# Patient Record
Sex: FEMALE | Race: WHITE | NOT HISPANIC OR LATINO | Employment: OTHER | URBAN - METROPOLITAN AREA
[De-identification: names, ages, dates, MRNs, and addresses within clinical notes are randomized per-mention and may not be internally consistent; named-entity substitution may affect disease eponyms.]

---

## 2017-02-06 ENCOUNTER — TELEPHONE (OUTPATIENT)
Dept: INTERNAL MEDICINE | Facility: CLINIC | Age: 77
End: 2017-02-06

## 2017-02-06 DIAGNOSIS — E78.5 HYPERLIPIDEMIA, UNSPECIFIED HYPERLIPIDEMIA TYPE: ICD-10-CM

## 2017-02-06 DIAGNOSIS — R73.01 ELEVATED FASTING GLUCOSE: Primary | ICD-10-CM

## 2017-02-06 NOTE — TELEPHONE ENCOUNTER
----- Message from Hali Lucas sent at 2/6/2017 12:40 PM EST -----  Pt has a medicare wellness appt scheduled 2/21/2017 at 1:15.  Pt requests to have labs before her appt so results are available to you when you see her.  Pt says you always have her do labs before.  There is no order please advise.  Pt# [phone]

## 2017-02-10 ENCOUNTER — LAB (OUTPATIENT)
Dept: INTERNAL MEDICINE | Facility: CLINIC | Age: 77
End: 2017-02-10

## 2017-02-10 DIAGNOSIS — R73.01 ELEVATED FASTING GLUCOSE: ICD-10-CM

## 2017-02-10 DIAGNOSIS — E78.5 HYPERLIPIDEMIA, UNSPECIFIED HYPERLIPIDEMIA TYPE: ICD-10-CM

## 2017-02-10 LAB
ALBUMIN SERPL-MCNC: 4.02 G/DL (ref 3.4–4.6)
ALBUMIN/GLOB SERPL: 1.3 G/DL
ALP SERPL-CCNC: 80 U/L (ref 46–116)
ALT SERPL W P-5'-P-CCNC: 34 U/L (ref 14–59)
ANION GAP SERPL CALCULATED.3IONS-SCNC: 11 MMOL/L
AST SERPL-CCNC: 23 U/L (ref 7–37)
BILIRUB SERPL-MCNC: 0.3 MG/DL (ref 0.2–1)
BUN BLD-MCNC: 17 MG/DL (ref 6–22)
BUN/CREAT SERPL: 23.9 (ref 7–25)
CALCIUM SPEC-SCNC: 9 MG/DL (ref 8.6–10.5)
CHLORIDE SERPL-SCNC: 104 MMOL/L (ref 95–107)
CHOLEST SERPL-MCNC: 118 MG/DL (ref 0–200)
CO2 SERPL-SCNC: 27 MMOL/L (ref 23–32)
CREAT BLD-MCNC: 0.71 MG/DL (ref 0.55–1.02)
GFR SERPL CREATININE-BSD FRML MDRD: 80 ML/MIN/1.73
GLOBULIN UR ELPH-MCNC: 3.2 GM/DL
GLUCOSE BLD-MCNC: 99 MG/DL (ref 70–100)
HBA1C MFR BLD: 6 % (ref 4.8–5.6)
HDLC SERPL-MCNC: 51 MG/DL (ref 40–81)
LDLC SERPL CALC-MCNC: 48 MG/DL (ref 0–100)
LDLC/HDLC SERPL: 0.95 {RATIO}
POTASSIUM BLD-SCNC: 4.6 MMOL/L (ref 3.3–5.3)
PROT SERPL-MCNC: 7.2 G/DL (ref 6.3–8.4)
SODIUM BLD-SCNC: 142 MMOL/L (ref 136–145)
TRIGL SERPL-MCNC: 93 MG/DL (ref 0–150)
VLDLC SERPL-MCNC: 18.6 MG/DL

## 2017-02-10 PROCEDURE — 80053 COMPREHEN METABOLIC PANEL: CPT | Performed by: INTERNAL MEDICINE

## 2017-02-10 PROCEDURE — 80061 LIPID PANEL: CPT | Performed by: INTERNAL MEDICINE

## 2017-02-21 ENCOUNTER — OFFICE VISIT (OUTPATIENT)
Dept: INTERNAL MEDICINE | Facility: CLINIC | Age: 77
End: 2017-02-21

## 2017-02-21 VITALS
WEIGHT: 165 LBS | DIASTOLIC BLOOD PRESSURE: 72 MMHG | SYSTOLIC BLOOD PRESSURE: 114 MMHG | RESPIRATION RATE: 14 BRPM | HEIGHT: 62 IN | BODY MASS INDEX: 30.36 KG/M2

## 2017-02-21 DIAGNOSIS — E78.00 HYPERCHOLESTEROLEMIA: ICD-10-CM

## 2017-02-21 DIAGNOSIS — K62.9 RECTAL ABNORMALITY: ICD-10-CM

## 2017-02-21 DIAGNOSIS — Z00.00 MEDICARE ANNUAL WELLNESS VISIT, SUBSEQUENT: Primary | ICD-10-CM

## 2017-02-21 DIAGNOSIS — R73.01 ELEVATED FASTING GLUCOSE: ICD-10-CM

## 2017-02-21 PROCEDURE — 99213 OFFICE O/P EST LOW 20 MIN: CPT | Performed by: INTERNAL MEDICINE

## 2017-02-21 PROCEDURE — G0439 PPPS, SUBSEQ VISIT: HCPCS | Performed by: INTERNAL MEDICINE

## 2017-02-21 NOTE — PATIENT INSTRUCTIONS
Resources for dietary advice re: pre-diabetes - ADA (American Diabetes Association) web site.  Sugar Busters Diet.

## 2017-02-21 NOTE — PROGRESS NOTES
QUICK REFERENCE INFORMATION:  The ABCs of the Annual Wellness Visit    Subsequent Medicare Wellness Visit    HEALTH RISK ASSESSMENT    Recent Hospitalizations:  No recent hospitalization(s)..        Current Medical Providers:  Patient Care Team:  Erica Johnston MD as PCP - General (Internal Medicine)        Smoking Status:  History   Smoking Status   • Never Smoker   Smokeless Tobacco   • Not on file       Alcohol Consumption:  History   Alcohol Use No       Depression Screen:   PHQ-9 Depression Screening 2/21/2017   Little interest or pleasure in doing things 0   Feeling down, depressed, or hopeless 0   Trouble falling or staying asleep, or sleeping too much 0   Feeling tired or having little energy 0   Poor appetite or overeating 0   Feeling bad about yourself - or that you are a failure or have let yourself or your family down 0   Trouble concentrating on things, such as reading the newspaper or watching television 0   Moving or speaking so slowly that other people could have noticed. Or the opposite - being so fidgety or restless that you have been moving around a lot more than usual 0   Thoughts that you would be better off dead, or of hurting yourself in some way 0   PHQ-9 Total Score 0   If you checked off any problems, how difficult have these problems made it for you to do your work, take care of things at home, or get along with other people? Not difficult at all       Health Habits and Functional and Cognitive Screening:  Functional & Cognitive Status 2/21/2017   Do you have difficulty preparing food and eating? No   Do you have difficulty bathing yourself? No   Do you have difficulty getting dressed? No   Do you have difficulty using the toilet? No   Do you have difficulty moving around from place to place? No   In the past year have you fallen or experienced a near fall? No   Do you need help using the phone?  No   Are you deaf or do you have serious difficulty hearing?  No   Do you need help with  transportation? No   Do you need help shopping? No   Do you need help preparing meals?  No   Do you need help with housework?  No   Do you need help with laundry? No   Do you need help taking your medications? No   Do you need help managing money? No   Do you have difficulty concentrating, remembering or making decisions? No              Does the patient have evidence of cognitive impairment? No    Asprin use counseling:she takes ASA    Finger Rub Hearing Test (right ear):passed  Finger Rub Hearing Test (left ear):passed      Recent Lab Results:  CMP:  Lab Results   Component Value Date     (H) 10/04/2016    BUN 17 02/10/2017    CREATININE 0.71 02/10/2017    EGFRIFNONA 80 02/10/2017    EGFRIFAFRI 88 10/04/2016    BCR 23.9 02/10/2017     02/10/2017    K 4.6 02/10/2017    CO2 27.0 02/10/2017    CALCIUM 9.0 02/10/2017    PROTENTOTREF 6.5 10/04/2016    ALBUMIN 4.02 02/10/2017    LABGLOBREF 2.0 10/04/2016    LABIL2 1.3 02/10/2017    BILITOT 0.3 02/10/2017    ALKPHOS 80 02/10/2017    AST 23 02/10/2017    ALT 34 02/10/2017     Lipid Panel:  Lab Results   Component Value Date    CHLPL 119 10/04/2016    TRIG 93 02/10/2017    HDL 51 02/10/2017    VLDL 18.6 02/10/2017    LDL 51 10/04/2016     LDL:     HbA1c:  Lab Results   Component Value Date    HGBA1C 6.00 (H) 02/10/2017     Urine Microalbumin:     Visual Acuity:  No exam data present    Age-appropriate Screening Schedule:  Refer to the list below for future screening recommendations based on patient's age, sex and/or medical conditions. Orders for these recommended tests are listed in the plan section. The patient has been provided with a written plan.    Health Maintenance   Topic Date Due   • PNEUMOCOCCAL VACCINES (65+ LOW/MEDIUM RISK) (2 of 2 - PCV13) 01/01/2011   • DXA SCAN  08/12/2016   • TDAP/TD VACCINES (2 - Td) 01/01/2018   • LIPID PANEL  02/10/2018   • MAMMOGRAM  08/15/2018   • COLONOSCOPY  02/27/2025   • INFLUENZA VACCINE  Completed   • ZOSTER VACCINE  " Completed        Subjective   History of Present Illness    Ashley Motta is a 77 y.o. female who presents for an Subsequent Wellness Visit. In addition, we addressed the following health issues:hyperlipidemia.    The following portions of the patient's history were reviewed and updated as appropriate: allergies, current medications, past family history, past medical history, past social history, past surgical history and problem list.    Outpatient Medications Prior to Visit   Medication Sig Dispense Refill   • aspirin 81 MG tablet Take 81 mg by mouth daily.     • B Complex Vitamins (VITAMIN B COMPLEX PO) Take  by mouth.     • Calcium Carbonate-Vitamin D (CALCIUM + D PO) Take  by mouth.     • Coenzyme Q10 (COQ10) 200 MG capsule Take  by mouth.     • lansoprazole (PREVACID) 30 MG capsule Take 30 mg by mouth daily.     • Misc Natural Products (GLUCOSAMINE CHONDROITIN VIT D3) capsule Take  by mouth.     • Multiple Vitamins-Minerals (MULTIVITAMIN ADULT PO) Take  by mouth.     • Omega-3 Fatty Acids (FISH OIL) 1000 MG capsule capsule Take  by mouth daily with breakfast.     • simvastatin (ZOCOR) 40 MG tablet Take 1 tablet by mouth every night. 90 tablet 3     No facility-administered medications prior to visit.        Patient Active Problem List   Diagnosis   • Osteopenia   • Hypercholesterolemia   • Elevated fasting glucose   • Osteoarthritis   • BALDEMAR (obstructive sleep apnea)       Identification of Risk Factors:  Risk factors include: weight .    Review of Systems    Compared to one year ago, the patient feels her physical health is the same.  Compared to one year ago, the patient feels her mental health is the same.    Objective     Physical Exam    Vitals:    02/21/17 1315   BP: 114/72   BP Location: Left arm   Patient Position: Sitting   Cuff Size: Adult   Resp: 14   Weight: 165 lb (74.8 kg)   Height: 62\" (157.5 cm)       Body mass index is 30.18 kg/(m^2).  Discussed the patient's BMI with her. The BMI is above " average; BMI management plan is completed.    Assessment/Plan   Patient Self-Management and Personalized Health Advice  The patient has been provided with information about: diet, exercise, weight management and designing advance directives and preventive services including:   · Advanced directives: has an advanced directive - a copy HAS NOT been provided.    Visit Diagnoses:  No diagnosis found.    No orders of the defined types were placed in this encounter.      Outpatient Encounter Prescriptions as of 2/21/2017   Medication Sig Dispense Refill   • aspirin 81 MG tablet Take 81 mg by mouth daily.     • B Complex Vitamins (VITAMIN B COMPLEX PO) Take  by mouth.     • Calcium Carbonate-Vitamin D (CALCIUM + D PO) Take  by mouth.     • Coenzyme Q10 (COQ10) 200 MG capsule Take  by mouth.     • lansoprazole (PREVACID) 30 MG capsule Take 30 mg by mouth daily.     • Misc Natural Products (GLUCOSAMINE CHONDROITIN VIT D3) capsule Take  by mouth.     • Multiple Vitamins-Minerals (MULTIVITAMIN ADULT PO) Take  by mouth.     • Omega-3 Fatty Acids (FISH OIL) 1000 MG capsule capsule Take  by mouth daily with breakfast.     • simvastatin (ZOCOR) 40 MG tablet Take 1 tablet by mouth every night. 90 tablet 3     No facility-administered encounter medications on file as of 2/21/2017.        Reviewed use of high risk medication in the elderly: not applicable  Reviewed for potential of harmful drug interactions in the elderly: not applicable    Follow Up:  No Follow-up on file.     An After Visit Summary and PPPS with all of these plans were given to the patient.

## 2017-02-21 NOTE — PROGRESS NOTES
"Chief Complaint   Patient presents with   • Medicare subsequent wellness   • Hyperlipidemia        Subjective   Ashley Motta is a 77 y.o. female     HPI: Hyperlipidemia:  No management changes were made at her last appointment. Her most recent lipid panel was done on 2/10/2017.  Total cholesterol was 118, Triglycerides 93, HDL 51, LDL 48.  She is currently taking a statin, fish oil.  Prudent diet and regular exercise have also been recommended. By report, there is good compliance with treatment and good tolerance.  She has not experienced statin associated myalgias, dyspepsia.  She has not had liver enzyme elevation.     Rectal problems - sometimes passes colored mucus.  A rectal \"repair\" was recommended after her hysterectomy 5-6 years ago.    Elevated fasting glucose:  She does not have history of diabetes.  However, she has had elevated fasting glucose in the past.  She denies polyuria, polydipsia, vision change appetite change, unexplained weight loss.   Lab Results   Component Value Date    HGBA1C 6.00 (H) 02/10/2017        The following portions of the patient's history were reviewed and updated as appropriate: allergies, current medications, past social history, problem list, past surgical history    Review of Systems   Constitutional: Negative for activity change and appetite change.   HENT: Negative for nosebleeds.    Eyes: Negative for visual disturbance.   Respiratory: Negative for cough and shortness of breath.    Cardiovascular: Negative for chest pain, palpitations and leg swelling.   Endocrine: Negative for cold intolerance, heat intolerance, polydipsia and polyuria.   Neurological: Negative for headaches.   Psychiatric/Behavioral: Negative for sleep disturbance.       Visit Vitals   • /72 (BP Location: Left arm, Patient Position: Sitting, Cuff Size: Adult)   • Resp 14   • Ht 62\" (157.5 cm)   • Wt 165 lb (74.8 kg)   • BMI 30.18 kg/m2        Objective   Physical Exam   Constitutional: She is " oriented to person, place, and time. She appears well-developed and well-nourished. No distress.   Neck: Normal carotid pulses present. Carotid bruit is not present.   Cardiovascular: Normal rate, regular rhythm, S1 normal, S2 normal and intact distal pulses.  Exam reveals no gallop and no friction rub.    No murmur heard.  Pulses:       Carotid pulses are 2+ on the right side, and 2+ on the left side.  Pulmonary/Chest: Effort normal and breath sounds normal. No respiratory distress. She has no wheezes. She has no rales. Chest wall is not dull to percussion. Right breast exhibits no inverted nipple, no mass, no nipple discharge, no skin change and no tenderness. Left breast exhibits no inverted nipple, no mass, no nipple discharge, no skin change and no tenderness.   Musculoskeletal: She exhibits no edema.   Neurological: She is alert and oriented to person, place, and time.   Skin: Skin is warm and dry.   Nursing note and vitals reviewed.      Assessment/Plan   Problem List Items Addressed This Visit        Cardiovascular and Mediastinum    Hypercholesterolemia       Endocrine    Elevated fasting glucose      Other Visit Diagnoses     Medicare annual wellness visit, subsequent    -  Primary

## 2017-04-21 ENCOUNTER — OFFICE VISIT (OUTPATIENT)
Dept: SURGERY | Facility: CLINIC | Age: 77
End: 2017-04-21

## 2017-04-21 VITALS
WEIGHT: 162.8 LBS | TEMPERATURE: 98.3 F | HEIGHT: 62 IN | HEART RATE: 84 BPM | OXYGEN SATURATION: 98 % | DIASTOLIC BLOOD PRESSURE: 70 MMHG | SYSTOLIC BLOOD PRESSURE: 116 MMHG | BODY MASS INDEX: 29.96 KG/M2

## 2017-04-21 DIAGNOSIS — N81.6 RECTOCELE: ICD-10-CM

## 2017-04-21 DIAGNOSIS — K64.8 INTERNAL HEMORRHOIDS WITH COMPLICATION: Primary | ICD-10-CM

## 2017-04-21 DIAGNOSIS — K64.4 EXTERNAL HEMORRHOIDS WITH COMPLICATION: ICD-10-CM

## 2017-04-21 PROCEDURE — 99204 OFFICE O/P NEW MOD 45 MIN: CPT | Performed by: COLON & RECTAL SURGERY

## 2017-04-21 PROCEDURE — 46600 DIAGNOSTIC ANOSCOPY SPX: CPT | Performed by: COLON & RECTAL SURGERY

## 2017-04-21 RX ORDER — CEFAZOLIN SODIUM 2 G/100ML
2 INJECTION, SOLUTION INTRAVENOUS ONCE
Status: CANCELLED | OUTPATIENT
Start: 2017-04-21 | End: 2017-04-21

## 2017-04-21 RX ORDER — SODIUM CHLORIDE 0.9 % (FLUSH) 0.9 %
1-10 SYRINGE (ML) INJECTION AS NEEDED
Status: CANCELLED | OUTPATIENT
Start: 2017-04-21

## 2017-04-21 NOTE — PROGRESS NOTES
Ashley Motta is a 77 y.o. female who is seen as a consult at the request of Erica Johnston MD for Fecal Incontinence.      HPI:    Pt states she had hysterectomy     States she was told at that time that she would require a repair to her rectum   Never had repair done to rectum s/p hysterectomy    Pt uncertain if she was diagnosed with rectocele    Pt states her rectum has a lip at the back that does not close in    Causes fecal leakage about once-twice per month    Has mucus drainage, malodorous, from anus    Wears liner in underwear    Endorses extruding anorectal tissue      All vaginal deliveries  Pt uncertain if any episiotomies    Most recent 2015 Dr. 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 SARAH LIRIANO   • HYSTERECTOMY      Total   • KNEE SURGERY     • SHOULDER ARTHROSCOPY Left        Social History:   reports that she has never smoked. She has never used smokeless tobacco. She reports that she does not drink alcohol or use illicit drugs.      Marriage status:     Family History   Problem Relation Age of Onset   • Heart disease Mother    • Lung cancer Father    • Hypertension Father    • Heart disease Father    • Heart attack Brother    • Emphysema Brother    • COPD Brother    • Depression Brother    • Heart attack Brother      Had stents placed          Current Outpatient Prescriptions:   •  aspirin 81 MG tablet, Take 81 mg by mouth daily., Disp: , Rfl:   •  B Complex Vitamins (VITAMIN B COMPLEX PO), Take  by mouth., Disp: , Rfl:    •  Calcium Carbonate-Vitamin D (CALCIUM + D PO), Take  by mouth., Disp: , Rfl:   •  Coenzyme Q10 (COQ10) 200 MG capsule, Take  by mouth., Disp: , Rfl:   •  lansoprazole (PREVACID) 30 MG capsule, Take 30 mg by mouth daily., Disp: , Rfl:   •  Misc Natural Products (GLUCOSAMINE CHONDROITIN VIT D3) capsule, Take  by mouth., Disp: , Rfl:   •  Multiple Vitamins-Minerals (MULTIVITAMIN ADULT PO), Take  by mouth., Disp: , Rfl:   •  Omega-3 Fatty Acids (FISH OIL) 1000 MG capsule capsule, Take  by mouth daily with breakfast., Disp: , Rfl:   •  simvastatin (ZOCOR) 40 MG tablet, Take 1 tablet by mouth every night., Disp: 90 tablet, Rfl: 3    Allergy  Review of patient's allergies indicates no known allergies.    Review of Systems   Constitution: Negative for decreased appetite, weakness and weight gain.   HENT: Negative for congestion, headaches, hearing loss and hoarse voice.    Eyes: Negative for blurred vision, discharge and visual disturbance.   Cardiovascular: Negative for chest pain, cyanosis and leg swelling.   Respiratory: Positive for snoring. Negative for cough, shortness of breath and sleep disturbances due to breathing.    Endocrine: Negative for cold intolerance and heat intolerance.   Hematologic/Lymphatic: Bruises/bleeds easily.   Skin: Negative for itching, poor wound healing and skin cancer.   Musculoskeletal: Positive for arthritis. Negative for back pain, joint pain and joint swelling.   Gastrointestinal: Positive for change in bowel habit and constipation. Negative for abdominal pain and bowel incontinence.   Genitourinary: Negative for bladder incontinence, dysuria and hematuria.   Neurological: Negative for brief paralysis, excessive daytime sleepiness, dizziness, focal weakness and light-headedness.   Psychiatric/Behavioral: Negative for altered mental status and hallucinations. The patient does not have insomnia.    Allergic/Immunologic: Negative for HIV exposure and persistent infections.   All other  systems reviewed and are negative.      Vitals:    04/21/17 0901   BP: 116/70   Pulse: 84   Temp: 98.3 °F (36.8 °C)   SpO2: 98%     Body mass index is 29.78 kg/(m^2).    Physical Exam   Constitutional: She is oriented to person, place, and time. She appears well-developed and well-nourished. No distress.   HENT:   Head: Normocephalic and atraumatic.   Nose: Nose normal.   Mouth/Throat: Oropharynx is clear and moist.   Eyes: Conjunctivae and EOM are normal. Pupils are equal, round, and reactive to light.   Neck: Normal range of motion. No tracheal deviation present.   Pulmonary/Chest: Effort normal and breath sounds normal. No respiratory distress.   Abdominal: Soft. Bowel sounds are normal. She exhibits no distension.   Genitourinary:   Genitourinary Comments: Perianal exam: external hem - enlarged RA  TIMOTEO- tone sl decresase. moderate to large rectocele, no masses  Anoscopy performed:  Grade 3 x 1 internal hem: RA.  Grade 2 x 2: RP & LL   Musculoskeletal: Normal range of motion. She exhibits no edema or deformity.   Neurological: She is alert and oriented to person, place, and time. No cranial nerve deficit. Coordination and gait normal.   Skin: Skin is warm and dry.   Psychiatric: She has a normal mood and affect. Her behavior is normal. Judgment normal.       Review of Medical Record:  I reviewed Dr. Rico pathology report cy 2015    Assessment:  1. Internal hemorrhoids with complication    2. External hemorrhoids with complication    3. Rectocele        Plan:    I recommend hemorroidectomy.  I discussed risk including bleeding, infection, injury to sphincters; benefits; and alternatives.  I discussed in detail expected recovery, possible urinary retention, time off activities, and healing.  Patient expresses understanding and wishes to proceed.    To urogynecology for rectocele    Scribed for Tono Munoz MD by Naila Gill PA-C 4/21/2017  This patient was evaluated by me, recommendations made, documentation  reviewed, edited, and revised by me, Tono Munoz MD

## 2017-07-05 ENCOUNTER — TELEPHONE (OUTPATIENT)
Dept: INTERNAL MEDICINE | Facility: CLINIC | Age: 77
End: 2017-07-05

## 2017-07-05 DIAGNOSIS — R73.01 ELEVATED FASTING GLUCOSE: ICD-10-CM

## 2017-07-05 DIAGNOSIS — E78.5 HYPERLIPIDEMIA, UNSPECIFIED HYPERLIPIDEMIA TYPE: Primary | ICD-10-CM

## 2017-07-05 NOTE — TELEPHONE ENCOUNTER
----- Message from Tianna Fernandez sent at 7/5/2017  8:11 AM EDT -----  Contact: pt  Please have Dr hatfield put lab orders in for this patient. She is coming in on 7/7/17. Thanks

## 2017-07-06 RX ORDER — SIMVASTATIN 40 MG
TABLET ORAL
Qty: 90 TABLET | Refills: 0 | Status: SHIPPED | OUTPATIENT
Start: 2017-07-06 | End: 2017-10-10 | Stop reason: SDUPTHER

## 2017-07-07 ENCOUNTER — LAB (OUTPATIENT)
Dept: LAB | Facility: HOSPITAL | Age: 77
End: 2017-07-07

## 2017-07-07 DIAGNOSIS — E78.5 HYPERLIPIDEMIA, UNSPECIFIED HYPERLIPIDEMIA TYPE: ICD-10-CM

## 2017-07-07 DIAGNOSIS — R73.01 ELEVATED FASTING GLUCOSE: ICD-10-CM

## 2017-07-07 LAB
ALBUMIN SERPL-MCNC: 4.5 G/DL (ref 3.5–5.2)
ALBUMIN/GLOB SERPL: 1.4 G/DL
ALP SERPL-CCNC: 81 U/L (ref 39–117)
ALT SERPL W P-5'-P-CCNC: 33 U/L (ref 1–33)
ANION GAP SERPL CALCULATED.3IONS-SCNC: 13.9 MMOL/L
AST SERPL-CCNC: 27 U/L (ref 1–32)
BILIRUB SERPL-MCNC: 0.3 MG/DL (ref 0.1–1.2)
BUN BLD-MCNC: 14 MG/DL (ref 8–23)
BUN/CREAT SERPL: 17.9 (ref 7–25)
CALCIUM SPEC-SCNC: 9.3 MG/DL (ref 8.6–10.5)
CHLORIDE SERPL-SCNC: 101 MMOL/L (ref 98–107)
CHOLEST SERPL-MCNC: 132 MG/DL (ref 0–200)
CO2 SERPL-SCNC: 23.1 MMOL/L (ref 22–29)
CREAT BLD-MCNC: 0.78 MG/DL (ref 0.57–1)
GFR SERPL CREATININE-BSD FRML MDRD: 72 ML/MIN/1.73
GLOBULIN UR ELPH-MCNC: 3.2 GM/DL
GLUCOSE BLD-MCNC: 123 MG/DL (ref 65–99)
HBA1C MFR BLD: 5.93 % (ref 4.8–5.6)
HDLC SERPL-MCNC: 48 MG/DL (ref 40–60)
LDLC SERPL CALC-MCNC: 62 MG/DL (ref 0–100)
LDLC/HDLC SERPL: 1.28 {RATIO}
POTASSIUM BLD-SCNC: 4.5 MMOL/L (ref 3.5–5.2)
PROT SERPL-MCNC: 7.7 G/DL (ref 6–8.5)
SODIUM BLD-SCNC: 138 MMOL/L (ref 136–145)
TRIGL SERPL-MCNC: 112 MG/DL (ref 0–150)
VLDLC SERPL-MCNC: 22.4 MG/DL (ref 5–40)

## 2017-07-07 PROCEDURE — 36415 COLL VENOUS BLD VENIPUNCTURE: CPT

## 2017-07-07 PROCEDURE — 80053 COMPREHEN METABOLIC PANEL: CPT

## 2017-07-07 PROCEDURE — 83036 HEMOGLOBIN GLYCOSYLATED A1C: CPT

## 2017-07-07 PROCEDURE — 80061 LIPID PANEL: CPT

## 2017-07-14 ENCOUNTER — OFFICE VISIT (OUTPATIENT)
Dept: INTERNAL MEDICINE | Facility: CLINIC | Age: 77
End: 2017-07-14

## 2017-07-14 VITALS
WEIGHT: 171 LBS | DIASTOLIC BLOOD PRESSURE: 73 MMHG | HEIGHT: 62 IN | BODY MASS INDEX: 31.47 KG/M2 | SYSTOLIC BLOOD PRESSURE: 112 MMHG

## 2017-07-14 DIAGNOSIS — E78.00 HYPERCHOLESTEROLEMIA: ICD-10-CM

## 2017-07-14 DIAGNOSIS — N81.6 RECTOCELE: Primary | ICD-10-CM

## 2017-07-14 DIAGNOSIS — Z79.899 ENCOUNTER FOR MEDICATION MANAGEMENT: ICD-10-CM

## 2017-07-14 DIAGNOSIS — R73.01 ELEVATED FASTING GLUCOSE: ICD-10-CM

## 2017-07-14 DIAGNOSIS — E78.5 HYPERLIPIDEMIA, UNSPECIFIED HYPERLIPIDEMIA TYPE: ICD-10-CM

## 2017-07-14 PROCEDURE — 99213 OFFICE O/P EST LOW 20 MIN: CPT | Performed by: INTERNAL MEDICINE

## 2017-08-22 ENCOUNTER — OFFICE VISIT (OUTPATIENT)
Dept: OBSTETRICS AND GYNECOLOGY | Age: 77
End: 2017-08-22

## 2017-08-22 DIAGNOSIS — N81.10 BLADDER PROLAPSE, FEMALE, ACQUIRED: Primary | ICD-10-CM

## 2017-08-22 DIAGNOSIS — N81.10 PELVIC RELAXATION DUE TO VAGINAL PROLAPSE: ICD-10-CM

## 2017-08-22 PROCEDURE — 99204 OFFICE O/P NEW MOD 45 MIN: CPT | Performed by: OBSTETRICS & GYNECOLOGY

## 2017-08-22 NOTE — PROGRESS NOTES
Basilio Motta is a 77 y.o. female is being seen today for No chief complaint on file.  .    History of Present Illness  Patient is here for consultation for possible vaginal relaxation.  She had a hysterectomy for prolapse and a 15 years ago and ever since then has had a slight problem with her rectum being somewhat relaxed.  She now has to help her stool come out and she feels a bulge in wipes it.  She was seen by her PCP here for drawn to Dr. Munoz for hemorrhoids and Dr. Munoz mentioned the prolapse referred her to GYN.  She does not have any stress incontinence at this time.  She is still very much sexually active and would like to continue with that.  However the past history including her medicines she is a  interestingly she had 2 children in her 20s they got remarried and had her last son at 48.  Only medical problems are hypercholesterolemia and reflux past surgical history is just hysterectomy and she thinks she had a bladder repair at the time.  Family history quite significant for heart disease and lung a melanoma.  Mammograms bone densities and colons all up-to-date.      The following portions of the patient's history were reviewed and updated as appropriate: allergies, current medications, past family history, past medical history, past social history, past surgical history and problem list.    There were no vitals filed for this visit.      PAST MEDICAL HISTORY  Past Medical History:   Diagnosis Date   • Elevated fasting glucose    • GERD (gastroesophageal reflux disease)    • Goiter     THYROID POLYP SEES    • Hyperlipidemia    • Hypersomnia    • BALDEMAR on CPAP    • Osteoarthritis    • Osteopenia    • RLS (restless legs syndrome)      OB History     No data available        Past Surgical History:   Procedure Laterality Date   • CATARACT EXTRACTION, BILATERAL Bilateral    • COLONOSCOPY N/A 2015    DR. SARAH LIRIANO   • HYSTERECTOMY  2004    Total   • KNEE SURGERY     •  SHOULDER ARTHROSCOPY Left      Family History   Problem Relation Age of Onset   • Heart disease Mother    • Lung cancer Father    • Hypertension Father    • Heart disease Father    • Heart attack Brother    • Emphysema Brother    • COPD Brother    • Depression Brother    • Heart attack Brother      Had stents placed 2009     History   Smoking Status   • Never Smoker   Smokeless Tobacco   • Never Used       Current Outpatient Prescriptions:   •  aspirin 81 MG tablet, Take 81 mg by mouth daily., Disp: , Rfl:   •  B Complex Vitamins (VITAMIN B COMPLEX PO), Take  by mouth., Disp: , Rfl:   •  Calcium Carbonate-Vitamin D (CALCIUM + D PO), Take  by mouth., Disp: , Rfl:   •  Coenzyme Q10 (COQ10) 200 MG capsule, Take  by mouth., Disp: , Rfl:   •  lansoprazole (PREVACID) 30 MG capsule, Take 30 mg by mouth daily., Disp: , Rfl:   •  Misc Natural Products (GLUCOSAMINE CHONDROITIN VIT D3) capsule, Take  by mouth., Disp: , Rfl:   •  Multiple Vitamins-Minerals (MULTIVITAMIN ADULT PO), Take  by mouth., Disp: , Rfl:   •  Omega-3 Fatty Acids (FISH OIL) 1000 MG capsule capsule, Take  by mouth daily with breakfast., Disp: , Rfl:   •  simvastatin (ZOCOR) 40 MG tablet, TAKE ONE TABLET BY MOUTH EVERY NIGHT, Disp: 90 tablet, Rfl: 0  Immunization History   Administered Date(s) Administered   • Influenza Split High Dose Preservative Free IM 10/11/2016   • Pneumococcal Polysaccharide 01/01/2010   • Tdap 01/01/2008   • Zoster 11/25/2008       Review of Systems  Positive for GI problems and vaginal discomfort   Objective   Physical Exam  On exam just spreading the labia there is obvious bulge was the rectocele.  But she also has enterocele and a cystocele present.      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  I described the alternatives  of using a pessary or surgery and because she is sexually active she would rather not use a pessary.  I then discussed the surgery wasn't involved fixing her enterocele and the different ways to do that but I do not perform a sacral colpopexy so I had we'll refer her on to Dr. Wyman.  This is all discussed with the patient.  No return appointment necessary    45 minutes was spent with the patient, more than half of which was spent in counseling

## 2017-08-25 DIAGNOSIS — Z12.31 ENCOUNTER FOR SCREENING MAMMOGRAM FOR BREAST CANCER: Primary | ICD-10-CM

## 2017-08-25 DIAGNOSIS — Z13.820 SCREENING FOR OSTEOPOROSIS: ICD-10-CM

## 2017-08-28 ENCOUNTER — OFFICE VISIT (OUTPATIENT)
Dept: INTERNAL MEDICINE | Facility: CLINIC | Age: 77
End: 2017-08-28

## 2017-08-28 ENCOUNTER — APPOINTMENT (OUTPATIENT)
Dept: WOMENS IMAGING | Facility: HOSPITAL | Age: 77
End: 2017-08-28

## 2017-08-28 DIAGNOSIS — Z13.820 SCREENING FOR OSTEOPOROSIS: ICD-10-CM

## 2017-08-28 DIAGNOSIS — Z12.31 ENCOUNTER FOR SCREENING MAMMOGRAM FOR BREAST CANCER: ICD-10-CM

## 2017-08-28 PROCEDURE — G0202 SCR MAMMO BI INCL CAD: HCPCS | Performed by: RADIOLOGY

## 2017-08-28 PROCEDURE — 77080 DXA BONE DENSITY AXIAL: CPT | Performed by: INTERNAL MEDICINE

## 2017-08-28 PROCEDURE — G0202 SCR MAMMO BI INCL CAD: HCPCS | Performed by: INTERNAL MEDICINE

## 2017-09-05 ENCOUNTER — OFFICE VISIT (OUTPATIENT)
Dept: ORTHOPEDIC SURGERY | Facility: CLINIC | Age: 77
End: 2017-09-05

## 2017-09-05 VITALS — BODY MASS INDEX: 31.28 KG/M2 | TEMPERATURE: 98.4 F | WEIGHT: 170 LBS | HEIGHT: 62 IN

## 2017-09-05 DIAGNOSIS — M17.12 ARTHRITIS OF LEFT KNEE: ICD-10-CM

## 2017-09-05 DIAGNOSIS — M25.562 CHRONIC PAIN OF BOTH KNEES: Primary | ICD-10-CM

## 2017-09-05 DIAGNOSIS — M25.561 CHRONIC PAIN OF BOTH KNEES: Primary | ICD-10-CM

## 2017-09-05 DIAGNOSIS — M17.11 ARTHRITIS OF RIGHT KNEE: ICD-10-CM

## 2017-09-05 DIAGNOSIS — G89.29 CHRONIC PAIN OF BOTH KNEES: Primary | ICD-10-CM

## 2017-09-05 PROCEDURE — 73562 X-RAY EXAM OF KNEE 3: CPT | Performed by: ORTHOPAEDIC SURGERY

## 2017-09-05 PROCEDURE — 99204 OFFICE O/P NEW MOD 45 MIN: CPT | Performed by: ORTHOPAEDIC SURGERY

## 2017-09-05 PROCEDURE — 20610 DRAIN/INJ JOINT/BURSA W/O US: CPT | Performed by: ORTHOPAEDIC SURGERY

## 2017-09-05 RX ORDER — BUPIVACAINE HYDROCHLORIDE 5 MG/ML
2 INJECTION, SOLUTION EPIDURAL; INTRACAUDAL
Status: COMPLETED | OUTPATIENT
Start: 2017-09-05 | End: 2017-09-05

## 2017-09-05 RX ORDER — LIDOCAINE HYDROCHLORIDE 10 MG/ML
2 INJECTION, SOLUTION EPIDURAL; INFILTRATION; INTRACAUDAL; PERINEURAL
Status: COMPLETED | OUTPATIENT
Start: 2017-09-05 | End: 2017-09-05

## 2017-09-05 RX ORDER — METHYLPREDNISOLONE ACETATE 80 MG/ML
80 INJECTION, SUSPENSION INTRA-ARTICULAR; INTRALESIONAL; INTRAMUSCULAR; SOFT TISSUE
Status: COMPLETED | OUTPATIENT
Start: 2017-09-05 | End: 2017-09-05

## 2017-09-05 RX ORDER — CALCIUM CARBONATE 300MG(750)
1 TABLET,CHEWABLE ORAL DAILY
COMMUNITY

## 2017-09-05 RX ADMIN — LIDOCAINE HYDROCHLORIDE 2 ML: 10 INJECTION, SOLUTION EPIDURAL; INFILTRATION; INTRACAUDAL; PERINEURAL at 13:39

## 2017-09-05 RX ADMIN — METHYLPREDNISOLONE ACETATE 80 MG: 80 INJECTION, SUSPENSION INTRA-ARTICULAR; INTRALESIONAL; INTRAMUSCULAR; SOFT TISSUE at 13:39

## 2017-09-05 RX ADMIN — BUPIVACAINE HYDROCHLORIDE 2 ML: 5 INJECTION, SOLUTION EPIDURAL; INTRACAUDAL at 13:39

## 2017-09-05 NOTE — PROGRESS NOTES
Patient Name: Ashley Motta   YOB: 1940  Referring Primary Care Physician: Erica Johnston MD      Chief Complaint:    Chief Complaint   Patient presents with   • Left Knee - Establish Care   • Right Knee - Establish Care        Subjective:    HPI:   Ashley Motta is a pleasant 77 y.o. year old who presents today for evaluation of   Chief Complaint   Patient presents with   • Left Knee - Establish Care   • Right Knee - Establish Care   . charlene knee pain.  Atraumatic.  L>R.  Exercises in gym.  Discussed various forms of exercises.  Achy, nonradiating.  No nsaids- tries tylenol and linamments.  Cnstant.  Scopes 5-7 yrs ago    This problem is new to this examiner.     Medications:   Home Medications:  Current Outpatient Prescriptions on File Prior to Visit   Medication Sig   • aspirin 81 MG tablet Take 81 mg by mouth daily.   • B Complex Vitamins (VITAMIN B COMPLEX PO) Take  by mouth.   • Calcium Carbonate-Vitamin D (CALCIUM + D PO) Take  by mouth 2 (Two) Times a Day.   • Coenzyme Q10 (COQ10) 200 MG capsule Take  by mouth.   • lansoprazole (PREVACID) 30 MG capsule Take 30 mg by mouth daily.   • Misc Natural Products (GLUCOSAMINE CHONDROITIN VIT D3) capsule Take  by mouth.   • Multiple Vitamins-Minerals (MULTIVITAMIN ADULT PO) Take  by mouth.   • Omega-3 Fatty Acids (FISH OIL) 1000 MG capsule capsule Take  by mouth daily with breakfast.   • simvastatin (ZOCOR) 40 MG tablet TAKE ONE TABLET BY MOUTH EVERY NIGHT     No current facility-administered medications on file prior to visit.      Current Medications:  Scheduled Meds:  Continuous Infusions:  No current facility-administered medications for this visit.   PRN Meds:.    I have reviewed the patient's medical history in detail and updated the computerized patient record.  Review and summarization of old records includes:    Past Medical History:   Diagnosis Date   • Elevated fasting glucose    • GERD (gastroesophageal reflux disease)    • Goiter     THYROID  POLYP SEES    • Hyperlipidemia    • Hypersomnia    • BALDEMAR on CPAP    • Osteoarthritis    • Osteopenia    • RLS (restless legs syndrome)         Past Surgical History:   Procedure Laterality Date   • CATARACT EXTRACTION, BILATERAL Bilateral 2015   • COLONOSCOPY N/A 02/27/2015    DR. SARAH LIRIANO   • HYSTERECTOMY  2004    Total   • KNEE SURGERY Left 2013   • KNEE SURGERY Right 2010   • SHOULDER ARTHROSCOPY Left         Social History     Occupational History   • Not on file.     Social History Main Topics   • Smoking status: Never Smoker   • Smokeless tobacco: Never Used   • Alcohol use No   • Drug use: No   • Sexual activity: Defer      Social History     Social History Narrative        Family History   Problem Relation Age of Onset   • Heart disease Mother    • Lung cancer Father    • Hypertension Father    • Heart disease Father    • Heart attack Brother    • Emphysema Brother    • COPD Brother    • Depression Brother    • Heart attack Brother      Had stents placed 2009       ROS: 14 point review of systems was performed and all other systems were reviewed and are negative except for documented findings in HPI and today's encounter.     Allergies: No Known Allergies  Constitutional:  Denies fever, shaking or chills   Eyes:  Denies change in visual acuity   HENT:  Denies nasal congestion or sore throat   Respiratory:  Denies cough or shortness of breath   Cardiovascular:  Denies chest pain or severe LE edema   GI:  Denies abdominal pain, nausea, vomiting, bloody stools or diarrhea   Musculoskeletal:  Numbness, tingling, pain, or loss of motor function only as noted above in history of present illness.  : Denies painful urination or hematuria  Integument:  Denies rash, lesion or ulceration   Neurologic:  Denies headache or focal weakness  Endocrine:  Denies lymphadenopathy  Psych:  Denies confusion or change in mental status   Hem:  Denies active bleeding    Objective:    Physical Exam: 77 y.o. female  Body  mass index is 31.09 kg/(m^2)., @WT@, @HT@  Vitals:    09/05/17 1313   Temp: 98.4 °F (36.9 °C)     Vital signs reviewed.   General Appearance:    Alert, cooperative, in no acute distress                  Eyes: conjunctiva clear  ENT: external ears and nose atraumatic  CV: no peripheral edema  Resp: normal respiratory effort  Skin: no rashes or wounds; normal turgor  Psych: mood and affect appropriate  Lymph: no nodes appreciated  Neuro: gross sensation intact  Vascular:  Palpable peripheral pulse in noted extremity  Musculoskeletal Extremities: KNEE Exam: medial joint line tenderness with crepitation, synovitis, swelling, and joint effusion bilateral knee.    Radiology:   Imaging done today and discussed at length with the patient:    Indication: pain related symptoms,  Views: 3V AP, LAT & 40 degree PA bilateral knee(s)   Findings: advanced arthritis  Comparison views: not available      Assessment:     ICD-10-CM ICD-9-CM   1. Chronic pain of both knees M25.561 719.46    M25.562 338.29    G89.29    2. Arthritis of left knee M19.90 716.96   3. Arthritis of right knee M19.90 716.96        Large Joint Arthrocentesis  Date/Time: 9/5/2017 1:39 PM  Consent given by: patient  Site marked: site marked  Timeout: Immediately prior to procedure a time out was called to verify the correct patient, procedure, equipment, support staff and site/side marked as required   Supporting Documentation  Indications: pain and joint swelling   Procedure Details  Location: knee - R knee  Preparation: Patient was prepped and draped in the usual sterile fashion  Needle size: 22 G  Approach: anterolateral  Medications administered: 80 mg methylPREDNISolone acetate 80 MG/ML; 2 mL bupivacaine (PF) 0.5 %; 2 mL lidocaine PF 1% 1 %  Patient tolerance: patient tolerated the procedure well with no immediate complications    Large Joint Arthrocentesis  Date/Time: 9/5/2017 1:39 PM  Consent given by: patient  Site marked: site marked  Timeout: Immediately  prior to procedure a time out was called to verify the correct patient, procedure, equipment, support staff and site/side marked as required   Supporting Documentation  Indications: pain and joint swelling   Procedure Details  Location: knee - L knee  Preparation: Patient was prepped and draped in the usual sterile fashion  Needle size: 22 G  Approach: anterolateral  Medications administered: 80 mg methylPREDNISolone acetate 80 MG/ML; 2 mL bupivacaine (PF) 0.5 %; 2 mL lidocaine PF 1% 1 %  Patient tolerance: patient tolerated the procedure well with no immediate complications             Plan: Biomechanics of pertinent body area discussed.  Risks, benefits, alternatives, comparisons, and complications of accepted medicines, injections, recommendations, surgical procedures, and therapies explained and education provided in laymen's terms. The patient was given the opportunity to ask questions and they were answerved to their satisfaction.   Natural history and expected course of this patient's diagnosis discussed along with evaluation of therapies. Questions answered.  Address and update of wt loss, physical exercises, use of assistive devices, and monitoring of Medications per orders to address ortho complaints; Evaluation and discussion of safety, precautions, side effects, and warnings given especially of long term NSAID therapy.  Lifestyle measures for weight loss, suggest starting at 20lbs, with biomechanical explanations for weight loss and how this affects orthopedic condition.  Cortisone Injection for DIAGNOSTIC and THERAPUTIC purposes.  OTC analgesics as needed with dosage warning and instructions given.  Cryotherapy/brachy therapy as indicated with instructions.   PT referral offered and declined, advised on stretching/strengthening exercises.  Advised on knee strengthening exercises, stressed importance of these with progression of arthritis, demonstrated exercises to patient with repeat demonstration and  verb of understanding.   Rest, ice, compression, and elevation (RICE) therapy.  Biomechanics and proper use of ambulatory aids:  crutches, walker, cane, &/or trekking poles.      9/5/2017

## 2017-10-09 RX ORDER — SIMVASTATIN 40 MG
TABLET ORAL
Qty: 90 TABLET | Refills: 3 | Status: SHIPPED | OUTPATIENT
Start: 2017-10-09 | End: 2018-03-20 | Stop reason: SDUPTHER

## 2017-10-10 ENCOUNTER — OFFICE VISIT (OUTPATIENT)
Dept: INTERNAL MEDICINE | Facility: CLINIC | Age: 77
End: 2017-10-10

## 2017-10-10 VITALS
TEMPERATURE: 98.5 F | DIASTOLIC BLOOD PRESSURE: 80 MMHG | HEART RATE: 74 BPM | WEIGHT: 172 LBS | OXYGEN SATURATION: 98 % | SYSTOLIC BLOOD PRESSURE: 116 MMHG | BODY MASS INDEX: 31.46 KG/M2

## 2017-10-10 DIAGNOSIS — B02.9 HERPES ZOSTER WITHOUT COMPLICATION: Primary | ICD-10-CM

## 2017-10-10 PROCEDURE — 99213 OFFICE O/P EST LOW 20 MIN: CPT | Performed by: NURSE PRACTITIONER

## 2017-10-10 RX ORDER — LIDOCAINE 50 MG/G
OINTMENT TOPICAL
Qty: 1 TUBE | Refills: 3 | Status: ON HOLD | OUTPATIENT
Start: 2017-10-10 | End: 2018-10-18

## 2017-10-10 RX ORDER — FAMCICLOVIR 500 MG/1
500 TABLET ORAL 3 TIMES DAILY
Qty: 21 TABLET | Refills: 0 | Status: SHIPPED | OUTPATIENT
Start: 2017-10-10 | End: 2022-07-20 | Stop reason: SDUPTHER

## 2017-10-10 NOTE — PATIENT INSTRUCTIONS
Shingles  Shingles, which is also known as herpes zoster, is an infection that causes a painful skin rash and fluid-filled blisters. Shingles is not related to genital herpes, which is a sexually transmitted infection.   Shingles only develops in people who:  · Have had chickenpox.  · Have received the chickenpox vaccine. (This is rare.)  CAUSES  Shingles is caused by varicella-zoster virus (VZV). This is the same virus that causes chickenpox. After exposure to VZV, the virus stays in the body in an inactive (dormant) state. Shingles develops if the virus reactivates. This can happen many years after the initial exposure to VZV. It is not known what causes this virus to reactivate.  RISK FACTORS  People who have had chickenpox or received the chickenpox vaccine are at risk for shingles. Infection is more common in people who:  · Are older than age 50.  · Have a weakened defense (immune) system, such as those with HIV, AIDS, or cancer.  · Are taking medicines that weaken the immune system, such as transplant medicines.  · Are under great stress.  SYMPTOMS  Early symptoms of this condition include itching, tingling, and pain in an area on your skin. Pain may be described as burning, stabbing, or throbbing.  A few days or weeks after symptoms start, a painful red rash appears, usually on one side of the body in a bandlike or beltlike pattern. The rash eventually turns into fluid-filled blisters that break open, scab over, and dry up in about 2-3 weeks.  At any time during the infection, you may also develop:  · A fever.  · Chills.  · A headache.  · An upset stomach.  DIAGNOSIS  This condition is diagnosed with a skin exam. Sometimes, skin or fluid samples are taken from the blisters before a diagnosis is made. These samples are examined under a microscope or sent to a lab for testing.  TREATMENT  There is no specific cure for this condition. Your health care provider will probably prescribe medicines to help you manage  pain, recover more quickly, and avoid long-term problems. Medicines may include:  · Antiviral drugs.  · Anti-inflammatory drugs.  · Pain medicines.  If the area involved is on your face, you may be referred to a specialist, such as an eye doctor (ophthalmologist) or an ear, nose, and throat (ENT) doctor to help you avoid eye problems, chronic pain, or disability.  HOME CARE INSTRUCTIONS  Medicines  · Take medicines only as directed by your health care provider.  · Apply an anti-itch or numbing cream to the affected area as directed by your health care provider.  Blister and Rash Care  · Take a cool bath or apply cool compresses to the area of the rash or blisters as directed by your health care provider. This may help with pain and itching.  · Keep your rash covered with a loose bandage (dressing). Wear loose-fitting clothing to help ease the pain of material rubbing against the rash.  · Keep your rash and blisters clean with mild soap and cool water or as directed by your health care provider.  · Check your rash every day for signs of infection. These include redness, swelling, and pain that lasts or increases.  · Do not pick your blisters.  · Do not scratch your rash.  General Instructions  · Rest as directed by your health care provider.  · Keep all follow-up visits as directed by your health care provider. This is important.  · Until your blisters scab over, your infection can cause chickenpox in people who have never had it or been vaccinated against it. To prevent this from happening, avoid contact with other people, especially:    Babies.    Pregnant women.    Children who have eczema.    Elderly people who have transplants.    People who have chronic illnesses, such as leukemia or AIDS.  SEEK MEDICAL CARE IF:  · Your pain is not relieved with prescribed medicines.  · Your pain does not get better after the rash heals.  · Your rash looks infected. Signs of infection include redness, swelling, and pain that  lasts or increases.  SEEK IMMEDIATE MEDICAL CARE IF:  · The rash is on your face or nose.  · You have facial pain, pain around your eye area, or loss of feeling on one side of your face.  · You have ear pain or you have ringing in your ear.  · You have loss of taste.  · Your condition gets worse.     This information is not intended to replace advice given to you by your health care provider. Make sure you discuss any questions you have with your health care provider.     Document Released: 12/18/2006 Document Revised: 04/10/2017 Document Reviewed: 10/29/2015  [employer] Interactive Patient Education ©2017 [employer]

## 2017-10-10 NOTE — PROGRESS NOTES
Subjective   Ashley Motta is a 77 y.o. female.     HPI Comments: She reports was ball game on Saturday and then noticed a lesion on her posterior neck, Then yesterday she noticed three small lesions to her right neck. She reports some sensitivity to neck. She applied hydrocortisone without relief. She has had zoster vaccination and had similar outbreak several years ago to lower abdomen.     Skin Problem   This is a new problem. The current episode started in the past 7 days. The problem has been gradually worsening. Associated symptoms include neck pain and a rash. Pertinent negatives include no chest pain, fever, headaches, nausea, numbness, sore throat, swollen glands or vomiting. Treatments tried: hydrocortisone  The treatment provided no relief.        The following portions of the patient's history were reviewed and updated as appropriate: allergies, current medications and problem list.    Review of Systems   Constitutional: Negative for fever.   HENT: Negative for sore throat.    Cardiovascular: Negative for chest pain.   Gastrointestinal: Negative for nausea and vomiting.   Musculoskeletal: Positive for neck pain.   Skin: Positive for rash.   Neurological: Negative for numbness and headaches.       Objective   Physical Exam   Constitutional: She is oriented to person, place, and time. She appears well-developed and well-nourished.   HENT:   Head: Normocephalic.   Nose: Nose normal.   Cardiovascular: Regular rhythm and normal heart sounds.  Exam reveals no S3 and no S4.    No murmur heard.  Pulmonary/Chest: Effort normal and breath sounds normal. She has no decreased breath sounds. She has no wheezes. She has no rhonchi. She has no rales.   Musculoskeletal: She exhibits no edema.   Neurological: She is alert and oriented to person, place, and time. Gait normal.   Skin: Skin is warm and dry. Rash noted. Rash is maculopapular and vesicular.   Rash noted to right neck anterior and posterior    Psychiatric: She  has a normal mood and affect.       Assessment/Plan   Ashley was seen today for skin problem.    Diagnoses and all orders for this visit:    Herpes zoster without complication  -     famciclovir (FAMVIR) 500 MG tablet; Take 1 tablet by mouth 3 (Three) Times a Day for 7 days.  -     lidocaine (XYLOCAINE) 5 % ointment; Apply  topically Every 2 (Two) Hours As Needed for Mild Pain  or Moderate Pain . Apply to affected skin area

## 2017-11-06 ENCOUNTER — OFFICE VISIT (OUTPATIENT)
Dept: SLEEP MEDICINE | Facility: HOSPITAL | Age: 77
End: 2017-11-06
Attending: INTERNAL MEDICINE

## 2017-11-06 ENCOUNTER — APPOINTMENT (OUTPATIENT)
Dept: LAB | Facility: HOSPITAL | Age: 77
End: 2017-11-06

## 2017-11-06 VITALS
WEIGHT: 172 LBS | SYSTOLIC BLOOD PRESSURE: 123 MMHG | HEIGHT: 63 IN | DIASTOLIC BLOOD PRESSURE: 67 MMHG | HEART RATE: 74 BPM | BODY MASS INDEX: 30.48 KG/M2

## 2017-11-06 DIAGNOSIS — Z99.89 OSA ON CPAP: Primary | ICD-10-CM

## 2017-11-06 DIAGNOSIS — G47.33 OSA ON CPAP: Primary | ICD-10-CM

## 2017-11-06 PROCEDURE — G0463 HOSPITAL OUTPT CLINIC VISIT: HCPCS

## 2017-11-06 NOTE — PROGRESS NOTES
"[facility] PULMONARY CARE         Dr Fredi Harrison  [unfilled]  Patient Care Team:  Erica Johnston MD as PCP - General (Internal Medicine)  Kan Garcia MD as PCP - Claims Attributed    Chief Complaint: BALDEMAR with restless leg syndrome    Interval History: This is a very pleasant 77-year-old female here for followup, accompanied by her .  The patient was set up on 11 cm. Currently compliance 100%. AHI is normal. Leak is slightly high at 39 minutes. She feels benefit from the machine. She goes to bed at 10:30 p.m., gets up at 5:30, gets about 7 to 8 hours of sleep and feels rested. 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  Benefit from CPAP    REVIEW OF SYSTEMS:   CARDIOVASCULAR: No chest pain, chest pressure or chest discomfort. No palpitations or edema.   RESPIRATORY: No shortness of breath, cough or sputum.   GASTROINTESTINAL: No anorexia, nausea, vomiting or diarrhea. No abdominal pain or blood.   HEMATOLOGIC: No bleeding or bruising.     Ventilator/Non-Invasive Ventilation Settings     None            Vital Signs  Heart Rate:  [74] 74  BP: (123)/(67) 123/67  [unfilled]  Flowsheet Rows         First Filed Value    Admission Height  63\" (160 cm) Documented at 11/06/2017 0947    Admission Weight  172 lb (78 kg) Documented at 11/06/2017 0947          Physical Exam:   General Appearance:    Alert, cooperative, in no acute distress  ENT Mallampati between 3 and 4    Lungs:     Clear to auscultation,respirations regular, even and                  unlabored    Heart:    Regular rhythm and normal rate, normal S1 and S2, no            murmur, no gallop, no rub, no click   Chest Wall:    No abnormalities observed   Abdomen:     Normal bowel sounds, no masses, no organomegaly, soft        non-tender, non-distended, no guarding, no rebound                tenderness   Extremities:   Moves all extremities well, no edema, no cyanosis, no             redness     Results Review:                                          I reviewed the " patient's new clinical results.  I personally viewed and interpreted the patient's CXR        Medication Review:         No current facility-administered medications for this visit.     ASSESSMENT:   Obstructive sleep apnea syndrome comorbidities of restless leg/periodic leg movement    PLAN:  Excellent compliance AHI leak  Sleep hygiene measures  Continue CPAP 11 cm  Supplies renewed for 1 year  Follow-up in 1 year    Fredi Harrison MD  11/06/17  10:45 AM

## 2017-11-08 ENCOUNTER — LAB (OUTPATIENT)
Dept: LAB | Facility: HOSPITAL | Age: 77
End: 2017-11-08

## 2017-11-08 DIAGNOSIS — E78.5 HYPERLIPIDEMIA, UNSPECIFIED HYPERLIPIDEMIA TYPE: Primary | ICD-10-CM

## 2017-11-08 DIAGNOSIS — R73.01 ELEVATED FASTING GLUCOSE: ICD-10-CM

## 2017-11-08 LAB
ALBUMIN SERPL-MCNC: 4.6 G/DL (ref 3.5–5.2)
ALBUMIN/GLOB SERPL: 1.5 G/DL
ALP SERPL-CCNC: 78 U/L (ref 39–117)
ALT SERPL W P-5'-P-CCNC: 35 U/L (ref 1–33)
ANION GAP SERPL CALCULATED.3IONS-SCNC: 14.9 MMOL/L
AST SERPL-CCNC: 31 U/L (ref 1–32)
BACTERIA UR QL AUTO: ABNORMAL /HPF
BILIRUB SERPL-MCNC: 0.3 MG/DL (ref 0.1–1.2)
BILIRUB UR QL STRIP: NEGATIVE
BUN BLD-MCNC: 17 MG/DL (ref 8–23)
BUN/CREAT SERPL: 19.3 (ref 7–25)
CALCIUM SPEC-SCNC: 9.9 MG/DL (ref 8.6–10.5)
CHLORIDE SERPL-SCNC: 105 MMOL/L (ref 98–107)
CHOLEST SERPL-MCNC: 114 MG/DL (ref 0–200)
CLARITY UR: ABNORMAL
CO2 SERPL-SCNC: 23.1 MMOL/L (ref 22–29)
COLOR UR: YELLOW
CREAT BLD-MCNC: 0.88 MG/DL (ref 0.57–1)
DEPRECATED RDW RBC AUTO: 50.2 FL (ref 37–54)
ERYTHROCYTE [DISTWIDTH] IN BLOOD BY AUTOMATED COUNT: 14.9 % (ref 11.7–13)
GFR SERPL CREATININE-BSD FRML MDRD: 63 ML/MIN/1.73
GLOBULIN UR ELPH-MCNC: 3.1 GM/DL
GLUCOSE BLD-MCNC: 115 MG/DL (ref 65–99)
GLUCOSE UR STRIP-MCNC: NEGATIVE MG/DL
HBA1C MFR BLD: 6.02 % (ref 4.8–5.6)
HCT VFR BLD AUTO: 43.8 % (ref 35.6–45.5)
HDLC SERPL-MCNC: 46 MG/DL (ref 40–60)
HGB BLD-MCNC: 13.8 G/DL (ref 11.9–15.5)
HGB UR QL STRIP.AUTO: NEGATIVE
HYALINE CASTS UR QL AUTO: ABNORMAL /LPF
KETONES UR QL STRIP: NEGATIVE
LDLC SERPL CALC-MCNC: 45 MG/DL (ref 0–100)
LDLC/HDLC SERPL: 0.99 {RATIO}
LEUKOCYTE ESTERASE UR QL STRIP.AUTO: ABNORMAL
MCH RBC QN AUTO: 29.5 PG (ref 26.9–32)
MCHC RBC AUTO-ENTMCNC: 31.5 G/DL (ref 32.4–36.3)
MCV RBC AUTO: 93.6 FL (ref 80.5–98.2)
NITRITE UR QL STRIP: NEGATIVE
PH UR STRIP.AUTO: <=5 [PH] (ref 5–8)
PLATELET # BLD AUTO: 290 10*3/MM3 (ref 140–500)
PMV BLD AUTO: 11.7 FL (ref 6–12)
POTASSIUM BLD-SCNC: 4.3 MMOL/L (ref 3.5–5.2)
PROT SERPL-MCNC: 7.7 G/DL (ref 6–8.5)
PROT UR QL STRIP: NEGATIVE
RBC # BLD AUTO: 4.68 10*6/MM3 (ref 3.9–5.2)
RBC # UR: ABNORMAL /HPF
REF LAB TEST METHOD: ABNORMAL
SODIUM BLD-SCNC: 143 MMOL/L (ref 136–145)
SP GR UR STRIP: 1.02 (ref 1–1.03)
SQUAMOUS #/AREA URNS HPF: ABNORMAL /HPF
TRIGL SERPL-MCNC: 113 MG/DL (ref 0–150)
UROBILINOGEN UR QL STRIP: ABNORMAL
VLDLC SERPL-MCNC: 22.6 MG/DL (ref 5–40)
WBC NRBC COR # BLD: 7.72 10*3/MM3 (ref 4.5–10.7)
WBC UR QL AUTO: ABNORMAL /HPF

## 2017-11-08 PROCEDURE — 80061 LIPID PANEL: CPT

## 2017-11-08 PROCEDURE — 36415 COLL VENOUS BLD VENIPUNCTURE: CPT

## 2017-11-08 PROCEDURE — 83036 HEMOGLOBIN GLYCOSYLATED A1C: CPT

## 2017-11-08 PROCEDURE — 85027 COMPLETE CBC AUTOMATED: CPT

## 2017-11-08 PROCEDURE — 80053 COMPREHEN METABOLIC PANEL: CPT

## 2017-11-08 PROCEDURE — 81001 URINALYSIS AUTO W/SCOPE: CPT

## 2017-11-17 ENCOUNTER — OFFICE VISIT (OUTPATIENT)
Dept: INTERNAL MEDICINE | Facility: CLINIC | Age: 77
End: 2017-11-17

## 2017-11-17 VITALS
WEIGHT: 171 LBS | HEIGHT: 63 IN | DIASTOLIC BLOOD PRESSURE: 68 MMHG | BODY MASS INDEX: 30.3 KG/M2 | SYSTOLIC BLOOD PRESSURE: 120 MMHG

## 2017-11-17 DIAGNOSIS — R73.01 ELEVATED FASTING GLUCOSE: ICD-10-CM

## 2017-11-17 DIAGNOSIS — E78.5 HYPERLIPIDEMIA, UNSPECIFIED HYPERLIPIDEMIA TYPE: Primary | ICD-10-CM

## 2017-11-17 PROCEDURE — 99214 OFFICE O/P EST MOD 30 MIN: CPT | Performed by: INTERNAL MEDICINE

## 2017-11-17 NOTE — PROGRESS NOTES
Chief Complaint   Patient presents with   • Hyperlipidemia     elevated glucose, review recent consultatiosn,4 month follow up        Subjective   Ashley Motta is a 77 y.o. female     HPI:   She has seen several specialists since her last appointment here.  She's been to see a gynecologist, will rectal surgery, and has had consultation with urogynecology.  She has been diagnosed with a rectocele.  She has tried a pessary.  She states that she's not really bothered by the rectocele.  She is more bothered by bulging in the anal area when she has bowel movements and some incontinence of fecal material.  She feels as if her rectum sticks out when she has a bowel movement.  Sometimes there  are a few drops of blood.        Hyperlipidemia:  This is a chronic problem.   No management changes were made at her last appointment.   Her most recent lipid panel was done on 11/8/2017.  Total cholesterol was 114, Triglycerides 113, HDL 46, LDL 45.   Current treatment: Statin medication.   Prudent diet and regular exercise have also been recommended. By report, there is good compliance with treatment and good tolerance.    She has not experienced statin associated myalgias, dyspepsia.  Her recent lab work showed a very slight elevation of the ALT to 35.  Family history is significant for heart disease, heart attacks, coronary artery disease.    Elevated fasting glucose:  She  has had elevated fasting glucose in the past.  She denies polyuria, polydipsia, vision change appetite change, unexplained weight loss.   Lab Results   Component Value Date    HGBA1C 6.02 (H) 11/08/2017      She is using CPAP, tolerates it well.         The following portions of the patient's history were reviewed and updated as appropriate: allergies, current medications, past social history, problem list, past surgical history    Review of Systems   Constitutional: Negative for activity change and appetite change.   HENT: Negative for nosebleeds.    Eyes:  "Negative for visual disturbance.   Respiratory: Negative for cough and shortness of breath.    Cardiovascular: Negative for chest pain, palpitations and leg swelling.   Neurological: Negative for headaches.   Psychiatric/Behavioral: Negative for sleep disturbance.           Objective     /68  Ht 63\" (160 cm)  Wt 171 lb (77.6 kg)  BMI 30.29 kg/m2     Physical Exam   Constitutional: She is oriented to person, place, and time. She appears well-developed and well-nourished. No distress.   Neck: Normal carotid pulses present. Carotid bruit is not present.   Cardiovascular: Normal rate, regular rhythm, S1 normal, S2 normal and intact distal pulses.  Exam reveals no gallop and no friction rub.    No murmur heard.  Pulses:       Carotid pulses are 2+ on the right side, and 2+ on the left side.  Pulmonary/Chest: Effort normal and breath sounds normal. No respiratory distress. She has no wheezes. She has no rhonchi. She has no rales. Chest wall is not dull to percussion.   Musculoskeletal: She exhibits no edema.   Neurological: She is alert and oriented to person, place, and time.   Skin: Skin is warm and dry.   Nursing note and vitals reviewed.      Assessment/Plan   Problem List Items Addressed This Visit        Cardiovascular and Mediastinum    Hyperlipidemia - Primary    Relevant Orders    Comprehensive Metabolic Panel    Lipid Panel       Endocrine    Elevated fasting glucose    Relevant Orders    Hemoglobin A1c        Long discussion about her recent consultations and the findings.  Reviewed notes from her gynecologist, from the uro gynecologist and also from colorectal surgery.  Explained to her that she has been found to have a rectocele.  This does not mean that her rectum bulges out from the anus when she has a bowel movement.  It does bulge into the vaginal vault.  She is not bothered by this.  She is bothered by the sensation of something coming out of her anus when she has a bowel movement.  Explained to " her that these are most likely the hemorrhoids.  The blood spots that she sees are likely coming from the hemorrhoids.  Advised her to contact the colorectal surgeon again to discuss having hemorrhoidectomy.

## 2018-03-15 ENCOUNTER — LAB (OUTPATIENT)
Dept: LAB | Facility: HOSPITAL | Age: 78
End: 2018-03-15

## 2018-03-15 DIAGNOSIS — R73.01 ELEVATED FASTING GLUCOSE: ICD-10-CM

## 2018-03-15 DIAGNOSIS — E78.5 HYPERLIPIDEMIA, UNSPECIFIED HYPERLIPIDEMIA TYPE: ICD-10-CM

## 2018-03-15 LAB
ALBUMIN SERPL-MCNC: 4.6 G/DL (ref 3.5–5.2)
ALBUMIN/GLOB SERPL: 1.7 G/DL
ALP SERPL-CCNC: 71 U/L (ref 39–117)
ALT SERPL W P-5'-P-CCNC: 34 U/L (ref 1–33)
ANION GAP SERPL CALCULATED.3IONS-SCNC: 10.5 MMOL/L
AST SERPL-CCNC: 32 U/L (ref 1–32)
BILIRUB SERPL-MCNC: 0.3 MG/DL (ref 0.1–1.2)
BUN BLD-MCNC: 17 MG/DL (ref 8–23)
BUN/CREAT SERPL: 17.5 (ref 7–25)
CALCIUM SPEC-SCNC: 9.5 MG/DL (ref 8.6–10.5)
CHLORIDE SERPL-SCNC: 105 MMOL/L (ref 98–107)
CHOLEST SERPL-MCNC: 114 MG/DL (ref 0–200)
CO2 SERPL-SCNC: 25.5 MMOL/L (ref 22–29)
CREAT BLD-MCNC: 0.97 MG/DL (ref 0.57–1)
GFR SERPL CREATININE-BSD FRML MDRD: 56 ML/MIN/1.73
GLOBULIN UR ELPH-MCNC: 2.7 GM/DL
GLUCOSE BLD-MCNC: 108 MG/DL (ref 65–99)
HBA1C MFR BLD: 5.7 % (ref 4.8–5.6)
HDLC SERPL-MCNC: 48 MG/DL (ref 40–60)
LDLC SERPL CALC-MCNC: 38 MG/DL (ref 0–100)
LDLC/HDLC SERPL: 0.79 {RATIO}
POTASSIUM BLD-SCNC: 4.5 MMOL/L (ref 3.5–5.2)
PROT SERPL-MCNC: 7.3 G/DL (ref 6–8.5)
SODIUM BLD-SCNC: 141 MMOL/L (ref 136–145)
TRIGL SERPL-MCNC: 140 MG/DL (ref 0–150)
VLDLC SERPL-MCNC: 28 MG/DL (ref 5–40)

## 2018-03-15 PROCEDURE — 80061 LIPID PANEL: CPT

## 2018-03-15 PROCEDURE — 80053 COMPREHEN METABOLIC PANEL: CPT

## 2018-03-15 PROCEDURE — 83036 HEMOGLOBIN GLYCOSYLATED A1C: CPT

## 2018-03-20 ENCOUNTER — OFFICE VISIT (OUTPATIENT)
Dept: INTERNAL MEDICINE | Facility: CLINIC | Age: 78
End: 2018-03-20

## 2018-03-20 VITALS
SYSTOLIC BLOOD PRESSURE: 114 MMHG | BODY MASS INDEX: 28 KG/M2 | HEIGHT: 63 IN | DIASTOLIC BLOOD PRESSURE: 64 MMHG | WEIGHT: 158 LBS

## 2018-03-20 DIAGNOSIS — G25.81 RESTLESS LEGS: ICD-10-CM

## 2018-03-20 DIAGNOSIS — E78.5 HYPERLIPIDEMIA, UNSPECIFIED HYPERLIPIDEMIA TYPE: Primary | ICD-10-CM

## 2018-03-20 DIAGNOSIS — Z23 NEED FOR VACCINATION: ICD-10-CM

## 2018-03-20 DIAGNOSIS — R73.01 ELEVATED FASTING GLUCOSE: ICD-10-CM

## 2018-03-20 PROCEDURE — 90632 HEPA VACCINE ADULT IM: CPT | Performed by: INTERNAL MEDICINE

## 2018-03-20 PROCEDURE — 99214 OFFICE O/P EST MOD 30 MIN: CPT | Performed by: INTERNAL MEDICINE

## 2018-03-20 PROCEDURE — 90471 IMMUNIZATION ADMIN: CPT | Performed by: INTERNAL MEDICINE

## 2018-03-20 RX ORDER — ROPINIROLE 0.5 MG/1
0.5 TABLET, FILM COATED ORAL NIGHTLY
Qty: 90 TABLET | Refills: 1 | Status: SHIPPED | OUTPATIENT
Start: 2018-03-20 | End: 2018-08-25 | Stop reason: SDUPTHER

## 2018-03-20 RX ORDER — SIMVASTATIN 40 MG
40 TABLET ORAL
Qty: 90 TABLET | Refills: 3 | Status: SHIPPED | OUTPATIENT
Start: 2018-03-20 | End: 2019-03-11 | Stop reason: SDUPTHER

## 2018-03-20 NOTE — PROGRESS NOTES
Chief Complaint   Patient presents with   • Hyperlipidemia     4 month follow up, also several questions, review lab       Subjective   Ashley Motta is a 78 y.o. female.     She would like to have the hepatitis A vaccine, is interested in treatment for restless legs.      Hyperlipidemia   This is a chronic problem. The problem is controlled. Recent lipid tests were reviewed and are normal. She has no history of diabetes (but has elevated fasting glucose). There are no known factors aggravating her hyperlipidemia. Pertinent negatives include no chest pain, leg pain, myalgias or shortness of breath. Current antihyperlipidemic treatment includes statins, herbal therapy and diet change. The current treatment provides significant improvement of lipids. There are no compliance problems.    Lipid panel done 3/15/2018 showed total cholesterol 114, triglycerides 140, HDL 48 and LDL 38.   Elevated fasting glucose:  She  has had elevated fasting glucose in the past.  She denies polyuria, polydipsia, vision change appetite change, unexplained weight loss.   Lab Results   Component Value Date    HGBA1C 5.70 (H) 03/15/2018      Restless Legs:  Problem with restless legs was noted when she had sleep study for BALDEMAR.  She is not taking medication for this.  She would like to try something.   She uses CPAP.      The following portions of the patient's history were reviewed and updated as appropriate: allergies, current medications, past family history, past medical history, past social history, past surgical history and problem list.    Review of Systems   Constitutional: Negative for appetite change.   HENT: Negative for nosebleeds.    Eyes: Negative for blurred vision and double vision.   Respiratory: Negative for cough and shortness of breath.    Cardiovascular: Negative for chest pain, palpitations and leg swelling.   Musculoskeletal: Negative for myalgias.   Neurological: Negative for headaches.         Current Outpatient  "Prescriptions:   •  aspirin 81 MG tablet, Take 81 mg by mouth daily., Disp: , Rfl:   •  B Complex Vitamins (VITAMIN B COMPLEX PO), Take  by mouth., Disp: , Rfl:   •  Calcium Carbonate-Vitamin D (CALCIUM + D PO), Take  by mouth 2 (Two) Times a Day., Disp: , Rfl:   •  Coenzyme Q10 (COQ10) 200 MG capsule, Take  by mouth., Disp: , Rfl:   •  lansoprazole (PREVACID) 30 MG capsule, Take 30 mg by mouth daily., Disp: , Rfl:   •  lidocaine (XYLOCAINE) 5 % ointment, Apply  topically Every 2 (Two) Hours As Needed for Mild Pain  or Moderate Pain . Apply to affected skin area, Disp: 1 tube, Rfl: 3  •  Magnesium 400 MG tablet, Take  by mouth., Disp: , Rfl:   •  Misc Natural Products (GLUCOSAMINE CHONDROITIN VIT D3) capsule, Take  by mouth., Disp: , Rfl:   •  Multiple Vitamins-Minerals (MULTIVITAMIN ADULT PO), Take  by mouth., Disp: , Rfl:   •  Omega-3 Fatty Acids (FISH OIL) 1000 MG capsule capsule, Take  by mouth daily with breakfast., Disp: , Rfl:   •  simvastatin (ZOCOR) 40 MG tablet, Take 1 tablet by mouth every night at bedtime., Disp: 90 tablet, Rfl: 3  •  rOPINIRole (REQUIP) 0.5 MG tablet, Take 1 tablet by mouth Every Night. Take 1 hour before bedtime., Disp: 90 tablet, Rfl: 1        Objective     Vitals:    03/20/18 1250   BP: 114/64   Weight: 71.7 kg (158 lb)   Height: 160 cm (63\")       Physical Exam   Constitutional: She is oriented to person, place, and time. She appears well-developed and well-nourished. No distress.   Neck: Normal carotid pulses present. Carotid bruit is not present.   Cardiovascular: Regular rhythm, S1 normal and S2 normal.  Exam reveals no gallop and no friction rub.    No murmur heard.  Pulses:       Carotid pulses are 2+ on the right side, and 2+ on the left side.  Pulmonary/Chest: Effort normal and breath sounds normal. She has no wheezes. She has no rhonchi. She has no rales. Chest wall is not dull to percussion.   Musculoskeletal: She exhibits no edema.   Neurological: She is alert and oriented " to person, place, and time.   Skin: Skin is warm and dry.         Assessment/Plan   Ashley was seen today for hyperlipidemia.    Diagnoses and all orders for this visit:    Hyperlipidemia, unspecified hyperlipidemia type  -     Comprehensive Metabolic Panel; Future  -     Urinalysis With / Microscopic If Indicated - Urine, Clean Catch; Future  -     Lipid Panel; Future    Elevated fasting glucose  -     Hemoglobin A1c; Future    Need for vaccination    Restless legs    Other orders  -     Hepatitis A Vaccine Adult IM  -     rOPINIRole (REQUIP) 0.5 MG tablet; Take 1 tablet by mouth Every Night. Take 1 hour before bedtime.  -     simvastatin (ZOCOR) 40 MG tablet; Take 1 tablet by mouth every night at bedtime.      Trial of Requip for restless legs.  She is also advised she could take melatonin to help her sleep.  She will continue using CPAP.  Discussed slightly elevated AST.  Advised her it has been stable.  We will continue to monitor.

## 2018-05-02 ENCOUNTER — OFFICE VISIT (OUTPATIENT)
Dept: INTERNAL MEDICINE | Facility: CLINIC | Age: 78
End: 2018-05-02

## 2018-05-02 VITALS
HEIGHT: 63 IN | HEART RATE: 76 BPM | SYSTOLIC BLOOD PRESSURE: 122 MMHG | BODY MASS INDEX: 27.29 KG/M2 | TEMPERATURE: 97.7 F | WEIGHT: 154 LBS | OXYGEN SATURATION: 100 % | DIASTOLIC BLOOD PRESSURE: 74 MMHG

## 2018-05-02 DIAGNOSIS — B02.9 HERPES ZOSTER WITHOUT COMPLICATION: Primary | ICD-10-CM

## 2018-05-02 PROCEDURE — 99213 OFFICE O/P EST LOW 20 MIN: CPT | Performed by: NURSE PRACTITIONER

## 2018-05-02 RX ORDER — VALACYCLOVIR HYDROCHLORIDE 1 G/1
1000 TABLET, FILM COATED ORAL 3 TIMES DAILY
Qty: 21 TABLET | Refills: 0 | Status: SHIPPED | OUTPATIENT
Start: 2018-05-02 | End: 2018-05-09

## 2018-05-02 NOTE — PROGRESS NOTES
Subjective   Ashley Motta is a 78 y.o. female who presents due to a rash on the left upper arm.    She c/o a rash on the left upper arm which she noticed Friday, states rash is painful but not pruritic. She reports a remote hx of shingles (prior to 2008), received Zostavax in 2008.      Rash   This is a new problem. The current episode started in the past 7 days (noticed last Friday). The problem has been gradually worsening since onset. The affected locations include the left arm. The rash is characterized by blistering and redness. She was exposed to nothing. Pertinent negatives include no congestion, cough, fatigue, fever, shortness of breath, sore throat or vomiting. Past treatments include nothing.        The following portions of the patient's history were reviewed and updated as appropriate: allergies, current medications, past social history and problem list.    Past Medical History:   Diagnosis Date   • Elevated fasting glucose    • GERD (gastroesophageal reflux disease)    • Goiter     THYROID POLYP SEES    • Hyperlipidemia    • Hypersomnia    • BALDEMAR on CPAP    • Osteoarthritis    • Osteopenia    • RLS (restless legs syndrome)          Current Outpatient Prescriptions:   •  aspirin 81 MG tablet, Take 81 mg by mouth daily., Disp: , Rfl:   •  B Complex Vitamins (VITAMIN B COMPLEX PO), Take  by mouth., Disp: , Rfl:   •  Calcium Carbonate-Vitamin D (CALCIUM + D PO), Take  by mouth 2 (Two) Times a Day., Disp: , Rfl:   •  Coenzyme Q10 (COQ10) 200 MG capsule, Take  by mouth., Disp: , Rfl:   •  lansoprazole (PREVACID) 30 MG capsule, Take 30 mg by mouth daily., Disp: , Rfl:   •  lidocaine (XYLOCAINE) 5 % ointment, Apply  topically Every 2 (Two) Hours As Needed for Mild Pain  or Moderate Pain . Apply to affected skin area, Disp: 1 tube, Rfl: 3  •  Magnesium 400 MG tablet, Take  by mouth., Disp: , Rfl:   •  Misc Natural Products (GLUCOSAMINE CHONDROITIN VIT D3) capsule, Take  by mouth., Disp: , Rfl:   •   "Multiple Vitamins-Minerals (MULTIVITAMIN ADULT PO), Take  by mouth., Disp: , Rfl:   •  Omega-3 Fatty Acids (FISH OIL) 1000 MG capsule capsule, Take  by mouth daily with breakfast., Disp: , Rfl:   •  rOPINIRole (REQUIP) 0.5 MG tablet, Take 1 tablet by mouth Every Night. Take 1 hour before bedtime., Disp: 90 tablet, Rfl: 1  •  simvastatin (ZOCOR) 40 MG tablet, Take 1 tablet by mouth every night at bedtime., Disp: 90 tablet, Rfl: 3  •  valACYclovir (VALTREX) 1000 MG tablet, Take 1 tablet by mouth 3 (Three) Times a Day for 7 days., Disp: 21 tablet, Rfl: 0    No Known Allergies    Review of Systems   Constitutional: Negative for chills, fatigue, fever and unexpected weight change.   HENT: Negative for congestion, ear pain, postnasal drip, sinus pressure, sore throat and trouble swallowing.    Eyes: Negative for visual disturbance.   Respiratory: Negative for cough, chest tightness, shortness of breath and wheezing.    Cardiovascular: Negative for chest pain, palpitations and leg swelling.   Gastrointestinal: Negative for abdominal pain, blood in stool, nausea and vomiting.   Genitourinary: Negative for dysuria, frequency and urgency.   Musculoskeletal: Negative for arthralgias and joint swelling.   Skin: Positive for rash. Negative for color change.   Neurological: Negative for syncope, weakness and headaches.   Hematological: Does not bruise/bleed easily.       Objective   Vitals:    05/02/18 0930   BP: 122/74   BP Location: Left arm   Patient Position: Sitting   Cuff Size: Adult   Pulse: 76   Temp: 97.7 °F (36.5 °C)   TempSrc: Oral   SpO2: 100%   Weight: 69.9 kg (154 lb)   Height: 160 cm (63\")     Physical Exam   Constitutional: She appears well-developed and well-nourished. She is cooperative. She does not have a sickly appearance. She does not appear ill.   HENT:   Head: Normocephalic.   Right Ear: Hearing, tympanic membrane and external ear normal. No drainage, swelling or tenderness. No mastoid tenderness. Tympanic " membrane is not injected, not scarred, not erythematous and not bulging. Tympanic membrane mobility is normal. No middle ear effusion. No decreased hearing is noted.   Left Ear: Hearing, tympanic membrane and external ear normal.   Nose: Nose normal. No mucosal edema, rhinorrhea, sinus tenderness or nasal deformity. Right sinus exhibits no maxillary sinus tenderness and no frontal sinus tenderness. Left sinus exhibits no maxillary sinus tenderness and no frontal sinus tenderness.   Mouth/Throat: Oropharynx is clear and moist and mucous membranes are normal. Normal dentition.   Eyes: Conjunctivae and lids are normal. Pupils are equal, round, and reactive to light. Right eye exhibits no discharge and no exudate. Left eye exhibits no discharge and no exudate.   Neck: Trachea normal and normal range of motion. Carotid bruit is not present. No edema present. No thyroid mass and no thyromegaly present.   Cardiovascular: Regular rhythm, normal heart sounds and normal pulses.    No murmur heard.  Pulmonary/Chest: Breath sounds normal. No respiratory distress. She has no decreased breath sounds. She has no wheezes. She has no rhonchi. She has no rales.   Lymphadenopathy:        Head (right side): No submental, no submandibular, no tonsillar, no preauricular, no posterior auricular and no occipital adenopathy present.        Head (left side): No submental, no submandibular, no tonsillar, no preauricular, no posterior auricular and no occipital adenopathy present.   Neurological: She is alert.   Skin: Skin is warm, dry and intact. Rash noted. Rash is maculopapular and vesicular. No cyanosis. Nails show no clubbing.   Erythematous, vesicular rash on the left upper arm; rash is in a dermatomal distrubution       Assessment/Plan   Ashley was seen today for herpes zoster.    Diagnoses and all orders for this visit:    Herpes zoster without complication  -     valACYclovir (VALTREX) 1000 MG tablet; Take 1 tablet by mouth 3 (Three)  Times a Day for 7 days.    Will start antiviral even though it has been >72 hours since outbreak began, she states area is painful but she has not taken anything. She may taken Tylenol as needed. She is advised to call with worsening symptoms and/or if pain is not well-controlled (declined meds today).

## 2018-05-07 ENCOUNTER — CLINICAL SUPPORT (OUTPATIENT)
Dept: ORTHOPEDIC SURGERY | Facility: CLINIC | Age: 78
End: 2018-05-07

## 2018-05-07 VITALS — HEIGHT: 63 IN | BODY MASS INDEX: 27.29 KG/M2 | TEMPERATURE: 98.1 F | WEIGHT: 154 LBS

## 2018-05-07 DIAGNOSIS — M17.0 ARTHRITIS OF BOTH KNEES: Primary | ICD-10-CM

## 2018-05-07 PROCEDURE — 99213 OFFICE O/P EST LOW 20 MIN: CPT | Performed by: ORTHOPAEDIC SURGERY

## 2018-05-07 PROCEDURE — 20610 DRAIN/INJ JOINT/BURSA W/O US: CPT | Performed by: ORTHOPAEDIC SURGERY

## 2018-05-07 RX ORDER — METHYLPREDNISOLONE ACETATE 80 MG/ML
80 INJECTION, SUSPENSION INTRA-ARTICULAR; INTRALESIONAL; INTRAMUSCULAR; SOFT TISSUE
Status: COMPLETED | OUTPATIENT
Start: 2018-05-07 | End: 2018-05-07

## 2018-05-07 RX ORDER — LIDOCAINE HYDROCHLORIDE 20 MG/ML
4 INJECTION, SOLUTION EPIDURAL; INFILTRATION; INTRACAUDAL; PERINEURAL
Status: COMPLETED | OUTPATIENT
Start: 2018-05-07 | End: 2018-05-07

## 2018-05-07 RX ADMIN — METHYLPREDNISOLONE ACETATE 80 MG: 80 INJECTION, SUSPENSION INTRA-ARTICULAR; INTRALESIONAL; INTRAMUSCULAR; SOFT TISSUE at 08:10

## 2018-05-07 RX ADMIN — LIDOCAINE HYDROCHLORIDE 4 ML: 20 INJECTION, SOLUTION EPIDURAL; INFILTRATION; INTRACAUDAL; PERINEURAL at 08:10

## 2018-05-07 NOTE — PROGRESS NOTES
Patient: Ashley Motta  YOB: 1940    Chief Complaints:  bilateral knee pain    Subjective:    History of Present Illness: Here today for knee pain. The pain is a generalized joint tenderness.  It has been progressive in nature but remains intermittent.  Worsened by prolonged standing or walking and squatting activities. Has had improvement in the past with ice/heat, rest, and injections. Had in September and has done well. Starting to bother again doesn't desire surgery at this time and is doing well  This problem is not new to this examiner.     Allergies: No Known Allergies    Medications:   Home Medications:  Current Outpatient Prescriptions on File Prior to Visit   Medication Sig   • aspirin 81 MG tablet Take 81 mg by mouth daily.   • B Complex Vitamins (VITAMIN B COMPLEX PO) Take  by mouth.   • Calcium Carbonate-Vitamin D (CALCIUM + D PO) Take  by mouth 2 (Two) Times a Day.   • Coenzyme Q10 (COQ10) 200 MG capsule Take  by mouth.   • lansoprazole (PREVACID) 30 MG capsule Take 30 mg by mouth daily.   • lidocaine (XYLOCAINE) 5 % ointment Apply  topically Every 2 (Two) Hours As Needed for Mild Pain  or Moderate Pain . Apply to affected skin area   • Magnesium 400 MG tablet Take  by mouth.   • Misc Natural Products (GLUCOSAMINE CHONDROITIN VIT D3) capsule Take  by mouth.   • Multiple Vitamins-Minerals (MULTIVITAMIN ADULT PO) Take  by mouth.   • Omega-3 Fatty Acids (FISH OIL) 1000 MG capsule capsule Take  by mouth daily with breakfast.   • rOPINIRole (REQUIP) 0.5 MG tablet Take 1 tablet by mouth Every Night. Take 1 hour before bedtime.   • simvastatin (ZOCOR) 40 MG tablet Take 1 tablet by mouth every night at bedtime.   • valACYclovir (VALTREX) 1000 MG tablet Take 1 tablet by mouth 3 (Three) Times a Day for 7 days.     No current facility-administered medications on file prior to visit.      Current Medications:  Scheduled Meds:  Continuous Infusions:  No current facility-administered medications for  this visit.   PRN Meds:.    I have reviewed the patient's medical history in detail and updated the computerized patient record.  Review and summarization of old records include:    Past Medical History:   Diagnosis Date   • Elevated fasting glucose    • GERD (gastroesophageal reflux disease)    • Goiter     THYROID POLYP SEES    • Hyperlipidemia    • Hypersomnia    • BALDEMAR on CPAP    • Osteoarthritis    • Osteopenia    • RLS (restless legs syndrome)         Past Surgical History:   Procedure Laterality Date   • CATARACT EXTRACTION, BILATERAL Bilateral 2015   • COLONOSCOPY N/A 02/27/2015    DR. SARAH LIRIANO   • HYSTERECTOMY  2004    Total   • KNEE SURGERY Left 2013   • KNEE SURGERY Right 2010   • SHOULDER ARTHROSCOPY Left         Social History     Occupational History   • Not on file.     Social History Main Topics   • Smoking status: Never Smoker   • Smokeless tobacco: Never Used   • Alcohol use No   • Drug use: No   • Sexual activity: Defer      Social History     Social History Narrative   • No narrative on file        Family History   Problem Relation Age of Onset   • Heart disease Mother    • Lung cancer Father    • Hypertension Father    • Heart disease Father    • Heart attack Brother    • Emphysema Brother    • COPD Brother    • Depression Brother    • Heart attack Brother      Had stents placed 2009       ROS: 14 point review of systems was performed and was negative except for documented findings in HPI and today's encounter.     Allergies: No Known Allergies  Constitutional:  Denies fever, shaking or chills   Eyes:  Denies change in visual acuity   HENT:  Denies nasal congestion or sore throat   Respiratory:  Denies cough or shortness of breath   Cardiovascular:  Denies chest pain or severe LE edema   GI:  Denies abdominal pain, nausea, vomiting, bloody stools or diarrhea   Musculoskeletal:  Numbness, tingling, or loss of motor function only as noted above in history of present illness.  : Denies  "painful urination or hematuria  Integument:  Denies rash, lesion or ulceration   Neurologic:  Denies headache or focal weakness  Endocrine:  Denies lymphadenopathy  Psych:  Denies confusion or change in mental status   Hem:  Denies active bleeding    Physical Exam:  Wt Readings from Last 3 Encounters:   05/07/18 69.9 kg (154 lb)   05/02/18 69.9 kg (154 lb)   03/20/18 71.7 kg (158 lb)     Ht Readings from Last 3 Encounters:   05/07/18 160 cm (63\")   05/02/18 160 cm (63\")   03/20/18 160 cm (63\")     Body mass index is 27.28 kg/m².  Facility age limit for growth percentiles is 20 years.  Vitals:    05/07/18 0809   Temp: 98.1 °F (36.7 °C)     Vital Signs:  reviewed  Constitutional: Awake alert and oriented x3, well developed, no acute distress, non-toxic appearance.  EYES: symmetric, sclera clear  ENT:  Normocephalic, Atraumatic.   Respiratory:  No respiratory distress, No wheezing  CV: pulse regular, no palpitations or pallor.  GI:  Abdomen soft, non-tender.   Vascular:  Intact distal pulses, No cyanosis, no signs or symptoms of DVT.  Neurologic: Sensation grossly intact to the involved extremity, No focal deficits noted.   Neck: No tenderness, Supple.  Integument: warm, dry, no ulcerations.   Psychiatric:  Oriented, no pathological affect.  Musculoskeletal:    Affected knee(s):  Painful gait with a subtle limp, positive for synovitis, swelling, joint effusion with crepitation.  Lachman negative  Posterior drawer negative  Colt's negative  Patellofemoral grind +  Sensation grossly intact to light touch throughout the lower extremity  Skin is intact  Distal pulses are palpable  No signs or symptoms of DVT        Diagnostic Data:     Imaging was done previously in the office and discussed with the patient:    Indication: pain related symptoms,  Views: 3V AP, LAT & 40 degree PA bilateral knee(s)   Findings: advanced arthritis  Comparison views: viewed last xray done in the office.     Procedure:  Large Joint " Arthrocentesis  Date/Time: 5/7/2018 8:10 AM  Consent given by: patient  Site marked: site marked  Timeout: Immediately prior to procedure a time out was called to verify the correct patient, procedure, equipment, support staff and site/side marked as required   Supporting Documentation  Indications: pain and joint swelling   Procedure Details  Location: knee - R knee  Preparation: Patient was prepped and draped in the usual sterile fashion  Needle size: 22 G  Approach: anterolateral  Medications administered: 80 mg methylPREDNISolone acetate 80 MG/ML; 4 mL lidocaine PF 2% 2 %  Patient tolerance: patient tolerated the procedure well with no immediate complications    Large Joint Arthrocentesis  Date/Time: 5/7/2018 8:10 AM  Consent given by: patient  Site marked: site marked  Timeout: Immediately prior to procedure a time out was called to verify the correct patient, procedure, equipment, support staff and site/side marked as required   Supporting Documentation  Indications: pain and joint swelling   Procedure Details  Location: knee - L knee  Preparation: Patient was prepped and draped in the usual sterile fashion  Needle size: 22 G  Approach: anterolateral  Medications administered: 4 mL lidocaine PF 2% 2 %; 80 mg methylPREDNISolone acetate 80 MG/ML  Patient tolerance: patient tolerated the procedure well with no immediate complications          Assessment:     ICD-10-CM ICD-9-CM   1. Arthritis of both knees M17.0 716.96           Plan: Is to proceed with injection  Follow up as indicated.  Ice, elevate, and rest as needed.  Additional interventions include: Biomechanics of pertinent body area discussed.  Risks, benefits, alternatives, comparisons, and complications of accepted medicines, injections, recommendations, surgical procedures, and therapies explained and education provided in laymen's terms. The patient was given the opportunity to ask questions and they were answerved to their satisfaction.   Natural  history and expected course of this patient's diagnosis discussed along with evaluation of therapies. Questions answered.  Cortisone Injection for DIAGNOSTIC and THERAPUTIC purposes.  Cryotherapy/brachy therapy as indicated with instructions.     5/7/2018

## 2018-07-13 ENCOUNTER — LAB (OUTPATIENT)
Dept: LAB | Facility: HOSPITAL | Age: 78
End: 2018-07-13

## 2018-07-13 DIAGNOSIS — R73.01 ELEVATED FASTING GLUCOSE: ICD-10-CM

## 2018-07-13 DIAGNOSIS — E78.5 HYPERLIPIDEMIA, UNSPECIFIED HYPERLIPIDEMIA TYPE: ICD-10-CM

## 2018-07-13 LAB
ALBUMIN SERPL-MCNC: 4.6 G/DL (ref 3.5–5.2)
ALBUMIN/GLOB SERPL: 1.8 G/DL
ALP SERPL-CCNC: 64 U/L (ref 39–117)
ALT SERPL W P-5'-P-CCNC: 18 U/L (ref 1–33)
ANION GAP SERPL CALCULATED.3IONS-SCNC: 11.6 MMOL/L
AST SERPL-CCNC: 18 U/L (ref 1–32)
BACTERIA UR QL AUTO: ABNORMAL /HPF
BILIRUB SERPL-MCNC: 0.4 MG/DL (ref 0.1–1.2)
BILIRUB UR QL STRIP: NEGATIVE
BUN BLD-MCNC: 18 MG/DL (ref 8–23)
BUN/CREAT SERPL: 26.1 (ref 7–25)
CALCIUM SPEC-SCNC: 9.2 MG/DL (ref 8.6–10.5)
CHLORIDE SERPL-SCNC: 102 MMOL/L (ref 98–107)
CHOLEST SERPL-MCNC: 109 MG/DL (ref 0–200)
CLARITY UR: CLEAR
CO2 SERPL-SCNC: 25.4 MMOL/L (ref 22–29)
COLOR UR: YELLOW
CREAT BLD-MCNC: 0.69 MG/DL (ref 0.57–1)
GFR SERPL CREATININE-BSD FRML MDRD: 83 ML/MIN/1.73
GLOBULIN UR ELPH-MCNC: 2.5 GM/DL
GLUCOSE BLD-MCNC: 103 MG/DL (ref 65–99)
GLUCOSE UR STRIP-MCNC: NEGATIVE MG/DL
HBA1C MFR BLD: 5.8 % (ref 4.8–5.6)
HDLC SERPL-MCNC: 52 MG/DL (ref 40–60)
HGB UR QL STRIP.AUTO: ABNORMAL
HYALINE CASTS UR QL AUTO: ABNORMAL /LPF
KETONES UR QL STRIP: NEGATIVE
LDLC SERPL CALC-MCNC: 36 MG/DL (ref 0–100)
LDLC/HDLC SERPL: 0.69 {RATIO}
LEUKOCYTE ESTERASE UR QL STRIP.AUTO: ABNORMAL
NITRITE UR QL STRIP: NEGATIVE
PH UR STRIP.AUTO: <=5 [PH] (ref 5–8)
POTASSIUM BLD-SCNC: 4.5 MMOL/L (ref 3.5–5.2)
PROT SERPL-MCNC: 7.1 G/DL (ref 6–8.5)
PROT UR QL STRIP: NEGATIVE
RBC # UR: ABNORMAL /HPF
REF LAB TEST METHOD: ABNORMAL
SODIUM BLD-SCNC: 139 MMOL/L (ref 136–145)
SP GR UR STRIP: 1.02 (ref 1–1.03)
SQUAMOUS #/AREA URNS HPF: ABNORMAL /HPF
TRIGL SERPL-MCNC: 106 MG/DL (ref 0–150)
UROBILINOGEN UR QL STRIP: ABNORMAL
VLDLC SERPL-MCNC: 21.2 MG/DL (ref 5–40)
WBC UR QL AUTO: ABNORMAL /HPF

## 2018-07-13 PROCEDURE — 80053 COMPREHEN METABOLIC PANEL: CPT

## 2018-07-13 PROCEDURE — 83036 HEMOGLOBIN GLYCOSYLATED A1C: CPT

## 2018-07-13 PROCEDURE — 81001 URINALYSIS AUTO W/SCOPE: CPT

## 2018-07-13 PROCEDURE — 80061 LIPID PANEL: CPT

## 2018-08-14 ENCOUNTER — OFFICE VISIT (OUTPATIENT)
Dept: INTERNAL MEDICINE | Facility: CLINIC | Age: 78
End: 2018-08-14

## 2018-08-14 VITALS
BODY MASS INDEX: 26.93 KG/M2 | WEIGHT: 152 LBS | SYSTOLIC BLOOD PRESSURE: 118 MMHG | OXYGEN SATURATION: 97 % | HEART RATE: 88 BPM | DIASTOLIC BLOOD PRESSURE: 54 MMHG | HEIGHT: 63 IN

## 2018-08-14 DIAGNOSIS — R31.29 MICROSCOPIC HEMATURIA: ICD-10-CM

## 2018-08-14 DIAGNOSIS — G25.81 RESTLESS LEGS: ICD-10-CM

## 2018-08-14 DIAGNOSIS — E78.5 HYPERLIPIDEMIA, UNSPECIFIED HYPERLIPIDEMIA TYPE: Primary | ICD-10-CM

## 2018-08-14 DIAGNOSIS — R73.01 ELEVATED FASTING GLUCOSE: ICD-10-CM

## 2018-08-14 LAB
AMORPH URATE CRY URNS QL MICRO: ABNORMAL /HPF
BACTERIA UR QL AUTO: ABNORMAL /HPF
BILIRUB UR QL STRIP: NEGATIVE
CLARITY UR: ABNORMAL
COLOR UR: YELLOW
GLUCOSE UR STRIP-MCNC: NEGATIVE MG/DL
HGB UR QL STRIP.AUTO: NEGATIVE
HYALINE CASTS UR QL AUTO: ABNORMAL /LPF
KETONES UR QL STRIP: ABNORMAL
LEUKOCYTE ESTERASE UR QL STRIP.AUTO: ABNORMAL
MUCOUS THREADS URNS QL MICRO: ABNORMAL /HPF
NITRITE UR QL STRIP: NEGATIVE
PH UR STRIP.AUTO: 7 [PH] (ref 5–8)
PROT UR QL STRIP: NEGATIVE
RBC # UR: ABNORMAL /HPF
REF LAB TEST METHOD: ABNORMAL
SP GR UR STRIP: 1.02 (ref 1–1.03)
SQUAMOUS #/AREA URNS HPF: ABNORMAL /HPF
TRANS CELLS #/AREA URNS HPF: ABNORMAL /HPF
UROBILINOGEN UR QL STRIP: ABNORMAL
WBC UR QL AUTO: ABNORMAL /HPF

## 2018-08-14 PROCEDURE — 81001 URINALYSIS AUTO W/SCOPE: CPT | Performed by: INTERNAL MEDICINE

## 2018-08-14 PROCEDURE — 99214 OFFICE O/P EST MOD 30 MIN: CPT | Performed by: INTERNAL MEDICINE

## 2018-08-14 NOTE — PROGRESS NOTES
Chief Complaint   Patient presents with   • Hyperlipidemia     4 month follow up       Subjective   Ashely Motta is a 78 y.o. female.     Hyperlipidemia   This is a chronic problem. The problem is controlled. Recent lipid tests were reviewed and are normal. She has no history of diabetes. There are no known factors aggravating her hyperlipidemia. Pertinent negatives include no chest pain, leg pain, myalgias or shortness of breath. Current antihyperlipidemic treatment includes statins, exercise and diet change. The current treatment provides significant improvement of lipids. There are no compliance problems.       Elevated fasting glucose:  She  has had elevated fasting glucose in the past.  She denies polyuria, polydipsia, vision change appetite change, unexplained weight loss.   Lab Results   Component Value Date    HGBA1C 5.80 (H) 07/13/2018     Hemorrhoids: She is aware of swelling and something protruding from her rectal area.  She has had colorectal surgery consultation.  She was diagnosed with hemorrhoids and rectocele.  She has seen her gynecologist in regard to the rectocele.  A pessary was tried but would not stay in.  No further treatment has been recommended for the rectocele.  She is still bothered considerably by this feeling of something in her anal area.    Restless legs: This is a chronic problem.  She is recently started taking Requip.  She finds that this is helping the problem.  She is tolerating the medication well.  She still has some sensation of leg restlessness when she sits down in the evening.  She will get up and walk around at that time.  She takes the Requip at bedtime.    The following portions of the patient's history were reviewed and updated as appropriate: allergies, current medications, past family history, past medical history, past social history, past surgical history and problem list.    Review of Systems   Constitutional: Negative for appetite change.   HENT: Negative for  "nosebleeds.    Eyes: Negative for blurred vision and double vision.   Respiratory: Negative for cough and shortness of breath.    Cardiovascular: Negative for chest pain, palpitations and leg swelling.   Musculoskeletal: Negative for myalgias.         Current Outpatient Prescriptions:   •  aspirin 81 MG tablet, Take 81 mg by mouth daily., Disp: , Rfl:   •  B Complex Vitamins (VITAMIN B COMPLEX PO), Take  by mouth., Disp: , Rfl:   •  Calcium Carbonate-Vitamin D (CALCIUM + D PO), Take  by mouth 2 (Two) Times a Day., Disp: , Rfl:   •  Coenzyme Q10 (COQ10) 200 MG capsule, Take  by mouth., Disp: , Rfl:   •  lansoprazole (PREVACID) 30 MG capsule, Take 30 mg by mouth daily., Disp: , Rfl:   •  lidocaine (XYLOCAINE) 5 % ointment, Apply  topically Every 2 (Two) Hours As Needed for Mild Pain  or Moderate Pain . Apply to affected skin area, Disp: 1 tube, Rfl: 3  •  Magnesium 400 MG tablet, Take  by mouth., Disp: , Rfl:   •  Misc Natural Products (GLUCOSAMINE CHONDROITIN VIT D3) capsule, Take  by mouth., Disp: , Rfl:   •  Multiple Vitamins-Minerals (MULTIVITAMIN ADULT PO), Take  by mouth., Disp: , Rfl:   •  Omega-3 Fatty Acids (FISH OIL) 1000 MG capsule capsule, Take  by mouth daily with breakfast., Disp: , Rfl:   •  rOPINIRole (REQUIP) 0.5 MG tablet, Take 1 tablet by mouth Every Night. Take 1 hour before bedtime., Disp: 90 tablet, Rfl: 1  •  simvastatin (ZOCOR) 40 MG tablet, Take 1 tablet by mouth every night at bedtime., Disp: 90 tablet, Rfl: 3        Objective     /54   Pulse 88   Ht 160 cm (63\")   Wt 68.9 kg (152 lb)   SpO2 97%   BMI 26.93 kg/m²     Physical Exam   Constitutional: She is oriented to person, place, and time. She appears well-developed and well-nourished. No distress.   Neck: Normal carotid pulses present. Carotid bruit is not present.   Cardiovascular: Regular rhythm, S1 normal and S2 normal.  Exam reveals no gallop and no friction rub.    No murmur heard.  Pulses:       Carotid pulses are 2+ on " the right side, and 2+ on the left side.  Pulmonary/Chest: Effort normal and breath sounds normal. She has no wheezes. She has no rhonchi. She has no rales. Chest wall is not dull to percussion.   Genitourinary: Rectal exam shows external hemorrhoid (small skin tag present).   Musculoskeletal: She exhibits no edema.   Neurological: She is alert and oriented to person, place, and time.   Skin: Skin is warm and dry.   Nursing note and vitals reviewed.        Assessment/Plan   Ashley was seen today for hyperlipidemia.    Diagnoses and all orders for this visit:    Hyperlipidemia, unspecified hyperlipidemia type  -     Comprehensive Metabolic Panel; Future  -     Lipid Panel; Future    Elevated fasting glucose  -     Hemoglobin A1c; Future    Microscopic hematuria  -     Urinalysis With Microscopic If Indicated (No Culture) - Urine, Clean Catch; Future  -     Urinalysis With Microscopic If Indicated (No Culture) - Urine, Clean Catch  -     Urinalysis, Microscopic Only - Urine, Clean Catch; Future  -     Urinalysis, Microscopic Only - Urine, Clean Catch    Restless legs      She will contact Dr. Munoz again in regard to hemorrhoids.

## 2018-08-15 ENCOUNTER — TELEPHONE (OUTPATIENT)
Dept: INTERNAL MEDICINE | Facility: CLINIC | Age: 78
End: 2018-08-15

## 2018-08-15 DIAGNOSIS — R31.29 MICROSCOPIC HEMATURIA: Primary | ICD-10-CM

## 2018-08-15 NOTE — TELEPHONE ENCOUNTER
The level of blood in her urine is minimal.  I don't think she needs to see a urologist yet.  We can recheck the urine in December with her other lab work.  She needs to be careful to do a clean catch urine.

## 2018-08-15 NOTE — TELEPHONE ENCOUNTER
Dr Johnston Mrs Motta had some questions why she still has blood in her urine. States if she has to go to urologist she would like to go to where her  goes. [facility] Dwayne Asher

## 2018-08-27 RX ORDER — ROPINIROLE 0.5 MG/1
TABLET, FILM COATED ORAL
Qty: 90 TABLET | Refills: 1 | Status: SHIPPED | OUTPATIENT
Start: 2018-08-27 | End: 2019-03-11 | Stop reason: SDUPTHER

## 2018-08-31 ENCOUNTER — OFFICE VISIT (OUTPATIENT)
Dept: INTERNAL MEDICINE | Facility: CLINIC | Age: 78
End: 2018-08-31

## 2018-08-31 ENCOUNTER — APPOINTMENT (OUTPATIENT)
Dept: WOMENS IMAGING | Facility: HOSPITAL | Age: 78
End: 2018-08-31

## 2018-08-31 DIAGNOSIS — Z12.31 ENCOUNTER FOR SCREENING MAMMOGRAM FOR BREAST CANCER: Primary | ICD-10-CM

## 2018-08-31 DIAGNOSIS — Z12.31 ENCOUNTER FOR SCREENING MAMMOGRAM FOR BREAST CANCER: ICD-10-CM

## 2018-08-31 PROCEDURE — 77067 SCR MAMMO BI INCL CAD: CPT | Performed by: INTERNAL MEDICINE

## 2018-08-31 PROCEDURE — 77067 SCR MAMMO BI INCL CAD: CPT | Performed by: RADIOLOGY

## 2018-09-05 ENCOUNTER — OFFICE VISIT (OUTPATIENT)
Dept: ORTHOPEDIC SURGERY | Facility: CLINIC | Age: 78
End: 2018-09-05

## 2018-09-05 VITALS — TEMPERATURE: 97.9 F | WEIGHT: 152 LBS | BODY MASS INDEX: 26.93 KG/M2 | HEIGHT: 63 IN

## 2018-09-05 DIAGNOSIS — M17.0 ARTHRITIS OF BOTH KNEES: ICD-10-CM

## 2018-09-05 DIAGNOSIS — G89.29 CHRONIC PAIN OF BOTH KNEES: ICD-10-CM

## 2018-09-05 DIAGNOSIS — M25.562 CHRONIC PAIN OF BOTH KNEES: ICD-10-CM

## 2018-09-05 DIAGNOSIS — M25.561 CHRONIC PAIN OF BOTH KNEES: ICD-10-CM

## 2018-09-05 DIAGNOSIS — M17.12 ARTHRITIS OF LEFT KNEE: ICD-10-CM

## 2018-09-05 DIAGNOSIS — M17.11 ARTHRITIS OF RIGHT KNEE: Primary | ICD-10-CM

## 2018-09-05 PROCEDURE — 73562 X-RAY EXAM OF KNEE 3: CPT | Performed by: NURSE PRACTITIONER

## 2018-09-05 PROCEDURE — 99212 OFFICE O/P EST SF 10 MIN: CPT | Performed by: NURSE PRACTITIONER

## 2018-09-05 PROCEDURE — 20610 DRAIN/INJ JOINT/BURSA W/O US: CPT | Performed by: NURSE PRACTITIONER

## 2018-09-05 RX ORDER — LIDOCAINE HYDROCHLORIDE 20 MG/ML
3 INJECTION, SOLUTION EPIDURAL; INFILTRATION; INTRACAUDAL; PERINEURAL
Status: COMPLETED | OUTPATIENT
Start: 2018-09-05 | End: 2018-09-05

## 2018-09-05 RX ORDER — METHYLPREDNISOLONE ACETATE 80 MG/ML
80 INJECTION, SUSPENSION INTRA-ARTICULAR; INTRALESIONAL; INTRAMUSCULAR; SOFT TISSUE
Status: COMPLETED | OUTPATIENT
Start: 2018-09-05 | End: 2018-09-05

## 2018-09-05 RX ADMIN — METHYLPREDNISOLONE ACETATE 80 MG: 80 INJECTION, SUSPENSION INTRA-ARTICULAR; INTRALESIONAL; INTRAMUSCULAR; SOFT TISSUE at 13:06

## 2018-09-05 RX ADMIN — METHYLPREDNISOLONE ACETATE 80 MG: 80 INJECTION, SUSPENSION INTRA-ARTICULAR; INTRALESIONAL; INTRAMUSCULAR; SOFT TISSUE at 13:03

## 2018-09-05 RX ADMIN — LIDOCAINE HYDROCHLORIDE 3 ML: 20 INJECTION, SOLUTION EPIDURAL; INFILTRATION; INTRACAUDAL; PERINEURAL at 13:06

## 2018-09-05 RX ADMIN — LIDOCAINE HYDROCHLORIDE 3 ML: 20 INJECTION, SOLUTION EPIDURAL; INFILTRATION; INTRACAUDAL; PERINEURAL at 13:03

## 2018-09-05 NOTE — PATIENT INSTRUCTIONS
Do Daily THIGH exercises sitting up straight in a supportive chair, lean forward as you do when you go to stand up and in that forward leaning position, lift the thigh slowly up and slowly down.  Give assist with your hand under the knee to lift as needed. 15x on each thigh once a day, every day.

## 2018-09-05 NOTE — PROGRESS NOTES
Patient: Ashley Motta  YOB: 1940    Chief Complaints:  bilateral knee pain    Subjective:    History of Present Illness: Here today for knee pain. The pain is a generalized joint tenderness.  It has been progressive in nature but remains intermittent.  Worsened by prolonged standing or walking and squatting activities. Has had improvement in the past with ice/heat, rest, and injections.     This problem is new to this examiner.     Allergies: No Known Allergies    Medications:   Home Medications:  Current Outpatient Prescriptions on File Prior to Visit   Medication Sig   • aspirin 81 MG tablet Take 81 mg by mouth daily.   • B Complex Vitamins (VITAMIN B COMPLEX PO) Take  by mouth.   • Calcium Carbonate-Vitamin D (CALCIUM + D PO) Take  by mouth 2 (Two) Times a Day.   • Coenzyme Q10 (COQ10) 200 MG capsule Take  by mouth.   • lansoprazole (PREVACID) 30 MG capsule Take 30 mg by mouth daily.   • lidocaine (XYLOCAINE) 5 % ointment Apply  topically Every 2 (Two) Hours As Needed for Mild Pain  or Moderate Pain . Apply to affected skin area   • Magnesium 400 MG tablet Take  by mouth.   • Misc Natural Products (GLUCOSAMINE CHONDROITIN VIT D3) capsule Take  by mouth.   • Multiple Vitamins-Minerals (MULTIVITAMIN ADULT PO) Take  by mouth.   • Omega-3 Fatty Acids (FISH OIL) 1000 MG capsule capsule Take  by mouth daily with breakfast.   • rOPINIRole (REQUIP) 0.5 MG tablet TAKE 1 TABLET BY MOUTH IN THE EVENING ONE  HOUR  BEFORE  BEDTIME   • simvastatin (ZOCOR) 40 MG tablet Take 1 tablet by mouth every night at bedtime.     No current facility-administered medications on file prior to visit.      Current Medications:  Scheduled Meds:  Continuous Infusions:  No current facility-administered medications for this visit.   PRN Meds:.    I have reviewed the patient's medical history in detail and updated the computerized patient record.  Review and summarization of old records include:    Past Medical History:    Diagnosis Date   • Elevated fasting glucose    • GERD (gastroesophageal reflux disease)    • Goiter     THYROID POLYP SEES    • Hyperlipidemia    • Hypersomnia    • BALDEMAR on CPAP    • Osteoarthritis    • Osteopenia    • RLS (restless legs syndrome)         Past Surgical History:   Procedure Laterality Date   • CATARACT EXTRACTION, BILATERAL Bilateral 2015   • COLONOSCOPY N/A 02/27/2015    DR. SARAH LIRIANO   • HYSTERECTOMY  2004    Total   • KNEE SURGERY Left 2013   • KNEE SURGERY Right 2010   • SHOULDER ARTHROSCOPY Left         Social History     Occupational History   • Not on file.     Social History Main Topics   • Smoking status: Never Smoker   • Smokeless tobacco: Never Used   • Alcohol use No   • Drug use: No   • Sexual activity: Defer    Social History     Social History Narrative   • No narrative on file        Family History   Problem Relation Age of Onset   • Heart disease Mother    • Lung cancer Father    • Hypertension Father    • Heart disease Father    • Heart attack Brother    • Emphysema Brother    • COPD Brother    • Depression Brother    • Heart attack Brother         Had stents placed 2009       ROS: 14 point review of systems was performed and was negative except for documented findings in HPI and today's encounter.     Allergies: No Known Allergies  Constitutional:  Denies fever, shaking or chills   Eyes:  Denies change in visual acuity   HENT:  Denies nasal congestion or sore throat   Respiratory:  Denies cough or shortness of breath   Cardiovascular:  Denies chest pain or severe LE edema   GI:  Denies abdominal pain, nausea, vomiting, bloody stools or diarrhea   Musculoskeletal:  Numbness, tingling, or loss of motor function only as noted above in history of present illness.  : Denies painful urination or hematuria  Integument:  Denies rash, lesion or ulceration   Neurologic:  Denies headache or focal weakness  Endocrine:  Denies lymphadenopathy  Psych:  Denies confusion or change in  "mental status   Hem:  Denies active bleeding    Physical Exam:  Wt Readings from Last 3 Encounters:   09/05/18 68.9 kg (152 lb)   08/14/18 68.9 kg (152 lb)   05/07/18 69.9 kg (154 lb)     Ht Readings from Last 3 Encounters:   09/05/18 160 cm (63\")   08/14/18 160 cm (63\")   05/07/18 160 cm (63\")     Body mass index is 26.93 kg/m².  Facility age limit for growth percentiles is 20 years.  Vitals:    09/05/18 1301   Temp: 97.9 °F (36.6 °C)     Vital Signs:  reviewed  Constitutional: Awake alert and oriented x3, well developed, no acute distress, non-toxic appearance.  EYES: symmetric, sclera clear  ENT:  Normocephalic, Atraumatic.   Respiratory:  No respiratory distress, No wheezing  CV: pulse regular, no palpitations or pallor.  GI:  Abdomen soft, non-tender.   Vascular:  Intact distal pulses, No cyanosis, no signs or symptoms of DVT.  Neurologic: Sensation grossly intact to the involved extremity, No focal deficits noted.   Neck: No tenderness, Supple.  Integument: warm, dry, no ulcerations.   Psychiatric:  Oriented, no pathological affect.  Musculoskeletal:    Affected knee(s):  Painful gait with a subtle limp, positive for synovitis, swelling, joint effusion with crepitation.  Lachman negative  Posterior drawer negative  Colt's negative  Patellofemoral grind +  Sensation grossly intact to light touch throughout the lower extremity  Skin is intact  Distal pulses are palpable  No signs or symptoms of DVT        Diagnostic Data:     Imaging was done today, images were personally viewed and discussed with the patient:    Indication: pain related symptoms,  Views: 3V AP, LAT & 40 degree PA bilateral knee(s)   Findings: severe end-stage arthritis (bone on bone, subchondral sclerosis/cysts, osteophytes)  Comparison views: viewed last xray done in the office.     Procedure:  Large Joint Arthrocentesis  Date/Time: 9/5/2018 1:03 PM  Consent given by: patient  Site marked: site marked  Timeout: Immediately prior to " procedure a time out was called to verify the correct patient, procedure, equipment, support staff and site/side marked as required   Supporting Documentation  Indications: pain   Procedure Details  Location: knee - R knee  Preparation: Patient was prepped and draped in the usual sterile fashion  Needle size: 25 G  Approach: anteromedial  Medications administered: 80 mg methylPREDNISolone acetate 80 MG/ML; 3 mL lidocaine PF 2% 2 %  Patient tolerance: patient tolerated the procedure well with no immediate complications    Large Joint Arthrocentesis  Date/Time: 9/5/2018 1:06 PM  Consent given by: patient  Site marked: site marked  Timeout: Immediately prior to procedure a time out was called to verify the correct patient, procedure, equipment, support staff and site/side marked as required   Supporting Documentation  Indications: pain   Procedure Details  Location: knee - L knee  Preparation: Patient was prepped and draped in the usual sterile fashion  Needle size: 25 G  Approach: anteromedial  Medications administered: 80 mg methylPREDNISolone acetate 80 MG/ML; 3 mL lidocaine PF 2% 2 %  Patient tolerance: patient tolerated the procedure well with no immediate complications          Assessment:     ICD-10-CM ICD-9-CM   1. Arthritis of right knee M17.11 716.96   2. Arthritis of left knee M17.12 716.96   3. Chronic pain of both knees M25.561 719.46    M25.562 338.29    G89.29            Plan: Is to proceed with injection  Follow up as indicated.  Ice, elevate, and rest as needed.  Additional interventions include:  15 min spent face to face with patient 11 min spent counseling about natural history and expected course of assessed complaint and reviewed treatment options that have been tried and not tried and those currently available. Questions answered.    Natural history and expected course of this patient's diagnosis discussed along with evaluation of therapies. Questions answered.  Advice on benefits of, and types  of regular/moderate exercise including biomechanical forces involved as it pertains to this complaint, with a goal of 5 min at least 7 times a week.    Cortisone Injection. See procedure note.  Cryotherapy/brachy therapy as indicated with instructions.   Advised on strengthening exercises, stressed importance of these with progression of arthritis, demonstrated exercises to patient with repeat demonstration and verb of understanding.   Also discussed gel vs cortisone inj.  She has hx of right knee arthroscopy and is bone on bone bilat knees and cortisone is working well for her so will continue with this plan.   Enc to start daily knee exercises:  Do Daily THIGH exercises sitting up straight in a supportive chair, lean forward as you do when you go to stand up and in that forward leaning position, lift the thigh slowly up and slowly down.  Give assist with your hand under the knee to lift as needed. 15x on each thigh once a day, every day.     9/5/2018  MARCELO

## 2018-10-18 ENCOUNTER — APPOINTMENT (OUTPATIENT)
Dept: CT IMAGING | Facility: HOSPITAL | Age: 78
End: 2018-10-18

## 2018-10-18 ENCOUNTER — HOSPITAL ENCOUNTER (OUTPATIENT)
Facility: HOSPITAL | Age: 78
Setting detail: OBSERVATION
Discharge: HOME | End: 2018-10-19
Attending: EMERGENCY MEDICINE | Admitting: EMERGENCY MEDICINE

## 2018-10-18 ENCOUNTER — APPOINTMENT (OUTPATIENT)
Dept: MRI IMAGING | Facility: HOSPITAL | Age: 78
End: 2018-10-18
Attending: INTERNAL MEDICINE

## 2018-10-18 DIAGNOSIS — R42 VERTIGO: Primary | ICD-10-CM

## 2018-10-18 DIAGNOSIS — R51.9 ACUTE NONINTRACTABLE HEADACHE, UNSPECIFIED HEADACHE TYPE: ICD-10-CM

## 2018-10-18 DIAGNOSIS — R26.2 DIFFICULTY WALKING: ICD-10-CM

## 2018-10-18 LAB
ALBUMIN SERPL-MCNC: 4.5 G/DL (ref 3.5–5.2)
ALBUMIN/GLOB SERPL: 1.5 G/DL
ALP SERPL-CCNC: 68 U/L (ref 39–117)
ALT SERPL W P-5'-P-CCNC: 26 U/L (ref 1–33)
ANION GAP SERPL CALCULATED.3IONS-SCNC: 12 MMOL/L
AST SERPL-CCNC: 25 U/L (ref 1–32)
BASOPHILS # BLD AUTO: 0.03 10*3/MM3 (ref 0–0.2)
BASOPHILS NFR BLD AUTO: 0.4 % (ref 0–1.5)
BILIRUB SERPL-MCNC: 0.3 MG/DL (ref 0.1–1.2)
BUN BLD-MCNC: 12 MG/DL (ref 8–23)
BUN/CREAT SERPL: 15.8 (ref 7–25)
CALCIUM SPEC-SCNC: 9.6 MG/DL (ref 8.6–10.5)
CHLORIDE SERPL-SCNC: 105 MMOL/L (ref 98–107)
CHOLEST SERPL-MCNC: 126 MG/DL (ref 0–200)
CO2 SERPL-SCNC: 27 MMOL/L (ref 22–29)
CREAT BLD-MCNC: 0.76 MG/DL (ref 0.57–1)
DEPRECATED RDW RBC AUTO: 50.2 FL (ref 37–54)
EOSINOPHIL # BLD AUTO: 0.15 10*3/MM3 (ref 0–0.7)
EOSINOPHIL NFR BLD AUTO: 2.1 % (ref 0.3–6.2)
ERYTHROCYTE [DISTWIDTH] IN BLOOD BY AUTOMATED COUNT: 14.8 % (ref 11.7–13)
GFR SERPL CREATININE-BSD FRML MDRD: 74 ML/MIN/1.73
GLOBULIN UR ELPH-MCNC: 3 GM/DL
GLUCOSE BLD-MCNC: 125 MG/DL (ref 65–99)
GLUCOSE BLDC GLUCOMTR-MCNC: 94 MG/DL (ref 70–130)
HBA1C MFR BLD: 5.8 % (ref 4.8–5.6)
HCT VFR BLD AUTO: 43.4 % (ref 35.6–45.5)
HDLC SERPL-MCNC: 60 MG/DL (ref 40–60)
HGB BLD-MCNC: 14.3 G/DL (ref 11.9–15.5)
HOLD SPECIMEN: NORMAL
HOLD SPECIMEN: NORMAL
IMM GRANULOCYTES # BLD: 0.01 10*3/MM3 (ref 0–0.03)
IMM GRANULOCYTES NFR BLD: 0.1 % (ref 0–0.5)
LDLC SERPL CALC-MCNC: 38 MG/DL (ref 0–100)
LDLC/HDLC SERPL: 0.63 {RATIO}
LYMPHOCYTES # BLD AUTO: 2.19 10*3/MM3 (ref 0.9–4.8)
LYMPHOCYTES NFR BLD AUTO: 30.2 % (ref 19.6–45.3)
MAGNESIUM SERPL-MCNC: 2.4 MG/DL (ref 1.6–2.4)
MCH RBC QN AUTO: 30.3 PG (ref 26.9–32)
MCHC RBC AUTO-ENTMCNC: 32.9 G/DL (ref 32.4–36.3)
MCV RBC AUTO: 91.9 FL (ref 80.5–98.2)
MONOCYTES # BLD AUTO: 0.65 10*3/MM3 (ref 0.2–1.2)
MONOCYTES NFR BLD AUTO: 9 % (ref 5–12)
NEUTROPHILS # BLD AUTO: 4.23 10*3/MM3 (ref 1.9–8.1)
NEUTROPHILS NFR BLD AUTO: 58.3 % (ref 42.7–76)
PLATELET # BLD AUTO: 268 10*3/MM3 (ref 140–500)
PMV BLD AUTO: 11.3 FL (ref 6–12)
POTASSIUM BLD-SCNC: 4.3 MMOL/L (ref 3.5–5.2)
PROT SERPL-MCNC: 7.5 G/DL (ref 6–8.5)
RBC # BLD AUTO: 4.72 10*6/MM3 (ref 3.9–5.2)
SODIUM BLD-SCNC: 144 MMOL/L (ref 136–145)
TRIGL SERPL-MCNC: 142 MG/DL (ref 0–150)
TROPONIN T SERPL-MCNC: <0.01 NG/ML (ref 0–0.03)
TSH SERPL DL<=0.05 MIU/L-ACNC: 5.81 MIU/ML (ref 0.27–4.2)
VIT B12 BLD-MCNC: 1032 PG/ML (ref 211–946)
VLDLC SERPL-MCNC: 28.4 MG/DL (ref 5–40)
WBC NRBC COR # BLD: 7.25 10*3/MM3 (ref 4.5–10.7)
WHOLE BLOOD HOLD SPECIMEN: NORMAL
WHOLE BLOOD HOLD SPECIMEN: NORMAL

## 2018-10-18 PROCEDURE — 85025 COMPLETE CBC W/AUTO DIFF WBC: CPT | Performed by: PHYSICIAN ASSISTANT

## 2018-10-18 PROCEDURE — G0378 HOSPITAL OBSERVATION PER HR: HCPCS

## 2018-10-18 PROCEDURE — 97535 SELF CARE MNGMENT TRAINING: CPT

## 2018-10-18 PROCEDURE — 80053 COMPREHEN METABOLIC PANEL: CPT | Performed by: PHYSICIAN ASSISTANT

## 2018-10-18 PROCEDURE — G8988 SELF CARE GOAL STATUS: HCPCS

## 2018-10-18 PROCEDURE — 97161 PT EVAL LOW COMPLEX 20 MIN: CPT

## 2018-10-18 PROCEDURE — 0 GADOBENATE DIMEGLUMINE 529 MG/ML SOLUTION: Performed by: INTERNAL MEDICINE

## 2018-10-18 PROCEDURE — 99284 EMERGENCY DEPT VISIT MOD MDM: CPT

## 2018-10-18 PROCEDURE — 70450 CT HEAD/BRAIN W/O DYE: CPT

## 2018-10-18 PROCEDURE — 83735 ASSAY OF MAGNESIUM: CPT | Performed by: PHYSICIAN ASSISTANT

## 2018-10-18 PROCEDURE — 82962 GLUCOSE BLOOD TEST: CPT

## 2018-10-18 PROCEDURE — G8979 MOBILITY GOAL STATUS: HCPCS

## 2018-10-18 PROCEDURE — G8989 SELF CARE D/C STATUS: HCPCS

## 2018-10-18 PROCEDURE — G8997 SWALLOW GOAL STATUS: HCPCS

## 2018-10-18 PROCEDURE — 93005 ELECTROCARDIOGRAM TRACING: CPT | Performed by: PHYSICIAN ASSISTANT

## 2018-10-18 PROCEDURE — G8998 SWALLOW D/C STATUS: HCPCS

## 2018-10-18 PROCEDURE — 97110 THERAPEUTIC EXERCISES: CPT

## 2018-10-18 PROCEDURE — G8978 MOBILITY CURRENT STATUS: HCPCS

## 2018-10-18 PROCEDURE — 97165 OT EVAL LOW COMPLEX 30 MIN: CPT

## 2018-10-18 PROCEDURE — 70549 MR ANGIOGRAPH NECK W/O&W/DYE: CPT

## 2018-10-18 PROCEDURE — 70544 MR ANGIOGRAPHY HEAD W/O DYE: CPT

## 2018-10-18 PROCEDURE — 25010000002 PROCHLORPERAZINE EDISYLATE PER 10 MG: Performed by: PSYCHIATRY & NEUROLOGY

## 2018-10-18 PROCEDURE — 92610 EVALUATE SWALLOWING FUNCTION: CPT

## 2018-10-18 PROCEDURE — 80061 LIPID PANEL: CPT | Performed by: INTERNAL MEDICINE

## 2018-10-18 PROCEDURE — 70553 MRI BRAIN STEM W/O & W/DYE: CPT

## 2018-10-18 PROCEDURE — 96374 THER/PROPH/DIAG INJ IV PUSH: CPT

## 2018-10-18 PROCEDURE — 83036 HEMOGLOBIN GLYCOSYLATED A1C: CPT | Performed by: INTERNAL MEDICINE

## 2018-10-18 PROCEDURE — A9577 INJ MULTIHANCE: HCPCS | Performed by: INTERNAL MEDICINE

## 2018-10-18 PROCEDURE — 82607 VITAMIN B-12: CPT | Performed by: PSYCHIATRY & NEUROLOGY

## 2018-10-18 PROCEDURE — G8987 SELF CARE CURRENT STATUS: HCPCS

## 2018-10-18 PROCEDURE — G8996 SWALLOW CURRENT STATUS: HCPCS

## 2018-10-18 PROCEDURE — 84443 ASSAY THYROID STIM HORMONE: CPT | Performed by: PSYCHIATRY & NEUROLOGY

## 2018-10-18 PROCEDURE — 84484 ASSAY OF TROPONIN QUANT: CPT | Performed by: PHYSICIAN ASSISTANT

## 2018-10-18 PROCEDURE — 93010 ELECTROCARDIOGRAM REPORT: CPT | Performed by: INTERNAL MEDICINE

## 2018-10-18 PROCEDURE — 96361 HYDRATE IV INFUSION ADD-ON: CPT

## 2018-10-18 PROCEDURE — 99204 OFFICE O/P NEW MOD 45 MIN: CPT | Performed by: PSYCHIATRY & NEUROLOGY

## 2018-10-18 RX ORDER — SODIUM CHLORIDE 0.9 % (FLUSH) 0.9 %
3 SYRINGE (ML) INJECTION EVERY 12 HOURS SCHEDULED
Status: DISCONTINUED | OUTPATIENT
Start: 2018-10-18 | End: 2018-10-19 | Stop reason: HOSPADM

## 2018-10-18 RX ORDER — ASPIRIN 300 MG/1
300 SUPPOSITORY RECTAL DAILY
Status: DISCONTINUED | OUTPATIENT
Start: 2018-10-18 | End: 2018-10-19 | Stop reason: HOSPADM

## 2018-10-18 RX ORDER — PROCHLORPERAZINE MALEATE 5 MG/1
5 TABLET ORAL ONCE
Status: COMPLETED | OUTPATIENT
Start: 2018-10-18 | End: 2018-10-18

## 2018-10-18 RX ORDER — SODIUM CHLORIDE 9 MG/ML
100 INJECTION, SOLUTION INTRAVENOUS CONTINUOUS
Status: DISCONTINUED | OUTPATIENT
Start: 2018-10-18 | End: 2018-10-19 | Stop reason: HOSPADM

## 2018-10-18 RX ORDER — ROPINIROLE 0.5 MG/1
0.5 TABLET, FILM COATED ORAL NIGHTLY
Status: DISCONTINUED | OUTPATIENT
Start: 2018-10-18 | End: 2018-10-19 | Stop reason: HOSPADM

## 2018-10-18 RX ORDER — GABAPENTIN 100 MG/1
100 CAPSULE ORAL NIGHTLY
Status: DISCONTINUED | OUTPATIENT
Start: 2018-10-18 | End: 2018-10-19 | Stop reason: HOSPADM

## 2018-10-18 RX ORDER — MECLIZINE HYDROCHLORIDE 25 MG/1
25 TABLET ORAL ONCE
Status: COMPLETED | OUTPATIENT
Start: 2018-10-18 | End: 2018-10-18

## 2018-10-18 RX ORDER — MECLIZINE HYDROCHLORIDE 25 MG/1
25 TABLET ORAL 3 TIMES DAILY PRN
Status: DISCONTINUED | OUTPATIENT
Start: 2018-10-18 | End: 2018-10-19 | Stop reason: HOSPADM

## 2018-10-18 RX ORDER — PROCHLORPERAZINE 25 MG
25 SUPPOSITORY, RECTAL RECTAL ONCE
Status: COMPLETED | OUTPATIENT
Start: 2018-10-18 | End: 2018-10-18

## 2018-10-18 RX ORDER — ONDANSETRON 2 MG/ML
4 INJECTION INTRAMUSCULAR; INTRAVENOUS EVERY 6 HOURS PRN
Status: DISCONTINUED | OUTPATIENT
Start: 2018-10-18 | End: 2018-10-19 | Stop reason: HOSPADM

## 2018-10-18 RX ORDER — SODIUM CHLORIDE 0.9 % (FLUSH) 0.9 %
10 SYRINGE (ML) INJECTION AS NEEDED
Status: DISCONTINUED | OUTPATIENT
Start: 2018-10-18 | End: 2018-10-19 | Stop reason: HOSPADM

## 2018-10-18 RX ORDER — ACETAMINOPHEN 325 MG/1
650 TABLET ORAL EVERY 4 HOURS PRN
Status: DISCONTINUED | OUTPATIENT
Start: 2018-10-18 | End: 2018-10-19 | Stop reason: HOSPADM

## 2018-10-18 RX ORDER — ASPIRIN 325 MG
325 TABLET ORAL DAILY
Status: DISCONTINUED | OUTPATIENT
Start: 2018-10-18 | End: 2018-10-19 | Stop reason: HOSPADM

## 2018-10-18 RX ORDER — ACETAMINOPHEN 650 MG/1
650 SUPPOSITORY RECTAL EVERY 4 HOURS PRN
Status: DISCONTINUED | OUTPATIENT
Start: 2018-10-18 | End: 2018-10-19 | Stop reason: HOSPADM

## 2018-10-18 RX ORDER — ATORVASTATIN CALCIUM 80 MG/1
80 TABLET, FILM COATED ORAL NIGHTLY
Status: DISCONTINUED | OUTPATIENT
Start: 2018-10-18 | End: 2018-10-19 | Stop reason: HOSPADM

## 2018-10-18 RX ORDER — SODIUM CHLORIDE 0.9 % (FLUSH) 0.9 %
3-10 SYRINGE (ML) INJECTION AS NEEDED
Status: DISCONTINUED | OUTPATIENT
Start: 2018-10-18 | End: 2018-10-19 | Stop reason: HOSPADM

## 2018-10-18 RX ADMIN — Medication 3 ML: at 08:40

## 2018-10-18 RX ADMIN — ROPINIROLE HYDROCHLORIDE 0.5 MG: 0.5 TABLET, FILM COATED ORAL at 20:59

## 2018-10-18 RX ADMIN — GABAPENTIN 100 MG: 100 CAPSULE ORAL at 20:59

## 2018-10-18 RX ADMIN — PROCHLORPERAZINE EDISYLATE 10 MG: 5 INJECTION INTRAMUSCULAR; INTRAVENOUS at 17:27

## 2018-10-18 RX ADMIN — SODIUM CHLORIDE 100 ML/HR: 9 INJECTION, SOLUTION INTRAVENOUS at 05:32

## 2018-10-18 RX ADMIN — SODIUM CHLORIDE 100 ML/HR: 9 INJECTION, SOLUTION INTRAVENOUS at 22:41

## 2018-10-18 RX ADMIN — ATORVASTATIN CALCIUM 80 MG: 80 TABLET, FILM COATED ORAL at 20:59

## 2018-10-18 RX ADMIN — ASPIRIN 325 MG: 325 TABLET ORAL at 10:36

## 2018-10-18 RX ADMIN — GADOBENATE DIMEGLUMINE 15 ML: 529 INJECTION, SOLUTION INTRAVENOUS at 10:04

## 2018-10-18 RX ADMIN — MECLIZINE 25 MG: 25 TABLET ORAL at 03:37

## 2018-10-18 RX ADMIN — ACETAMINOPHEN 650 MG: 325 TABLET, FILM COATED ORAL at 07:25

## 2018-10-18 NOTE — NURSING NOTE
S/W Millie at [facility] for Neurology.  Michelle Alves RN

## 2018-10-18 NOTE — ED PROVIDER NOTES
MD ATTESTATION NOTE    Pt presents to the ED c/o intermittent dizziness that began yesterday morning around 0800 which woke her from sleep. Pt reports that the dizziness worsens with positional changes. Pt also complains of a headache but denies visual disturbance, speech difficulty, trouble swallowing, facial asymmetry, light-headedness, or urinary symptoms.     On exam, the patient is awake, alert, oriented, and in NAD. Cardio is RRR. Her lungs are CTAB. Abdomen is soft and without tenderness. Extremities are unremarkable. There are no focal neuro deficits.     Patient became very unsteady when trying to stand at bedside.  Will admit for evaluation for possible CVA.    I agree with the plan for admission for further evaluation and management.    The ALESSANDRA and I have discussed this patient's history, physical exam, and treatment plan.  I have reviewed the documentation and personally had a face to face interaction with the patient. I affirm the documentation and agree with the treatment and plan.  The attached note describes my personal findings.      Documentation assistance provided by cora Presley for Dr. Maren MD. Information recorded by the scribe was done at my direction and has been verified and validated by me.       Dee Presley  10/18/18 2368       Chapincito Engel MD  10/18/18 4619

## 2018-10-18 NOTE — ED TRIAGE NOTES
To ER via PV  C/o dizziness since 0800 Wednesday morning. Denies room spinning sensation, but states her balance is off.  States difficulty walking without assistance.  Using walker for balance.  Pt does not normally use walker.  Also c/o headache since approx 12 noon yesterday.  States headache improved through the day, but got worse as she tried to go to bed.  + nausea

## 2018-10-18 NOTE — PLAN OF CARE
Problem: Fall Risk (Adult)  Goal: Identify Related Risk Factors and Signs and Symptoms  Outcome: Ongoing (interventions implemented as appropriate)    Goal: Absence of Fall  Outcome: Ongoing (interventions implemented as appropriate)      Problem: Stroke (Ischemic) (Adult)  Goal: Signs and Symptoms of Listed Potential Problems Will be Absent, Minimized or Managed (Stroke)  Outcome: Ongoing (interventions implemented as appropriate)      Problem: Pain, Acute (Adult)  Goal: Identify Related Risk Factors and Signs and Symptoms  Outcome: Ongoing (interventions implemented as appropriate)    Goal: Acceptable Pain Control/Comfort Level  Outcome: Ongoing (interventions implemented as appropriate)

## 2018-10-18 NOTE — H&P
[facility] Admission H&P    Patient Care Team:  Erica Johnston MD as PCP - General (Internal Medicine)  Erica Johnston MD as PCP - Claims Attributed    Chief complaint: Lightheadedness, imbalance    History of Present Illness    This is a 78-year-old female with a history of hyperlipidemia and BALDEMAR who presented to the emergency room with acute onset lightheadedness and gait imbalance.  This started yesterday at 8:00 in the morning.  She had to use her 's walker to stabilize herself.  She also developed an associated global headache that feels like a tightening/band around her head.  She denies any history of migraine headache, TIA, or stroke.  She takes an aspirin daily along with statin.  Her symptoms are still present at this time.  She denies a sensation of spinning and describes this more as a lightheaded/floating feeling.  She denies any change in symptoms with position.  She still remains very unsteady on her feet.  She felt a little sick to her stomach yesterday but this has resolved and she has no other complaints at this time.    Past Medical History:   Diagnosis Date   • Elevated fasting glucose    • GERD (gastroesophageal reflux disease)    • Goiter     THYROID POLYP SEES    • Hyperlipidemia    • Hypersomnia    • BALDEMAR on CPAP    • Osteoarthritis    • Osteopenia    • RLS (restless legs syndrome)      Past Surgical History:   Procedure Laterality Date   • CATARACT EXTRACTION, BILATERAL Bilateral 2015   • COLONOSCOPY N/A 02/27/2015    DR. SARAH LIRIANO   • HYSTERECTOMY  2004    Total   • KNEE SURGERY Left 2013   • KNEE SURGERY Right 2010   • SHOULDER ARTHROSCOPY Left      Family History   Problem Relation Age of Onset   • Heart disease Mother    • Lung cancer Father    • Hypertension Father    • Heart disease Father    • Heart attack Brother    • Emphysema Brother    • COPD Brother    • Depression Brother    • Heart attack Brother         Had stents placed 2009     Social History   Substance Use Topics    • Smoking status: Never Smoker   • Smokeless tobacco: Never Used   • Alcohol use No     Prescriptions Prior to Admission   Medication Sig Dispense Refill Last Dose   • aspirin 81 MG tablet Take 81 mg by mouth daily.   10/17/2018 at Unknown time   • B Complex Vitamins (VITAMIN B COMPLEX PO) Take  by mouth.   Past Week at Unknown time   • Calcium Carbonate-Vitamin D (CALCIUM + D PO) Take  by mouth 2 (Two) Times a Day.   Past Week at Unknown time   • Coenzyme Q10 (COQ10) 200 MG capsule Take  by mouth.   Past Week at Unknown time   • Magnesium 400 MG tablet Take  by mouth.   Past Week at Unknown time   • Misc Natural Products (GLUCOSAMINE CHONDROITIN VIT D3) capsule Take  by mouth.   Past Week at Unknown time   • Multiple Vitamins-Minerals (MULTIVITAMIN ADULT PO) Take  by mouth.   Past Week at Unknown time   • Omega-3 Fatty Acids (FISH OIL) 1000 MG capsule capsule Take  by mouth daily with breakfast.   Taking   • rOPINIRole (REQUIP) 0.5 MG tablet TAKE 1 TABLET BY MOUTH IN THE EVENING ONE  HOUR  BEFORE  BEDTIME 90 tablet 1 10/17/2018 at Unknown time   • simvastatin (ZOCOR) 40 MG tablet Take 1 tablet by mouth every night at bedtime. 90 tablet 3 10/17/2018 at Unknown time     Allergies:  Patient has no known allergies.    Review of Systems   Constitutional: Negative for chills and fever.   HENT: Negative for congestion and sore throat.    Eyes: Negative for visual disturbance.   Respiratory: Negative for cough, chest tightness, shortness of breath and wheezing.    Cardiovascular: Negative for chest pain, palpitations and leg swelling.   Gastrointestinal: Negative for abdominal distention, abdominal pain, diarrhea, nausea and vomiting.   Endocrine: Negative for polydipsia and polyuria.   Genitourinary: Negative for difficulty urinating, dysuria, frequency and urgency.   Musculoskeletal: Negative for arthralgias and myalgias.   Skin: Negative for color change and rash.   Neurological: Positive for light-headedness.  Negative for dizziness, seizures, syncope, speech difficulty, weakness and numbness.   Psychiatric/Behavioral: Negative for confusion and hallucinations.        PHYSICAL EXAM    Vital Signs  tMax 24 hrs:  Temp (24hrs), Av.5 °F (36.4 °C), Min:97.5 °F (36.4 °C), Max:97.5 °F (36.4 °C)    Vitals Ranges:  Temp:  [97.5 °F (36.4 °C)] 97.5 °F (36.4 °C)  Heart Rate:  [56-68] 64  Resp:  [16-18] 18  BP: (100-146)/(65-83) 146/75    Physical Exam   Constitutional: She is oriented to person, place, and time. She appears well-developed and well-nourished.   HENT:   Head: Normocephalic and atraumatic.   Eyes: Pupils are equal, round, and reactive to light. EOM are normal.   Neck: Neck supple. No tracheal deviation present.   Cardiovascular: Normal rate and regular rhythm.  Exam reveals no gallop.    No murmur heard.  Pulmonary/Chest: Effort normal. No respiratory distress. She has no wheezes.   Abdominal: Soft. Bowel sounds are normal. She exhibits no distension. There is no tenderness.   Musculoskeletal: She exhibits no edema or tenderness.   Neurological: She is alert and oriented to person, place, and time. No cranial nerve deficit.   Skin: Skin is warm and dry.   Nursing note and vitals reviewed.      Results Review:    Results from last 7 days  Lab Units 10/18/18  0221   WBC 10*3/mm3 7.25   HEMOGLOBIN g/dL 14.3   HEMATOCRIT % 43.4   PLATELETS 10*3/mm3 268       Results from last 7 days  Lab Units 10/18/18  0221   SODIUM mmol/L 144   POTASSIUM mmol/L 4.3   CHLORIDE mmol/L 105   CO2 mmol/L 27.0   BUN mg/dL 12   CREATININE mg/dL 0.76   CALCIUM mg/dL 9.6   BILIRUBIN mg/dL 0.3   ALK PHOS U/L 68   ALT (SGPT) U/L 26   AST (SGOT) U/L 25   GLUCOSE mg/dL 125*        I reviewed the patient's new clinical results.  I reviewed the patient's new imaging results and agree with the interpretation.  I personally viewed and interpreted the patient's EKG/Telemetry data        Active Hospital Problems    Diagnosis Date Noted   • **Vertigo  [R42] 10/18/2018   • Restless legs [G25.81] 08/14/2018   • BALDEMAR on CPAP [G47.33, Z99.89]    • Hyperlipidemia [E78.5]       Resolved Hospital Problems    Diagnosis Date Noted Date Resolved   No resolved problems to display.       Assessment & Plan    The patient has been admitted.  MRI/MRA have been performed this morning with results pending.  Neurology consultation.  She has passed a swallow study and we will place her on a regular diet.  She'll remain on aspirin and high-dose statin for now until we have more results and neuro input.  Therapy evaluation.  Additional plans based on her clinical course.    I discussed the patients findings and my recommendations with patient, family and nursing staff    Hill Arellano MD  10/18/18  11:57 AM

## 2018-10-18 NOTE — THERAPY EVALUATION
"Acute Care - Physical Therapy Initial Evaluation  [facility]     Patient Name: Ashley Motta  : 1940  MRN: [id number]  Today's Date: 10/18/2018   Onset of Illness/Injury or Date of Surgery: 10/18/18  Date of Referral to PT: 10/18/18  Referring Physician: Lewis Fletcher      Admit Date: 10/18/2018    Visit Dx:     ICD-10-CM ICD-9-CM   1. Vertigo (Intractable) R42 780.4   2. Acute nonintractable headache, unspecified headache type R51 784.0   3. Difficulty walking R26.2 719.7     Patient Active Problem List   Diagnosis   • Osteopenia   • Hyperlipidemia   • Elevated fasting glucose   • Osteoarthritis   • BALDEMAR on CPAP   • Pelvic relaxation due to vaginal prolapse   • Chronic pain of both knees   • Arthritis of right knee   • Arthritis of left knee   • Arthritis of both knees   • Restless legs   • Vertigo     Past Medical History:   Diagnosis Date   • Elevated fasting glucose    • GERD (gastroesophageal reflux disease)    • Goiter     THYROID POLYP SEES    • Hyperlipidemia    • Hypersomnia    • BALDEMAR on CPAP    • Osteoarthritis    • Osteopenia    • RLS (restless legs syndrome)      Past Surgical History:   Procedure Laterality Date   • CATARACT EXTRACTION, BILATERAL Bilateral    • COLONOSCOPY N/A 2015    DR. SARAH LIRIANO   • HYSTERECTOMY  2004    Total   • KNEE SURGERY Left    • KNEE SURGERY Right    • SHOULDER ARTHROSCOPY Left         PT ASSESSMENT (last 12 hours)      Physical Therapy Evaluation     Row Name 10/18/18 1112          PT Evaluation Time/Intention    Subjective Information complains of;dizziness  -MS     Document Type evaluation  -MS     Mode of Treatment physical therapy;individual therapy  -MS     Patient Effort good  -MS     Comment Pt. reports feeling \"pressure\" in her head (Posterior and frontal) as well as dizziness with any type of mobility.  -MS     Row Name 10/18/18 1112          General Information    Onset of Illness/Injury or Date of Surgery 10/18/18  -MS  " "   Referring Physician Lewis Fletcher  -MS     Patient Observations agree to therapy;cooperative  -MS     Prior Level of Function independent:  -MS     Equipment Currently Used at Home none  -MS     Pertinent History of Current Functional Problem Pt. admitted with dizziness; Headache; Vertigo  -MS     Existing Precautions/Restrictions fall  -MS     Risks Reviewed patient:  -MS     Benefits Reviewed patient:  -MS     Barriers to Rehab none identified  -MS     Row Name 10/18/18 1112          Cognitive Assessment/Intervention- PT/OT    Orientation Status (Cognition) oriented x 3  -MS     Follows Commands (Cognition) WNL  -MS     Personal Safety Interventions fall prevention program maintained;gait belt;nonskid shoes/slippers when out of bed;supervised activity  -MS     Row Name 10/18/18 1112          Bed Mobility Assessment/Treatment    Bed Mobility Assessment/Treatment supine-sit;sit-supine  -MS     Supine-Sit Twilight (Bed Mobility) independent  -MS     Sit-Supine Twilight (Bed Mobility) independent  -MS     Row Name 10/18/18 1112          Transfer Assessment/Treatment    Transfer Assessment/Treatment stand-sit transfer;sit-stand transfer  -MS     Sit-Stand Twilight (Transfers) contact guard  -MS     Stand-Sit Twilight (Transfers) contact guard  -MS     Row Name 10/18/18 1112          Gait/Stairs Assessment/Training    Twilight Level (Gait) contact guard  -MS     Distance in Feet (Gait) 120 feet  -MS     Pattern (Gait) step-through  -MS     Deviations/Abnormal Patterns (Gait) haily decreased  -MS     Comment (Gait/Stairs) Pt. guarded during ambulation with intermittent wide base of support due to dizziness and feeling \"off balance\".   -MS     Row Name 10/18/18 1112          General ROM    GENERAL ROM COMMENTS BUE/LE (WFL's)  -MS     Row Name 10/18/18 1112          MMT (Manual Muscle Testing)    General MMT Comments BUE/LE (4-/5)  -MS     Row Name 10/18/18 1112          Pain Assessment    " Additional Documentation Pain Scale: Numbers Pre/Post-Treatment (Group)  -MS     Row Name 10/18/18 1112          Pain Scale: Numbers Pre/Post-Treatment    Pain Scale: Numbers, Pretreatment 0/10 - no pain  -MS     Pain Scale: Numbers, Post-Treatment 0/10 - no pain  -MS     Row Name 10/18/18 1112          Physical Therapy Clinical Impression    Date of Referral to PT 10/18/18  -MS     Criteria for Skilled Interventions Met (PT Clinical Impression) treatment indicated  -MS     Rehab Potential (PT Clinical Summary) good, to achieve stated therapy goals  -MS     Row Name 10/18/18 1112          Physical Therapy Goals    Transfer Goal Selection (PT) transfer, PT goal 1  -MS     Gait Training Goal Selection (PT) gait training, PT goal 1  -MS     Row Name 10/18/18 1112          Transfer Goal 1 (PT)    Activity/Assistive Device (Transfer Goal 1, PT) transfers, all  -MS     [place] Level/Cues Needed (Transfer Goal 1, PT) independent  -MS     Time Frame (Transfer Goal 1, PT) long term goal (LTG);3 days  -MS     Row Name 10/18/18 1112          Gait Training Goal 1 (PT)    Activity/Assistive Device (Gait Training Goal 1, PT) gait (walking locomotion)  -MS     [place] Level (Gait Training Goal 1, PT) independent  -MS     Distance (Gait Goal 1, PT) 350 feet  -MS     Time Frame (Gait Training Goal 1, PT) long term goal (LTG);3 days  -MS     Row Name 10/18/18 1112          Positioning and Restraints    Pre-Treatment Position in bed  -MS     Post Treatment Position bed  -MS     In Bed notified nsg;supine;call light within reach;encouraged to call for assist;exit alarm on;with family/caregiver   All lines intact.  -MS       User Key  (r) = Recorded By, (t) = Taken By, (c) = Cosigned By    Initials Name Provider Type    Joon Pete, PT Physical Therapist          Physical Therapy Education     Title: PT OT SLP Therapies (Done)     Topic: Physical Therapy (Done)     Point: Mobility training (Done)    Learning Progress  Summary     Learner Status Readiness Method Response Comment Documented by    Patient Done Acceptance ROHIT CRUZ NR  MS 10/18/18 1117          Point: Home exercise program (Done)    Learning Progress Summary     Learner Status Readiness Method Response Comment Documented by    Patient Done Acceptance ROHIT CRUZ NR  MS 10/18/18 1117          Point: Body mechanics (Done)    Learning Progress Summary     Learner Status Readiness Method Response Comment Documented by    Patient Done Acceptance ROHIT CRUZ NR  MS 10/18/18 1117          Point: Precautions (Done)    Learning Progress Summary     Learner Status Readiness Method Response Comment Documented by    Patient Done Acceptance ROHIT CRUZ NR  MS 10/18/18 1117                      User Key     Initials Effective Dates Name Provider Type Discipline    MS 04/03/18 -  Joon Calderon, PT Physical Therapist PT                PT Recommendation and Plan  Anticipated Discharge Disposition (PT): home with assist, home with home health  Planned Therapy Interventions (PT Eval): balance training, bed mobility training, gait training, patient/family education, postural re-education, strengthening, transfer training  Therapy Frequency (PT Clinical Impression): daily  Outcome Summary/Treatment Plan (PT)  Anticipated Discharge Disposition (PT): home with assist, home with home health          Outcome Measures     Row Name 10/18/18 1100             How much help from another person do you currently need...    Turning from your back to your side while in flat bed without using bedrails? 4  -MS      Moving from lying on back to sitting on the side of a flat bed without bedrails? 4  -MS      Moving to and from a bed to a chair (including a wheelchair)? 3  -MS      Standing up from a chair using your arms (e.g., wheelchair, bedside chair)? 3  -MS      Climbing 3-5 steps with a railing? 3  -MS      To walk in hospital room? 3  -MS      AM-PAC 6 Clicks Score 20  -MS         Functional Assessment     Outcome Measure Options AM-PAC 6 Clicks Basic Mobility (PT)  -MS        User Key  (r) = Recorded By, (t) = Taken By, (c) = Cosigned By    Initials Name Provider Type    Joon Pete, PT Physical Therapist           Time Calculation:         PT Charges     Row Name 10/18/18 1117             Time Calculation    Start Time 1050  -MS      Stop Time 1105  -MS      Time Calculation (min) 15 min  -MS      PT Received On 10/18/18  -MS      PT - Next Appointment 10/19/18  -MS      PT Goal Re-Cert Due Date 10/21/18  -MS         Time Calculation- PT    Total Timed Code Minutes- PT 11 minute(s)  -MS        User Key  (r) = Recorded By, (t) = Taken By, (c) = Cosigned By    Initials Name Provider Type    Joon Pete PT Physical Therapist        Therapy Suggested Charges     Code   Minutes Charges    None           Therapy Charges for Today     Code Description Service Date Service Provider Modifiers Qty    47937698606 HC PT MOBILITY CURRENT 10/18/2018 Joon Calderon, PT GP,  1    22630180479 HC PT MOBILITY PROJECTED 10/18/2018 Joon Calderon, PT GP,  1    12015655876 HC PT EVAL LOW COMPLEXITY 1 10/18/2018 Joon Calderon, PT GP 1    82151227200 HC PT THER PROC EA 15 MIN 10/18/2018 Joon Calderon, PT GP 1          PT G-Codes  PT Professional Judgement Used?: Yes  Outcome Measure Options: AM-PAC 6 Clicks Basic Mobility (PT)  AM-PAC 6 Clicks Score: 20  Functional Limitation: Mobility: Walking and moving around  Mobility: Walking and Moving Around Current Status (): At least 20 percent but less than 40 percent impaired, limited or restricted  Mobility: Walking and Moving Around Goal Status (): 0 percent impaired, limited or restricted      Joon Calderon, PT  10/18/2018

## 2018-10-18 NOTE — THERAPY EVALUATION
Acute Care - Speech Language Pathology   Swallow Initial Evaluation [facility]     Patient Name: Ashley Motta  : 1940  MRN: [id number]  Today's Date: 10/18/2018  Onset of Illness/Injury or Date of Surgery: 10/18/18     Referring Physician: Lewis Fletcher      Admit Date: 10/18/2018    Visit Dx:     ICD-10-CM ICD-9-CM   1. Vertigo (Intractable) R42 780.4   2. Acute nonintractable headache, unspecified headache type R51 784.0   3. Difficulty walking R26.2 719.7     Patient Active Problem List   Diagnosis   • Osteopenia   • Hyperlipidemia   • Elevated fasting glucose   • Osteoarthritis   • BALDEMAR on CPAP   • Pelvic relaxation due to vaginal prolapse   • Chronic pain of both knees   • Arthritis of right knee   • Arthritis of left knee   • Arthritis of both knees   • Restless legs   • Vertigo     Past Medical History:   Diagnosis Date   • Elevated fasting glucose    • GERD (gastroesophageal reflux disease)    • Goiter     THYROID POLYP SEES    • Hyperlipidemia    • Hypersomnia    • BALDEMAR on CPAP    • Osteoarthritis    • Osteopenia    • RLS (restless legs syndrome)      Past Surgical History:   Procedure Laterality Date   • CATARACT EXTRACTION, BILATERAL Bilateral    • COLONOSCOPY N/A 2015    DR. SARAH LIRIANO   • HYSTERECTOMY  2004    Total   • KNEE SURGERY Left    • KNEE SURGERY Right    • SHOULDER ARTHROSCOPY Left           SWALLOW EVALUATION (last 72 hours)      SLP Adult Swallow Evaluation     Row Name 10/18/18 1500                   Rehab Evaluation    Document Type evaluation  -AW        Subjective Information no complaints  -AW        Patient Observations alert;cooperative;agree to therapy  -AW        Patient Effort good  -AW        Symptoms Noted During/After Treatment none  -AW           General Information    Patient Profile Reviewed yes  -AW        Pertinent History Of Current Problem Pt admitted with dizziness and decreased balance. MRI negative.   -AW        Current  Method of Nutrition regular textures;thin liquids  -AW        Prior Level of Function-Swallowing no diet consistency restrictions  -AW        Plans/Goals Discussed with patient;spouse/S.O.;agreed upon  -AW        Barriers to Rehab none identified  -AW        Patient's Goals for Discharge return to all previous roles/activities  -AW        Family Goals for Discharge family did not state  -AW           Pain Assessment    Additional Documentation Pain Scale: Numbers Pre/Post-Treatment (Group)  -AW           Pain Scale: Numbers Pre/Post-Treatment    Pain Scale: Numbers, Pretreatment 6/10  -AW        Pain Scale: Numbers, Post-Treatment 6/10  -AW        Pain Location head  -AW        Pre/Post Treatment Pain Comment Pt took Tylenol prior to eval.  -AW           Oral Motor and Function    Dentition Assessment upper dentures/partial in place;lower dentures/partial in place  -AW        Secretion Management WNL/WFL  -AW        Volitional Swallow WFL  -AW        Volitional Cough WFL  -AW           Oral Musculature and Cranial Nerve Assessment    Oral Motor General Assessment WFL  -AW           General Eating/Swallowing Observations    Respiratory Support Currently in Use room air  -AW        Eating/Swallowing Skills self-fed  -AW        Positioning During Eating upright in bed  -AW        Utensils Used spoon;cup;straw  -AW        Consistencies Trialed regular textures;soft textures;pureed;thin liquids  -AW           Clinical Swallow Eval    Oral Prep Phase WFL  -AW        Oral Transit WFL  -AW        Oral Residue WFL  -AW        Pharyngeal Phase no overt signs/symptoms of pharyngeal impairment  -AW        Clinical Swallow Evaluation Summary No s/s noted with thins via cup and straw. Mastication was functional for soft and regular soids. Laryngeal elevation appeared adequate with palpation.   -AW           Clinical Impression    SLP Swallowing Diagnosis functional oral phase;functional pharyngeal phase  -AW        Functional  Impact no impact on function  -AW        Swallow Criteria for Skilled Therapeutic Interventions Met no problems identified which require skilled intervention  -AW           Recommendations    Therapy Frequency (Swallow) evaluation only  -AW        SLP Diet Recommendation regular textures;thin liquids  -AW        Recommended Precautions and Strategies upright posture during/after eating;small bites of food and sips of liquid  -AW        SLP Rec. for Method of Medication Administration meds whole;with thin liquids;with pudding or applesauce  -AW        Monitor for Signs of Aspiration yes;notify SLP if any concerns;cough;gurgly voice;throat clearing;right lower lobe infiltrates;pneumonia  -AW        Anticipated Dischage Disposition home with assist  -AW          User Key  (r) = Recorded By, (t) = Taken By, (c) = Cosigned By    Initials Name Effective Dates    Maddison Ayon, MS CCC-SLP 06/08/18 -         EDUCATION  The patient has been educated in the following areas:   Dysphagia (Swallowing Impairment) Oral Care/Hydration.    SLP Recommendation and Plan  SLP Swallowing Diagnosis: functional oral phase, functional pharyngeal phase  SLP Diet Recommendation: regular textures, thin liquids  Recommended Precautions and Strategies: upright posture during/after eating, small bites of food and sips of liquid     Monitor for Signs of Aspiration: yes, notify SLP if any concerns, cough, gurgly voice, throat clearing, right lower lobe infiltrates, pneumonia     Swallow Criteria for Skilled Therapeutic Interventions Met: no problems identified which require skilled intervention  Anticipated Dischage Disposition: home with assist     Therapy Frequency (Swallow): evaluation only                         SLP Outcome Measures (last 72 hours)      SLP Outcome Measures     Row Name 10/18/18 1500             SLP Outcome Measures    Outcome Measure Used? Adult NOMS  -AW         Adult FCM Scores    FCM Chosen Swallowing  -AW      Swallowing  FCM Score 7  -AW        User Key  (r) = Recorded By, (t) = Taken By, (c) = Cosigned By    Initials Name Effective Dates    BELLA Dyllan Maddison MS CCC-SLP 06/08/18 -            Time Calculation:         Time Calculation- SLP     Row Name 10/18/18 1514             Time Calculation- SLP    SLP Start Time 1100  -AW      SLP Received On 10/18/18  -AW        User Key  (r) = Recorded By, (t) = Taken By, (c) = Cosigned By    Initials Name Provider Type    Maddison Ayon MS CCC-SLP Speech and Language Pathologist          Therapy Charges for Today     Code Description Service Date Service Provider Modifiers Qty    28577041574 HC ST SWALLOWING CURRENT STATUS 10/18/2018 Maddison Mijares MS CCC-SLP GN, CH 1    71445119841 HC ST SWALLOWING PROJECTED 10/18/2018 Maddison Mijares MS CCC-SLP GN, CH 1    74379532525 HC ST SWALLOWING DISCHARGE 10/18/2018 Maddison Mijares MS CCC-SLP GN, CH 1    93784377661 HC ST EVAL ORAL PHARYNG SWALLOW 3 10/18/2018 Maddison Mijares MS CCC-SLP GN, KX 1          SLP G-Codes  SLP NOMS Used?: Yes  Functional Limitations: Swallowing  Swallow Current Status (): 0 percent impaired, limited or restricted  Swallow Goal Status (): 0 percent impaired, limited or restricted  Swallow Discharge Status (): 0 percent impaired, limited or restricted    Maddison Mijares MS CCC-SLP  10/18/2018

## 2018-10-18 NOTE — PROGRESS NOTES
Discharge Planning Assessment  [facility]     Patient Name: Ashley Motta  MRN: [id number]  Today's Date: 10/18/2018    Admit Date: 10/18/2018          Discharge Needs Assessment     Row Name 10/18/18 1745       Living Environment    Lives With spouse    Name(s) of Who Lives With Patient spouse Wilfredo Motta [phone]    Current Living Arrangements home/apartment/condo    Primary Care Provided by self    Provides Primary Care For no one    Family Caregiver if Needed spouse    Family Caregiver Names spouse Wilfredo Motta [phone]    Quality of Family Relationships helpful;involved;supportive    Able to Return to Prior Arrangements yes       Resource/Environmental Concerns    Resource/Environmental Concerns none    Transportation Concerns car, none       Transition Planning    Patient/Family Anticipates Transition to home with family    Patient/Family Anticipated Services at Transition none    Transportation Anticipated family or friend will provide       Discharge Needs Assessment    Concerns to be Addressed denies needs/concerns at this time    Equipment Currently Used at Home none    Anticipated Changes Related to Illness none    Equipment Needed After Discharge none    Offered/Gave Vendor List yes            Discharge Plan     Row Name 10/18/18 7594       Plan    Plan Home with assistance from her spouse and currently denies any d/c needs.      Patient/Family in Agreement with Plan yes    Plan Comments Met with the patient and her spouse Wilfredo Motta [phone] at bedside; explained role of CCP, verified facesheet, checked [facility] notice and discussed discharge planning needs.  The patient uses no DME, has grab bars in her shower, has no steps to enter or inside her condo, PCP is Erica Jhonston, pharmacy is [email or web address] in [facility] and the patient denies any trouble remembering to take her medication or with affording her medication.  The patient denies any HH/SNF history, was provided with a HH/SNF list and samara denies  any d/c needs.  The patient was encouraged to bring her POA documents to [facility] to be scanned into Epic and she states that her friend or son will transport her home upon d/c.  LAURENT Saunders        Destination     No service coordination in this encounter.      Durable Medical Equipment     No service coordination in this encounter.      Dialysis/Infusion     No service coordination in this encounter.      Home Medical Care     No service coordination in this encounter.      Social Care     No service coordination in this encounter.                Demographic Summary     Row Name 10/18/18 1742       General Information    Admission Type observation    Arrived From home    Referral Source physician;nursing    Reason for Consult discharge planning    Preferred Language English     Used During This Interaction no            Functional Status     Row Name 10/18/18 1741       Functional Status    Usual Activity Tolerance excellent    Current Activity Tolerance fair       Functional Status, IADL    Medications independent    Meal Preparation independent    Housekeeping independent    Laundry independent    Shopping independent       Mental Status    General Appearance WDL WDL       Mental Status Summary    Recent Changes in Mental Status/Cognitive Functioning no changes            Psychosocial    No documentation.           Abuse/Neglect    No documentation.           Legal    No documentation.           Substance Abuse    No documentation.           Patient Forms     Row Name 10/18/18 1744       Patient Forms    Provider Choice List Delivered    Delivered to Patient    Method of delivery In person          LAURENT Serrano

## 2018-10-18 NOTE — ED NOTES
Nursing report ED to floor  [facility]  78 y.o.  female    HPI (triage note):   Chief Complaint   Patient presents with   • Dizziness   • Headache       Admitting doctor:   Lewis Duvall MD    Admitting diagnosis:   The primary encounter diagnosis was Vertigo (Intractable). A diagnosis of Acute nonintractable headache, unspecified headache type was also pertinent to this visit.    Code status:   Current Code Status     Date Active Code Status Order ID Comments User Context       10/18/2018  4:57 AM CPR [id number]  Lewis Duvall MD ED       Questions for Current Code Status     Question Answer Comment    Code Status CPR     Medical Interventions (Level of Support Prior to Arrest) Full           Allergies:   Patient has no known allergies.    Weight:   1    10/18/18  0212   Weight: 69.7 kg (153 lb 9.6 oz)       Most recent vitals:   Vitals:    10/18/18 0207 10/18/18 0212 10/18/18 0403 10/18/18 0530   BP: 100/74  132/65 132/83   BP Location: Left arm   Left arm   Patient Position: Sitting   Sitting   Pulse: 61  57 56   Resp: 16  16 16   Temp:       TempSrc:       SpO2: 94%  96% 96%   Weight:  69.7 kg (153 lb 9.6 oz)     Height:           Active LDAs/IV Access:   Lines, Drains & Airways    Active LDAs     Name:   Placement date:   Placement time:   Site:   Days:    Peripheral IV 10/18/18 0216 Right Antecubital  10/18/18    0216    Antecubital    less than 1                Labs (abnormal labs have a star):   Labs Reviewed   COMPREHENSIVE METABOLIC PANEL - Abnormal; Notable for the following:        Result Value    Glucose 125 (*)     All other components within normal limits    Narrative:     The MDRD GFR formula is only valid for adults with stable renal function between ages 18 and 70.   CBC WITH AUTO DIFFERENTIAL - Abnormal; Notable for the following:     RDW 14.8 (*)     All other components within normal limits   TROPONIN (IN-HOUSE) - Normal    Narrative:     Troponin T Reference Ranges:  Less  than 0.03 ng/mL:    Negative for AMI  0.03 to 0.09 ng/mL:      Indeterminant for AMI  Greater than 0.09 ng/mL: Positive for AMI   MAGNESIUM - Normal   RAINBOW DRAW    Narrative:     The following orders were created for panel order [facility] Draw.  Procedure                               Abnormality         Status                     ---------                               -----------         ------                     Light Blue Top[[id number]]                                   Final result               Green Top (Gel)[[id number]]                                  Final result               Lavender Top[[id number]]                                     Final result               Gold Top - SST[[id number]]                                   Final result                 Please view results for these tests on the individual orders.   HEMOGLOBIN A1C   LIPID PANEL   POCT GLUCOSE FINGERSTICK   POCT GLUCOSE FINGERSTICK   POCT GLUCOSE FINGERSTICK   POCT GLUCOSE FINGERSTICK   LIGHT BLUE TOP   GREEN TOP   LAVENDER TOP   GOLD TOP - SST   CBC AND DIFFERENTIAL    Narrative:     The following orders were created for panel order CBC & Differential.  Procedure                               Abnormality         Status                     ---------                               -----------         ------                     CBC Auto Differential[[id number]]        Abnormal            Final result                 Please view results for these tests on the individual orders.       EKG:   ECG 12 Lead             Meds given in ED:   Medications   sodium chloride 0.9 % flush 10 mL (not administered)   sodium chloride 0.9 % flush 10 mL (not administered)   meclizine (ANTIVERT) tablet 25 mg (not administered)   ondansetron (ZOFRAN) injection 4 mg (not administered)   sodium chloride 0.9 % flush 3 mL (not administered)   sodium chloride 0.9 % flush 3-10 mL (not administered)   atorvastatin (LIPITOR) tablet 80 mg (not administered)   sodium chloride  0.9 % infusion (100 mL/hr Intravenous New Bag 10/18/18 0532)   aspirin tablet 325 mg (not administered)     Or   aspirin suppository 300 mg (not administered)   acetaminophen (TYLENOL) tablet 650 mg (not administered)     Or   acetaminophen (TYLENOL) suppository 650 mg (not administered)   meclizine (ANTIVERT) tablet 25 mg (25 mg Oral Given 10/18/18 0337)       Imaging results:  Ct Head Without Contrast    Result Date: 10/18/2018   1. No acute intracranial process. 2. Air-fluid level within the left maxillary sinus. Correlation with any history of acute sinusitis is recommended. In addition, the left mastoid air cells appear relatively sclerotic when compared to the contralateral side, which could reflect changes of chronic mastoiditis.  Radiation dose reduction techniques were utilized, including automated exposure control and exposure modulation based on body size.  This report was finalized on 10/18/2018 3:15 AM by Dr. Lianne Ingram M.D.        Ambulatory status:   New mobility issue, requires assistance of at least one person.     Social issues:   Social History     Social History   • Marital status:      Spouse name: N/A   • Number of children: N/A   • Years of education: N/A     Occupational History   • Not on file.     Social History Main Topics   • Smoking status: Never Smoker   • Smokeless tobacco: Never Used   • Alcohol use No   • Drug use: No   • Sexual activity: Defer     Other Topics Concern   • Not on file     Social History Narrative   • No narrative on file        Magdalena Browne RN  10/18/18 0538

## 2018-10-18 NOTE — SIGNIFICANT NOTE
10/18/18 0833   Rehab Time/Intention   Evaluation Not Performed other (see comments)  (transport present to take to )   Rehab Treatment   Discipline occupational therapist

## 2018-10-18 NOTE — ED PROVIDER NOTES
" EMERGENCY DEPARTMENT ENCOUNTER    Room Number:  22/22  Time seen: 2:38 AM  PCP: Erica Johnston MD  Historian: patient, family (spouse)  History Limited By: N/A      HPI:  Chief Complaint: dizziness  Context: Ashley Motta is a 78 y.o. female who presents to the ED c/o intermittent dizziness (described as \"off balance\") that she noticed yesterday at about 0800 when she awoke from sleep. It is exacerbated by walking and position changes and is alleviated by lying down and remaining still. It worsened last night. She also reports having headache (gradual onset, moderate in severity) and nausea. She denies vomiting, focal weakness, numbness, vision changes, trouble with speech, trouble swallowing, syncope, ear pain, chest pain, dyspnea, palpitations, lightheadedness, neck stiffness, vomiting, abd pain, fever, chills, and pain and difficulty with urination. Spouse reports that pt has not had facial droop and has been using spouse's walker to keep herself steady during ambulation. She is not on any blood thinners. Pt has no other complaints at this time.     Location: generalized  Radiation: none  Quality: described as \"off balance\"  Intensity/Severity: moderate  Duration: started yesterday at about 0800  Onset quality: unknown- pt noticed dizziness when she awoke from sleep  Timing: intermittent  Progression: worse  Aggravating Factors: walking, position changes  Alleviating Factors: lying down, remaining still  Previous Episodes: none  Treatment before arrival: Spouse reports that pt has been using spouse's walker to keep herself steady during ambulation.  Associated Symptoms: ataxic gait, headache, nausea          PAST MEDICAL HISTORY  Active Ambulatory Problems     Diagnosis Date Noted   • Osteopenia    • Hyperlipidemia    • Elevated fasting glucose    • Osteoarthritis    • BALDEMAR on CPAP    • Pelvic relaxation due to vaginal prolapse 08/22/2017   • Chronic pain of both knees 09/05/2017   • Arthritis of right knee " 09/05/2017   • Arthritis of left knee 09/05/2017   • Arthritis of both knees 05/07/2018   • Restless legs 08/14/2018     Resolved Ambulatory Problems     Diagnosis Date Noted   • No Resolved Ambulatory Problems     Past Medical History:   Diagnosis Date   • Elevated fasting glucose    • GERD (gastroesophageal reflux disease)    • Goiter    • Hyperlipidemia    • Hypersomnia    • BALDEMAR on CPAP    • Osteoarthritis    • Osteopenia    • RLS (restless legs syndrome)          PAST SURGICAL HISTORY  Past Surgical History:   Procedure Laterality Date   • CATARACT EXTRACTION, BILATERAL Bilateral 2015   • COLONOSCOPY N/A 02/27/2015    DR. SARAH LIRIANO   • HYSTERECTOMY  2004    Total   • KNEE SURGERY Left 2013   • KNEE SURGERY Right 2010   • SHOULDER ARTHROSCOPY Left          FAMILY HISTORY  Family History   Problem Relation Age of Onset   • Heart disease Mother    • Lung cancer Father    • Hypertension Father    • Heart disease Father    • Heart attack Brother    • Emphysema Brother    • COPD Brother    • Depression Brother    • Heart attack Brother         Had stents placed 2009         SOCIAL HISTORY  Social History     Social History   • Marital status:      Spouse name: N/A   • Number of children: N/A   • Years of education: N/A     Occupational History   • Not on file.     Social History Main Topics   • Smoking status: Never Smoker   • Smokeless tobacco: Never Used   • Alcohol use No   • Drug use: No   • Sexual activity: Defer     Other Topics Concern   • Not on file     Social History Narrative   • No narrative on file         ALLERGIES  Patient has no known allergies.      REVIEW OF SYSTEMS  Review of Systems   Constitutional: Negative for chills and fever.   HENT: Negative for congestion, rhinorrhea and sore throat.    Eyes: Negative for pain and visual disturbance.   Respiratory: Negative for cough and shortness of breath.    Cardiovascular: Negative for chest pain and palpitations.   Gastrointestinal: Positive  "for nausea. Negative for abdominal pain, diarrhea and vomiting.   Endocrine: Negative.    Genitourinary: Negative for difficulty urinating and dysuria.   Musculoskeletal: Positive for gait problem (ataxic gait). Negative for neck stiffness.   Skin: Negative.    Neurological: Positive for dizziness (described as \"off balance\") and headaches (gradual onset, moderate in severity). Negative for syncope, facial asymmetry, speech difficulty, weakness, light-headedness and numbness.   Psychiatric/Behavioral: Negative.    All other systems reviewed and are negative.           PHYSICAL EXAM  ED Triage Vitals   Temp Heart Rate Resp BP SpO2   10/18/18 0122 10/18/18 0122 10/18/18 0122 10/18/18 0207 10/18/18 0122   97.5 °F (36.4 °C) 68 16 100/74 99 % WNL      Temp src Heart Rate Source Patient Position BP Location FiO2 (%)   10/18/18 0122 10/18/18 0207 10/18/18 0207 10/18/18 0207 --   Tympanic Monitor Sitting Left arm        Physical Exam   Constitutional: She is oriented to person, place, and time. No distress.   HENT:   Head: Normocephalic and atraumatic.   Right Ear: Tympanic membrane and ear canal normal.   Left Ear: Tympanic membrane and ear canal normal.   Mouth/Throat: Mucous membranes are normal.   Eyes: Pupils are equal, round, and reactive to light. EOM are normal. Right eye exhibits no nystagmus. Left eye exhibits no nystagmus.   Neck: Normal range of motion. Neck supple.   No nuchal rigidity, no meningeal signs   Cardiovascular: Normal rate, regular rhythm and normal heart sounds.    Pulmonary/Chest: Effort normal and breath sounds normal. No respiratory distress. She has no decreased breath sounds. She has no wheezes. She has no rhonchi. She has no rales.   Abdominal: Soft. There is no tenderness. There is no rebound and no guarding.   Musculoskeletal: Normal range of motion.   Neurological: She is alert and oriented to person, place, and time. She has normal motor skills and normal sensation. She displays facial " symmetry and normal speech. No cranial nerve deficit.   Normal finger to nose, no focal neuro deficits    Skin: Skin is warm and dry.   Psychiatric: Mood and affect normal.   Nursing note and vitals reviewed.          LAB RESULTS  Recent Results (from the past 24 hour(s))   Light Blue Top    Collection Time: 10/18/18  2:21 AM   Result Value Ref Range    Extra Tube hold for add-on    Green Top (Gel)    Collection Time: 10/18/18  2:21 AM   Result Value Ref Range    Extra Tube Hold for add-ons.    Lavender Top    Collection Time: 10/18/18  2:21 AM   Result Value Ref Range    Extra Tube hold for add-on    Gold Top - SST    Collection Time: 10/18/18  2:21 AM   Result Value Ref Range    Extra Tube Hold for add-ons.    Comprehensive Metabolic Panel    Collection Time: 10/18/18  2:21 AM   Result Value Ref Range    Glucose 125 (H) 65 - 99 mg/dL    BUN 12 8 - 23 mg/dL    Creatinine 0.76 0.57 - 1.00 mg/dL    Sodium 144 136 - 145 mmol/L    Potassium 4.3 3.5 - 5.2 mmol/L    Chloride 105 98 - 107 mmol/L    CO2 27.0 22.0 - 29.0 mmol/L    Calcium 9.6 8.6 - 10.5 mg/dL    Total Protein 7.5 6.0 - 8.5 g/dL    Albumin 4.50 3.50 - 5.20 g/dL    ALT (SGPT) 26 1 - 33 U/L    AST (SGOT) 25 1 - 32 U/L    Alkaline Phosphatase 68 39 - 117 U/L    Total Bilirubin 0.3 0.1 - 1.2 mg/dL    eGFR Non African Amer 74 >60 mL/min/1.73    Globulin 3.0 gm/dL    A/G Ratio 1.5 g/dL    BUN/Creatinine Ratio 15.8 7.0 - 25.0    Anion Gap 12.0 mmol/L   Troponin    Collection Time: 10/18/18  2:21 AM   Result Value Ref Range    Troponin T <0.010 0.000 - 0.030 ng/mL   Magnesium    Collection Time: 10/18/18  2:21 AM   Result Value Ref Range    Magnesium 2.4 1.6 - 2.4 mg/dL   CBC Auto Differential    Collection Time: 10/18/18  2:21 AM   Result Value Ref Range    WBC 7.25 4.50 - 10.70 10*3/mm3    RBC 4.72 3.90 - 5.20 10*6/mm3    Hemoglobin 14.3 11.9 - 15.5 g/dL    Hematocrit 43.4 35.6 - 45.5 %    MCV 91.9 80.5 - 98.2 fL    MCH 30.3 26.9 - 32.0 pg    MCHC 32.9 32.4 - 36.3  g/dL    RDW 14.8 (H) 11.7 - 13.0 %    RDW-SD 50.2 37.0 - 54.0 fl    MPV 11.3 6.0 - 12.0 fL    Platelets 268 140 - 500 10*3/mm3    Neutrophil % 58.3 42.7 - 76.0 %    Lymphocyte % 30.2 19.6 - 45.3 %    Monocyte % 9.0 5.0 - 12.0 %    Eosinophil % 2.1 0.3 - 6.2 %    Basophil % 0.4 0.0 - 1.5 %    Immature Grans % 0.1 0.0 - 0.5 %    Neutrophils, Absolute 4.23 1.90 - 8.10 10*3/mm3    Lymphocytes, Absolute 2.19 0.90 - 4.80 10*3/mm3    Monocytes, Absolute 0.65 0.20 - 1.20 10*3/mm3    Eosinophils, Absolute 0.15 0.00 - 0.70 10*3/mm3    Basophils, Absolute 0.03 0.00 - 0.20 10*3/mm3    Immature Grans, Absolute 0.01 0.00 - 0.03 10*3/mm3       Ordered the above labs and reviewed the results.        RADIOLOGY  CT Head Without Contrast (Final result)   Result time 10/18/18 03:15:24 (independently viewed by me, interpreted by radiologist)    Final result by Lianne Ingram MD (10/18/18 03:15:24)                Impression:       1. No acute intracranial process.  2. Air-fluid level within the left maxillary sinus. Correlation with any  history of acute sinusitis is recommended. In addition, the left mastoid  air cells appear relatively sclerotic when compared to the contralateral  side, which could reflect changes of chronic mastoiditis.     Radiation dose reduction techniques were utilized, including automated  exposure control and exposure modulation based on body size.     This report was finalized on 10/18/2018 3:15 AM by Dr. Lianne Ingram M.D.               Narrative:    CT OF THE HEAD WITHOUT CONTRAST     HISTORY: New onset vertigo and headache     COMPARISON: None available.     TECHNIQUE: Axial CT imaging was obtained from the vertex of the skull to  the skull base. No IV contrast was administered.     FINDINGS:  No acute intracranial hemorrhage is identified. Brain parenchyma is  normal in attenuation. There are no focal areas of decreased attenuation  seen. There is some mild atrophy, in keeping with the age of  78. There  is an air-fluid level seen within the left maxillary sinus, which may  reflect some sinusitis. The patient's left mastoid air cells appear  relatively sclerotic when compared to the contralateral side, which  could reflect some underlying chronic mastoiditis.                         Ordered the above noted radiological studies. Reviewed by me in PACS.           PROCEDURES  Procedures        EKG:           EKG time: 0332  Rhythm/Rate: sinus bradycardia, rate= 55  P waves and AL: normal, normal  QRS, axis: normal axis   ST and T waves: no acute ST-T changes     Interpreted Contemporaneously by me, independently viewed  unchanged compared to prior 7/31/13 other than rate (was not bradycardic at that time)          PROGRESS AND CONSULTS     0252- Ordered meclizine for dizziness. Ordered CT head, magnesium level, blood work, troponin, and EKG for further evaluation. TPA not indicated-> time of sx onset is greater than 12 hours; no focal neuro deficits.    0320- Obtained CT head results-> no acute intracranial process.     0430- Per tech, when pt attempted to ambulate, she became dizzy and could not ambulate from the stretcher to the door.    0435- Rechecked pt. She is resting comfortably and is in no acute distress. Discussed with pt and family about all pertinent results including stable labs and CT head findings (no acute intracranial process). Educated pt and family on the nature of dizziness and informed them that intractable dizziness could either be due to vertigo versus posterior circulation stroke. Discussed pt admission for further care, further evaluation, and observation. Pt and family verbalize understanding and agreement with plan.     0440- Discussed case with Dr Engel who agrees with the plan of care.     0442- Sent call out to [facility]. Admission decision time= 0442    0450- Discussed case with Dr Duvall ([facility] hospitalist)  Reviewed history, exam, results and treatments.  Discussed concerns and  plan of care. Dr Duvall accepts pt to be admitted to neuro telemetry.        MEDICAL DECISION MAKING      MDM  Number of Diagnoses or Management Options     Amount and/or Complexity of Data Reviewed  Clinical lab tests: ordered and reviewed (WBC= 7.25, magnesium= 2.4, troponin <0.01, hgb= 14.3)  Tests in the radiology section of CPT®: reviewed and ordered (CT head- no acute intracranial process)  Tests in the medicine section of CPT®: reviewed and ordered (EKG)  Discuss the patient with other providers: yes (Dr Duvall ([facility] hospitalist))  Independent visualization of images, tracings, or specimens: yes    Patient Progress  Patient progress: stable             DIAGNOSIS  Final diagnoses:   Vertigo (Intractable)   Acute nonintractable headache, unspecified headache type         DISPOSITION  Admitted- neuro telemetry    Discussed treatment plan and reason for admission with pt/family and admitting physician.  Pt/family voiced understanding of the plan for admission for further testing/treatment as needed.         Latest Documented Vital Signs:  As of 4:50 AM  BP- 132/65 HR- 57 Temp- 97.5 °F (36.4 °C) (Tympanic) O2 sat- 96%      --  Documentation assistance provided by cora Hu for CESAR Dueñas.  Information recorded by the scribjr was done at my direction and has been verified and validated by me.     Bin Hu  10/18/18 3940       Willie Dueñas III, PA  10/18/18 3777

## 2018-10-18 NOTE — THERAPY EVALUATION
Acute Care - Occupational Therapy Initial Evaluation  [facility]     Patient Name: Ashley Motta  : 1940  MRN: [id number]  Today's Date: 10/18/2018  Onset of Illness/Injury or Date of Surgery: 10/18/18     Referring Physician: Lewis Fletcher    Admit Date: 10/18/2018       ICD-10-CM ICD-9-CM   1. Vertigo (Intractable) R42 780.4   2. Acute nonintractable headache, unspecified headache type R51 784.0   3. Difficulty walking R26.2 719.7     Patient Active Problem List   Diagnosis   • Osteopenia   • Hyperlipidemia   • Elevated fasting glucose   • Osteoarthritis   • BALDEMAR on CPAP   • Pelvic relaxation due to vaginal prolapse   • Chronic pain of both knees   • Arthritis of right knee   • Arthritis of left knee   • Arthritis of both knees   • Restless legs   • Vertigo     Past Medical History:   Diagnosis Date   • Elevated fasting glucose    • GERD (gastroesophageal reflux disease)    • Goiter     THYROID POLYP SEES    • Hyperlipidemia    • Hypersomnia    • BALDEMAR on CPAP    • Osteoarthritis    • Osteopenia    • RLS (restless legs syndrome)      Past Surgical History:   Procedure Laterality Date   • CATARACT EXTRACTION, BILATERAL Bilateral    • COLONOSCOPY N/A 2015    DR. SARAH LIRIANO   • HYSTERECTOMY  2004    Total   • KNEE SURGERY Left    • KNEE SURGERY Right    • SHOULDER ARTHROSCOPY Left           OT ASSESSMENT FLOWSHEET (last 72 hours)      Occupational Therapy Evaluation     Row Name 10/18/18 1438                   OT Evaluation Time/Intention    Subjective Information complains of;dizziness  -SG        Document Type evaluation  -SG        Mode of Treatment occupational therapy  -SG        Patient Effort good  -SG           General Information    Patient Profile Reviewed? yes  -SG        Patient Observations alert;cooperative;agree to therapy  -SG        General Observations of Patient sitting EOB. States just out of bathroom  -SG        Prior Level of Function independent:;ADL's   -SG        Equipment Currently Used at Home none  -SG        Existing Precautions/Restrictions fall  -SG           Cognitive Assessment/Intervention- PT/OT    Orientation Status (Cognition) oriented x 4  -SG        Follows Commands (Cognition) WFL  -SG           Bed Mobility Assessment/Treatment    Sit-Supine [place] (Bed Mobility) supervision  -SG           ADL Assessment/Intervention    BADL Assessment/Intervention lower body dressing  -SG           Lower Body Dressing Assessment/Training    Lower Body Dressing [place] Level lower body dressing skills;minimum assist (75% patient effort)  -SG        Lower Body Dressing Position edge of bed sitting  -SG        Comment (Lower Body Dressing) demonstrates ability to pull legs up to reach feet without bending  -SG           BADL Safety/Performance    Skilled BADL Treatment/Intervention BADL process/adaptation training  -SG           General ROM    GENERAL ROM COMMENTS BUANTHONY's WFL AROM  -SG           Sensory Assessment/Intervention    Sensory General Assessment no sensation deficits identified   BUANTHONY's WFL AROM  -SG           Positioning and Restraints    Pre-Treatment Position in bed  -SG        Post Treatment Position bed  -SG        In Bed call light within reach;encouraged to call for assist;exit alarm on;with family/caregiver;supine;notified nsg  -SG           Pain Scale: Numbers Pre/Post-Treatment    Pain Scale: Numbers, Pretreatment 0/10 - no pain  -SG        Pain Scale: Numbers, Post-Treatment 0/10 - no pain  -SG           Clinical Impression (OT)    OT Diagnosis need for assist with personal care  -SG        Criteria for Skilled Therapeutic Interventions Met (OT Eval) yes;treatment indicated  -SG        Therapy Frequency (OT Eval) 5 times/wk  -SG           Planned OT Interventions    Planned Therapy Interventions (OT Eval) BADL retraining;transfer/mobility retraining  -SG           OT Goals    Transfer Goal Selection (OT) transfer, OT goal 1  -SG         Problem Specific Goal Selection (OT) problem specific goal 1, OT  -SG        Additional Documentation Problem Specific Goal Selection (OT) (Row)  -           Transfer Goal 1 (OT)    Activity/Assistive Device (Transfer Goal 1, OT) toilet  -        [place] Level/Cues Needed (Transfer Goal 1, OT) supervision required  -SG        Time Frame (Transfer Goal 1, OT) 1 week  -SG           Problem Specific Goal 1 (OT)    Problem Specific Goal 1 (OT) pt to perform adls at [facility]  -        Time Frame (Problem Specific Goal 1, OT) 1 week  -SG          User Key  (r) = Recorded By, (t) = Taken By, (c) = Cosigned By    Initials Name Effective Dates     Heena Barger, OTR 06/08/18 -            Occupational Therapy Education     Title: PT OT SLP Therapies (Done)     Topic: Occupational Therapy (Done)     Point: ADL training (Done)     Description: Instruct learner(s) on proper safety adaptation and remediation techniques during self care or transfers.   Instruct in proper use of assistive devices.   Learning Progress Summary     Learner Status Readiness Method Response Comment Documented by    Patient Done Acceptance E,TB VU,NR safety for adls  10/18/18 1443                      User Key     Initials Effective Dates Name Provider Type Discipline     06/08/18 -  Heena Barger, OTR Occupational Therapist OT                  OT Recommendation and Plan  Planned Therapy Interventions (OT Eval): BADL retraining, transfer/mobility retraining  Therapy Frequency (OT Eval): 5 times/wk             Outcome Measures     Row Name 10/18/18 1443 10/18/18 1100          How much help from another person do you currently need...    Turning from your back to your side while in flat bed without using bedrails?  -- 4  -MS     Moving from lying on back to sitting on the side of a flat bed without bedrails?  -- 4  -MS     Moving to and from a bed to a chair (including a wheelchair)?  -- 3  -MS     Standing up from a chair using your arms  (e.g., wheelchair, bedside chair)?  -- 3  -MS     Climbing 3-5 steps with a railing?  -- 3  -MS     To walk in hospital room?  -- 3  -MS     AM-PAC 6 Clicks Score  -- 20  -MS        How much help from another is currently needed...    Putting on and taking off regular lower body clothing? 3  -SG  --     Bathing (including washing, rinsing, and drying) 3  -SG  --     Toileting (which includes using toilet bed pan or urinal) 3  -SG  --     Putting on and taking off regular upper body clothing 3  -SG  --     Taking care of personal grooming (such as brushing teeth) 3  -SG  --     Eating meals 4  -SG  --     Score 19  -SG  --        Functional Assessment    Outcome Measure Options AM-PAC 6 Clicks Daily Activity (OT)  -SG AM-PAC 6 Clicks Basic Mobility (PT)  -MS       User Key  (r) = Recorded By, (t) = Taken By, (c) = Cosigned By    Initials Name Provider Type    Heena Cee OTR Occupational Therapist    Joon Pete, PT Physical Therapist          Time Calculation:         Time Calculation- OT     Row Name 10/18/18 1444 10/18/18 0833          Time Calculation- OT    OT Start Time 1350  -  --     OT Stop Time 1404  -SG  --     OT Time Calculation (min) 14 min  -SG  --     Total Timed Code Minutes- OT 8 minute(s)  -  --     OT Received On 10/18/18  -  --     OT - Next Appointment  -- 10/19/18  -SG     OT Goal Re-Cert Due Date 10/25/18  -SG  --       User Key  (r) = Recorded By, (t) = Taken By, (c) = Cosigned By    Initials Name Provider Type    Heena Cee OTR Occupational Therapist        Therapy Suggested Charges     Code   Minutes Charges    None           Therapy Charges for Today     Code Description Service Date Service Provider Modifiers Qty    51892048562 HC OT SELFCARE CURRENT 10/18/2018 Heena Barger OTR GO, CK 1    1919423 HC OT SELFCARE PROJECTED 10/18/2018 Heena Barger OTR GO, CK 1    03725863162 HC OT SELFCARE DISCHARGE 10/18/2018 Heena Barger OTR GO, CK 1     [id number]  OT EVAL LOW COMPLEXITY 2 10/18/2018 Heena Barger OTR GO 1    49475346101 HC OT SELF CARE/MGMT/TRAIN EA 15 MIN 10/18/2018 Heena Barger OTR GO 1          OT G-codes  OT Functional Scales Options: AM-PAC 6 Clicks Daily Activity (OT)  Functional Limitation: Self care  Self Care Current Status (): At least 40 percent but less than 60 percent impaired, limited or restricted  Self Care Goal Status (): At least 40 percent but less than 60 percent impaired, limited or restricted  Self Care Discharge Status (): At least 40 percent but less than 60 percent impaired, limited or restricted    JADIEL Guerra  10/18/2018

## 2018-10-18 NOTE — CONSULTS
Encounter Date: 10/18/2018    CHIEF COMPLAINT: imbalance and dizziness since 1 day    Patient Active Problem List   Diagnosis   • Osteopenia   • Hyperlipidemia   • Elevated fasting glucose   • Osteoarthritis   • BALDEMAR on CPAP   • Pelvic relaxation due to vaginal prolapse   • Chronic pain of both knees   • Arthritis of right knee   • Arthritis of left knee   • Arthritis of both knees   • Restless legs   • Vertigo       PRESENT ILLNESS:    Portions of this notes is copied from previous physician encounters, reviewed and edited appropriately.    Background:     Ashley Motta is a 78 y.o. female with h/o dyslipidemia and BALDEMAR now admitted with sudden onset of lightheadedness, and few hours (4 hrs) later she has headache. There is asso headache which is around the head. No actual weakness but due to dizziness and balance issues, She is unable to walk without support of a walker. No previous similar spell.    Review of systems   No neck stiffness  No photophobia  All systems reviewed and are negative.         Past Medical History:   Diagnosis Date   • Elevated fasting glucose    • GERD (gastroesophageal reflux disease)    • Goiter     THYROID POLYP SEES    • Hyperlipidemia    • Hypersomnia    • BALDEMAR on CPAP    • Osteoarthritis    • Osteopenia    • RLS (restless legs syndrome)        Past Surgical History:   Procedure Laterality Date   • CATARACT EXTRACTION, BILATERAL Bilateral 2015   • COLONOSCOPY N/A 02/27/2015    DR. SARAH LIRIANO   • HYSTERECTOMY  2004    Total   • KNEE SURGERY Left 2013   • KNEE SURGERY Right 2010   • SHOULDER ARTHROSCOPY Left        Current Facility-Administered Medications   Medication Dose Route Frequency Provider Last Rate Last Dose   • acetaminophen (TYLENOL) tablet 650 mg  650 mg Oral Q4H PRN Lewis Duvall MD   650 mg at 10/18/18 0725    Or   • acetaminophen (TYLENOL) suppository 650 mg  650 mg Rectal Q4H PRN Lewis Duvall MD       • aspirin tablet 325 mg  325 mg Oral Daily  Lewis Duvall MD   325 mg at 10/18/18 1036    Or   • aspirin suppository 300 mg  300 mg Rectal Daily Lewis Duvall MD       • atorvastatin (LIPITOR) tablet 80 mg  80 mg Oral Nightly Lewis Duvall MD       • meclizine (ANTIVERT) tablet 25 mg  25 mg Oral TID PRN Lewis Duvall MD       • ondansetron (ZOFRAN) injection 4 mg  4 mg Intravenous Q6H PRN Lewis Duvall MD       • rOPINIRole (REQUIP) tablet 0.5 mg  0.5 mg Oral Nightly Hill Arellano MD       • sodium chloride 0.9 % flush 10 mL  10 mL Intravenous PRN Chapincito Engel MD       • sodium chloride 0.9 % flush 10 mL  10 mL Intravenous PRN Willie Dueñas III, PA       • sodium chloride 0.9 % flush 3 mL  3 mL Intravenous Q12H Lewis Duvall MD   3 mL at 10/18/18 0840   • sodium chloride 0.9 % flush 3-10 mL  3-10 mL Intravenous PRN Lewis Duvall MD       • sodium chloride 0.9 % infusion  100 mL/hr Intravenous Continuous Lewis Duvall  mL/hr at 10/18/18 1403 100 mL/hr at 10/18/18 1403       No Known Allergies    Social History     Social History   • Marital status:      Spouse name: N/A   • Number of children: N/A   • Years of education: N/A     Occupational History   • Not on file.     Social History Main Topics   • Smoking status: Never Smoker   • Smokeless tobacco: Never Used   • Alcohol use No   • Drug use: No   • Sexual activity: Defer     Other Topics Concern   • Not on file     Social History Narrative   • No narrative on file       Family History   Problem Relation Age of Onset   • Heart disease Mother    • Lung cancer Father    • Hypertension Father    • Heart disease Father    • Heart attack Brother    • Emphysema Brother    • COPD Brother    • Depression Brother    • Heart attack Brother         Had stents placed 2009       Family Status   Relation Status   • Mother         83   • Father  at age 76   • Brother Alive   • Brother (Not  Specified)   • Brother (Not Specified)   • Brother (Not Specified)       No current facility-administered medications on file prior to encounter.      Current Outpatient Prescriptions on File Prior to Encounter   Medication Sig   • aspirin 81 MG tablet Take 81 mg by mouth daily.   • B Complex Vitamins (VITAMIN B COMPLEX PO) Take  by mouth.   • Calcium Carbonate-Vitamin D (CALCIUM + D PO) Take  by mouth 2 (Two) Times a Day.   • Coenzyme Q10 (COQ10) 200 MG capsule Take  by mouth.   • Magnesium 400 MG tablet Take  by mouth.   • Misc Natural Products (GLUCOSAMINE CHONDROITIN VIT D3) capsule Take  by mouth.   • Multiple Vitamins-Minerals (MULTIVITAMIN ADULT PO) Take  by mouth.   • Omega-3 Fatty Acids (FISH OIL) 1000 MG capsule capsule Take  by mouth daily with breakfast.   • rOPINIRole (REQUIP) 0.5 MG tablet TAKE 1 TABLET BY MOUTH IN THE EVENING ONE  HOUR  BEFORE  BEDTIME   • simvastatin (ZOCOR) 40 MG tablet Take 1 tablet by mouth every night at bedtime.   • [DISCONTINUED] lansoprazole (PREVACID) 30 MG capsule Take 30 mg by mouth daily.   • [DISCONTINUED] lidocaine (XYLOCAINE) 5 % ointment Apply  topically Every 2 (Two) Hours As Needed for Mild Pain  or Moderate Pain . Apply to affected skin area           PHYSICAL EXAMINATION:    Vitals: Patient Vitals for the past 4 hrs:   BP Temp Temp src Pulse Resp SpO2   10/18/18 1319 125/60 97.5 °F (36.4 °C) Oral 60 18 97 %     Temp:  [97.5 °F (36.4 °C)] 97.5 °F (36.4 °C)  Heart Rate:  [56-68] 60  Resp:  [16-18] 18  BP: (100-146)/(60-83) 125/60    General:  pleasant in no acute distress.  HEENT: No pallor or icterus. Neck supple. No Carotid bruits.  Heart: S1 and S2 normal with regular rhythm.  No murmur.       GENERAL NEUROLOGIC EXAM     ROME-HALPIKE negative.    Mental Status: Awake alert and oriented to person place and time. Language is normal for comprehension, repetition, naming and fluency. Recent and remote memory were normal.  Attention span and concentration were normal.  Fund of knowledge was normal.      Cranial nerves:  Fundi were normal bilaterally.  Visual fields were full to confrontation.  Pupils are equal regular and reactive to light. Extraocular movements are intact. Facial sensation was normal and symmetrical bilaterally. Muscles of facial expression were strong and symmetric. Hearing to finger rub normal bilaterally. Palate elevates symmetrically. Sternocleidomastoid and trapezius normal. The tongue protrudes midline without atrophy or fasciculations.      Motor: Normal tone and bulk with no involuntary movements or pronator drift.    Strength normal 5/5 in bilateral upper and lower extremities.     Sensation: Light touch, pin prick, vibration and joint position sense were normal in the upper and lower extremities.     Reflexes: 1+ bilaterally symmetrical with down going toes.    Coordination: finger nose and heel shin test normal bilaterally.     Gait: feels unsteady and use walker and can stand with support of a walker.      Labs:     Lab Results   Component Value Date    HGB 14.3 10/18/2018    HCT 43.4 10/18/2018    WBC 7.25 10/18/2018     10/18/2018     Lab Results   Component Value Date    BUN 12 10/18/2018    CALCIUM 9.6 10/18/2018     10/18/2018    K 4.3 10/18/2018     10/18/2018    CO2 27.0 10/18/2018     (H) 10/04/2016     Lab Results   Component Value Date    ALT 26 10/18/2018    AST 25 10/18/2018    BILITOT 0.3 10/18/2018     Lab Results   Component Value Date    MG 2.4 10/18/2018     No components found for: POCGLUC  No components found for: A1C  Lab Results   Component Value Date    HDL 60 10/18/2018    LDL 38 10/18/2018     No components found for: B12  No results found for: TSH    Lab Results (last 24 hours)     Procedure Component Value Units Date/Time    POC Glucose Once [[id number]]  (Normal) Collected:  10/18/18 1106    Specimen:  Blood Updated:  10/18/18 1109     Glucose 94 mg/dL     Narrative:       Meter: [id number] :  [id number] [facility]    Lipid Panel [[id number]] Collected:  10/18/18 0759    Specimen:  Blood Updated:  10/18/18 0832     Total Cholesterol 126 mg/dL      Triglycerides 142 mg/dL      HDL Cholesterol 60 mg/dL      LDL Cholesterol  38 mg/dL      VLDL Cholesterol 28.4 mg/dL      LDL/HDL Ratio 0.63    Narrative:       Cholesterol Reference Ranges  (U.S. Department of Health and Human Services ATP III Classifications)    Desirable          <200 mg/dL  Borderline High    200-239 mg/dL  High Risk          >240 mg/dL      Triglyceride Reference Ranges  (U.S. Department of Health and Human Services ATP III Classifications)    Normal           <150 mg/dL  Borderline High  150-199 mg/dL  High             200-499 mg/dL  Very High        >500 mg/dL    HDL Reference Ranges  (U.S. Department of Health and Human Services ATP III Classifcations)    Low     <40 mg/dl (major risk factor for CHD)  High    >60 mg/dl ('negative' risk factor for CHD)        LDL Reference Ranges  (U.S. Department of Health and Human Services ATP III Classifcations)    Optimal          <100 mg/dL  Near Optimal     100-129 mg/dL  Borderline High  130-159 mg/dL  High             160-189 mg/dL  Very High        >189 mg/dL    Hemoglobin A1c [[id number]]  (Abnormal) Collected:  10/18/18 0759    Specimen:  Blood Updated:  10/18/18 0821     Hemoglobin A1C 5.80 (H) %     Narrative:       Hemoglobin A1C Ranges:    Increased Risk for Diabetes  5.7% to 6.4%  Diabetes                     >= 6.5%  Diabetic Goal                < 7.0%    [place] Draw [[id number]] Collected:  10/18/18 0221    Specimen:  Blood Updated:  10/18/18 0331    Narrative:       The following orders were created for panel order [place] Draw.  Procedure                               Abnormality         Status                     ---------                               -----------         ------                     Light Blue Top[[id number]]                                   Final result                Green Top (Gel)[[id number]]                                  Final result               Lavender Top[[id number]]                                     Final result               Gold Top - SST[[id number]]                                   Final result                 Please view results for these tests on the individual orders.    Light Blue Top [[id number]] Collected:  10/18/18 0221    Specimen:  Blood Updated:  10/18/18 0331     Extra Tube hold for add-on     Comment: Auto resulted       Green Top (Gel) [[id number]] Collected:  10/18/18 0221    Specimen:  Blood Updated:  10/18/18 0331     Extra Tube Hold for add-ons.     Comment: Auto resulted.       Lavender Top [[id number]] Collected:  10/18/18 0221    Specimen:  Blood Updated:  10/18/18 0331     Extra Tube hold for add-on     Comment: Auto resulted       Gold Top - SST [[id number]] Collected:  10/18/18 0221    Specimen:  Blood Updated:  10/18/18 0331     Extra Tube Hold for add-ons.     Comment: Auto resulted.       Troponin [[id number]]  (Normal) Collected:  10/18/18 0221    Specimen:  Blood Updated:  10/18/18 0318     Troponin T <0.010 ng/mL     Narrative:       Troponin T Reference Ranges:  Less than 0.03 ng/mL:    Negative for AMI  0.03 to 0.09 ng/mL:      Indeterminant for AMI  Greater than 0.09 ng/mL: Positive for AMI    Comprehensive Metabolic Panel [[id number]]  (Abnormal) Collected:  10/18/18 0221    Specimen:  Blood Updated:  10/18/18 0315     Glucose 125 (H) mg/dL      BUN 12 mg/dL      Creatinine 0.76 mg/dL      Sodium 144 mmol/L      Potassium 4.3 mmol/L      Chloride 105 mmol/L      CO2 27.0 mmol/L      Calcium 9.6 mg/dL      Total Protein 7.5 g/dL      Albumin 4.50 g/dL      ALT (SGPT) 26 U/L      AST (SGOT) 25 U/L      Alkaline Phosphatase 68 U/L      Total Bilirubin 0.3 mg/dL      eGFR Non African Amer 74 mL/min/1.73      Globulin 3.0 gm/dL      A/G Ratio 1.5 g/dL      BUN/Creatinine Ratio 15.8     Anion Gap 12.0 mmol/L     Narrative:       The MDRD GFR  formula is only valid for adults with stable renal function between ages 18 and 70.    Magnesium [[id number]]  (Normal) Collected:  10/18/18 0221    Specimen:  Blood Updated:  10/18/18 0315     Magnesium 2.4 mg/dL     CBC & Differential [[id number]] Collected:  10/18/18 0221    Specimen:  Blood Updated:  10/18/18 0258    Narrative:       The following orders were created for panel order CBC & Differential.  Procedure                               Abnormality         Status                     ---------                               -----------         ------                     CBC Auto Differential[[id number]]        Abnormal            Final result                 Please view results for these tests on the individual orders.    CBC Auto Differential [[id number]]  (Abnormal) Collected:  10/18/18 0221    Specimen:  Blood Updated:  10/18/18 0258     WBC 7.25 10*3/mm3      RBC 4.72 10*6/mm3      Hemoglobin 14.3 g/dL      Hematocrit 43.4 %      MCV 91.9 fL      MCH 30.3 pg      MCHC 32.9 g/dL      RDW 14.8 (H) %      RDW-SD 50.2 fl      MPV 11.3 fL      Platelets 268 10*3/mm3      Neutrophil % 58.3 %      Lymphocyte % 30.2 %      Monocyte % 9.0 %      Eosinophil % 2.1 %      Basophil % 0.4 %      Immature Grans % 0.1 %      Neutrophils, Absolute 4.23 10*3/mm3      Lymphocytes, Absolute 2.19 10*3/mm3      Monocytes, Absolute 0.65 10*3/mm3      Eosinophils, Absolute 0.15 10*3/mm3      Basophils, Absolute 0.03 10*3/mm3      Immature Grans, Absolute 0.01 10*3/mm3           .  Imaging Results (last 24 hours)     Procedure Component Value Units Date/Time    MRI Brain With & Without Contrast [[id number]] Collected:  10/18/18 1118     Updated:  10/18/18 1118    Narrative:       MRI BRAIN WITH AND WITHOUT CONTRAST, MRA HEAD, MRA NECK     HISTORY: Stroke; dizziness.     COMPARISON: CT head 10/18/2018.     TECHNIQUE: MRI was performed of the brain without intravenous  administration of gadolinium contrast. 3-D time-of-flight MR  angiography  was performed of the head and neck with axial as well as 3-D MIP images.        FINDINGS:     MRI brain:      There is no restricted diffusion. T2 hyperintense foci within the  periventricular and subcortical white matter represent minimal chronic  ischemic small vessel degenerative changes. There is no extra-axial  collection. There is no finding of parenchymal hemorrhage. There is no  hydrocephalus. Major vascular flow voids are unremarkable. Midline  structures are unremarkable.      Mild mucosal thickening in the bilateral maxillary sinuses.     MR ANGIOGRAPHY NECK:   The origins of the great vessels are widely patent.  The common and  internal carotid arteries are without appreciable stenosis. The origin  of the right vertebral artery is not well visualized due to motion  artifact. There is also apparent moderate stenosis of the high right  cervical vertebral artery which is likely related to the the horizontal  orientation of the vessel in this location and time-of-flight technique  of the vessel in this location as this finding was not present on  subsequent sequences performed with a different technique.     MR ANGIOGRAPHY HEAD:   The anterior and posterior circulations are without appreciable  stenosis, aneurysm formation or occlusion.       Impression:       1.  No findings of acute intracranial abnormality.  2.  No findings of stenosis or occlusion of the cervical or intracranial  circulations. No findings of intracerebral aneurysm formation.                MRI Angiogram Head Without Contrast [[id number]] Collected:  10/18/18 1118     Updated:  10/18/18 1118    Narrative:       MRI BRAIN WITH AND WITHOUT CONTRAST, MRA HEAD, MRA NECK     HISTORY: Stroke; dizziness.     COMPARISON: CT head 10/18/2018.     TECHNIQUE: MRI was performed of the brain without intravenous  administration of gadolinium contrast. 3-D time-of-flight MR angiography  was performed of the head and neck with axial as well as  3-D MIP images.        FINDINGS:     MRI brain:      There is no restricted diffusion. T2 hyperintense foci within the  periventricular and subcortical white matter represent minimal chronic  ischemic small vessel degenerative changes. There is no extra-axial  collection. There is no finding of parenchymal hemorrhage. There is no  hydrocephalus. Major vascular flow voids are unremarkable. Midline  structures are unremarkable.      Mild mucosal thickening in the bilateral maxillary sinuses.     MR ANGIOGRAPHY NECK:   The origins of the great vessels are widely patent.  The common and  internal carotid arteries are without appreciable stenosis. The origin  of the right vertebral artery is not well visualized due to motion  artifact. There is also apparent moderate stenosis of the high right  cervical vertebral artery which is likely related to the the horizontal  orientation of the vessel in this location and time-of-flight technique  of the vessel in this location as this finding was not present on  subsequent sequences performed with a different technique.     MR ANGIOGRAPHY HEAD:   The anterior and posterior circulations are without appreciable  stenosis, aneurysm formation or occlusion.       Impression:       1.  No findings of acute intracranial abnormality.  2.  No findings of stenosis or occlusion of the cervical or intracranial  circulations. No findings of intracerebral aneurysm formation.                MRI Angiogram Neck With & Without Contrast [[id number]] Collected:  10/18/18 1118     Updated:  10/18/18 1118    Narrative:       MRI BRAIN WITH AND WITHOUT CONTRAST, MRA HEAD, MRA NECK     HISTORY: Stroke; dizziness.     COMPARISON: CT head 10/18/2018.     TECHNIQUE: MRI was performed of the brain without intravenous  administration of gadolinium contrast. 3-D time-of-flight MR angiography  was performed of the head and neck with axial as well as 3-D MIP images.        FINDINGS:     MRI brain:      There is  no restricted diffusion. T2 hyperintense foci within the  periventricular and subcortical white matter represent minimal chronic  ischemic small vessel degenerative changes. There is no extra-axial  collection. There is no finding of parenchymal hemorrhage. There is no  hydrocephalus. Major vascular flow voids are unremarkable. Midline  structures are unremarkable.      Mild mucosal thickening in the bilateral maxillary sinuses.     MR ANGIOGRAPHY NECK:   The origins of the great vessels are widely patent.  The common and  internal carotid arteries are without appreciable stenosis. The origin  of the right vertebral artery is not well visualized due to motion  artifact. There is also apparent moderate stenosis of the high right  cervical vertebral artery which is likely related to the the horizontal  orientation of the vessel in this location and time-of-flight technique  of the vessel in this location as this finding was not present on  subsequent sequences performed with a different technique.     MR ANGIOGRAPHY HEAD:   The anterior and posterior circulations are without appreciable  stenosis, aneurysm formation or occlusion.       Impression:       1.  No findings of acute intracranial abnormality.  2.  No findings of stenosis or occlusion of the cervical or intracranial  circulations. No findings of intracerebral aneurysm formation.                CT Head Without Contrast [[id number]] Collected:  10/18/18 0310     Updated:  10/18/18 0318    Narrative:       CT OF THE HEAD WITHOUT CONTRAST     HISTORY: New onset vertigo and headache     COMPARISON: None available.     TECHNIQUE: Axial CT imaging was obtained from the vertex of the skull to  the skull base. No IV contrast was administered.     FINDINGS:  No acute intracranial hemorrhage is identified. Brain parenchyma is  normal in attenuation. There are no focal areas of decreased attenuation  seen. There is some mild atrophy, in keeping with the age of 78.  There  is an air-fluid level seen within the left maxillary sinus, which may  reflect some sinusitis. The patient's left mastoid air cells appear  relatively sclerotic when compared to the contralateral side, which  could reflect some underlying chronic mastoiditis.       Impression:          1. No acute intracranial process.  2. Air-fluid level within the left maxillary sinus. Correlation with any  history of acute sinusitis is recommended. In addition, the left mastoid  air cells appear relatively sclerotic when compared to the contralateral  side, which could reflect changes of chronic mastoiditis.     Radiation dose reduction techniques were utilized, including automated  exposure control and exposure modulation based on body size.     This report was finalized on 10/18/2018 3:15 AM by Dr. Lianne Ingram M.D.             For this problem, the following was ordered:  Orders Placed This Encounter   Procedures   • CT Head Without Contrast   • MRI Brain With & Without Contrast   • MRI Angiogram Head Without Contrast   • MRI Angiogram Neck With & Without Contrast   • [place] Draw   • Comprehensive Metabolic Panel   • Troponin   • Magnesium   • CBC Auto Differential   • Hemoglobin A1c   • Lipid Panel   • Basic Metabolic Panel   • Diet Regular   • Ambulate patient   • Place Sequential Compression Device   • Maintain Sequential Compression Device   • Vital Signs Per Hospital Policy   • Pulse Oximetry, Continuous   • Cardiac Monitoring   • Turn Patient   • Intake and Output   • Neuro Checks   • NIHSS Assessment   • Order CT Head Without Contrast for Neurological Decline   • Provide Stroke Education Material   • Nursing Dysphagia Screening (Complete Prior to Giving Anything By Mouth)   • RN to Place Order SLP Consult - Eval & Treat Choosing Reason of RN Dysphagia Screen Failed   • Nurse to Call MD or Nutrition Services for Diet if Patient Passes Dysphagia Screen   • Notify Provider   • Saline Lock & Maintain IV Access    • Code Status and Medical Interventions:   • [facility] (on-call MD unless specified)   • Inpatient Neuro Clinical Specialist Consult   • Inpatient Rehab Admission Consult   • Inpatient Case Management  Consult   • Inpatient Neurology Consult General   • OT Consult: Eval & Treat Stroke Patient   • PT Consult: Eval & Treat   • PT Plan of Care Cert / Re-Cert   • SLP Consult: Eval & Treat   • POC Glucose Q6H   • POC Glucose Once   • ECG 12 Lead   • Insert peripheral IV   • Insert peripheral IV   • Insert Peripheral IV   • Initiate Observation Status   • Initiate Observation Status   • Light Blue Top   • Green Top (Gel)   • Lavender Top   • Gold Top - SST   • CBC & Differential   • CBC & Differential       Problem List Items Addressed This Visit     * (Principal)Vertigo - Primary      Other Visit Diagnoses     Acute nonintractable headache, unspecified headache type        Difficulty walking              ASSESSMENT/PLAN: This is a 78 y.o. female who has   Past Medical History:   Diagnosis Date   • Elevated fasting glucose    • GERD (gastroesophageal reflux disease)    • Goiter     THYROID POLYP SEES    • Hyperlipidemia    • Hypersomnia    • BALDEMAR on CPAP    • Osteoarthritis    • Osteopenia    • RLS (restless legs syndrome)     presents with HEADACHE, balance issue and vertigo/dizziness. She is on asa 81. MRI BRAIN/MRA HEAD AND NECK NORMAL.    1. Most likely complex migraine:  - trial of compazine and Neurontin.    Will follow,    Thank you for allowing us to participate in the care of this patient.    Kaushik Nunez MD  10/18/18  2:40 PM

## 2018-10-19 VITALS
WEIGHT: 153.71 LBS | BODY MASS INDEX: 27.23 KG/M2 | TEMPERATURE: 97.6 F | RESPIRATION RATE: 18 BRPM | DIASTOLIC BLOOD PRESSURE: 58 MMHG | SYSTOLIC BLOOD PRESSURE: 130 MMHG | HEIGHT: 63 IN | OXYGEN SATURATION: 96 % | HEART RATE: 74 BPM

## 2018-10-19 PROBLEM — G43.909 HEADACHE, MIGRAINE: Status: ACTIVE | Noted: 2018-10-19

## 2018-10-19 LAB
ANION GAP SERPL CALCULATED.3IONS-SCNC: 6.7 MMOL/L
BASOPHILS # BLD AUTO: 0.03 10*3/MM3 (ref 0–0.2)
BASOPHILS NFR BLD AUTO: 0.4 % (ref 0–1.5)
BUN BLD-MCNC: 12 MG/DL (ref 8–23)
BUN/CREAT SERPL: 15.4 (ref 7–25)
CALCIUM SPEC-SCNC: 8.6 MG/DL (ref 8.6–10.5)
CHLORIDE SERPL-SCNC: 114 MMOL/L (ref 98–107)
CO2 SERPL-SCNC: 23.3 MMOL/L (ref 22–29)
CREAT BLD-MCNC: 0.78 MG/DL (ref 0.57–1)
DEPRECATED RDW RBC AUTO: 50.7 FL (ref 37–54)
EOSINOPHIL # BLD AUTO: 0.2 10*3/MM3 (ref 0–0.7)
EOSINOPHIL NFR BLD AUTO: 2.7 % (ref 0.3–6.2)
ERYTHROCYTE [DISTWIDTH] IN BLOOD BY AUTOMATED COUNT: 14.8 % (ref 11.7–13)
GFR SERPL CREATININE-BSD FRML MDRD: 72 ML/MIN/1.73
GLUCOSE BLD-MCNC: 96 MG/DL (ref 65–99)
HCT VFR BLD AUTO: 40.4 % (ref 35.6–45.5)
HGB BLD-MCNC: 12.8 G/DL (ref 11.9–15.5)
IMM GRANULOCYTES # BLD: 0.02 10*3/MM3 (ref 0–0.03)
IMM GRANULOCYTES NFR BLD: 0.3 % (ref 0–0.5)
LYMPHOCYTES # BLD AUTO: 2.54 10*3/MM3 (ref 0.9–4.8)
LYMPHOCYTES NFR BLD AUTO: 34.8 % (ref 19.6–45.3)
MCH RBC QN AUTO: 29.7 PG (ref 26.9–32)
MCHC RBC AUTO-ENTMCNC: 31.7 G/DL (ref 32.4–36.3)
MCV RBC AUTO: 93.7 FL (ref 80.5–98.2)
MONOCYTES # BLD AUTO: 0.59 10*3/MM3 (ref 0.2–1.2)
MONOCYTES NFR BLD AUTO: 8.1 % (ref 5–12)
NEUTROPHILS # BLD AUTO: 3.94 10*3/MM3 (ref 1.9–8.1)
NEUTROPHILS NFR BLD AUTO: 54 % (ref 42.7–76)
PLATELET # BLD AUTO: 236 10*3/MM3 (ref 140–500)
PMV BLD AUTO: 11.3 FL (ref 6–12)
POTASSIUM BLD-SCNC: 4.3 MMOL/L (ref 3.5–5.2)
RBC # BLD AUTO: 4.31 10*6/MM3 (ref 3.9–5.2)
SODIUM BLD-SCNC: 144 MMOL/L (ref 136–145)
WBC NRBC COR # BLD: 7.3 10*3/MM3 (ref 4.5–10.7)

## 2018-10-19 PROCEDURE — 80048 BASIC METABOLIC PNL TOTAL CA: CPT | Performed by: INTERNAL MEDICINE

## 2018-10-19 PROCEDURE — G0378 HOSPITAL OBSERVATION PER HR: HCPCS

## 2018-10-19 PROCEDURE — 85025 COMPLETE CBC W/AUTO DIFF WBC: CPT | Performed by: INTERNAL MEDICINE

## 2018-10-19 PROCEDURE — 97535 SELF CARE MNGMENT TRAINING: CPT

## 2018-10-19 PROCEDURE — 96361 HYDRATE IV INFUSION ADD-ON: CPT

## 2018-10-19 RX ADMIN — ASPIRIN 325 MG: 325 TABLET ORAL at 08:50

## 2018-10-19 RX ADMIN — Medication 3 ML: at 08:50

## 2018-10-19 NOTE — DISCHARGE SUMMARY
Date of Admission: 10/18/2018  Date of Discharge:  10/19/2018  Primary Care Physician: Erica Johnston MD     Discharge Diagnosis:  Active Hospital Problems    Diagnosis Date Noted   • **Headache, migraine [G43.909] 10/19/2018   • Vertigo [R42] 10/18/2018   • Restless legs [G25.81] 08/14/2018   • BALDEMAR on CPAP [G47.33, Z99.89]    • Hyperlipidemia [E78.5]       Resolved Hospital Problems    Diagnosis Date Noted Date Resolved   No resolved problems to display.       DETAILS OF HOSPITAL STAY     Pertinent Test Results and Procedures Performed    Brain MRI with MRA of the head and neck;  1.  No findings of acute intracranial abnormality.  2.  No findings of stenosis or occlusion of the cervical or intracranial  circulations. No findings of intracerebral aneurysm formation.    Head CT:  1. No acute intracranial process.  2. Air-fluid level within the left maxillary sinus. Correlation with any  history of acute sinusitis is recommended. In addition, the left mastoid  air cells appear relatively sclerotic when compared to the contralateral  side, which could reflect changes of chronic mastoiditis.    HPI  This is a 78-year-old female with a history of hyperlipidemia and BALDEMAR who presented to the emergency room with acute onset lightheadedness and gait imbalance.  This started yesterday at 8:00 in the morning.  She had to use her 's walker to stabilize herself.  She also developed an associated global headache that feels like a tightening/band around her head.  She denies any history of migraine headache, TIA, or stroke.  She takes an aspirin daily along with statin.  Her symptoms are still present at this time.  She denies a sensation of spinning and describes this more as a lightheaded/floating feeling.  She denies any change in symptoms with position.  She still remains very unsteady on her feet.  She felt a little sick to her stomach yesterday but this has resolved and she has no other complaints at this  time.    Hospital Course  The patient was admitted and underwent further evaluation of her imbalance and lightheadedness with MRI/MRA of the head and neck which was unremarkable.  We do not think that she suffered a stroke or TIA.  Neurology evaluated her and felt that this was consistent with a complex migraine.  She was given Compazine and Neurontin last night which allowed her to sleep and her symptoms are now resolved this morning.  She is otherwise medically stable this point and will be discharged home today.    Physical Exam at Discharge:  General: No acute distress, AAOx3  HEENT: EOMI, PERRL  Cardiovascular: +s1 and s2, RRR  Lungs: No rhonchi or wheezing  Abdomen: soft, nontender    Consults:   Consults     Date and Time Order Name Status Description    10/18/2018 1407 Inpatient Neurology Consult General Completed     10/18/2018 3257 [facility] (on-call MD unless specified) Completed             Condition on Discharge: Stable    Discharge Disposition  Home or Self Care    Discharge Medications     Discharge Medications      Continue These Medications      Instructions Start Date   aspirin 81 MG tablet   81 mg, Oral, Daily      CALCIUM + D PO   Oral, 2 Times Daily      CoQ10 200 MG capsule   Oral      fish oil 1000 MG capsule capsule   Oral, Daily With Breakfast      GLUCOSAMINE CHONDROITIN VIT D3 capsule   Oral      Magnesium 400 MG tablet   Oral      MULTIVITAMIN ADULT PO   Oral      rOPINIRole 0.5 MG tablet  Commonly known as:  REQUIP   TAKE 1 TABLET BY MOUTH IN THE EVENING ONE  HOUR  BEFORE  BEDTIME      simvastatin 40 MG tablet  Commonly known as:  ZOCOR   40 mg, Oral, Every Night at Bedtime      VITAMIN B COMPLEX PO   Oral             Discharge Diet:   Diet Instructions     Diet: Regular; Thin       Discharge Diet:  Regular    Fluid Consistency:  Thin          Activity at Discharge:   Activity Instructions     Activity as Tolerated             Follow-up Appointments  Future Appointments  Date Time Provider  Department Center   11/5/2018 9:00 AM Fredi Harrison MD [facility] None   12/14/2018 2:00 PM Erica Johnston MD [facility] None     Additional Instructions for the Follow-ups that You Need to Schedule     Discharge Follow-up with PCP    As directed      Currently Documented PCP:  Erica Johnston MD  PCP Phone Number:  [phone]    Follow Up Details:  1 week                 I have examined and discussed discharge planning with the patient today.     Hill Arellano MD  10/19/18  8:12 AM    Time: Discharge 20 min

## 2018-10-19 NOTE — PLAN OF CARE
Problem: Patient Care Overview  Goal: Plan of Care Review  Outcome: Ongoing (interventions implemented as appropriate)   10/19/18 0257   Coping/Psychosocial   Plan of Care Reviewed With patient   Plan of Care Review   Progress improving   OTHER   Outcome Summary Initiated gabapentin this evening. Well tolerated thus far. Sleeping well.  at the bedside. VSS. Neuros intact.

## 2018-10-19 NOTE — NURSING NOTE
Eval per stroke order set. Noted dx of complex migraine with symptoms resolved this am and plans to dc home today. Will sign off.     Avani Ray RN  Acute Rehab Admission Nurse

## 2018-10-19 NOTE — PROGRESS NOTES
Case Management Discharge Note    Final Note: Home w/ spouse    Destination     No service has been selected for the patient.      Durable Medical Equipment     No service has been selected for the patient.      Dialysis/Infusion     No service has been selected for the patient.      Home Medical Care     No service has been selected for the patient.      Social Care     No service has been selected for the patient.        Other: Other    Final Discharge Disposition Code: 01 - home or self-care

## 2018-10-19 NOTE — THERAPY TREATMENT NOTE
Acute Care - Occupational Therapy Progress Note  [facility]     Patient Name: Ashley Motta  : 1940  MRN: [id number]  Today's Date: 10/19/2018  Onset of Illness/Injury or Date of Surgery: 10/18/18     Referring Physician: Lewis Fletcher    Admit Date: 10/18/2018       ICD-10-CM ICD-9-CM   1. Vertigo (Intractable) R42 780.4   2. Acute nonintractable headache, unspecified headache type R51 784.0   3. Difficulty walking R26.2 719.7     Patient Active Problem List   Diagnosis   • Osteopenia   • Hyperlipidemia   • Elevated fasting glucose   • Osteoarthritis   • BALDEMAR on CPAP   • Pelvic relaxation due to vaginal prolapse   • Chronic pain of both knees   • Arthritis of right knee   • Arthritis of left knee   • Arthritis of both knees   • Restless legs   • Vertigo   • Headache, migraine     Past Medical History:   Diagnosis Date   • Elevated fasting glucose    • GERD (gastroesophageal reflux disease)    • Goiter     THYROID POLYP SEES    • Hyperlipidemia    • Hypersomnia    • BALDEMAR on CPAP    • Osteoarthritis    • Osteopenia    • RLS (restless legs syndrome)      Past Surgical History:   Procedure Laterality Date   • CATARACT EXTRACTION, BILATERAL Bilateral    • COLONOSCOPY N/A 2015    DR. SARAH LIRIANO   • HYSTERECTOMY  2004    Total   • KNEE SURGERY Left    • KNEE SURGERY Right    • SHOULDER ARTHROSCOPY Left        Therapy Treatment          Rehabilitation Treatment Summary     Row Name 10/19/18 1031             Treatment Time/Intention    Discipline occupational therapist  -SG      Document Type therapy note (daily note)  -SG      Subjective Information no complaints  -SG      Mode of Treatment occupational therapy  -SG      Patient Effort good  -SG      Recorded by [SG] Heena Barger OTR 10/19/18 1034      Row Name 10/19/18 1031             Cognitive Assessment/Intervention- PT/OT    Orientation Status (Cognition) oriented x 4  -SG      Follows Commands (Cognition) WFL  -SG       Recorded by [SG] Heena Barger OTR 10/19/18 1034      Row Name 10/19/18 1031             ADL Assessment/Intervention    BADL Assessment/Intervention --   pt and souse educated with safety for adls and transfers  -SG      Recorded by [SG] Heena Barger OTR 10/19/18 1034      Row Name 10/19/18 1031             Lower Body Dressing Assessment/Training    Lower Body Dressing Titus Level lower body dressing skills;supervision  -SG      Lower Body Dressing Position edge of bed sitting  -SG      Comment (Lower Body Dressing) demonstrates ability to perform without bending. Instructed with safety for LE adls  -SG      Recorded by [SG] Heena Barger OTR 10/19/18 1034      Row Name 10/19/18 1031             Positioning and Restraints    Pre-Treatment Position in bed  -SG      Post Treatment Position bed  -SG      In Bed call light within reach;encouraged to call for assist;exit alarm on;with family/caregiver  -SG      Recorded by [SG] Heena Barger OTR 10/19/18 1034      Row Name 10/19/18 1031             Pain Scale: Numbers Pre/Post-Treatment    Pain Scale: Numbers, Pretreatment 0/10 - no pain  -SG      Pain Scale: Numbers, Post-Treatment 0/10 - no pain  -SG      Recorded by [SG] Heena Barger, OTR 10/19/18 1034        User Key  (r) = Recorded By, (t) = Taken By, (c) = Cosigned By    Initials Name Effective Dates Discipline     Heena Barger, OTR 06/08/18 -  OT               Occupational Therapy Education     Title: PT OT SLP Therapies (Resolved)     Topic: Occupational Therapy (Resolved)     Point: ADL training (Resolved)     Description: Instruct learner(s) on proper safety adaptation and remediation techniques during self care or transfers.   Instruct in proper use of assistive devices.   Learning Progress Summary     Learner Status Readiness Method Response Comment Documented by    Patient Done Acceptance E,TB VU,NR safety for adls  10/18/18 1563                      User Key     Initials  Effective Dates Name Provider Type Discipline     06/08/18 -  Heena Barger OTR Occupational Therapist OT                OT Recommendation and Plan  Planned Therapy Interventions (OT Eval): BADL retraining, transfer/mobility retraining  Therapy Frequency (OT Eval): 5 times/wk           Outcome Measures     Row Name 10/19/18 1034 10/18/18 1443 10/18/18 1100       How much help from another person do you currently need...    Turning from your back to your side while in flat bed without using bedrails?  --  -- 4  -MS    Moving from lying on back to sitting on the side of a flat bed without bedrails?  --  -- 4  -MS    Moving to and from a bed to a chair (including a wheelchair)?  --  -- 3  -MS    Standing up from a chair using your arms (e.g., wheelchair, bedside chair)?  --  -- 3  -MS    Climbing 3-5 steps with a railing?  --  -- 3  -MS    To walk in hospital room?  --  -- 3  -MS    AM-PAC 6 Clicks Score  --  -- 20  -MS       How much help from another is currently needed...    Putting on and taking off regular lower body clothing? 3  -SG 3  -SG  --    Bathing (including washing, rinsing, and drying) 3  -SG 3  -SG  --    Toileting (which includes using toilet bed pan or urinal) 3  -SG 3  -SG  --    Putting on and taking off regular upper body clothing 3  -SG 3  -SG  --    Taking care of personal grooming (such as brushing teeth) 3  -SG 3  -SG  --    Eating meals 4  -SG 4  -SG  --    Score 19  -SG 19  -SG  --       Functional Assessment    Outcome Measure Options  -- AM-PAC 6 Clicks Daily Activity (OT)  -SG AM-PAC 6 Clicks Basic Mobility (PT)  -MS      User Key  (r) = Recorded By, (t) = Taken By, (c) = Cosigned By    Initials Name Provider Type    SG Heena Barger, OTR Occupational Therapist    Joon Pete, PT Physical Therapist           Time Calculation:         Time Calculation- OT     Row Name 10/19/18 1034             Time Calculation- OT    OT Start Time 0933  -      OT Stop Time 0946  -       OT Time Calculation (min) 13 min  -SG      Total Timed Code Minutes- OT 13 minute(s)  -SG      OT Received On 10/19/18  -        User Key  (r) = Recorded By, (t) = Taken By, (c) = Cosigned By    Initials Name Provider Type    Heena Cee OTR Occupational Therapist           Therapy Suggested Charges     Code   Minutes Charges    None           Therapy Charges for Today     Code Description Service Date Service Provider Modifiers Qty    16257793183 HC OT SELFCARE CURRENT 10/18/2018 Heena Barger OTR GO CK 1    36663519135 HC OT SELFCARE PROJECTED 10/18/2018 Heena Barger OTR GO CK 1    23760032228 HC OT SELFCARE DISCHARGE 10/18/2018 Heena Barger OTR GO CK 1    72958694222 HC OT EVAL LOW COMPLEXITY 2 10/18/2018 Heena Barger OTR GO 1    51938361191 HC OT SELF CARE/MGMT/TRAIN EA 15 MIN 10/18/2018 Heena Barger OTR GO 1    91148945188 HC OT SELF CARE/MGMT/TRAIN EA 15 MIN 10/19/2018 Heena Barger OTR GO, KX 1          OT G-codes  OT Functional Scales Options: AM-PAC 6 Clicks Daily Activity (OT)  Functional Limitation: Self care  Self Care Current Status (): At least 40 percent but less than 60 percent impaired, limited or restricted  Self Care Goal Status (): At least 40 percent but less than 60 percent impaired, limited or restricted  Self Care Discharge Status (): At least 40 percent but less than 60 percent impaired, limited or restricted    JADIEL Guerra  10/19/2018

## 2018-11-05 ENCOUNTER — OFFICE VISIT (OUTPATIENT)
Dept: SLEEP MEDICINE | Facility: HOSPITAL | Age: 78
End: 2018-11-05
Attending: INTERNAL MEDICINE

## 2018-11-05 VITALS
HEART RATE: 64 BPM | OXYGEN SATURATION: 95 % | WEIGHT: 159.2 LBS | BODY MASS INDEX: 28.21 KG/M2 | HEIGHT: 63 IN | DIASTOLIC BLOOD PRESSURE: 73 MMHG | SYSTOLIC BLOOD PRESSURE: 114 MMHG

## 2018-11-05 DIAGNOSIS — Z99.89 OSA ON CPAP: Primary | ICD-10-CM

## 2018-11-05 DIAGNOSIS — G47.33 OSA ON CPAP: Primary | ICD-10-CM

## 2018-11-05 PROCEDURE — G0463 HOSPITAL OUTPT CLINIC VISIT: HCPCS

## 2018-11-05 NOTE — PROGRESS NOTES
"[facility] PULMONARY CARE         Dr Fredi Harrison  [unfilled]  Patient Care Team:  Erica Johnston MD as PCP - General (Internal Medicine)  Erica Johnston MD as PCP - Claims Attributed    Chief Complaint:BALDEMAR with restless leg syndrome    Interval History: This is a very pleasant 77-year-old female here for followup, accompanied by her .  And will visit today.   The patient was set up on 11 cm. Currently compliance 83%.  AHI is normal. Leak is within normal limits.. She feels benefit from the machine. She goes to bed at 10:30 p.m., gets up at 6:30, gets about 7 to 8 hours of sleep and feels rested. 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  Benefit from CPAP    REVIEW OF SYSTEMS:   CARDIOVASCULAR: No chest pain, chest pressure or chest discomfort. No palpitations or edema.   RESPIRATORY: No shortness of breath, cough or sputum.   GASTROINTESTINAL: No anorexia, nausea, vomiting or diarrhea. No abdominal pain or blood.   HEMATOLOGIC: No bleeding or bruising.  Positive dry mouth currently has a fullface mask that fits well  Helen 2 out of 24 with the normal limits    Ventilator/Non-Invasive Ventilation Settings     None            Vital Signs  Heart Rate:  [64] 64  BP: (114)/(73) 114/73  [unfilled]  Flowsheet Rows      First Filed Value   Admission Height  160 cm (63\") Documented at 11/05/2018 0900   Admission Weight  72.2 kg (159 lb 3.2 oz) Documented at 11/05/2018 0900          Physical Exam:   General Appearance:    Alert, cooperative, in no acute distress  ENT Mallampati between 3 and 4    Lungs:     Clear to auscultation,respirations regular, even and                  unlabored    Heart:    Regular rhythm and normal rate, normal S1 and S2, no            murmur, no gallop, no rub, no click   Chest Wall:    No abnormalities observed   Abdomen:     Normal bowel sounds, no masses, no organomegaly, soft        non-tender, non-distended, no guarding, no rebound                tenderness   Extremities:   Moves all extremities " well, no edema, no cyanosis, no             redness     Results Review:                                          I reviewed the patient's new clinical results.  I personally viewed and interpreted the patient's CXR        Medication Review:         No current facility-administered medications for this visit.     ASSESSMENT:   BALDEMAR on CPAP  Restless leg/periodic leg movement      PLAN:  Reviewed compliance download with the patient  Humidification needs to be adjusted her  Sleep hygiene measures  Continue Requip for RLS  Caffeine intake is not excessive  Sleep hygiene measures  Weight is stable  Supplies renewed for 1 year      Fredi Harrison MD  11/05/18  9:10 AM

## 2018-11-12 ENCOUNTER — TELEPHONE (OUTPATIENT)
Dept: INTERNAL MEDICINE | Facility: CLINIC | Age: 78
End: 2018-11-12

## 2018-11-12 NOTE — TELEPHONE ENCOUNTER
----- Message from Daria Fonseca sent at 11/12/2018  2:30 PM EST -----  Contact: [facility]  Pharmacy requesting refill on    gabapentin (NEURONTIN) capsule 100 mg       Patient was given medication when she was in the hospital. Please advise    [facility]:[phone]

## 2018-11-13 ENCOUNTER — OFFICE VISIT (OUTPATIENT)
Dept: INTERNAL MEDICINE | Facility: CLINIC | Age: 78
End: 2018-11-13

## 2018-11-13 VITALS
SYSTOLIC BLOOD PRESSURE: 114 MMHG | BODY MASS INDEX: 28.7 KG/M2 | WEIGHT: 162 LBS | HEIGHT: 63 IN | DIASTOLIC BLOOD PRESSURE: 64 MMHG

## 2018-11-13 DIAGNOSIS — G43.809 OTHER MIGRAINE WITHOUT STATUS MIGRAINOSUS, NOT INTRACTABLE: Primary | ICD-10-CM

## 2018-11-13 PROCEDURE — 99213 OFFICE O/P EST LOW 20 MIN: CPT | Performed by: INTERNAL MEDICINE

## 2018-11-13 RX ORDER — GABAPENTIN 100 MG/1
100 CAPSULE ORAL NIGHTLY
Qty: 30 CAPSULE | Refills: 5 | Status: SHIPPED | OUTPATIENT
Start: 2018-11-13 | End: 2018-12-14 | Stop reason: SDUPTHER

## 2018-11-13 RX ORDER — GABAPENTIN 100 MG/1
100 CAPSULE ORAL
COMMUNITY
End: 2018-11-13

## 2018-11-13 RX ORDER — GABAPENTIN 100 MG/1
100 CAPSULE ORAL NIGHTLY
COMMUNITY
Start: 2018-10-19 | End: 2018-11-13 | Stop reason: SDUPTHER

## 2018-11-13 NOTE — PROGRESS NOTES
Chief Complaint   Patient presents with   • Med Refill     discuss medication for headaches       Subjective   Ashley Motta is a 78 y.o. female.     History of Present Illness     She was recently hospitalized for dizziness, unsteady gait, headache.  She had MRI, MRA of brain. Ct of brain. Neurology consultation.  Studies were negative for stroke and complicated migraine diagnosed.  She was treated with gabapentin 100 mgm at HS. This has helped. Her headache is better.  She still has some dizziness. She has vertigo when she lies down. It does not last long.   She needs refill on gabapentin.    The following portions of the patient's history were reviewed and updated as appropriate: allergies, current medications, past family history, past medical history, past social history, past surgical history and problem list.    Review of Systems   Eyes: Negative for blurred vision and double vision.   Gastrointestinal: Negative for nausea and vomiting.   Neurological: Positive for dizziness and headache.         Current Outpatient Medications:   •  aspirin 81 MG tablet, Take 81 mg by mouth daily., Disp: , Rfl:   •  B Complex Vitamins (VITAMIN B COMPLEX PO), Take  by mouth., Disp: , Rfl:   •  Calcium Carbonate-Vitamin D (CALCIUM + D PO), Take  by mouth 2 (Two) Times a Day., Disp: , Rfl:   •  Coenzyme Q10 (COQ10) 200 MG capsule, Take  by mouth., Disp: , Rfl:   •  gabapentin (NEURONTIN) 100 MG capsule, Take 1 capsule by mouth Every Night., Disp: 30 capsule, Rfl: 5  •  Magnesium 400 MG tablet, Take  by mouth., Disp: , Rfl:   •  Misc Natural Products (GLUCOSAMINE CHONDROITIN VIT D3) capsule, Take  by mouth., Disp: , Rfl:   •  Multiple Vitamins-Minerals (MULTIVITAMIN ADULT PO), Take  by mouth., Disp: , Rfl:   •  Omega-3 Fatty Acids (FISH OIL) 1000 MG capsule capsule, Take  by mouth daily with breakfast., Disp: , Rfl:   •  rOPINIRole (REQUIP) 0.5 MG tablet, TAKE 1 TABLET BY MOUTH IN THE EVENING ONE  HOUR  BEFORE  BEDTIME, Disp: 90  "tablet, Rfl: 1  •  simvastatin (ZOCOR) 40 MG tablet, Take 1 tablet by mouth every night at bedtime., Disp: 90 tablet, Rfl: 3        Objective     /64 (BP Location: Left arm, Patient Position: Sitting, Cuff Size: Adult)   Ht 160 cm (63\")   Wt 73.5 kg (162 lb)   BMI 28.70 kg/m²     Physical Exam   Constitutional: She is oriented to person, place, and time. She appears well-developed and well-nourished. No distress.   Neck: Normal carotid pulses present. Carotid bruit is not present.   Cardiovascular: Regular rhythm, S1 normal and S2 normal. Exam reveals no gallop and no friction rub.   No murmur heard.  Pulses:       Carotid pulses are 2+ on the right side, and 2+ on the left side.  Pulmonary/Chest: Effort normal and breath sounds normal. She has no wheezes. She has no rhonchi. She has no rales. Chest wall is not dull to percussion.   Musculoskeletal: She exhibits no edema.   Neurological: She is alert and oriented to person, place, and time. She has normal strength. No cranial nerve deficit. She displays a negative Romberg sign. Gait normal.   Skin: Skin is warm and dry.   Nursing note and vitals reviewed.        Assessment/Plan   Ashley was seen today for med refill.    Diagnoses and all orders for this visit:    Other migraine without status migrainosus, not intractable    Other orders  -     gabapentin (NEURONTIN) 100 MG capsule; Take 1 capsule by mouth Every Night.      Discussed use of gabapentin.  Advised her this was a controlled medication.  We will continue it for now, 100 mg at at bedtime only.  Controlled substance agreement form signed today.         "

## 2018-12-10 ENCOUNTER — LAB (OUTPATIENT)
Dept: LAB | Facility: HOSPITAL | Age: 78
End: 2018-12-10

## 2018-12-10 DIAGNOSIS — E78.5 HYPERLIPIDEMIA, UNSPECIFIED HYPERLIPIDEMIA TYPE: ICD-10-CM

## 2018-12-10 DIAGNOSIS — R73.01 ELEVATED FASTING GLUCOSE: ICD-10-CM

## 2018-12-10 DIAGNOSIS — R31.29 MICROSCOPIC HEMATURIA: ICD-10-CM

## 2018-12-10 LAB
ALBUMIN SERPL-MCNC: 4.5 G/DL (ref 3.5–5.2)
ALBUMIN/GLOB SERPL: 1.8 G/DL
ALP SERPL-CCNC: 67 U/L (ref 39–117)
ALT SERPL W P-5'-P-CCNC: 35 U/L (ref 1–33)
ANION GAP SERPL CALCULATED.3IONS-SCNC: 10.2 MMOL/L
AST SERPL-CCNC: 35 U/L (ref 1–32)
BACTERIA UR QL AUTO: NORMAL /HPF
BILIRUB SERPL-MCNC: 0.4 MG/DL (ref 0.1–1.2)
BILIRUB UR QL STRIP: NEGATIVE
BUN BLD-MCNC: 10 MG/DL (ref 8–23)
BUN/CREAT SERPL: 12.2 (ref 7–25)
CALCIUM SPEC-SCNC: 9.6 MG/DL (ref 8.6–10.5)
CHLORIDE SERPL-SCNC: 105 MMOL/L (ref 98–107)
CHOLEST SERPL-MCNC: 126 MG/DL (ref 0–200)
CLARITY UR: CLEAR
CO2 SERPL-SCNC: 23.8 MMOL/L (ref 22–29)
COLOR UR: YELLOW
CREAT BLD-MCNC: 0.82 MG/DL (ref 0.57–1)
GFR SERPL CREATININE-BSD FRML MDRD: 68 ML/MIN/1.73
GLOBULIN UR ELPH-MCNC: 2.5 GM/DL
GLUCOSE BLD-MCNC: 115 MG/DL (ref 65–99)
GLUCOSE UR STRIP-MCNC: NEGATIVE MG/DL
HBA1C MFR BLD: 5.9 % (ref 4.8–5.6)
HDLC SERPL-MCNC: 59 MG/DL (ref 40–60)
HGB UR QL STRIP.AUTO: NEGATIVE
HYALINE CASTS UR QL AUTO: NORMAL /LPF
KETONES UR QL STRIP: NEGATIVE
LDLC SERPL CALC-MCNC: 50 MG/DL (ref 0–100)
LDLC/HDLC SERPL: 0.85 {RATIO}
LEUKOCYTE ESTERASE UR QL STRIP.AUTO: ABNORMAL
NITRITE UR QL STRIP: NEGATIVE
PH UR STRIP.AUTO: 6.5 [PH] (ref 5–8)
POTASSIUM BLD-SCNC: 4.4 MMOL/L (ref 3.5–5.2)
PROT SERPL-MCNC: 7 G/DL (ref 6–8.5)
PROT UR QL STRIP: NEGATIVE
RBC # UR: NORMAL /HPF
REF LAB TEST METHOD: NORMAL
SODIUM BLD-SCNC: 139 MMOL/L (ref 136–145)
SP GR UR STRIP: 1.01 (ref 1–1.03)
SQUAMOUS #/AREA URNS HPF: NORMAL /HPF
TRIGL SERPL-MCNC: 85 MG/DL (ref 0–150)
UROBILINOGEN UR QL STRIP: ABNORMAL
VLDLC SERPL-MCNC: 17 MG/DL (ref 5–40)
WBC UR QL AUTO: NORMAL /HPF

## 2018-12-10 PROCEDURE — 80053 COMPREHEN METABOLIC PANEL: CPT

## 2018-12-10 PROCEDURE — 81001 URINALYSIS AUTO W/SCOPE: CPT

## 2018-12-10 PROCEDURE — 83036 HEMOGLOBIN GLYCOSYLATED A1C: CPT

## 2018-12-10 PROCEDURE — 80061 LIPID PANEL: CPT

## 2018-12-14 ENCOUNTER — OFFICE VISIT (OUTPATIENT)
Dept: INTERNAL MEDICINE | Facility: CLINIC | Age: 78
End: 2018-12-14

## 2018-12-14 VITALS
WEIGHT: 169 LBS | SYSTOLIC BLOOD PRESSURE: 134 MMHG | HEIGHT: 63 IN | HEART RATE: 79 BPM | DIASTOLIC BLOOD PRESSURE: 60 MMHG | BODY MASS INDEX: 29.95 KG/M2 | OXYGEN SATURATION: 97 %

## 2018-12-14 DIAGNOSIS — R73.01 ELEVATED FASTING GLUCOSE: ICD-10-CM

## 2018-12-14 DIAGNOSIS — G43.809 OTHER MIGRAINE WITHOUT STATUS MIGRAINOSUS, NOT INTRACTABLE: ICD-10-CM

## 2018-12-14 DIAGNOSIS — E78.5 HYPERLIPIDEMIA, UNSPECIFIED HYPERLIPIDEMIA TYPE: Primary | ICD-10-CM

## 2018-12-14 DIAGNOSIS — Z23 NEED FOR PNEUMOCOCCAL VACCINE: ICD-10-CM

## 2018-12-14 PROCEDURE — 99213 OFFICE O/P EST LOW 20 MIN: CPT | Performed by: INTERNAL MEDICINE

## 2018-12-14 PROCEDURE — G0009 ADMIN PNEUMOCOCCAL VACCINE: HCPCS | Performed by: INTERNAL MEDICINE

## 2018-12-14 PROCEDURE — 90670 PCV13 VACCINE IM: CPT | Performed by: INTERNAL MEDICINE

## 2018-12-14 RX ORDER — GABAPENTIN 100 MG/1
100 CAPSULE ORAL NIGHTLY
Qty: 90 CAPSULE | Refills: 1 | Status: SHIPPED | OUTPATIENT
Start: 2018-12-14 | End: 2019-04-17 | Stop reason: SDUPTHER

## 2018-12-14 NOTE — PATIENT INSTRUCTIONS
Check with your pharmacy about the new shingles vaccine and the Hepatitis A vaccine.  Also, an updated Tetanus shot.

## 2018-12-14 NOTE — PROGRESS NOTES
Chief Complaint   Patient presents with   • Hyperlipidemia   • Migraine       Subjective   Ashley Motta is a 78 y.o. female.     Hyperlipidemia   This is a chronic problem. The problem is controlled. Recent lipid tests were reviewed and are normal. She has no history of diabetes, hypothyroidism or obesity. Pertinent negatives include no chest pain or shortness of breath. Current antihyperlipidemic treatment includes statins, diet change and exercise. The current treatment provides significant improvement of lipids. There are no compliance problems.    Migraine    Pertinent negatives include no blurred vision, coughing or dizziness. There is no history of obesity.      She was recently hospitalized with a complicated migraine. Since then, she has taken gabapentin 100 mgm at bedtime.  She has not had recurrent migraines.    Elevated fasting glucose:  This is a chronic problem.  She denies polyuria, polydipsia, vision change appetite change, unexplained weight loss.   Lab Results   Component Value Date    HGBA1C 5.90 (H) 12/10/2018      The following portions of the patient's history were reviewed and updated as appropriate: allergies, current medications, past family history, past medical history, past social history, past surgical history and problem list.    Review of Systems   Constitutional: Negative for appetite change.   HENT: Negative for nosebleeds.    Eyes: Negative for blurred vision and double vision.   Respiratory: Negative for cough and shortness of breath.    Cardiovascular: Negative for chest pain, palpitations and leg swelling.   Neurological: Negative for dizziness and headache.         Current Outpatient Medications:   •  aspirin 81 MG tablet, Take 81 mg by mouth daily., Disp: , Rfl:   •  B Complex Vitamins (VITAMIN B COMPLEX PO), Take  by mouth., Disp: , Rfl:   •  Calcium Carbonate-Vitamin D (CALCIUM + D PO), Take  by mouth 2 (Two) Times a Day., Disp: , Rfl:   •  Coenzyme Q10 (COQ10) 200 MG  "capsule, Take  by mouth., Disp: , Rfl:   •  gabapentin (NEURONTIN) 100 MG capsule, Take 1 capsule by mouth Every Night., Disp: 90 capsule, Rfl: 1  •  Magnesium 400 MG tablet, Take  by mouth., Disp: , Rfl:   •  Misc Natural Products (GLUCOSAMINE CHONDROITIN VIT D3) capsule, Take  by mouth., Disp: , Rfl:   •  Multiple Vitamins-Minerals (MULTIVITAMIN ADULT PO), Take  by mouth., Disp: , Rfl:   •  Omega-3 Fatty Acids (FISH OIL) 1000 MG capsule capsule, Take  by mouth daily with breakfast., Disp: , Rfl:   •  rOPINIRole (REQUIP) 0.5 MG tablet, TAKE 1 TABLET BY MOUTH IN THE EVENING ONE  HOUR  BEFORE  BEDTIME, Disp: 90 tablet, Rfl: 1  •  simvastatin (ZOCOR) 40 MG tablet, Take 1 tablet by mouth every night at bedtime., Disp: 90 tablet, Rfl: 3        Objective     /60 (BP Location: Right arm, Patient Position: Sitting, Cuff Size: Adult)   Pulse 79   Ht 160 cm (63\")   Wt 76.7 kg (169 lb)   SpO2 97%   BMI 29.94 kg/m²     Physical Exam   Constitutional: She is oriented to person, place, and time. She appears well-developed and well-nourished. No distress.   Neck: Normal carotid pulses present. Carotid bruit is not present.   Cardiovascular: Regular rhythm, S1 normal and S2 normal. Exam reveals no gallop and no friction rub.   No murmur heard.  Pulses:       Carotid pulses are 2+ on the right side, and 2+ on the left side.  Pulmonary/Chest: Effort normal and breath sounds normal. She has no wheezes. She has no rhonchi. She has no rales. Chest wall is not dull to percussion.   Musculoskeletal: She exhibits no edema.   Neurological: She is alert and oriented to person, place, and time.   Skin: Skin is warm and dry.   Nursing note and vitals reviewed.        Assessment/Plan   Ashley was seen today for hyperlipidemia and migraine.    Diagnoses and all orders for this visit:    Hyperlipidemia, unspecified hyperlipidemia type    Elevated fasting glucose    Other migraine without status migrainosus, not intractable    Need for " pneumococcal vaccine  -     Pneumococcal Conjugate Vaccine 13-[employer] All    Other orders  -     gabapentin (NEURONTIN) 100 MG capsule; Take 1 capsule by mouth Every Night.

## 2019-03-04 ENCOUNTER — OFFICE VISIT (OUTPATIENT)
Dept: ORTHOPEDIC SURGERY | Facility: CLINIC | Age: 79
End: 2019-03-04

## 2019-03-04 VITALS — HEIGHT: 63 IN | BODY MASS INDEX: 30.3 KG/M2 | TEMPERATURE: 98.6 F | WEIGHT: 171 LBS

## 2019-03-04 DIAGNOSIS — M17.12 ARTHRITIS OF LEFT KNEE: Primary | ICD-10-CM

## 2019-03-04 PROCEDURE — 99213 OFFICE O/P EST LOW 20 MIN: CPT | Performed by: ORTHOPAEDIC SURGERY

## 2019-03-04 NOTE — PROGRESS NOTES
Patient Name: Ashley Motta   YOB: 1940  Referring Primary Care Physician: Erica Johnston MD  BMI: Body mass index is 30.29 kg/m².    Chief Complaint:    Chief Complaint   Patient presents with   • Left Knee - Follow-up, Pain        HPI:     Ashley Motta is a 79 y.o. female who presents today for evaluation of   Chief Complaint   Patient presents with   • Left Knee - Follow-up, Pain   .  Seen back today complaining of left knee pain it gets stiff and hurts at the medial lateral joint line it does not really lock.  She had an injection September 5 it proved to be helpful is not done physical therapy    This problem is not new to this examiner.     Subjective   Medications:   Home Medications:  Current Outpatient Medications on File Prior to Visit   Medication Sig   • aspirin 81 MG tablet Take 81 mg by mouth daily.   • B Complex Vitamins (VITAMIN B COMPLEX PO) Take  by mouth.   • Calcium Carbonate-Vitamin D (CALCIUM + D PO) Take  by mouth 2 (Two) Times a Day.   • Coenzyme Q10 (COQ10) 200 MG capsule Take  by mouth.   • gabapentin (NEURONTIN) 100 MG capsule Take 1 capsule by mouth Every Night.   • Magnesium 400 MG tablet Take  by mouth.   • Misc Natural Products (GLUCOSAMINE CHONDROITIN VIT D3) capsule Take  by mouth.   • Multiple Vitamins-Minerals (MULTIVITAMIN ADULT PO) Take  by mouth.   • Omega-3 Fatty Acids (FISH OIL) 1000 MG capsule capsule Take  by mouth daily with breakfast.   • rOPINIRole (REQUIP) 0.5 MG tablet TAKE 1 TABLET BY MOUTH IN THE EVENING ONE  HOUR  BEFORE  BEDTIME   • simvastatin (ZOCOR) 40 MG tablet Take 1 tablet by mouth every night at bedtime.     No current facility-administered medications on file prior to visit.      Current Medications:  Scheduled Meds:  Continuous Infusions:  No current facility-administered medications for this visit.   PRN Meds:.    I have reviewed the patient's medical history in detail and updated the computerized patient record.  Review and summarization of  old records includes:    Past Medical History:   Diagnosis Date   • Elevated fasting glucose    • GERD (gastroesophageal reflux disease)    • Goiter     THYROID POLYP SEES    • Hyperlipidemia    • Hypersomnia    • BALDEMAR on CPAP    • Osteoarthritis    • Osteopenia    • RLS (restless legs syndrome)         Past Surgical History:   Procedure Laterality Date   • CATARACT EXTRACTION, BILATERAL Bilateral 2015   • COLONOSCOPY N/A 02/27/2015    DR. SARAH LIRIANO   • HYSTERECTOMY  2004    Total   • KNEE SURGERY Left 2013   • KNEE SURGERY Right 2010   • SHOULDER ARTHROSCOPY Left         Social History     Occupational History   • Not on file   Tobacco Use   • Smoking status: Never Smoker   • Smokeless tobacco: Never Used   Substance and Sexual Activity   • Alcohol use: No   • Drug use: No   • Sexual activity: Defer     Birth control/protection: Post-menopausal      Social History     Social History Narrative   • Not on file        Family History   Problem Relation Age of Onset   • Heart disease Mother    • Lung cancer Father    • Hypertension Father    • Heart disease Father    • Heart attack Brother    • Emphysema Brother    • COPD Brother    • Depression Brother    • Heart attack Brother         Had stents placed 2009       ROS: 14 point review of systems was performed and all other systems were reviewed and are negative except for documented findings in HPI and today's encounter.     Allergies: No Known Allergies  Constitutional:  Denies fever, shaking or chills   Eyes:  Denies change in visual acuity   HENT:  Denies nasal congestion or sore throat   Respiratory:  Denies cough or shortness of breath   Cardiovascular:  Denies chest pain or severe LE edema   GI:  Denies abdominal pain, nausea, vomiting, bloody stools or diarrhea   Musculoskeletal:  Numbness, tingling, pain, or loss of motor function only as noted above in history of present illness.  : Denies painful urination or hematuria  Integument:  Denies rash,  "lesion or ulceration   Neurologic:  Denies headache or focal weakness  Endocrine:  Denies lymphadenopathy  Psych:  Denies confusion or change in mental status   Hem:  Denies active bleeding    OBJECTIVE:  Physical Exam:   Temp 98.6 °F (37 °C) (Temporal)   Ht 160 cm (63\")   Wt 77.6 kg (171 lb)   BMI 30.29 kg/m²     General Appearance:    Alert, cooperative, in no acute distress                  Eyes: conjunctiva clear  ENT: external ears and nose atraumatic  CV: no peripheral edema  Resp: normal respiratory effort  Skin: no rashes or wounds; normal turgor  Psych: mood and affect appropriate  Lymph: no nodes appreciated  Neuro: gross sensation intact  Vascular:  Palpable peripheral pulse in noted extremity  Musculoskeletal Extremities: Exam shows medial and lateral joint line tenderness some stiffness pseudolaxity    Radiology:   AP lateral 40 degrees PA x-ray of bilateral knees taken in September show advanced osteoarthritis    Assessment:     ICD-10-CM ICD-9-CM   1. Arthritis of left knee M17.12 716.96        Procedures       Plan:       3/4/2019  Atul Rico MD  "

## 2019-03-11 RX ORDER — SIMVASTATIN 40 MG
TABLET ORAL
Qty: 90 TABLET | Refills: 3 | Status: SHIPPED | OUTPATIENT
Start: 2019-03-11 | End: 2020-04-03

## 2019-03-11 RX ORDER — ROPINIROLE 0.5 MG/1
TABLET, FILM COATED ORAL
Qty: 90 TABLET | Refills: 3 | Status: SHIPPED | OUTPATIENT
Start: 2019-03-11 | End: 2020-04-03

## 2019-03-22 ENCOUNTER — APPOINTMENT (OUTPATIENT)
Dept: PHYSICAL THERAPY | Facility: HOSPITAL | Age: 79
End: 2019-03-22

## 2019-04-01 ENCOUNTER — LAB (OUTPATIENT)
Dept: LAB | Facility: HOSPITAL | Age: 79
End: 2019-04-01

## 2019-04-01 DIAGNOSIS — E78.5 HYPERLIPIDEMIA, UNSPECIFIED HYPERLIPIDEMIA TYPE: ICD-10-CM

## 2019-04-01 DIAGNOSIS — R73.01 ELEVATED FASTING GLUCOSE: ICD-10-CM

## 2019-04-01 DIAGNOSIS — R94.6 NONSPECIFIC ABNORMAL RESULTS OF THYROID FUNCTION STUDY: ICD-10-CM

## 2019-04-01 DIAGNOSIS — E78.5 HYPERLIPIDEMIA, UNSPECIFIED HYPERLIPIDEMIA TYPE: Primary | ICD-10-CM

## 2019-04-01 LAB
ALBUMIN SERPL-MCNC: 4.7 G/DL (ref 3.5–5.2)
ALBUMIN/GLOB SERPL: 1.7 G/DL
ALP SERPL-CCNC: 63 U/L (ref 39–117)
ALT SERPL W P-5'-P-CCNC: 32 U/L (ref 1–33)
ANION GAP SERPL CALCULATED.3IONS-SCNC: 12.1 MMOL/L
AST SERPL-CCNC: 28 U/L (ref 1–32)
BASOPHILS # BLD AUTO: 0.05 10*3/MM3 (ref 0–0.2)
BASOPHILS NFR BLD AUTO: 0.7 % (ref 0–1.5)
BILIRUB SERPL-MCNC: 0.3 MG/DL (ref 0.2–1.2)
BUN BLD-MCNC: 13 MG/DL (ref 8–23)
BUN/CREAT SERPL: 15.7 (ref 7–25)
CALCIUM SPEC-SCNC: 9.6 MG/DL (ref 8.6–10.5)
CHLORIDE SERPL-SCNC: 101 MMOL/L (ref 98–107)
CO2 SERPL-SCNC: 24.9 MMOL/L (ref 22–29)
CREAT BLD-MCNC: 0.83 MG/DL (ref 0.57–1)
DEPRECATED RDW RBC AUTO: 49 FL (ref 37–54)
EOSINOPHIL # BLD AUTO: 0.39 10*3/MM3 (ref 0–0.4)
EOSINOPHIL NFR BLD AUTO: 5.4 % (ref 0.3–6.2)
ERYTHROCYTE [DISTWIDTH] IN BLOOD BY AUTOMATED COUNT: 14.2 % (ref 12.3–15.4)
GFR SERPL CREATININE-BSD FRML MDRD: 67 ML/MIN/1.73
GLOBULIN UR ELPH-MCNC: 2.8 GM/DL
GLUCOSE BLD-MCNC: 121 MG/DL (ref 65–99)
HBA1C MFR BLD: 5.8 % (ref 4.8–5.6)
HCT VFR BLD AUTO: 46.7 % (ref 34–46.6)
HGB BLD-MCNC: 14.6 G/DL (ref 12–15.9)
IMM GRANULOCYTES # BLD AUTO: 0.02 10*3/MM3 (ref 0–0.05)
IMM GRANULOCYTES NFR BLD AUTO: 0.3 % (ref 0–0.5)
LYMPHOCYTES # BLD AUTO: 2.63 10*3/MM3 (ref 0.7–3.1)
LYMPHOCYTES NFR BLD AUTO: 36.7 % (ref 19.6–45.3)
MCH RBC QN AUTO: 29.2 PG (ref 26.6–33)
MCHC RBC AUTO-ENTMCNC: 31.3 G/DL (ref 31.5–35.7)
MCV RBC AUTO: 93.4 FL (ref 79–97)
MONOCYTES # BLD AUTO: 0.71 10*3/MM3 (ref 0.1–0.9)
MONOCYTES NFR BLD AUTO: 9.9 % (ref 5–12)
NEUTROPHILS # BLD AUTO: 3.36 10*3/MM3 (ref 1.4–7)
NEUTROPHILS NFR BLD AUTO: 47 % (ref 42.7–76)
NRBC BLD AUTO-RTO: 0 /100 WBC (ref 0–0)
PLATELET # BLD AUTO: 279 10*3/MM3 (ref 140–450)
PMV BLD AUTO: 11.5 FL (ref 6–12)
POTASSIUM BLD-SCNC: 4.6 MMOL/L (ref 3.5–5.2)
PROT SERPL-MCNC: 7.5 G/DL (ref 6–8.5)
RBC # BLD AUTO: 5 10*6/MM3 (ref 3.77–5.28)
SODIUM BLD-SCNC: 138 MMOL/L (ref 136–145)
TSH SERPL DL<=0.05 MIU/L-ACNC: 2.83 MIU/ML (ref 0.27–4.2)
WBC NRBC COR # BLD: 7.16 10*3/MM3 (ref 3.4–10.8)

## 2019-04-01 PROCEDURE — 85025 COMPLETE CBC W/AUTO DIFF WBC: CPT

## 2019-04-01 PROCEDURE — 84443 ASSAY THYROID STIM HORMONE: CPT

## 2019-04-01 PROCEDURE — 36415 COLL VENOUS BLD VENIPUNCTURE: CPT

## 2019-04-01 PROCEDURE — 83036 HEMOGLOBIN GLYCOSYLATED A1C: CPT

## 2019-04-01 PROCEDURE — 80053 COMPREHEN METABOLIC PANEL: CPT

## 2019-04-17 ENCOUNTER — OFFICE VISIT (OUTPATIENT)
Dept: INTERNAL MEDICINE | Facility: CLINIC | Age: 79
End: 2019-04-17

## 2019-04-17 VITALS
HEIGHT: 63 IN | SYSTOLIC BLOOD PRESSURE: 124 MMHG | BODY MASS INDEX: 29.59 KG/M2 | WEIGHT: 167 LBS | DIASTOLIC BLOOD PRESSURE: 62 MMHG

## 2019-04-17 DIAGNOSIS — R73.01 ELEVATED FASTING GLUCOSE: ICD-10-CM

## 2019-04-17 DIAGNOSIS — Z00.00 MEDICARE ANNUAL WELLNESS VISIT, SUBSEQUENT: Primary | ICD-10-CM

## 2019-04-17 DIAGNOSIS — E78.5 HYPERLIPIDEMIA, UNSPECIFIED HYPERLIPIDEMIA TYPE: ICD-10-CM

## 2019-04-17 DIAGNOSIS — G43.809 OTHER MIGRAINE WITHOUT STATUS MIGRAINOSUS, NOT INTRACTABLE: ICD-10-CM

## 2019-04-17 PROCEDURE — G0439 PPPS, SUBSEQ VISIT: HCPCS | Performed by: INTERNAL MEDICINE

## 2019-04-17 PROCEDURE — 99213 OFFICE O/P EST LOW 20 MIN: CPT | Performed by: INTERNAL MEDICINE

## 2019-04-17 RX ORDER — GABAPENTIN 100 MG/1
100 CAPSULE ORAL NIGHTLY
Qty: 90 CAPSULE | Refills: 1 | Status: SHIPPED | OUTPATIENT
Start: 2019-04-17 | End: 2019-08-31 | Stop reason: SDUPTHER

## 2019-04-17 NOTE — PATIENT INSTRUCTIONS
Medicare Wellness  Personal Prevention Plan of Service     Date of Office Visit:  2019  Encounter Provider:  Erica Johnston MD  Place of Service:  [facility] INTERNAL MEDICINE  Patient Name: Ashley Motta  :  1940    As part of the Medicare Wellness portion of your visit today, we are providing you with this personalized preventive plan of services (PPPS). This plan is based upon recommendations of the [place] Preventive Services Task Force (USPSTF) and the Advisory Committee on Immunization Practices (ACIP).    This lists the preventive care services that should be considered, and provides dates of when you are due. Items listed as completed are up-to-date and do not require any further intervention.    Health Maintenance   Topic Date Due   • ZOSTER VACCINE (2 of 3) 2009   • TDAP/TD VACCINES (2 - Td) 2018   • MEDICARE ANNUAL WELLNESS  2018   • INFLUENZA VACCINE  2019   • DXA SCAN  2019   • MAMMOGRAM  2019   • LIPID PANEL  12/10/2019   • COLONOSCOPY  2020   • PNEUMOCOCCAL VACCINES (65+ LOW/MEDIUM RISK)  Completed     Check with your pharmacy about the new zoster (shingles) vaccine and an updated tetanus (Td) shot    Orders Placed This Encounter   Procedures   • Comprehensive Metabolic Panel     Standing Status:   Future   • Lipid Panel     Standing Status:   Future     Standing Expiration Date:   2019   • Hemoglobin A1c     Standing Status:   Future   • Urinalysis With Microscopic If Indicated (No Culture) - Urine, Clean Catch     Standing Status:   Future     Standing Expiration Date:   2020       Return in about 4 months (around 2019) for Follow up.

## 2019-04-17 NOTE — PROGRESS NOTES
Chief Complaint   Patient presents with   • Medicare Wellness-subsequent   • Med Refill   • Hyperlipidemia       Subjective   Ashley Motta is a 79 y.o. female.     Hyperlipidemia   This is a chronic problem. The problem is controlled. Recent lipid tests were reviewed and are normal. She has no history of diabetes. There are no known factors aggravating her hyperlipidemia. Pertinent negatives include no chest pain, leg pain, myalgias or shortness of breath. Current antihyperlipidemic treatment includes statins, diet change and exercise. The current treatment provides significant improvement of lipids. There are no compliance problems.       Migraines:  She continues to take gabapentin at bedtime. By report, there is good compliance with treatment, good tolerance of treatment and good symptom control.     Elevated fasting glucose:  This is a chronic problem.  She denies polyuria, polydipsia, vision change appetite change, unexplained weight loss.   Lab Results   Component Value Date    HGBA1C 5.80 (H) 04/01/2019      The following portions of the patient's history were reviewed and updated as appropriate: allergies, current medications, past family history, past medical history, past social history, past surgical history and problem list.    Review of Systems   Constitutional: Negative for appetite change.   HENT: Negative for nosebleeds.    Eyes: Negative for blurred vision and double vision.   Respiratory: Negative for cough and shortness of breath.    Cardiovascular: Negative for chest pain, palpitations and leg swelling.   Musculoskeletal: Negative for myalgias.         Current Outpatient Medications:   •  aspirin 81 MG tablet, Take 81 mg by mouth daily., Disp: , Rfl:   •  B Complex Vitamins (VITAMIN B COMPLEX PO), Take  by mouth., Disp: , Rfl:   •  Calcium Carbonate-Vitamin D (CALCIUM + D PO), Take  by mouth 2 (Two) Times a Day., Disp: , Rfl:   •  Coenzyme Q10 (COQ10) 200 MG capsule, Take  by mouth., Disp: ,  "Rfl:   •  gabapentin (NEURONTIN) 100 MG capsule, Take 1 capsule by mouth Every Night., Disp: 90 capsule, Rfl: 1  •  Magnesium 400 MG tablet, Take  by mouth., Disp: , Rfl:   •  Misc Natural Products (GLUCOSAMINE CHONDROITIN VIT D3) capsule, Take  by mouth., Disp: , Rfl:   •  Multiple Vitamins-Minerals (MULTIVITAMIN ADULT PO), Take  by mouth., Disp: , Rfl:   •  Omega-3 Fatty Acids (FISH OIL) 1000 MG capsule capsule, Take  by mouth daily with breakfast., Disp: , Rfl:   •  rOPINIRole (REQUIP) 0.5 MG tablet, TAKE 1 TABLET BY MOUTH IN THE EVENING 1 HOUR BEFORE BEDTIME, Disp: 90 tablet, Rfl: 3  •  simvastatin (ZOCOR) 40 MG tablet, TAKE 1 TABLET BY MOUTH AT BEDTIME, Disp: 90 tablet, Rfl: 3        Objective     /62   Ht 160 cm (63\")   Wt 75.8 kg (167 lb)   BMI 29.58 kg/m²     Physical Exam   Constitutional: She is oriented to person, place, and time. She appears well-developed and well-nourished. No distress.   Neck: Normal carotid pulses present. Carotid bruit is not present.   Cardiovascular: Regular rhythm, S1 normal and S2 normal. Exam reveals no gallop and no friction rub.   No murmur heard.  Pulses:       Carotid pulses are 2+ on the right side, and 2+ on the left side.  Pulmonary/Chest: Effort normal and breath sounds normal. She has no wheezes. She has no rhonchi. She has no rales. Chest wall is not dull to percussion.   Musculoskeletal: She exhibits no edema.   Neurological: She is alert and oriented to person, place, and time.   Skin: Skin is warm and dry.   Nursing note and vitals reviewed.        Assessment/Plan   Ashley was seen today for medicare wellness-subsequent, med refill and hyperlipidemia.    Diagnoses and all orders for this visit:    Medicare annual wellness visit, subsequent    Other migraine without status migrainosus, not intractable    Hyperlipidemia, unspecified hyperlipidemia type  -     Comprehensive Metabolic Panel; Future  -     Lipid Panel; Future  -     Urinalysis With Microscopic " If Indicated (No Culture) - Urine, Clean Catch; Future    Elevated fasting glucose  -     Hemoglobin A1c; Future    Other orders  -     gabapentin (NEURONTIN) 100 MG capsule; Take 1 capsule by mouth Every Night.      Reviewed recent lab results.  Glucose and HgbA1c are in the pre-diabetic range.  She will continue prudent diet, regular exercise.

## 2019-04-17 NOTE — PROGRESS NOTES
Subsequent Medicare Wellness Visit   The ABC's of the Annual Wellness Visit    Chief Complaint   Patient presents with   • Medicare Wellness-subsequent   • Med Refill   • Hyperlipidemia       HPI:  LADY David-1940, is a 79 y.o. female who presents for a Subsequent Medicare Wellness Visit.    Recent Hospital[facility]:  [facility]    Current Medical Providers:  Patient Care Team:  Erica Johnston MD as PCP - General (Internal Medicine)  Erica Johnston MD as PCP - Claims Attributed    Health Habits and Functional and Cognitive Screening and Depression Screening:  Functional & Cognitive Status 2019   Do you have difficulty preparing food and eating? No   Do you have difficulty bathing yourself, getting dressed or grooming yourself? No   Do you have difficulty using the toilet? No   Do you have difficulty moving around from place to place? No   Do you have trouble with steps or getting out of a bed or a chair? No   In the past year have you fallen or experienced a near fall? No   Current Diet Well Balanced Diet   Dental Exam Up to date   Eye Exam Up to date   Exercise (times per week) 4 times per week   Current Exercise Activities Include Walking   Do you need help using the phone?  No   Are you deaf or do you have serious difficulty hearing?  No   Do you need help with transportation? No   Do you need help shopping? No   Do you need help preparing meals?  No   Do you need help with housework?  No   Do you need help with laundry? No   Do you need help taking your medications? No   Do you need help managing money? No   Do you ever drive or ride in a car without wearing a seat belt? No   Have you felt unusual stress, anger or loneliness in the last month? No   Who do you live with? Spouse   If you need help, do you have trouble finding someone available to you? No   Have you been bothered in the last four weeks  by sexual problems? No   Do you have difficulty concentrating, remembering or making decisions? No       Compared to one year ago, the patient feels her physical health is the same and her mental health is the same.    Depression Screen:  PHQ-2/PHQ-9 Depression Screening 4/17/2019   Little interest or pleasure in doing things 0   Feeling down, depressed, or hopeless 0   Trouble falling or staying asleep, or sleeping too much 0   Feeling tired or having little energy 0   Poor appetite or overeating 0   Feeling bad about yourself - or that you are a failure or have let yourself or your family down 0   Trouble concentrating on things, such as reading the newspaper or watching television 0   Moving or speaking so slowly that other people could have noticed. Or the opposite - being so fidgety or restless that you have been moving around a lot more than usual 0   Thoughts that you would be better off dead, or of hurting yourself in some way 0   Total Score 0   If you checked off any problems, how difficult have these problems made it for you to do your work, take care of things at home, or get along with other people? -         Past Medical/Family/Social History:  The following portions of the patient's history were reviewed and updated as appropriate: allergies, current medications, past family history, past medical history, past social history, past surgical history and problem list.    No Known Allergies      Current Outpatient Medications:   •  aspirin 81 MG tablet, Take 81 mg by mouth daily., Disp: , Rfl:   •  B Complex Vitamins (VITAMIN B COMPLEX PO), Take  by mouth., Disp: , Rfl:   •  Calcium Carbonate-Vitamin D (CALCIUM + D PO), Take  by mouth 2 (Two) Times a Day., Disp: , Rfl:   •  Coenzyme Q10 (COQ10) 200 MG capsule, Take  by mouth., Disp: , Rfl:   •  gabapentin (NEURONTIN) 100 MG capsule, Take 1 capsule by mouth Every Night., Disp: 90 capsule, Rfl: 1  •  Magnesium 400 MG tablet, Take  by mouth., Disp: , Rfl:  "  •  Misc Natural Products (GLUCOSAMINE CHONDROITIN VIT D3) capsule, Take  by mouth., Disp: , Rfl:   •  Multiple Vitamins-Minerals (MULTIVITAMIN ADULT PO), Take  by mouth., Disp: , Rfl:   •  Omega-3 Fatty Acids (FISH OIL) 1000 MG capsule capsule, Take  by mouth daily with breakfast., Disp: , Rfl:   •  rOPINIRole (REQUIP) 0.5 MG tablet, TAKE 1 TABLET BY MOUTH IN THE EVENING 1 HOUR BEFORE BEDTIME, Disp: 90 tablet, Rfl: 3  •  simvastatin (ZOCOR) 40 MG tablet, TAKE 1 TABLET BY MOUTH AT BEDTIME, Disp: 90 tablet, Rfl: 3    Aspirin use counseling: Thre is family history of CAD, she has HLD.  Advised to take ASA 81 mgm QOD    Current medication list contains no high risk medications.  No harmful drug interactions have been identified.     Family History   Problem Relation Age of Onset   • Heart disease Mother    • Lung cancer Father    • Hypertension Father    • Heart disease Father    • Heart attack Brother    • Emphysema Brother    • COPD Brother    • Depression Brother    • Heart attack Brother         Had stents placed 2009       Social History     Tobacco Use   • Smoking status: Never Smoker   • Smokeless tobacco: Never Used   Substance Use Topics   • Alcohol use: No       Past Surgical History:   Procedure Laterality Date   • CATARACT EXTRACTION, BILATERAL Bilateral 2015   • COLONOSCOPY N/A 02/27/2015    DR. SARAH LIRIANO   • HYSTERECTOMY  2004    Total   • KNEE SURGERY Left 2013   • KNEE SURGERY Right 2010   • SHOULDER ARTHROSCOPY Left        Patient Active Problem List   Diagnosis   • Osteopenia   • Hyperlipidemia   • Elevated fasting glucose   • Osteoarthritis   • BALDEMAR on CPAP   • Pelvic relaxation due to vaginal prolapse   • Chronic pain of both knees   • Arthritis of right knee   • Arthritis of left knee   • Arthritis of both knees   • Restless legs   • Vertigo   • Headache, migraine       Review of Systems    Objective     Vitals:    04/17/19 1416   BP: 124/62   Weight: 75.8 kg (167 lb)   Height: 160 cm (63\") "       Patient's Body mass index is 29.58 kg/m². BMI is above normal parameters. Recommendations include: Encouraged prudent diet, regular exercise.      No exam data present    The patient has no evidence of cognitve impairment.     Physical Exam    Recent Lab Results:  Lab Results   Component Value Date     (H) 10/04/2016     Lab Results   Component Value Date    CHOL 126 12/10/2018    TRIG 85 12/10/2018    HDL 59 12/10/2018    VLDL 17 12/10/2018    LDLHDL 0.85 12/10/2018       Assessment/Plan   Age-appropriate Screening Schedule:  Refer to the list below for future screening recommendations based on patient's age, sex and/or medical conditions.      Health Maintenance   Topic Date Due   • ZOSTER VACCINE (2 of 3) 01/20/2009   • TDAP/TD VACCINES (2 - Td) 01/01/2018   • INFLUENZA VACCINE  08/01/2019   • DXA SCAN  08/28/2019   • MAMMOGRAM  08/31/2019   • LIPID PANEL  12/10/2019   • COLONOSCOPY  02/27/2020   • PNEUMOCOCCAL VACCINES (65+ LOW/MEDIUM RISK)  Completed       Medicare Risks and Personalized Health Plan:  weight  and cardiovascular risk      CMS-Preventive Services Quick Reference  Medicare Preventive Services Addressed:  She will check with her pharmacy re: Shingrix and Td.      Advance Care Planning:  Patient has an advance directive - a copy has not been provided. Have asked the patient to send this to us to add to record    Diagnoses and all orders for this visit:    1. Medicare annual wellness visit, subsequent (Primary)    2. Other migraine without status migrainosus, not intractable    3. Hyperlipidemia, unspecified hyperlipidemia type  -     Comprehensive Metabolic Panel; Future  -     Lipid Panel; Future  -     Urinalysis With Microscopic If Indicated (No Culture) - Urine, Clean Catch; Future    4. Elevated fasting glucose  -     Hemoglobin A1c; Future    Other orders  -     gabapentin (NEURONTIN) 100 MG capsule; Take 1 capsule by mouth Every Night.  Dispense: 90 capsule; Refill: 1        An  After Visit Summary and PPPS with all of these plans were given to the patient.      Follow Up:  Return in about 4 months (around 8/17/2019) for Follow up.

## 2019-07-02 ENCOUNTER — CLINICAL SUPPORT (OUTPATIENT)
Dept: ORTHOPEDIC SURGERY | Facility: CLINIC | Age: 79
End: 2019-07-02

## 2019-07-02 VITALS — TEMPERATURE: 98.3 F | HEIGHT: 63 IN | WEIGHT: 168 LBS | BODY MASS INDEX: 29.77 KG/M2

## 2019-07-02 DIAGNOSIS — G57.12 MERALGIA PARESTHETICA OF LEFT SIDE: ICD-10-CM

## 2019-07-02 DIAGNOSIS — M17.12 ARTHRITIS OF LEFT KNEE: Primary | ICD-10-CM

## 2019-07-02 PROCEDURE — 99213 OFFICE O/P EST LOW 20 MIN: CPT | Performed by: ORTHOPAEDIC SURGERY

## 2019-07-02 PROCEDURE — 73562 X-RAY EXAM OF KNEE 3: CPT | Performed by: ORTHOPAEDIC SURGERY

## 2019-07-02 PROCEDURE — 20610 DRAIN/INJ JOINT/BURSA W/O US: CPT | Performed by: ORTHOPAEDIC SURGERY

## 2019-07-02 RX ORDER — METHYLPREDNISOLONE ACETATE 80 MG/ML
80 INJECTION, SUSPENSION INTRA-ARTICULAR; INTRALESIONAL; INTRAMUSCULAR; SOFT TISSUE
Status: COMPLETED | OUTPATIENT
Start: 2019-07-02 | End: 2019-07-02

## 2019-07-02 RX ADMIN — METHYLPREDNISOLONE ACETATE 80 MG: 80 INJECTION, SUSPENSION INTRA-ARTICULAR; INTRALESIONAL; INTRAMUSCULAR; SOFT TISSUE at 09:47

## 2019-07-02 NOTE — PROGRESS NOTES
Patient Name: Ashley Motta   YOB: 1940  Referring Primary Care Physician: Erica Johnston MD  BMI: Body mass index is 29.76 kg/m².    Chief Complaint:    Chief Complaint   Patient presents with   • Left Knee - Follow-up, Pain        HPI:     Ashley Motta is a 79 y.o. female who presents today for evaluation of   Chief Complaint   Patient presents with   • Left Knee - Follow-up, Pain   .  Follows up today with arthritis of her left knee.  It bothers her intermittently.  Her right knee does not really bother her a lot.  Is sore achy pain that is intermittent and can be at times severe that radiates locally.  She is also complaining of numbness and tingling that runs down the lateral femoral cutaneous nerve distribution on the left.  Denies any trauma.  I did demonstrate some stretches for it.  I had sent her for physical therapy but she is unable to get it done around a traveling schedule and wishes to do it now.    This problem is not new to this examiner.     Subjective   Medications:   Home Medications:  Current Outpatient Medications on File Prior to Visit   Medication Sig   • aspirin 81 MG tablet Take 81 mg by mouth daily.   • B Complex Vitamins (VITAMIN B COMPLEX PO) Take  by mouth.   • Calcium Carbonate-Vitamin D (CALCIUM + D PO) Take  by mouth 2 (Two) Times a Day.   • Coenzyme Q10 (COQ10) 200 MG capsule Take  by mouth.   • gabapentin (NEURONTIN) 100 MG capsule Take 1 capsule by mouth Every Night.   • Magnesium 400 MG tablet Take  by mouth.   • Misc Natural Products (GLUCOSAMINE CHONDROITIN VIT D3) capsule Take  by mouth.   • Multiple Vitamins-Minerals (MULTIVITAMIN ADULT PO) Take  by mouth.   • Omega-3 Fatty Acids (FISH OIL) 1000 MG capsule capsule Take  by mouth daily with breakfast.   • rOPINIRole (REQUIP) 0.5 MG tablet TAKE 1 TABLET BY MOUTH IN THE EVENING 1 HOUR BEFORE BEDTIME   • simvastatin (ZOCOR) 40 MG tablet TAKE 1 TABLET BY MOUTH AT BEDTIME     No current facility-administered  medications on file prior to visit.      Current Medications:  Scheduled Meds:  Continuous Infusions:  No current facility-administered medications for this visit.   PRN Meds:.    I have reviewed the patient's medical history in detail and updated the computerized patient record.  Review and summarization of old records includes:    Past Medical History:   Diagnosis Date   • Elevated fasting glucose    • GERD (gastroesophageal reflux disease)    • Goiter     THYROID POLYP SEES    • Hyperlipidemia    • Hypersomnia    • BALDEMAR on CPAP    • Osteoarthritis    • Osteopenia    • RLS (restless legs syndrome)         Past Surgical History:   Procedure Laterality Date   • CATARACT EXTRACTION, BILATERAL Bilateral 2015   • COLONOSCOPY N/A 02/27/2015    DR. SARAH LIRIANO   • HYSTERECTOMY  2004    Total   • KNEE SURGERY Left 2013   • KNEE SURGERY Right 2010   • SHOULDER ARTHROSCOPY Left         Social History     Occupational History   • Not on file   Tobacco Use   • Smoking status: Never Smoker   • Smokeless tobacco: Never Used   Substance and Sexual Activity   • Alcohol use: No   • Drug use: No   • Sexual activity: Defer     Birth control/protection: Post-menopausal    Social History     Social History Narrative   • Not on file        Family History   Problem Relation Age of Onset   • Heart disease Mother    • Lung cancer Father    • Hypertension Father    • Heart disease Father    • Heart attack Brother    • Emphysema Brother    • COPD Brother    • Depression Brother    • Heart attack Brother         Had stents placed 2009       ROS: 14 point review of systems was performed and all other systems were reviewed and are negative except for documented findings in HPI and today's encounter.     Allergies: No Known Allergies  Constitutional:  Denies fever, shaking or chills   Eyes:  Denies change in visual acuity   HENT:  Denies nasal congestion or sore throat   Respiratory:  Denies cough or shortness of breath  "  Cardiovascular:  Denies chest pain or severe LE edema   GI:  Denies abdominal pain, nausea, vomiting, bloody stools or diarrhea   Musculoskeletal:  Numbness, tingling, pain, or loss of motor function only as noted above in history of present illness.  : Denies painful urination or hematuria  Integument:  Denies rash, lesion or ulceration   Neurologic:  Denies headache or focal weakness  Endocrine:  Denies lymphadenopathy  Psych:  Denies confusion or change in mental status   Hem:  Denies active bleeding    OBJECTIVE:  Physical Exam:   Temp 98.3 °F (36.8 °C)   Ht 160 cm (63\")   Wt 76.2 kg (168 lb)   BMI 29.76 kg/m²     General Appearance:    Alert, cooperative, in no acute distress                  Eyes: conjunctiva clear  ENT: external ears and nose atraumatic  CV: no peripheral edema  Resp: normal respiratory effort  Skin: no rashes or wounds; normal turgor  Psych: mood and affect appropriate  Lymph: no nodes appreciated  Neuro: gross sensation intact  Vascular:  Palpable peripheral pulse in noted extremity  Musculoskeletal Extremities: Exam today shows medial joint line and lateral joint line tenderness on the left she has some effusion some synovitis and stiffness very mild start up limp but she walks pretty well right knee has some swelling as well but no tenderness    Radiology:   AP lateral 40 degree PA left knee compared to old x-ray shows advanced arthritis    Assessment:     ICD-10-CM ICD-9-CM   1. Arthritis of left knee M17.12 716.96   2. Meralgia paresthetica of left side G57.12 355.1        Large Joint Arthrocentesis: L knee  Date/Time: 7/2/2019 9:47 AM  Consent given by: patient  Site marked: site marked  Timeout: Immediately prior to procedure a time out was called to verify the correct patient, procedure, equipment, support staff and site/side marked as required   Supporting Documentation  Indications: pain and joint swelling   Procedure Details  Location: knee - L knee  Preparation: Patient " was prepped and draped in the usual sterile fashion  Needle size: 22 G  Approach: anterolateral  Medications administered: 80 mg methylPREDNISolone acetate 80 MG/ML; 4 mL lidocaine (cardiac)  Patient tolerance: patient tolerated the procedure well with no immediate complications             Plan: Biomechanics of pertinent body area discussed.  Risks, benefits, alternatives, comparisons, and complications of accepted medicines, injections, recommendations, surgical procedures, and therapies explained and education provided in laymen's terms. Natural history and expected course of this patient's diagnosis discussed along with evaluation of therapies. Questions answered. When appropriate I also discussed proper use of cane, walker, trekking poles.   EXERCISES:  Advice on benefits of, and types of regular/moderate exercise including biomechanical forces involved as it pertains to this complaint.  Cortisone Injection. See procedure note.  PT referral.to ask the PT to work on her meralgia paresthetica as well as the arthritis of her knee and hopefully this would help.  He can be referred to pain clinic for meralgia paresthetica for possible injection if she failed to get better from it.      7/2/2019    Much of this encounter note is an electronic transcription/translation of spoken language to printed text. The electronic translation of spoken language may permit erroneous, or at times, nonsensical words or phrases to be inadvertently transcribed; Although I have reviewed the note for such errors, some may still exist

## 2019-07-09 ENCOUNTER — HOSPITAL ENCOUNTER (OUTPATIENT)
Dept: PHYSICAL THERAPY | Facility: HOSPITAL | Age: 79
Setting detail: THERAPIES SERIES
Discharge: HOME | End: 2019-07-09

## 2019-07-09 DIAGNOSIS — M25.562 CHRONIC PAIN OF LEFT KNEE: Primary | ICD-10-CM

## 2019-07-09 DIAGNOSIS — G57.12 MERALGIA PARESTHETICA OF LEFT SIDE: ICD-10-CM

## 2019-07-09 DIAGNOSIS — R26.2 DIFFICULTY WALKING: ICD-10-CM

## 2019-07-09 DIAGNOSIS — G89.29 CHRONIC PAIN OF LEFT KNEE: Primary | ICD-10-CM

## 2019-07-09 PROCEDURE — 97110 THERAPEUTIC EXERCISES: CPT

## 2019-07-09 PROCEDURE — 97161 PT EVAL LOW COMPLEX 20 MIN: CPT

## 2019-07-09 NOTE — THERAPY EVALUATION
Outpatient Physical Therapy Ortho Initial Evaluation  [facility]     Patient Name: Ashley Motta  : 1940  MRN: [id number]  Today's Date: 2019      Visit Date: 2019    Patient Active Problem List   Diagnosis   • Osteopenia   • Hyperlipidemia   • Elevated fasting glucose   • Osteoarthritis   • BALDEMAR on CPAP   • Pelvic relaxation due to vaginal prolapse   • Chronic pain of both knees   • Arthritis of right knee   • Arthritis of left knee   • Arthritis of both knees   • Restless legs   • Vertigo   • Headache, migraine        Past Medical History:   Diagnosis Date   • Elevated fasting glucose    • GERD (gastroesophageal reflux disease)    • Goiter     THYROID POLYP SEES    • Hyperlipidemia    • Hypersomnia    • BALDEMAR on CPAP    • Osteoarthritis    • Osteopenia    • RLS (restless legs syndrome)         Past Surgical History:   Procedure Laterality Date   • CATARACT EXTRACTION, BILATERAL Bilateral    • COLONOSCOPY N/A 2015    DR. SARAH LIRIANO   • HYSTERECTOMY  2004    Total   • KNEE SURGERY Left    • KNEE SURGERY Right    • SHOULDER ARTHROSCOPY Left        Visit Dx:     ICD-10-CM ICD-9-CM   1. Chronic pain of left knee M25.562 719.46    G89.29 338.29   2. Meralgia paresthetica of left side G57.12 355.1   3. Difficulty walking R26.2 719.7         Patient History     Row Name 19 1300             History    Chief Complaint  Pain;Numbness;Tinglings  -CA      Type of Pain  Lower Extremity / Leg;Knee pain  -CA      Brief Description of Current Complaint  Ashley Motta is a 79 y.o. female referred to PT today for L knee OA and L sided meralgia paresthetica. Pt reports chronic knee pain ongoing for approximately 5 years, since time of R meniscal surgery (pt unsure of menisectomy vs. Meniscal repair). Pt reports onset of n/t in the front of the L thigh for the past 6 months. Pt reports the n/t increases when sitting for long periods of time, but goes away one she stand up and  begins moving around. She reports approximately 15 min of sitting prior to onset of s/s. Pt went to see Dr. Rico last week, x-rays remarkable for bone on bone OA, and pt receive joint injection at this time. Pt reports some pain relief with injection. She reports difficulty walking, navigating steps in a step to pattern, sitting for long periods of time, driving for long periods of time, and sleeping throughout the night. PMH includes R knee meniscal surgery.   -CA      What clinical tests have you had for this problem?  X-ray  -CA      Results of Clinical Tests  L knee OA  -CA         Pain     Pain Location  Knee  -CA      Pain at Present  4  -CA      Pain at Best  2  -CA      Pain at Worst  5  -CA      Is your sleep disturbed?  Yes  -CA         Fall Risk Assessment    Any falls in the past year:  No  -CA         Daily Activities    Primary Language  English  -CA      Are you able to read  Yes  -CA      Are you able to write  Yes  -CA      Barriers to learning  None  -CA      Pt Participated in POC and Goals  Yes  -CA         Safety    Are you being hurt, hit, or frightened by anyone at home or in your life?  No  -CA      Are you being neglected by a caregiver  No  -CA        User Key  (r) = Recorded By, (t) = Taken By, (c) = Cosigned By    Initials Name Provider Type    CA Karen Mittal, PT Physical Therapist          PT Ortho     Row Name 07/09/19 1300       Posture/Observations    Posture/Observations Comments  mild fwd head, rounded shoulders, decr lumbar lordosis  -CA       Neural Tension Signs- Lower Quarter Clearing    SLR  Bilateral:;Negative  -CA    Prone knee flexion  Bilateral:;Positive  -CA       Sensory Screen for Light Touch- Lower Quarter Clearing    L2 (anterior mid thigh)  Intact  -CA    L3 (distal anterior thigh)  Left:;Diminished;Right:;Intact  -CA    L4 (medial lower leg/foot)  Intact  -CA    L5 (lateral lower leg/great toe)  Intact  -CA       Myotomal Screen- Lower Quarter Clearing    Hip  flexion (L2)  Right:;4- (Good -);Left:;3+ (Fair +)  -CA    Knee extension (L3)  Right:;5 (Normal);Left:;4- (Good -)  -CA    Ankle DF (L4)  Bilateral:;WNL  -CA    Knee flexion (S2)  Bilateral:;4- (Good -)  -CA       Lumbar ROM Screen- Lower Quarter Clearing    Lumbar Flexion  Normal  -CA    Lumbar Extension  Normal  -CA    Lumbar Lateral Flexion  Impaired B = distal thigh; L = pain in L hip  -CA    Lumbar Rotation  Impaired B= 75% and pain in L hip  -CA       SI/Hip Screen- Lower Quarter Clearing    ASIS compression  Negative  -CA    ASIS distraction  Negative  -CA    Kevin's/José's test  Left:;Positive for pain in L groin  -CA       Lumbosacral Palpation    SI  Left:;Guarded/taut;Tender  -CA    Piriformis  Bilateral:;Tender;Guarded/taut;Trigger point  -CA    Quadratus Lumborum  Bilateral:;Tender;Guarded/taut  -CA       Right Lower Ext    Rt Knee Extension/Flexion AROM  2-0-132  -CA       Left Lower Ext    Lt Knee Extension/Flexion AROM  2-0-133  -CA       MMT Right Lower Ext    Rt Hip ABduction MMT, Gross Movement  (3+/5) fair plus  -CA       MMT Left Lower Ext    Lt Hip ABduction MMT, Gross Movement  (3/5) fair  -CA       Lower Extremity Flexibility    Hamstrings  Left:;Moderately limited;Right:;Mildly limited  -CA    Hip Flexors  Bilateral:;Severely limited  -CA    Quadriceps  Bilateral:;Moderately limited  -CA      User Key  (r) = Recorded By, (t) = Taken By, (c) = Cosigned By    Initials Name Provider Type    Karen Gordon, PT Physical Therapist                      Therapy Education  Education Details: Eval findings, PT POC, knee anatomy  Given: HEP, Symptoms/condition management, Pain management, Posture/body mechanics  Program: New  How Provided: Verbal, Demonstration, Written  Provided to: Patient  Level of Understanding: Verbalized, Demonstrated     PT OP Goals     Row Name 07/09/19 1500          PT Short Term Goals    STG Date to Achieve  08/06/19  -CA     STG 1  Pt will be independent with  initial HEP to improve strength/ROM and decrease pain  -CA     STG 1 Progress  New  -CA     STG 2  Pt will improve B LE strength to at least 4/5.  -CA     STG 2 Progress  New  -CA        Long Term Goals    LTG Date to Achieve  09/03/19  -CA     LTG 1  Pt will be independent with advanced HEP to improve strength/ROM and decrease pain  -CA     LTG 1 Progress  New  -CA     LTG 2  Pt will report </= 2/10 pain with ADLs to improve quality of life  -CA     LTG 2 Progress  New  -CA     LTG 3  Pt will be able to ascend/descend stairs in a reciprocal pattern with no L knee pain.  -CA     LTG 3 Progress  New  -CA     LTG 4  Pt will improve KOS score to at least 75% function to show improved quality of life.  -CA     LTG 4 Progress  New  -CA       User Key  (r) = Recorded By, (t) = Taken By, (c) = Cosigned By    Initials Name Provider Type    Karen Gordon, PT Physical Therapist          PT Assessment/Plan     Row Name 07/09/19 1500          PT Assessment    Functional Limitations  Performance in self-care ADL;Performance in leisure activities;Limitations in community activities;Limitation in home management  -CA     Impairments  Balance;Gait;Impaired flexibility;Joint mobility;Range of motion;Posture;Poor body mechanics;Pain;Muscle strength  -CA     Assessment Comments  Ashley Motta is a 79 y.o. female referred to physical therapy for L knee OA and meralgia paresthesia. She presents with an evolving clinical presentation, along with no remarkable comorbidities or personal factors that may impact her progress in the plan of care. Pt presents today with TTP over L QL, piriformis, and SI joint; pain in L hip with lumbar rotation and lateral flexion; and weakness of B LE L>R. . her signs and symptoms are consistent with a physical therapy diagnosis of degenerative changes of the L knee and lumbar radiculopathy. The previous impairments limit her ability to walk, sit in a chair for long periods of time, ascend/descend  steps, and sleep through the night. Pt will benefit from skilled PT to address the previous impairments and return to PLOF.  -CA     Please refer to paper survey for additional self-reported information  Yes  -CA     Rehab Potential  Good  -CA     Patient/caregiver participated in establishment of treatment plan and goals  Yes  -CA     Patient would benefit from skilled therapy intervention  Yes  -CA        PT Plan    PT Frequency  2x/week  -CA     Predicted Duration of Therapy Intervention (Therapy Eval)  6-8 weeks  -CA     Planned CPT's?  PT EVAL LOW COMPLEXITY: 00579;PT RE-EVAL: 67328;PT THER PROC EA 15 MIN: 88893;PT THER ACT EA 15 MIN: 44434;PT MANUAL THERAPY EA 15 MIN: 61576;PT NEUROMUSC RE-EDUCATION EA 15 MIN: 16651;PT GAIT TRAINING EA 15 MIN: 93381;PT SELF CARE/HOME MGMT/TRAIN EA 15: 91109;PT HOT OR COLD PACK TREAT MCARE;PT ELECTRICAL STIM UNATTEND: ;PT ULTRASOUND EA 15 MIN: 18534;PT TRACTION LUMBAR: 39224;PT HOT/COLD PACK WC NONMCARE: 55165  -CA     PT Plan Comments  Next visit, assess response to HEP, consider addition of warm up on nu step, HL hip abd, SAQ, LAQ, hip flexor/quad stretch  -CA       User Key  (r) = Recorded By, (t) = Taken By, (c) = Cosigned By    Initials Name Provider Type    Karen Gordon, PT Physical Therapist            Exercises     Row Name 07/09/19 1300             Total Minutes    78566 - PT Therapeutic Exercise Minutes  25 incl education  -CA         Exercise 1    Exercise Name 1  PPT  -CA      Cueing 1  Verbal  -CA      Reps 1  10  -CA      Time 1  5s  -CA         Exercise 2    Exercise Name 2  Modified Piriformis Stretch  -CA      Cueing 2  Verbal  -CA      Reps 2  3  -CA      Time 2  20s  -CA         Exercise 3    Exercise Name 3  90/90 HS stretch  -CA      Cueing 3  Verbal  -CA      Sets 3  3  -CA      Reps 3  20 ankle pumps during hold  -CA         Exercise 4    Exercise Name 4  QS on towel roll  -CA      Cueing 4  Verbal  -CA      Reps 4  10  -CA      Time 4  5s   -CA         Exercise 5    Exercise Name 5  supine hip flexor stretch off edge of mat  -CA      Additional Comments  next visit  -CA        User Key  (r) = Recorded By, (t) = Taken By, (c) = Cosigned By    Initials Name Provider Type    Karen Gordon, PT Physical Therapist                        Outcome Measure Options: Knee Outcome Score- ADL  Knee Outcome Score  Knee Outcome Score Comments: not complete, have pt finish last question next visit      Time Calculation:     Start Time: 1300  Stop Time: 1345  Time Calculation (min): 45 min  Total Timed Code Minutes- PT: 25 minute(s)     Therapy Charges for Today     Code Description Service Date Service Provider Modifiers Qty    02297605210  PT THER PROC EA 15 MIN 7/9/2019 Karen Mittal, PT GP 2    38512063614 HC PT EVAL LOW COMPLEXITY 1 7/9/2019 Karen Mittal, PT GP 1          PT G-Codes  Outcome Measure Options: Knee Outcome Score- ADL         Karen Mittal, PT  7/9/2019

## 2019-07-16 ENCOUNTER — HOSPITAL ENCOUNTER (OUTPATIENT)
Dept: PHYSICAL THERAPY | Facility: HOSPITAL | Age: 79
Setting detail: THERAPIES SERIES
Discharge: HOME | End: 2019-07-16

## 2019-07-16 DIAGNOSIS — R26.2 DIFFICULTY WALKING: ICD-10-CM

## 2019-07-16 DIAGNOSIS — G57.12 MERALGIA PARESTHETICA OF LEFT SIDE: ICD-10-CM

## 2019-07-16 DIAGNOSIS — G89.29 CHRONIC PAIN OF LEFT KNEE: Primary | ICD-10-CM

## 2019-07-16 DIAGNOSIS — M25.562 CHRONIC PAIN OF LEFT KNEE: Primary | ICD-10-CM

## 2019-07-16 PROCEDURE — 97110 THERAPEUTIC EXERCISES: CPT

## 2019-07-16 NOTE — THERAPY TREATMENT NOTE
Outpatient Physical Therapy Ortho Treatment Note  [facility]     Patient Name: Ashley Motta  : 1940  MRN: [id number]  Today's Date: 2019      Visit Date: 2019    Visit Dx:    ICD-10-CM ICD-9-CM   1. Chronic pain of left knee M25.562 719.46    G89.29 338.29   2. Meralgia paresthetica of left side G57.12 355.1   3. Difficulty walking R26.2 719.7       Patient Active Problem List   Diagnosis   • Osteopenia   • Hyperlipidemia   • Elevated fasting glucose   • Osteoarthritis   • BALDEMAR on CPAP   • Pelvic relaxation due to vaginal prolapse   • Chronic pain of both knees   • Arthritis of right knee   • Arthritis of left knee   • Arthritis of both knees   • Restless legs   • Vertigo   • Headache, migraine        Past Medical History:   Diagnosis Date   • Elevated fasting glucose    • GERD (gastroesophageal reflux disease)    • Goiter     THYROID POLYP SEES    • Hyperlipidemia    • Hypersomnia    • BALDEMAR on CPAP    • Osteoarthritis    • Osteopenia    • RLS (restless legs syndrome)         Past Surgical History:   Procedure Laterality Date   • CATARACT EXTRACTION, BILATERAL Bilateral    • COLONOSCOPY N/A 2015    DR. SARAH LIRIANO   • HYSTERECTOMY  2004    Total   • KNEE SURGERY Left    • KNEE SURGERY Right    • SHOULDER ARTHROSCOPY Left                        PT Assessment/Plan     Row Name 19 0927          PT Assessment    Assessment Comments  Pt returns for first follow up since evaluation reporting B hip pain R as well after first day. She is tolerating exercises well. Reports L leg numbness has been present for one year. Reviewed HEP and progressed LE and core strengthening as well as LE flexibility. Pt tolerated without adverse reaction.   -LB        PT Plan    PT Plan Comments  Assess tolerance to last session, advance HEP, consider HR, lateral walking at ballet bar.  -LB       User Key  (r) = Recorded By, (t) = Taken By, (c) = Cosigned By    Initials Name Provider Type     LB Alexus Rachel, PT Physical Therapist            Exercises     Row Name 07/16/19 0900             Subjective Comments    Subjective Comments  I am doing pretty good. I have some pain in my R hip.  -LB         Subjective Pain    Able to rate subjective pain?  yes  -LB      Pre-Treatment Pain Level  5  -LB         Total Minutes    48225 - PT Therapeutic Exercise Minutes  40  -LB         Exercise 1    Exercise Name 1  PPT  -LB      Cueing 1  Verbal  -LB      Reps 1  10  -LB      Time 1  5s  -LB      Additional Comments  with iso adduction  -LB         Exercise 2    Exercise Name 2  Modified Piriformis Stretch  -LB      Cueing 2  Verbal  -LB      Reps 2  3  -LB      Time 2  20s  -LB         Exercise 3    Exercise Name 3  90/90 HS stretch  -LB      Cueing 3  Verbal  -LB      Sets 3  3  -LB      Reps 3  20 ankle pumps during hold  -LB         Exercise 4    Exercise Name 4  seated QS into blue ball  -LB      Cueing 4  Verbal  -LB      Reps 4  15  -LB      Time 4  5s  -LB         Exercise 5    Exercise Name 5  supine hip flexor stretch off edge of mat  -LB      Reps 5  3  -LB      Time 5  20  -LB         Exercise 6    Exercise Name 6  Nustep  -LB      Time 6  5 minutes  -LB      Additional Comments  UE/LE Level 4  -LB         Exercise 7    Exercise Name 7  HL hip abdution  -LB      Sets 7  2  -LB      Reps 7  10  -LB      Additional Comments  RTB B/uni  -LB         Exercise 8    Exercise Name 8  SAQ  -LB      Sets 8  2  -LB      Reps 8  10  -LB      Additional Comments  2# B  -LB         Exercise 9    Exercise Name 9  LAQ  -LB      Sets 9  2  -LB      Reps 9  10  -LB      Additional Comments  2# B  -LB         Exercise 10    Exercise Name 10  supine hip flexor stretch with leg off mat table  -LB      Reps 10  3  -LB      Time 10  20  -LB      Additional Comments  L manual; R with leg off table  -LB         Exercise 11    Exercise Name 11  glute set  -LB      Reps 11  10  -LB      Time 11  5  -LB        User Key  (r) =  Recorded By, (t) = Taken By, (c) = Cosigned By    Initials Name Provider Type    LB Alexus Rachel, PT Physical Therapist                       PT OP Goals     Row Name 07/16/19 0900          PT Short Term Goals    STG Date to Achieve  08/06/19  -LB     STG 1  Pt will be independent with initial HEP to improve strength/ROM and decrease pain  -LB     STG 1 Progress  Ongoing  -LB     STG 1 Progress Comments  Reviewed today.  -LB     STG 2  Pt will improve B LE strength to at least 4/5.  -LB     STG 2 Progress  Ongoing  -LB        Long Term Goals    LTG Date to Achieve  09/03/19  -LB     LTG 1  Pt will be independent with advanced HEP to improve strength/ROM and decrease pain  -LB     LTG 1 Progress  New  -LB     LTG 2  Pt will report </= 2/10 pain with ADLs to improve quality of life  -LB     LTG 2 Progress  New  -LB     LTG 3  Pt will be able to ascend/descend stairs in a reciprocal pattern with no L knee pain.  -LB     LTG 3 Progress  New  -LB     LTG 4  Pt will improve KOS score to at least 75% function to show improved quality of life.  -LB     LTG 4 Progress  New  -LB       User Key  (r) = Recorded By, (t) = Taken By, (c) = Cosigned By    Initials Name Provider Type    LB Alexus Rachel PT Physical Therapist          Therapy Education  Education Details: reviewed HEP, discussed core stabilization  Given: HEP, Symptoms/condition management  Program: Reinforced  How Provided: Verbal, Demonstration  Provided to: Patient  Level of Understanding: Teach back education performed, Verbalized, Demonstrated              Time Calculation:   Start Time: 0900  Stop Time: 0945  Time Calculation (min): 45 min  Total Timed Code Minutes- PT: 42 minute(s)  Therapy Charges for Today     Code Description Service Date Service Provider Modifiers Qty    07230989038 HC PT THER PROC EA 15 MIN 7/16/2019 Alexus Rachel, PT GP 3                    Alexus Rachel PT  7/16/2019

## 2019-07-18 ENCOUNTER — APPOINTMENT (OUTPATIENT)
Dept: PHYSICAL THERAPY | Facility: HOSPITAL | Age: 79
End: 2019-07-18

## 2019-07-19 ENCOUNTER — HOSPITAL ENCOUNTER (OUTPATIENT)
Dept: PHYSICAL THERAPY | Facility: HOSPITAL | Age: 79
Setting detail: THERAPIES SERIES
Discharge: HOME | End: 2019-07-19

## 2019-07-19 DIAGNOSIS — G57.12 MERALGIA PARESTHETICA OF LEFT SIDE: ICD-10-CM

## 2019-07-19 DIAGNOSIS — R26.2 DIFFICULTY WALKING: ICD-10-CM

## 2019-07-19 DIAGNOSIS — M25.562 CHRONIC PAIN OF LEFT KNEE: Primary | ICD-10-CM

## 2019-07-19 DIAGNOSIS — G89.29 CHRONIC PAIN OF LEFT KNEE: Primary | ICD-10-CM

## 2019-07-19 PROCEDURE — 97110 THERAPEUTIC EXERCISES: CPT

## 2019-07-22 ENCOUNTER — HOSPITAL ENCOUNTER (OUTPATIENT)
Dept: PHYSICAL THERAPY | Facility: HOSPITAL | Age: 79
Setting detail: THERAPIES SERIES
Discharge: HOME | End: 2019-07-22

## 2019-07-22 DIAGNOSIS — G57.12 MERALGIA PARESTHETICA OF LEFT SIDE: ICD-10-CM

## 2019-07-22 DIAGNOSIS — R26.2 DIFFICULTY WALKING: ICD-10-CM

## 2019-07-22 DIAGNOSIS — M25.562 CHRONIC PAIN OF LEFT KNEE: Primary | ICD-10-CM

## 2019-07-22 DIAGNOSIS — G89.29 CHRONIC PAIN OF LEFT KNEE: Primary | ICD-10-CM

## 2019-07-22 PROCEDURE — 97110 THERAPEUTIC EXERCISES: CPT | Performed by: PHYSICAL THERAPIST

## 2019-07-22 NOTE — THERAPY TREATMENT NOTE
Outpatient Physical Therapy Ortho Treatment Note  [facility]     Patient Name: Ashley Motta  : 1940  MRN: [id number]  Today's Date: 2019      Visit Date: 2019    Visit Dx:    ICD-10-CM ICD-9-CM   1. Chronic pain of left knee M25.562 719.46    G89.29 338.29   2. Difficulty walking R26.2 719.7   3. Meralgia paresthetica of left side G57.12 355.1       Patient Active Problem List   Diagnosis   • Osteopenia   • Hyperlipidemia   • Elevated fasting glucose   • Osteoarthritis   • BALDEMAR on CPAP   • Pelvic relaxation due to vaginal prolapse   • Chronic pain of both knees   • Arthritis of right knee   • Arthritis of left knee   • Arthritis of both knees   • Restless legs   • Vertigo   • Headache, migraine        Past Medical History:   Diagnosis Date   • Elevated fasting glucose    • GERD (gastroesophageal reflux disease)    • Goiter     THYROID POLYP SEES    • Hyperlipidemia    • Hypersomnia    • BALDEMAR on CPAP    • Osteoarthritis    • Osteopenia    • RLS (restless legs syndrome)         Past Surgical History:   Procedure Laterality Date   • CATARACT EXTRACTION, BILATERAL Bilateral    • COLONOSCOPY N/A 2015    DR. SARAH LIRIANO   • HYSTERECTOMY  2004    Total   • KNEE SURGERY Left    • KNEE SURGERY Right    • SHOULDER ARTHROSCOPY Left                        PT Assessment/Plan     Row Name 19 1137          PT Assessment    Assessment Comments  Continued with current program but progressed resistance/weights without incident. No increase in pain. Working on progressive strengthening in order to stabilize and decrease pressure through L knee.   -AARON       User Key  (r) = Recorded By, (t) = Taken By, (c) = Cosigned By    Initials Name Provider Type    Modesto Collado, PT Physical Therapist            Exercises     Row Name 19 1000             Subjective Comments    Subjective Comments  I have gotten some good tips so far.  -AARON         Subjective Pain    Able to rate  subjective pain?  yes  -CJ      Pre-Treatment Pain Level  4  -CJ      Post-Treatment Pain Level  4  -CJ         Total Minutes    04067 - PT Therapeutic Exercise Minutes  45  -CJ         Exercise 1    Exercise Name 1  PPT  -CJ      Cueing 1  Verbal  -CJ      Reps 1  10  -CJ      Time 1  5s  -CJ         Exercise 2    Exercise Name 2  Modified Piriformis Stretch  -CJ      Cueing 2  Verbal  -CJ      Reps 2  3  -CJ      Time 2  30 sec  -CJ         Exercise 3    Exercise Name 3  HS stretch, performed standing today  -CJ      Reps 3  2  -CJ      Time 3  30sec  -CJ         Exercise 4    Exercise Name 4  seated QS into blue ball  -CJ      Cueing 4  Verbal  -CJ      Reps 4  15  -CJ      Time 4  5s  -CJ         Exercise 5    Exercise Name 5  HS curls, B-pain  -CJ      Reps 5  20  -CJ      Additional Comments  #2  -CJ         Exercise 6    Exercise Name 6  Nustep- L5  -CJ      Time 6  5 minutes  -CJ         Exercise 7    Exercise Name 7  HL hip abdution  -CJ      Sets 7  3  -CJ      Reps 7  10  -CJ      Additional Comments  GTB B/Uni  -CJ         Exercise 8    Exercise Name 8  SAQ  -CJ      Reps 8  15  -CJ      Time 8  5 sec  -CJ      Additional Comments  #3, B  -CJ         Exercise 9    Exercise Name 9  LAQ  -CJ      Reps 9  15  -CJ      Time 9  5 sec  -CJ      Additional Comments  #3, B  -CJ         Exercise 10    Exercise Name 10  supine hip flexor stretch with leg off mat table  -CJ      Reps 10  3  -CJ      Time 10  20  -CJ         Exercise 13    Exercise Name 13  hip abduction, B  -CJ      Sets 13  2  -CJ      Reps 13  10  -CJ      Additional Comments  #2, B  -CJ         Exercise 14    Exercise Name 14  calf stretch off edge of step  -CJ      Reps 14  4  -CJ      Time 14  20 sec  -CJ        User Key  (r) = Recorded By, (t) = Taken By, (c) = Cosigned By    Initials Name Provider Type    Modesto Collado, PT Physical Therapist                                          Time Calculation:   Start Time: 1045  Stop Time:  1130  Time Calculation (min): 45 min  Total Timed Code Minutes- PT: 45 minute(s)  Therapy Charges for Today     Code Description Service Date Service Provider Modifiers Qty    03479883602 HC PT THER PROC EA 15 MIN 7/22/2019 Modesto Singh, PT GP 3                    Modesto Singh, PT  7/22/2019

## 2019-07-24 ENCOUNTER — HOSPITAL ENCOUNTER (OUTPATIENT)
Dept: PHYSICAL THERAPY | Facility: HOSPITAL | Age: 79
Setting detail: THERAPIES SERIES
Discharge: HOME | End: 2019-07-24

## 2019-07-24 DIAGNOSIS — R26.2 DIFFICULTY WALKING: ICD-10-CM

## 2019-07-24 DIAGNOSIS — G89.29 CHRONIC PAIN OF LEFT KNEE: Primary | ICD-10-CM

## 2019-07-24 DIAGNOSIS — M25.562 CHRONIC PAIN OF LEFT KNEE: Primary | ICD-10-CM

## 2019-07-24 PROCEDURE — 97110 THERAPEUTIC EXERCISES: CPT

## 2019-07-24 NOTE — THERAPY TREATMENT NOTE
Outpatient Physical Therapy Ortho Treatment Note  [facility]     Patient Name: Ashley Motta  : 1940  MRN: [id number]  Today's Date: 2019      Visit Date: 2019    Visit Dx:    ICD-10-CM ICD-9-CM   1. Chronic pain of left knee M25.562 719.46    G89.29 338.29   2. Difficulty walking R26.2 719.7       Patient Active Problem List   Diagnosis   • Osteopenia   • Hyperlipidemia   • Elevated fasting glucose   • Osteoarthritis   • BALDEMAR on CPAP   • Pelvic relaxation due to vaginal prolapse   • Chronic pain of both knees   • Arthritis of right knee   • Arthritis of left knee   • Arthritis of both knees   • Restless legs   • Vertigo   • Headache, migraine        Past Medical History:   Diagnosis Date   • Elevated fasting glucose    • GERD (gastroesophageal reflux disease)    • Goiter     THYROID POLYP SEES    • Hyperlipidemia    • Hypersomnia    • BALDEMAR on CPAP    • Osteoarthritis    • Osteopenia    • RLS (restless legs syndrome)         Past Surgical History:   Procedure Laterality Date   • CATARACT EXTRACTION, BILATERAL Bilateral    • COLONOSCOPY N/A 2015    DR. SARAH LIRIANO   • HYSTERECTOMY  2004    Total   • KNEE SURGERY Left    • KNEE SURGERY Right    • SHOULDER ARTHROSCOPY Left                        PT Assessment/Plan     Row Name 19 1401          PT Assessment    Assessment Comments  Added slow march walk, side steps with band, and backward walking for improved dynamic hip stability and gait training with good tolerance. The pt reports good compliance with and understanding of current HEP and is progressing well toward functional goals.  -RS        PT Plan    PT Plan Comments  Assess tolerance to last session, consider heel raise, tandem walk/stance, foam with HS curl and hip ab, mini squat as able  -RS       User Key  (r) = Recorded By, (t) = Taken By, (c) = Cosigned By    Initials Name Provider Type    RS Bere Bautista, PT Physical Therapist             Exercises     Row Name 07/24/19 1300             Subjective Comments    Subjective Comments  I am feeling some better, the tips have helped.  -RS         Subjective Pain    Able to rate subjective pain?  yes  -RS      Pre-Treatment Pain Level  4  -RS      Post-Treatment Pain Level  4  -RS         Total Minutes    71784 - PT Therapeutic Exercise Minutes  45  -RS         Exercise 1    Exercise Name 1  PPT  -RS      Cueing 1  Verbal  -RS      Reps 1  10  -RS      Time 1  5s  -RS         Exercise 2    Exercise Name 2  Modified Piriformis Stretch  -RS      Cueing 2  Verbal  -RS      Reps 2  3  -RS      Time 2  30 sec  -RS         Exercise 3    Exercise Name 3  HS stretch, performed standing today  -RS      Reps 3  2  -RS      Time 3  30sec  -RS         Exercise 4    Exercise Name 4  seated QS into blue ball  -RS      Cueing 4  Verbal  -RS      Reps 4  15  -RS      Time 4  5s  -RS         Exercise 5    Exercise Name 5  HS curls, B-pain  -RS      Reps 5  20  -RS      Additional Comments  2#  -RS         Exercise 6    Exercise Name 6  Nustep- L5  -RS      Time 6  5 minutes  -RS      Additional Comments  UE/LE Level 4  -RS         Exercise 7    Exercise Name 7  HL hip abdution  -RS      Sets 7  3  -RS      Reps 7  10  -RS      Additional Comments  GTB B/Uni  -RS         Exercise 8    Exercise Name 8  SAQ  -RS      Reps 8  15  -RS      Time 8  5 sec  -RS      Additional Comments  #3, B  -RS         Exercise 9    Exercise Name 9  LAQ  -RS      Reps 9  15  -RS      Time 9  5 sec  -RS      Additional Comments  #3, B  -RS         Exercise 10    Exercise Name 10  supine hip flexor stretch with leg off mat table  -RS      Reps 10  3  -RS      Time 10  20  -RS      Additional Comments  1 min total  -RS         Exercise 11    Exercise Name 11  LTR  -RS      Cueing 11  Verbal  -RS      Reps 11  10ea  -RS      Time 11  3sec  -RS         Exercise 12    Exercise Name 12  backward walking  -RS      Cueing 12  Verbal  -RS      Reps  12  3 laps  -RS      Additional Comments  cues for larger step length  -RS         Exercise 13    Exercise Name 13  hip abduction, B  -RS      Sets 13  2  -RS      Reps 13  10  -RS      Additional Comments  #2, B  -RS         Exercise 14    Exercise Name 14  calf stretch off edge of step  -RS      Reps 14  4  -RS      Time 14  20 sec  -RS         Exercise 15    Exercise Name 15  side steps with band  -RS      Cueing 15  Verbal  -RS      Reps 15  3 laps  -RS      Additional Comments  YTB  -RS         Exercise 16    Exercise Name 16  march walk  -RS      Cueing 16  Verbal;Demo  -RS      Reps 16  2 laps, slow and controlled  -RS        User Key  (r) = Recorded By, (t) = Taken By, (c) = Cosigned By    Initials Name Provider Type    RS Bere Bautista, PT Physical Therapist                       PT OP Goals     Row Name 07/24/19 1300          PT Short Term Goals    STG Date to Achieve  08/06/19  -RS     STG 1  Pt will be independent with initial HEP to improve strength/ROM and decrease pain  -RS     STG 1 Progress  Met  -RS     STG 2  Pt will improve B LE strength to at least 4/5.  -RS     STG 2 Progress  Progressing  -RS        Long Term Goals    LTG Date to Achieve  09/03/19  -RS     LTG 1  Pt will be independent with advanced HEP to improve strength/ROM and decrease pain  -RS     LTG 1 Progress  New  -RS     LTG 2  Pt will report </= 2/10 pain with ADLs to improve quality of life  -RS     LTG 2 Progress  New  -RS     LTG 3  Pt will be able to ascend/descend stairs in a reciprocal pattern with no L knee pain.  -RS     LTG 3 Progress  New  -RS     LTG 4  Pt will improve KOS score to at least 75% function to show improved quality of life.  -RS     LTG 4 Progress  New  -RS       User Key  (r) = Recorded By, (t) = Taken By, (c) = Cosigned By    Initials Name Provider Type    RS Bere Bautista, PT Physical Therapist                         Time Calculation:   Start Time: 1315  Stop Time: 1402  Time Calculation (min):  47 min  Therapy Charges for Today     Code Description Service Date Service Provider Modifiers Qty    89532502837 HC PT THER PROC EA 15 MIN 7/24/2019 Bere Bautista, PT GP 3                    Bere Bautista, PT  7/24/2019

## 2019-07-29 ENCOUNTER — HOSPITAL ENCOUNTER (OUTPATIENT)
Dept: PHYSICAL THERAPY | Facility: HOSPITAL | Age: 79
Setting detail: THERAPIES SERIES
Discharge: HOME | End: 2019-07-29

## 2019-07-29 DIAGNOSIS — R26.2 DIFFICULTY WALKING: ICD-10-CM

## 2019-07-29 DIAGNOSIS — G57.12 MERALGIA PARESTHETICA OF LEFT SIDE: ICD-10-CM

## 2019-07-29 DIAGNOSIS — M25.562 CHRONIC PAIN OF LEFT KNEE: Primary | ICD-10-CM

## 2019-07-29 DIAGNOSIS — G89.29 CHRONIC PAIN OF LEFT KNEE: Primary | ICD-10-CM

## 2019-07-29 PROCEDURE — 97110 THERAPEUTIC EXERCISES: CPT

## 2019-07-29 NOTE — THERAPY TREATMENT NOTE
Outpatient Physical Therapy Ortho Treatment Note  [facility]     Patient Name: Ashley Motta  : 1940  MRN: [id number]  Today's Date: 2019      Visit Date: 2019    Visit Dx:    ICD-10-CM ICD-9-CM   1. Chronic pain of left knee M25.562 719.46    G89.29 338.29   2. Difficulty walking R26.2 719.7   3. Meralgia paresthetica of left side G57.12 355.1       Patient Active Problem List   Diagnosis   • Osteopenia   • Hyperlipidemia   • Elevated fasting glucose   • Osteoarthritis   • BALDEMAR on CPAP   • Pelvic relaxation due to vaginal prolapse   • Chronic pain of both knees   • Arthritis of right knee   • Arthritis of left knee   • Arthritis of both knees   • Restless legs   • Vertigo   • Headache, migraine        Past Medical History:   Diagnosis Date   • Elevated fasting glucose    • GERD (gastroesophageal reflux disease)    • Goiter     THYROID POLYP SEES    • Hyperlipidemia    • Hypersomnia    • BALDEMAR on CPAP    • Osteoarthritis    • Osteopenia    • RLS (restless legs syndrome)         Past Surgical History:   Procedure Laterality Date   • CATARACT EXTRACTION, BILATERAL Bilateral    • COLONOSCOPY N/A 2015    DR. SARAH LIRIANO   • HYSTERECTOMY  2004    Total   • KNEE SURGERY Left    • KNEE SURGERY Right    • SHOULDER ARTHROSCOPY Left                        PT Assessment/Plan     Row Name 19 0800          PT Assessment    Assessment Comments  Pt cont to report unchanged pain levels with PT, although cont to report good HEP compliance. Continued today with strengthening and balance per pt tolerance.  -CA        PT Plan    PT Plan Comments  Reassess if beneficial to cont skilled PT vs. DC to advanced HEP.   -CA       User Key  (r) = Recorded By, (t) = Taken By, (c) = Cosigned By    Initials Name Provider Type    Karen Gordon, PT Physical Therapist            Exercises     Row Name 19 0700             Subjective Comments    Subjective Comments  Pain is about the  same, I'm glad to be getting good tips though.  -CA         Subjective Pain    Able to rate subjective pain?  yes  -CA      Pre-Treatment Pain Level  5  -CA      Post-Treatment Pain Level  4  -CA         Total Minutes    06411 - PT Therapeutic Exercise Minutes  40  -CA         Exercise 1    Exercise Name 1  bridge with PPT  -CA      Cueing 1  Verbal  -CA      Reps 1  10  -CA         Exercise 2    Exercise Name 2  tandem walk  -CA      Cueing 2  Verbal  -CA      Reps 2  3 laps at ballet bar  -CA      Additional Comments  2 finger UE support  -CA         Exercise 3    Exercise Name 3  HS stretch, steps  -CA      Reps 3  2  -CA      Time 3  30sec  -CA         Exercise 4    Exercise Name 4  TKE  -CA      Cueing 4  Verbal  -CA      Reps 4  15  -CA      Time 4  5s  -CA      Additional Comments  GTB  -CA         Exercise 5    Exercise Name 5  HS curls, B  -CA      Sets 5  2  -CA      Reps 5  10  -CA      Additional Comments  2#; blue foam  -CA         Exercise 6    Exercise Name 6  Nustep- L5  -CA      Time 6  5 minutes  -CA         Exercise 7    Exercise Name 7  SL clams  -CA      Sets 7  2  -CA      Reps 7  10  -CA      Time 7  RTB  -CA         Exercise 8    Exercise Name 8  SAQ  -CA      Sets 8  2  -CA      Reps 8  10  -CA      Time 8  3 sec  -CA      Additional Comments  4#, B  -CA         Exercise 9    Exercise Name 9  LAQ  -CA      Sets 9  2  -CA      Reps 9  10  -CA      Time 9  5 sec  -CA      Additional Comments  4#, B  -CA         Exercise 11    Exercise Name 11  LTR  -CA      Cueing 11  Verbal  -CA      Reps 11  20ea  -CA      Time 11  3sec  -CA         Exercise 13    Exercise Name 13  hip abduction, B  -CA      Sets 13  2  -CA      Reps 13  10  -CA      Additional Comments  2#, B  -CA         Exercise 14    Exercise Name 14  calf stretch off edge of step  -CA      Reps 14  2  -CA      Time 14  20 sec  -CA        User Key  (r) = Recorded By, (t) = Taken By, (c) = Cosigned By    Initials Name Provider Type    CA  Karen Mittal, PT Physical Therapist                                          Time Calculation:   Start Time: 0745  Stop Time: 0825  Time Calculation (min): 40 min  Total Timed Code Minutes- PT: 40 minute(s)  Therapy Charges for Today     Code Description Service Date Service Provider Modifiers Qty    91049311056 HC PT THER PROC EA 15 MIN 7/29/2019 Karen Mittal, PT GP 3                    Karen Mittal, PT  7/29/2019

## 2019-08-02 ENCOUNTER — HOSPITAL ENCOUNTER (OUTPATIENT)
Dept: PHYSICAL THERAPY | Facility: HOSPITAL | Age: 79
Setting detail: THERAPIES SERIES
Discharge: HOME | End: 2019-08-02

## 2019-08-02 DIAGNOSIS — G89.29 CHRONIC PAIN OF LEFT KNEE: Primary | ICD-10-CM

## 2019-08-02 DIAGNOSIS — M25.562 CHRONIC PAIN OF LEFT KNEE: Primary | ICD-10-CM

## 2019-08-02 DIAGNOSIS — R26.2 DIFFICULTY WALKING: ICD-10-CM

## 2019-08-02 PROCEDURE — 97110 THERAPEUTIC EXERCISES: CPT

## 2019-08-02 NOTE — THERAPY DISCHARGE NOTE
Outpatient Physical Therapy Ortho Treatment Note/Discharge Summary  [facility]     Patient Name: Ashley Motta  : 1940  MRN: [id number]  Today's Date: 2019      Visit Date: 2019    Visit Dx:    ICD-10-CM ICD-9-CM   1. Chronic pain of left knee M25.562 719.46    G89.29 338.29   2. Difficulty walking R26.2 719.7       Patient Active Problem List   Diagnosis   • Osteopenia   • Hyperlipidemia   • Elevated fasting glucose   • Osteoarthritis   • BALDEMAR on CPAP   • Pelvic relaxation due to vaginal prolapse   • Chronic pain of both knees   • Arthritis of right knee   • Arthritis of left knee   • Arthritis of both knees   • Restless legs   • Vertigo   • Headache, migraine        Past Medical History:   Diagnosis Date   • Elevated fasting glucose    • GERD (gastroesophageal reflux disease)    • Goiter     THYROID POLYP SEES    • Hyperlipidemia    • Hypersomnia    • BALDEMAR on CPAP    • Osteoarthritis    • Osteopenia    • RLS (restless legs syndrome)         Past Surgical History:   Procedure Laterality Date   • CATARACT EXTRACTION, BILATERAL Bilateral    • COLONOSCOPY N/A 2015    DR. SARAH LIRIANO   • HYSTERECTOMY  2004    Total   • KNEE SURGERY Left    • KNEE SURGERY Right    • SHOULDER ARTHROSCOPY Left                                Exercises     Row Name 19 1200             Subjective Comments    Subjective Comments  The pt reports she is doing well, the pain has decreased.  -RS         Subjective Pain    Able to rate subjective pain?  yes  -RS      Pre-Treatment Pain Level  3  -RS         Total Minutes    15334 - PT Therapeutic Exercise Minutes  40  -RS         Exercise 1    Exercise Name 1  bridge with PPT  -RS      Cueing 1  Verbal  -RS      Reps 1  10  -RS         Exercise 2    Exercise Name 2  tandem walk  -RS      Cueing 2  Verbal  -RS      Reps 2  3 laps at ballet bar  -RS      Additional Comments  2 finger UE support  -RS         Exercise 3    Exercise Name 3  HS  stretch, steps  -RS      Reps 3  2  -RS      Time 3  30sec  -RS         Exercise 4    Exercise Name 4  TKE  -RS      Cueing 4  Verbal  -RS      Reps 4  15  -RS      Time 4  5s  -RS      Additional Comments  GTB  -RS         Exercise 5    Exercise Name 5  HS curls, B  -RS      Sets 5  2  -RS      Reps 5  10  -RS      Additional Comments  2# blue foam  -RS         Exercise 6    Exercise Name 6  Nustep- L5  -RS      Time 6  5 minutes  -RS         Exercise 7    Exercise Name 7  SL clams  -RS      Sets 7  2  -RS      Reps 7  10  -RS      Time 7  GTB  -RS         Exercise 8    Exercise Name 8  --  -RS      Sets 8  --  -RS      Reps 8  --  -RS      Time 8  --  -RS      Additional Comments  --  -RS         Exercise 9    Exercise Name 9  LAQ  -RS      Sets 9  2  -RS      Reps 9  10  -RS      Time 9  5 sec  -RS      Additional Comments  4# B  -RS         Exercise 11    Exercise Name 11  LTR  -RS      Cueing 11  Verbal  -RS      Reps 11  20ea  -RS      Time 11  3sec  -RS         Exercise 13    Exercise Name 13  hip abduction, B  -RS      Sets 13  2  -RS      Reps 13  10  -RS      Additional Comments  2# B  -RS         Exercise 14    Exercise Name 14  calf stretch off edge of step  -RS      Reps 14  2  -RS      Time 14  20 sec  -RS        User Key  (r) = Recorded By, (t) = Taken By, (c) = Cosigned By    Initials Name Provider Type    RS Bere Bautista, PT Physical Therapist                         PT OP Goals     Row Name 08/02/19 1200          PT Short Term Goals    STG Date to Achieve  08/06/19  -RS     STG 1  Pt will be independent with initial HEP to improve strength/ROM and decrease pain  -RS     STG 1 Progress  Met  -RS     STG 2  Pt will improve B LE strength to at least 4/5.  -RS     STG 2 Progress  Met  -RS        Long Term Goals    LTG Date to Achieve  09/03/19  -RS     LTG 1  Pt will be independent with advanced HEP to improve strength/ROM and decrease pain  -RS     LTG 1 Progress  Met  -RS     LTG 2  Pt will  report </= 2/10 pain with ADLs to improve quality of life  -RS     LTG 2 Progress  Partially Met  -RS     LTG 2 Progress Comments  reports 3/10 pain  -RS     LTG 3  Pt will be able to ascend/descend stairs in a reciprocal pattern with no L knee pain.  -RS     LTG 3 Progress  Partially Met  -RS     LTG 3 Progress Comments  pain 3/10  -RS     LTG 4  Pt will improve KOS score to at least 75% function to show improved quality of life.  -RS     LTG 4 Progress  Partially Met  -RS     LTG 4 Progress Comments  66%  -RS       User Key  (r) = Recorded By, (t) = Taken By, (c) = Cosigned By    Initials Name Provider Type    RS Bere Bautista, PT Physical Therapist               Outcome Measure Options: Knee Outcome Score- ADL  Knee Outcome Score  Knee Outcome Score Comments: 66% ability      Time Calculation:   Start Time: 1215  Stop Time: 1258  Time Calculation (min): 43 min  Therapy Charges for Today     Code Description Service Date Service Provider Modifiers Qty    01130378754 HC PT THER PROC EA 15 MIN 8/2/2019 Bere Bautista, PT GP 3          PT G-Codes  Outcome Measure Options: Knee Outcome Score- ADL     OP PT Discharge Summary  Date of Discharge: 08/02/19  Reason for Discharge: Maximum functional potential achieved  Outcomes Achieved: Patient able to partially acheive established goals  Discharge Destination: Home with home program  Discharge Instructions/Additional Comments: Ms. Motta is discharged from skilled PT this date secondary to meeting or partially meeting all functional goals. She reports pain approx 3/10 at highest and reports good compliance with and understanding of HEP. She reports improvement in funciton with PT and would like to trial IND management. She is appropriate for and agreeable to DC this date.      Bere Bautista, RAYMOND  8/2/2019

## 2019-08-05 ENCOUNTER — LAB (OUTPATIENT)
Dept: LAB | Facility: HOSPITAL | Age: 79
End: 2019-08-05

## 2019-08-05 DIAGNOSIS — E78.5 HYPERLIPIDEMIA, UNSPECIFIED HYPERLIPIDEMIA TYPE: ICD-10-CM

## 2019-08-05 DIAGNOSIS — R73.01 ELEVATED FASTING GLUCOSE: ICD-10-CM

## 2019-08-05 LAB
ALBUMIN SERPL-MCNC: 4.5 G/DL (ref 3.5–5.2)
ALBUMIN/GLOB SERPL: 2 G/DL
ALP SERPL-CCNC: 60 U/L (ref 39–117)
ALT SERPL W P-5'-P-CCNC: 38 U/L (ref 1–33)
ANION GAP SERPL CALCULATED.3IONS-SCNC: 13.1 MMOL/L (ref 5–15)
AST SERPL-CCNC: 30 U/L (ref 1–32)
BACTERIA UR QL AUTO: ABNORMAL /HPF
BILIRUB SERPL-MCNC: 0.3 MG/DL (ref 0.2–1.2)
BILIRUB UR QL STRIP: NEGATIVE
BUN BLD-MCNC: 11 MG/DL (ref 8–23)
BUN/CREAT SERPL: 13.6 (ref 7–25)
CALCIUM SPEC-SCNC: 9.6 MG/DL (ref 8.6–10.5)
CHLORIDE SERPL-SCNC: 106 MMOL/L (ref 98–107)
CHOLEST SERPL-MCNC: 112 MG/DL (ref 0–200)
CLARITY UR: CLEAR
CO2 SERPL-SCNC: 22.9 MMOL/L (ref 22–29)
COLOR UR: YELLOW
CREAT BLD-MCNC: 0.81 MG/DL (ref 0.57–1)
GFR SERPL CREATININE-BSD FRML MDRD: 69 ML/MIN/1.73
GLOBULIN UR ELPH-MCNC: 2.3 GM/DL
GLUCOSE BLD-MCNC: 115 MG/DL (ref 65–99)
GLUCOSE UR STRIP-MCNC: NEGATIVE MG/DL
HBA1C MFR BLD: 5.8 % (ref 4.8–5.6)
HDLC SERPL-MCNC: 52 MG/DL (ref 40–60)
HGB UR QL STRIP.AUTO: NEGATIVE
HYALINE CASTS UR QL AUTO: ABNORMAL /LPF
KETONES UR QL STRIP: NEGATIVE
LDLC SERPL CALC-MCNC: 41 MG/DL (ref 0–100)
LDLC/HDLC SERPL: 0.8 {RATIO}
LEUKOCYTE ESTERASE UR QL STRIP.AUTO: ABNORMAL
NITRITE UR QL STRIP: NEGATIVE
PH UR STRIP.AUTO: 5.5 [PH] (ref 5–8)
POTASSIUM BLD-SCNC: 4.5 MMOL/L (ref 3.5–5.2)
PROT SERPL-MCNC: 6.8 G/DL (ref 6–8.5)
PROT UR QL STRIP: NEGATIVE
RBC # UR: ABNORMAL /HPF
REF LAB TEST METHOD: ABNORMAL
SODIUM BLD-SCNC: 142 MMOL/L (ref 136–145)
SP GR UR STRIP: 1.01 (ref 1–1.03)
SQUAMOUS #/AREA URNS HPF: ABNORMAL /HPF
TRIGL SERPL-MCNC: 93 MG/DL (ref 0–150)
UROBILINOGEN UR QL STRIP: ABNORMAL
VLDLC SERPL-MCNC: 18.6 MG/DL (ref 5–40)
WBC UR QL AUTO: ABNORMAL /HPF

## 2019-08-05 PROCEDURE — 80061 LIPID PANEL: CPT

## 2019-08-05 PROCEDURE — 81001 URINALYSIS AUTO W/SCOPE: CPT

## 2019-08-05 PROCEDURE — 80053 COMPREHEN METABOLIC PANEL: CPT

## 2019-08-05 PROCEDURE — 36415 COLL VENOUS BLD VENIPUNCTURE: CPT

## 2019-08-05 PROCEDURE — 83036 HEMOGLOBIN GLYCOSYLATED A1C: CPT

## 2019-08-12 ENCOUNTER — OFFICE VISIT (OUTPATIENT)
Dept: INTERNAL MEDICINE | Facility: CLINIC | Age: 79
End: 2019-08-12

## 2019-08-12 VITALS
WEIGHT: 169 LBS | SYSTOLIC BLOOD PRESSURE: 124 MMHG | DIASTOLIC BLOOD PRESSURE: 64 MMHG | BODY MASS INDEX: 29.95 KG/M2 | HEIGHT: 63 IN

## 2019-08-12 DIAGNOSIS — R73.01 ELEVATED FASTING GLUCOSE: ICD-10-CM

## 2019-08-12 DIAGNOSIS — R82.90 ABNORMAL FINDING IN URINE: ICD-10-CM

## 2019-08-12 DIAGNOSIS — E78.5 HYPERLIPIDEMIA, UNSPECIFIED HYPERLIPIDEMIA TYPE: Primary | ICD-10-CM

## 2019-08-12 LAB
BACTERIA UR QL AUTO: ABNORMAL /HPF
BILIRUB UR QL STRIP: NEGATIVE
CLARITY UR: CLEAR
COLOR UR: YELLOW
GLUCOSE UR STRIP-MCNC: NEGATIVE MG/DL
HGB UR QL STRIP.AUTO: NEGATIVE
HYALINE CASTS UR QL AUTO: ABNORMAL /LPF
KETONES UR QL STRIP: NEGATIVE
LEUKOCYTE ESTERASE UR QL STRIP.AUTO: ABNORMAL
NITRITE UR QL STRIP: NEGATIVE
PH UR STRIP.AUTO: 7 [PH] (ref 5–8)
PROT UR QL STRIP: NEGATIVE
RBC # UR: ABNORMAL /HPF
REF LAB TEST METHOD: ABNORMAL
SP GR UR STRIP: 1.01 (ref 1–1.03)
SQUAMOUS #/AREA URNS HPF: ABNORMAL /HPF
UROBILINOGEN UR QL STRIP: ABNORMAL
WBC UR QL AUTO: ABNORMAL /HPF

## 2019-08-12 PROCEDURE — 99213 OFFICE O/P EST LOW 20 MIN: CPT | Performed by: INTERNAL MEDICINE

## 2019-08-12 PROCEDURE — 81001 URINALYSIS AUTO W/SCOPE: CPT | Performed by: INTERNAL MEDICINE

## 2019-08-12 RX ORDER — LANSOPRAZOLE 30 MG/1
30 CAPSULE, DELAYED RELEASE ORAL DAILY
COMMUNITY
End: 2019-08-12 | Stop reason: SDUPTHER

## 2019-08-12 RX ORDER — LANSOPRAZOLE 30 MG/1
30 CAPSULE, DELAYED RELEASE ORAL DAILY
Qty: 30 CAPSULE | Refills: 3 | Status: SHIPPED | OUTPATIENT
Start: 2019-08-12 | End: 2019-12-13

## 2019-08-12 NOTE — PROGRESS NOTES
Chief Complaint   Patient presents with   • Hyperlipidemia     4 month follow up       Subjective   Ashley Motta is a 79 y.o. female.     History of Present Illness     Hyperlipidemia:     Her most recent lipid panel was done on 8/5/2019.    Total cholesterol was 112, Triglycerides 93, HDL 52, LDL 41.   Current treatment: Statin  Prudent diet and regular exercise have also been recommended.  No management changes were made at her last appointment.   By report, there is good compliance with treatment, good tolerance of treatment.  She has not experienced statin associated myalgias, dyspepsia.        Elevated fasting glucose:  This is a chronic problem.  Patient has been advised to follow prudent diet, avoid sugar, exercise regularly.  She denies polyuria, polydipsia, vision change appetite change, unexplained weight loss.   Lab Results   Component Value Date    HGBA1C 5.80 (H) 08/05/2019      Abnormal findings on urinalysis.  She had a urinalysis done on 8/5/2019.  There were white blood cells seen on microscopic.  She denies problems with dysuria, frequency, urgency.       The following portions of the patient's history were reviewed and updated as appropriate: allergies, current medications, past family history, past medical history, past social history, past surgical history and problem list.    Review of Systems   Constitutional: Negative for appetite change.   HENT: Negative for nosebleeds.    Eyes: Negative for blurred vision and double vision.   Respiratory: Negative for cough and shortness of breath.    Cardiovascular: Negative for chest pain, palpitations and leg swelling.         Current Outpatient Medications:   •  aspirin 81 MG tablet, Take 81 mg by mouth daily., Disp: , Rfl:   •  B Complex Vitamins (VITAMIN B COMPLEX PO), Take  by mouth., Disp: , Rfl:   •  Calcium Carbonate-Vitamin D (CALCIUM + D PO), Take  by mouth 2 (Two) Times a Day., Disp: , Rfl:   •  Coenzyme Q10 (COQ10) 200 MG capsule, Take  by  "mouth., Disp: , Rfl:   •  gabapentin (NEURONTIN) 100 MG capsule, Take 1 capsule by mouth Every Night., Disp: 90 capsule, Rfl: 1  •  lansoprazole (PREVACID) 30 MG capsule, Take 1 capsule by mouth Daily., Disp: 30 capsule, Rfl: 3  •  Magnesium 400 MG tablet, Take  by mouth., Disp: , Rfl:   •  Misc Natural Products (GLUCOSAMINE CHONDROITIN VIT D3) capsule, Take  by mouth., Disp: , Rfl:   •  Multiple Vitamins-Minerals (MULTIVITAMIN ADULT PO), Take  by mouth., Disp: , Rfl:   •  Omega-3 Fatty Acids (FISH OIL) 1000 MG capsule capsule, Take  by mouth daily with breakfast., Disp: , Rfl:   •  Probiotic Product (PROBIOTIC DAILY PO), Take  by mouth., Disp: , Rfl:   •  rOPINIRole (REQUIP) 0.5 MG tablet, TAKE 1 TABLET BY MOUTH IN THE EVENING 1 HOUR BEFORE BEDTIME, Disp: 90 tablet, Rfl: 3  •  simvastatin (ZOCOR) 40 MG tablet, TAKE 1 TABLET BY MOUTH AT BEDTIME, Disp: 90 tablet, Rfl: 3        Objective     /64   Ht 160 cm (63\")   Wt 76.7 kg (169 lb)   BMI 29.94 kg/m²     Physical Exam   Constitutional: She is oriented to person, place, and time. She appears well-developed and well-nourished. No distress.   Neck: Normal carotid pulses present. Carotid bruit is not present.   Cardiovascular: Regular rhythm, S1 normal and S2 normal. Exam reveals no gallop and no friction rub.   No murmur heard.  Pulses:       Carotid pulses are 2+ on the right side, and 2+ on the left side.  Pulmonary/Chest: Effort normal and breath sounds normal. She has no wheezes. She has no rhonchi. She has no rales. Chest wall is not dull to percussion.   Musculoskeletal: She exhibits no edema.   Neurological: She is alert and oriented to person, place, and time.   Skin: Skin is warm and dry.   Nursing note and vitals reviewed.        Assessment/Plan   Ashley was seen today for hyperlipidemia.    Diagnoses and all orders for this visit:    Hyperlipidemia, unspecified hyperlipidemia type  -     Comprehensive Metabolic Panel; Future  -     Lipid Panel; " Future    Abnormal finding in urine  -     Urinalysis With Microscopic If Indicated (No Culture) - Urine, Clean Catch; Future  -     Urinalysis With Microscopic If Indicated (No Culture) - Urine, Clean Catch  -     Urinalysis, Microscopic Only - Urine, Clean Catch; Future  -     Urinalysis, Microscopic Only - Urine, Clean Catch    Elevated fasting glucose  -     Hemoglobin A1c; Future    Other orders  -     lansoprazole (PREVACID) 30 MG capsule; Take 1 capsule by mouth Daily.

## 2019-09-04 RX ORDER — GABAPENTIN 100 MG/1
CAPSULE ORAL
Qty: 90 CAPSULE | Refills: 1 | OUTPATIENT
Start: 2019-09-04 | End: 2020-05-26

## 2019-10-07 ENCOUNTER — CLINICAL SUPPORT (OUTPATIENT)
Dept: ORTHOPEDIC SURGERY | Facility: CLINIC | Age: 79
End: 2019-10-07

## 2019-10-07 VITALS — WEIGHT: 165 LBS | HEIGHT: 63 IN | TEMPERATURE: 98.3 F | BODY MASS INDEX: 29.23 KG/M2

## 2019-10-07 DIAGNOSIS — M17.0 PRIMARY OSTEOARTHRITIS OF BOTH KNEES: Primary | ICD-10-CM

## 2019-10-07 PROCEDURE — 99212 OFFICE O/P EST SF 10 MIN: CPT | Performed by: ORTHOPAEDIC SURGERY

## 2019-10-07 PROCEDURE — 20610 DRAIN/INJ JOINT/BURSA W/O US: CPT | Performed by: ORTHOPAEDIC SURGERY

## 2019-10-07 RX ORDER — METHYLPREDNISOLONE ACETATE 80 MG/ML
80 INJECTION, SUSPENSION INTRA-ARTICULAR; INTRALESIONAL; INTRAMUSCULAR; SOFT TISSUE
Status: COMPLETED | OUTPATIENT
Start: 2019-10-07 | End: 2019-10-07

## 2019-10-07 RX ADMIN — METHYLPREDNISOLONE ACETATE 80 MG: 80 INJECTION, SUSPENSION INTRA-ARTICULAR; INTRALESIONAL; INTRAMUSCULAR; SOFT TISSUE at 08:38

## 2019-10-07 NOTE — PROGRESS NOTES
Patient: Ashley Motta  YOB: 1940    Chief Complaints:  bilateral knee pain    Subjective:    History of Present Illness: Here today for knee pain. The pain is a generalized joint tenderness.  It has been progressive in nature but remains intermittent.  Worsened by prolonged standing or walking and squatting activities. Has had improvement in the past with ice/heat, rest, and injections.   He says that the shots helped for a while but then they stopped working.  She did physical therapy and she was encouraged to do home exercise program and went over the importance of this and the reasons why.  She works out including hydro-cycle workouts and tries to keep reasonably active.  We discussed stem cells Visco supplementation in the current knowledge as far as those go.  And we went over surgery and what it would involve.  Including risk benefits complications and alternatives  This problem is not new to this examiner.     Allergies: No Known Allergies    Medications:   Home Medications:  Current Outpatient Medications on File Prior to Visit   Medication Sig   • aspirin 81 MG tablet Take 81 mg by mouth daily.   • B Complex Vitamins (VITAMIN B COMPLEX PO) Take  by mouth.   • Calcium Carbonate-Vitamin D (CALCIUM + D PO) Take  by mouth 2 (Two) Times a Day.   • Coenzyme Q10 (COQ10) 200 MG capsule Take  by mouth.   • gabapentin (NEURONTIN) 100 MG capsule TAKE ONE CAPSULE BY MOUTH EVERY NIGHT   • lansoprazole (PREVACID) 30 MG capsule Take 1 capsule by mouth Daily.   • Magnesium 400 MG tablet Take  by mouth.   • Misc Natural Products (GLUCOSAMINE CHONDROITIN VIT D3) capsule Take  by mouth.   • Multiple Vitamins-Minerals (MULTIVITAMIN ADULT PO) Take  by mouth.   • Omega-3 Fatty Acids (FISH OIL) 1000 MG capsule capsule Take  by mouth daily with breakfast.   • Probiotic Product (PROBIOTIC DAILY PO) Take  by mouth.   • rOPINIRole (REQUIP) 0.5 MG tablet TAKE 1 TABLET BY MOUTH IN THE EVENING 1 HOUR BEFORE BEDTIME   •  simvastatin (ZOCOR) 40 MG tablet TAKE 1 TABLET BY MOUTH AT BEDTIME     No current facility-administered medications on file prior to visit.      Current Medications:  Scheduled Meds:  Continuous Infusions:  No current facility-administered medications for this visit.   PRN Meds:.    I have reviewed the patient's medical history in detail and updated the computerized patient record.  Review and summarization of old records include:    Past Medical History:   Diagnosis Date   • Elevated fasting glucose    • GERD (gastroesophageal reflux disease)    • Goiter     THYROID POLYP SEES    • Hyperlipidemia    • Hypersomnia    • BALDEMAR on CPAP    • Osteoarthritis    • Osteopenia    • RLS (restless legs syndrome)         Past Surgical History:   Procedure Laterality Date   • CATARACT EXTRACTION, BILATERAL Bilateral 2015   • COLONOSCOPY N/A 02/27/2015    DR. SARAH LIRIANO   • HYSTERECTOMY  2004    Total   • KNEE SURGERY Left 2013   • KNEE SURGERY Right 2010   • SHOULDER ARTHROSCOPY Left         Social History     Occupational History   • Not on file   Tobacco Use   • Smoking status: Never Smoker   • Smokeless tobacco: Never Used   Substance and Sexual Activity   • Alcohol use: No   • Drug use: No   • Sexual activity: Defer     Birth control/protection: Post-menopausal      Social History     Social History Narrative   • Not on file        Family History   Problem Relation Age of Onset   • Heart disease Mother    • Lung cancer Father    • Hypertension Father    • Heart disease Father    • Heart attack Brother    • Emphysema Brother    • COPD Brother    • Depression Brother    • Heart attack Brother         Had stents placed 2009       ROS: 14 point review of systems was performed and was negative except for documented findings in HPI and today's encounter.     Allergies: No Known Allergies  Constitutional:  Denies fever, shaking or chills   Eyes:  Denies change in visual acuity   HENT:  Denies nasal congestion or sore throat  "  Respiratory:  Denies cough or shortness of breath   Cardiovascular:  Denies chest pain or severe LE edema   GI:  Denies abdominal pain, nausea, vomiting, bloody stools or diarrhea   Musculoskeletal:  Numbness, tingling, or loss of motor function only as noted above in history of present illness.  : Denies painful urination or hematuria  Integument:  Denies rash, lesion or ulceration   Neurologic:  Denies headache or focal weakness  Endocrine:  Denies lymphadenopathy  Psych:  Denies confusion or change in mental status   Hem:  Denies active bleeding    Physical Exam:  Wt Readings from Last 3 Encounters:   10/07/19 74.8 kg (165 lb)   08/12/19 76.7 kg (169 lb)   07/02/19 76.2 kg (168 lb)     Ht Readings from Last 3 Encounters:   10/07/19 160 cm (63\")   08/12/19 160 cm (63\")   07/02/19 160 cm (63\")     Body mass index is 29.23 kg/m².  Facility age limit for growth percentiles is 20 years.  Vitals:    10/07/19 0906   Temp: 98.3 °F (36.8 °C)     Vital Signs:  reviewed  Constitutional: Awake alert and oriented x3, well developed, no acute distress, non-toxic appearance.  EYES: symmetric, sclera clear  ENT:  Normocephalic, Atraumatic.   Respiratory:  No respiratory distress, No wheezing  CV: pulse regular, no palpitations or pallor.  GI:  Abdomen soft, non-tender.   Vascular:  Intact distal pulses, No cyanosis, no signs or symptoms of DVT.  Neurologic: Sensation grossly intact to the involved extremity, No focal deficits noted.   Neck: No tenderness, Supple.  Integument: warm, dry, no ulcerations.   Psychiatric:  Oriented, no pathological affect.  Musculoskeletal:    Exam today shows medial joint line tenderness bilaterally she has decreased range of motion with some synovitis effusion and crepitation she walks with a start up limp but is able to walk it off.        Diagnostic Data:     Imaging was done previously in the office, images were personally viewed and discussed with the patient:    Indication: pain related " symptoms,  Views: 3V AP, LAT & 40 degree PA bilateral knee(s)   Findings: severe end-stage arthritis (bone on bone, subchondral sclerosis/cysts, osteophytes)  Comparison views: viewed last xray done in the office.     Procedure:  Large Joint Arthrocentesis: L knee  Date/Time: 10/7/2019 8:38 AM  Consent given by: patient  Site marked: site marked  Timeout: Immediately prior to procedure a time out was called to verify the correct patient, procedure, equipment, support staff and site/side marked as required   Supporting Documentation  Indications: pain and joint swelling   Procedure Details  Location: knee - L knee  Preparation: Patient was prepped and draped in the usual sterile fashion  Needle gauge: 21 G.  Approach: anterolateral  Medications administered: 2 mL lidocaine (cardiac); 80 mg methylPREDNISolone acetate 80 MG/ML  Patient tolerance: patient tolerated the procedure well with no immediate complications          Assessment:     ICD-10-CM ICD-9-CM   1. Primary osteoarthritis of both knees M17.0 715.16           Plan: Is to proceed with injection  Follow up as indicated.  Ice, elevate, and rest as needed.  Additional interventions include: Biomechanics of pertinent body area discussed.  Risks, benefits, alternatives, comparisons, and complications of accepted medicines, injections, recommendations, surgical procedures, and therapies explained and education provided in laymen's terms. Natural history and expected course of this patient's diagnosis discussed along with evaluation of therapies. Questions answered. When appropriate I also discussed proper use of cane, walker, trekking poles.   EXERCISES:  Advice on benefits of, and types of regular/moderate exercise including biomechanical forces involved as it pertains to this complaint.  MEDICATIONS:  Prescription, OTC and Monitoring of Medications per orders to address ortho complaints; Evaluation and discussion of safety, precautions, side effects, and  warnings given especially of long term NSAID or steroid therapy.    RICE: Rest, ice, compression, and elevation therapy, Cryotherapy/brachy therapy, and or OTC linaments as indicated with instructions.   Cortisone Injection. See procedure note.continue with home exercise program.  She wants to try Visco supplementation or total knee arthroplasty which was discussed she can let us know this otherwise we will see her back in about 3 months.  She should continue to do home exercise program and I gave her some handouts as a refresher    10/7/2019

## 2019-10-11 ENCOUNTER — OFFICE VISIT (OUTPATIENT)
Dept: INTERNAL MEDICINE | Facility: CLINIC | Age: 79
End: 2019-10-11

## 2019-10-11 ENCOUNTER — APPOINTMENT (OUTPATIENT)
Dept: WOMENS IMAGING | Facility: HOSPITAL | Age: 79
End: 2019-10-11

## 2019-10-11 DIAGNOSIS — Z12.31 ENCOUNTER FOR SCREENING MAMMOGRAM FOR BREAST CANCER: Primary | ICD-10-CM

## 2019-10-11 DIAGNOSIS — Z12.31 ENCOUNTER FOR SCREENING MAMMOGRAM FOR BREAST CANCER: ICD-10-CM

## 2019-10-11 PROCEDURE — 77067 SCR MAMMO BI INCL CAD: CPT | Performed by: INTERNAL MEDICINE

## 2019-10-11 PROCEDURE — 77067 SCR MAMMO BI INCL CAD: CPT | Performed by: RADIOLOGY

## 2019-10-21 ENCOUNTER — OFFICE VISIT (OUTPATIENT)
Dept: SLEEP MEDICINE | Facility: HOSPITAL | Age: 79
End: 2019-10-21
Attending: INTERNAL MEDICINE

## 2019-10-21 VITALS
BODY MASS INDEX: 29.23 KG/M2 | SYSTOLIC BLOOD PRESSURE: 126 MMHG | HEIGHT: 63 IN | DIASTOLIC BLOOD PRESSURE: 70 MMHG | HEART RATE: 70 BPM | OXYGEN SATURATION: 95 % | WEIGHT: 165 LBS

## 2019-10-21 DIAGNOSIS — G47.33 OSA ON CPAP: Primary | ICD-10-CM

## 2019-10-21 DIAGNOSIS — Z99.89 OSA ON CPAP: Primary | ICD-10-CM

## 2019-10-21 PROCEDURE — G0463 HOSPITAL OUTPT CLINIC VISIT: HCPCS

## 2019-10-21 NOTE — PROGRESS NOTES
"[facility] PULMONARY CARE         Dr Fredi Harrison  [unfilled]  Patient Care Team:  Erica Johnston MD as PCP - General (Internal Medicine)  Erica Johnston MD as PCP - Claims Attributed    Chief Complaint:BALDEMAR with restless leg syndrome    Interval History: Patient here for annual have a compliance visit.  Compliance 99% average daily use of 7 hours 42 minutes.  AHI and leak are excellent.  Currently on CPAP 11 cm.  She reports dry mouth consistently.  Otherwise she is doing well.  She is benefiting from CPAP.  Goes to bed 1030 gets up 6:30 in the morning gets about 8 hours of sleep and generally feels rested.  No tobacco alcohol or caffeine abuse.  Currently has a full facemask that fits well.    REVIEW OF SYSTEMS:   CARDIOVASCULAR: No chest pain, chest pressure or chest discomfort. No palpitations or edema.   RESPIRATORY: No shortness of breath, cough or sputum.   GASTROINTESTINAL: No anorexia, nausea, vomiting or diarrhea. No abdominal pain or blood.   HEMATOLOGIC: No bleeding or bruising.  Positive for nasal congestion postnasal drainage  Acid reflux      Ventilator/Non-Invasive Ventilation Settings (From admission, onward)    None            Vital Signs  Heart Rate:  [70] 70  BP: (126)/(70) 126/70  [unfilled]  Flowsheet Rows      First Filed Value   Admission Height  160 cm (63\") Documented at 10/21/2019 0900   Admission Weight  74.8 kg (165 lb) Documented at 10/21/2019 0900          Physical Exam:   General Appearance:    Alert, cooperative, in no acute distress   Lungs:     Clear to auscultation,respirations regular, even and                  unlabored  Intimal body between 3 and 4 normal nasal exam      Heart:    Regular rhythm and normal rate, normal S1 and S2, no            murmur, no gallop, no rub, no click   Chest Wall:    No abnormalities observed   Abdomen:     Normal bowel sounds, no masses, no organomegaly, soft        non-tender, non-distended, no guarding, no rebound                tenderness "   Extremities:   Moves all extremities well, no edema, no cyanosis, no             redness     Results Review:                                          I reviewed the patient's new clinical results.  I personally viewed and interpreted the patient's CXR        Medication Review:         No current facility-administered medications for this visit.     ASSESSMENT:   BALDEMAR on CPAP  Restless leg/periodic leg movement    PLAN:  Excellent compliance AHI leak  We will add a chinstrap for dry mouth  Sleep hygiene measures  Continue Requip for restless leg syndrome  Advised patient to decrease caffeine intake  Sleep hygiene measures  Follow-up in 1 year    Fredi Harrison MD  10/21/19  10:47 AM

## 2019-12-03 ENCOUNTER — LAB (OUTPATIENT)
Dept: LAB | Facility: HOSPITAL | Age: 79
End: 2019-12-03

## 2019-12-03 DIAGNOSIS — R73.01 ELEVATED FASTING GLUCOSE: ICD-10-CM

## 2019-12-03 DIAGNOSIS — E78.5 HYPERLIPIDEMIA, UNSPECIFIED HYPERLIPIDEMIA TYPE: ICD-10-CM

## 2019-12-03 LAB
ALBUMIN SERPL-MCNC: 4.4 G/DL (ref 3.5–5.2)
ALBUMIN/GLOB SERPL: 1.5 G/DL
ALP SERPL-CCNC: 73 U/L (ref 39–117)
ALT SERPL W P-5'-P-CCNC: 39 U/L (ref 1–33)
ANION GAP SERPL CALCULATED.3IONS-SCNC: 12.6 MMOL/L (ref 5–15)
AST SERPL-CCNC: 31 U/L (ref 1–32)
BILIRUB SERPL-MCNC: 0.3 MG/DL (ref 0.2–1.2)
BUN BLD-MCNC: 18 MG/DL (ref 8–23)
BUN/CREAT SERPL: 22 (ref 7–25)
CALCIUM SPEC-SCNC: 9.4 MG/DL (ref 8.6–10.5)
CHLORIDE SERPL-SCNC: 105 MMOL/L (ref 98–107)
CHOLEST SERPL-MCNC: 126 MG/DL (ref 0–200)
CO2 SERPL-SCNC: 23.4 MMOL/L (ref 22–29)
CREAT BLD-MCNC: 0.82 MG/DL (ref 0.57–1)
GFR SERPL CREATININE-BSD FRML MDRD: 68 ML/MIN/1.73
GLOBULIN UR ELPH-MCNC: 3 GM/DL
GLUCOSE BLD-MCNC: 124 MG/DL (ref 65–99)
HBA1C MFR BLD: 6.4 % (ref 4.8–5.6)
HDLC SERPL-MCNC: 54 MG/DL (ref 40–60)
LDLC SERPL CALC-MCNC: 55 MG/DL (ref 0–100)
LDLC/HDLC SERPL: 1.02 {RATIO}
POTASSIUM BLD-SCNC: 4.4 MMOL/L (ref 3.5–5.2)
PROT SERPL-MCNC: 7.4 G/DL (ref 6–8.5)
SODIUM BLD-SCNC: 141 MMOL/L (ref 136–145)
TRIGL SERPL-MCNC: 85 MG/DL (ref 0–150)
VLDLC SERPL-MCNC: 17 MG/DL (ref 5–40)

## 2019-12-03 PROCEDURE — 80053 COMPREHEN METABOLIC PANEL: CPT

## 2019-12-03 PROCEDURE — 80061 LIPID PANEL: CPT

## 2019-12-03 PROCEDURE — 36415 COLL VENOUS BLD VENIPUNCTURE: CPT

## 2019-12-03 PROCEDURE — 83036 HEMOGLOBIN GLYCOSYLATED A1C: CPT

## 2019-12-13 ENCOUNTER — OFFICE VISIT (OUTPATIENT)
Dept: INTERNAL MEDICINE | Facility: CLINIC | Age: 79
End: 2019-12-13

## 2019-12-13 VITALS
SYSTOLIC BLOOD PRESSURE: 120 MMHG | BODY MASS INDEX: 30.65 KG/M2 | DIASTOLIC BLOOD PRESSURE: 74 MMHG | HEIGHT: 63 IN | OXYGEN SATURATION: 99 % | WEIGHT: 173 LBS | HEART RATE: 69 BPM

## 2019-12-13 DIAGNOSIS — Z12.11 SCREENING FOR COLON CANCER: ICD-10-CM

## 2019-12-13 DIAGNOSIS — E78.5 HYPERLIPIDEMIA, UNSPECIFIED HYPERLIPIDEMIA TYPE: Primary | ICD-10-CM

## 2019-12-13 DIAGNOSIS — R12 HEARTBURN: ICD-10-CM

## 2019-12-13 DIAGNOSIS — R73.03 PRE-DIABETES: ICD-10-CM

## 2019-12-13 PROCEDURE — 99214 OFFICE O/P EST MOD 30 MIN: CPT | Performed by: INTERNAL MEDICINE

## 2019-12-13 RX ORDER — PANTOPRAZOLE SODIUM 20 MG/1
20 TABLET, DELAYED RELEASE ORAL DAILY
Qty: 90 TABLET | Refills: 1 | Status: SHIPPED | OUTPATIENT
Start: 2019-12-13 | End: 2022-11-17 | Stop reason: SDUPTHER

## 2019-12-13 NOTE — PROGRESS NOTES
Chief Complaint   Patient presents with   • Hyperlipidemia     4 month follow up       Subjective   Ashley Motta is a 79 y.o. female.     Hyperlipidemia   This is a chronic problem. The problem is controlled. Recent lipid tests were reviewed and are normal. She has no history of diabetes (has pre-diabetes). Pertinent negatives include no chest pain or shortness of breath. Current antihyperlipidemic treatment includes exercise, diet change and statins. The current treatment provides significant improvement of lipids. Compliance problems include adherence to diet.    Heartburn   She complains of heartburn. She reports no chest pain or no coughing. This is a chronic problem. The problem occurs rarely. The problem has been unchanged. The symptoms are aggravated by certain foods. Risk factors include obesity. She has tried a histamine-2 antagonist (She would like to try a prescription PPI.) for the symptoms. The treatment provided moderate relief.      Pre-diabetes/Elevated fasting glucose:  This is a chronic problem.  Patient has been advised to follow prudent diet, avoid sugar, exercise regularly.  She denies polyuria, polydipsia, vision change appetite change, unexplained weight loss.   Lab Results   Component Value Date    HGBA1C 6.40 (H) 12/03/2019      The following portions of the patient's history were reviewed and updated as appropriate: allergies, current medications, past family history, past medical history, past social history, past surgical history and problem list.    Review of Systems   Constitutional: Negative for appetite change.   HENT: Negative for nosebleeds.    Eyes: Negative for blurred vision and double vision.   Respiratory: Negative for cough and shortness of breath.    Cardiovascular: Negative for chest pain, palpitations and leg swelling.   Endocrine: Negative for cold intolerance and heat intolerance.         Current Outpatient Medications:   •  aspirin 81 MG tablet, Take 81 mg by mouth  "daily., Disp: , Rfl:   •  B Complex Vitamins (VITAMIN B COMPLEX PO), Take  by mouth., Disp: , Rfl:   •  Calcium Carbonate-Vitamin D (CALCIUM + D PO), Take  by mouth 2 (Two) Times a Day., Disp: , Rfl:   •  Coenzyme Q10 (COQ10) 200 MG capsule, Take  by mouth., Disp: , Rfl:   •  gabapentin (NEURONTIN) 100 MG capsule, TAKE ONE CAPSULE BY MOUTH EVERY NIGHT, Disp: 90 capsule, Rfl: 1  •  Magnesium 400 MG tablet, Take  by mouth., Disp: , Rfl:   •  Misc Natural Products (GLUCOSAMINE CHONDROITIN VIT D3) capsule, Take  by mouth., Disp: , Rfl:   •  Multiple Vitamins-Minerals (MULTIVITAMIN ADULT PO), Take  by mouth., Disp: , Rfl:   •  Omega-3 Fatty Acids (FISH OIL) 1000 MG capsule capsule, Take  by mouth daily with breakfast., Disp: , Rfl:   •  Probiotic Product (PROBIOTIC DAILY PO), Take  by mouth., Disp: , Rfl:   •  rOPINIRole (REQUIP) 0.5 MG tablet, TAKE 1 TABLET BY MOUTH IN THE EVENING 1 HOUR BEFORE BEDTIME, Disp: 90 tablet, Rfl: 3  •  simvastatin (ZOCOR) 40 MG tablet, TAKE 1 TABLET BY MOUTH AT BEDTIME, Disp: 90 tablet, Rfl: 3  •  pantoprazole (PROTONIX) 20 MG EC tablet, Take 1 tablet by mouth Daily., Disp: 90 tablet, Rfl: 1        Objective     /74   Pulse 69   Ht 160 cm (63\")   Wt 78.5 kg (173 lb)   SpO2 99%   BMI 30.65 kg/m²     Physical Exam   Constitutional: She is oriented to person, place, and time. She appears well-developed and well-nourished. No distress.   Neck: Normal carotid pulses present. Carotid bruit is not present.   Cardiovascular: Regular rhythm, S1 normal and S2 normal. Exam reveals no gallop and no friction rub.   No murmur heard.  Pulses:       Carotid pulses are 2+ on the right side, and 2+ on the left side.  Pulmonary/Chest: Effort normal and breath sounds normal. She has no wheezes. She has no rhonchi. She has no rales. Chest wall is not dull to percussion.   Musculoskeletal: She exhibits no edema.   Neurological: She is alert and oriented to person, place, and time.   Skin: Skin is warm " and dry.   Nursing note and vitals reviewed.     Lab Results   Component Value Date    CHOL 126 12/03/2019    CHLPL 119 10/04/2016    TRIG 85 12/03/2019    HDL 54 12/03/2019    LDL 55 12/03/2019          Assessment/Plan   Ashley was seen today for hyperlipidemia.    Diagnoses and all orders for this visit:    Hyperlipidemia, unspecified hyperlipidemia type  -     Comprehensive Metabolic Panel; Future  -     CBC (No Diff); Future  -     Lipid Panel; Future  -     Urinalysis With Microscopic If Indicated (No Culture) - Urine, Clean Catch; Future    Pre-diabetes  -     Hemoglobin A1c; Future    Screening for colon cancer  -     Ambulatory Referral For Screening Colonoscopy    Other orders  -     pantoprazole (PROTONIX) 20 MG EC tablet; Take 1 tablet by mouth Daily.

## 2020-01-10 ENCOUNTER — CLINICAL SUPPORT (OUTPATIENT)
Dept: ORTHOPEDIC SURGERY | Facility: CLINIC | Age: 80
End: 2020-01-10

## 2020-01-10 VITALS — WEIGHT: 173 LBS | BODY MASS INDEX: 30.65 KG/M2 | TEMPERATURE: 96.6 F | HEIGHT: 63 IN

## 2020-01-10 DIAGNOSIS — M17.12 ARTHRITIS OF LEFT KNEE: Primary | ICD-10-CM

## 2020-01-10 PROCEDURE — 99212 OFFICE O/P EST SF 10 MIN: CPT | Performed by: ORTHOPAEDIC SURGERY

## 2020-01-10 PROCEDURE — 20610 DRAIN/INJ JOINT/BURSA W/O US: CPT | Performed by: ORTHOPAEDIC SURGERY

## 2020-01-10 RX ORDER — METHYLPREDNISOLONE ACETATE 80 MG/ML
80 INJECTION, SUSPENSION INTRA-ARTICULAR; INTRALESIONAL; INTRAMUSCULAR; SOFT TISSUE
Status: COMPLETED | OUTPATIENT
Start: 2020-01-10 | End: 2020-01-10

## 2020-01-10 RX ADMIN — METHYLPREDNISOLONE ACETATE 80 MG: 80 INJECTION, SUSPENSION INTRA-ARTICULAR; INTRALESIONAL; INTRAMUSCULAR; SOFT TISSUE at 07:56

## 2020-01-10 NOTE — PROGRESS NOTES
Patient Name: Ashley Motta   YOB: 1940  Referring Primary Care Physician: Erica Johnston MD  BMI: Body mass index is 30.65 kg/m².    Chief Complaint:    Chief Complaint   Patient presents with   • Left Knee - Injections, Pain        HPI:     Ashley Motta is a 79 y.o. female who presents today for evaluation of   Chief Complaint   Patient presents with   • Left Knee - Injections, Pain   .  Patient follows up today complaining of intermittent dull achy stiff left knee pain.  She also complains some itching in the suprapatellar region when she gets an effusion.  Her right knee is starting to bother her some but not enough to get a shot she is doing exercise including [facility] went [facility] some different recommendations as well    This problem is not new to this examiner.     Subjective   Medications:   Home Medications:  Current Outpatient Medications on File Prior to Visit   Medication Sig   • aspirin 81 MG tablet Take 81 mg by mouth daily.   • B Complex Vitamins (VITAMIN B COMPLEX PO) Take  by mouth.   • Calcium Carbonate-Vitamin D (CALCIUM + D PO) Take  by mouth 2 (Two) Times a Day.   • Coenzyme Q10 (COQ10) 200 MG capsule Take  by mouth.   • gabapentin (NEURONTIN) 100 MG capsule TAKE ONE CAPSULE BY MOUTH EVERY NIGHT   • Magnesium 400 MG tablet Take  by mouth.   • Misc Natural Products (GLUCOSAMINE CHONDROITIN VIT D3) capsule Take  by mouth.   • Multiple Vitamins-Minerals (MULTIVITAMIN ADULT PO) Take  by mouth.   • Omega-3 Fatty Acids (FISH OIL) 1000 MG capsule capsule Take  by mouth daily with breakfast.   • pantoprazole (PROTONIX) 20 MG EC tablet Take 1 tablet by mouth Daily.   • Probiotic Product (PROBIOTIC DAILY PO) Take  by mouth.   • rOPINIRole (REQUIP) 0.5 MG tablet TAKE 1 TABLET BY MOUTH IN THE EVENING 1 HOUR BEFORE BEDTIME   • simvastatin (ZOCOR) 40 MG tablet TAKE 1 TABLET BY MOUTH AT BEDTIME     No current facility-administered medications on file prior to visit.       Current Medications:  Scheduled Meds:  Continuous Infusions:  No current facility-administered medications for this visit.   PRN Meds:.    I have reviewed the patient's medical history in detail and updated the computerized patient record.  Review and summarization of old records includes:    Past Medical History:   Diagnosis Date   • Elevated fasting glucose    • GERD (gastroesophageal reflux disease)    • Goiter     THYROID POLYP SEES    • Hyperlipidemia    • Hypersomnia    • BALDEMAR on CPAP    • Osteoarthritis    • Osteopenia    • RLS (restless legs syndrome)         Past Surgical History:   Procedure Laterality Date   • CATARACT EXTRACTION, BILATERAL Bilateral 2015   • COLONOSCOPY N/A 02/27/2015    DR. SARAH LIRIANO   • HYSTERECTOMY  2004    Total   • KNEE SURGERY Left 2013   • KNEE SURGERY Right 2010   • SHOULDER ARTHROSCOPY Left         Social History     Occupational History   • Not on file   Tobacco Use   • Smoking status: Never Smoker   • Smokeless tobacco: Never Used   Substance and Sexual Activity   • Alcohol use: No   • Drug use: No   • Sexual activity: Defer     Birth control/protection: Post-menopausal      Social History     Social History Narrative   • Not on file        Family History   Problem Relation Age of Onset   • Heart disease Mother    • Lung cancer Father    • Hypertension Father    • Heart disease Father    • Heart attack Brother    • Emphysema Brother    • COPD Brother    • Depression Brother    • Heart attack Brother         Had stents placed 2009       ROS: 14 point review of systems was performed and all other systems were reviewed and are negative except for documented findings in HPI and today's encounter.     Allergies: No Known Allergies  Constitutional:  Denies fever, shaking or chills   Eyes:  Denies change in visual acuity   HENT:  Denies nasal congestion or sore throat   Respiratory:  Denies cough or shortness of breath   Cardiovascular:  Denies chest pain or severe LE  "edema   GI:  Denies abdominal pain, nausea, vomiting, bloody stools or diarrhea   Musculoskeletal:  Numbness, tingling, pain, or loss of motor function only as noted above in history of present illness.  : Denies painful urination or hematuria  Integument:  Denies rash, lesion or ulceration   Neurologic:  Denies headache or focal weakness  Endocrine:  Denies lymphadenopathy  Psych:  Denies confusion or change in mental status   Hem:  Denies active bleeding    OBJECTIVE:  Physical Exam: 79 y.o. female  Wt Readings from Last 3 Encounters:   01/10/20 78.5 kg (173 lb)   12/13/19 78.5 kg (173 lb)   10/21/19 74.8 kg (165 lb)     Ht Readings from Last 1 Encounters:   01/10/20 160 cm (63\")     Body mass index is 30.65 kg/m².  Vitals:    01/10/20 0802   Temp: 96.6 °F (35.9 °C)     Vital signs reviewed.     General Appearance:    Alert, cooperative, in no acute distress                  Eyes: conjunctiva clear  ENT: external ears and nose atraumatic  CV: no peripheral edema  Resp: normal respiratory effort  Skin: no rashes or wounds; normal turgor  Psych: mood and affect appropriate  Lymph: no nodes appreciated  Neuro: gross sensation intact  Vascular:  Palpable peripheral pulse in noted extremity  Musculoskeletal Extremities: She has swelling crepitation synovitis medial joint line tenderness left greater than right she has mild stiffness but overall walking without a real limp    Radiology:   X-rays from 7/2/2019 compared to old x-rays AP lateral 40 degree PA show bilateral knees with fairly advanced arthritis but worse radiographically on the left    Assessment:     ICD-10-CM ICD-9-CM   1. Arthritis of left knee M17.12 716.96        Large Joint Arthrocentesis: L knee  Date/Time: 1/10/2020 7:56 AM  Site marked: site marked  Timeout: Immediately prior to procedure a time out was called to verify the correct patient, procedure, equipment, support staff and site/side marked as required   Supporting " Documentation  Indications: pain   Procedure Details  Location: knee - L knee  Preparation: Patient was prepped and draped in the usual sterile fashion  Needle gauge: 21.  Approach: anterolateral  Medications administered: 4 mL lidocaine (cardiac); 80 mg methylPREDNISolone acetate 80 MG/ML  Patient tolerance: patient tolerated the procedure well with no immediate complications             Plan: Biomechanics of pertinent body area discussed.  Risks, benefits, alternatives, comparisons, and complications of accepted medicines, injections, recommendations, surgical procedures, and therapies explained and education provided in laymen's terms. Natural history and expected course of this patient's diagnosis discussed along with evaluation of therapies. Questions answered. When appropriate I also discussed proper use of cane, walker, trekking poles.   EXERCISES:  Advice on benefits of, and types of regular/moderate exercise including biomechanical forces involved as it pertains to this complaint.  RICE: Rest, ice, compression, and elevation therapy, Cryotherapy/brachy therapy, and or OTC linaments as indicated with instructions.   Cortisone Injection. See procedure note.      1/10/2020    Much of this encounter note is an electronic transcription/translation of spoken language to printed text. The electronic translation of spoken language may permit erroneous, or at times, nonsensical words or phrases to be inadvertently transcribed; Although I have reviewed the note for such errors, some may still exist

## 2020-03-04 ENCOUNTER — ON CAMPUS - OUTPATIENT (OUTPATIENT)
Dept: URBAN - METROPOLITAN AREA HOSPITAL 114 | Facility: HOSPITAL | Age: 80
End: 2020-03-04

## 2020-03-04 ENCOUNTER — HOSPITAL ENCOUNTER (OUTPATIENT)
Facility: HOSPITAL | Age: 80
Setting detail: HOSPITAL OUTPATIENT SURGERY
Discharge: HOME | End: 2020-03-04
Attending: INTERNAL MEDICINE | Admitting: INTERNAL MEDICINE

## 2020-03-04 ENCOUNTER — ANESTHESIA (OUTPATIENT)
Dept: GASTROENTEROLOGY | Facility: HOSPITAL | Age: 80
End: 2020-03-04

## 2020-03-04 ENCOUNTER — ANESTHESIA EVENT (OUTPATIENT)
Dept: GASTROENTEROLOGY | Facility: HOSPITAL | Age: 80
End: 2020-03-04

## 2020-03-04 VITALS
WEIGHT: 171 LBS | BODY MASS INDEX: 30.3 KG/M2 | RESPIRATION RATE: 18 BRPM | SYSTOLIC BLOOD PRESSURE: 129 MMHG | OXYGEN SATURATION: 98 % | DIASTOLIC BLOOD PRESSURE: 70 MMHG | HEIGHT: 63 IN | HEART RATE: 78 BPM | TEMPERATURE: 97.6 F

## 2020-03-04 DIAGNOSIS — Z86.010 PERSONAL HISTORY OF COLONIC POLYPS: ICD-10-CM

## 2020-03-04 DIAGNOSIS — Z86.010 HISTORY OF COLON POLYPS: ICD-10-CM

## 2020-03-04 DIAGNOSIS — D12.2 BENIGN NEOPLASM OF ASCENDING COLON: ICD-10-CM

## 2020-03-04 DIAGNOSIS — D37.8 NEOPLASM OF UNCERTAIN BEHAVIOR OF OTHER SPECIFIED DIGESTIVE: ICD-10-CM

## 2020-03-04 DIAGNOSIS — K62.1 RECTAL POLYP: ICD-10-CM

## 2020-03-04 PROCEDURE — 45380 COLONOSCOPY AND BIOPSY: CPT | Mod: PT | Performed by: INTERNAL MEDICINE

## 2020-03-04 PROCEDURE — 88305 TISSUE EXAM BY PATHOLOGIST: CPT | Performed by: INTERNAL MEDICINE

## 2020-03-04 PROCEDURE — 25010000002 PROPOFOL 10 MG/ML EMULSION: Performed by: ANESTHESIOLOGY

## 2020-03-04 RX ORDER — SODIUM CHLORIDE 0.9 % (FLUSH) 0.9 %
3 SYRINGE (ML) INJECTION EVERY 12 HOURS SCHEDULED
Status: DISCONTINUED | OUTPATIENT
Start: 2020-03-04 | End: 2020-03-04 | Stop reason: HOSPADM

## 2020-03-04 RX ORDER — PROPOFOL 10 MG/ML
VIAL (ML) INTRAVENOUS AS NEEDED
Status: DISCONTINUED | OUTPATIENT
Start: 2020-03-04 | End: 2020-03-04 | Stop reason: SURG

## 2020-03-04 RX ORDER — SODIUM CHLORIDE, SODIUM LACTATE, POTASSIUM CHLORIDE, CALCIUM CHLORIDE 600; 310; 30; 20 MG/100ML; MG/100ML; MG/100ML; MG/100ML
30 INJECTION, SOLUTION INTRAVENOUS CONTINUOUS PRN
Status: DISCONTINUED | OUTPATIENT
Start: 2020-03-04 | End: 2020-03-04 | Stop reason: HOSPADM

## 2020-03-04 RX ORDER — PROPOFOL 10 MG/ML
VIAL (ML) INTRAVENOUS CONTINUOUS PRN
Status: DISCONTINUED | OUTPATIENT
Start: 2020-03-04 | End: 2020-03-04 | Stop reason: SURG

## 2020-03-04 RX ORDER — SODIUM CHLORIDE 0.9 % (FLUSH) 0.9 %
10 SYRINGE (ML) INJECTION AS NEEDED
Status: DISCONTINUED | OUTPATIENT
Start: 2020-03-04 | End: 2020-03-04 | Stop reason: HOSPADM

## 2020-03-04 RX ORDER — LIDOCAINE HYDROCHLORIDE 20 MG/ML
INJECTION, SOLUTION INFILTRATION; PERINEURAL AS NEEDED
Status: DISCONTINUED | OUTPATIENT
Start: 2020-03-04 | End: 2020-03-04 | Stop reason: SURG

## 2020-03-04 RX ADMIN — SODIUM CHLORIDE, POTASSIUM CHLORIDE, SODIUM LACTATE AND CALCIUM CHLORIDE 30 ML/HR: 600; 310; 30; 20 INJECTION, SOLUTION INTRAVENOUS at 07:52

## 2020-03-04 RX ADMIN — SODIUM CHLORIDE, POTASSIUM CHLORIDE, SODIUM LACTATE AND CALCIUM CHLORIDE: 600; 310; 30; 20 INJECTION, SOLUTION INTRAVENOUS at 08:00

## 2020-03-04 RX ADMIN — LIDOCAINE HYDROCHLORIDE 40 MG: 20 INJECTION, SOLUTION INFILTRATION; PERINEURAL at 08:07

## 2020-03-04 RX ADMIN — PROPOFOL 140 MCG/KG/MIN: 10 INJECTION, EMULSION INTRAVENOUS at 08:08

## 2020-03-04 RX ADMIN — PROPOFOL 50 MG: 10 INJECTION, EMULSION INTRAVENOUS at 08:08

## 2020-03-04 NOTE — DISCHARGE INSTRUCTIONS
Colonoscopy, Adult, Care After  This sheet gives you information about how to care for yourself after your procedure. Your health care provider may also give you more specific instructions. If you have problems or questions, contact your health care provider.  What can I expect after the procedure?  After the procedure, it is common to have:  · A small amount of blood in your stool for 24 hours after the procedure.  · Some gas.  · Mild abdominal cramping or bloating.  Follow these instructions at home:  General instructions  · For the first 24 hours after the procedure:  ? Do not drive or use machinery.  ? Do not sign important documents.  ? Do not drink alcohol.  ? Do your regular daily activities at a slower pace than normal.  ? Eat soft, easy-to-digest foods.  · Take over-the-counter or prescription medicines only as told by your health care provider.  Relieving cramping and bloating    · Try walking around when you have cramps or feel bloated.  · Apply heat to your abdomen as told by your health care provider. Use a heat source that your health care provider recommends, such as a moist heat pack or a heating pad.  ? Place a towel between your skin and the heat source.  ? Leave the heat on for 20-30 minutes.  ? Remove the heat if your skin turns bright red. This is especially important if you are unable to feel pain, heat, or cold. You may have a greater risk of getting burned.  Eating and drinking    · Drink enough fluid to keep your urine pale yellow.  · Resume your normal diet as instructed by your health care provider. Avoid heavy or fried foods that are hard to digest.  · Avoid drinking alcohol for as long as instructed by your health care provider.  Contact a health care provider if:  · You have blood in your stool 2-3 days after the procedure.  Get help right away if:  · You have more than a small spotting of blood in your stool.  · You pass large blood clots in your stool.  · Your abdomen is  swollen.  · You have nausea or vomiting.  · You have a fever.  · You have increasing abdominal pain that is not relieved with medicine.  Summary  · After the procedure, it is common to have a small amount of blood in your stool. You may also have mild abdominal cramping and bloating.  · For the first 24 hours after the procedure, do not drive or use machinery, sign important documents, or drink alcohol.  · Contact your health care provider if you have a lot of blood in your stool, nausea or vomiting, a fever, or increased abdominal pain.  This information is not intended to replace advice given to you by your health care provider. Make sure you discuss any questions you have with your health care provider.  Document Released: 08/01/2005 Document Revised: 12/20/2019 Document Reviewed: 02/28/2017  [employer] Interactive Patient Education © 2020 [employer]

## 2020-03-04 NOTE — H&P
Patient Care Team:  Erica Johnston MD as PCP - General (Internal Medicine)  Erica Johnston MD as PCP - Claims Attributed    Chief complaint hx of colon polyps    Subjective     History of Present Illness  The patient presents with no gastrointestinal  Symptoms or issues at this time   Review of Systems   All other systems reviewed and are negative.       Past Medical History:   Diagnosis Date   • Elevated fasting glucose    • GERD (gastroesophageal reflux disease)    • Goiter     THYROID POLYP SEES    • Hyperlipidemia    • Hypersomnia    • BALDEMAR on CPAP    • Osteoarthritis    • Osteopenia    • RLS (restless legs syndrome)      Past Surgical History:   Procedure Laterality Date   • CATARACT EXTRACTION, BILATERAL Bilateral 2015   • COLONOSCOPY N/A 02/27/2015    DR. SARAH LIRIANO   • HYSTERECTOMY  2004    Total   • KNEE SURGERY Left 2013   • KNEE SURGERY Right 2010   • SHOULDER ARTHROSCOPY Left      Family History   Problem Relation Age of Onset   • Heart disease Mother    • Lung cancer Father    • Hypertension Father    • Heart disease Father    • Heart attack Brother    • Emphysema Brother    • COPD Brother    • Depression Brother    • Heart attack Brother         Had stents placed 2009     Social History     Tobacco Use   • Smoking status: Never Smoker   • Smokeless tobacco: Never Used   Substance Use Topics   • Alcohol use: No   • Drug use: No     Medications Prior to Admission   Medication Sig Dispense Refill Last Dose   • gabapentin (NEURONTIN) 100 MG capsule TAKE ONE CAPSULE BY MOUTH EVERY NIGHT 90 capsule 1 3/3/2020 at Unknown time   • rOPINIRole (REQUIP) 0.5 MG tablet TAKE 1 TABLET BY MOUTH IN THE EVENING 1 HOUR BEFORE BEDTIME 90 tablet 3 3/3/2020 at Unknown time   • aspirin 81 MG tablet Take 81 mg by mouth daily.   2/28/2020   • B Complex Vitamins (VITAMIN B COMPLEX PO) Take  by mouth.   3/2/2020   • Calcium Carbonate-Vitamin D (CALCIUM + D PO) Take  by mouth 2 (Two) Times a Day.   3/2/2020   •  Coenzyme Q10 (COQ10) 200 MG capsule Take  by mouth.   3/2/2020   • Magnesium 400 MG tablet Take  by mouth.   3/2/2020   • Misc Natural Products (GLUCOSAMINE CHONDROITIN VIT D3) capsule Take  by mouth.   3/2/2020   • Multiple Vitamins-Minerals (MULTIVITAMIN ADULT PO) Take  by mouth.   3/2/2020   • Omega-3 Fatty Acids (FISH OIL) 1000 MG capsule capsule Take  by mouth daily with breakfast.   3/2/2020   • pantoprazole (PROTONIX) 20 MG EC tablet Take 1 tablet by mouth Daily. 90 tablet 1 3/1/2020   • Probiotic Product (PROBIOTIC DAILY PO) Take  by mouth.   3/2/2020   • simvastatin (ZOCOR) 40 MG tablet TAKE 1 TABLET BY MOUTH AT BEDTIME 90 tablet 3 3/2/2020     Allergies:  Patient has no known allergies.    Objective      Vital Signs  Temp:  [97.7 °F (36.5 °C)] 97.7 °F (36.5 °C)  Heart Rate:  [75] 75  Resp:  [20] 20  BP: (127)/(72) 127/72    Physical Exam   Constitutional: She is oriented to person, place, and time. She appears well-developed and well-nourished.   HENT:   Mouth/Throat: Oropharynx is clear and moist.   Eyes: Conjunctivae are normal.   Neck: Neck supple.   Cardiovascular: Normal rate and regular rhythm.   Pulmonary/Chest: Effort normal and breath sounds normal.   Abdominal: Soft. Bowel sounds are normal.   Neurological: She is alert and oriented to person, place, and time.   Skin: Skin is warm and dry.   Psychiatric: She has a normal mood and affect.       Results Review:   None      Assessment/Plan       * No active hospital problems. *      Assessment:  (Personal history of colon polyps ).     Plan:   (Colonoscopy risks, alternatives and benefits discussed with patient and the patient is agreeable to having procedure done.).       I discussed the patients findings and my recommendations with patient and nursing staff    Darrian Rico MD  03/04/20

## 2020-03-04 NOTE — ANESTHESIA POSTPROCEDURE EVALUATION
"Patient: Ashley Motta    Procedure Summary     Date:  03/04/20 Room / Location:   [facility]    Anesthesia Start:  0800 Anesthesia Stop:  0836    Procedure:  COLONOSCOPY into cecum with cold biopsy polypectomies (N/A ) Diagnosis:      Surgeon:  Darrian Rico MD Provider:  Conrado Valle MD    Anesthesia Type:  MAC ASA Status:  3          Anesthesia Type: MAC    Vitals  No vitals data found for the desired time range.          Post Anesthesia Care and Evaluation    Patient location during evaluation: bedside  Patient participation: complete - patient participated  Level of consciousness: awake  Pain score: 2  Pain management: adequate  Airway patency: patent  Anesthetic complications: No anesthetic complications  PONV Status: none  Cardiovascular status: acceptable  Respiratory status: acceptable  Hydration status: acceptable    Comments: /72 (BP Location: Left arm, Patient Position: Lying)   Pulse 75   Temp 36.5 °C (97.7 °F) (Oral)   Resp 20   Ht 160 cm (63\")   Wt 77.6 kg (171 lb)   SpO2 96%   BMI 30.29 kg/m²         "

## 2020-03-05 LAB
CYTO UR: NORMAL
LAB AP CASE REPORT: NORMAL
PATH REPORT.FINAL DX SPEC: NORMAL
PATH REPORT.GROSS SPEC: NORMAL

## 2020-03-30 ENCOUNTER — TELEPHONE (OUTPATIENT)
Dept: INTERNAL MEDICINE | Facility: CLINIC | Age: 80
End: 2020-03-30

## 2020-03-30 NOTE — TELEPHONE ENCOUNTER
Patient called to reschedule medicare wellness visit for . The soonest available I'm showing is August with overbook warnings. Please advise.     Patient call back: [phone].

## 2020-04-03 RX ORDER — ROPINIROLE 0.5 MG/1
TABLET, FILM COATED ORAL
Qty: 90 TABLET | Refills: 1 | Status: SHIPPED | OUTPATIENT
Start: 2020-04-03 | End: 2020-10-21

## 2020-04-03 RX ORDER — SIMVASTATIN 40 MG
TABLET ORAL
Qty: 90 TABLET | Refills: 1 | Status: SHIPPED | OUTPATIENT
Start: 2020-04-03 | End: 2020-10-05

## 2020-04-09 ENCOUNTER — TELEPHONE (OUTPATIENT)
Dept: ORTHOPEDIC SURGERY | Facility: CLINIC | Age: 80
End: 2020-04-09

## 2020-04-09 NOTE — TELEPHONE ENCOUNTER
Patient was concerned about having to wait more than 3 months for her injection.  States that usually if she waits more than 3 months her knee becomes red and angry and swollen and has increased pain.  Patient states that PCP will does not recommend that she take NSAIDs since they do not mix well with her other medications.  Have discussed with Dr. Rico and he is recommending ice, elevation, and Tylenol.  Have left it open for her to call if she has any questions or concerns

## 2020-05-26 DIAGNOSIS — G43.809 OTHER MIGRAINE WITHOUT STATUS MIGRAINOSUS, NOT INTRACTABLE: Primary | ICD-10-CM

## 2020-05-26 RX ORDER — GABAPENTIN 100 MG/1
CAPSULE ORAL
Qty: 90 CAPSULE | Refills: 0 | Status: SHIPPED | OUTPATIENT
Start: 2020-05-26 | End: 2020-08-24

## 2020-05-27 ENCOUNTER — LAB (OUTPATIENT)
Dept: LAB | Facility: HOSPITAL | Age: 80
End: 2020-05-27

## 2020-05-27 DIAGNOSIS — E78.5 HYPERLIPIDEMIA, UNSPECIFIED HYPERLIPIDEMIA TYPE: ICD-10-CM

## 2020-05-27 DIAGNOSIS — R73.03 PRE-DIABETES: ICD-10-CM

## 2020-05-27 LAB
ALBUMIN SERPL-MCNC: 4.3 G/DL (ref 3.5–5.2)
ALBUMIN/GLOB SERPL: 1.7 G/DL
ALP SERPL-CCNC: 66 U/L (ref 39–117)
ALT SERPL W P-5'-P-CCNC: 40 U/L (ref 1–33)
ANION GAP SERPL CALCULATED.3IONS-SCNC: 10.8 MMOL/L (ref 5–15)
AST SERPL-CCNC: 33 U/L (ref 1–32)
BACTERIA UR QL AUTO: ABNORMAL /HPF
BILIRUB SERPL-MCNC: 0.2 MG/DL (ref 0.2–1.2)
BILIRUB UR QL STRIP: NEGATIVE
BUN BLD-MCNC: 16 MG/DL (ref 8–23)
BUN/CREAT SERPL: 21.9 (ref 7–25)
CALCIUM SPEC-SCNC: 9.2 MG/DL (ref 8.6–10.5)
CHLORIDE SERPL-SCNC: 108 MMOL/L (ref 98–107)
CHOLEST SERPL-MCNC: 110 MG/DL (ref 0–200)
CLARITY UR: CLEAR
CO2 SERPL-SCNC: 23.2 MMOL/L (ref 22–29)
COLOR UR: YELLOW
CREAT BLD-MCNC: 0.73 MG/DL (ref 0.57–1)
DEPRECATED RDW RBC AUTO: 44 FL (ref 37–54)
ERYTHROCYTE [DISTWIDTH] IN BLOOD BY AUTOMATED COUNT: 13.2 % (ref 12.3–15.4)
GFR SERPL CREATININE-BSD FRML MDRD: 77 ML/MIN/1.73
GLOBULIN UR ELPH-MCNC: 2.5 GM/DL
GLUCOSE BLD-MCNC: 117 MG/DL (ref 65–99)
GLUCOSE UR STRIP-MCNC: NEGATIVE MG/DL
HBA1C MFR BLD: 6.4 % (ref 4.8–5.6)
HCT VFR BLD AUTO: 40.5 % (ref 34–46.6)
HDLC SERPL-MCNC: 48 MG/DL (ref 40–60)
HGB BLD-MCNC: 13.4 G/DL (ref 12–15.9)
HGB UR QL STRIP.AUTO: ABNORMAL
HYALINE CASTS UR QL AUTO: ABNORMAL /LPF
KETONES UR QL STRIP: ABNORMAL
LDLC SERPL CALC-MCNC: 37 MG/DL (ref 0–100)
LDLC/HDLC SERPL: 0.78 {RATIO}
LEUKOCYTE ESTERASE UR QL STRIP.AUTO: ABNORMAL
MCH RBC QN AUTO: 29.6 PG (ref 26.6–33)
MCHC RBC AUTO-ENTMCNC: 33.1 G/DL (ref 31.5–35.7)
MCV RBC AUTO: 89.6 FL (ref 79–97)
NITRITE UR QL STRIP: NEGATIVE
PH UR STRIP.AUTO: <=5 [PH] (ref 5–8)
PLATELET # BLD AUTO: 291 10*3/MM3 (ref 140–450)
PMV BLD AUTO: 10.8 FL (ref 6–12)
POTASSIUM BLD-SCNC: 4.3 MMOL/L (ref 3.5–5.2)
PROT SERPL-MCNC: 6.8 G/DL (ref 6–8.5)
PROT UR QL STRIP: NEGATIVE
RBC # BLD AUTO: 4.52 10*6/MM3 (ref 3.77–5.28)
RBC # UR: ABNORMAL /HPF
REF LAB TEST METHOD: ABNORMAL
SODIUM BLD-SCNC: 142 MMOL/L (ref 136–145)
SP GR UR STRIP: 1.03 (ref 1–1.03)
SQUAMOUS #/AREA URNS HPF: ABNORMAL /HPF
TRIGL SERPL-MCNC: 124 MG/DL (ref 0–150)
UROBILINOGEN UR QL STRIP: ABNORMAL
VLDLC SERPL-MCNC: 24.8 MG/DL (ref 5–40)
WBC NRBC COR # BLD: 8.2 10*3/MM3 (ref 3.4–10.8)
WBC UR QL AUTO: ABNORMAL /HPF

## 2020-05-27 PROCEDURE — 80053 COMPREHEN METABOLIC PANEL: CPT

## 2020-05-27 PROCEDURE — 81001 URINALYSIS AUTO W/SCOPE: CPT

## 2020-05-27 PROCEDURE — 85027 COMPLETE CBC AUTOMATED: CPT

## 2020-05-27 PROCEDURE — 83036 HEMOGLOBIN GLYCOSYLATED A1C: CPT

## 2020-05-27 PROCEDURE — 36415 COLL VENOUS BLD VENIPUNCTURE: CPT

## 2020-05-27 PROCEDURE — 80061 LIPID PANEL: CPT

## 2020-06-01 ENCOUNTER — OFFICE VISIT (OUTPATIENT)
Dept: INTERNAL MEDICINE | Facility: CLINIC | Age: 80
End: 2020-06-01

## 2020-06-01 VITALS
BODY MASS INDEX: 31.54 KG/M2 | HEART RATE: 84 BPM | OXYGEN SATURATION: 100 % | WEIGHT: 178 LBS | DIASTOLIC BLOOD PRESSURE: 72 MMHG | HEIGHT: 63 IN | SYSTOLIC BLOOD PRESSURE: 120 MMHG

## 2020-06-01 DIAGNOSIS — R82.90 ABNORMAL URINALYSIS: ICD-10-CM

## 2020-06-01 DIAGNOSIS — R73.03 PRE-DIABETES: ICD-10-CM

## 2020-06-01 DIAGNOSIS — E78.5 HYPERLIPIDEMIA, UNSPECIFIED HYPERLIPIDEMIA TYPE: ICD-10-CM

## 2020-06-01 DIAGNOSIS — Z00.00 MEDICARE ANNUAL WELLNESS VISIT, SUBSEQUENT: Primary | ICD-10-CM

## 2020-06-01 LAB
BACTERIA UR QL AUTO: ABNORMAL /HPF
BILIRUB UR QL STRIP: NEGATIVE
CLARITY UR: CLEAR
COLOR UR: YELLOW
GLUCOSE UR STRIP-MCNC: NEGATIVE MG/DL
HGB UR QL STRIP.AUTO: NEGATIVE
HYALINE CASTS UR QL AUTO: ABNORMAL /LPF
KETONES UR QL STRIP: NEGATIVE
LEUKOCYTE ESTERASE UR QL STRIP.AUTO: ABNORMAL
MUCOUS THREADS URNS QL MICRO: ABNORMAL /HPF
NITRITE UR QL STRIP: NEGATIVE
PH UR STRIP.AUTO: 5.5 [PH] (ref 5–8)
PROT UR QL STRIP: NEGATIVE
RBC # UR: ABNORMAL /HPF
REF LAB TEST METHOD: ABNORMAL
SP GR UR STRIP: 1.01 (ref 1–1.03)
SQUAMOUS #/AREA URNS HPF: ABNORMAL /HPF
UROBILINOGEN UR QL STRIP: ABNORMAL
WBC UR QL AUTO: ABNORMAL /HPF

## 2020-06-01 PROCEDURE — 87147 CULTURE TYPE IMMUNOLOGIC: CPT | Performed by: INTERNAL MEDICINE

## 2020-06-01 PROCEDURE — 99212 OFFICE O/P EST SF 10 MIN: CPT | Performed by: INTERNAL MEDICINE

## 2020-06-01 PROCEDURE — G0439 PPPS, SUBSEQ VISIT: HCPCS | Performed by: INTERNAL MEDICINE

## 2020-06-01 PROCEDURE — 81001 URINALYSIS AUTO W/SCOPE: CPT | Performed by: INTERNAL MEDICINE

## 2020-06-01 PROCEDURE — 87086 URINE CULTURE/COLONY COUNT: CPT | Performed by: INTERNAL MEDICINE

## 2020-06-01 NOTE — PATIENT INSTRUCTIONS
Medicare Wellness  Personal Prevention Plan of Service     Date of Office Visit:  2020  Encounter Provider:  Erica Johnston MD  Place of Service:  [facility]  Patient Name: Ashley Motta  :  1940    As part of the Medicare Wellness portion of your visit today, we are providing you with this personalized preventive plan of services (PPPS). This plan is based upon recommendations of the [place] Preventive Services Task Force (USPSTF) and the Advisory Committee on Immunization Practices (ACIP).    This lists the preventive care services that should be considered, and provides dates of when you are due. Items listed as completed are up-to-date and do not require any further intervention.    Health Maintenance   Topic Date Due   • HEPATITIS C SCREENING  2016   • TDAP/TD VACCINES (2 - Td) 2018   • DXA SCAN  2019   • MEDICARE ANNUAL WELLNESS  2020   • INFLUENZA VACCINE  2020   • MAMMOGRAM  10/11/2020   • LIPID PANEL  2021   • COLONOSCOPY  2025   • Pneumococcal Vaccine Once at 65 Years Old  Completed   • ZOSTER VACCINE  Completed       No orders of the defined types were placed in this encounter.      No follow-ups on file.    Check with your pharmacy about a tetanus shot (T  Advance Directive    Advance directives are legal documents that let you make choices ahead of time about your health care and medical treatment in case you become unable to communicate for yourself. Advance directives are a way for you to communicate your wishes to family, friends, and health care providers. This can help convey your decisions about end-of-life care if you become unable to communicate.  Discussing and writing advance directives should happen over time rather than all at once. Advance directives can be changed depending on your situation and what you want, even after you have signed the advance directives.  If you do not have an advance  directive, some states assign family decision makers to act on your behalf based on how closely you are related to them. Each state has its own laws regarding advance directives. You may want to check with your health care provider, , or state representative about the laws in your state. There are different types of advance directives, such as:  · Medical power of .  · Living will.  · Do not resuscitate (DNR) or do not attempt resuscitation (DNAR) order.  Health care proxy and medical power of   A health care proxy, also called a health care agent, is a person who is appointed to make medical decisions for you in cases in which you are unable to make the decisions yourself. Generally, people choose someone they know well and trust to represent their preferences. Make sure to ask this person for an agreement to act as your proxy. A proxy may have to exercise judgment in the event of a medical decision for which your wishes are not known.  A medical power of  is a legal document that names your health care proxy. Depending on the laws in your state, after the document is written, it may also need to be:  · Signed.  · Notarized.  · Dated.  · Copied.  · Witnessed.  · Incorporated into your medical record.  You may also want to appoint someone to manage your financial affairs in a situation in which you are unable to do so. This is called a durable power of  for finances. It is a separate legal document from the durable power of  for health care. You may choose the same person or someone different from your health care proxy to act as your agent in financial matters.  If you do not appoint a proxy, or if there is a concern that the proxy is not acting in your best interests, a court-appointed guardian may be designated to act on your behalf.  Living will  A living will is a set of instructions documenting your wishes about medical care when you cannot express them yourself.  Health care providers should keep a copy of your living will in your medical record. You may want to give a copy to family members or friends. To alert caregivers in case of an emergency, you can place a card in your wallet to let them know that you have a living will and where they can find it. A living will is used if you become:  · Terminally ill.  · Incapacitated.  · Unable to communicate or make decisions.  Items to consider in your living will include:  · The use or non-use of life-sustaining equipment, such as dialysis machines and breathing machines (ventilators).  · A DNR or DNAR order, which is the instruction not to use cardiopulmonary resuscitation (CPR) if breathing or heartbeat stops.  · The use or non-use of tube feeding.  · Withholding of food and fluids.  · Comfort (palliative) care when the goal becomes comfort rather than a cure.  · Organ and tissue donation.  A living will does not give instructions for distributing your money and property if you should pass away. It is recommended that you seek the advice of a  when writing a will. Decisions about taxes, beneficiaries, and asset distribution will be legally binding. This process can relieve your family and friends of any concerns surrounding disputes or questions that may come up about the distribution of your assets.  DNR or DNAR  A DNR or DNAR order is a request not to have CPR in the event that your heart stops beating or you stop breathing. If a DNR or DNAR order has not been made and shared, a health care provider will try to help any patient whose heart has stopped or who has stopped breathing. If you plan to have surgery, talk with your health care provider about how your DNR or DNAR order will be followed if problems occur.  Summary  · Advance directives are the legal documents that allow you to make choices ahead of time about your health care and medical treatment in case you become unable to communicate for yourself.  · The  process of discussing and writing advance directives should happen over time. You can change the advance directives, even after you have signed them.  · Advance directives include DNR or DNAR orders, living marmolejo, and designating an agent as your medical power of .  This information is not intended to replace advice given to you by your health care provider. Make sure you discuss any questions you have with your health care provider.  Document Released: 03/26/2009 Document Revised: 01/22/2020 Document Reviewed: 11/06/2017  [employer] Patient Education © 2020 [employer]

## 2020-06-01 NOTE — PROGRESS NOTES
The ABCs of the Annual Wellness Visit  Subsequent Medicare Wellness Visit    Chief Complaint   Patient presents with   • Medicare Wellness-subsequent   • Hyperlipidemia       Subjective   History of Present Illness:  Ashley Motta is a 80 y.o. female who presents for a Subsequent Medicare Wellness Visit.    HEALTH RISK ASSESSMENT    Recent Hospitalizations:  No hospitalization(s) within the last year.    Current Medical Providers:  Patient Care Team:  Erica Johnston MD as PCP - General (Internal Medicine)  Erica Johnston MD as PCP - Claims Attributed    Smoking Status:  Social History     Tobacco Use   Smoking Status Never Smoker   Smokeless Tobacco Never Used       Alcohol Consumption:  Social History     Substance and Sexual Activity   Alcohol Use No       Depression Screen:   PHQ-2/PHQ-9 Depression Screening 6/1/2020   Little interest or pleasure in doing things 0   Feeling down, depressed, or hopeless 0   Trouble falling or staying asleep, or sleeping too much 0   Feeling tired or having little energy 0   Poor appetite or overeating 0   Feeling bad about yourself - or that you are a failure or have let yourself or your family down 0   Trouble concentrating on things, such as reading the newspaper or watching television 0   Moving or speaking so slowly that other people could have noticed. Or the opposite - being so fidgety or restless that you have been moving around a lot more than usual 0   Thoughts that you would be better off dead, or of hurting yourself in some way -   Total Score 0   If you checked off any problems, how difficult have these problems made it for you to do your work, take care of things at home, or get along with other people? Not difficult at all       Fall Risk Screen:  STEADI Fall Risk Assessment was completed, and patient is at LOW risk for falls.Assessment completed on:6/1/2020    Health Habits and Functional and Cognitive Screening:  Functional & Cognitive Status 6/1/2020   Do you have  difficulty preparing food and eating? No   Do you have difficulty bathing yourself, getting dressed or grooming yourself? No   Do you have difficulty using the toilet? No   Do you have difficulty moving around from place to place? No   Do you have trouble with steps or getting out of a bed or a chair? No   Current Diet Well Balanced Diet   Dental Exam Up to date   Eye Exam Up to date   Exercise (times per week) 3 times per week   Current Exercise Activities Include Walking   Do you need help using the phone?  No   Are you deaf or do you have serious difficulty hearing?  No   Do you need help with transportation? No   Do you need help shopping? No   Do you need help preparing meals?  No   Do you need help with housework?  No   Do you need help with laundry? No   Do you need help taking your medications? No   Do you need help managing money? No   Do you ever drive or ride in a car without wearing a seat belt? No   Have you felt unusual stress, anger or loneliness in the last month? No   Who do you live with? Spouse   If you need help, do you have trouble finding someone available to you? No   Have you been bothered in the last four weeks by sexual problems? No   Do you have difficulty concentrating, remembering or making decisions? No         Does the patient have evidence of cognitive impairment? No    Asprin use counseling:Taking ASA appropriately as indicated    Age-appropriate Screening Schedule:  Refer to the list below for future screening recommendations based on patient's age, sex and/or medical conditions. Orders for these recommended tests are listed in the plan section. The patient has been provided with a written plan.    Health Maintenance   Topic Date Due   • TDAP/TD VACCINES (2 - Td) 01/01/2018   • DXA SCAN  08/28/2019   • INFLUENZA VACCINE  08/01/2020   • MAMMOGRAM  10/11/2020   • LIPID PANEL  05/27/2021   • COLONOSCOPY  03/04/2025   • ZOSTER VACCINE  Completed          The following portions of the  patient's history were reviewed and updated as appropriate: allergies, current medications, past family history, past medical history, past social history, past surgical history and problem list.    Outpatient Medications Prior to Visit   Medication Sig Dispense Refill   • aspirin 81 MG tablet Take 81 mg by mouth daily.     • B Complex Vitamins (VITAMIN B COMPLEX PO) Take  by mouth.     • Calcium Carbonate-Vitamin D (CALCIUM + D PO) Take  by mouth 2 (Two) Times a Day.     • Coenzyme Q10 (COQ10) 200 MG capsule Take  by mouth.     • gabapentin (NEURONTIN) 100 MG capsule Take 1 capsule by mouth at bedtime 90 capsule 0   • Magnesium 400 MG tablet Take  by mouth.     • Misc Natural Products (GLUCOSAMINE CHONDROITIN VIT D3) capsule Take  by mouth.     • Multiple Vitamins-Minerals (MULTIVITAMIN ADULT PO) Take  by mouth.     • Omega-3 Fatty Acids (FISH OIL) 1000 MG capsule capsule Take  by mouth daily with breakfast.     • pantoprazole (PROTONIX) 20 MG EC tablet Take 1 tablet by mouth Daily. 90 tablet 1   • Probiotic Product (PROBIOTIC DAILY PO) Take  by mouth.     • rOPINIRole (REQUIP) 0.5 MG tablet TAKE 1 TABLET BY MOUTH IN THE EVENING 1  HOUR  BEFORE  BEDTIME 90 tablet 1   • simvastatin (ZOCOR) 40 MG tablet TAKE 1 TABLET BY MOUTH AT BEDTIME 90 tablet 1     No facility-administered medications prior to visit.        Patient Active Problem List   Diagnosis   • Osteopenia   • Hyperlipidemia   • Pre-diabetes   • Osteoarthritis   • BALDEMAR on CPAP   • Pelvic relaxation due to vaginal prolapse   • Chronic pain of both knees   • Arthritis of right knee   • Arthritis of left knee   • Arthritis of both knees   • Restless legs   • Vertigo   • Headache, migraine   • Heartburn       Advanced Care Planning:  ACP discussion was held with the patient during this visit. Patient does not have an advance directive, information provided.    Review of Systems    Compared to one year ago, the patient feels her physical health is the same. She  "has gained weight, but feels health is about the same  Compared to one year ago, the patient feels her mental health is the same.    Reviewed chart for potential of high risk medication in the elderly: yes  Reviewed chart for potential of harmful drug interactions in the elderly:yes    Objective         Vitals:    06/01/20 1057   BP: 120/72   Pulse: 84   SpO2: 100%   Weight: 80.7 kg (178 lb)   Height: 160 cm (63\")       Body mass index is 31.53 kg/m².  Discussed the patient's BMI with her. The BMI above average - encouraged prudent diet and regular exercise.    Physical Exam    Lab Results   Component Value Date    TRIG 124 05/27/2020    HDL 48 05/27/2020    LDL 37 05/27/2020    VLDL 24.8 05/27/2020    HGBA1C 6.40 (H) 05/27/2020        Assessment/Plan   Medicare Risks and Personalized Health Plan  CMS Preventative Services Quick Reference  Immunizations Discussed/Encouraged (specific immunizations; Td )  Osteoprorosis Risk    The above risks/problems have been discussed with the patient.  Pertinent information has been shared with the patient in the After Visit Summary.  Follow up plans and orders are seen below in the Assessment/Plan Section.    Diagnoses and all orders for this visit:    1. Medicare annual wellness visit, subsequent (Primary)    2. Abnormal urinalysis  -     Urinalysis With Culture If Indicated - Urine, Clean Catch      Follow Up:  No follow-ups on file.     An After Visit Summary and PPPS were given to the patient.           "

## 2020-06-01 NOTE — PROGRESS NOTES
Chief Complaint   Patient presents with   • Medicare Wellness-subsequent   • Hyperlipidemia       Subjective   Ashley Motta is a 80 y.o. female.     History of Present Illness     Hyperlipidemia:    This is a chronic problem.  Her most recent lipid panel showed:  Lab Results   Component Value Date    CHOL 110 05/27/2020    CHLPL 119 10/04/2016    TRIG 124 05/27/2020    HDL 48 05/27/2020    LDL 37 05/27/2020     Current treatment: Simvastatin.  Prudent diet and regular exercise have also been recommended.  No management changes were made at her last appointment.   By report, there is good compliance with treatment, good tolerance of treatment.  She has not experienced statin associated myalgias, dyspepsia.  She has mild liver enzyme elevation.        Pre-diabetes/Elevated fasting glucose:  This is a chronic problem.  Patient has been advised to follow prudent diet, avoid sugar, exercise regularly.  She denies polyuria, polydipsia, vision change appetite change, unexplained weight loss.   Lab Results   Component Value Date    HGBA1C 6.40 (H) 05/27/2020          The following portions of the patient's history were reviewed and updated as appropriate: allergies, current medications, past family history, past medical history, past social history, past surgical history and problem list.    Review of Systems   Constitutional: Negative for appetite change.   HENT: Negative for nosebleeds.    Eyes: Negative for blurred vision and double vision.   Respiratory: Negative for cough and shortness of breath.    Cardiovascular: Negative for chest pain, palpitations and leg swelling.   Endocrine: Negative for polydipsia and polyuria.         Current Outpatient Medications:   •  aspirin 81 MG tablet, Take 81 mg by mouth daily., Disp: , Rfl:   •  B Complex Vitamins (VITAMIN B COMPLEX PO), Take  by mouth., Disp: , Rfl:   •  Calcium Carbonate-Vitamin D (CALCIUM + D PO), Take  by mouth 2 (Two) Times a Day., Disp: , Rfl:   •  Coenzyme  "Q10 (COQ10) 200 MG capsule, Take  by mouth., Disp: , Rfl:   •  gabapentin (NEURONTIN) 100 MG capsule, Take 1 capsule by mouth at bedtime, Disp: 90 capsule, Rfl: 0  •  Magnesium 400 MG tablet, Take  by mouth., Disp: , Rfl:   •  Misc Natural Products (GLUCOSAMINE CHONDROITIN VIT D3) capsule, Take  by mouth., Disp: , Rfl:   •  Multiple Vitamins-Minerals (MULTIVITAMIN ADULT PO), Take  by mouth., Disp: , Rfl:   •  Omega-3 Fatty Acids (FISH OIL) 1000 MG capsule capsule, Take  by mouth daily with breakfast., Disp: , Rfl:   •  pantoprazole (PROTONIX) 20 MG EC tablet, Take 1 tablet by mouth Daily., Disp: 90 tablet, Rfl: 1  •  Probiotic Product (PROBIOTIC DAILY PO), Take  by mouth., Disp: , Rfl:   •  rOPINIRole (REQUIP) 0.5 MG tablet, TAKE 1 TABLET BY MOUTH IN THE EVENING 1  HOUR  BEFORE  BEDTIME, Disp: 90 tablet, Rfl: 1  •  simvastatin (ZOCOR) 40 MG tablet, TAKE 1 TABLET BY MOUTH AT BEDTIME, Disp: 90 tablet, Rfl: 1        Objective     /72   Pulse 84   Ht 160 cm (63\")   Wt 80.7 kg (178 lb)   SpO2 100%   BMI 31.53 kg/m²     Physical Exam   Constitutional: She is oriented to person, place, and time. She appears well-developed and well-nourished. No distress.   Neck: Normal carotid pulses present. Carotid bruit is not present.   Cardiovascular: Regular rhythm, S1 normal and S2 normal. Exam reveals no gallop and no friction rub.   No murmur heard.  Pulses:       Carotid pulses are 2+ on the right side, and 2+ on the left side.  Pulmonary/Chest: Effort normal and breath sounds normal. She has no wheezes. She has no rhonchi. She has no rales. Chest wall is not dull to percussion.   Musculoskeletal: She exhibits no edema.   Neurological: She is alert and oriented to person, place, and time.   Skin: Skin is warm and dry.   Nursing note and vitals reviewed.        Assessment/Plan   Ashley was seen today for medicare wellness-subsequent and hyperlipidemia.    Diagnoses and all orders for this visit:    Medicare annual " wellness visit, subsequent    Abnormal urinalysis  -     Cancel: Urinalysis With Culture If Indicated - Urine, Clean Catch  -     Urinalysis With Microscopic If Indicated (No Culture) - Urine, Clean Catch; Future    Hyperlipidemia, unspecified hyperlipidemia type    Pre-diabetes      Discussed results of recent lab work with her.  Urinalysis suggest UTI.  Repeat is done today.Encouraged prudent diet, regular exercise, maintenance of healthy weight, good sleep habits,  avoidance of tobacco products, excessive alcohol. In regard to COVID pandemic, encouraged social distancing, wearing a mask while out, frequent handwashing.

## 2020-06-02 DIAGNOSIS — E78.5 HYPERLIPIDEMIA, UNSPECIFIED HYPERLIPIDEMIA TYPE: ICD-10-CM

## 2020-06-02 DIAGNOSIS — R73.03 PRE-DIABETES: Primary | ICD-10-CM

## 2020-06-03 LAB
BACTERIA SPEC AEROBE CULT: ABNORMAL
STREP GROUPING: ABNORMAL

## 2020-06-03 RX ORDER — AMOXICILLIN 875 MG/1
875 TABLET, COATED ORAL 2 TIMES DAILY
Qty: 14 TABLET | Refills: 0 | Status: SHIPPED | OUTPATIENT
Start: 2020-06-03 | End: 2020-06-10

## 2020-06-04 ENCOUNTER — CLINICAL SUPPORT (OUTPATIENT)
Dept: ORTHOPEDIC SURGERY | Facility: CLINIC | Age: 80
End: 2020-06-04

## 2020-06-04 VITALS — HEIGHT: 63 IN | TEMPERATURE: 98.1 F | WEIGHT: 178.2 LBS | BODY MASS INDEX: 31.57 KG/M2

## 2020-06-04 DIAGNOSIS — M17.12 ARTHRITIS OF LEFT KNEE: Primary | ICD-10-CM

## 2020-06-04 PROCEDURE — 99213 OFFICE O/P EST LOW 20 MIN: CPT | Performed by: ORTHOPAEDIC SURGERY

## 2020-06-04 PROCEDURE — 73562 X-RAY EXAM OF KNEE 3: CPT | Performed by: ORTHOPAEDIC SURGERY

## 2020-06-04 PROCEDURE — 20610 DRAIN/INJ JOINT/BURSA W/O US: CPT | Performed by: ORTHOPAEDIC SURGERY

## 2020-06-04 RX ORDER — METHYLPREDNISOLONE ACETATE 80 MG/ML
80 INJECTION, SUSPENSION INTRA-ARTICULAR; INTRALESIONAL; INTRAMUSCULAR; SOFT TISSUE
Status: COMPLETED | OUTPATIENT
Start: 2020-06-04 | End: 2020-06-04

## 2020-06-04 RX ADMIN — METHYLPREDNISOLONE ACETATE 80 MG: 80 INJECTION, SUSPENSION INTRA-ARTICULAR; INTRALESIONAL; INTRAMUSCULAR; SOFT TISSUE at 07:35

## 2020-06-04 NOTE — PROGRESS NOTES
Patient Name: Ashley Motta   YOB: 1940  Referring Primary Care Physician: Erica Johnston MD  BMI: Body mass index is 31.57 kg/m².    Chief Complaint:    Chief Complaint   Patient presents with   • Left Knee - Follow-up, Pain        HPI:     Ashley Motta is a 80 y.o. female who presents today for evaluation of   Chief Complaint   Patient presents with   • Left Knee - Follow-up, Pain   .  Recurrent acute on chronic left knee pain.  Hurts medially it is achy stiff and swollen.  She is had shots in the past which helps.  She normally does aqua exercise at the [facility] but with it being close the COVID she is doing other forms of exercise.  She also is doing a home exercise program and she says that the knee is not bothered enough to consider surgery at this point to go over the options.    This problem is not new to this examiner.     Subjective   Medications:   Home Medications:  Current Outpatient Medications on File Prior to Visit   Medication Sig   • amoxicillin (AMOXIL) 875 MG tablet Take 1 tablet by mouth 2 (Two) Times a Day for 7 days.   • aspirin 81 MG tablet Take 81 mg by mouth daily.   • B Complex Vitamins (VITAMIN B COMPLEX PO) Take  by mouth.   • Calcium Carbonate-Vitamin D (CALCIUM + D PO) Take  by mouth 2 (Two) Times a Day.   • Coenzyme Q10 (COQ10) 200 MG capsule Take  by mouth.   • gabapentin (NEURONTIN) 100 MG capsule Take 1 capsule by mouth at bedtime   • Magnesium 400 MG tablet Take  by mouth.   • Misc Natural Products (GLUCOSAMINE CHONDROITIN VIT D3) capsule Take  by mouth.   • Multiple Vitamins-Minerals (MULTIVITAMIN ADULT PO) Take  by mouth.   • Omega-3 Fatty Acids (FISH OIL) 1000 MG capsule capsule Take  by mouth daily with breakfast.   • pantoprazole (PROTONIX) 20 MG EC tablet Take 1 tablet by mouth Daily.   • Probiotic Product (PROBIOTIC DAILY PO) Take  by mouth.   • rOPINIRole (REQUIP) 0.5 MG tablet TAKE 1 TABLET BY MOUTH IN THE EVENING 1  HOUR  BEFORE  BEDTIME   • simvastatin  (ZOCOR) 40 MG tablet TAKE 1 TABLET BY MOUTH AT BEDTIME     No current facility-administered medications on file prior to visit.      Current Medications:  Scheduled Meds:  Continuous Infusions:  No current facility-administered medications for this visit.   PRN Meds:.    I have reviewed the patient's medical history in detail and updated the computerized patient record.  Review and summarization of old records includes:    Past Medical History:   Diagnosis Date   • Elevated fasting glucose    • GERD (gastroesophageal reflux disease)    • Goiter     THYROID POLYP SEES    • Hyperlipidemia    • Hypersomnia    • BALDEMAR on CPAP    • Osteoarthritis    • Osteopenia    • RLS (restless legs syndrome)         Past Surgical History:   Procedure Laterality Date   • CATARACT EXTRACTION, BILATERAL Bilateral 2015   • COLONOSCOPY N/A 02/27/2015    DR. DARRIAN LIRIANO   • COLONOSCOPY N/A 3/4/2020    Procedure: COLONOSCOPY into cecum with cold biopsy polypectomies;  Surgeon: Darrian Liriano MD;  Location: [facility] ENDOSCOPY;  Service: Gastroenterology;  Laterality: N/A;  Pre op: History of Polyps  Post op: Polyps   • HYSTERECTOMY  2004    Total   • KNEE SURGERY Left 2013   • KNEE SURGERY Right 2010   • SHOULDER ARTHROSCOPY Left         Social History     Occupational History   • Not on file   Tobacco Use   • Smoking status: Never Smoker   • Smokeless tobacco: Never Used   Substance and Sexual Activity   • Alcohol use: No   • Drug use: No   • Sexual activity: Defer     Birth control/protection: Post-menopausal      Social History     Social History Narrative   • Not on file        Family History   Problem Relation Age of Onset   • Heart disease Mother    • Lung cancer Father    • Hypertension Father    • Heart disease Father    • Heart attack Brother    • Emphysema Brother    • COPD Brother    • Depression Brother    • Heart attack Brother         Had stents placed 2009       ROS: 14 point review of systems was performed and all  "other systems were reviewed and are negative except for documented findings in HPI and today's encounter.     Allergies: No Known Allergies  Constitutional:  Denies fever, shaking or chills   Eyes:  Denies change in visual acuity   HENT:  Denies nasal congestion or sore throat   Respiratory:  Denies cough or shortness of breath   Cardiovascular:  Denies chest pain or severe LE edema   GI:  Denies abdominal pain, nausea, vomiting, bloody stools or diarrhea   Musculoskeletal:  Numbness, tingling, pain, or loss of motor function only as noted above in history of present illness.  : Denies painful urination or hematuria  Integument:  Denies rash, lesion or ulceration   Neurologic:  Denies headache or focal weakness  Endocrine:  Denies lymphadenopathy  Psych:  Denies confusion or change in mental status   Hem:  Denies active bleeding    OBJECTIVE:  Physical Exam: 80 y.o. female  Wt Readings from Last 3 Encounters:   06/04/20 80.8 kg (178 lb 3.2 oz)   06/01/20 80.7 kg (178 lb)   03/04/20 77.6 kg (171 lb)     Ht Readings from Last 1 Encounters:   06/04/20 160 cm (63\")     Body mass index is 31.57 kg/m².  Vitals:    06/04/20 0816   Temp: 98.1 °F (36.7 °C)     Vital signs reviewed.     General Appearance:    Alert, cooperative, in no acute distress                  Eyes: conjunctiva clear  ENT: external ears and nose atraumatic  CV: no peripheral edema  Resp: normal respiratory effort  Skin: no rashes or wounds; normal turgor  Psych: mood and affect appropriate  Lymph: no nodes appreciated  Neuro: gross sensation intact  Vascular:  Palpable peripheral pulse in noted extremity  Musculoskeletal Extremities: Left knee with crepitation synovitis swelling varus medial joint line tenderness some synovitis and slight start up limp a little bit of stiffness at extremes    Radiology:   AP lateral 40 degree PA x-rays left knee taken the office today with comparison view show advanced arthritis with bone-on-bone " medially    Assessment:     ICD-10-CM ICD-9-CM   1. Arthritis of left knee M17.12 716.96        Large Joint Arthrocentesis: L knee  Date/Time: 6/4/2020 7:35 AM  Consent given by: patient  Site marked: site marked  Timeout: Immediately prior to procedure a time out was called to verify the correct patient, procedure, equipment, support staff and site/side marked as required   Supporting Documentation  Indications: pain   Procedure Details  Location: knee - L knee  Preparation: Patient was prepped and draped in the usual sterile fashion  Needle gauge: 21.  Approach: anterolateral  Medications administered: 4 mL lidocaine (cardiac); 80 mg methylPREDNISolone acetate 80 MG/ML  Patient tolerance: patient tolerated the procedure well with no immediate complications             Plan: EXERCISES:  Advice on benefits of, and types of regular/moderate exercise including biomechanical forces involved as it pertains to this complaint.  RICE: Rest, ice, compression, and elevation therapy, Cryotherapy/brachy therapy, and or OTC linaments as indicated with instructions.   Cortisone Injection. See procedure note.      6/4/2020    Much of this encounter note is an electronic transcription/translation of spoken language to printed text. The electronic translation of spoken language may permit erroneous, or at times, nonsensical words or phrases to be inadvertently transcribed; Although I have reviewed the note for such errors, some may still exist

## 2020-06-17 ENCOUNTER — LAB (OUTPATIENT)
Dept: INTERNAL MEDICINE | Facility: CLINIC | Age: 80
End: 2020-06-17

## 2020-06-17 DIAGNOSIS — Z87.440 HISTORY OF UTI: Primary | ICD-10-CM

## 2020-06-17 DIAGNOSIS — R82.90 ABNORMAL URINE FINDINGS: ICD-10-CM

## 2020-06-18 LAB
APPEARANCE UR: ABNORMAL
BACTERIA #/AREA URNS HPF: ABNORMAL /HPF
BILIRUB UR QL STRIP: NEGATIVE
CASTS URNS MICRO: ABNORMAL
COLOR UR: YELLOW
EPI CELLS #/AREA URNS HPF: ABNORMAL /HPF
GLUCOSE UR QL: NEGATIVE
HGB UR QL STRIP: NEGATIVE
KETONES UR QL STRIP: NEGATIVE
LEUKOCYTE ESTERASE UR QL STRIP: ABNORMAL
NITRITE UR QL STRIP: NEGATIVE
PH UR STRIP: 8 [PH] (ref 5–8)
PROT UR QL STRIP: NEGATIVE
RBC #/AREA URNS HPF: ABNORMAL /HPF
SP GR UR: 1.02 (ref 1–1.03)
UROBILINOGEN UR STRIP-MCNC: ABNORMAL MG/DL
WBC #/AREA URNS HPF: ABNORMAL /HPF

## 2020-06-20 LAB
BACTERIA UR CULT: NORMAL
BACTERIA UR CULT: NORMAL

## 2020-06-26 ENCOUNTER — HOSPITAL ENCOUNTER (OUTPATIENT)
Facility: HOSPITAL | Age: 80
Setting detail: HOSPITAL OUTPATIENT SURGERY
End: 2020-06-26
Attending: COLON & RECTAL SURGERY | Admitting: COLON & RECTAL SURGERY

## 2020-06-26 ENCOUNTER — OFFICE VISIT (OUTPATIENT)
Dept: SURGERY | Facility: CLINIC | Age: 80
End: 2020-06-26

## 2020-06-26 VITALS
HEART RATE: 72 BPM | OXYGEN SATURATION: 99 % | WEIGHT: 165 LBS | TEMPERATURE: 97.9 F | SYSTOLIC BLOOD PRESSURE: 106 MMHG | BODY MASS INDEX: 29.23 KG/M2 | HEIGHT: 63 IN | DIASTOLIC BLOOD PRESSURE: 70 MMHG

## 2020-06-26 DIAGNOSIS — K64.8 INTERNAL HEMORRHOIDS WITH COMPLICATION: Primary | ICD-10-CM

## 2020-06-26 DIAGNOSIS — A63.0 CONDYLOMA: ICD-10-CM

## 2020-06-26 PROCEDURE — 46600 DIAGNOSTIC ANOSCOPY SPX: CPT | Performed by: COLON & RECTAL SURGERY

## 2020-06-26 PROCEDURE — 99204 OFFICE O/P NEW MOD 45 MIN: CPT | Performed by: COLON & RECTAL SURGERY

## 2020-06-26 RX ORDER — CEFAZOLIN SODIUM 2 G/100ML
2 INJECTION, SOLUTION INTRAVENOUS ONCE
Status: CANCELLED | OUTPATIENT
Start: 2020-07-09 | End: 2020-06-26

## 2020-06-26 NOTE — PROGRESS NOTES
Ashley Motta is a 80 y.o. female who is seen as a consult at the request of Darrian Liriano MD for anal nodule    HPI:    Pt c/o lump at her bottom that sometimes bothers her.  She denies bleeding or extruding tissue.  She had a colonoscopy with Dr. Liriano 3/4/2020: sessile serrated adenoma in the ascending colon, hyperplastic polyp distal rectum, and 7 mm mucosal nodule at anus.    No hx abnl Pap.  She had hysterectomy for uterine prolapse.    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 STEWART SAYIED   • Osteoarthritis    • Osteopenia    • PLMD (periodic limb movement disorder)    • Postmenopausal atrophic vaginitis    • Prediabetes    • Presbyopia    • Rectal nodule 03/2020    REFERRED TO DR. PAMELA NAPOLES   • Rectocele 07/2017   • Regular astigmatism of both eyes    • Rheumatoid arthritis (CMS/HCC)    • RLS (restless legs syndrome)    • Seborrheic keratosis    • Skin cancer 04/2015    LOWER LEG, FOLLOWED BY DR. EDI SANCHEZ   • Urinary incontinence 09/2017   • Vaginal vault prolapse 09/2017   • Vertigo 10/18/2018    ADMITTED TO [facility]   • Vitreoretinal degeneration of both eyes         Past Surgical History:   Procedure Laterality Date   • CATARACT EXTRACTION, BILATERAL Bilateral 2015   • COLONOSCOPY N/A 02/27/2015    1 TUB[facility]   • COLONOSCOPY N/A 3/4/2020    10 MM SESSILE SERRATED ADENOMA POLYP IN ASCENDING, 3 MM HYPERPLASTIC POLYP IN RECTUM, 7 MM ANAL NODULE, DR. SARAH LIRIANO AT [facility]   • HYSTERECTOMY N/A 01/19/2004    KAYLEE WITH BSO, DR. RAFIA MORTENSEN AT [facility]   • KNEE ARTHROSCOPY Left 2013   • KNEE ARTHROSCOPY Right 2010   • MELISSA CULDOPLASTY N/A 01/19/2004    DR. RAFIA MORTENSEN AT [facility]   • SACROCOLPOPEXY N/A 01/19/2004    SACROVAGINAL SLING, DR. RAFIA MORTENSEN AT [facility]   • SHOULDER ARTHROSCOPY Left        Social History:   reports that she has never smoked. She has never used smokeless tobacco. She reports that she does not drink alcohol or use drugs.      Marriage status:     Family History   Problem Relation Age of Onset   • Heart disease Mother    • Lung cancer Father    • Hypertension Father    • Heart disease Father    • Cancer Father    • Heart attack Brother    • Emphysema Brother    • COPD Brother    • Depression Brother    • Heart attack Brother         Had stents placed 2009         Current Outpatient Medications:   •  aspirin 81 MG tablet, Take 81 mg by mouth daily., Disp: , Rfl:   •  Calcium Carbonate-Vitamin D (CALCIUM + D PO), Take  by mouth 2 (Two) Times a Day., Disp: , Rfl:   •  ECHINACEA-ZINC-VITAMIN C PO, Take 1,000 mg by mouth., Disp: , Rfl:   •  gabapentin (NEURONTIN) 100 MG capsule, Take 1 capsule by mouth at bedtime, Disp: 90 capsule, Rfl: 0  •  Misc Natural Products (GLUCOSAMINE CHONDROITIN VIT D3) capsule, Take  by mouth., Disp: , Rfl:   •  Multiple Vitamins-Minerals (MULTIVITAMIN ADULT PO), Take  by mouth., Disp: , Rfl:   •  Omega-3 Fatty Acids (FISH OIL) 1000 MG capsule capsule, Take  by mouth daily with breakfast., Disp: , Rfl:   •  pantoprazole (PROTONIX) 20 MG EC tablet, Take 1 tablet by  mouth Daily., Disp: 90 tablet, Rfl: 1  •  rOPINIRole (REQUIP) 0.5 MG tablet, TAKE 1 TABLET BY MOUTH IN THE EVENING 1  HOUR  BEFORE  BEDTIME, Disp: 90 tablet, Rfl: 1  •  simvastatin (ZOCOR) 40 MG tablet, TAKE 1 TABLET BY MOUTH AT BEDTIME, Disp: 90 tablet, Rfl: 1  •  B Complex Vitamins (VITAMIN B COMPLEX PO), Take  by mouth., Disp: , Rfl:   •  Coenzyme Q10 (COQ10) 200 MG capsule, Take  by mouth., Disp: , Rfl:   •  Magnesium 400 MG tablet, Take  by mouth., Disp: , Rfl:   •  Probiotic Product (PROBIOTIC DAILY PO), Take  by mouth., Disp: , Rfl:     Allergy  Patient has no known allergies.    Review of Systems   Constitution: Negative for decreased appetite and weight gain.   HENT: Negative for congestion, hearing loss and hoarse voice.    Eyes: Negative for blurred vision, discharge and visual disturbance.   Cardiovascular: Negative for chest pain, cyanosis and leg swelling.   Respiratory: Negative for cough, shortness of breath, sleep disturbances due to breathing and snoring.    Endocrine: Negative for cold intolerance and heat intolerance.   Hematologic/Lymphatic: Does not bruise/bleed easily.   Skin: Negative for itching, poor wound healing and skin cancer.   Musculoskeletal: Positive for arthritis and joint pain. Negative for back pain and joint swelling.   Gastrointestinal: Negative for abdominal pain, change in bowel habit, bowel incontinence and constipation.   Genitourinary: Negative for bladder incontinence, dysuria and hematuria.   Neurological: Negative for brief paralysis, excessive daytime sleepiness, dizziness, focal weakness, headaches, light-headedness and weakness.   Psychiatric/Behavioral: Negative for altered mental status and hallucinations. The patient does not have insomnia.    Allergic/Immunologic: Negative for HIV exposure and persistent infections.   All other systems reviewed and are negative.      Vitals:    06/26/20 1026   BP: 106/70   Pulse: 72   Temp: 97.9 °F (36.6 °C)   SpO2: 99%     Body  mass index is 29.23 kg/m².    Physical Exam   Constitutional: She is oriented to person, place, and time. She appears well-developed and well-nourished. No distress.   HENT:   Head: Normocephalic and atraumatic.   Nose: Nose normal.   Mouth/Throat: Oropharynx is clear and moist.   Eyes: Pupils are equal, round, and reactive to light. Conjunctivae and EOM are normal.   Neck: Normal range of motion. No tracheal deviation present.   Pulmonary/Chest: Effort normal and breath sounds normal. No respiratory distress.   Abdominal: Soft. She exhibits no distension.   Genitourinary:   Genitourinary Comments: Perianal exam: external hem - wnl.  Small left anterior anal polyp.  Right anterior gluteal small cluster of verrucous tissue  Cystocele visible from external vagina  TIMOTEO- adequate tone, no masses  Anoscopy performed:  Grade 3 x 2 internal hem   Musculoskeletal: Normal range of motion. She exhibits no edema or deformity.   Neurological: She is alert and oriented to person, place, and time. No cranial nerve deficit. Coordination and gait normal.   Skin: Skin is warm and dry.   Psychiatric: She has a normal mood and affect. Her behavior is normal. Judgment normal.       Review of Medical Record:  I reviewed records colonoscopy with Dr. Rico 3/4/2020: sessile serrated adenoma in the ascending colon, hyperplastic polyp distal rectum, and 7 mm mucosal nodule at anus    Assessment:  1. Internal hemorrhoids with complication    2. Condyloma        Plan:    I recommend hemorrhoidectomy and excision/fulguration gluteal condyloma.  I discussed risk including bleeding, infection, injury to sphincters; benefits; and alternatives.  I discussed in detail expected recovery, possible urinary retention, time off activities, and healing.  Patient expresses understanding and wishes to proceed.    Offered referral to urology or GYN for cystocele.  Pt states as she is not having any urinary leakage, she is not interested at this  time.        Scribed for Tono Munoz MD by Naila Gill PA-C  6/26/2020   This patient was evaluated by me, recommendations made, documentation reviewed, edited, and revised by me, Tono Munoz MD

## 2020-07-01 LAB — REF LAB TEST METHOD: NORMAL

## 2020-07-07 ENCOUNTER — APPOINTMENT (OUTPATIENT)
Dept: PREADMISSION TESTING | Facility: HOSPITAL | Age: 80
End: 2020-07-07

## 2020-08-22 DIAGNOSIS — G43.809 OTHER MIGRAINE WITHOUT STATUS MIGRAINOSUS, NOT INTRACTABLE: ICD-10-CM

## 2020-08-24 RX ORDER — GABAPENTIN 100 MG/1
CAPSULE ORAL
Qty: 90 CAPSULE | Refills: 1 | Status: SHIPPED | OUTPATIENT
Start: 2020-08-24 | End: 2020-12-30

## 2020-09-28 ENCOUNTER — TELEPHONE (OUTPATIENT)
Dept: ORTHOPEDIC SURGERY | Facility: CLINIC | Age: 80
End: 2020-09-28

## 2020-10-02 ENCOUNTER — CLINICAL SUPPORT (OUTPATIENT)
Dept: ORTHOPEDIC SURGERY | Facility: CLINIC | Age: 80
End: 2020-10-02

## 2020-10-02 VITALS — BODY MASS INDEX: 31.01 KG/M2 | HEIGHT: 63 IN | WEIGHT: 175 LBS | TEMPERATURE: 96.7 F

## 2020-10-02 DIAGNOSIS — M17.12 ARTHRITIS OF LEFT KNEE: Primary | ICD-10-CM

## 2020-10-02 PROCEDURE — 20610 DRAIN/INJ JOINT/BURSA W/O US: CPT | Performed by: ORTHOPAEDIC SURGERY

## 2020-10-02 PROCEDURE — 99213 OFFICE O/P EST LOW 20 MIN: CPT | Performed by: ORTHOPAEDIC SURGERY

## 2020-10-02 RX ORDER — METHYLPREDNISOLONE ACETATE 80 MG/ML
80 INJECTION, SUSPENSION INTRA-ARTICULAR; INTRALESIONAL; INTRAMUSCULAR; SOFT TISSUE
Status: COMPLETED | OUTPATIENT
Start: 2020-10-02 | End: 2020-10-02

## 2020-10-02 RX ADMIN — METHYLPREDNISOLONE ACETATE 80 MG: 80 INJECTION, SUSPENSION INTRA-ARTICULAR; INTRALESIONAL; INTRAMUSCULAR; SOFT TISSUE at 07:44

## 2020-10-02 NOTE — PROGRESS NOTES
Patient Name: Ashley Motta   YOB: 1940  Referring Primary Care Physician: Erica Johnston MD  BMI: Body mass index is 31 kg/m².    Chief Complaint:    Chief Complaint   Patient presents with   • Left Knee - Follow-up        HPI:     Ashley Motta is a 80 y.o. female who presents today for evaluation of   Chief Complaint   Patient presents with   • Left Knee - Follow-up   .  Acute on chronic left knee pain.  Went over her options for treatment and discussed them with her.    This problem is not new to this examiner.     Subjective   Medications:   Home Medications:  Current Outpatient Medications on File Prior to Visit   Medication Sig   • aspirin 81 MG tablet Take 81 mg by mouth daily.   • B Complex Vitamins (VITAMIN B COMPLEX PO) Take  by mouth.   • Calcium Carbonate-Vitamin D (CALCIUM + D PO) Take  by mouth 2 (Two) Times a Day.   • Coenzyme Q10 (COQ10) 200 MG capsule Take  by mouth.   • ECHINACEA-ZINC-VITAMIN C PO Take 1,000 mg by mouth.   • gabapentin (NEURONTIN) 100 MG capsule Take 1 capsule by mouth at bedtime   • Magnesium 400 MG tablet Take  by mouth.   • Misc Natural Products (GLUCOSAMINE CHONDROITIN VIT D3) capsule Take  by mouth.   • Multiple Vitamins-Minerals (MULTIVITAMIN ADULT PO) Take  by mouth.   • Omega-3 Fatty Acids (FISH OIL) 1000 MG capsule capsule Take  by mouth daily with breakfast.   • pantoprazole (PROTONIX) 20 MG EC tablet Take 1 tablet by mouth Daily.   • Probiotic Product (PROBIOTIC DAILY PO) Take  by mouth.   • rOPINIRole (REQUIP) 0.5 MG tablet TAKE 1 TABLET BY MOUTH IN THE EVENING 1  HOUR  BEFORE  BEDTIME   • simvastatin (ZOCOR) 40 MG tablet TAKE 1 TABLET BY MOUTH AT BEDTIME     No current facility-administered medications on file prior to visit.      Current Medications:  Scheduled Meds:  Continuous Infusions:No current facility-administered medications for this visit.     PRN Meds:.    I have reviewed the patient's medical history in detail and updated the computerized  patient record.  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 TERRY STEWARTIED   • Osteoarthritis    • Osteopenia    • PLMD (periodic limb movement disorder)    • Postmenopausal atrophic vaginitis    • Prediabetes    • Presbyopia    • Rectal nodule 03/2020    REFERRED TO DR. PAMELA NAPOLES   • Rectocele 07/2017   • Regular astigmatism of both eyes    • Rheumatoid arthritis (CMS/HCC)    • RLS (restless legs syndrome)    • Seborrheic keratosis    • Skin cancer 04/2015    LOWER LEG, FOLLOWED BY DR. EDI SANCHEZ   • Urinary incontinence 09/2017   • Vaginal vault prolapse 09/2017   • Vertigo 10/18/2018    ADMITTED TO [facility]   • Vitreoretinal degeneration of both eyes         Past Surgical History:   Procedure Laterality Date   • CATARACT EXTRACTION, BILATERAL Bilateral 2015   • COLONOSCOPY N/A 02/27/2015    1 TUBULAR ADENOMA POLYP IN DISTAL [facility]END[facility]   • COLONOSCOPY N/A 3/4/2020    10 MM SESSILE SERRATED ADENOMA POLYP IN ASCENDING, 3 MM HYPERPLASTIC POLYP IN RECTUM, 7 MM ANAL NODULE, DR. SARAH LIRIANO AT [facility]   • HYSTERECTOMY N/A  01/19/2004    KAYLEE WITH BSO, DR. RAFIA MORTENSEN AT [facility]   • KNEE ARTHROSCOPY Left 2013   • KNEE ARTHROSCOPY Right 2010   • MELISSA CULDOPLASTY N/A 01/19/2004    DR. RAFIA MORTENSEN AT [facility]   • SACROCOLPOPEXY N/A 01/19/2004    SACROVAGINAL SLING, DR. RAFIA MORTENSEN AT [facility]   • SHOULDER ARTHROSCOPY Left         Social History     Occupational History   • Not on file   Tobacco Use   • Smoking status: Never Smoker   • Smokeless tobacco: Never Used   Substance and Sexual Activity   • Alcohol use: No   • Drug use: Never   • Sexual activity: Not Currently     Birth control/protection: Post-menopausal, Surgical     Comment: .      Social History     Social History Narrative   • Not on file        Family History   Problem Relation Age of Onset   • Heart disease Mother    • Lung cancer Father    • Hypertension Father    • Heart disease Father    • Cancer Father    • Heart attack Brother    • Emphysema Brother    • COPD Brother    • Depression Brother    • Heart attack Brother         Had stents placed 2009       ROS: 14 point review of systems was performed and all other systems were reviewed and are negative except for documented findings in HPI and today's encounter.     Allergies: No Known Allergies  Constitutional:  Denies fever, shaking or chills   Eyes:  Denies change in visual acuity   HENT:  Denies nasal congestion or sore throat   Respiratory:  Denies cough or shortness of breath   Cardiovascular:  Denies chest pain or severe LE edema   GI:  Denies abdominal pain, nausea, vomiting, bloody stools or diarrhea   Musculoskeletal:  Numbness, tingling, pain, or loss of motor function only as noted above in history of present illness.  : Denies painful urination or hematuria  Integument:  Denies rash, lesion or ulceration   Neurologic:  Denies headache or focal weakness  Endocrine:  Denies lymphadenopathy  Psych:  Denies confusion or change in mental status   Hem:  Denies active bleeding    OBJECTIVE:  Physical Exam: 80  "y.o. female  Wt Readings from Last 3 Encounters:   10/02/20 79.4 kg (175 lb)   06/26/20 74.8 kg (165 lb)   06/04/20 80.8 kg (178 lb 3.2 oz)     Ht Readings from Last 1 Encounters:   10/02/20 160 cm (63\")     Body mass index is 31 kg/m².  Vitals:    10/02/20 0928   Temp: 96.7 °F (35.9 °C)     Vital signs reviewed.     General Appearance:    Alert, cooperative, in no acute distress                  Eyes: conjunctiva clear  ENT: external ears and nose atraumatic  CV: no peripheral edema  Resp: normal respiratory effort  Skin: no rashes or wounds; normal turgor  Psych: mood and affect appropriate  Lymph: no nodes appreciated  Neuro: gross sensation intact  Vascular:  Palpable peripheral pulse in noted extremity  Musculoskeletal Extremities: Crepitation synovitis swelling stiffness and joint line tenderness left knee    Radiology:   Previous AP lateral 40 agree PA x-ray taken for pain without comparison view shows arthritis    Assessment:     ICD-10-CM ICD-9-CM   1. Arthritis of left knee  M17.12 716.96        Large Joint Arthrocentesis: L knee  Date/Time: 10/2/2020 7:44 AM  Consent given by: patient  Site marked: site marked  Timeout: Immediately prior to procedure a time out was called to verify the correct patient, procedure, equipment, support staff and site/side marked as required   Supporting Documentation  Indications: pain   Procedure Details  Location: knee - L knee  Preparation: Patient was prepped and draped in the usual sterile fashion  Needle gauge: 21.  Approach: anterolateral  Medications administered: 4 mL lidocaine (cardiac); 80 mg methylPREDNISolone acetate 80 MG/ML  Patient tolerance: patient tolerated the procedure well with no immediate complications             Plan: RICE: Rest, ice, compression, and elevation therapy, Cryotherapy/brachy therapy, and or OTC linaments as indicated with instructions.   Cortisone Injection. See procedure note.  Once again went over options and answer " questions      10/2/2020    Much of this encounter note is an electronic transcription/translation of spoken language to printed text. The electronic translation of spoken language may permit erroneous, or at times, nonsensical words or phrases to be inadvertently transcribed; Although I have reviewed the note for such errors, some may still exist

## 2020-10-05 ENCOUNTER — LAB (OUTPATIENT)
Dept: LAB | Facility: HOSPITAL | Age: 80
End: 2020-10-05

## 2020-10-05 DIAGNOSIS — R73.03 PRE-DIABETES: ICD-10-CM

## 2020-10-05 DIAGNOSIS — E78.5 HYPERLIPIDEMIA, UNSPECIFIED HYPERLIPIDEMIA TYPE: ICD-10-CM

## 2020-10-05 LAB
ALBUMIN SERPL-MCNC: 4.5 G/DL (ref 3.5–5.2)
ALBUMIN/GLOB SERPL: 1.6 G/DL
ALP SERPL-CCNC: 88 U/L (ref 39–117)
ALT SERPL W P-5'-P-CCNC: 37 U/L (ref 1–33)
ANION GAP SERPL CALCULATED.3IONS-SCNC: 9.3 MMOL/L (ref 5–15)
AST SERPL-CCNC: 29 U/L (ref 1–32)
BILIRUB SERPL-MCNC: 0.3 MG/DL (ref 0–1.2)
BUN SERPL-MCNC: 16 MG/DL (ref 8–23)
BUN/CREAT SERPL: 19.8 (ref 7–25)
CALCIUM SPEC-SCNC: 9.6 MG/DL (ref 8.6–10.5)
CHLORIDE SERPL-SCNC: 105 MMOL/L (ref 98–107)
CHOLEST SERPL-MCNC: 122 MG/DL (ref 0–200)
CO2 SERPL-SCNC: 25.7 MMOL/L (ref 22–29)
CREAT SERPL-MCNC: 0.81 MG/DL (ref 0.57–1)
GFR SERPL CREATININE-BSD FRML MDRD: 68 ML/MIN/1.73
GLOBULIN UR ELPH-MCNC: 2.9 GM/DL
GLUCOSE SERPL-MCNC: 111 MG/DL (ref 65–99)
HBA1C MFR BLD: 5.6 % (ref 4.8–5.6)
HDLC SERPL-MCNC: 55 MG/DL (ref 40–60)
LDLC SERPL CALC-MCNC: 43 MG/DL (ref 0–100)
LDLC/HDLC SERPL: 0.77 {RATIO}
POTASSIUM SERPL-SCNC: 4.5 MMOL/L (ref 3.5–5.2)
PROT SERPL-MCNC: 7.4 G/DL (ref 6–8.5)
SODIUM SERPL-SCNC: 140 MMOL/L (ref 136–145)
TRIGL SERPL-MCNC: 122 MG/DL (ref 0–150)
VLDLC SERPL-MCNC: 24.4 MG/DL (ref 5–40)

## 2020-10-05 PROCEDURE — 80053 COMPREHEN METABOLIC PANEL: CPT

## 2020-10-05 PROCEDURE — 36415 COLL VENOUS BLD VENIPUNCTURE: CPT

## 2020-10-05 PROCEDURE — 80061 LIPID PANEL: CPT

## 2020-10-05 PROCEDURE — 83036 HEMOGLOBIN GLYCOSYLATED A1C: CPT

## 2020-10-05 RX ORDER — SIMVASTATIN 40 MG
TABLET ORAL
Qty: 90 TABLET | Refills: 1 | Status: SHIPPED | OUTPATIENT
Start: 2020-10-05 | End: 2021-04-02

## 2020-10-12 ENCOUNTER — OFFICE VISIT (OUTPATIENT)
Dept: INTERNAL MEDICINE | Facility: CLINIC | Age: 80
End: 2020-10-12

## 2020-10-12 VITALS
HEART RATE: 74 BPM | DIASTOLIC BLOOD PRESSURE: 64 MMHG | WEIGHT: 174 LBS | TEMPERATURE: 98.1 F | BODY MASS INDEX: 30.83 KG/M2 | OXYGEN SATURATION: 99 % | SYSTOLIC BLOOD PRESSURE: 126 MMHG | HEIGHT: 63 IN

## 2020-10-12 DIAGNOSIS — E78.5 HYPERLIPIDEMIA, UNSPECIFIED HYPERLIPIDEMIA TYPE: Primary | ICD-10-CM

## 2020-10-12 DIAGNOSIS — Z78.0 POST-MENOPAUSAL: ICD-10-CM

## 2020-10-12 DIAGNOSIS — R73.03 PRE-DIABETES: ICD-10-CM

## 2020-10-12 DIAGNOSIS — Z11.59 SCREENING FOR VIRAL DISEASE: ICD-10-CM

## 2020-10-12 PROCEDURE — 99213 OFFICE O/P EST LOW 20 MIN: CPT | Performed by: INTERNAL MEDICINE

## 2020-10-12 NOTE — PROGRESS NOTES
Chief Complaint   Patient presents with   • Hyperlipidemia     4 month ollow up       Subjective   Ashley Motta is a 80 y.o. female.     Hyperlipidemia  This is a chronic problem. The problem is controlled. Recent lipid tests were reviewed and are normal. Exacerbating diseases include obesity. She has no history of diabetes. Pertinent negatives include no chest pain or shortness of breath. Current antihyperlipidemic treatment includes statins, herbal therapy, diet change and exercise. The current treatment provides significant improvement of lipids. There are no compliance problems.      Her most recent lipid panel showed:  Lab Results   Component Value Date    CHOL 122 10/05/2020    CHLPL 119 10/04/2016    TRIG 122 10/05/2020    HDL 55 10/05/2020    LDL 43 10/05/2020     Pre-diabetes/Elevated fasting glucose:  This is a chronic problem.  Patient has been advised to follow prudent diet, avoid sugar, exercise regularly.  She denies polyuria, polydipsia, vision change appetite change, unexplained weight loss.   Lab Results   Component Value Date    HGBA1C 5.60 10/05/2020            The following portions of the patient's history were reviewed and updated as appropriate: allergies, current medications, past family history, past medical history, past social history, past surgical history and problem list.    Review of Systems   Constitutional: Negative for appetite change.   HENT: Negative for nosebleeds.    Eyes: Negative for blurred vision and double vision.   Respiratory: Negative for cough and shortness of breath.    Cardiovascular: Negative for chest pain, palpitations and leg swelling.   Neurological: Negative for headache.         Current Outpatient Medications:   •  aspirin 81 MG tablet, Take 81 mg by mouth daily., Disp: , Rfl:   •  B Complex Vitamins (VITAMIN B COMPLEX PO), Take  by mouth., Disp: , Rfl:   •  Calcium Carbonate-Vitamin D (CALCIUM + D PO), Take  by mouth 2 (Two) Times a Day., Disp: , Rfl:   •   "Coenzyme Q10 (COQ10) 200 MG capsule, Take  by mouth., Disp: , Rfl:   •  ECHINACEA-ZINC-VITAMIN C PO, Take 1,000 mg by mouth., Disp: , Rfl:   •  gabapentin (NEURONTIN) 100 MG capsule, Take 1 capsule by mouth at bedtime, Disp: 90 capsule, Rfl: 1  •  Magnesium 400 MG tablet, Take  by mouth., Disp: , Rfl:   •  Misc Natural Products (GLUCOSAMINE CHONDROITIN VIT D3) capsule, Take  by mouth., Disp: , Rfl:   •  Multiple Vitamins-Minerals (MULTIVITAMIN ADULT PO), Take  by mouth., Disp: , Rfl:   •  Omega-3 Fatty Acids (FISH OIL) 1000 MG capsule capsule, Take  by mouth daily with breakfast., Disp: , Rfl:   •  pantoprazole (PROTONIX) 20 MG EC tablet, Take 1 tablet by mouth Daily., Disp: 90 tablet, Rfl: 1  •  Probiotic Product (PROBIOTIC DAILY PO), Take  by mouth., Disp: , Rfl:   •  rOPINIRole (REQUIP) 0.5 MG tablet, TAKE 1 TABLET BY MOUTH IN THE EVENING 1  HOUR  BEFORE  BEDTIME, Disp: 90 tablet, Rfl: 1  •  simvastatin (ZOCOR) 40 MG tablet, TAKE 1 TABLET BY MOUTH AT BEDTIME, Disp: 90 tablet, Rfl: 1        Objective     /64   Pulse 74   Temp 98.1 °F (36.7 °C)   Ht 160 cm (63\")   Wt 78.9 kg (174 lb)   SpO2 99%   BMI 30.82 kg/m²     Physical Exam  Vitals signs and nursing note reviewed.   Constitutional:       General: She is not in acute distress.     Appearance: She is well-developed.   Neck:      Vascular: Normal carotid pulses. No carotid bruit.   Cardiovascular:      Rate and Rhythm: Regular rhythm.      Pulses:           Carotid pulses are 2+ on the right side and 2+ on the left side.     Heart sounds: S1 normal and S2 normal. No murmur. No friction rub. No gallop.    Pulmonary:      Effort: Pulmonary effort is normal.      Breath sounds: Normal breath sounds. No wheezing, rhonchi or rales.   Chest:      Chest wall: There is no dullness to percussion.   Skin:     General: Skin is warm and dry.   Neurological:      Mental Status: She is alert and oriented to person, place, and time.           Assessment/Plan "   Ashley was seen today for hyperlipidemia.    Diagnoses and all orders for this visit:    Hyperlipidemia, unspecified hyperlipidemia type    Pre-diabetes    Post-menopausal  -     DEXA Bone Density Axial; Future

## 2020-10-15 ENCOUNTER — OFFICE VISIT (OUTPATIENT)
Dept: INTERNAL MEDICINE | Facility: CLINIC | Age: 80
End: 2020-10-15

## 2020-10-15 ENCOUNTER — APPOINTMENT (OUTPATIENT)
Dept: WOMENS IMAGING | Facility: HOSPITAL | Age: 80
End: 2020-10-15

## 2020-10-15 DIAGNOSIS — Z12.31 ENCOUNTER FOR SCREENING MAMMOGRAM FOR BREAST CANCER: Primary | ICD-10-CM

## 2020-10-15 DIAGNOSIS — Z78.0 POST-MENOPAUSAL: ICD-10-CM

## 2020-10-15 DIAGNOSIS — Z12.31 ENCOUNTER FOR SCREENING MAMMOGRAM FOR BREAST CANCER: ICD-10-CM

## 2020-10-15 PROCEDURE — 77067 SCR MAMMO BI INCL CAD: CPT | Performed by: RADIOLOGY

## 2020-10-15 PROCEDURE — 77067 SCR MAMMO BI INCL CAD: CPT | Performed by: INTERNAL MEDICINE

## 2020-10-15 PROCEDURE — 77080 DXA BONE DENSITY AXIAL: CPT | Performed by: INTERNAL MEDICINE

## 2020-10-19 ENCOUNTER — OFFICE VISIT (OUTPATIENT)
Dept: SLEEP MEDICINE | Facility: HOSPITAL | Age: 80
End: 2020-10-19

## 2020-10-19 VITALS
HEART RATE: 73 BPM | WEIGHT: 174 LBS | BODY MASS INDEX: 29.71 KG/M2 | SYSTOLIC BLOOD PRESSURE: 115 MMHG | HEIGHT: 64 IN | DIASTOLIC BLOOD PRESSURE: 74 MMHG

## 2020-10-19 DIAGNOSIS — G47.33 OSA ON CPAP: Primary | ICD-10-CM

## 2020-10-19 DIAGNOSIS — Z99.89 OSA ON CPAP: Primary | ICD-10-CM

## 2020-10-19 PROCEDURE — G0463 HOSPITAL OUTPT CLINIC VISIT: HCPCS

## 2020-10-19 NOTE — PROGRESS NOTES
"[facility] PULMONARY CARE         Dr Fredi Harrison  [unfilled]  Patient Care Team:  Erica Johnston MD as PCP - General (Internal Medicine)  Atul Rico MD as PCP - Claims Attributed    Chief Complaint:BALDEMAR with restless leg syndrome    Interval History: Patient here for annual compliance visit as you recall she is currently on CPAP 11 cm.  Compliance 100% average daily use 7 hours 26 minutes.  AHI leak excellent.  She is benefiting from CPAP.  Goes to bed 10 gets up 730 gets about 8 hours of sleep and feels rested.  No tobacco no alcohol no caffeine abuse.  Currently has a full facemask that fits well.  Supplies have been adequate.    REVIEW OF SYSTEMS:   CARDIOVASCULAR: No chest pain, chest pressure or chest discomfort. No palpitations or edema.   RESPIRATORY: No shortness of breath, cough or sputum.   GASTROINTESTINAL: No anorexia, nausea, vomiting or diarrhea. No abdominal pain or blood.   HEMATOLOGIC: No bleeding or bruising.   Review of system positive for acid reflux  [place] 2 out of 24 within normal limits  Ventilator/Non-Invasive Ventilation Settings (From admission, onward)    None            Vital Signs  Heart Rate:  [73] 73  BP: (115)/(74) 115/74  [unfilled]  Flowsheet Rows      First Filed Value   Admission Height  162.6 cm (64\") Documented at 10/19/2020 0900   Admission Weight  78.9 kg (174 lb) Documented at 10/19/2020 0900          Physical Exam:  Patient is examined using the personal protective equipment as per guidelines from infection control for this particular patient as enacted.  Hand hygiene was performed before and after patient interaction.   General Appearance:    Alert, cooperative, in no acute distress.  Following simple commands  ENT Mallampati between 3 and 4 normal nasal exam  Neck midline trachea no thyromegaly   Lungs:     Clear to auscultation,respirations regular, even and                  unlabored    Heart:    Regular rhythm and normal rate, normal S1 and S2, no            " murmur, no gallop, no rub, no click   Chest Wall:    No abnormalities observed   Abdomen:     Normal bowel sounds, no masses, no organomegaly, soft        non-tender, non-distended, no guarding, no rebound                tenderness   Extremities:   Moves all extremities well, no edema, no cyanosis, no             redness  CNS no focal neurological deficits normal sensory exam  Skin no rashes no nodules  Musculoskeletal no cyanosis no clubbing normal range of motion     Results Review:                                          I reviewed the patient's new clinical results.  I personally viewed and interpreted the patient's CXR        Medication Review:       No current facility-administered medications for this visit.       ASSESSMENT:   BALDEMAR on CPAP  Restless leg syndrome controlled with Requip      PLAN:  Reviewed compliance download with the patient  Compliance AHI leak excellent  Continue current CPAP pressure  Sleep hygiene measures  Requip for restless leg syndrome currently being controlled  Decreasing caffeine intake  Sleep hygiene measures  Follow-up in 1 year    Fredi Harrison MD  10/19/20  10:25 EDT

## 2020-10-21 RX ORDER — ROPINIROLE 0.5 MG/1
TABLET, FILM COATED ORAL
Qty: 90 TABLET | Refills: 0 | Status: SHIPPED | OUTPATIENT
Start: 2020-10-21 | End: 2020-12-30

## 2020-12-30 DIAGNOSIS — G43.809 OTHER MIGRAINE WITHOUT STATUS MIGRAINOSUS, NOT INTRACTABLE: ICD-10-CM

## 2020-12-30 RX ORDER — GABAPENTIN 100 MG/1
CAPSULE ORAL
Qty: 90 CAPSULE | Refills: 0 | Status: SHIPPED | OUTPATIENT
Start: 2020-12-30 | End: 2021-05-25

## 2020-12-30 RX ORDER — ROPINIROLE 0.5 MG/1
TABLET, FILM COATED ORAL
Qty: 90 TABLET | Refills: 0 | Status: SHIPPED | OUTPATIENT
Start: 2020-12-30 | End: 2021-04-13

## 2021-01-05 ENCOUNTER — CLINICAL SUPPORT (OUTPATIENT)
Dept: ORTHOPEDIC SURGERY | Facility: CLINIC | Age: 81
End: 2021-01-05

## 2021-01-05 VITALS — HEIGHT: 64 IN | TEMPERATURE: 98 F | WEIGHT: 178 LBS | BODY MASS INDEX: 30.39 KG/M2

## 2021-01-05 DIAGNOSIS — M17.12 ARTHRITIS OF LEFT KNEE: Primary | ICD-10-CM

## 2021-01-05 DIAGNOSIS — R52 PAIN: ICD-10-CM

## 2021-01-05 PROCEDURE — 99213 OFFICE O/P EST LOW 20 MIN: CPT | Performed by: NURSE PRACTITIONER

## 2021-01-05 PROCEDURE — 20610 DRAIN/INJ JOINT/BURSA W/O US: CPT | Performed by: NURSE PRACTITIONER

## 2021-01-05 PROCEDURE — 73562 X-RAY EXAM OF KNEE 3: CPT | Performed by: NURSE PRACTITIONER

## 2021-01-05 RX ORDER — METHYLPREDNISOLONE ACETATE 80 MG/ML
80 INJECTION, SUSPENSION INTRA-ARTICULAR; INTRALESIONAL; INTRAMUSCULAR; SOFT TISSUE
Status: COMPLETED | OUTPATIENT
Start: 2021-01-05 | End: 2021-01-05

## 2021-01-05 RX ADMIN — METHYLPREDNISOLONE ACETATE 80 MG: 80 INJECTION, SUSPENSION INTRA-ARTICULAR; INTRALESIONAL; INTRAMUSCULAR; SOFT TISSUE at 08:07

## 2021-01-05 NOTE — PROGRESS NOTES
Patient Name: Ashley Motta   YOB: 1940  Referring Primary Care Physician: Erica Johnston MD  BMI: Body mass index is 30.55 kg/m².    Chief Complaint:    Chief Complaint   Patient presents with   • Left Knee - Follow-up        HPI:     Ashley Motta is a 80 y.o. female who presents today for evaluation of   Chief Complaint   Patient presents with   • Left Knee - Follow-up   .  Patient presents for evaluation of acute on chronic left knee pain.  She has history of osteoarthritis and is receiving cortisone injections in the left knee regularly which are effective in relieving her pain.  They typically last for a little over 2 months-the last 3 weeks before her appointment she notices that it starts to hurt again.  The pain is primarily in the lateral aspect of the knee and is aching in nature.  She uses topical Voltaren as needed but does not typically use ice or heat.  She likes to walk and they also recently got a recumbent bike that she uses frequently.  She denies any new injury or trauma to the knee.  Her right knee is sometimes achy but overall does not bother her much.      Subjective   Medications:   Home Medications:  Current Outpatient Medications on File Prior to Visit   Medication Sig   • aspirin 81 MG tablet Take 81 mg by mouth daily.   • B Complex Vitamins (VITAMIN B COMPLEX PO) Take  by mouth.   • Calcium Carbonate-Vitamin D (CALCIUM + D PO) Take  by mouth 2 (Two) Times a Day.   • Coenzyme Q10 (COQ10) 200 MG capsule Take  by mouth.   • ECHINACEA-ZINC-VITAMIN C PO Take 1,000 mg by mouth.   • gabapentin (NEURONTIN) 100 MG capsule Take 1 capsule by mouth at bedtime   • Magnesium 400 MG tablet Take  by mouth.   • Misc Natural Products (GLUCOSAMINE CHONDROITIN VIT D3) capsule Take  by mouth.   • Multiple Vitamins-Minerals (MULTIVITAMIN ADULT PO) Take  by mouth.   • Omega-3 Fatty Acids (FISH OIL) 1000 MG capsule capsule Take  by mouth daily with breakfast.   • pantoprazole (PROTONIX) 20 MG EC  tablet Take 1 tablet by mouth Daily.   • Probiotic Product (PROBIOTIC DAILY PO) Take  by mouth.   • rOPINIRole (REQUIP) 0.5 MG tablet TAKE 1 TABLET BY MOUTH IN THE EVENING 1  HOUR  BEFORE  BEDTIME   • simvastatin (ZOCOR) 40 MG tablet TAKE 1 TABLET BY MOUTH AT BEDTIME     No current facility-administered medications on file prior to visit.      Current Medications:  Scheduled Meds:  Continuous Infusions:No current facility-administered medications for this visit.     PRN Meds:.    I have reviewed the patient's medical history in detail and updated the computerized patient record.  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 TERRY CHEUNG   • Osteoarthritis    • Osteopenia    • PLMD (periodic limb movement disorder)    • Postmenopausal atrophic vaginitis    • Prediabetes    • Presbyopia    • Rectal nodule 03/2020    REFERRED TO DR. PAMELA NAPOLES   • Rectocele 07/2017   • Regular astigmatism of both eyes    • Rheumatoid arthritis (CMS/HCC)    • RLS (restless legs syndrome)    • Seborrheic keratosis    • Skin cancer 04/2015    LOWER LEG, FOLLOWED BY DR. EDI FROST  SMITH   • Urinary incontinence 09/2017   • Vaginal vault prolapse 09/2017   • Vertigo 10/18/2018    [facility]   • Vitreoretinal degeneration of both eyes         Past Surgical History:   Procedure Laterality Date   • CATARACT EXTRACTION, BILATERAL Bilateral 2015   • COLONOSCOPY N/A 02/27/2015    1 TUBULAR ADENOMA POLYP IN DISTAL [facility]   • COLONOSCOPY N/A 3/4/2020    10 MM SESSILE SERRATED ADENOMA POLYP IN ASCENDING, 3 MM HYPERPLASTIC POLYP IN RECTUM, 7 MM ANAL NODULE, DR[facility]   • HYSTERECTOMY N/A 01/19/2004    KAYLEE WITH BSO, DR[facility] RAFIA [facility]   • KNEE ARTHROSCOPY Left 2013   • KNEE ARTHROSCOPY Right 2010   • MELISSA CULDOPLASTY N/A 01/19/2004    DR. RAF[facility]   • SACROCOLPOPEXY N/A 01/19/2004    SACROVAGINAL SLING, DR. RAFIA [facility]EN [facility]   • SHOULDER ARTHROSCOPY Left         Social History     Occupational History   • Not on file   Tobacco Use   • Smoking status: Never Smoker   • Smokeless tobacco: Never Used   Substance and Sexual Activity   • Alcohol use: No   • Drug use: Never   • Sexual activity: Not Currently     Birth control/protection: Post-menopausal, Surgical     Comment: .      Social History     Social History Narrative   • Not on file        Family History   Problem Relation Age of Onset   • Heart disease Mother    • Lung cancer Father    • Hypertension Father    • Heart disease Father    • Cancer Father    • Heart attack Brother    • Emphysema Brother    • COPD Brother    • Depression Brother    • Heart attack Brother         Had stents placed 2009       ROS: 14 point review of systems was performed and all other systems were reviewed and are negative except for documented findings in HPI and today's encounter.     Allergies: No Known Allergies  Constitutional:  Denies fever, shaking or chills   Eyes:  Denies change in visual acuity   HENT:  Denies nasal congestion or sore throat   Respiratory:  Denies cough or  "shortness of breath   Cardiovascular:  Denies chest pain or severe LE edema   GI:  Denies abdominal pain, nausea, vomiting, bloody stools or diarrhea   Musculoskeletal:  Numbness, tingling, pain, or loss of motor function only as noted above in history of present illness.  : Denies painful urination or hematuria  Integument:  Denies rash, lesion or ulceration   Neurologic:  Denies headache or focal weakness  Endocrine:  Denies lymphadenopathy  Psych:  Denies confusion or change in mental status   Hem:  Denies active bleeding    OBJECTIVE:  Physical Exam: 80 y.o. female  Wt Readings from Last 3 Encounters:   01/05/21 80.7 kg (178 lb)   10/19/20 78.9 kg (174 lb)   10/12/20 78.9 kg (174 lb)     Ht Readings from Last 1 Encounters:   01/05/21 162.6 cm (64\")     Body mass index is 30.55 kg/m².  Vitals:    01/05/21 1036   Temp: 98 °F (36.7 °C)     Vital signs reviewed.     General Appearance:    Alert, cooperative, in no acute distress                  Eyes: conjunctiva clear  ENT: external ears and nose atraumatic  CV: no peripheral edema  Resp: normal respiratory effort  Skin: no rashes or wounds; normal turgor  Psych: mood and affect appropriate  Lymph: no nodes appreciated  Neuro: gross sensation intact  Vascular:  Palpable peripheral pulse in noted extremity  Musculoskeletal Extremities: medial and lateral joint line tenderness, swelling, synovitis left knee. Calf soft, non-tender. Skin clean, intact with no rash or lesions    Radiology:   AP, lateral, 40 degree PA of left knee obtained in office today due to pain with prior comparisons shows end stage osteoarthritis left knee with varus deformity    Assessment:     ICD-10-CM ICD-9-CM   1. Arthritis of left knee  M17.12 716.96   2. Pain  R52 780.96        Large Joint Arthrocentesis: L knee  Date/Time: 1/5/2021 8:07 AM  Consent given by: patient  Site marked: site marked  Timeout: Immediately prior to procedure a time out was called to verify the correct patient, " procedure, equipment, support staff and site/side marked as required   Supporting Documentation  Indications: pain   Procedure Details  Location: knee - L knee  Preparation: Patient was prepped and draped in the usual sterile fashion  Needle gauge: 21.  Approach: anterolateral  Medications administered: 4 mL lidocaine (cardiac); 80 mg methylPREDNISolone acetate 80 MG/ML  Patient tolerance: patient tolerated the procedure well with no immediate complications             Plan: The diagnosis(es), natural history, pathophysiology and treatment for diagnosis(es) were discussed. Opportunity given and questions answered.  Biomechanics of pertinent body areas discussed.  When appropriate, the use of ambulatory aids discussed.  EXERCISES:  Advice on benefits of, and types of regular/moderate exercise pertaining to orthopedic diagnosis(es).  Inflammation/pain control; with cold, heat, elevation and/or liniments discussed as appropriate  Cortisone Injection. See procedure note.      1/5/2021    Much of this encounter note is an electronic transcription/translation of spoken language to printed text. The electronic translation of spoken language may permit erroneous, or at times, nonsensical words or phrases to be inadvertently transcribed; Although I have reviewed the note for such errors, some may still exist

## 2021-02-08 ENCOUNTER — LAB (OUTPATIENT)
Dept: LAB | Facility: HOSPITAL | Age: 81
End: 2021-02-08

## 2021-02-08 PROCEDURE — 85027 COMPLETE CBC AUTOMATED: CPT | Performed by: INTERNAL MEDICINE

## 2021-02-08 PROCEDURE — 80061 LIPID PANEL: CPT | Performed by: INTERNAL MEDICINE

## 2021-02-08 PROCEDURE — 86803 HEPATITIS C AB TEST: CPT | Performed by: INTERNAL MEDICINE

## 2021-02-08 PROCEDURE — 81001 URINALYSIS AUTO W/SCOPE: CPT | Performed by: INTERNAL MEDICINE

## 2021-02-08 PROCEDURE — 80053 COMPREHEN METABOLIC PANEL: CPT | Performed by: INTERNAL MEDICINE

## 2021-02-08 PROCEDURE — 83036 HEMOGLOBIN GLYCOSYLATED A1C: CPT | Performed by: INTERNAL MEDICINE

## 2021-02-10 ENCOUNTER — OFFICE VISIT (OUTPATIENT)
Dept: INTERNAL MEDICINE | Facility: CLINIC | Age: 81
End: 2021-02-10

## 2021-02-10 VITALS
HEART RATE: 74 BPM | BODY MASS INDEX: 31.92 KG/M2 | WEIGHT: 187 LBS | OXYGEN SATURATION: 99 % | HEIGHT: 64 IN | TEMPERATURE: 98 F | DIASTOLIC BLOOD PRESSURE: 64 MMHG | SYSTOLIC BLOOD PRESSURE: 102 MMHG

## 2021-02-10 DIAGNOSIS — R73.03 PRE-DIABETES: ICD-10-CM

## 2021-02-10 DIAGNOSIS — E78.5 HYPERLIPIDEMIA, UNSPECIFIED HYPERLIPIDEMIA TYPE: Primary | ICD-10-CM

## 2021-02-10 PROCEDURE — 99214 OFFICE O/P EST MOD 30 MIN: CPT | Performed by: INTERNAL MEDICINE

## 2021-02-10 NOTE — PROGRESS NOTES
Chief Complaint   Patient presents with   • Hyperlipidemia       Subjective   Ashley Motta is a 81 y.o. female.     Hyperlipidemia  Pertinent negatives include no chest pain or shortness of breath.      Hyperlipidemia:    This is a chronic problem.  Her most recent lipid panel showed:  Lab Results   Component Value Date    CHOL 110 02/08/2021    CHLPL 119 10/04/2016    TRIG 133 02/08/2021    HDL 52 02/08/2021    LDL 35 02/08/2021     Current treatment: statin  Prudent diet and regular exercise have also been recommended.  No management changes were made at her last appointment.      Pre-diabetes/Elevated fasting glucose:  This is a chronic problem.  Patient has been advised to follow prudent diet, avoid sugar, exercise regularly.  She denies polyuria, polydipsia, vision change appetite change, unexplained weight loss.   Lab Results   Component Value Date    HGBA1C 6.10 (H) 02/08/2021        The following portions of the patient's history were reviewed and updated as appropriate: allergies, current medications, past family history, past medical history, past social history, past surgical history and problem list.    Review of Systems   Constitutional: Negative for appetite change.   HENT: Negative for nosebleeds.    Eyes: Negative for blurred vision and double vision.   Respiratory: Negative for cough and shortness of breath.    Cardiovascular: Negative for chest pain, palpitations and leg swelling.           Current Outpatient Medications:   •  aspirin 81 MG tablet, Take 81 mg by mouth daily., Disp: , Rfl:   •  B Complex Vitamins (VITAMIN B COMPLEX PO), Take  by mouth., Disp: , Rfl:   •  Calcium Carbonate-Vitamin D (CALCIUM + D PO), Take  by mouth 2 (Two) Times a Day., Disp: , Rfl:   •  Coenzyme Q10 (COQ10) 200 MG capsule, Take  by mouth., Disp: , Rfl:   •  ECHINACEA-ZINC-VITAMIN C PO, Take 1,000 mg by mouth., Disp: , Rfl:   •  gabapentin (NEURONTIN) 100 MG capsule, Take 1 capsule by mouth at bedtime, Disp: 90  "capsule, Rfl: 0  •  Magnesium 400 MG tablet, Take  by mouth., Disp: , Rfl:   •  Misc Natural Products (GLUCOSAMINE CHONDROITIN VIT D3) capsule, Take  by mouth., Disp: , Rfl:   •  Multiple Vitamins-Minerals (MULTIVITAMIN ADULT PO), Take  by mouth., Disp: , Rfl:   •  Omega-3 Fatty Acids (FISH OIL) 1000 MG capsule capsule, Take  by mouth daily with breakfast., Disp: , Rfl:   •  pantoprazole (PROTONIX) 20 MG EC tablet, Take 1 tablet by mouth Daily., Disp: 90 tablet, Rfl: 1  •  Probiotic Product (PROBIOTIC DAILY PO), Take  by mouth., Disp: , Rfl:   •  rOPINIRole (REQUIP) 0.5 MG tablet, TAKE 1 TABLET BY MOUTH IN THE EVENING 1  HOUR  BEFORE  BEDTIME, Disp: 90 tablet, Rfl: 0  •  simvastatin (ZOCOR) 40 MG tablet, TAKE 1 TABLET BY MOUTH AT BEDTIME, Disp: 90 tablet, Rfl: 1        Objective     /64   Pulse 74   Temp 98 °F (36.7 °C)   Ht 162.6 cm (64\")   Wt 84.8 kg (187 lb)   SpO2 99%   BMI 32.10 kg/m²     Physical Exam  Vitals signs and nursing note reviewed.   Constitutional:       General: She is not in acute distress.     Appearance: She is well-developed.   Neck:      Vascular: Normal carotid pulses. No carotid bruit.   Cardiovascular:      Rate and Rhythm: Regular rhythm.      Pulses:           Carotid pulses are 2+ on the right side and 2+ on the left side.     Heart sounds: S1 normal and S2 normal. No murmur. No friction rub. No gallop.    Pulmonary:      Effort: Pulmonary effort is normal.      Breath sounds: Normal breath sounds. No wheezing, rhonchi or rales.   Musculoskeletal:      Right lower leg: No edema.      Left lower leg: No edema.   Skin:     General: Skin is warm and dry.   Neurological:      Mental Status: She is alert and oriented to person, place, and time.     Assessment/Plan   Diagnoses and all orders for this visit:    1. Hyperlipidemia, unspecified hyperlipidemia type (Primary)  -     Comprehensive Metabolic Panel; Future  -     Lipid Panel With / Chol / HDL Ratio; Future  -     Urinalysis " With Culture If Indicated - Urine, Clean Catch; Future    2. Pre-diabetes  -     Hemoglobin A1c; Future      She will continue same medications.  Encouraged prudent diet and regular exercise.

## 2021-02-18 ENCOUNTER — IMMUNIZATION (OUTPATIENT)
Dept: VACCINE CLINIC | Facility: HOSPITAL | Age: 81
End: 2021-02-18

## 2021-02-18 PROCEDURE — 0001A: CPT | Performed by: INTERNAL MEDICINE

## 2021-02-18 PROCEDURE — 91300 HC SARSCOV02 VAC 30MCG/0.3ML IM: CPT | Performed by: INTERNAL MEDICINE

## 2021-03-11 ENCOUNTER — IMMUNIZATION (OUTPATIENT)
Dept: VACCINE CLINIC | Facility: HOSPITAL | Age: 81
End: 2021-03-11

## 2021-03-11 PROCEDURE — 91300 HC SARSCOV02 VAC 30MCG/0.3ML IM: CPT | Performed by: INTERNAL MEDICINE

## 2021-03-11 PROCEDURE — 0002A: CPT | Performed by: INTERNAL MEDICINE

## 2021-04-02 RX ORDER — SIMVASTATIN 40 MG
TABLET ORAL
Qty: 90 TABLET | Refills: 1 | Status: SHIPPED | OUTPATIENT
Start: 2021-04-02 | End: 2021-08-26

## 2021-04-05 ENCOUNTER — TELEPHONE (OUTPATIENT)
Dept: ORTHOPEDIC SURGERY | Facility: CLINIC | Age: 81
End: 2021-04-05

## 2021-04-05 NOTE — TELEPHONE ENCOUNTER
Patient is coming in tomorrow 4-6-21 for a left knee cortisone injection and was wondering if she could go on and have her right knee injection while she was here. Please advise.

## 2021-04-06 ENCOUNTER — CLINICAL SUPPORT (OUTPATIENT)
Dept: ORTHOPEDIC SURGERY | Facility: CLINIC | Age: 81
End: 2021-04-06

## 2021-04-06 VITALS — HEIGHT: 63 IN | BODY MASS INDEX: 32.6 KG/M2 | WEIGHT: 184 LBS | TEMPERATURE: 98.2 F

## 2021-04-06 DIAGNOSIS — M17.0 PRIMARY OSTEOARTHRITIS OF BOTH KNEES: Primary | ICD-10-CM

## 2021-04-06 DIAGNOSIS — M17.12 ARTHRITIS OF LEFT KNEE: ICD-10-CM

## 2021-04-06 PROCEDURE — 99213 OFFICE O/P EST LOW 20 MIN: CPT | Performed by: NURSE PRACTITIONER

## 2021-04-06 PROCEDURE — 20610 DRAIN/INJ JOINT/BURSA W/O US: CPT | Performed by: NURSE PRACTITIONER

## 2021-04-06 RX ORDER — METHYLPREDNISOLONE ACETATE 80 MG/ML
80 INJECTION, SUSPENSION INTRA-ARTICULAR; INTRALESIONAL; INTRAMUSCULAR; SOFT TISSUE
Status: COMPLETED | OUTPATIENT
Start: 2021-04-06 | End: 2021-04-06

## 2021-04-06 RX ADMIN — METHYLPREDNISOLONE ACETATE 80 MG: 80 INJECTION, SUSPENSION INTRA-ARTICULAR; INTRALESIONAL; INTRAMUSCULAR; SOFT TISSUE at 11:03

## 2021-04-06 RX ADMIN — METHYLPREDNISOLONE ACETATE 80 MG: 80 INJECTION, SUSPENSION INTRA-ARTICULAR; INTRALESIONAL; INTRAMUSCULAR; SOFT TISSUE at 10:42

## 2021-04-06 NOTE — PROGRESS NOTES
Patient Name: Ashley Motta   YOB: 1940  Referring Primary Care Physician: Erica Johnston MD  BMI: Body mass index is 32.59 kg/m².    Chief Complaint:    Chief Complaint   Patient presents with   • Right Knee - Follow-up, Pain   • Left Knee - Follow-up, Pain        HPI:     Ashley Motta is a 81 y.o. female who presents today for evaluation of   Chief Complaint   Patient presents with   • Right Knee - Follow-up, Pain   • Left Knee - Follow-up, Pain   . Patient initially presented for evaluation of left knee pain. She has known history of osteoarthritis and has received cortisone injections in the past which are effective in relieving her pain. Today she also complains of right knee pain. She reports a generalized, aching pain with intermittent swelling and stiffness. It is worse when she first stands up and gets better as she starts walking. Denies any new injury/trauma to the knee.      Subjective   Medications:   Home Medications:  Current Outpatient Medications on File Prior to Visit   Medication Sig   • aspirin 81 MG tablet Take 81 mg by mouth daily.   • B Complex Vitamins (VITAMIN B COMPLEX PO) Take  by mouth.   • Calcium Carbonate-Vitamin D (CALCIUM + D PO) Take  by mouth 2 (Two) Times a Day.   • Coenzyme Q10 (COQ10) 200 MG capsule Take  by mouth.   • ECHINACEA-ZINC-VITAMIN C PO Take 1,000 mg by mouth.   • gabapentin (NEURONTIN) 100 MG capsule Take 1 capsule by mouth at bedtime   • Magnesium 400 MG tablet Take  by mouth.   • Misc Natural Products (GLUCOSAMINE CHONDROITIN VIT D3) capsule Take  by mouth.   • Multiple Vitamins-Minerals (MULTIVITAMIN ADULT PO) Take  by mouth.   • Omega-3 Fatty Acids (FISH OIL) 1000 MG capsule capsule Take  by mouth daily with breakfast.   • pantoprazole (PROTONIX) 20 MG EC tablet Take 1 tablet by mouth Daily.   • Probiotic Product (PROBIOTIC DAILY PO) Take  by mouth.   • rOPINIRole (REQUIP) 0.5 MG tablet TAKE 1 TABLET BY MOUTH IN THE EVENING 1  HOUR  BEFORE  BEDTIME    • simvastatin (ZOCOR) 40 MG tablet TAKE 1 TABLET BY MOUTH AT BEDTIME     No current facility-administered medications on file prior to visit.     Current Medications:  Scheduled Meds:  Continuous Infusions:No current facility-administered medications for this visit.    PRN Meds:.    I have reviewed the patient's medical history in detail and updated the computerized patient record.  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 TERRY CHEUNG   • Osteoarthritis    • Osteopenia    • PLMD (periodic limb movement disorder)    • Postmenopausal atrophic vaginitis    • Prediabetes    • Presbyopia    • Rectal nodule 03/2020    REFERRED TO DR. PAMELA NAPOLES   • Rectocele 07/2017   • Regular astigmatism of both eyes    • Rheumatoid arthritis (CMS/HCC)    • RLS (restless legs syndrome)    • Seborrheic keratosis    • Skin cancer 04/2015    LOWER LEG, FOLLOWED BY DR. EDI SANCHEZ   • Urinary incontinence 09/2017   • Vaginal vault prolapse 09/2017   • Vertigo 10/18/2018    ADMITTED TO [facility]   • Vitreoretinal degeneration of both eyes         Past Surgical History:    Procedure Laterality Date   • CATARACT EXTRACTION, BILATERAL Bilateral 2015   • COLONOSCOPY N/A 02/27/2015    1 TUBULAR ADENOMA POLYP [facility] DISTAL [facility]END[facility]   • COLONOSCOPY N/A 3/4/2020    10 MM SESSILE SERRATED ADENOMA POLYP IN ASCENDING, 3 MM HYPERPLASTIC POLYP IN RECTUM, 7 MM ANAL NODULE, DR. SARAH LIRIANO AT [facility]   • HYSTERECTOMY N/A 01/19/2004    KAYLEE WITH BSO, DR. RAFIA MORTENSEN AT [facility]   • KNEE ARTHROSCOPY Left 2013   • KNEE ARTHROSCOPY Right 2010   • MELISSA CULDOPLASTY N/A 01/19/2004    DR. RAFIA MORTENSEN AT [facility]   • SACROCOLPOPEXY N/A 01/19/2004    SACROVAGINAL SLING, DR. RAFIA MORTENSEN AT [facility]   • SHOULDER ARTHROSCOPY Left         Social History     Occupational History   • Not on file   Tobacco Use   • Smoking status: Never Smoker   • Smokeless tobacco: Never Used   Vaping Use   • Vaping Use: Never used   Substance and Sexual Activity   • Alcohol use: No   • Drug use: Never   • Sexual activity: Not Currently     Birth control/protection: Post-menopausal, Surgical     Comment: .      Social History     Social History Narrative   • Not on file        Family History   Problem Relation Age of Onset   • Heart disease Mother    • Lung cancer Father    • Hypertension Father    • Heart disease Father    • Cancer Father    • Heart attack Brother    • Emphysema Brother    • COPD Brother    • Depression Brother    • Heart attack Brother         Had stents placed 2009       ROS: 14 point review of systems was performed and all other systems were reviewed and are negative except for documented findings in HPI and today's encounter.     Allergies: No Known Allergies  Constitutional:  Denies fever, shaking or chills   Eyes:  Denies change in visual acuity   HENT:  Denies nasal congestion or sore throat   Respiratory:  Denies cough or shortness of breath   Cardiovascular:  Denies chest pain or severe LE edema   GI:  Denies abdominal pain, nausea, vomiting, bloody stools or diarrhea  "  Musculoskeletal:  Numbness, tingling, pain, or loss of motor function only as noted above in history of present illness.  : Denies painful urination or hematuria  Integument:  Denies rash, lesion or ulceration   Neurologic:  Denies headache or focal weakness  Endocrine:  Denies lymphadenopathy  Psych:  Denies confusion or change in mental status   Hem:  Denies active bleeding    OBJECTIVE:  Physical Exam: 81 y.o. female  Wt Readings from Last 3 Encounters:   04/06/21 83.5 kg (184 lb)   02/10/21 84.8 kg (187 lb)   01/05/21 80.7 kg (178 lb)     Ht Readings from Last 1 Encounters:   04/06/21 160 cm (63\")     Body mass index is 32.59 kg/m².  Vitals:    04/06/21 1043   Temp: 98.2 °F (36.8 °C)     Vital signs reviewed.     General Appearance:    Alert, cooperative, in no acute distress                  Eyes: conjunctiva clear  ENT: external ears and nose atraumatic  CV: no peripheral edema  Resp: normal respiratory effort  Skin: no rashes or wounds; normal turgor  Psych: mood and affect appropriate  Lymph: no nodes appreciated  Neuro: gross sensation intact  Vascular:  Palpable peripheral pulse in noted extremity  Musculoskeletal Extremities: Medial lateral joint line tenderness, swelling, synovitis, crepitation bilateral knees.  Small effusions bilaterally, left greater than right.  Calves soft and nontender.  Skin clean and intact with no rash or lesions    Radiology:   No x-rays obtained today.  Reviewed AP, lateral, 40 degree PA of left knee from 1/2021 which shows advanced osteoarthritis of bilateral knees    Assessment:     ICD-10-CM ICD-9-CM   1. Primary osteoarthritis of both knees  M17.0 715.16   2. Arthritis of left knee  M17.12 716.96           MDM/Plan:   The diagnosis(es), natural history, pathophysiology and treatment for diagnosis(es) were discussed. Opportunity given and questions answered.  Biomechanics of pertinent body areas discussed.  When appropriate, the use of ambulatory aids discussed. "   Discussed treatment options with patient including medications, topical ice/heat/liniments, injections, physical therapy, and ultimately total knee arthroplasty.  She would like to proceed with cortisone injections in bilateral knees today.  She is inquiring about viscosupplementation.  We discussed the current evidence recommendations on it and she would like to try it as her brother has found it beneficial.  Order placed.  Encouraged her to continue ice, heat, topical liniments as needed.  Will see her back as needed.      4/6/2021    Much of this encounter note is an electronic transcription/translation of spoken language to printed text. The electronic translation of spoken language may permit erroneous, or at times, nonsensical words or phrases to be inadvertently transcribed; Although I have reviewed the note for such errors, some may still exist      Large Joint Arthrocentesis: L knee  Date/Time: 4/6/2021 10:42 AM  Consent given by: patient  Site marked: site marked  Timeout: Immediately prior to procedure a time out was called to verify the correct patient, procedure, equipment, support staff and site/side marked as required   Supporting Documentation  Indications: pain   Procedure Details  Location: knee - L knee  Preparation: Patient was prepped and draped in the usual sterile fashion  Needle gauge: 21G.  Approach: anterolateral  Medications administered: 4 mL lidocaine (cardiac); 80 mg methylPREDNISolone acetate 80 MG/ML  Patient tolerance: patient tolerated the procedure well with no immediate complications    Large Joint Arthrocentesis: R knee  Date/Time: 4/6/2021 11:03 AM  Consent given by: patient  Site marked: site marked  Timeout: Immediately prior to procedure a time out was called to verify the correct patient, procedure, equipment, support staff and site/side marked as required   Supporting Documentation  Indications: pain   Procedure Details  Location: knee - R knee  Preparation: Patient was  prepped and draped in the usual sterile fashion  Needle gauge: 21G.  Approach: anterolateral  Medications administered: 80 mg methylPREDNISolone acetate 80 MG/ML; 4 mL lidocaine (cardiac)  Patient tolerance: patient tolerated the procedure well with no immediate complications

## 2021-04-13 DIAGNOSIS — G25.81 RESTLESS LEG SYNDROME: Primary | ICD-10-CM

## 2021-04-13 RX ORDER — ROPINIROLE 0.5 MG/1
TABLET, FILM COATED ORAL
Qty: 90 TABLET | Refills: 0 | Status: SHIPPED | OUTPATIENT
Start: 2021-04-13 | End: 2021-07-15

## 2021-05-24 DIAGNOSIS — G43.809 OTHER MIGRAINE WITHOUT STATUS MIGRAINOSUS, NOT INTRACTABLE: ICD-10-CM

## 2021-05-25 RX ORDER — GABAPENTIN 100 MG/1
CAPSULE ORAL
Qty: 90 CAPSULE | Refills: 0 | Status: SHIPPED | OUTPATIENT
Start: 2021-05-25 | End: 2021-08-23

## 2021-06-09 ENCOUNTER — LAB (OUTPATIENT)
Dept: LAB | Facility: HOSPITAL | Age: 81
End: 2021-06-09

## 2021-06-09 PROCEDURE — 81001 URINALYSIS AUTO W/SCOPE: CPT | Performed by: INTERNAL MEDICINE

## 2021-06-09 PROCEDURE — 80053 COMPREHEN METABOLIC PANEL: CPT | Performed by: INTERNAL MEDICINE

## 2021-06-09 PROCEDURE — 87086 URINE CULTURE/COLONY COUNT: CPT | Performed by: INTERNAL MEDICINE

## 2021-06-09 PROCEDURE — 80061 LIPID PANEL: CPT | Performed by: INTERNAL MEDICINE

## 2021-06-09 PROCEDURE — 87147 CULTURE TYPE IMMUNOLOGIC: CPT | Performed by: INTERNAL MEDICINE

## 2021-06-09 PROCEDURE — 83036 HEMOGLOBIN GLYCOSYLATED A1C: CPT | Performed by: INTERNAL MEDICINE

## 2021-06-10 RX ORDER — AMOXICILLIN 875 MG/1
875 TABLET, COATED ORAL 2 TIMES DAILY
Qty: 14 TABLET | Refills: 0 | Status: SHIPPED | OUTPATIENT
Start: 2021-06-10 | End: 2021-06-17

## 2021-06-16 ENCOUNTER — OFFICE VISIT (OUTPATIENT)
Dept: INTERNAL MEDICINE | Facility: CLINIC | Age: 81
End: 2021-06-16

## 2021-06-16 VITALS
DIASTOLIC BLOOD PRESSURE: 60 MMHG | WEIGHT: 182 LBS | HEIGHT: 63 IN | HEART RATE: 68 BPM | OXYGEN SATURATION: 98 % | SYSTOLIC BLOOD PRESSURE: 132 MMHG | TEMPERATURE: 98.5 F | BODY MASS INDEX: 32.25 KG/M2

## 2021-06-16 DIAGNOSIS — R73.03 PRE-DIABETES: ICD-10-CM

## 2021-06-16 DIAGNOSIS — E78.5 HYPERLIPIDEMIA, UNSPECIFIED HYPERLIPIDEMIA TYPE: Primary | ICD-10-CM

## 2021-06-16 DIAGNOSIS — Z00.00 MEDICARE ANNUAL WELLNESS VISIT, SUBSEQUENT: Primary | ICD-10-CM

## 2021-06-16 PROCEDURE — G0439 PPPS, SUBSEQ VISIT: HCPCS | Performed by: INTERNAL MEDICINE

## 2021-06-16 NOTE — PROGRESS NOTES
The ABCs of the Annual Wellness Visit  Subsequent Medicare Wellness Visit    Chief Complaint   Patient presents with   • Medicare Wellness-subsequent       Subjective   History of Present Illness:  Ashley Motta is a 81 y.o. female who presents for a Subsequent Medicare Wellness Visit.    HEALTH RISK ASSESSMENT    Recent Hospitalizations:  No hospitalization(s) within the last year.    Current Medical Providers:  Patient Care Team:  Erica Johnston MD as PCP - General (Internal Medicine)    Smoking Status:  Social History     Tobacco Use   Smoking Status Never Smoker   Smokeless Tobacco Never Used       Alcohol Consumption:  Social History     Substance and Sexual Activity   Alcohol Use No       Depression Screen:   PHQ-2/PHQ-9 Depression Screening 6/16/2021   Little interest or pleasure in doing things 0   Feeling down, depressed, or hopeless 0   Trouble falling or staying asleep, or sleeping too much 0   Feeling tired or having little energy 0   Poor appetite or overeating 0   Feeling bad about yourself - or that you are a failure or have let yourself or your family down 0   Trouble concentrating on things, such as reading the newspaper or watching television 0   Moving or speaking so slowly that other people could have noticed. Or the opposite - being so fidgety or restless that you have been moving around a lot more than usual 0   Thoughts that you would be better off dead, or of hurting yourself in some way 0   Total Score 0   If you checked off any problems, how difficult have these problems made it for you to do your work, take care of things at home, or get along with other people? -       Fall Risk Screen:  STEADI Fall Risk Assessment was completed, and patient is at LOW risk for falls.Assessment completed on:6/16/2021    Health Habits and Functional and Cognitive Screening:  Functional & Cognitive Status 6/16/2021   Do you have difficulty preparing food and eating? No   Do you have difficulty bathing  yourself, getting dressed or grooming yourself? No   Do you have difficulty using the toilet? No   Do you have difficulty moving around from place to place? No   Do you have trouble with steps or getting out of a bed or a chair? No   Current Diet Well Balanced Diet   Dental Exam Up to date   Eye Exam Up to date   Exercise (times per week) 3 times per week   Current Exercises Include Walking   Current Exercise Activities Include -   Do you need help using the phone?  No   Are you deaf or do you have serious difficulty hearing?  No   Do you need help with transportation? No   Do you need help shopping? No   Do you need help preparing meals?  No   Do you need help with housework?  No   Do you need help with laundry? No   Do you need help taking your medications? No   Do you need help managing money? No   Do you ever drive or ride in a car without wearing a seat belt? No   Have you felt unusual stress, anger or loneliness in the last month? No   Who do you live with? Spouse   If you need help, do you have trouble finding someone available to you? No   Have you been bothered in the last four weeks by sexual problems? No   Do you have difficulty concentrating, remembering or making decisions? No         Does the patient have evidence of cognitive impairment? No    Asprin use counseling:Taking ASA appropriately as indicated    Age-appropriate Screening Schedule:  Refer to the list below for future screening recommendations based on patient's age, sex and/or medical conditions. Orders for these recommended tests are listed in the plan section. The patient has been provided with a written plan.    Health Maintenance   Topic Date Due   • TDAP/TD VACCINES (2 - Td or Tdap) 01/01/2018   • INFLUENZA VACCINE  08/01/2021   • MAMMOGRAM  10/15/2021   • LIPID PANEL  06/09/2022   • DXA SCAN  10/15/2022   • ZOSTER VACCINE  Completed          The following portions of the patient's history were reviewed and updated as appropriate:  allergies, current medications, past family history, past medical history, past social history, past surgical history and problem list.    Outpatient Medications Prior to Visit   Medication Sig Dispense Refill   • amoxicillin (AMOXIL) 875 MG tablet Take 1 tablet by mouth 2 (Two) Times a Day for 7 days. 14 tablet 0   • aspirin 81 MG tablet Take 81 mg by mouth daily.     • B Complex Vitamins (VITAMIN B COMPLEX PO) Take  by mouth.     • Calcium Carbonate-Vitamin D (CALCIUM + D PO) Take  by mouth 2 (Two) Times a Day.     • Coenzyme Q10 (COQ10) 200 MG capsule Take  by mouth.     • ECHINACEA-ZINC-VITAMIN C PO Take 1,000 mg by mouth.     • gabapentin (NEURONTIN) 100 MG capsule Take 1 capsule by mouth at bedtime 90 capsule 0   • Magnesium 400 MG tablet Take  by mouth.     • Misc Natural Products (GLUCOSAMINE CHONDROITIN VIT D3) capsule Take  by mouth.     • Multiple Vitamins-Minerals (MULTIVITAMIN ADULT PO) Take  by mouth.     • Omega-3 Fatty Acids (FISH OIL) 1000 MG capsule capsule Take  by mouth daily with breakfast.     • pantoprazole (PROTONIX) 20 MG EC tablet Take 1 tablet by mouth Daily. 90 tablet 1   • Probiotic Product (PROBIOTIC DAILY PO) Take  by mouth.     • rOPINIRole (REQUIP) 0.5 MG tablet TAKE 1 TABLET BY MOUTH IN THE EVENING 1  HOUR  BEFORE  BEDTIME 90 tablet 0   • simvastatin (ZOCOR) 40 MG tablet TAKE 1 TABLET BY MOUTH AT BEDTIME 90 tablet 1     No facility-administered medications prior to visit.       Patient Active Problem List   Diagnosis   • Osteopenia   • Hyperlipidemia   • Pre-diabetes   • Osteoarthritis   • BALDEMAR on CPAP   • Pelvic relaxation due to vaginal prolapse   • Chronic pain of both knees   • Arthritis of right knee   • Arthritis of left knee   • Arthritis of both knees   • Restless legs   • Vertigo   • Headache, migraine   • Heartburn   • Internal hemorrhoids with complication       Advanced Care Planning:  ACP discussion was held with the patient during this visit. Patient has an advance  "directive (not in EMR), copy requested.    Review of Systems    Compared to one year ago, the patient feels her physical health is the same. But, weighs more.  Compared to one year ago, the patient feels her mental health is the same.    Reviewed chart for potential of high risk medication in the elderly: yes  Reviewed chart for potential of harmful drug interactions in the elderly:yes    Objective         Vitals:    06/16/21 1320   BP: 132/60   Pulse: 68   Temp: 98.5 °F (36.9 °C)   SpO2: 98%   Weight: 82.6 kg (182 lb)   Height: 160 cm (63\")   PainSc: 0-No pain       Body mass index is 32.24 kg/m².  Discussed the patient's BMI with her. The BMI is above average - encouraged prudent diet and regular exercise.    Physical Exam    Lab Results   Component Value Date    TRIG 120 06/09/2021    HDL 45 06/09/2021    LDL 51 06/09/2021    VLDL 22 06/09/2021    HGBA1C 6.00 (H) 06/09/2021        Assessment/Plan   Medicare Risks and Personalized Health Plan  CMS Preventative Services Quick Reference  Advance Directive Discussion  Breast Cancer/Mammogram Screening  Obesity/Overweight     The above risks/problems have been discussed with the patient.  Pertinent information has been shared with the patient in the After Visit Summary.  Follow up plans and orders are seen below in the Assessment/Plan Section.    Diagnoses and all orders for this visit:    1. Medicare annual wellness visit, subsequent (Primary)      Follow Up:  Return in about 4 months (around 10/16/2021) for Follow up mammogram.     An After Visit Summary and PPPS were given to the patient.               "

## 2021-06-16 NOTE — PATIENT INSTRUCTIONS
Medicare Wellness  Personal Prevention Plan of Service     Date of Office Visit:  2021  Encounter Provider:  Erica Johnston MD  Place of Service:  [facility]  Patient Name: Ashley Motta  :  1940    As part of the Medicare Wellness portion of your visit today, we are providing you with this personalized preventive plan of services (PPPS). This plan is based upon recommendations of the [place] Preventive Services Task Force (USPSTF) and the Advisory Committee on Immunization Practices (ACIP).    This lists the preventive care services that should be considered, and provides dates of when you are due. Items listed as completed are up-to-date and do not require any further intervention.    Health Maintenance   Topic Date Due   • TDAP/TD VACCINES (2 - Td or Tdap) 2018   • ANNUAL WELLNESS VISIT  2021   • INFLUENZA VACCINE  2021   • MAMMOGRAM  10/15/2021   • LIPID PANEL  2022   • DXA SCAN  10/15/2022   • COLORECTAL CANCER SCREENING  2025   • HEPATITIS C SCREENING  Completed   • COVID-19 Vaccine  Completed   • Pneumococcal Vaccine 65+  Completed   • ZOSTER VACCINE  Completed       No orders of the defined types were placed in this encounter.      Return in about 4 months (around 10/16/2021) for Follow up mammogram.      Check with your pharmacy about a tetanus shot (You had a Tdap in )    Please send us a copy of your living will/advanced directive

## 2021-07-06 ENCOUNTER — CLINICAL SUPPORT (OUTPATIENT)
Dept: ORTHOPEDIC SURGERY | Facility: CLINIC | Age: 81
End: 2021-07-06

## 2021-07-06 VITALS — HEIGHT: 63 IN | BODY MASS INDEX: 32 KG/M2 | TEMPERATURE: 96.1 F | WEIGHT: 180.6 LBS

## 2021-07-06 DIAGNOSIS — R52 PAIN: Primary | ICD-10-CM

## 2021-07-06 DIAGNOSIS — M17.0 PRIMARY OSTEOARTHRITIS OF BOTH KNEES: ICD-10-CM

## 2021-07-06 PROCEDURE — 20610 DRAIN/INJ JOINT/BURSA W/O US: CPT | Performed by: NURSE PRACTITIONER

## 2021-07-06 PROCEDURE — 73562 X-RAY EXAM OF KNEE 3: CPT | Performed by: NURSE PRACTITIONER

## 2021-07-06 RX ORDER — LIDOCAINE HYDROCHLORIDE 10 MG/ML
4 INJECTION, SOLUTION EPIDURAL; INFILTRATION; INTRACAUDAL; PERINEURAL
Status: COMPLETED | OUTPATIENT
Start: 2021-07-06 | End: 2021-07-06

## 2021-07-06 RX ADMIN — LIDOCAINE HYDROCHLORIDE 4 ML: 10 INJECTION, SOLUTION EPIDURAL; INFILTRATION; INTRACAUDAL; PERINEURAL at 08:42

## 2021-07-06 NOTE — PROGRESS NOTES
7/6/2021    Ashley Motta is here today for worsening knee pain.  Patient has undergone cortisone injections in the knee before with good relief.  We discussed the current recommendations on viscosupplementation and she elects to proceed with Monovisc in bilateral knees today.  Radiology: AP, lateral, sunrise view of bilateral knees obtained in the office today due to pain with prior comparison shows advanced osteoarthritis of bilateral knees with varus pattern    KNEE Injection Procedure Note:  Large Joint Arthrocentesis: R knee  Date/Time: 7/6/2021 8:42 AM  Consent given by: patient  Site marked: site marked  Timeout: Immediately prior to procedure a time out was called to verify the correct patient, procedure, equipment, support staff and site/side marked as required   Supporting Documentation  Indications: pain   Procedure Details  Location: knee - R knee  Preparation: Patient was prepped and draped in the usual sterile fashion  Needle gauge: 21G.  Approach: anterolateral  Medications administered: 88 mg Hyaluronan 88 MG/4ML; 4 mL lidocaine PF 1% 1 %  Patient tolerance: patient tolerated the procedure well with no immediate complications    Large Joint Arthrocentesis: L knee  Date/Time: 7/6/2021 8:42 AM  Consent given by: patient  Site marked: site marked  Timeout: Immediately prior to procedure a time out was called to verify the correct patient, procedure, equipment, support staff and site/side marked as required   Supporting Documentation  Indications: pain   Procedure Details  Location: knee - L knee  Preparation: Patient was prepped and draped in the usual sterile fashion  Needle gauge: 21G.  Approach: anterolateral  Medications administered: 88 mg Hyaluronan 88 MG/4ML; 4 mL lidocaine PF 1% 1 %  Patient tolerance: patient tolerated the procedure well with no immediate complications        At the conclusion of the injection I discussed the importance of continued quad strengthening exercises on a daily  basis.  Patient is requesting a referral for physical therapy and that was placed today.  She did some physical therapy years ago and found it helpful.  I will see the patient back if the symptoms should fail to improve or worsen.    Cammie Henderson, APRN  7/6/2021

## 2021-07-14 DIAGNOSIS — G25.81 RESTLESS LEG SYNDROME: ICD-10-CM

## 2021-07-15 RX ORDER — ROPINIROLE 0.5 MG/1
TABLET, FILM COATED ORAL
Qty: 90 TABLET | Refills: 0 | Status: SHIPPED | OUTPATIENT
Start: 2021-07-15 | End: 2021-09-30

## 2021-07-27 ENCOUNTER — HOSPITAL ENCOUNTER (OUTPATIENT)
Dept: PHYSICAL THERAPY | Facility: HOSPITAL | Age: 81
Setting detail: THERAPIES SERIES
Discharge: HOME | End: 2021-07-27

## 2021-07-27 DIAGNOSIS — R26.2 DIFFICULTY WALKING: ICD-10-CM

## 2021-07-27 DIAGNOSIS — M17.0 PRIMARY OSTEOARTHRITIS OF BOTH KNEES: Primary | ICD-10-CM

## 2021-07-27 PROCEDURE — 97110 THERAPEUTIC EXERCISES: CPT

## 2021-07-27 PROCEDURE — 97161 PT EVAL LOW COMPLEX 20 MIN: CPT

## 2021-07-28 NOTE — THERAPY EVALUATION
Outpatient Physical Therapy Ortho Initial Evaluation  [facility]     Patient Name: Ashley Motta  : 1940  MRN: [id number]  Today's Date: 2021      Visit Date: 2021    Patient Active Problem List   Diagnosis   • Osteopenia   • Hyperlipidemia   • Pre-diabetes   • Osteoarthritis   • BALDEMAR on CPAP   • Pelvic relaxation due to vaginal prolapse   • Chronic pain of both knees   • Arthritis of right knee   • Arthritis of left knee   • Arthritis of both knees   • Restless legs   • Vertigo   • Headache, migraine   • Heartburn   • Internal hemorrhoids with complication        Past Medical History:   Diagnosis Date   • Actinic keratosis    • Anxiety    • Arthritis    • 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 SARAH LIRIANO   • Difficulty walking    • Difficulty walking    • Dry eye syndrome, bilateral    • Eczema 2014   • Elevated fasting glucose    • Endothelial corneal dystrophy 2020   • Genital prolapse 10/2017   • GERD (gastroesophageal reflux disease)    • Goiter, nontoxic, multinodular 2017    THYROID POLYP SEES    • Hemorrhoids    • Herpes zoster 2018   • Hyperlipidemia    • Hypersomnia 2016   • Impaired fasting glucose    • Meralgia paresthetica, left lower limb    • Migraine    • Myopia of both eyes    • Nuclear sclerosis    • BALDEMAR on CPAP     FOLLOWED BY DR. TERRY CHEUNG   • Osteoarthritis    • Osteopenia    • PLMD (periodic limb movement disorder)    • Postmenopausal atrophic vaginitis    • Prediabetes    • Presbyopia    • Rectal nodule 2020    REFERRED TO DR. PAMELA NAPOLES   • Rectocele 2017   • Regular astigmatism of both eyes    • Rheumatoid arthritis (CMS/HCC)    • RLS (restless legs syndrome)    • Seborrheic keratosis    • Skin cancer 2015    LOWER LEG, FOLLOWED BY DR. EDI SANCHEZ   • Urinary incontinence 2017   •  "Vaginal vault prolapse 09/2017   • Vertigo 10/18/2018    ADMITTED TO [facility]   • Vitreoretinal degeneration of both eyes         Past Surgical History:   Procedure Laterality Date   • CATARACT EXTRACTION, BILATERAL Bilateral 2015   • COLONOSCOPY N/A 02/27/2015    1 TUBULAR ADENOMA POLYP IN DISTAL [facility]ENDING[facility]   • COLONOSCOPY N/A 3/4/2020    10 MM SESSILE SERRATED ADENOMA POLYP IN ASCENDING, 3 MM HYPERPLASTIC POLYP IN RECTUM, 7 MM ANAL NODULE, DR. SARAH LIRIANO AT [facility]   • HYSTERECTOMY N/A 01/19/2004    KAYLEE WITH BSO, DR. RAFIA MORTENSEN AT [facility]   • KNEE ARTHROSCOPY Left 2013   • KNEE ARTHROSCOPY Right 2010   • MELISSA CULDOPLASTY N/A 01/19/2004    DR. RAFIA MORTENSEN AT [facility]   • SACROCOLPOPEXY N/A 01/19/2004    SACROVAGINAL SLING, DR. RAFIA MORTENSEN AT [facility]   • SHOULDER ARTHROSCOPY Left        Visit Dx:     ICD-10-CM ICD-9-CM   1. Primary osteoarthritis of both knees  M17.0 715.16   2. Difficulty walking  R26.2 719.7         Patient History     Row Name 07/27/21 0800             History    Chief Complaint  Pain  -CN (r) NC (t) CN (c)      Type of Pain  Knee pain;Hip pain;Back pain  -CN (r) NC (t) CN (c)      Brief Description of Current Complaint  Pt reports having bilateral knee pain with the L knee worse than her right from arthritis. She states having many cortisone injections over the years and tried gel injections on 7/6/21 and her knees feel \"so-so\". Pt stated she does not have plans for knee replacements at this time and has to wait 3 months before receiving more cortisone shots. She adds having difficulty with balance but no falls. Pt reports having pain in her R low back and hip. Previously in 2019, pt states having an arthroscopy on her R knee and was not able to have it performed on L due to waiting too long. Pt states goals of decreasing knee pain and getting back to walking in the park.  -CN (r) NC (t) CN (c)      What clinical tests have you had for this problem?  X-ray  -CN (r) NC " (t) CN (c)         Pain     Pain Location  Knee;Hip;Back  -CN (r) NC (t) CN (c)      Pain at Present  4  -CN (r) NC (t) CN (c)      Pain at Worst  4  -CN (r) NC (t) CN (c)      Pain Frequency  Constant/continuous  -CN (r) NC (t) CN (c)      What Performance Factors Make the Current Problem(s) WORSE?  Walking, standing  -CN (r) NC (t) CN (c)      What Performance Factors Make the Current Problem(s) BETTER?  cream and prop legs up  -CN      Tolerance Time- Standing  10-15 min  -CN (r) NC (t) CN (c)      Tolerance Time- Walking  1/2  -CN (r) NC (t) CN (c)      Is your sleep disturbed?  No  -CN (r) NC (t) CN (c)      Difficulties at work?  retired  -CN (r) NC (t) CN (c)      Difficulties with ADL's?  coming down the stairs  -CN (r) NC (t) CN (c)      Difficulties with recreational activities?  likes the swim classes  -CN (r) NC (t) CN (c)         Fall Risk Assessment    Any falls in the past year:  No  -CN (r) NC (t) CN (c)         Services    Prior Rehab/Home Health Experiences  No  -CN (r) NC (t) CN (c)         Daily Activities    Primary Language  English  -CN (r) NC (t) CN (c)      How does patient learn best?  Listening;Reading;Demonstration  -CN (r) NC (t) CN (c)      Barriers to learning  None  -CN (r) NC (t) CN (c)      Pt Participated in POC and Goals  Yes  -CN (r) NC (t) CN (c)         Safety    Are you being hurt, hit, or frightened by anyone at home or in your life?  No  -CN (r) NC (t) CN (c)      Are you being neglected by a caregiver  No  -CN (r) NC (t) CN (c)      Have you had any of the following issues with  N/A  -CN (r) NC (t) CN (c)        User Key  (r) = Recorded By, (t) = Taken By, (c) = Cosigned By    Initials Name Provider Type    CN Darcie Westbrook, PT Physical Therapist    NC Mena, Kei, PT Student PT Student          PT Ortho     Row Name 07/27/21 1600       Myotomal Screen- Lower Quarter Clearing    Hip flexion (L2)  Right:;5 (Normal);Left:;4+ (Good +)  -CN (r) NC (t) CN (c)     Knee extension (L3)  Bilateral:;5 (Normal)  -CN (r) NC (t) CN (c)    Ankle DF (L4)  Bilateral:;5 (Normal)  -CN (r) NC (t) CN (c)    Ankle PF (S1)  Bilateral:;5 (Normal)  -CN (r) NC (t) CN (c)    Knee flexion (S2)  Bilateral:;5 (Normal)  -CN (r) NC (t) CN (c)       Lumbosacral Palpation    Lumbosacral Palpation?  Yes  -CN (r) NC (t) CN (c)    Erector Spinae (Paraspinals)  Right:;Guarded/taut;Tender  -CN (r) NC (t) CN (c)       Hip/Thigh Palpation    Greater Trochanter  Right:;Tender;Guarded/taut  -CN (r) NC (t) CN (c)    Gluteus Petar  Bilateral:;Tender  -CN (r) NC (t) CN (c)    Gluteus Medius  Bilateral:;Tender  -CN (r) NC (t) CN (c)       General ROM    RT Lower Ext  Rt Hip Flexion;Rt Hip External Rotation;Rt Hip Internal Rotation;Rt Knee Extension/Flexion  -CN (r) NC (t) CN (c)    LT Lower Ext  Lt Hip Flexion;Lt Hip External Rotation;Lt Hip Internal Rotation;Lt Knee Extension/Flexion  -CN (r) NC (t) CN (c)       Right Lower Ext    Rt Hip Flexion PROM  100%  -CN (r) NC (t) CN (c)    Rt Hip External Rotation PROM  100%  -CN (r) NC (t) CN (c)    Rt Hip Internal Rotation PROM  100%  -CN (r) NC (t) CN (c)    Rt Knee Extension/Flexion PROM  0-3-130  -CN (r) NC (t) CN (c)       Left Lower Ext    Lt Hip Flexion PROM  100%  -CN (r) NC (t) CN (c)    Lt Hip External Rotation PROM  100%  -CN (r) NC (t) CN (c)    Lt Hip Internal Rotation PROM  100%  -CN (r) NC (t) CN (c)    Lt Knee Extension/Flexion PROM  0-1-130  -CN (r) NC (t) CN (c)       MMT (Manual Muscle Testing)    Rt Lower Ext  Rt Hip ABduction  -CN (r) NC (t) CN (c)    Lt Lower Ext  Lt Hip ABduction  -CN (r) NC (t) CN (c)       MMT Right Lower Ext    Rt Hip ABduction MMT, Gross Movement  (4-/5) good minus  -CN (r) NC (t) CN (c)       MMT Left Lower Ext    Lt Hip ABduction MMT, Gross Movement  (4-/5) good minus  -CN (r) NC (t) CN (c)       Lower Extremity Flexibility    Hamstrings  Right:;Moderately limited;Left:;Mildly limited  -CN (r) NC (t) CN (c)    Hip Flexors   Bilateral:;Mildly limited  -CN (r) NC (t) CN (c)       Gait/Stairs (Locomotion)    Comment (Gait/Stairs)  Pt has difficulty walking long distance and has balance issues. No assistive device used.  -CN (r) NC (t) CN (c)      User Key  (r) = Recorded By, (t) = Taken By, (c) = Cosigned By    Initials Name Provider Type    Darcie Martinez, PT Physical Therapist    eKi Messer, PT Student PT Student                      Therapy Education  Education Details: Access Code: [email or web address]. Pt educated on evaluation finding, HEP, expectations for therapy, and more information on knee replacements.  Given: HEP, Symptoms/condition management, Mobility training, Pain management, Posture/body mechanics  Program: New  How Provided: Verbal, Written  Provided to: Patient  Level of Understanding: Verbalized     PT OP Goals     Row Name 07/27/21 1600          PT Short Term Goals    STG Date to Achieve  08/27/21  -CN (r) NC (t) CN (c)     STG 1  Pt will report pain rated 2/10 at worst in order to demonstrate ability to return to normalized ADLs and functional activities.  -CN (r) NC (t) CN (c)     STG 1 Progress  New  -CN (r) NC (t) CN (c)     STG 2  Pt will be independent with initial HEP for symptom management.  -CN (r) NC (t) CN (c)     STG 2 Progress  New  -CN (r) NC (t) CN (c)     STG 3  Pt will demonstrate bilateral :20 sec SL balance to decrease risk of falls in the community.  -CN (r) NC (t) CN (c)        Long Term Goals    LTG Date to Achieve  09/27/21  -CN (r) NC (t) CN (c)     LTG 1  Pt will be independent and compliant with advanced HEP for long term management of symptoms and prevention of future occurrence.   -CN (r) NC (t) CN (c)     LTG 1 Progress  New  -CN (r) NC (t) CN (c)     LTG 2  Pt will reduce level of perceived disability as measured by the KOS  to 70% in order to improve QOL where 100% is no dissability.  -CN (r) NC (t) CN (c)     LTG 2 Progress  New  -CN (r) NC (t) CN (c)     LTG 3  Pt will report  walking 1/2 mile at park with no greater than 3/10 pain in order to improve QOL.  -CN (r) NC (t) CN (c)     LTG 3 Progress  New  -CN (r) NC (t) CN (c)     LTG 4  Pt will demonstrate bilateral :30 sec SL balance to decrease risk of falls in the community.  -CN (r) NC (t) CN (c)     LTG 4 Progress  New  -CN (r) NC (t) CN (c)     LTG 5  Pt will demonstrate reciprocal gait pattern descending 5 stairs with no greater than 2/10 pain to ambulate safely in home.  (Pended)   -NC     LTG 5 Progress  New  (Pended)   -NC        Time Calculation    PT Goal Re-Cert Due Date  08/27/21  -CN (r) NC (t) CN (c)       User Key  (r) = Recorded By, (t) = Taken By, (c) = Cosigned By    Initials Name Provider Type    Darcie Martinez, PT Physical Therapist    Kei Messer, PT Student PT Student          PT Assessment/Plan     Row Name 07/27/21 1653 07/27/21 1649       PT Assessment    Functional Limitations  Decreased safety during functional activities;Impaired gait;Limitation in home management;Limitations in community activities;Limitations in functional capacity and performance;Performance in leisure activities  -CN  --  -CN    Impairments  Balance;Coordination;Endurance;Gait;Impaired aerobic capacity;Impaired flexibility;Impaired muscle endurance;Joint integrity;Muscle strength;Pain;Poor body mechanics;Posture  -CN  --  -CN    Assessment Comments  81 y.o. female referred to outpatient physical therapy for evaluation and treatment of bilateral knees, R hip, R low back.  Patient presents with chronic bilateral knee pain, chronic R hip and low back pain, antalgic gait, decreased endurance. Pt's signs and symptoms are consistent with referring diagnosis.  Pertinent comorbidities and personal factors that may affect progress include, but are not limited to, arthritis and chronic cortisone injections in both knees.  Pt scored 56.25% on the KOS where 100% is no disability and is in stable clinical condition. Pt would benefit  from skilled PT to address functional deficits and return to PLOF.  -CN (r) NC (t) CN (c)  --    Please refer to paper survey for additional self-reported information  Yes  -CN (r) NC (t) CN (c)  --    Rehab Potential  Good  -CN (r) NC (t) CN (c)  --    Patient/caregiver participated in establishment of treatment plan and goals  Yes  -CN (r) NC (t) CN (c)  --    Patient would benefit from skilled therapy intervention  Yes  -CN (r) NC (t) CN (c)  --       PT Plan    PT Frequency  2x/week  -CN (r) NC (t) CN (c)  --    Predicted Duration of Therapy Intervention (PT)  8-10  -CN (r) NC (t) CN (c)  --    Planned CPT's?  PT EVAL LOW COMPLEXITY: 20820;PT RE-EVAL: 67910;PT THER PROC EA 15 MIN: 37305;PT THER ACT EA 15 MIN: 36690;PT MANUAL THERAPY EA 15 MIN: 33278;PT NEUROMUSC RE-EDUCATION EA 15 MIN: 14805;PT GAIT TRAINING EA 15 MIN: 57548;PT EVAL AQUA: 43910;PT AQUATIC THERAPY EA 15 MIN: 31291;PT SELF CARE/HOME MGMT/TRAIN EA 15: 16982;PT HOT OR COLD PACK TREAT MCARE;PT ELECTRICAL STIM UNATTEND: ;PT ULTRASOUND EA 15 MIN: 49619;PT TRACTION LUMBAR: 01184  -CN (r) NC (t) CN (c)  --    PT Plan Comments  Assess response to eval and HEP. Consider recumbent bike for w/u, Side lying clamshells with RTB, LAQ with 2-3lb ankle weights, gait training, and SL balance.  -CN (r) NC (t) CN (c)  --      User Key  (r) = Recorded By, (t) = Taken By, (c) = Cosigned By    Initials Name Provider Type    Darcie Martinez, PT Physical Therapist    Kei Messer, PT Student PT Student            OP Exercises     Row Name 07/27/21 1600             Total Minutes    18277 - PT Therapeutic Exercise Minutes  12  -CN (r) NC (t) CN (c)         Exercise 1    Exercise Name 1  LTR  -CN (r) NC (t) CN (c)      Cueing 1  Verbal  -CN (r) NC (t) CN (c)      Sets 1  1  -CN (r) NC (t) CN (c)      Reps 1  10  -CN (r) NC (t) CN (c)      Additional Comments  Small pain free ROM  -CN (r) NC (t) CN (c)         Exercise 2    Exercise Name 2  Supine  Piriformis/fig 4 stretch  -CN (r) NC (t) CN (c)      Cueing 2  Verbal  -CN (r) NC (t) CN (c)      Sets 2  3e  -CN (r) NC (t) CN (c)      Time 2  :20 sec  -CN (r) NC (t) CN (c)         Exercise 3    Exercise Name 3  PPT  -CN (r) NC (t) CN (c)      Cueing 3  Verbal  -CN (r) NC (t) CN (c)      Sets 3  1  -CN (r) NC (t) CN (c)      Reps 3  10  -CN (r) NC (t) CN (c)      Time 3  5s hold  -CN (r) NC (t) CN (c)         Exercise 4    Exercise Name 4  Seated HS stretch  -CN (r) NC (t) CN (c)      Cueing 4  Verbal  -CN (r) NC (t) CN (c)      Sets 4  3e  -CN (r) NC (t) CN (c)      Time 4  :20 sec hold  -CN (r) NC (t) CN (c)        User Key  (r) = Recorded By, (t) = Taken By, (c) = Cosigned By    Initials Name Provider Type    Darcie Martinez, PT Physical Therapist    Kei Messer PT Student PT Student                        Outcome Measure Options: Knee Outcome Score- ADL  Knee Outcome Score  Knee Outcome Score Comments: 56.25% where 100% is no disability      Time Calculation:     Start Time: 0830  Stop Time: 0915  Time Calculation (min): 45 min  Timed Charges  62951 - PT Therapeutic Exercise Minutes: 12  Total Minutes  Timed Charges Total Minutes: 12   Total Minutes: 12     Therapy Charges for Today     Code Description Service Date Service Provider Modifiers Qty    [id number]  PT THER PROC EA 15 MIN 7/27/2021 Kei Dee, PT Student GP 1    [id number]  PT EVAL LOW COMPLEXITY 2 7/27/2021 Kei Dee, PT Student GP 1          PT G-Codes  Outcome Measure Options: Knee Outcome Score- ADL         KEI DEE PT Student  7/28/2021

## 2021-08-03 ENCOUNTER — OFFICE VISIT (OUTPATIENT)
Dept: ORTHOPEDIC SURGERY | Facility: CLINIC | Age: 81
End: 2021-08-03

## 2021-08-03 VITALS — BODY MASS INDEX: 31.89 KG/M2 | TEMPERATURE: 96.4 F | HEIGHT: 63 IN | WEIGHT: 180 LBS

## 2021-08-03 DIAGNOSIS — M17.0 PRIMARY OSTEOARTHRITIS OF BOTH KNEES: Primary | ICD-10-CM

## 2021-08-03 PROCEDURE — 20610 DRAIN/INJ JOINT/BURSA W/O US: CPT | Performed by: NURSE PRACTITIONER

## 2021-08-03 PROCEDURE — 99213 OFFICE O/P EST LOW 20 MIN: CPT | Performed by: NURSE PRACTITIONER

## 2021-08-03 RX ORDER — LIDOCAINE HYDROCHLORIDE 10 MG/ML
4 INJECTION, SOLUTION EPIDURAL; INFILTRATION; INTRACAUDAL; PERINEURAL
Status: COMPLETED | OUTPATIENT
Start: 2021-08-03 | End: 2021-08-03

## 2021-08-03 RX ORDER — METHYLPREDNISOLONE ACETATE 80 MG/ML
80 INJECTION, SUSPENSION INTRA-ARTICULAR; INTRALESIONAL; INTRAMUSCULAR; SOFT TISSUE
Status: COMPLETED | OUTPATIENT
Start: 2021-08-03 | End: 2021-08-03

## 2021-08-03 RX ADMIN — METHYLPREDNISOLONE ACETATE 80 MG: 80 INJECTION, SUSPENSION INTRA-ARTICULAR; INTRALESIONAL; INTRAMUSCULAR; SOFT TISSUE at 08:28

## 2021-08-03 RX ADMIN — LIDOCAINE HYDROCHLORIDE 4 ML: 10 INJECTION, SOLUTION EPIDURAL; INFILTRATION; INTRACAUDAL; PERINEURAL at 08:28

## 2021-08-03 NOTE — PROGRESS NOTES
Patient Name: Ashley Motta   YOB: 1940  Referring Primary Care Physician: Erica Johnston MD  BMI: Body mass index is 31.89 kg/m².    Chief Complaint:    Chief Complaint   Patient presents with   • Left Knee - Follow-up, Pain   • Right Knee - Follow-up, Pain        HPI:     Ashley Motta is a 81 y.o. female who presents today for evaluation of   Chief Complaint   Patient presents with   • Left Knee - Follow-up, Pain   • Right Knee - Follow-up, Pain   .  Patient returns for evaluation of bilateral knee pain.  She has osteoarthritis of the knees and has a generalized, achy pain with stiffness and swelling bilaterally.  She had viscosupplementation over a month ago and reports that it did not help her at all.  Cortisone has worked for her in the past.  She also uses ice, heat, topical liniments, etc. for pain and inflammation control.  We briefly discussed total knee arthroplasty but she is not ready for surgical intervention at this point.      Subjective   Medications:   Home Medications:  Current Outpatient Medications on File Prior to Visit   Medication Sig   • aspirin 81 MG tablet Take 81 mg by mouth daily.   • B Complex Vitamins (VITAMIN B COMPLEX PO) Take  by mouth.   • Calcium Carbonate-Vitamin D (CALCIUM + D PO) Take  by mouth 2 (Two) Times a Day.   • Coenzyme Q10 (COQ10) 200 MG capsule Take  by mouth.   • ECHINACEA-ZINC-VITAMIN C PO Take 1,000 mg by mouth.   • gabapentin (NEURONTIN) 100 MG capsule Take 1 capsule by mouth at bedtime   • Magnesium 400 MG tablet Take  by mouth.   • Misc Natural Products (GLUCOSAMINE CHONDROITIN VIT D3) capsule Take  by mouth.   • Multiple Vitamins-Minerals (MULTIVITAMIN ADULT PO) Take  by mouth.   • Omega-3 Fatty Acids (FISH OIL) 1000 MG capsule capsule Take  by mouth daily with breakfast.   • pantoprazole (PROTONIX) 20 MG EC tablet Take 1 tablet by mouth Daily.   • Probiotic Product (PROBIOTIC DAILY PO) Take  by mouth.   • rOPINIRole (REQUIP) 0.5 MG tablet TAKE  ONE TABLET BY MOUTH IN THE EVENING 1 HOUR BEFORE BEDTIME   • simvastatin (ZOCOR) 40 MG tablet TAKE 1 TABLET BY MOUTH AT BEDTIME     No current facility-administered medications on file prior to visit.     Current Medications:  Scheduled Meds:  Continuous Infusions:No current facility-administered medications for this visit.    PRN Meds:.    I have reviewed the patient's medical history in detail and updated the computerized patient record.  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 TERRY CHEUNG   • Osteoarthritis    • Osteopenia    • PLMD (periodic limb movement disorder)    • Postmenopausal atrophic vaginitis    • Prediabetes    • Presbyopia    • Rectal nodule 03/2020    REFERRED TO DR. PAMELA NAPOLES   • Rectocele 07/2017   • Regular astigmatism of both eyes    • Rheumatoid arthritis (CMS/HCC)    • RLS (restless legs syndrome)    • Seborrheic keratosis    • Skin cancer 04/2015    LOWER LEG, FOLLOWED BY DR. EDI SANCHEZ   • Urinary incontinence 09/2017   • Vaginal vault prolapse 09/2017   • Vertigo 10/18/2018    ADMITTED TO [facility]   • Vitreoretinal  degeneration of both eyes         Past Surgical History:   Procedure Laterality Date   • CATARACT EXTRACTION, BILATERAL Bilateral 2015   • COLONOSCOPY N/A 02/27/2015    1 TUBULAR ADEN[facility]   • COLONOSCOPY N/A 3/4/2020    10 MM SESSILE SERRATED ADENOMA POLYP IN ASCENDING, 3 MM HYPERPLASTIC POLYP IN RECTUM, 7 MM ANAL NODULE, DR. SARAH LIRIANO AT [facility]   • HYSTERECTOMY N/A 01/19/2004    KAYLEE WITH BSO, DR. RAFIA MORTENSEN AT [facility]   • KNEE ARTHROSCOPY Left 2013   • KNEE ARTHROSCOPY Right 2010   • MELISSA CULDOPLASTY N/A 01/19/2004    DR. RAFIA MORTENSEN AT [facility]   • SACROCOLPOPEXY N/A 01/19/2004    SACROVAGINAL SLING, DR. RAFIA MORTENSEN AT [facility]   • SHOULDER ARTHROSCOPY Left         Social History     Occupational History   • Not on file   Tobacco Use   • Smoking status: Never Smoker   • Smokeless tobacco: Never Used   Vaping Use   • Vaping Use: Never used   Substance and Sexual Activity   • Alcohol use: No   • Drug use: Never   • Sexual activity: Not Currently     Birth control/protection: Post-menopausal, Surgical     Comment: .      Social History     Social History Narrative   • Not on file        Family History   Problem Relation Age of Onset   • Heart disease Mother    • Lung cancer Father    • Hypertension Father    • Heart disease Father    • Cancer Father    • Heart attack Brother    • Emphysema Brother    • COPD Brother    • Depression Brother    • Heart attack Brother         Had stents placed 2009       ROS: 14 point review of systems was performed and all other systems were reviewed and are negative except for documented findings in HPI and today's encounter.     Allergies: No Known Allergies  Constitutional:  Denies fever, shaking or chills   Eyes:  Denies change in visual acuity   HENT:  Denies nasal congestion or sore throat   Respiratory:  Denies cough or shortness of breath   Cardiovascular:  Denies chest pain or severe LE edema   GI:  Denies  "abdominal pain, nausea, vomiting, bloody stools or diarrhea   Musculoskeletal:  Numbness, tingling, pain, or loss of motor function only as noted above in history of present illness.  : Denies painful urination or hematuria  Integument:  Denies rash, lesion or ulceration   Neurologic:  Denies headache or focal weakness  Endocrine:  Denies lymphadenopathy  Psych:  Denies confusion or change in mental status   Hem:  Denies active bleeding    OBJECTIVE:  Physical Exam: 81 y.o. female  Wt Readings from Last 3 Encounters:   08/03/21 81.6 kg (180 lb)   07/06/21 81.9 kg (180 lb 9.6 oz)   06/16/21 82.6 kg (182 lb)     Ht Readings from Last 1 Encounters:   08/03/21 160 cm (63\")     Body mass index is 31.89 kg/m².  Vitals:    08/03/21 0825   Temp: 96.4 °F (35.8 °C)     Vital signs reviewed.     General Appearance:    Alert, cooperative, in no acute distress                  Eyes: conjunctiva clear  ENT: external ears and nose atraumatic  CV: no peripheral edema  Resp: normal respiratory effort  Skin: no rashes or wounds; normal turgor  Psych: mood and affect appropriate  Lymph: no nodes appreciated  Neuro: gross sensation intact  Vascular:  Palpable peripheral pulse in noted extremity  Musculoskeletal Extremities: Bilateral knees with medial lateral joint tenderness, swelling, synovitis, patellofemoral crepitation.  Calves soft nontender.  Skin clean and intact with no rash or lesions    Radiology:   No x-rays obtained today.  Reviewed AP, lateral, 40 degree PA of bilateral knees from 7/6/2021 which show advanced osteoarthritis of the knees bilaterally, worse in the medial patellofemoral compartments    Assessment:     ICD-10-CM ICD-9-CM   1. Primary osteoarthritis of both knees  M17.0 715.16       Large Joint Arthrocentesis: R knee  Date/Time: 8/3/2021 8:28 AM  Consent given by: patient  Site marked: site marked  Timeout: Immediately prior to procedure a time out was called to verify the correct patient, procedure, " equipment, support staff and site/side marked as required   Supporting Documentation  Indications: pain   Procedure Details  Location: knee - R knee  Preparation: Patient was prepped and draped in the usual sterile fashion  Needle gauge: 21G.  Approach: anterolateral  Medications administered: 80 mg methylPREDNISolone acetate 80 MG/ML; 4 mL lidocaine PF 1% 1 %  Patient tolerance: patient tolerated the procedure well with no immediate complications    Large Joint Arthrocentesis: L knee  Date/Time: 8/3/2021 8:28 AM  Consent given by: patient  Site marked: site marked  Timeout: Immediately prior to procedure a time out was called to verify the correct patient, procedure, equipment, support staff and site/side marked as required   Supporting Documentation  Indications: pain   Procedure Details  Location: knee - L knee  Preparation: Patient was prepped and draped in the usual sterile fashion  Needle gauge: 21G.  Approach: anterolateral  Medications administered: 80 mg methylPREDNISolone acetate 80 MG/ML; 4 mL lidocaine PF 1% 1 %  Patient tolerance: patient tolerated the procedure well with no immediate complications            MDM/Plan:   The diagnosis(es), natural history, pathophysiology and treatment for diagnosis(es) were discussed. Opportunity given and questions answered.  Biomechanics of pertinent body areas discussed.  When appropriate, the use of ambulatory aids discussed.  MEDICATIONS:  The risks, benefits, warnings,side effects and alternatives of medications discussed.  Inflammation/pain control; with cold, heat, elevation and/or liniments discussed as appropriate  Cortisone Injection. See procedure note.  PT referral.   We again discussed treatment options and patient elects to proceed with cortisone injection in bilateral knees today.  I referred her to physical therapy and she starts that in a couple weeks.  Encouraged her to continue doing quad strengthening exercises on a regular basis at home.  She can  continue taking Tylenol and using ice, heat, topical liniments as needed.  We will see her back as needed.  Could repeat cortisone injections in 3 months if effective.      8/3/2021    Much of this encounter note is an electronic transcription/translation of spoken language to printed text. The electronic translation of spoken language may permit erroneous, or at times, nonsensical words or phrases to be inadvertently transcribed; Although I have reviewed the note for such errors, some may still exist

## 2021-08-11 ENCOUNTER — HOSPITAL ENCOUNTER (OUTPATIENT)
Dept: PHYSICAL THERAPY | Facility: HOSPITAL | Age: 81
Setting detail: THERAPIES SERIES
Discharge: HOME | End: 2021-08-11

## 2021-08-11 DIAGNOSIS — R26.2 DIFFICULTY WALKING: ICD-10-CM

## 2021-08-11 DIAGNOSIS — M17.0 PRIMARY OSTEOARTHRITIS OF BOTH KNEES: Primary | ICD-10-CM

## 2021-08-11 PROCEDURE — 97110 THERAPEUTIC EXERCISES: CPT

## 2021-08-11 NOTE — THERAPY TREATMENT NOTE
Outpatient Physical Therapy Ortho Treatment Note  [facility]     Patient Name: Ashley Motta  : 1940  MRN: [id number]  Today's Date: 2021      Visit Date: 2021    Visit Dx:    ICD-10-CM ICD-9-CM   1. Primary osteoarthritis of both knees  M17.0 715.16   2. 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 SARAH LIRIANO   • Difficulty walking    • Difficulty walking    • Dry eye syndrome, bilateral    • Eczema 2014   • Elevated fasting glucose    • Endothelial corneal dystrophy 2020   • Genital prolapse 10/2017   • GERD (gastroesophageal reflux disease)    • Goiter, nontoxic, multinodular 2017    THYROID POLYP SEES    • Hemorrhoids    • Herpes zoster 2018   • Hyperlipidemia    • Hypersomnia 2016   • Impaired fasting glucose    • Meralgia paresthetica, left lower limb    • Migraine    • Myopia of both eyes    • Nuclear sclerosis    • BALDEMAR on CPAP     FOLLOWED BY DR. TERRY CHEUNG   • Osteoarthritis    • Osteopenia    • PLMD (periodic limb movement disorder)    • Postmenopausal atrophic vaginitis    • Prediabetes    • Presbyopia    • Rectal nodule 2020    REFERRED TO DR. PAMELA NAPOLES   • Rectocele 2017   • Regular astigmatism of both eyes    • Rheumatoid arthritis (CMS/HCC)    • RLS (restless legs syndrome)    •  Seborrheic keratosis    • Skin cancer 04/2015    LOWER LEG, FOLLOWED BY DR. EDI SANCHEZ   • Urinary incontinence 09/2017   • Vaginal vault prolapse 09/2017   • Vertigo 10/18/2018    ADMITTED TO [facility]   • Vitreoretinal degeneration of both eyes         Past Surgical History:   Procedure Laterality Date   • CATARACT EXTRACTION, BILATERAL Bilateral 2015   • COLONOSCOPY N/A 02/27/2015    1 TUBULAR ADENOMA POLYP IN DISTAL ASCENDING[facility]   • COLONOSCOPY N/A 3/4/2020    10 MM SESSILE SERRATED ADENOMA POLYP IN ASCENDING, 3 MM HYPERPLASTIC POLYP IN RECTUM, 7 MM ANAL NODULE, DR. SARAH LIRIANO AT [facility]   • HYSTERECTOMY N/A 01/19/2004    KAYLEE WITH BSO, DR. RAFIA MORTENSEN AT [facility]   • KNEE ARTHROSCOPY Left 2013   • KNEE ARTHROSCOPY Right 2010   • MELISSA CULDOPLASTY N/A 01/19/2004    DR. RAFIA MORTENSEN AT [facility]   • SACROCOLPOPEXY N/A 01/19/2004    SACROVAGINAL SLING, DR. RAFIA MORTENSEN AT [facility]   • SHOULDER ARTHROSCOPY Left                        PT Assessment/Plan     Row Name 08/11/21 1216          PT Assessment    Assessment Comments  Pt presents today for first f/u since evaluation having had cortisone shots in both knees with decreased pain in both knees. Pt reports exercises are going well but still needed moderate verbal cueing to perform. New strengthening exercises were initiated and tolerated without increase in knee pain. Pt continues to improve and remains a good candidate for skilled therapy.  -CN (r) NC (t) CN (c)        PT Plan    PT Plan Comments  Assess response to new strethening exercises. May consider 2 min walk test, STS, and single leg balance at bar.  -CN (r) NC (t) CN (c)       User Key  (r) = Recorded By, (t) = Taken By, (c) = Cosigned By    Initials Name Provider Type    Darcie Martinez, PT Physical Therapist    Kei Messer, PT Student PT Student            OP Exercises     Row Name 08/11/21 0900             Subjective Comments    Subjective Comments  My knees feel  fine. I had cortisone shot in my knees and it seems to be helping.  -CN (r) NC (t) CN (c)         Subjective Pain    Able to rate subjective pain?  yes  -CN (r) NC (t) CN (c)      Pre-Treatment Pain Level  2  -CN (r) NC (t) CN (c)      Subjective Pain Comment  L knee  -CN (r) NC (t) CN (c)         Total Minutes    19340 - PT Therapeutic Exercise Minutes  42  -CN (r) NC (t) CN (c)         Exercise 1    Exercise Name 1  LTR  -CN (r) NC (t) CN (c)      Cueing 1  Verbal  -CN (r) NC (t) CN (c)      Sets 1  1  -CN (r) NC (t) CN (c)      Reps 1  10  -CN (r) NC (t) CN (c)         Exercise 2    Exercise Name 2  Supine Piriformis/fig 4 stretch  -CN (r) NC (t) CN (c)      Cueing 2  Verbal  -CN (r) NC (t) CN (c)      Sets 2  3e  -CN (r) NC (t) CN (c)      Time 2  :20 sec  -CN (r) NC (t) CN (c)         Exercise 3    Exercise Name 3  PPT  -CN (r) NC (t) CN (c)      Cueing 3  Verbal  -CN (r) NC (t) CN (c)      Sets 3  1  -CN (r) NC (t) CN (c)      Reps 3  10  -CN (r) NC (t) CN (c)      Time 3  5s hold  -CN (r) NC (t) CN (c)         Exercise 4    Exercise Name 4  Seated HS stretch  -CN (r) NC (t) CN (c)      Cueing 4  Verbal  -CN (r) NC (t) CN (c)      Sets 4  3e  -CN (r) NC (t) CN (c)      Time 4  :20 sec hold  -CN (r) NC (t) CN (c)         Exercise 5    Exercise Name 5  glute bridge  -CN (r) NC (t) CN (c)      Cueing 5  Verbal  -CN (r) NC (t) CN (c)      Sets 5  2  -CN (r) NC (t) CN (c)      Reps 5  10  -CN (r) NC (t) CN (c)      Additional Comments  RTB  -CN (r) NC (t) CN (c)         Exercise 6    Exercise Name 6  HL Clamshells  -CN (r) NC (t) CN (c)      Cueing 6  Verbal  -CN (r) NC (t) CN (c)      Sets 6  2e  -CN (r) NC (t) CN (c)      Reps 6  10  -CN (r) NC (t) CN (c)      Additional Comments  RTB  -CN (r) NC (t) CN (c)         Exercise 7    Exercise Name 7  LAQ  -CN (r) NC (t) CN (c)      Cueing 7  Verbal  -CN (r) NC (t) CN (c)      Sets 7  2e  -CN (r) NC (t) CN (c)      Reps 7  10  -CN (r) NC (t) CN (c)      Additional  Comments  2# ankle weights  -CN (r) NC (t) CN (c)         Exercise 8    Exercise Name 8  --  -CN (r) NC (t) CN (c)        User Key  (r) = Recorded By, (t) = Taken By, (c) = Cosigned By    Initials Name Provider Type    Darcie Martinez, PT Physical Therapist    Kei Messer, PT Student PT Student                       PT OP Goals     Row Name 08/11/21 1000          PT Short Term Goals    STG Date to Achieve  08/27/21  -CN (r) NC (t) CN (c)     STG 1  Pt will report pain rated 2/10 at worst in order to demonstrate ability to return to normalized ADLs and functional activities.  -CN (r) NC (t) CN (c)     STG 1 Progress  Progressing  -CN (r) NC (t) CN (c)     STG 1 Progress Comments  Pt says L knee is worse but shots are making them feel better  -CN (r) NC (t) CN (c)     STG 2  Pt will be independent with initial HEP for symptom management.  -CN (r) NC (t) CN (c)     STG 2 Progress  Progressing  -CN (r) NC (t) CN (c)     STG 2 Progress Comments  Pt needs moderate cueing for exercices  -CN (r) NC (t) CN (c)     STG 3  Pt will demonstrate bilateral :20 sec SL balance to decrease risk of falls in the community.  -CN (r) NC (t) CN (c)     STG 3 Progress  Ongoing  -CN (r) NC (t) CN (c)        Long Term Goals    LTG Date to Achieve  09/27/21  -CN (r) NC (t) CN (c)     LTG 1  Pt will be independent and compliant with advanced HEP for long term management of symptoms and prevention of future occurrence.   -CN (r) NC (t) CN (c)     LTG 1 Progress  Ongoing  -CN (r) NC (t) CN (c)     LTG 2  Pt will reduce level of perceived disability as measured by the KOS  to 70% in order to improve QOL where 100% is no dissability.  -CN (r) NC (t) CN (c)     LTG 2 Progress  Ongoing  -CN (r) NC (t) CN (c)     LTG 3  Pt will report walking 1/2 mile at park with no greater than 3/10 pain in order to improve QOL.  -CN (r) NC (t) CN (c)     LTG 3 Progress  Ongoing  -CN (r) NC (t) CN (c)     LTG 4  Pt will demonstrate bilateral :30  sec SL balance to decrease risk of falls in the community.  -CN (r) NC (t) CN (c)     LTG 4 Progress  Ongoing  -CN (r) NC (t) CN (c)     LTG 5  Pt will demonstrate reciprocal gait pattern descending 5 stairs with no greater than 2/10 pain to ambulate safely in home.  -CN (r) NC (t) CN (c)     LTG 5 Progress  Ongoing  -CN (r) NC (t) CN (c)       User Key  (r) = Recorded By, (t) = Taken By, (c) = Cosigned By    Initials Name Provider Type    Darcie Martinez, PT Physical Therapist    Kei Messer, PT Student PT Student          Therapy Education  Education Details: HEP updated. Pt educated on anatomy and potential referal to [facility] for aquatic therapy.  Given: HEP, Symptoms/condition management  Program: Reinforced, New  How Provided: Verbal  Provided to: Patient  Level of Understanding: Verbalized              Time Calculation:   Start Time: 0915  Stop Time: 1000  Time Calculation (min): 45 min  Timed Charges  77854 - PT Therapeutic Exercise Minutes: 42  Total Minutes  Timed Charges Total Minutes: 42   Total Minutes: 42  Therapy Charges for Today     Code Description Service Date Service Provider Modifiers Qty    [id number] HC PT THER PROC EA 15 MIN 8/11/2021 Kei Dee, PT Student GP 3                    KEI DEE, PT Student  8/11/2021

## 2021-08-16 ENCOUNTER — HOSPITAL ENCOUNTER (OUTPATIENT)
Dept: PHYSICAL THERAPY | Facility: HOSPITAL | Age: 81
Setting detail: THERAPIES SERIES
Discharge: HOME | End: 2021-08-16

## 2021-08-16 DIAGNOSIS — M17.0 PRIMARY OSTEOARTHRITIS OF BOTH KNEES: Primary | ICD-10-CM

## 2021-08-16 DIAGNOSIS — R26.2 DIFFICULTY WALKING: ICD-10-CM

## 2021-08-16 PROCEDURE — 97110 THERAPEUTIC EXERCISES: CPT

## 2021-08-16 NOTE — THERAPY TREATMENT NOTE
Outpatient Physical Therapy Ortho Treatment Note  [facility]     Patient Name: Ashley Motta  : 1940  MRN: [id number]  Today's Date: 2021      Visit Date: 2021    Visit Dx:    ICD-10-CM ICD-9-CM   1. Primary osteoarthritis of both knees  M17.0 715.16   2. 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 SARAH LIRIANO   • Difficulty walking    • Difficulty walking    • Dry eye syndrome, bilateral    • Eczema 2014   • Elevated fasting glucose    • Endothelial corneal dystrophy 2020   • Genital prolapse 10/2017   • GERD (gastroesophageal reflux disease)    • Goiter, nontoxic, multinodular 2017    THYROID POLYP SEES    • Hemorrhoids    • Herpes zoster 2018   • Hyperlipidemia    • Hypersomnia 2016   • Impaired fasting glucose    • Meralgia paresthetica, left lower limb    • Migraine    • Myopia of both eyes    • Nuclear sclerosis    • BALDEMAR on CPAP     FOLLOWED BY DR. TERRY CHEUNG   • Osteoarthritis    • Osteopenia    • PLMD (periodic limb movement disorder)    • Postmenopausal atrophic vaginitis    • Prediabetes    • Presbyopia    • Rectal nodule 2020    REFERRED TO DR. PAMELA NAPOLES   • Rectocele 2017   • Regular astigmatism of both eyes    • Rheumatoid arthritis (CMS/HCC)    • RLS (restless legs syndrome)    •  Seborrheic keratosis    • Skin cancer 04/2015    LOWER LEG, FOLLOWED BY DR. EDI SANCHEZ   • Urinary incontinence 09/2017   • Vaginal vault prolapse 09/2017   • Vertigo 10/18/2018    ADMITTED TO [facility]   • Vitreoretinal degeneration of both eyes         Past Surgical History:   Procedure Laterality Date   • CATARACT EXTRACTION, BILATERAL Bilateral 2015   • COLONOSCOPY N/A 02/27/2015    1 TUBULAR ADENOMA POLYP IN DISTAL [facility]   • COLONOSCOPY N/A 3/4/2020    10 MM SESSILE SERRATED ADENOMA POLYP IN ASCENDING, 3 MM HYPERPLASTIC POLYP IN RECTUM, 7 MM ANAL NODULE, DR. SARAH LIRIANO AT [facility]   • HYSTERECTOMY N/A 01/19/2004    KAYLEE WITH BSO, DR. RAFIA MORTENSEN AT [facility]   • KNEE ARTHROSCOPY Left 2013   • KNEE ARTHROSCOPY Right 2010   • MELISSA CULDOPLASTY N/A 01/19/2004    DR. RAFIA MORTENSEN AT [facility]   • SACROCOLPOPEXY N/A 01/19/2004    SACROVAGINAL SLING, DR. RAFIA MORTENSEN AT [facility]   • SHOULDER ARTHROSCOPY Left                        PT Assessment/Plan     Row Name 08/16/21 1026          PT Assessment    Assessment Comments  Pt reporting low pain levels overall and compliant with current home program. Added SL clamshells and reviewed current program with intermittnet cues for form. Pt planning to initiate aquatic therapy next month for improved mobility and strenghtening.  -CN        PT Plan    PT Plan Comments  Assess response to added exercises. Consider SL balance, sidestepping and monster walk next visit.  -CN       User Key  (r) = Recorded By, (t) = Taken By, (c) = Cosigned By    Initials Name Provider Type    Darcie Martinez, PT Physical Therapist            OP Exercises     Row Name 08/16/21 0900             Subjective Comments    Subjective Comments  I am feeling ok today. They got me scheduled in the pool but it isn't for another month.   -CN         Subjective Pain    Able to rate subjective pain?  yes  -CN      Pre-Treatment Pain Level  2  -CN         Total Minutes     82505 - PT Therapeutic Exercise Minutes  41  -CN         Exercise 1    Exercise Name 1  LTR  -CN      Cueing 1  Verbal  -CN      Sets 1  1  -CN      Reps 1  10  -CN         Exercise 2    Exercise Name 2  Supine Piriformis/fig 4 stretch  -CN      Cueing 2  Verbal  -CN      Sets 2  3e  -CN      Time 2  :20 sec  -CN         Exercise 3    Exercise Name 3  PPT  -CN      Cueing 3  Verbal  -CN      Sets 3  1  -CN      Reps 3  10  -CN      Time 3  5s hold  -CN         Exercise 4    Exercise Name 4  Seated HS stretch  -CN      Cueing 4  Verbal  -CN      Sets 4  3e  -CN      Time 4  :20 sec hold  -CN         Exercise 5    Exercise Name 5  glute bridge  -CN      Cueing 5  Verbal  -CN      Sets 5  2  -CN      Reps 5  10  -CN      Additional Comments  RTB  -CN         Exercise 6    Exercise Name 6  HL Clamshells  -CN      Cueing 6  Verbal  -CN      Sets 6  2e  -CN      Reps 6  10  -CN      Additional Comments  RTB  -CN         Exercise 7    Exercise Name 7  LAQ  -CN      Cueing 7  Verbal  -CN      Sets 7  2e  -CN      Reps 7  10  -CN      Additional Comments  2# ankle weights  -CN         Exercise 8    Exercise Name 8  SL clamshells  -CN      Cueing 8  Verbal;Demo  -CN      Time 8  10 B  -CN         Exercise 9    Exercise Name 9  Nustep, L5  -CN      Cueing 9  Verbal;Demo  -CN      Time 9  5 min  -CN        User Key  (r) = Recorded By, (t) = Taken By, (c) = Cosigned By    Initials Name Provider Type    Darcie Martinez, PT Physical Therapist                       PT OP Goals     Row Name 08/16/21 0900          PT Short Term Goals    STG Date to Achieve  08/27/21  -CN     STG 1  Pt will report pain rated 2/10 at worst in order to demonstrate ability to return to normalized ADLs and functional activities.  -CN     STG 1 Progress  Progressing  -CN     STG 1 Progress Comments  Pt reports low level pain overall at this time.   -CN     STG 2  Pt will be independent with initial HEP for symptom management.  -CN     STG 2  Progress  Met  -CN     STG 3  Pt will demonstrate bilateral :20 sec SL balance to decrease risk of falls in the community.  -CN     STG 3 Progress  Ongoing  -CN        Long Term Goals    LTG Date to Achieve  09/27/21  -CN     LTG 1  Pt will be independent and compliant with advanced HEP for long term management of symptoms and prevention of future occurrence.   -CN     LTG 1 Progress  Ongoing  -CN     LTG 2  Pt will reduce level of perceived disability as measured by the KOS  to 70% in order to improve QOL where 100% is no dissability.  -CN     LTG 2 Progress  Ongoing  -CN     LTG 3  Pt will report walking 1/2 mile at park with no greater than 3/10 pain in order to improve QOL.  -CN     LTG 3 Progress  Ongoing  -CN     LTG 4  Pt will demonstrate bilateral :30 sec SL balance to decrease risk of falls in the community.  -CN     LTG 4 Progress  Ongoing  -CN     LTG 5  Pt will demonstrate reciprocal gait pattern descending 5 stairs with no greater than 2/10 pain to ambulate safely in home.  -CN     LTG 5 Progress  Ongoing  -CN       User Key  (r) = Recorded By, (t) = Taken By, (c) = Cosigned By    Initials Name Provider Type    Darcie Martinez, PT Physical Therapist          Therapy Education  Given: HEP, Symptoms/condition management  Program: Reinforced, Progressed  How Provided: Verbal  Provided to: Patient  Level of Understanding: Verbalized              Time Calculation:   Start Time: 0919  Stop Time: 1000  Time Calculation (min): 41 min  Timed Charges  67892 - PT Therapeutic Exercise Minutes: 41  Total Minutes  Timed Charges Total Minutes: 41   Total Minutes: 41  Therapy Charges for Today     Code Description Service Date Service Provider Modifiers Qty    13179815647 HC PT THER PROC EA 15 MIN 8/16/2021 Darcie Westbrook, PT GP 3                    Darcie Westbrook PT  8/16/2021

## 2021-08-18 ENCOUNTER — HOSPITAL ENCOUNTER (OUTPATIENT)
Dept: PHYSICAL THERAPY | Facility: HOSPITAL | Age: 81
Setting detail: THERAPIES SERIES
Discharge: HOME | End: 2021-08-18

## 2021-08-18 DIAGNOSIS — R26.2 DIFFICULTY WALKING: ICD-10-CM

## 2021-08-18 DIAGNOSIS — M17.0 PRIMARY OSTEOARTHRITIS OF BOTH KNEES: Primary | ICD-10-CM

## 2021-08-18 PROCEDURE — 97110 THERAPEUTIC EXERCISES: CPT

## 2021-08-19 ENCOUNTER — OFFICE VISIT (OUTPATIENT)
Dept: SLEEP MEDICINE | Facility: HOSPITAL | Age: 81
End: 2021-08-19

## 2021-08-19 VITALS
SYSTOLIC BLOOD PRESSURE: 119 MMHG | OXYGEN SATURATION: 96 % | HEIGHT: 63 IN | DIASTOLIC BLOOD PRESSURE: 67 MMHG | BODY MASS INDEX: 31.89 KG/M2 | WEIGHT: 180 LBS | HEART RATE: 81 BPM

## 2021-08-19 DIAGNOSIS — G47.33 OSA ON CPAP: Primary | ICD-10-CM

## 2021-08-19 DIAGNOSIS — Z99.89 OSA ON CPAP: Primary | ICD-10-CM

## 2021-08-19 PROCEDURE — G0463 HOSPITAL OUTPT CLINIC VISIT: HCPCS

## 2021-08-19 NOTE — PROGRESS NOTES
[facility]  Telephone: [phone] / Fax: [phone] [place]  Telephone: [phone] / Fax: [phone] Lisbeth López    PCP: Erica Johnston MD    Reason for visit: BALDEMAR f/u    Ashley Motta is a 81 y.o.female  was last seen at [facility] sleep lab in October 2020. She is now following with Dr Harrison. I was asked to evaluate her as Dr Harrison is not in the office. 1 month ago her machine stopped working properly. Humidifier does not use the water. She registered her machine with [employer], and it is recalled. Her machine is going to be 5 years old in September 2021 and is eligible for replacement regardless of the recall. She does not use [employer] to disinfect the CPAP parts. CPAP has been beneficial to control the snoring and apneas. Her sleep schedule is 10:30pm-6:00am. ESS is 1.    SH- no tobacco, no alcohol, no energy drinks.    ROS- +GERD, rest is negative.    DME [employer].    Current Medications:    Current Outpatient Medications:   •  aspirin 81 MG tablet, Take 81 mg by mouth daily., Disp: , Rfl:   •  B Complex Vitamins (VITAMIN B COMPLEX PO), Take  by mouth., Disp: , Rfl:   •  Calcium Carbonate-Vitamin D (CALCIUM + D PO), Take  by mouth 2 (Two) Times a Day., Disp: , Rfl:   •  Coenzyme Q10 (COQ10) 200 MG capsule, Take  by mouth., Disp: , Rfl:   •  ECHINACEA-ZINC-VITAMIN C PO, Take 1,000 mg by mouth., Disp: , Rfl:   •  gabapentin (NEURONTIN) 100 MG capsule, Take 1 capsule by mouth at bedtime, Disp: 90 capsule, Rfl: 0  •  Magnesium 400 MG tablet, Take  by mouth., Disp: , Rfl:   •  Misc Natural Products (GLUCOSAMINE CHONDROITIN VIT D3) capsule, Take  by mouth., Disp: , Rfl:   •  Multiple Vitamins-Minerals (MULTIVITAMIN ADULT PO), Take  by mouth., Disp: , Rfl:   •  Omega-3 Fatty Acids (FISH OIL) 1000 MG capsule capsule, Take  by mouth daily with breakfast., Disp: , Rfl:   •  pantoprazole (PROTONIX) 20 MG EC tablet, Take 1 tablet by mouth Daily., Disp: 90 tablet, Rfl: 1  •  Probiotic Product  "(PROBIOTIC DAILY PO), Take  by mouth., Disp: , Rfl:   •  rOPINIRole (REQUIP) 0.5 MG tablet, TAKE ONE TABLET BY MOUTH IN THE EVENING 1 HOUR BEFORE BEDTIME, Disp: 90 tablet, Rfl: 0  •  simvastatin (ZOCOR) 40 MG tablet, TAKE 1 TABLET BY MOUTH AT BEDTIME, Disp: 90 tablet, Rfl: 1   also entered in Sleep Questionnaire    Patient  has a past medical history of Actinic keratosis, Anxiety, Arthritis, 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, Cataract (09/2016), Chronic allergic conjunctivitis, Chronic pain of left knee, Colon polyps, Difficulty walking, Difficulty walking, Dry eye syndrome, bilateral, Eczema (07/2014), Elevated fasting glucose, Endothelial corneal dystrophy (02/2020), Genital prolapse (10/2017), GERD (gastroesophageal reflux disease), Goiter, nontoxic, multinodular (05/2017), Hemorrhoids, Herpes zoster (05/2018), Hyperlipidemia, Hypersomnia (09/2016), Impaired fasting glucose, Meralgia paresthetica, left lower limb, Migraine, Myopia of both eyes, Nuclear sclerosis, BALDEMAR on CPAP, Osteoarthritis, Osteopenia, PLMD (periodic limb movement disorder), Postmenopausal atrophic vaginitis, Prediabetes, Presbyopia, Rectal nodule (03/2020), Rectocele (07/2017), Regular astigmatism of both eyes, Rheumatoid arthritis (CMS/[facility]), RLS (restless legs syndrome), Seborrheic keratosis, Skin cancer (04/2015), Urinary incontinence (09/2017), Vaginal vault prolapse (09/2017), Vertigo (10/18/2018), and Vitreoretinal degeneration of both eyes.    I have reviewed the Past Medical History, Past Surgical History, Social History and Family History.    Vital Signs /67 (BP Location: Left arm, Patient Position: Sitting)   Pulse 81   Ht 160 cm (63\")   Wt 81.6 kg (180 lb)   SpO2 96%   BMI 31.89 kg/m²  Body mass index is 31.89 kg/m².    General Alert and oriented. No acute distress noted   Pharynx/Throat Class III  Mallampati airway, large tongue, no evidence of redundant lateral pharyngeal tissue. No oral lesions. No thrush. Moist " mucous membranes.   Head Normocephalic. Symmetrical. Atraumatic.    Nose No septal deviation. No drainage   Chest Wall Normal shape. Symmetric expansion with respiration. No tenderness.   Neck Trachea midline, no thyromegaly or adenopathy    Lungs Clear to auscultation bilaterally. No wheezes. No rhonchi. No rales. Respirations regular, even and unlabored.   Heart Regular rhythm and normal rate. Normal S1 and S2. No murmur   Abdomen Soft, non-tender and non-distended. Normal bowel sounds. No masses.   Extremities Moves all extremities well. No edema   Psychiatric Normal mood and affect.     Testing:  · Download 5/21/21-8/18/21- 91% use with avg nightly use of 7 hours and 1 minute on CPAP 11cm with AHI 0.6, leak of 1 min and 45 seconds.    Study-2016- PSG:  Split study was performed.  Diagnostic portion from 10:44 PM to 1:47 AM.  Sleep efficiency 93% with 2.83 total sleep time.  Sleep distribution showed decreased REM sleep at 17%.  AHI index is 7.8 indicating mild obstructive sleep apnea.  In supine position index 11.2 and in rem sleep index 39 which is severe.  Saturation remained above 89%.  Arousal index was 16.  He 11 index high at 83 with PLM arousal index of 11.  Snoring for 7.67% of total sleep time.    Titration study from 1:47 AM to 6:02 AM.  Sleep efficiency 95%. Sleep distribution some decrease in slow-wave sleep with improvement in rem sleep to 19%.PLM index improved somewhat with PLM arousal index of 3.0 but total PLM index was 54.  CPAP increased up to 11 cm.  Maximum sleep at the11 cm water pressure.  Air leaks noted as the pressures were increased.  AHI index is less than 5 at final pressure setting.     Impression:  1. BALDEMAR on CPAP          Plan:  I discussed recent [place] recall with patient. I explained the short and long term effects of polyester-based polyurethane(PE-PUR) foam degradation. I have also discussed with patient the consequences of untreated moderate-to severe BALDEMAR.  Order new  machine-Resmed or DreamStation 2-auto CPAP with fixed pressure of 11cm.    Follow up with Dr. Harrison 3 months    Thank you for allowing me to participate in your patient's care.      BERNICE Rooney  [facility]  Phone: [phone]      Part of this note may be an electronic transcription/translation of spoken language to printed text using the Dragon Dictation System.

## 2021-08-23 ENCOUNTER — HOSPITAL ENCOUNTER (OUTPATIENT)
Dept: PHYSICAL THERAPY | Facility: HOSPITAL | Age: 81
Setting detail: THERAPIES SERIES
Discharge: HOME | End: 2021-08-23

## 2021-08-23 DIAGNOSIS — R26.2 DIFFICULTY WALKING: ICD-10-CM

## 2021-08-23 DIAGNOSIS — G43.809 OTHER MIGRAINE WITHOUT STATUS MIGRAINOSUS, NOT INTRACTABLE: ICD-10-CM

## 2021-08-23 DIAGNOSIS — M17.0 PRIMARY OSTEOARTHRITIS OF BOTH KNEES: Primary | ICD-10-CM

## 2021-08-23 PROCEDURE — 97110 THERAPEUTIC EXERCISES: CPT

## 2021-08-23 RX ORDER — GABAPENTIN 100 MG/1
CAPSULE ORAL
Qty: 90 CAPSULE | Refills: 0 | Status: SHIPPED | OUTPATIENT
Start: 2021-08-23 | End: 2021-11-09 | Stop reason: SDUPTHER

## 2021-08-23 NOTE — THERAPY TREATMENT NOTE
Outpatient Physical Therapy Ortho Treatment Note  [facility]     Patient Name: Ashley Motta  : 1940  MRN: [id number]  Today's Date: 2021      Visit Date: 2021    Visit Dx:    ICD-10-CM ICD-9-CM   1. Primary osteoarthritis of both knees  M17.0 715.16   2. 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 SARAH LIRIANO   • Difficulty walking    • Difficulty walking    • Dry eye syndrome, bilateral    • Eczema 2014   • Elevated fasting glucose    • Endothelial corneal dystrophy 2020   • Genital prolapse 10/2017   • GERD (gastroesophageal reflux disease)    • Goiter, nontoxic, multinodular 2017    THYROID POLYP SEES    • Hemorrhoids    • Herpes zoster 2018   • Hyperlipidemia    • Hypersomnia 2016   • Impaired fasting glucose    • Meralgia paresthetica, left lower limb    • Migraine    • Myopia of both eyes    • Nuclear sclerosis    • BALDEMRA on CPAP     FOLLOWED BY DR. TERRY CHEUNG   • Osteoarthritis    • Osteopenia    • PLMD (periodic limb movement disorder)    • Postmenopausal atrophic vaginitis    • Prediabetes    • Presbyopia    • Rectal nodule 2020    REFERRED TO DR. PAMELA NAPOLES   • Rectocele 2017   • Regular astigmatism of both eyes    • Rheumatoid arthritis (CMS/HCC)    • RLS (restless legs syndrome)    •  Seborrheic keratosis    • Skin cancer 04/2015    LOWER LEG, FOLLOWED BY DR. EDI SANCHEZ   • Urinary incontinence 09/2017   • Vaginal vault prolapse 09/2017   • Vertigo 10/18/2018    ADMITTED TO [facility]   • Vitreoretinal degeneration of both eyes         Past Surgical History:   Procedure Laterality Date   • CATARACT EXTRACTION, BILATERAL Bilateral 2015   • COLONOSCOPY N/A 02/27/2015    1 TUBULAR ADENOMA POLYP IN DISTAL ASCENDING[facility]   • COLONOSCOPY N/A 3/4/2020    10 MM SESSILE SERRATED ADENOMA POLYP IN ASCENDING, 3 MM HYPERPLASTIC POLYP IN RECTUM, 7 MM ANAL NODULE, DR. SARAH LIRIANO AT [facility]   • HYSTERECTOMY N/A 01/19/2004    KAYLEE WITH BSO, DR. RAFIA MORTENSEN AT [facility]   • KNEE ARTHROSCOPY Left 2013   • KNEE ARTHROSCOPY Right 2010   • MELISSA CULDOPLASTY N/A 01/19/2004    DR. RAFIA MROTENSEN AT [facility]   • SACROCOLPOPEXY N/A 01/19/2004    SACROVAGINAL SLING, DR. RAFIA MORTENSEN AT [facility]   • SHOULDER ARTHROSCOPY Left                        PT Assessment/Plan     Row Name 08/23/21 1400          PT Assessment    Assessment Comments  Ms. Motta reports lower pain currently, but does report pain cont to be worse at the end of the day. Pt reports feeling her balance is improving. Continued with strengthening today, pt denies pain at end of session. Remains good candiate for skilled PT.  -CA        PT Plan    PT Plan Comments  add small step up.  -CA       User Key  (r) = Recorded By, (t) = Taken By, (c) = Cosigned By    Initials Name Provider Type    Karen Gordon, PT Physical Therapist            OP Exercises     Row Name 08/23/21 1400             Subjective Comments    Subjective Comments  Pain is usually worse in evenings  -CA         Subjective Pain    Able to rate subjective pain?  yes  -CA      Pre-Treatment Pain Level  0  -CA         Total Minutes    47383 - PT Therapeutic Exercise Minutes  40  -CA         Exercise 1    Exercise Name 1  LTR  -CA      Cueing 1  Verbal  -CA      Sets 1  1  -CA       Reps 1  10  -CA         Exercise 2    Exercise Name 2  Supine Piriformis/fig 4 stretch  -CA      Cueing 2  Verbal  -CA      Reps 2  3e  -CA      Time 2  :20 sec  -CA         Exercise 3    Exercise Name 3  PPT  -CA      Cueing 3  Verbal  -CA      Sets 3  1  -CA      Reps 3  15  -CA      Time 3  5s hold  -CA         Exercise 4    Exercise Name 4  Seated HS stretch  -CA      Cueing 4  Verbal  -CA      Sets 4  3e  -CA      Time 4  :20 sec hold  -CA         Exercise 5    Exercise Name 5  glute bridge  -CA      Cueing 5  Verbal  -CA      Sets 5  2  -CA      Reps 5  10  -CA      Time 5  cues to push into band slightly to engage glute med  -CA      Additional Comments  RTB  -CA         Exercise 7    Exercise Name 7  LAQ  -CA      Cueing 7  Verbal  -CA      Sets 7  2e  -CA      Reps 7  10  -CA      Additional Comments  3#  -CA         Exercise 8    Exercise Name 8  SL clamshells  -CA      Cueing 8  Verbal;Demo  -CA      Time 8  10 B  -CA      Additional Comments  RTB  -CA         Exercise 9    Exercise Name 9  Nustep, L5  -CA      Cueing 9  Verbal;Demo  -CA      Time 9  5 min  -CA      Additional Comments  UE/LE L5  -CA         Exercise 10    Exercise Name 10  lateral stepping  -CA      Cueing 10  Verbal  -CA      Reps 10  3 laps // bars  -CA      Time 10  RTB at thighs  -CA         Exercise 11    Exercise Name 11  monster walk FW/BW  -CA      Cueing 11  Verbal;Demo  -CA      Reps 11  3 laps // bars  -CA      Time 11  RTB at thighs  -CA      Additional Comments  fwd/bkwd  -CA        User Key  (r) = Recorded By, (t) = Taken By, (c) = Cosigned By    Initials Name Provider Type    Karen Gordon PT Physical Therapist                                          Time Calculation:   Start Time: 1403  Stop Time: 1443  Time Calculation (min): 40 min  Total Timed Code Minutes- PT: 40 minute(s)  Timed Charges  51076 - PT Therapeutic Exercise Minutes: 40  Total Minutes  Timed Charges Total Minutes: 40   Total Minutes: 40  Therapy  Charges for Today     Code Description Service Date Service Provider Modifiers Qty    36017090357 HC PT THER PROC EA 15 MIN 8/23/2021 Karen Mittal, PT GP 3                    Karen Mittal, PT  8/23/2021

## 2021-08-25 ENCOUNTER — HOSPITAL ENCOUNTER (OUTPATIENT)
Dept: PHYSICAL THERAPY | Facility: HOSPITAL | Age: 81
Setting detail: THERAPIES SERIES
Discharge: HOME | End: 2021-08-25

## 2021-08-25 DIAGNOSIS — M17.0 PRIMARY OSTEOARTHRITIS OF BOTH KNEES: Primary | ICD-10-CM

## 2021-08-25 DIAGNOSIS — R26.2 DIFFICULTY WALKING: ICD-10-CM

## 2021-08-25 PROCEDURE — 97110 THERAPEUTIC EXERCISES: CPT

## 2021-08-25 NOTE — THERAPY TREATMENT NOTE
Outpatient Physical Therapy Ortho Treatment Note  [facility]     Patient Name: Ashley Motta  : 1940  MRN: [id number]  Today's Date: 2021      Visit Date: 2021    Visit Dx:    ICD-10-CM ICD-9-CM   1. Primary osteoarthritis of both knees  M17.0 715.16   2. 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 SARAH LIRIANO   • Difficulty walking    • Difficulty walking    • Dry eye syndrome, bilateral    • Eczema 2014   • Elevated fasting glucose    • Endothelial corneal dystrophy 2020   • Genital prolapse 10/2017   • GERD (gastroesophageal reflux disease)    • Goiter, nontoxic, multinodular 2017    THYROID POLYP SEES    • Hemorrhoids    • Herpes zoster 2018   • Hyperlipidemia    • Hypersomnia 2016   • Impaired fasting glucose    • Meralgia paresthetica, left lower limb    • Migraine    • Myopia of both eyes    • Nuclear sclerosis    • BALDEMAR on CPAP     FOLLOWED BY DR. TERRY CHEUNG   • Osteoarthritis    • Osteopenia    • PLMD (periodic limb movement disorder)    • Postmenopausal atrophic vaginitis    • Prediabetes    • Presbyopia    • Rectal nodule 2020    REFERRED TO DR. PAMELA NAPOLES   • Rectocele 2017   • Regular astigmatism of both eyes    • Rheumatoid arthritis (CMS/HCC)    • RLS (restless legs syndrome)    •  Seborrheic keratosis    • Skin cancer 04/2015    LOWER LEG, FOLLOWED BY DR. EDI SANCHEZ   • Urinary incontinence 09/2017   • Vaginal vault prolapse 09/2017   • Vertigo 10/18/2018    ADMITTED TO [facility]   • Vitreoretinal degeneration of both eyes         Past Surgical History:   Procedure Laterality Date   • CATARACT EXTRACTION, BILATERAL Bilateral 2015   • COLONOSCOPY N/A 02/27/2015    1 TUBULAR ADENOMA POLYP [facility]   • COLONOSCOPY N/A 3/4/2020    10 MM SESSILE SERRATED ADENOMA POLYP IN ASCENDING, 3 MM HYPERPLASTIC POLYP IN RECTUM, 7 MM ANAL NODULE, DR. SARAH LIRIANO AT [facility]   • HYSTERECTOMY N/A 01/19/2004    KAYLEE WITH BSO, DR. RAFIA MORTENSEN AT [facility]   • KNEE ARTHROSCOPY Left 2013   • KNEE ARTHROSCOPY Right 2010   • MELISSA CULDOPLASTY N/A 01/19/2004    DR. RAFIA MORTENSEN AT [facility]   • SACROCOLPOPEXY N/A 01/19/2004    SACROVAGINAL SLING, DR. RAFIA MORTENSEN AT [facility]   • SHOULDER ARTHROSCOPY Left                        PT Assessment/Plan     Row Name 08/25/21 1017          PT Assessment    Assessment Comments  Pt reporting low pain levels overall today and good tolerance to strenthening and balance program. Added step ups with cues for improve eccentric control and pt reports able to perform recriprocally with pain rated 4/10 or less. Pt progressing as expected and continues to be a good candidate for skilled PT services.  -CN        PT Plan    PT Plan Comments  Continue to progress toward standing strengthening and balance tasks.  -CN       User Key  (r) = Recorded By, (t) = Taken By, (c) = Cosigned By    Initials Name Provider Type    Darcie Martinez, PT Physical Therapist            OP Exercises     Row Name 08/25/21 0900             Subjective Comments    Subjective Comments  I had to walk in from the helicopter pad so it is a little sore.I have been working on some balance though and that feels like it is helping.   -CN         Subjective Pain    Able to rate  subjective pain?  yes  -CN      Pre-Treatment Pain Level  2  -CN         Total Minutes    50891 - PT Therapeutic Exercise Minutes  40  -CN         Exercise 1    Exercise Name 1  LTR  -CN      Cueing 1  Verbal  -CN      Sets 1  1  -CN      Reps 1  10  -CN         Exercise 2    Exercise Name 2  Supine Piriformis/fig 4 stretch  -CN      Cueing 2  Verbal  -CN      Reps 2  3e  -CN      Time 2  :20 sec  -CN         Exercise 3    Exercise Name 3  PPT  -CN      Cueing 3  Verbal  -CN      Sets 3  1  -CN      Reps 3  15  -CN      Time 3  5s hold  -CN         Exercise 4    Exercise Name 4  Seated HS stretch  -CN      Cueing 4  Verbal  -CN      Sets 4  3e  -CN      Time 4  :20 sec hold  -CN         Exercise 5    Exercise Name 5  glute bridge  -CN      Cueing 5  Verbal  -CN      Sets 5  2  -CN      Reps 5  10  -CN      Time 5  cues to push into band slightly to engage glute med  -CN      Additional Comments  RTB  -CN         Exercise 7    Exercise Name 7  LAQ  -CN      Cueing 7  Verbal  -CN      Sets 7  2e  -CN      Reps 7  10  -CN      Additional Comments  3#  -CN         Exercise 8    Exercise Name 8  SL clamshells  -CN      Cueing 8  Verbal;Demo  -CN      Time 8  10 B  -CN      Additional Comments  RTB  -CN         Exercise 9    Exercise Name 9  Nustep, L5  -CN      Cueing 9  Verbal;Demo  -CN      Time 9  5 min  -CN      Additional Comments  UE/LE L5  -CN         Exercise 11    Exercise Name 11  monster walk FW/BW  -CN      Cueing 11  Verbal;Demo  -CN      Reps 11  3 laps // bars  -CN      Time 11  RTB at thighs  -CN         Exercise 13    Exercise Name 13  Step ups  -CN      Cueing 13  Verbal;Demo  -CN      Reps 13  10 B  -CN        User Key  (r) = Recorded By, (t) = Taken By, (c) = Cosigned By    Initials Name Provider Type    Darcie Martinez, PT Physical Therapist                       PT OP Goals     Row Name 08/25/21 0900          PT Short Term Goals    STG Date to Achieve  08/27/21  -CN     STG 1  Pt will  report pain rated 2/10 at worst in order to demonstrate ability to return to normalized ADLs and functional activities.  -CN     STG 1 Progress  Progressing  -CN     STG 2  Pt will be independent with initial HEP for symptom management.  -CN     STG 2 Progress  Met  -CN     STG 3  Pt will demonstrate bilateral :20 sec SL balance to decrease risk of falls in the community.  -CN     STG 3 Progress  Ongoing  -CN        Long Term Goals    LTG Date to Achieve  09/27/21  -CN     LTG 1  Pt will be independent and compliant with advanced HEP for long term management of symptoms and prevention of future occurrence.   -CN     LTG 1 Progress  Ongoing  -CN     LTG 2  Pt will reduce level of perceived disability as measured by the KOS  to 70% in order to improve QOL where 100% is no dissability.  -CN     LTG 2 Progress  Ongoing  -CN     LTG 3  Pt will report walking 1/2 mile at park with no greater than 3/10 pain in order to improve QOL.  -CN     LTG 3 Progress  Ongoing  -CN     LTG 3 Progress Comments  Haven't attempted due to weather.   -CN     LTG 4  Pt will demonstrate bilateral :30 sec SL balance to decrease risk of falls in the community.  -CN     LTG 4 Progress  Ongoing  -CN     LTG 5  Pt will demonstrate reciprocal gait pattern descending 5 stairs with no greater than 2/10 pain to ambulate safely in home.  -CN     LTG 5 Progress  Progressing  -CN     LTG 5 Progress Comments  Able to perform recriprocally with rail due to balance with pain rated 4/10.  -CN       User Key  (r) = Recorded By, (t) = Taken By, (c) = Cosigned By    Initials Name Provider Type    Darcie Martinez, PT Physical Therapist          Therapy Education  Given: HEP, Symptoms/condition management  Program: Reinforced, Progressed  How Provided: Verbal, Demonstration  Provided to: Patient  Level of Understanding: Verbalized, Demonstrated              Time Calculation:   Start Time: 0920  Stop Time: 1000  Time Calculation (min): 40 min  Timed  Charges  71858 - PT Therapeutic Exercise Minutes: 40  Total Minutes  Timed Charges Total Minutes: 40   Total Minutes: 40  Therapy Charges for Today     Code Description Service Date Service Provider Modifiers Qty    19018658699  PT THER PROC EA 15 MIN 8/25/2021 Darcie Westbrook, PT GP 3                    Darcie Westbrook, PT  8/25/2021

## 2021-08-26 RX ORDER — SIMVASTATIN 40 MG
TABLET ORAL
Qty: 90 TABLET | Refills: 3 | Status: SHIPPED | OUTPATIENT
Start: 2021-08-26 | End: 2022-03-14 | Stop reason: SDUPTHER

## 2021-08-30 ENCOUNTER — HOSPITAL ENCOUNTER (OUTPATIENT)
Dept: PHYSICAL THERAPY | Facility: HOSPITAL | Age: 81
Setting detail: THERAPIES SERIES
Discharge: HOME | End: 2021-08-30

## 2021-08-30 DIAGNOSIS — R26.2 DIFFICULTY WALKING: ICD-10-CM

## 2021-08-30 DIAGNOSIS — M17.0 PRIMARY OSTEOARTHRITIS OF BOTH KNEES: Primary | ICD-10-CM

## 2021-08-30 PROCEDURE — 97110 THERAPEUTIC EXERCISES: CPT

## 2021-08-30 NOTE — THERAPY PROGRESS REPORT/RE-CERT
Outpatient Physical Therapy Ortho Re-Assessment  [facility]     Patient Name: Ashley Motta  : 1940  MRN: [id number]  Today's Date: 2021      Visit Date: 2021    Patient Active Problem List   Diagnosis   • Osteopenia   • Hyperlipidemia   • Pre-diabetes   • Osteoarthritis   • BALDEMAR on CPAP   • Pelvic relaxation due to vaginal prolapse   • Chronic pain of both knees   • Arthritis of right knee   • Arthritis of left knee   • Arthritis of both knees   • Restless legs   • Vertigo   • Headache, migraine   • Heartburn   • Internal hemorrhoids with complication        Past Medical History:   Diagnosis Date   • Actinic keratosis    • Anxiety    • Arthritis    • 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 SARAH LIRIANO   • Difficulty walking    • Difficulty walking    • Dry eye syndrome, bilateral    • Eczema 2014   • Elevated fasting glucose    • Endothelial corneal dystrophy 2020   • Genital prolapse 10/2017   • GERD (gastroesophageal reflux disease)    • Goiter, nontoxic, multinodular 2017    THYROID POLYP SEES    • Hemorrhoids    • Herpes zoster 2018   • Hyperlipidemia    • Hypersomnia 2016   • Impaired fasting glucose    • Meralgia paresthetica, left lower limb    • Migraine    • Myopia of both eyes    • Nuclear sclerosis    • BALDEMAR on CPAP     FOLLOWED BY DR. TERRY CHEUNG   • Osteoarthritis    • Osteopenia    • PLMD (periodic limb movement disorder)    • Postmenopausal atrophic vaginitis    • Prediabetes    • Presbyopia    • Rectal nodule 2020    REFERRED TO DR. PAMELA NAPOLES   • Rectocele 2017   • Regular astigmatism of both eyes    • Rheumatoid arthritis (CMS/HCC)    • RLS (restless legs syndrome)    • Seborrheic keratosis    • Skin cancer 2015    LOWER LEG, FOLLOWED BY DR. EDI SANCHEZ   • Urinary incontinence 2017   • Vaginal  vault prolapse 09/2017   • Vertigo 10/18/2018    ADMITTED TO [facility]   • Vitreoretinal degeneration of both eyes         Past Surgical History:   Procedure Laterality Date   • CATARACT EXTRACTION, BILATERAL Bilateral 2015   • COLONOSCOPY N/A 02/27/2015    1 TUBULAR ADENOMA POLYP IN DISTAL ASCENDING[facility]   • COLONOSCOPY N/A 3/4/2020    10 MM SESSILE SERRATED ADENOMA POLYP IN ASCENDING, 3 MM HYPERPLASTIC POLYP IN RECTUM, 7 MM ANAL NODULE, DR. SARAH LIRIANO AT [facility]   • HYSTERECTOMY N/A 01/19/2004    KAYLEE WITH BSO, DR. RAFIA MORTENSEN AT [facility]   • KNEE ARTHROSCOPY Left 2013   • KNEE ARTHROSCOPY Right 2010   • MELISSA CULDOPLASTY N/A 01/19/2004    DR. RAFIA MORTENSEN AT [facility]   • SACROCOLPOPEXY N/A 01/19/2004    SACROVAGINAL SLING, DR. RAFIA MORTENSEN AT [facility]   • SHOULDER ARTHROSCOPY Left        Visit Dx:     ICD-10-CM ICD-9-CM   1. Primary osteoarthritis of both knees  M17.0 715.16   2. Difficulty walking  R26.2 719.7                             Therapy Education  Given: HEP, Symptoms/condition management  Program: Reinforced, Progressed  How Provided: Verbal, Demonstration  Provided to: Patient  Level of Understanding: Verbalized, Demonstrated     PT OP Goals     Row Name 08/30/21 0900          PT Short Term Goals    STG Date to Achieve  08/27/21  -CN     STG 1  Pt will report pain rated 2/10 at worst in order to demonstrate ability to return to normalized ADLs and functional activities.  -CN     STG 1 Progress  Progressing  -CN     STG 1 Progress Comments  Pt reports pain rated 4/10 at worst with descending stairs.   -CN     STG 2  Pt will be independent with initial HEP for symptom management.  -CN     STG 2 Progress  Met  -CN     STG 3  Pt will demonstrate bilateral :20 sec SL balance to decrease risk of falls in the community.  -CN     STG 3 Progress  Ongoing  -CN     STG 3 Progress Comments  Pt able to perform SLS on R foot for 3 sec, 2 seconds on the L.   -CN        Long Term Goals    LTG Date to  Achieve  09/27/21  -CN     LTG 1  Pt will be independent and compliant with advanced HEP for long term management of symptoms and prevention of future occurrence.   -CN     LTG 1 Progress  Ongoing  -CN     LTG 1 Progress Comments  Progressing as tolerated.   -CN     LTG 2  Pt will reduce level of perceived disability as measured by the KOS  to 70% in order to improve QOL where 100% is no dissability.  -CN     LTG 2 Progress  Met  -CN     LTG 2 Progress Comments  Pt scored 72.5% on the KOS where 100% is no disability.   -CN     LTG 3  Pt will report walking 1/2 mile at park with no greater than 3/10 pain in order to improve QOL.  -CN     LTG 3 Progress  Partially Met  -CN     LTG 3 Progress Comments  Pt able to ambulate 1/2 mile with 4/10 pain at the end.   -CN     LTG 4  Pt will demonstrate bilateral :30 sec SL balance to decrease risk of falls in the community.  -CN     LTG 4 Progress  Ongoing  -CN     LTG 5  Pt will demonstrate reciprocal gait pattern descending 5 stairs with no greater than 2/10 pain to ambulate safely in home.  -CN     LTG 5 Progress  Progressing  -CN     LTG 5 Progress Comments  Pt able to perform recriprocal pattern with pain rated 4/10.   -CN       User Key  (r) = Recorded By, (t) = Taken By, (c) = Cosigned By    Initials Name Provider Type    Darcie Martinez, PT Physical Therapist          PT Assessment/Plan     Row Name 08/30/21 0958          PT Assessment    Functional Limitations  Decreased safety during functional activities;Impaired gait;Limitation in home management;Limitations in community activities;Limitations in functional capacity and performance;Performance in leisure activities  -CN     Impairments  Balance;Coordination;Endurance;Gait;Impaired aerobic capacity;Impaired flexibility;Impaired muscle endurance;Joint integrity;Muscle strength;Pain;Poor body mechanics;Posture  -CN     Assessment Comments  Pt has attended 7 skilled therapy sessions for treatment of B knee  pain L>R. Pt reports improved walking tolerance, ambulating 1/2 mile with 4/10 pain as well as able to perform stairs recriprocally. Pt with minimal pain on the R and intermittent pain on the L rated 4/10 at worst. Pt reports great improvement in balance and balance reactions and continuing to progress HEP with emphasis on strengthening and stability as tolerated. Pt has met 1/3 STG and 2/5 LTG and would benefit from continued skilled PT to address remaining deficits and return to PLOF.  -CN     Please refer to paper survey for additional self-reported information  Yes  -CN     Rehab Potential  Good  -CN     Patient/caregiver participated in establishment of treatment plan and goals  Yes  -CN     Patient would benefit from skilled therapy intervention  Yes  -CN        PT Plan    PT Frequency  2x/week  -CN     Predicted Duration of Therapy Intervention (PT)  8 visits  -CN     PT Plan Comments  Continue with progressive strengthening and balance with plan to transition to aquatic program for continued pain relief in 2-3 weeks.  -CN       User Key  (r) = Recorded By, (t) = Taken By, (c) = Cosigned By    Initials Name Provider Type    Darcie Martinez, PT Physical Therapist            OP Exercises     Row Name 08/30/21 0900             Subjective Comments    Subjective Comments  I am doing ok today, L>R knee pain. I have been trying to work on my balance though.   -CN         Subjective Pain    Able to rate subjective pain?  yes  -CN      Pre-Treatment Pain Level  3  -CN         Total Minutes    42082 - PT Therapeutic Exercise Minutes  40  -CN         Exercise 1    Exercise Name 1  LTR  -CN      Cueing 1  Verbal  -CN      Sets 1  1  -CN      Reps 1  10  -CN         Exercise 2    Exercise Name 2  Supine Piriformis/fig 4 stretch  -CN      Cueing 2  Verbal  -CN      Reps 2  3e  -CN      Time 2  :20 sec  -CN         Exercise 3    Exercise Name 3  PPT  -CN      Cueing 3  Verbal  -CN      Sets 3  1  -CN      Reps 3   15  -CN      Time 3  5s hold  -CN         Exercise 4    Exercise Name 4  Seated HS stretch  -CN      Cueing 4  Verbal  -CN      Sets 4  3e  -CN      Time 4  :20 sec hold  -CN         Exercise 5    Exercise Name 5  glute bridge  -CN      Cueing 5  Verbal  -CN      Sets 5  2  -CN      Reps 5  10  -CN      Time 5  cues to push into band slightly to engage glute med  -CN      Additional Comments  GTB  -CN         Exercise 7    Exercise Name 7  LAQ  -CN      Cueing 7  Verbal  -CN      Sets 7  2e  -CN      Reps 7  10  -CN      Additional Comments  3#  -CN         Exercise 8    Exercise Name 8  SL clamshells  -CN      Cueing 8  Verbal;Demo  -CN      Time 8  10 B  -CN      Additional Comments  GTB  -CN         Exercise 9    Exercise Name 9  Nustep, L5  -CN      Cueing 9  Verbal;Demo  -CN      Time 9  5 min  -CN      Additional Comments  UE/LE L5  -CN         Exercise 10    Exercise Name 10  lateral stepping  -CN      Cueing 10  Verbal  -CN      Reps 10  3 laps // bars  -CN      Time 10  GTB at knees  -CN         Exercise 11    Exercise Name 11  monster walk FW/BW  -CN      Cueing 11  Verbal;Demo  -CN      Reps 11  3 laps // bars  -CN      Time 11  GTB at thighs  -CN        User Key  (r) = Recorded By, (t) = Taken By, (c) = Cosigned By    Initials Name Provider Type    CN Darcie Westbrook, RAYMOND Physical Therapist                        Outcome Measure Options: Knee Outcome Score- ADL  Knee Outcome Score  Knee Outcome Score Comments: 72.5% where 100% is no disability      Time Calculation:     Start Time: 0917  Stop Time: 1000  Time Calculation (min): 43 min  Timed Charges  21519 - PT Therapeutic Exercise Minutes: 40  Total Minutes  Timed Charges Total Minutes: 40   Total Minutes: 40     Therapy Charges for Today     Code Description Service Date Service Provider Modifiers Qty    05662196738 HC PT THER PROC EA 15 MIN 8/30/2021 Darcie Westbrook, PT GP 3          PT G-Codes  Outcome Measure Options: Knee Outcome  Score- ADL         Darcie Westbrook, PT  8/30/2021

## 2021-09-01 ENCOUNTER — HOSPITAL ENCOUNTER (OUTPATIENT)
Dept: PHYSICAL THERAPY | Facility: HOSPITAL | Age: 81
Setting detail: THERAPIES SERIES
Discharge: HOME | End: 2021-09-01

## 2021-09-01 DIAGNOSIS — M17.0 PRIMARY OSTEOARTHRITIS OF BOTH KNEES: Primary | ICD-10-CM

## 2021-09-01 DIAGNOSIS — R26.2 DIFFICULTY WALKING: ICD-10-CM

## 2021-09-01 PROCEDURE — 97110 THERAPEUTIC EXERCISES: CPT

## 2021-09-01 NOTE — THERAPY TREATMENT NOTE
Outpatient Physical Therapy Ortho Treatment Note  [facility]     Patient Name: Ashley Motta  : 1940  MRN: [id number]  Today's Date: 2021      Visit Date: 2021    Visit Dx:    ICD-10-CM ICD-9-CM   1. Primary osteoarthritis of both knees  M17.0 715.16   2. 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 SARAH LIRIANO   • Difficulty walking    • Difficulty walking    • Dry eye syndrome, bilateral    • Eczema 2014   • Elevated fasting glucose    • Endothelial corneal dystrophy 2020   • Genital prolapse 10/2017   • GERD (gastroesophageal reflux disease)    • Goiter, nontoxic, multinodular 2017    THYROID POLYP SEES    • Hemorrhoids    • Herpes zoster 2018   • Hyperlipidemia    • Hypersomnia 2016   • Impaired fasting glucose    • Meralgia paresthetica, left lower limb    • Migraine    • Myopia of both eyes    • Nuclear sclerosis    • BALDEMAR on CPAP     FOLLOWED BY DR. TERRY CHEUNG   • Osteoarthritis    • Osteopenia    • PLMD (periodic limb movement disorder)    • Postmenopausal atrophic vaginitis    • Prediabetes    • Presbyopia    • Rectal nodule 2020    REFERRED TO DR. PAMELA NAPOLES   • Rectocele 2017   • Regular astigmatism of both eyes    • Rheumatoid arthritis (CMS/HCC)    • RLS (restless legs syndrome)    •  Seborrheic keratosis    • Skin cancer 04/2015    LOWER LEG, FOLLOWED BY DR. EDI SANCHEZ   • Urinary incontinence 09/2017   • Vaginal vault prolapse 09/2017   • Vertigo 10/18/2018    ADMITTED TO [facility]   • Vitreoretinal degeneration of both eyes         Past Surgical History:   Procedure Laterality Date   • CATARACT EXTRACTION, BILATERAL Bilateral 2015   • COLONOSCOPY N/A 02/27/2015    1 TUBULAR ADENOMA POLYP IN DISTAL [facility]ENDING[facility]   • COLONOSCOPY N/A 3/4/2020    10 MM SESSILE SERRATED ADENOMA POLYP IN ASCENDING, 3 MM HYPERPLASTIC POLYP IN RECTUM, 7 MM ANAL NODULE, DR. SARAH LIRIANO AT [facility]   • HYSTERECTOMY N/A 01/19/2004    KAYLEE WITH BSO, DR. RAFIA MORTENSEN AT [facility]   • KNEE ARTHROSCOPY Left 2013   • KNEE ARTHROSCOPY Right 2010   • MELISSA CULDOPLASTY N/A 01/19/2004    DR. RAFIA MORTENSEN AT [facility]   • SACROCOLPOPEXY N/A 01/19/2004    SACROVAGINAL SLING, DR. RAFIA MORTENSEN AT [facility]   • SHOULDER ARTHROSCOPY Left                        PT Assessment/Plan     Row Name 09/01/21 1000          PT Assessment    Assessment Comments  Mrs. Motta presents to therapy with no new complaints aside from slight increase hip pain that is not unusual for patient. Began balance work this date with added heel raises without UE support, tandem walk FW/BW, and slow/high march with pt. intermittently using bar for balance correction. Plan to continue to challenge balance/proprioception over next 2 visits prior to transition to pool.  -        PT Plan    PT Plan Comments  balance work  -       User Key  (r) = Recorded By, (t) = Taken By, (c) = Cosigned By    Initials Name Provider Type    Bebe Pedersne, PT Physical Therapist            OP Exercises     Row Name 09/01/21 0900             Subjective Comments    Subjective Comments  I am doing okay, my hip is hurting today  -         Subjective Pain    Able to rate subjective pain?  yes  -      Pre-Treatment Pain Level  5  -         Total Minutes    84942 -  PT Therapeutic Exercise Minutes  40  -MH         Exercise 1    Exercise Name 1  LTR  -MH      Cueing 1  Verbal  -MH      Sets 1  1  -MH      Reps 1  10e  -MH         Exercise 2    Exercise Name 2  Supine Piriformis/fig 4 stretch  -MH      Cueing 2  Verbal  -MH      Reps 2  3e  -MH      Time 2  :20 sec  -MH         Exercise 3    Exercise Name 3  PPT  -MH      Cueing 3  Verbal  -MH      Sets 3  2  -MH      Reps 3  15  -MH      Time 3  5s hold  -MH      Additional Comments  2nd set with heel slides  -MH         Exercise 4    Exercise Name 4  Seated HS stretch  -MH      Cueing 4  --  -MH      Sets 4  --  -MH      Time 4  --  -MH         Exercise 5    Exercise Name 5  glute bridge  -MH      Cueing 5  Verbal  -MH      Sets 5  2  -MH      Reps 5  10  -MH      Time 5  cues to push into band slightly to engage glute med  -MH      Additional Comments  GTB  -MH         Exercise 7    Exercise Name 7  --  -MH      Cueing 7  --  -MH      Sets 7  --  -MH      Reps 7  --  -MH         Exercise 8    Exercise Name 8  SL clamshells  -MH      Cueing 8  Verbal;Demo  -MH      Time 8  10 B  -MH      Additional Comments  GTB  -MH         Exercise 9    Exercise Name 9  Nustep, L5  -MH      Cueing 9  Verbal;Demo  -MH      Time 9  5 min  -MH      Additional Comments  UE/LE L5  -MH         Exercise 10    Exercise Name 10  lateral stepping  -MH      Cueing 10  Verbal  -MH      Reps 10  3 laps // bars  -MH      Time 10  GTB at knees  -MH         Exercise 11    Exercise Name 11  monster walk FW/BW  -MH      Cueing 11  Verbal;Demo  -MH      Reps 11  3 laps // bars  -MH      Time 11  GTB at thighs  -MH         Exercise 12    Exercise Name 12  heel raises  -MH      Cueing 12  Verbal  -MH      Reps 12  20  -MH         Exercise 13    Exercise Name 13  tandem walk fw/bw  -MH      Cueing 13  Verbal;Demo  -MH      Reps 13  2 laps  -MH      Time 13  intermittent use of bar to correct balance  -MH         Exercise 14    Exercise Name 14  slow high march   -      Cueing 14  Verbal  -      Reps 14  2 laps  -        User Key  (r) = Recorded By, (t) = Taken By, (c) = Cosigned By    Initials Name Provider Type    Bebe Pedersen PT Physical Therapist                       PT OP Goals     Row Name 09/01/21 0900          PT Short Term Goals    STG Date to Achieve  08/27/21  -     STG 1  Pt will report pain rated 2/10 at worst in order to demonstrate ability to return to normalized ADLs and functional activities.  -     STG 1 Progress  Progressing  -     STG 2  Pt will be independent with initial HEP for symptom management.  -     STG 2 Progress  Met  -     STG 3  Pt will demonstrate bilateral :20 sec SL balance to decrease risk of falls in the community.  -     STG 3 Progress  Ongoing  -        Long Term Goals    LTG Date to Achieve  09/27/21  -     LTG 1  Pt will be independent and compliant with advanced HEP for long term management of symptoms and prevention of future occurrence.   -     LTG 1 Progress  Ongoing  -     LTG 2  Pt will reduce level of perceived disability as measured by the KOS  to 70% in order to improve QOL where 100% is no dissability.  -     LTG 2 Progress  Met  -     LTG 3  Pt will report walking 1/2 mile at park with no greater than 3/10 pain in order to improve QOL.  -     LTG 3 Progress  Partially Met  -     LTG 4  Pt will demonstrate bilateral :30 sec SL balance to decrease risk of falls in the community.  -     LTG 4 Progress  Ongoing  -     LTG 5  Pt will demonstrate reciprocal gait pattern descending 5 stairs with no greater than 2/10 pain to ambulate safely in home.  -     LTG 5 Progress  Progressing  -       User Key  (r) = Recorded By, (t) = Taken By, (c) = Cosigned By    Initials Name Provider Type    Bebe Pedersen, PT Physical Therapist          Therapy Education  Education Details: Will update HEP next session  Given: HEP, Symptoms/condition management  Program: Reinforced  How Provided:  Verbal, Demonstration  Provided to: Patient  Level of Understanding: Verbalized, Demonstrated              Time Calculation:   Start Time: 0920  Stop Time: 1000  Time Calculation (min): 40 min  Total Timed Code Minutes- PT: 40 minute(s)  Timed Charges  91115 - PT Therapeutic Exercise Minutes: 40  Total Minutes  Timed Charges Total Minutes: 40   Total Minutes: 40  Therapy Charges for Today     Code Description Service Date Service Provider Modifiers Qty    68817434484 HC PT THER PROC EA 15 MIN 9/1/2021 Bebe Carpenter, PT GP 3                    Bebe Carpenter, PT  9/1/2021

## 2021-09-10 ENCOUNTER — HOSPITAL ENCOUNTER (OUTPATIENT)
Dept: PHYSICAL THERAPY | Facility: HOSPITAL | Age: 81
Setting detail: THERAPIES SERIES
Discharge: HOME | End: 2021-09-10

## 2021-09-10 DIAGNOSIS — M17.0 PRIMARY OSTEOARTHRITIS OF BOTH KNEES: Primary | ICD-10-CM

## 2021-09-10 DIAGNOSIS — R26.2 DIFFICULTY WALKING: ICD-10-CM

## 2021-09-10 PROCEDURE — 97116 GAIT TRAINING THERAPY: CPT | Performed by: PHYSICAL THERAPIST

## 2021-09-10 PROCEDURE — 97110 THERAPEUTIC EXERCISES: CPT | Performed by: PHYSICAL THERAPIST

## 2021-09-10 NOTE — THERAPY TREATMENT NOTE
Outpatient Physical Therapy Ortho Treatment Note  [facility]     Patient Name: Ashley Motta  : 1940  MRN: [id number]  Today's Date: 9/10/2021      Visit Date: 09/10/2021    Visit Dx:    ICD-10-CM ICD-9-CM   1. Primary osteoarthritis of both knees  M17.0 715.16   2. 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 SARAH LIRIANO   • Difficulty walking    • Difficulty walking    • Dry eye syndrome, bilateral    • Eczema 2014   • Elevated fasting glucose    • Endothelial corneal dystrophy 2020   • Genital prolapse 10/2017   • GERD (gastroesophageal reflux disease)    • Goiter, nontoxic, multinodular 2017    THYROID POLYP SEES    • Hemorrhoids    • Herpes zoster 2018   • Hyperlipidemia    • Hypersomnia 2016   • Impaired fasting glucose    • Meralgia paresthetica, left lower limb    • Migraine    • Myopia of both eyes    • Nuclear sclerosis    • BALDEMAR on CPAP     FOLLOWED BY DR. TERRY CHEUNG   • Osteoarthritis    • Osteopenia    • PLMD (periodic limb movement disorder)    • Postmenopausal atrophic vaginitis    • Prediabetes    • Presbyopia    • Rectal nodule 2020    REFERRED TO DR. PAMELA NAPOLES   • Rectocele 2017   • Regular astigmatism of both eyes    • Rheumatoid arthritis (CMS/HCC)    • RLS (restless legs syndrome)    •  Seborrheic keratosis    • Skin cancer 04/2015    LOWER LEG, FOLLOWED BY DR. EDI SANCHEZ   • Urinary incontinence 09/2017   • Vaginal vault prolapse 09/2017   • Vertigo 10/18/2018    ADMITTED TO [facility]   • Vitreoretinal degeneration of both eyes         Past Surgical History:   Procedure Laterality Date   • CATARACT EXTRACTION, BILATERAL Bilateral 2015   • COLONOSCOPY N/A 02/27/2015    1 TUBULAR ADENOMA POLYP IN DISTAL ASCENDING[facility]   • COLONOSCOPY N/A 3/4/2020    10 MM SESSILE SERRATED ADENOMA POLYP IN ASCENDING, 3 MM HYPERPLASTIC POLYP IN RECTUM, 7 MM ANAL NODULE, DR. SARAH LIRIANO AT [facility]   • HYSTERECTOMY N/A 01/19/2004    KAYLEE WITH BSO, DR. RAFIA MORTENSEN AT [facility]   • KNEE ARTHROSCOPY Left 2013   • KNEE ARTHROSCOPY Right 2010   • MELISSA CULDOPLASTY N/A 01/19/2004    DR. RAFIA MORTENSEN AT [facility]   • SACROCOLPOPEXY N/A 01/19/2004    SACROVAGINAL SLING, DR. RAFIA MORTENSEN AT [facility]   • SHOULDER ARTHROSCOPY Left        PT Ortho     Row Name 09/10/21 0915       Subjective Comments    Subjective Comments  Reports L>R knee pain. Ambulating 1/2 mile, usually every other day.   -JS      User Key  (r) = Recorded By, (t) = Taken By, (c) = Cosigned By    Initials Name Provider Type    Lindsay Souza, PT Physical Therapist                      PT Assessment/Plan     Row Name 09/10/21 6911          PT Assessment    Assessment Comments  Pt presents with abnormal gait pattern, demonstrating wide BRENDA, compensatory trunk lean due to decreased L weight shifting. Pt able to improve weight shifting with cueing, though decreased balance present and some difficulty remains when ambulating at normal speed . Treatment continues to focus on core & knee strengthening/stabilization.  -JS        PT Plan    PT Plan Comments  Continue x 1 further land-based PT visit, then plans to transfer to [facility] for aquatic therapy.  -JS       User Key  (r) = Recorded By, (t) = Taken By, (c) = Cosigned By    Initials Name  Provider Type    JS Lindsay Del Angel, PT Physical Therapist            OP Exercises     Row Name 09/10/21 0915             Subjective Comments    Subjective Comments  Reports L>R knee pain. Ambulating 1/2 mile, usually every other day.   -JS         Subjective Pain    Able to rate subjective pain?  yes  -JS      Pre-Treatment Pain Level  4  -JS         Total Minutes    42525 - Gait Training Minutes   10  -JS      82868 - PT Therapeutic Exercise Minutes  35  -JS         Exercise 1    Exercise Name 1  LTR  -JS      Cueing 1  Verbal  -JS      Sets 1  1  -JS      Reps 1  10e  -JS         Exercise 2    Exercise Name 2  Supine Piriformis/fig 4 stretch  -JS      Cueing 2  Verbal  -JS      Reps 2  3e  -JS      Time 2  :20 sec  -JS         Exercise 3    Exercise Name 3  PPT  -JS      Cueing 3  Verbal  -JS      Sets 3  2  -JS      Reps 3  15  -JS      Time 3  5s hold  -JS         Exercise 5    Exercise Name 5  glute bridge  -JS      Cueing 5  Verbal  -JS      Sets 5  2  -JS      Reps 5  10  -JS      Time 5  cues to push into band slightly to engage glute med  -JS         Exercise 8    Exercise Name 8  SL clamshells  -JS      Cueing 8  Verbal;Demo  -JS      Time 8  10 B  -JS      Additional Comments  GTB  -JS         Exercise 9    Exercise Name 9  Nustep, L5  -JS      Cueing 9  Verbal;Demo  -JS      Time 9  5 min  -JS      Additional Comments  UE/LE L5  -JS         Exercise 10    Exercise Name 10  lateral stepping  -JS      Cueing 10  Verbal  -JS      Reps 10  3 laps // bars  -JS      Time 10  GTB at knees  -JS         Exercise 11    Exercise Name 11  monster walk FW/BW  -JS      Cueing 11  Verbal;Demo  -JS      Reps 11  3 laps // bars  -JS      Time 11  GTB at thighs  -JS         Exercise 12    Exercise Name 12  heel raises  -JS      Cueing 12  Verbal  -JS      Reps 12  20  -JS         Exercise 13    Exercise Name 13  tandem walk fw/bw  -JS      Cueing 13  Verbal;Demo  -JS      Reps 13  2 laps  -JS      Time 13  without UE  support, slow pace  -JS         Exercise 14    Exercise Name 14  slow high march  -JS      Cueing 14  Verbal cues for TA & upright posture  -JS      Reps 14  2 laps  -JS         Exercise 15    Exercise Name 15  Pre-gait: [place] in // bars  -JS      Cueing 15  Verbal;Demo cues for weight shift L, avoidance of compensatory lean  -JS      Reps 15  15 B  -JS         Exercise 16    Exercise Name 16  Gait training- focus on weight shifting, upright trunk posture  -JS      Cueing 16  Verbal;Tactile;Demo  -JS      Time 16  8 min (in// bars and in clinic)  -JS        User Key  (r) = Recorded By, (t) = Taken By, (c) = Cosigned By    Initials Name Provider Type    Lindsay Souza, PT Physical Therapist                       PT OP Goals     Row Name 09/10/21 0915          PT Short Term Goals    STG Date to Achieve  08/27/21  -JS     STG 1  Pt will report pain rated 2/10 at worst in order to demonstrate ability to return to normalized ADLs and functional activities.  -JS     STG 1 Progress  Progressing  -JS     STG 2  Pt will be independent with initial HEP for symptom management.  -JS     STG 2 Progress  Met  -JS     STG 3  Pt will demonstrate bilateral :20 sec SL balance to decrease risk of falls in the community.  -JS     STG 3 Progress  Ongoing  -JS        Long Term Goals    LTG Date to Achieve  09/27/21  -JS     LTG 1  Pt will be independent and compliant with advanced HEP for long term management of symptoms and prevention of future occurrence.   -JS     LTG 1 Progress  Ongoing  -JS     LTG 2  Pt will reduce level of perceived disability as measured by the KOS  to 70% in order to improve QOL where 100% is no dissability.  -JS     LTG 2 Progress  Met  -JS     LTG 3  Pt will report walking 1/2 mile at park with no greater than 3/10 pain in order to improve QOL.  -JS     LTG 3 Progress  Partially Met  -JS     LTG 4  Pt will demonstrate bilateral :30 sec SL balance to decrease risk of falls in the community.  -JS     LTG 4  Progress  Ongoing  -     LTG 5  Pt will demonstrate reciprocal gait pattern descending 5 stairs with no greater than 2/10 pain to ambulate safely in home.  -     LTG 5 Progress  Progressing  -JS       User Key  (r) = Recorded By, (t) = Taken By, (c) = Cosigned By    Initials Name Provider Type    Lindsay Souza PT Physical Therapist          Therapy Education  Education Details: Reviewed & reinforced HEP. Gait training today, with focus on proper weight shifting, core stabilization to encourage upright posture and prevention of compensatory trunk lean              Time Calculation:   Start Time: 0915  Stop Time: 1000  Time Calculation (min): 45 min  Timed Charges  72389 - PT Therapeutic Exercise Minutes: 35  31544 - Gait Training Minutes : 10  Total Minutes  Timed Charges Total Minutes: 45   Total Minutes: 45  Therapy Charges for Today     Code Description Service Date Service Provider Modifiers Qty    00741829279 HC PT THER PROC EA 15 MIN 9/10/2021 Lindsay Del Angel, PT GP 2    79794752492 HC GAIT TRAINING EA 15 MIN 9/10/2021 Lindsay Del Angel, PT GP 1                    Lindsay Del Angel PT  9/10/2021

## 2021-09-14 ENCOUNTER — HOSPITAL ENCOUNTER (OUTPATIENT)
Dept: PHYSICAL THERAPY | Facility: HOSPITAL | Age: 81
Setting detail: THERAPIES SERIES
Discharge: HOME | End: 2021-09-14

## 2021-09-14 DIAGNOSIS — R26.2 DIFFICULTY WALKING: ICD-10-CM

## 2021-09-14 DIAGNOSIS — M17.0 PRIMARY OSTEOARTHRITIS OF BOTH KNEES: Primary | ICD-10-CM

## 2021-09-14 PROCEDURE — 97110 THERAPEUTIC EXERCISES: CPT

## 2021-09-14 NOTE — THERAPY TREATMENT NOTE
Outpatient Physical Therapy Ortho Treatment Note  [facility]     Patient Name: Ashley Motta  : 1940  MRN: [id number]  Today's Date: 2021      Visit Date: 2021    Visit Dx:    ICD-10-CM ICD-9-CM   1. Primary osteoarthritis of both knees  M17.0 715.16   2. 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 SARAH LIRIANO   • Difficulty walking    • Difficulty walking    • Dry eye syndrome, bilateral    • Eczema 2014   • Elevated fasting glucose    • Endothelial corneal dystrophy 2020   • Genital prolapse 10/2017   • GERD (gastroesophageal reflux disease)    • Goiter, nontoxic, multinodular 2017    THYROID POLYP SEES    • Hemorrhoids    • Herpes zoster 2018   • Hyperlipidemia    • Hypersomnia 2016   • Impaired fasting glucose    • Meralgia paresthetica, left lower limb    • Migraine    • Myopia of both eyes    • Nuclear sclerosis    • BALDEMAR on CPAP     FOLLOWED BY DR. TERRY CHEUNG   • Osteoarthritis    • Osteopenia    • PLMD (periodic limb movement disorder)    • Postmenopausal atrophic vaginitis    • Prediabetes    • Presbyopia    • Rectal nodule 2020    REFERRED TO DR. PAMELA NAPOLES   • Rectocele 2017   • Regular astigmatism of both eyes    • Rheumatoid arthritis (CMS/HCC)    • RLS (restless legs syndrome)    •  Seborrheic keratosis    • Skin cancer 04/2015    LOWER LEG, FOLLOWED BY DR. EDI SANCHEZ   • Urinary incontinence 09/2017   • Vaginal vault prolapse 09/2017   • Vertigo 10/18/2018    ADMITTED TO [facility]   • Vitreoretinal degeneration of both eyes         Past Surgical History:   Procedure Laterality Date   • CATARACT EXTRACTION, BILATERAL Bilateral 2015   • COLONOSCOPY N/A 02/27/2015    1 TUBULAR ADENOMA POLYP IN DISTAL ASCENDING[facility]   • COLONOSCOPY N/A 3/4/2020    10 MM SESSILE SERRATED ADENOMA POLYP IN ASCENDING, 3 MM HYPERPLASTIC POLYP IN RECTUM, 7 MM ANAL NODULE, DR. SARAH LIRIANO AT [facility]   • HYSTERECTOMY N/A 01/19/2004    KAYLEE WITH BSO, DR. RAFIA MORTENSEN AT [facility]   • KNEE ARTHROSCOPY Left 2013   • KNEE ARTHROSCOPY Right 2010   • MELISSA CULDOPLASTY N/A 01/19/2004    DR. RAFIA MORTENSEN AT [facility]   • SACROCOLPOPEXY N/A 01/19/2004    SACROVAGINAL SLING, DR. RAFIA MORTENSEN AT [facility]   • SHOULDER ARTHROSCOPY Left                        PT Assessment/Plan     Row Name 09/14/21 1400          PT Assessment    Assessment Comments  Mrs. Motta returns to clinic with no new complaints, this is pt. lasst session scheduled for land-based therapy prior to transitioning to aquatic therapy later this week. spent session reviewing current HEP program to improve pt. performance and independence. Pt. demonstrated good understanding throughout session, cues with stnading ther ex to improve pacing to challenge balance. Instructed pt. to perform balance exercises by counter support at home for safety. Pt. is to transition to aquatic therapy, no questions at end of session this date.  -        PT Plan    PT Plan Comments  transfer to [facility]  -       User Key  (r) = Recorded By, (t) = Taken By, (c) = Cosigned By    Initials Name Provider Type    Bebe Pedersen, PT Physical Therapist            OP Exercises     Row Name 09/14/21 1300             Subjective Comments    Subjective Comments  I am starting at  the pool Friday, thsi has really helped  -MH         Subjective Pain    Able to rate subjective pain?  yes  -MH      Pre-Treatment Pain Level  3  -MH         Total Minutes    41598 - PT Therapeutic Exercise Minutes  38  -MH         Exercise 1    Exercise Name 1  LTR  -MH      Cueing 1  Verbal  -MH      Sets 1  1  -MH      Reps 1  10e  -MH         Exercise 2    Exercise Name 2  Supine Piriformis/fig 4 stretch  -MH      Cueing 2  Verbal  -MH      Reps 2  3e  -MH      Time 2  :20 sec  -MH         Exercise 3    Exercise Name 3  PPT  -MH      Cueing 3  Verbal  -MH      Sets 3  --  -MH      Reps 3  10  -MH      Time 3  5s hold  -MH         Exercise 5    Exercise Name 5  glute bridge  -MH      Cueing 5  Verbal  -MH      Sets 5  2  -MH      Reps 5  10  -MH      Time 5  cues to push into band slightly to engage glute med  -MH      Additional Comments  GTB  -MH         Exercise 8    Exercise Name 8  SL clamshells  -MH      Cueing 8  Verbal;Demo  -MH      Time 8  10 B  -MH      Additional Comments  GTB  -MH         Exercise 9    Exercise Name 9  Nustep, L5  -MH      Cueing 9  Verbal;Demo  -MH      Time 9  5 min  -MH      Additional Comments  UE/LE L5  -MH         Exercise 10    Exercise Name 10  lateral stepping  -MH      Cueing 10  Verbal  -MH      Reps 10  3 laps // bars  -MH      Time 10  GTB at knees  -MH         Exercise 11    Exercise Name 11  monster walk FW/BW  -MH      Cueing 11  Verbal;Demo  -MH      Reps 11  3 laps // bars  -MH      Time 11  GTB at thighs  -MH         Exercise 12    Exercise Name 12  heel raises  -MH      Cueing 12  Verbal  -MH      Reps 12  20  -MH         Exercise 13    Exercise Name 13  tandem walk fw/bw  -MH      Cueing 13  Verbal;Demo  -MH      Reps 13  2 laps  -MH      Time 13  without UE support, slow pace  -MH         Exercise 14    Exercise Name 14  slow high march  -MH      Cueing 14  Verbal cues for TA & upright posture  -MH      Reps 14  2 laps  -MH        User Key  (r) = Recorded By,  (t) = Taken By, (c) = Cosigned By    Initials Name Provider Type    Bebe Pedersen, PT Physical Therapist                       PT OP Goals     Row Name 09/14/21 1400          PT Short Term Goals    STG Date to Achieve  08/27/21  -     STG 1  Pt will report pain rated 2/10 at worst in order to demonstrate ability to return to normalized ADLs and functional activities.  -     STG 1 Progress  Progressing  -     STG 2  Pt will be independent with initial HEP for symptom management.  -     STG 2 Progress  Met  -     STG 3  Pt will demonstrate bilateral :20 sec SL balance to decrease risk of falls in the community.  -     STG 3 Progress  Ongoing  -        Long Term Goals    LTG Date to Achieve  09/27/21  -     LTG 1  Pt will be independent and compliant with advanced HEP for long term management of symptoms and prevention of future occurrence.   -     LTG 1 Progress  Ongoing  -     LTG 2  Pt will reduce level of perceived disability as measured by the KOS  to 70% in order to improve QOL where 100% is no dissability.  -     LTG 2 Progress  Met  -     LTG 3  Pt will report walking 1/2 mile at park with no greater than 3/10 pain in order to improve QOL.  -     LTG 3 Progress  Partially Met  -     LTG 4  Pt will demonstrate bilateral :30 sec SL balance to decrease risk of falls in the community.  -     LTG 4 Progress  Ongoing  -     LTG 5  Pt will demonstrate reciprocal gait pattern descending 5 stairs with no greater than 2/10 pain to ambulate safely in home.  -     LTG 5 Progress  Progressing  -       User Key  (r) = Recorded By, (t) = Taken By, (c) = Cosigned By    Initials Name Provider Type    Bebe Pedersen PT Physical Therapist          Therapy Education  Education Details: Reissued/finalized HEP Access Code: [email or web address]  Given: HEP  Program: Reinforced, Progressed  How Provided: Verbal, Demonstration, Written  Provided to: Patient  Level of Understanding: Verbalized,  Demonstrated              Time Calculation:   Start Time: 1348  Stop Time: 1428  Time Calculation (min): 40 min  Total Timed Code Minutes- PT: 38 minute(s)  Timed Charges  88781 - PT Therapeutic Exercise Minutes: 38  Total Minutes  Timed Charges Total Minutes: 38   Total Minutes: 38  Therapy Charges for Today     Code Description Service Date Service Provider Modifiers Qty    [id number] HC PT THER PROC EA 15 MIN 9/14/2021 Bebe Carpenter, PT GP 3                    Bebe Carpenter PT  9/14/2021

## 2021-09-17 ENCOUNTER — TREATMENT (OUTPATIENT)
Dept: PHYSICAL THERAPY | Facility: CLINIC | Age: 81
End: 2021-09-17

## 2021-09-17 DIAGNOSIS — R26.2 DIFFICULTY WALKING: Primary | ICD-10-CM

## 2021-09-17 DIAGNOSIS — M17.0 PRIMARY OSTEOARTHRITIS OF BOTH KNEES: ICD-10-CM

## 2021-09-17 PROCEDURE — 97113 AQUATIC THERAPY/EXERCISES: CPT | Performed by: PHYSICAL THERAPIST

## 2021-09-17 NOTE — PROGRESS NOTES
Physical Therapy Daily Progress Note    Patient: Ashley Motta   : 1940  Diagnosis/ICD-10 Code:  Difficulty walking [R26.2]  Referring practitioner: BERNICE Cardenas  Date of Initial Visit: Type: THERAPY  Noted: 2021  Today's Date: 2021  Patient seen for 11 sessions             Subjective   My left knee is more painful than the right and my right hip hurts.  I am due Oct 12th for my next knee injections.    Objective     AQUATIC EXERCISE:     Water Walk    Fwds 25 ft X 4            Stretch HS 20 sec X 2 each                  Stretch Calf 20 sec.   X 2 each                  Stretch  Quads --  Stretch   Piriformis --                                 Abdominals  LN Pushdowns X 15              Clams --                        Hip Abd/Add  15X               SLRs X 15 each              March in Place  15X          Mini Squat   *Next                Toe/Heel Raises  15X B   Leg Press w/ large foam ring X 15 each                  Step Up/Downs w/ rail   8 inch X 5 each leg                  Bicycle   2 min. seated                                 Flutter/Scissor    --                      Assessment/Plan  First session of aquatic therapy.  One on one instruction in aquatic exercise as noted above.  Difficulty with 8 inch step up and step down due to strength and balance deficits.           Timed:  Aquatic Therapy    39     mins [id number];    Celestino Iniguez, PT  Physical Therapist

## 2021-09-24 ENCOUNTER — TREATMENT (OUTPATIENT)
Dept: PHYSICAL THERAPY | Facility: CLINIC | Age: 81
End: 2021-09-24

## 2021-09-24 DIAGNOSIS — R26.2 DIFFICULTY WALKING: Primary | ICD-10-CM

## 2021-09-24 DIAGNOSIS — M17.0 PRIMARY OSTEOARTHRITIS OF BOTH KNEES: ICD-10-CM

## 2021-09-24 PROCEDURE — 97113 AQUATIC THERAPY/EXERCISES: CPT | Performed by: PHYSICAL THERAPIST

## 2021-09-24 NOTE — PROGRESS NOTES
Physical Therapy Daily Progress Note    Patient: Ashley Motta   : 1940  Diagnosis/ICD-10 Code:  Difficulty walking [R26.2]  Referring practitioner: BERNICE Cardenas  Date of Initial Visit: Type: THERAPY  Noted: 2021  Today's Date: 2021  Patient seen for 12 sessions             Subjective   Did okay after first water therapy.  left knee gets swollen.  I'm due for my next knee injection in 2 weeks.  Knee pain 5/10 today.    Objective     AQUATIC EXERCISE:      Water Walk    Fwds - holding foam noodle for balance 25 ft X 4            Stretch HS 20 sec X 2 each                  Stretch Calf 20 sec.   X 2 each                  Stretch  Quads --  Stretch   Piriformis --                                 Abdominals  LN Pushdowns X 15                                   Hip Abd/Add  20X               SLRs X 15 each              March in Place  15X          Mini Squat   15X               Toe/Heel Raises  15X B   Leg Press w/ large foam ring Deferred                 Step Up/Downs w/ rail   6 inch X 10 each leg                  Bicycle   2 min. seated                                 Flutter/Scissor   15/15 Seated    Assessment/Plan  Good response to initial aquatic therapy session.  Ashley states she is due for knee injection in 2 weeks.  Reviewed all aquatic exercises in full with Ashley during one on one session, providing verbal instruction and demonstration  as needed. Did modify step ups from 8 inch to 6 inch, still difficulty on left, may need to decrease to 4 inch for left.             Timed:  Aquatic Therapy    40     mins [id number];    Celestino Iniguez, PT  Physical Therapist

## 2021-09-30 DIAGNOSIS — G25.81 RESTLESS LEG SYNDROME: ICD-10-CM

## 2021-09-30 RX ORDER — ROPINIROLE 0.5 MG/1
TABLET, FILM COATED ORAL
Qty: 90 TABLET | Refills: 0 | Status: SHIPPED | OUTPATIENT
Start: 2021-09-30 | End: 2021-11-09 | Stop reason: SDUPTHER

## 2021-10-04 ENCOUNTER — TREATMENT (OUTPATIENT)
Dept: PHYSICAL THERAPY | Facility: CLINIC | Age: 81
End: 2021-10-04

## 2021-10-04 DIAGNOSIS — M17.0 PRIMARY OSTEOARTHRITIS OF BOTH KNEES: ICD-10-CM

## 2021-10-04 DIAGNOSIS — R26.2 DIFFICULTY WALKING: Primary | ICD-10-CM

## 2021-10-04 PROCEDURE — 97113 AQUATIC THERAPY/EXERCISES: CPT | Performed by: PHYSICAL THERAPIST

## 2021-10-04 NOTE — PROGRESS NOTES
Physical Therapy 30-Day Progress Note         Patient: Ashley Motta   : 1940  Diagnosis/ICD-10 Code:  Difficulty walking [R26.2]  Referring practitioner: BERNICE Cardenas  Date of Initial Visit: Type: THERAPY  Noted: 2021  Today's Date: 10/4/2021  Patient seen for 13 sessions      Subjective:     Clinical Progress: improved  Home Program Compliance: Yes  Treatment has included:  therapeutic exercise, therapeutic activity, aquatic therapy and patient education with home exercise program     Subjective left knee hurts more than right, pain level 5/10.  I am due for my next cortisone injection in one week.    Objective      Knee AROM:  Right  0- 125  Left  0-126    Strength:  Hip Flexion  B 5/5  Knee Extension  B 5/5  Knee Flexion  B 5/5  Ankle DF  B 5/5    Balance  SLS Right 2 sec.  and Left 1 sec.    GAIT:  Independent and Antalgic    STAIRS: Patient uses a non-reciprocal pattern for both ascending and descending stairs due to pain    OBSERVATION:  Swelling left knee (latera aspect)    Functional outcome score: KOS-ADL 47/80    AQUATIC EXERCISE:      Water Walk    Fwds, Sideways and Backwards  25 ft X 4,           Stretch HS 20 sec X 2 each                  Stretch Calf 20 sec.  X 2 each                  Stretch  Quads --  Stretch   Piriformis 20 sec. each                                 Abdominals  LN Pushdowns X 15                                   Hip Abd/Add 15X               SLRs X 15 each              March in Place  15X          Mini Squat   15X               Toe/Heel Raises  15X B   Leg Press w/ large foam ring X 15 each                Step Up/Downs w/ rail   6 inch X 10 each leg   Deferred                 Bicycle   2 min. seated                                 Flutter/Scissor   15/15 Seated      Assessment/Plan  Ashley has attended 13 skilled physical therapy sessions for treatment of B knee pain L>R. In addition to knee OA, she reports osteopenia in her right hip.  She has transitioned from  land based therapy to aquatic therapy for the past 3 visits.  She is due for her next cortisone injection in one week and therefore reports an increase in her knee pain.  She also states previous gel injections to be ineffective for her. Ashley used to go to the [facility], however stopped going due to Covid 19 pandemic and hasn't returned yet as she does not feel safe.  She states she will go back at some point when she feels safe.  She continues to have difficulty on stairs due to pain and typically ascends/descends with a non- reciprocal pattern.  Her gait is antalgic and a cane was recommended to avoid limping.  Some progress towards goals as noted below.        STG 1  Pt will report pain rated 2/10 at worst in order to demonstrate ability to return to normalized ADLs and functional activities.         STG 1 Progress  Ongoing - recently worse since due for next knee injection in one week               STG 2  Pt will be independent with initial HEP for symptom management.         STG 2 Progress  Met                          .                   Long Term Goals     LTG Date to Achieve         LTG 1  Pt will be independent and compliant with advanced HEP for long term management of symptoms and prevention of future occurrence.         LTG 1 Progress Partially MET       LTG 1 Progress Comments  Progressing as tolerated.         LTG 2  Pt will reduce level of perceived disability as measured by the KOS  to 70% in order to improve QOL where 100% is no disability.         LTG 2 Progress  Met          .         LTG 3  Pt will report walking 1/2 mile at park with no greater than 3/10 pain in order to improve QOL.         LTG 3 Progress  Partially Met         LTG 3 Progress Comments  Pt able to ambulate 1/2 mile with 5/10 pain at the end.         LTG 4  Pt will demonstrate balance in single limb stance to 5 sec. to decrease risk of falls        LTG 4 Progress  Modified Goal, Ongoing         LTG 5  Pt will demonstrate reciprocal  gait pattern descending 5 stairs with minimal increase in pain to ambulate safely in home.        LTG 5 Progress  Modified Goal, Progressing                    Recommendations: Continue as planned  Timeframe: 1 month  Prognosis to achieve goals: good    PT Signature: Celestino Iniguez, PT      Based upon review of the patient's progress and continued therapy plan, it is my medical opinion that Ashley Motta should continue physical therapy treatment at [facility]  [address] [place] [address] [address] [address]  [phone].    Signature: __________________________________  Cammie Henderson APRN    Timed:  Aquatic therapy  97819    55   mins

## 2021-10-05 ENCOUNTER — IMMUNIZATION (OUTPATIENT)
Dept: VACCINE CLINIC | Facility: HOSPITAL | Age: 81
End: 2021-10-05

## 2021-10-05 PROCEDURE — 0004A ADM SARSCOV2 30MCG/0.3ML BOOSTER: CPT | Performed by: INTERNAL MEDICINE

## 2021-10-05 PROCEDURE — 91300 HC SARSCOV02 VAC 30MCG/0.3ML IM: CPT | Performed by: INTERNAL MEDICINE

## 2021-10-05 PROCEDURE — 0003A: CPT | Performed by: INTERNAL MEDICINE

## 2021-10-11 ENCOUNTER — TREATMENT (OUTPATIENT)
Dept: PHYSICAL THERAPY | Facility: CLINIC | Age: 81
End: 2021-10-11

## 2021-10-11 DIAGNOSIS — R26.2 DIFFICULTY WALKING: Primary | ICD-10-CM

## 2021-10-11 DIAGNOSIS — M17.0 PRIMARY OSTEOARTHRITIS OF BOTH KNEES: ICD-10-CM

## 2021-10-11 PROCEDURE — 97113 AQUATIC THERAPY/EXERCISES: CPT | Performed by: PHYSICAL THERAPIST

## 2021-10-11 NOTE — PROGRESS NOTES
Physical Therapy Daily Progress Note    Patient: Ashley Motta   : 1940  Diagnosis/ICD-10 Code:  Difficulty walking [R26.2]  Referring practitioner: BERNICE Cardenas  Date of Initial Visit: Type: THERAPY  Noted: 2021  Today's Date: 10/11/2021  Patient seen for 14 sessions             Subjective   Doing okay.    Objective     AQUATIC EXERCISE:      Water Walk    Fwds, Sideways and Backwards  25 ft X 4 each directio Stretch HS 20 sec X 2 each                  Stretch Calf 20 sec.  X 2 each                  Stretch  Quads --  Stretch   Piriformis 20 sec. each                                 Abdominals  LN Pushdowns X 15                                   Hip Abd/Add 15X               SLRs X 15 each              March Walk 25 ft X 2          Mini Squat   15X               Toe/Heel Raises  15X B   Leg Press w/ large foam ring X 15 each                Step Ups w/ rail   6 inch X 10 each leg                  Bicycle   2 min. seated                                 Flutter/Scissor   15/15 Seated    Assessment/Plan   Further education on proper squat technique to avoid knee irritation.  Provided pictures today of aquatic exercises to help guide patient in addition to verbal cues and demonstration           Timed:  Aquatic Therapy    40     mins [id number];    Celestino Iniguez, PT  Physical Therapist

## 2021-10-18 DIAGNOSIS — Z12.31 ENCOUNTER FOR SCREENING MAMMOGRAM FOR BREAST CANCER: Primary | ICD-10-CM

## 2021-10-20 ENCOUNTER — TREATMENT (OUTPATIENT)
Dept: PHYSICAL THERAPY | Facility: CLINIC | Age: 81
End: 2021-10-20

## 2021-10-20 DIAGNOSIS — M17.0 PRIMARY OSTEOARTHRITIS OF BOTH KNEES: ICD-10-CM

## 2021-10-20 DIAGNOSIS — R26.2 DIFFICULTY WALKING: Primary | ICD-10-CM

## 2021-10-20 PROCEDURE — 97113 AQUATIC THERAPY/EXERCISES: CPT | Performed by: PHYSICAL THERAPIST

## 2021-10-20 NOTE — PROGRESS NOTES
Physical Therapy Daily Progress Note    Patient: Ashley Motta   : 1940  Diagnosis/ICD-10 Code:  Difficulty walking [R26.2]  Referring practitioner: BERNICE Cardenas  Date of Initial Visit: Type: THERAPY  Noted: 2021  Today's Date: 10/20/2021  Patient seen for 15 sessions             Subjective   Knee pain 5/10.  Feels better after aquatic therapy.    Objective      AQUATIC EXERCISE:      Water Walk    Fwds, Sideways and Backwards  25 ft X 4 each directio Stretch HS 20 sec X 2 each   Added lg noodle support at ankle                Stretch Calf 20 sec.  X 2 each                  NEW: Stretch  Quads 20 sec X 2 ea  NEW: Wall Crawl stretch  25 sec.   Stretch   Piriformis 20 sec. each                                 Abdominals  LN Pushdowns X 15                                   Hip Abd/Add 2 X 10               SLRs X 15 each              March Walk 25 ft X 2          Mini Squat   15X               Toe/Heel Raises  Single X 10 and B X 10  Leg Press w/ large foam ring X 20 each                Step Ups w/ rail   6 inch X 10 each leg                  Bicycle   2 min. seated                                 Flutter/Scissor   15/15 Seated    Assessment/Plan    Added quad stretch and increased reps on leg lifts and leg press.  Progressed heel raises to single leg today. During step ups patient reported back pain- taught wall crawl stretch for back that alleviated the pain. Next visit have patient perform step ups earlier than at end of session.  Patient scheduled for B knee  cortisone injection .             Timed:  Aquatic Therapy    53     mins [id number];    Celestino Iniguez, PT  Physical Therapist

## 2021-10-22 ENCOUNTER — TREATMENT (OUTPATIENT)
Dept: PHYSICAL THERAPY | Facility: CLINIC | Age: 81
End: 2021-10-22

## 2021-10-22 DIAGNOSIS — R26.2 DIFFICULTY WALKING: Primary | ICD-10-CM

## 2021-10-22 DIAGNOSIS — M17.0 PRIMARY OSTEOARTHRITIS OF BOTH KNEES: ICD-10-CM

## 2021-10-22 PROCEDURE — 97113 AQUATIC THERAPY/EXERCISES: CPT | Performed by: PHYSICAL THERAPIST

## 2021-10-22 NOTE — PROGRESS NOTES
Physical Therapy Daily Progress Note    Patient: Ashley Motta   : 1940  Diagnosis/ICD-10 Code:  Difficulty walking [R26.2]  Referring practitioner: BERNICE Cardenas  Date of Initial Visit: Type: THERAPY  Noted: 2021  Today's Date: 10/22/2021  Patient seen for 16 sessions             Subjective   My balance is better.    Objective     AQUATIC EXERCISE:      Water Walk    Fwds and  Sideways   25 ft X 4 each direction   Stretch HS 20 sec X 2 each   LN support at ankle                Stretch Calf 20 sec.  X 2 each                   Stretch  Quads 20 sec X 2 ea   Wall Crawl stretch  20 sec.  X 2    Abdominals  LN Pushdowns X 20                                  Hip Abd/Add 2 X 10               Hip Flex/Ext  X 20 each              March Walk 25 ft X 2          Mini Squat   20X               Toe/Heel Raises  B X 20 and Single X 10 each  Leg Press w/ large foam ring X 20 each                Step Ups w/ rail   6 inch X 20 each leg    Step Downs on 4 inch  *Next  Bicycle   2 min. seated                                 Flutter/Scissor   15/15 Seated    Assessment/Plan  Improved balance noted by patient, however still having difficulty going up/down stairs.  Ashley is now able to transfer from sit to stand without UE support, however she does have increased knee pain during the transfer. She was able to increase repetitions on all standing strength exercises from 15 to 20 today.  Plan: Begin Step downs next week             Timed:  Aquatic Therapy    45     mins [id number];    Celestino Iniguez, PT  Physical Therapist

## 2021-10-25 ENCOUNTER — TREATMENT (OUTPATIENT)
Dept: PHYSICAL THERAPY | Facility: CLINIC | Age: 81
End: 2021-10-25

## 2021-10-25 DIAGNOSIS — M17.0 PRIMARY OSTEOARTHRITIS OF BOTH KNEES: ICD-10-CM

## 2021-10-25 DIAGNOSIS — R26.2 DIFFICULTY WALKING: Primary | ICD-10-CM

## 2021-10-25 PROCEDURE — 97113 AQUATIC THERAPY/EXERCISES: CPT | Performed by: PHYSICAL THERAPIST

## 2021-10-25 NOTE — PROGRESS NOTES
Physical Therapy Re-certification         Patient: Ashley Motta   : 1940  Diagnosis/ICD-10 Code:  Difficulty walking [R26.2]  Referring practitioner: BERNICE Cardenas  Date of Initial Visit: Type: THERAPY  Noted: 2021  Today's Date: 10/25/2021  Patient seen for 17 sessions      Subjective:     Clinical Progress: improved  Home Program Compliance: Yes  Treatment has included:  therapeutic exercise, aquatic therapy and patient education with home exercise program     Subjective Knee pain 5/10. Ready for injections in 2 weeks.    Objective      Knee AROM:  Right  0- 122  Left  0-125     Strength:  Hip Flexion  B 5/5  Knee Extension  B 5/5  Knee Flexion  B 5/5  Ankle DF  B 5/5    Balance in SLS right 1-2 sec.,  left 1 sec.    Gait: Independent and antalgic (recommended cane)    Stair Navigation: Non-reciprocal pattern with minimal to moderate support on railing.    Observation: Swelling  left lateral knee    Functional Outcome Measure- Knee Outcome Survey- ADLs  47/80 or 58%    AQUATIC EXERCISE:    Water Walk    Fwds and  Sideways   25 ft X 4 each direction   Stretch HS 20 sec X 2 each   LN support at ankle                Stretch Calf 20 sec.  X 2 each                   Stretch  Quads 20 sec X 2 ea   Wall Crawl stretch  20 sec.  X 2    Abdominals  LN Pushdowns X 20                                  Hip Abd/Add 2 X 10               Hip Flex/Ext  X 20 each              March Walk 25 ft X 2          Mini Squat   20X               Toe/Heel Raises  B X 20 and Single X 10 each  Leg Press w/ large foam ring X 20 each                Step Ups w/ rail   6 inch X 20 each leg    Step Downs on 4 inch  *Next  New:  Single leg stance in pool, right 10 sec., left 5 sec.  Bicycle   2 min. seated                                 Flutter/Scissor   15/15 Seated    Assessment/Plan  Ashley has attended 17 skilled physical therapy sessions for treatment of B knee pain L>R. In addition to knee OA, she reports osteopenia in her  right hip.  She has transitioned from land based therapy to aquatic therapy for the past 7 visits.  She states she is due for her next cortisone injection November 11th and therefore reports an increase in her knee pain.  She also states previous gel injections to be ineffective for her. Ashley used to go to the [employer] pool, however stopped going due to Covid 19 pandemic and hasn't returned yet as she does not feel safe.  She plans to go back in January.  She continues to have difficulty on stairs due to pain and ascends/descends with a non- reciprocal pattern.  Her gait is antalgic and a cane was recommended to avoid compensatory patterns.  Also recommended use of cold pack and elevation as needed for pain control.  Two goals met at this time.    Short Term Goals:  STG 1. Patient will report [ain rated 2/10 at worst in order to demonstrate ability to return to normalized ADLS  And functional activities.   ONGOING    STG 2. Patient will be independent with initial HOME EXERCISE PROGRAM for symptom management.  MET    Long Term Goals:  LTG 1. Patient will be independent and compliant with advanced HOME EXERCISE PROGRAM for long term management of symptoms and prevention of future occurrence.  MET    LTG 2. Patient will reduce level of perceived disability as measured by the KOS- ADL to 70% in order to improve  QOL where 100% is no disability.  ONGOING    LTG 3. Patient will report walking 1/2 mile at park with no greater than 3/10 pain in order to improve ADLs. ONGOING    LTG 4.Patient will demonstrate balance in single limb stance to 5 sec. To decrease risk of falls.  ONGOING    LTG 5. Patient will demonstrate reciprocal pattern descending 5 stairs with minimal increase in pain to ambulate safely at home.  ONGOING       Recommendations: Continue as planned  Timeframe: 1 month  Prognosis to achieve goals: good    PT Signature: Celestino Iniguez, PT      Based upon review of the patient's progress and continued therapy  plan, it is my medical opinion that Ashley Motta should continue physical therapy treatment at [facility]  [address]   [place] [address] [phone]. office phone  [phone] office fax      Re-certification  Certification Period: 1/23/2022  I certify that the therapy services are furnished while this patient is under my care.  The services outlined above are required by this patient, and will be reviewed every 90 days.     PHYSICIAN: Cammie Henderson APRN        DATE:     Signature: __________________________________  Cammie Henderson APRN    Please sign and return via fax to [phone]   Thank you, [facility].    Timed:  Aquatic Therapy    47     mins [id number];

## 2021-10-29 ENCOUNTER — TREATMENT (OUTPATIENT)
Dept: PHYSICAL THERAPY | Facility: CLINIC | Age: 81
End: 2021-10-29

## 2021-10-29 DIAGNOSIS — M17.0 PRIMARY OSTEOARTHRITIS OF BOTH KNEES: ICD-10-CM

## 2021-10-29 DIAGNOSIS — R26.2 DIFFICULTY WALKING: Primary | ICD-10-CM

## 2021-10-29 PROCEDURE — 97113 AQUATIC THERAPY/EXERCISES: CPT | Performed by: PHYSICAL THERAPIST

## 2021-10-29 NOTE — PROGRESS NOTES
Physical Therapy Daily Progress Note    Patient: Ashley Motta   : 1940  Diagnosis/ICD-10 Code:  Difficulty walking [R26.2]  Referring practitioner: BERNICE Cardenas  Date of Initial Visit: Type: THERAPY  Noted: 2021  Today's Date: 10/29/2021  Patient seen for 18 sessions             Subjective   Thinking of using 's cane that he no longer uses.    Objective     AQUATIC EXERCISE:    Water Walk    Fwds and  Sideways   25 ft X 4 each direction  Independent as warm up  Stretch HS 20 sec X 2 each   LN support at ankle                Stretch Calf 20 sec.  X 2 each                   Stretch  Quads 20 sec X 2 ea   Wall Crawl stretch  20 sec.  X 2    Abdominals  LN Pushdowns X 20                                  Hip Abd/Add 2 X 10 each               Hip Flex/Ext  X 20 each                     Mini Squat   20X               Toe/Heel Raises  B X 20 and Single X 10 each  Leg Press w/ large foam ring X 20 each                Step Ups w/ rail   6 inch X 20 each leg    Step Downs on 4 inch X 10 each  Single leg stance in pool, right 30 sec., left 2 sec.  Bicycle   2 min. seated                                 Flutter/Scissor   15/15 Seated  NEXT: Tandem Walk    Assessment/Plan  Instruction in adjusting cane to proper  Ashley reports she has lost 5 pounds since starting P.T..  Began 4 inch  step downs today that Ashley tolerated well.  She still needs verbal cues for proper squat technique especially with proper foot and hip positioning.  Plan: Tandem Walk           Timed:  Aquatic Therapy    40     mins [id number];    Celestino Iniguez, PT  Physical Therapist

## 2021-11-01 ENCOUNTER — TREATMENT (OUTPATIENT)
Dept: PHYSICAL THERAPY | Facility: CLINIC | Age: 81
End: 2021-11-01

## 2021-11-01 DIAGNOSIS — R26.2 DIFFICULTY WALKING: Primary | ICD-10-CM

## 2021-11-01 DIAGNOSIS — M17.0 PRIMARY OSTEOARTHRITIS OF BOTH KNEES: ICD-10-CM

## 2021-11-01 PROCEDURE — 97113 AQUATIC THERAPY/EXERCISES: CPT | Performed by: PHYSICAL THERAPIST

## 2021-11-01 NOTE — PROGRESS NOTES
Physical Therapy Daily Progress Note    Patient: Ashley Motta   : 1940  Diagnosis/ICD-10 Code:  Difficulty walking [R26.2]  Referring practitioner: BERNICE Cardenas  Date of Initial Visit: Type: THERAPY  Noted: 2021  Today's Date: 2021  Patient seen for 19 sessions             Subjective   When I wake up in the morning my right hip hurts.  After being up a while it feels better.  Continued knee pain left > right. Knee injections next week, patient also plans to inquire about her right hip pain.    Objective     AQUATIC EXERCISE:    Water Walk    Fwds and  Sideways   25 ft X 4 each direction  Independent as warm up  Stretch HS 20 sec X 2 each   LN support at ankle                Stretch Calf 20 sec.  X 2 each                   Stretch  Quads 20 sec X 2 ea   Wall Crawl stretch  20 sec.  X 2    Abdominals  LN Pushdowns X 20                                  Hip Abd/Add 2 X 10 each               Hip Flex/Ext  X 20 each                     Mini Squat   20X               Toe/Heel Raises  B X 20 and Single X 10 each  Leg Press w/ large foam ring X 20 each                Step Ups w/ rail  4 inch on left X 20 and 6 inch on right X 20  Single leg stance in pool, right 30 sec., left 8 sec.  Bicycle   2 min. seated                                 Flutter/Scissor   15/15 Seated  NEW: Tandem Walk 25 ft X 2 w/ Noodle support, then at railing 20 ft X 2    Assessment/Plan  Reduced step up from 6 inch to 4 inh on left LE due to joint pain.  Instructed Ashley in tandem walk which as very challenging and required rail support vs noodle.             Timed:  Aquatic Therapy    45     mins [id number];    Celestino Iniguez, PT  Physical Therapist

## 2021-11-02 ENCOUNTER — LAB (OUTPATIENT)
Dept: LAB | Facility: HOSPITAL | Age: 81
End: 2021-11-02

## 2021-11-02 PROCEDURE — 85027 COMPLETE CBC AUTOMATED: CPT | Performed by: INTERNAL MEDICINE

## 2021-11-02 PROCEDURE — 80053 COMPREHEN METABOLIC PANEL: CPT | Performed by: INTERNAL MEDICINE

## 2021-11-02 PROCEDURE — 83036 HEMOGLOBIN GLYCOSYLATED A1C: CPT | Performed by: INTERNAL MEDICINE

## 2021-11-02 PROCEDURE — 80061 LIPID PANEL: CPT | Performed by: INTERNAL MEDICINE

## 2021-11-02 PROCEDURE — 87086 URINE CULTURE/COLONY COUNT: CPT | Performed by: INTERNAL MEDICINE

## 2021-11-02 PROCEDURE — 81001 URINALYSIS AUTO W/SCOPE: CPT | Performed by: INTERNAL MEDICINE

## 2021-11-05 ENCOUNTER — TREATMENT (OUTPATIENT)
Dept: PHYSICAL THERAPY | Facility: CLINIC | Age: 81
End: 2021-11-05

## 2021-11-05 DIAGNOSIS — R26.2 DIFFICULTY WALKING: Primary | ICD-10-CM

## 2021-11-05 DIAGNOSIS — M17.0 PRIMARY OSTEOARTHRITIS OF BOTH KNEES: ICD-10-CM

## 2021-11-05 PROCEDURE — 97113 AQUATIC THERAPY/EXERCISES: CPT | Performed by: PHYSICAL THERAPIST

## 2021-11-05 NOTE — PROGRESS NOTES
Physical Therapy Daily Progress Note    Patient: Ashley Motta   : 1940  Diagnosis/ICD-10 Code:  Difficulty walking [R26.2]  Referring practitioner: BERNICE Cardenas  Date of Initial Visit: Type: THERAPY  Noted: 2021  Today's Date: 2021  Patient seen for 20 sessions             Subjective   Knees hurt with walking and on stairs the most.    Objective     AQUATIC EXERCISE:    Water Walk    Fwds and  Sideways   25 ft X 4 each direction  Independent as warm up  Stretch HS 20 sec X 2 each   LN support at ankle                Stretch Calf 20 sec.  X 2 each                   Stretch  Quads 20 sec X 2 ea   Wall Crawl stretch  20 sec.  X 2    Abdominals  LN Pushdowns X 20                                  Hip Abd/Add 2 X 10 each               Hip Flex/Ext  X 20 each                     Mini Squat   20X               Toe/Heel Raises  B X 20 and Single X 10 each  Leg Press w/ large foam ring X 20 each                Step Ups w/ rail  6 inch  X 20 ea  Single leg stance in pool, right 30 sec., left 8 sec.  NEW:  4 Square Stepping for balance CW X 5 and CCW X 5  Bicycle   2 min. seated    Independent                             Flutter/Scissor   15/15 Seated   Tandem Walk 25 ft X 2 w/ Noodle support, then at railing 20 ft X 2       Assessment/Plan  Added more neuromuscular reeducation with 4 square stepping in shallow with SBA. Increased step ups from 4 inch to 6 inch. Knee pain still limits land based walking and stairs due to pain.             Timed:  Aquatic Therapy    39    mins [id number];    Celestino Iniguez, PT  Physical Therapist

## 2021-11-08 ENCOUNTER — TREATMENT (OUTPATIENT)
Dept: PHYSICAL THERAPY | Facility: CLINIC | Age: 81
End: 2021-11-08

## 2021-11-08 ENCOUNTER — OFFICE VISIT (OUTPATIENT)
Dept: SLEEP MEDICINE | Facility: HOSPITAL | Age: 81
End: 2021-11-08

## 2021-11-08 VITALS
BODY MASS INDEX: 31.54 KG/M2 | SYSTOLIC BLOOD PRESSURE: 135 MMHG | DIASTOLIC BLOOD PRESSURE: 64 MMHG | HEIGHT: 63 IN | WEIGHT: 178 LBS | HEART RATE: 90 BPM | OXYGEN SATURATION: 96 %

## 2021-11-08 DIAGNOSIS — G47.33 OSA ON CPAP: Primary | ICD-10-CM

## 2021-11-08 DIAGNOSIS — Z99.89 OSA ON CPAP: Primary | ICD-10-CM

## 2021-11-08 DIAGNOSIS — M17.0 PRIMARY OSTEOARTHRITIS OF BOTH KNEES: ICD-10-CM

## 2021-11-08 DIAGNOSIS — R26.2 DIFFICULTY WALKING: Primary | ICD-10-CM

## 2021-11-08 PROCEDURE — 97113 AQUATIC THERAPY/EXERCISES: CPT | Performed by: PHYSICAL THERAPIST

## 2021-11-08 PROCEDURE — G0463 HOSPITAL OUTPT CLINIC VISIT: HCPCS

## 2021-11-08 NOTE — PROGRESS NOTES
[facility]         Dr Fredi Harrison  [unfilled]  Patient Care Team:  Erica Johnston MD as PCP - General (Internal Medicine)    Chief Complaint:Study-2016- PSG:  Split study was performed.  Diagnostic portion from 10:44 PM to 1:47 AM.  Sleep efficiency 93% with 2.83 total sleep time.  Sleep distribution showed decreased REM sleep at 17%.  AHI index is 7.8 indicating mild obstructive sleep apnea.  In supine position index 11.2 and in rem sleep index 39 which is severe.  Saturation remained above 89%.  Arousal index was 16.  He 11 index high at 83 with PLM arousal index of 11.  Snoring for 7.67% of total sleep time.     Titration study from 1:47 AM to 6:02 AM.  Sleep efficiency 95%. Sleep distribution some decrease in slow-wave sleep with improvement in rem sleep to 19%.PLM index improved somewhat with PLM arousal index of 3.0 but total PLM index was 54.  CPAP increased up to 11 cm.  Maximum sleep at the11 cm water pressure.  Air leaks noted as the pressures were increased.  AHI index is less than 5 at final pressure setting.    Interval History: Patient here for compliance visit.  As you recall currently on CPAP 11 cm.  Compliance 100% average daily use 7 hours 17 minutes.  AHI and leak look excellent.  Goes to bed 10 gets up 6 gets about 8 hours of sleep and feels rested.  No tobacco alcohol or caffeine abuse.  Currently has a full facemask that fits well.  Supplies been adequate.    REVIEW OF SYSTEMS:   CARDIOVASCULAR: No chest pain, chest pressure or chest discomfort. No palpitations or edema.   RESPIRATORY: No shortness of breath, cough or sputum.   GASTROINTESTINAL: No anorexia, nausea, vomiting or diarrhea. No abdominal pain or blood.   HEMATOLOGIC: No bleeding or bruising.  [place] 5 out of 24 within normal limits  Positive acid reflux    Ventilator/Non-Invasive Ventilation Settings (From admission, onward)            None            Vital Signs  Heart Rate:  [90] 90  BP: (135)/(64)  "135/64  [unfilled]  Flowsheet Rows      First Filed Value   Admission Height 160 cm (63\") Documented at 11/08/2021 1019   Admission Weight 80.7 kg (178 lb) Documented at 11/08/2021 1019          Physical Exam:  Patient is examined using the personal protective equipment as per guidelines from infection control for this particular patient as enacted.  Hand hygiene was performed before and after patient interaction.   General Appearance:    Alert, cooperative, in no acute distress.  Following simple commands  Neck midline trachea, no thyromegaly   Lungs:     Clear to auscultation, respirations regular, even and                  unlabored  ENT Mallampati between 3 and 4 normal nasal exam    Heart:    Regular rhythm and normal rate, normal S1 and S2, no            murmur, no gallop, no rub, no click   Chest Wall:    No abnormalities observed   Abdomen:     Normal bowel sounds, no masses, no organomegaly, soft        nontender, nondistended, no guarding, no rebound                tenderness   Extremities:   Moves all extremities well, no edema, no cyanosis, no             redness  CNS no focal neurological deficits normal sensory exam  Skin no rashes no nodules  Musculoskeletal no cyanosis no clubbing normal range of motion     Results Review:        Results from last 7 days   Lab Units 11/02/21  0739   SODIUM mmol/L 138   POTASSIUM mmol/L 4.0   CHLORIDE mmol/L 106   CO2 mmol/L 23.9   BUN mg/dL 12   CREATININE mg/dL 0.66   GLUCOSE mg/dL 124*   CALCIUM mg/dL 9.2         Results from last 7 days   Lab Units 11/02/21  0739   WBC 10*3/mm3 7.69   HEMOGLOBIN g/dL 13.8   HEMATOCRIT % 41.8   PLATELETS 10*3/mm3 257         Results from last 7 days   Lab Units 11/02/21  0739   CHOLESTEROL mg/dL 109         Results from last 7 days   Lab Units 11/02/21  0739   CHOLESTEROL mg/dL 109   TRIGLYCERIDES mg/dL 130   HDL CHOL mg/dL 49   LDL CHOL mg/dL 37           I reviewed the patient's new clinical results.  I personally viewed and " interpreted the patient's chest x-ray.        Medication Review:       No current facility-administered medications for this visit.      ASSESSMENT:   BALDEMAR on CPAP  Restless leg syndrome controlled with Requip    PLAN:  Reviewed compliance download with the patient  Excellent compliance AHI and leak  Continue current sleep hygiene measures  CPAP 11 cm and the new CPAP working well  Treatment of underlying comorbidities  Remains on Requip with adequate control of restless leg.  Avoid excessive alcohol and caffeine  Follow-up in 1 year.      Fredi Harrison MD  11/08/21  10:37 EST

## 2021-11-08 NOTE — PROGRESS NOTES
Physical Therapy Daily Progress Note    Patient: Ashley Motta   : 1940  Diagnosis/ICD-10 Code:  Difficulty walking [R26.2]  Referring practitioner: BERNICE Cardenas  Date of Initial Visit: Type: THERAPY  Noted: 2021  Today's Date: 2021  Patient seen for 21 sessions             Subjective    I get my knee injections tomorrow. Stairs and walking painful.    Objective       AQUATIC EXERCISE:    Water Walk    Fwds and  Sideways   25 ft X 4 each direction  Independent as warm up  Stretch HS 20 sec X 2 each   LN support at ankle                Stretch Calf 20 sec.  X 2 each                   Stretch  Quads 20 sec X 2 ea   Wall Crawl stretch  20 sec.  X 2    Abdominals  LN Pushdowns X 20                                  Hip Abd/Add 2 X 10 each               Hip Flex/Ext  X 20 each                     Mini Squat   20X               Toe/Heel Raises  B X 20 and Single X 10 each  Leg Press w/ large foam ring X 20 each                Step Ups w/ rail  6 inch  X 20 ea  Single leg stance in pool, right 30 sec., left 7 sec.   4 Square Stepping for balance CW X 5 and CCW X 5  Bicycle   2 min. seated    Independent                             Flutter/Scissor   15/15 Seated   Tandem Walk 25 ft X 2 w/ Noodle support, then at railing 20 ft X 2    Assessment/Plan  Ashley is scheduled for knee injections tomorrow.  She is hoping this will help decrease her pain with walking and going up/down stairs. She is nearing independence with an advanced aquatic exercise program that she can continue on her own.  Planning on discharge from formal physical therapy in 10 days (Ashley has had 21 sessions).            Timed:  Aquatic Therapy    40     mins [id number];    Celestino Iniguez, PT  Physical Therapist

## 2021-11-09 ENCOUNTER — CLINICAL SUPPORT (OUTPATIENT)
Dept: ORTHOPEDIC SURGERY | Facility: CLINIC | Age: 81
End: 2021-11-09

## 2021-11-09 ENCOUNTER — OFFICE VISIT (OUTPATIENT)
Dept: INTERNAL MEDICINE | Facility: CLINIC | Age: 81
End: 2021-11-09

## 2021-11-09 VITALS
OXYGEN SATURATION: 98 % | DIASTOLIC BLOOD PRESSURE: 72 MMHG | HEIGHT: 63 IN | TEMPERATURE: 98 F | SYSTOLIC BLOOD PRESSURE: 134 MMHG | HEART RATE: 86 BPM | BODY MASS INDEX: 31.54 KG/M2 | WEIGHT: 178 LBS

## 2021-11-09 VITALS — BODY MASS INDEX: 31.01 KG/M2 | WEIGHT: 175 LBS | HEIGHT: 63 IN | TEMPERATURE: 96.5 F

## 2021-11-09 DIAGNOSIS — G25.81 RESTLESS LEG SYNDROME: ICD-10-CM

## 2021-11-09 DIAGNOSIS — R73.03 PRE-DIABETES: ICD-10-CM

## 2021-11-09 DIAGNOSIS — M17.0 PRIMARY OSTEOARTHRITIS OF BOTH KNEES: Primary | ICD-10-CM

## 2021-11-09 DIAGNOSIS — E78.5 HYPERLIPIDEMIA, UNSPECIFIED HYPERLIPIDEMIA TYPE: Primary | ICD-10-CM

## 2021-11-09 DIAGNOSIS — G43.809 OTHER MIGRAINE WITHOUT STATUS MIGRAINOSUS, NOT INTRACTABLE: ICD-10-CM

## 2021-11-09 PROCEDURE — 99213 OFFICE O/P EST LOW 20 MIN: CPT | Performed by: NURSE PRACTITIONER

## 2021-11-09 PROCEDURE — 20610 DRAIN/INJ JOINT/BURSA W/O US: CPT | Performed by: NURSE PRACTITIONER

## 2021-11-09 PROCEDURE — 99214 OFFICE O/P EST MOD 30 MIN: CPT | Performed by: INTERNAL MEDICINE

## 2021-11-09 RX ORDER — ROPINIROLE 1 MG/1
TABLET, FILM COATED ORAL
Qty: 90 TABLET | Refills: 1 | Status: SHIPPED | OUTPATIENT
Start: 2021-11-09 | End: 2022-05-23 | Stop reason: SDUPTHER

## 2021-11-09 RX ORDER — METHYLPREDNISOLONE ACETATE 80 MG/ML
80 INJECTION, SUSPENSION INTRA-ARTICULAR; INTRALESIONAL; INTRAMUSCULAR; SOFT TISSUE
Status: COMPLETED | OUTPATIENT
Start: 2021-11-09 | End: 2021-11-09

## 2021-11-09 RX ORDER — GABAPENTIN 100 MG/1
100 CAPSULE ORAL
Qty: 90 CAPSULE | Refills: 1 | Status: SHIPPED | OUTPATIENT
Start: 2021-11-09 | End: 2022-05-25 | Stop reason: SDUPTHER

## 2021-11-09 RX ADMIN — METHYLPREDNISOLONE ACETATE 80 MG: 80 INJECTION, SUSPENSION INTRA-ARTICULAR; INTRALESIONAL; INTRAMUSCULAR; SOFT TISSUE at 07:59

## 2021-11-09 NOTE — PROGRESS NOTES
Patient Name: Ashley Motta   YOB: 1940  Referring Primary Care Physician: Erica Johnston MD  BMI: Body mass index is 31 kg/m².    Chief Complaint:    Chief Complaint   Patient presents with   • Left Knee - Follow-up, Injections   • Right Knee - Follow-up, Injections        HPI:     Ashley Motta is a 81 y.o. female who presents today for evaluation of   Chief Complaint   Patient presents with   • Left Knee - Follow-up, Injections   • Right Knee - Follow-up, Injections   .  Patient follows up for evaluation of bilateral knee pain.  She has known history of osteoarthritis in both knees and has pain, stiffness, swelling, etc.  She denies any new injury or trauma to the knees.  No locking or catching.  The left hurts worse than the right.  She uses Tylenol, ice, heat, liniments, etc. as needed.  She has been doing physical therapy and is now into aqua therapy and reports that it is helping.  She has had injections in the past which do well for her.  She is inquiring about total knee arthroplasty today.      Subjective   Medications:   Home Medications:  Current Outpatient Medications on File Prior to Visit   Medication Sig   • aspirin 81 MG tablet Take 81 mg by mouth daily.   • B Complex Vitamins (VITAMIN B COMPLEX PO) Take  by mouth.   • Calcium Carbonate-Vitamin D (CALCIUM + D PO) Take  by mouth 2 (Two) Times a Day.   • Coenzyme Q10 (COQ10) 200 MG capsule Take  by mouth.   • ECHINACEA-ZINC-VITAMIN C PO Take 1,000 mg by mouth.   • gabapentin (NEURONTIN) 100 MG capsule Take 1 capsule by mouth at bedtime   • Magnesium 400 MG tablet Take  by mouth.   • Misc Natural Products (GLUCOSAMINE CHONDROITIN VIT D3) capsule Take  by mouth.   • Multiple Vitamins-Minerals (MULTIVITAMIN ADULT PO) Take  by mouth.   • Omega-3 Fatty Acids (FISH OIL) 1000 MG capsule capsule Take  by mouth daily with breakfast.   • pantoprazole (PROTONIX) 20 MG EC tablet Take 1 tablet by mouth Daily.   • Probiotic Product (PROBIOTIC DAILY  PO) Take  by mouth.   • rOPINIRole (REQUIP) 0.5 MG tablet TAKE 1 TABLET BY MOUTH IN THE EVENING 1  HOUR  BEFORE  BEDTIME   • simvastatin (ZOCOR) 40 MG tablet TAKE 1 TABLET BY MOUTH AT BEDTIME     No current facility-administered medications on file prior to visit.     Current Medications:  Scheduled Meds:  Continuous Infusions:No current facility-administered medications for this visit.    PRN Meds:.    I have reviewed the patient's medical history in detail and updated the computerized patient record.  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 STEWART SAYIED   • Osteoarthritis    • Osteopenia    • PLMD (periodic limb movement disorder)    • Postmenopausal atrophic vaginitis    • Prediabetes    • Presbyopia    • Rectal nodule 03/2020    REFERRED TO DR. PAMELA NAPOLES   • Rectocele 07/2017   • Regular astigmatism of both eyes    • Rheumatoid arthritis (HCC)    • RLS (restless legs syndrome)    • Seborrheic keratosis    • Skin cancer 04/2015    LOWER LEG, FOLLOWED BY DR. EDI SANCHEZ   • Urinary incontinence 09/2017   • Vaginal vault prolapse 09/2017   •  Vertigo 10/18/2018    ADMITTED TO [facility]   • Vitreoretinal degeneration of both eyes         Past Surgical History:   Procedure Laterality Date   • CATARACT EXTRACTION, BILATERAL Bilateral 2015   • COLONOSCOPY N/A 02/27/2015    1 TUBULAR ADENOMA POLYP IN DISTAL ASCENDING[facility]   • COLONOSCOPY N/A 3/4/2020    10 MM SESSILE SERRATED ADENOMA POLYP IN ASCENDING, 3 MM HYPERPLASTIC POLYP IN RECTUM, 7 MM ANAL NODULE, DR. SARAH LIRIANO AT [facility]   • HYSTERECTOMY N/A 01/19/2004    KAYLEE WITH BSO, DR. RAFIA MORTENSEN AT [facility]   • KNEE ARTHROSCOPY Left 2013   • KNEE ARTHROSCOPY Right 2010   • MELISSA CULDOPLASTY N/A 01/19/2004    DR. RAFIA MORTENSEN AT [facility]   • SACROCOLPOPEXY N/A 01/19/2004    SACROVAGINAL SLING, DR. RAFIA MORTENSEN AT [facility]   • SHOULDER ARTHROSCOPY Left         Social History     Occupational History   • Not on file   Tobacco Use   • Smoking status: Never Smoker   • Smokeless tobacco: Never Used   Vaping Use   • Vaping Use: Never used   Substance and Sexual Activity   • Alcohol use: No   • Drug use: Never   • Sexual activity: Not Currently     Birth control/protection: Post-menopausal, Surgical     Comment: .      Social History     Social History Narrative   • Not on file        Family History   Problem Relation Age of Onset   • Heart disease Mother    • Lung cancer Father    • Hypertension Father    • Heart disease Father    • Cancer Father    • Heart attack Brother    • Emphysema Brother    • COPD Brother    • Depression Brother    • Heart attack Brother         Had stents placed 2009       ROS: 14 point review of systems was performed and all other systems were reviewed and are negative except for documented findings in HPI and today's encounter.     Allergies: No Known Allergies  Constitutional:  Denies fever, shaking or chills   Eyes:  Denies change in visual acuity   HENT:  Denies nasal congestion or sore throat   Respiratory:  Denies cough or shortness of breath   Cardiovascular:   "Denies chest pain or severe LE edema   GI:  Denies abdominal pain, nausea, vomiting, bloody stools or diarrhea   Musculoskeletal:  Numbness, tingling, pain, or loss of motor function only as noted above in history of present illness.  : Denies painful urination or hematuria  Integument:  Denies rash, lesion or ulceration   Neurologic:  Denies headache or focal weakness  Endocrine:  Denies lymphadenopathy  Psych:  Denies confusion or change in mental status   Hem:  Denies active bleeding    OBJECTIVE:  Physical Exam: 81 y.o. female  Wt Readings from Last 3 Encounters:   11/09/21 79.4 kg (175 lb)   11/08/21 80.7 kg (178 lb)   08/19/21 81.6 kg (180 lb)     Ht Readings from Last 1 Encounters:   11/09/21 160 cm (63\")     Body mass index is 31 kg/m².  Vitals:    11/09/21 0758   Temp: 96.5 °F (35.8 °C)     Vital signs reviewed.     General Appearance:    Alert, cooperative, in no acute distress                  Eyes: conjunctiva clear  ENT: external ears and nose atraumatic  CV: no peripheral edema  Resp: normal respiratory effort  Skin: no rashes or wounds; normal turgor  Psych: mood and affect appropriate  Lymph: no nodes appreciated  Neuro: gross sensation intact  Vascular:  Palpable peripheral pulse in noted extremity  Musculoskeletal Extremities: Bilateral knees have medial joint line tenderness, swelling, synovitis, patellofemoral crepitation.  Calves are soft nontender.  Skin is clean and intact with no rash or lesions    Radiology:   No x-rays obtained today.  Reviewed AP, lateral, sunrise, 40 degree PA of bilateral knees from 7/6/2021 which show advanced tricompartmental osteoarthritis of bilateral knees    Assessment:     ICD-10-CM ICD-9-CM   1. Primary osteoarthritis of both knees  M17.0 715.16           MDM/Plan:   The diagnosis(es), natural history, pathophysiology and treatment for diagnosis(es) were discussed. Opportunity given and questions answered.  Biomechanics of pertinent body areas discussed.  When " appropriate, the use of ambulatory aids discussed.  EXERCISES:  Advice on benefits of, and types of regular/moderate exercise pertaining to orthopedic diagnosis(es).  MEDICATIONS:  The risks, benefits, warnings,side effects and alternatives of medications discussed.  Inflammation/pain control; with cold, heat, elevation and/or liniments discussed as appropriate  Cortisone Injection. See procedure note.  HOME EXERCISE/PT program encouraged  Went over treatment options and patient elects to proceed with cortisone injections in bilateral knees today.  I encouraged her to continue her quad strengthening exercises at home and to continue aqua therapy if it is too well for her.  We briefly discussed total knee arthroplasty and the logistics of her recovery.  She was to hold off on scheduling now but will call when she is ready to do so.  We can see her back in 3 months if the injections are helpful.    11/9/2021    Much of this encounter note is an electronic transcription/translation of spoken language to printed text. The electronic translation of spoken language may permit erroneous, or at times, nonsensical words or phrases to be inadvertently transcribed; Although I have reviewed the note for such errors, some may still exist        Large Joint Arthrocentesis: R knee  Date/Time: 11/9/2021 7:59 AM  Consent given by: patient  Site marked: site marked  Timeout: Immediately prior to procedure a time out was called to verify the correct patient, procedure, equipment, support staff and site/side marked as required   Supporting Documentation  Indications: pain   Procedure Details  Location: knee - R knee  Preparation: Patient was prepped and draped in the usual sterile fashion  Needle gauge: 21G.  Approach: lateral  Medications administered: 80 mg methylPREDNISolone acetate 80 MG/ML; 4 mL lidocaine (cardiac)  Patient tolerance: patient tolerated the procedure well with no immediate complications    Large Joint  Arthrocentesis: L knee  Date/Time: 11/9/2021 7:59 AM  Consent given by: patient  Site marked: site marked  Timeout: Immediately prior to procedure a time out was called to verify the correct patient, procedure, equipment, support staff and site/side marked as required   Supporting Documentation  Indications: pain   Procedure Details  Location: knee - L knee  Preparation: Patient was prepped and draped in the usual sterile fashion  Needle gauge: 21G.  Approach: lateral  Medications administered: 80 mg methylPREDNISolone acetate 80 MG/ML; 4 mL lidocaine (cardiac)  Patient tolerance: patient tolerated the procedure well with no immediate complications

## 2021-11-09 NOTE — PROGRESS NOTES
Chief Complaint   Patient presents with   • Hyperlipidemia       Subjective   Ashley Motta is a 81 y.o. female.     History of Present Illness     Hyperlipidemia:    This is a chronic problem currently treated with simvastatin, fish oil.  Her most recent lipid panel showed:  Lab Results   Component Value Date    CHOL 109 11/02/2021    CHLPL 119 10/04/2016    TRIG 130 11/02/2021    HDL 49 11/02/2021    LDL 37 11/02/2021      Restless legs - still has some symptoms even with ropinirole 0.5 mg.    Migraines: gabapentin helps    Pre-diabetes:  This is a chronic problem, currently treated with diet.    She denies polyuria, polydipsia, vision change appetite change, unexplained weight loss.   Lab Results   Component Value Date    HGBA1C 6.20 (H) 11/02/2021          The following portions of the patient's history were reviewed and updated as appropriate: allergies, current medications, past family history, past medical history, past social history, past surgical history and problem list.      Current Outpatient Medications:   •  aspirin 81 MG tablet, Take 81 mg by mouth daily., Disp: , Rfl:   •  B Complex Vitamins (VITAMIN B COMPLEX PO), Take  by mouth., Disp: , Rfl:   •  Calcium Carbonate-Vitamin D (CALCIUM + D PO), Take  by mouth 2 (Two) Times a Day., Disp: , Rfl:   •  Coenzyme Q10 (COQ10) 200 MG capsule, Take  by mouth., Disp: , Rfl:   •  ECHINACEA-ZINC-VITAMIN C PO, Take 1,000 mg by mouth., Disp: , Rfl:   •  gabapentin (NEURONTIN) 100 MG capsule, Take 1 capsule by mouth every night at bedtime., Disp: 90 capsule, Rfl: 1  •  Magnesium 400 MG tablet, Take  by mouth., Disp: , Rfl:   •  Misc Natural Products (GLUCOSAMINE CHONDROITIN VIT D3) capsule, Take  by mouth., Disp: , Rfl:   •  Multiple Vitamins-Minerals (MULTIVITAMIN ADULT PO), Take  by mouth., Disp: , Rfl:   •  Omega-3 Fatty Acids (FISH OIL) 1000 MG capsule capsule, Take  by mouth daily with breakfast., Disp: , Rfl:   •  pantoprazole (PROTONIX) 20 MG EC tablet,  "Take 1 tablet by mouth Daily., Disp: 90 tablet, Rfl: 1  •  Probiotic Product (PROBIOTIC DAILY PO), Take  by mouth., Disp: , Rfl:   •  rOPINIRole (REQUIP) 1 MG tablet, Take one tablet 1 hour before bedtime., Disp: 90 tablet, Rfl: 1  •  simvastatin (ZOCOR) 40 MG tablet, TAKE 1 TABLET BY MOUTH AT BEDTIME, Disp: 90 tablet, Rfl: 3  No current facility-administered medications for this visit.           Review of Systems   Constitutional: Negative for appetite change.   HENT: Negative for nosebleeds.    Eyes: Negative for blurred vision and double vision.   Respiratory: Negative for cough and shortness of breath.    Cardiovascular: Negative for chest pain, palpitations and leg swelling.           Objective     /72   Pulse 86   Temp 98 °F (36.7 °C)   Ht 160 cm (63\")   Wt 80.7 kg (178 lb)   SpO2 98%   BMI 31.53 kg/m²       Physical Exam  Vitals and nursing note reviewed.   Constitutional:       General: She is not in acute distress.     Appearance: She is well-developed.   Neck:      Vascular: Normal carotid pulses. No carotid bruit.   Cardiovascular:      Rate and Rhythm: Regular rhythm.      Pulses:           Carotid pulses are 2+ on the right side and 2+ on the left side.     Heart sounds: S1 normal and S2 normal. No murmur heard.  No friction rub. No gallop.    Pulmonary:      Effort: Pulmonary effort is normal.      Breath sounds: Normal breath sounds. No wheezing, rhonchi or rales.   Skin:     General: Skin is warm and dry.   Neurological:      Mental Status: She is alert and oriented to person, place, and time.       Lab Results   Component Value Date    GLUCOSE 124 (H) 11/02/2021    BUN 12 11/02/2021    CREATININE 0.66 11/02/2021    EGFRIFNONA 86 11/02/2021    EGFRIFAFRI 88 10/04/2016    BCR 18.2 11/02/2021    K 4.0 11/02/2021    CO2 23.9 11/02/2021    CALCIUM 9.2 11/02/2021    PROTENTOTREF 6.5 10/04/2016    ALBUMIN 4.50 11/02/2021    LABIL2 2.3 10/04/2016    AST 35 (H) 11/02/2021    ALT 41 (H) 11/02/2021 "         Assessment/Plan   Diagnoses and all orders for this visit:    1. Hyperlipidemia, unspecified hyperlipidemia type (Primary)    2. Pre-diabetes    3. Other migraine without status migrainosus, not intractable  -     gabapentin (NEURONTIN) 100 MG capsule; Take 1 capsule by mouth every night at bedtime.  Dispense: 90 capsule; Refill: 1    4. Restless leg syndrome  -     rOPINIRole (REQUIP) 1 MG tablet; Take one tablet 1 hour before bedtime.  Dispense: 90 tablet; Refill: 1      She will continue same medications.  Encouraged prudent diet and regular exercise.   We also discussed her recent urinalysis.  Despite findings of leukocytes and bacteria in the urine microscopic, urine culture was negative.  Ropinirole increased from 0.5mg to 1.0 mg today.  She has taken gabapentin for migraine headaches ever since she was admitted with a complicated migraine she finds gabapentin is a good preventative for her migraines.             Return for follow up as scheduled.

## 2021-11-12 ENCOUNTER — TREATMENT (OUTPATIENT)
Dept: PHYSICAL THERAPY | Facility: CLINIC | Age: 81
End: 2021-11-12

## 2021-11-12 DIAGNOSIS — R26.2 DIFFICULTY WALKING: Primary | ICD-10-CM

## 2021-11-12 DIAGNOSIS — M17.0 PRIMARY OSTEOARTHRITIS OF BOTH KNEES: ICD-10-CM

## 2021-11-12 PROCEDURE — 97113 AQUATIC THERAPY/EXERCISES: CPT | Performed by: PHYSICAL THERAPIST

## 2021-11-12 NOTE — PROGRESS NOTES
Physical Therapy Daily Progress Note    Patient: Ashley Motta   : 1940  Diagnosis/ICD-10 Code:  Difficulty walking [R26.2]  Referring practitioner: BERNICE Cardenas  Date of Initial Visit: Type: THERAPY  Noted: 2021  Today's Date: 2021  Patient seen for 22 sessions             Subjective   Had my knees injected on Tuesday and they are feeling better.    Objective      AQUATIC EXERCISE:    Water Walk    Fwds and  Sideways   25 ft X 4 each direction  Independent as warm up  Stretch HS 20 sec X 2 each   LN support at ankle                Stretch Calf 20 sec.  X 2 each                   Stretch  Quads 20 sec X 2 ea   Wall Crawl stretch  20 sec.  X 2    Abdominals  LN Pushdowns X 20                                  Hip Abd/Add 2 X 10 each               Hip Flex/Ext  X 20 each                     Mini Squat   20X               Toe/Heel Raises  B X 20 and Single X 10 each  Leg Press w/ large foam ring X 20 each                Step Ups w/ rail  6 inch  X 10 ea  Single leg stance in pool, right 30 sec., and left 12 sec.   4 Square Stepping for balance CW X 5 and CCW X 5  Bicycle   2 min. seated    Independent                             Flutter/Scissor   15/15 Seated  Tandem Stance 20 sec X 2   Tandem Walk 25 ft X 2 w/ Noodle support       Assessment/Plan   B knee injections 3 days ago which patient feels was helpful.  Education on practicing tandem stance at home for balance training with UE support available for safety.  Decreased balance noted left LE compared to right with single limb stance.  Ashley continues to ambulate with a limp and a cane was recommended.  Anticipate DC next Visit   Patient plans to re join [facility] to be able to continue water exercise independently.           Timed:  Aquatic Therapy    40     mins [id number];    Celestino Iniguez, PT  Physical Therapist

## 2021-11-17 ENCOUNTER — TREATMENT (OUTPATIENT)
Dept: PHYSICAL THERAPY | Facility: CLINIC | Age: 81
End: 2021-11-17

## 2021-11-17 DIAGNOSIS — M17.0 PRIMARY OSTEOARTHRITIS OF BOTH KNEES: ICD-10-CM

## 2021-11-17 DIAGNOSIS — R26.2 DIFFICULTY WALKING: Primary | ICD-10-CM

## 2021-11-17 PROCEDURE — 97113 AQUATIC THERAPY/EXERCISES: CPT | Performed by: PHYSICAL THERAPIST

## 2021-11-17 NOTE — PROGRESS NOTES
Physical Therapy Daily Progress Note/Discharge Note    Patient: Ashley Motta   : 1940  Diagnosis/ICD-10 Code:  Difficulty walking [R26.2]  Referring practitioner: BERNICE Cardenas  Date of Initial Visit: Type: THERAPY  Noted: 2021  Today's Date: 2021  Patient seen for 23 sessions             Subjective    KNEES FEELING BETTER.  PAIN RATED 3/10.  PLANNING TO GO BACK TO [facility].    Objective      AQUATIC EXERCISE:   Water Walk    Fwds and  Sideways   25 ft X 4 each direction  Independent as warm up  Stretch HS 20 sec X 2 each   LN support at ankle                Stretch Calf 20 sec.  X 2 each                   Stretch  Quads 20 sec X 2 ea   Wall Crawl stretch  20 sec.  X 2    Abdominals  LN Pushdowns X 20                                  Hip Abd/Add 2 X 10 each               Hip Flex/Ext  X 20 each                     Mini Squat   20X               Toe/Heel Raises  B X 20 and Single X 10 each  Leg Press w/ large foam ring X 20 each                Step Ups w/ rail  6 inch  X 10 ea  Single leg stance in pool, right 30 sec., and left 20 sec.   4 Square Stepping for balance CW X 5 and CCW X 5  Bicycle   2 min. seated    Independent                              Tandem Walk 25 ft X 2 w/ Noodle support       Assessment/Plan   Ashley has completed 23 sessions of physical therapy that has included dry land therapy for instruction in home exercises as well as aquatic therapy in a warm water pool.  Ashley reports decreased knee pain and improved balance and mobility.  She is able to ascend steps with a reciprocal pattern, however descending stairs is more difficult and feels better for her to continue a non-reciprocal pattern.  Wriitten instructions and pictures of aquatic exercises issued.  KNEE OUTCOME SURVEY - ADLs Score  67% (Goal 70%)    Short Term Goals:    STG 1. Patient will report pain rated 2/10 at worst in order to demonstrate ability to return to normalized ADLS  And functional activities.   PARTIALLY MET (pain is at 3/10 currently)     STG 2. Patient will be independent with initial HOME EXERCISE PROGRAM for symptom management.  MET     Long Term Goals:  LTG 1. Patient will be independent and compliant with advanced HOME EXERCISE PROGRAM for long term management of symptoms and prevention of future occurrence.  MET     LTG 2. Patient will reduce level of perceived disability as measured by the KOS- ADL to 70% in order to improve  QOL where 100% is no disability.  67%  PARTIALLY MET     LTG 3. Patient will report walking 1/2 mile at park with no greater than 3/10 pain in order to improve ADLs. MET     LTG 4.Patient will demonstrate balance in single limb stance to 5 sec. To decrease risk of falls.  MET     LTG 5. Patient will demonstrate reciprocal pattern descending 5 stairs with minimal increase in pain to ambulate safely at home. NOT MET    Plan:  Ashley plans to re join the [facility] to continue aquatic exercise as instructed and she is compliant with home exercises as well.  Discharge from formal physical therapy.           Timed:  Aquatic Therapy    40     mins [id number];    Celestino Iniguez, PT  Physical Therapist

## 2021-11-22 ENCOUNTER — OFFICE VISIT (OUTPATIENT)
Dept: INTERNAL MEDICINE | Facility: CLINIC | Age: 81
End: 2021-11-22

## 2021-11-22 ENCOUNTER — APPOINTMENT (OUTPATIENT)
Dept: WOMENS IMAGING | Facility: HOSPITAL | Age: 81
End: 2021-11-22

## 2021-11-22 DIAGNOSIS — Z12.31 ENCOUNTER FOR SCREENING MAMMOGRAM FOR BREAST CANCER: ICD-10-CM

## 2021-11-22 PROCEDURE — 77067 SCR MAMMO BI INCL CAD: CPT | Performed by: INTERNAL MEDICINE

## 2021-11-22 PROCEDURE — 77067 SCR MAMMO BI INCL CAD: CPT | Performed by: RADIOLOGY

## 2022-01-03 ENCOUNTER — TELEPHONE (OUTPATIENT)
Dept: ORTHOPEDIC SURGERY | Facility: CLINIC | Age: 82
End: 2022-01-03

## 2022-01-03 NOTE — TELEPHONE ENCOUNTER
Caller: JACK STALLWORTH    Relationship to patient: SELF    Best call back number: [phone]    Chief complaint:  HAS APPT. FOR BILATERAL KNEE INJECTIONS ON 2/9/22 @ 4:00. PATIENT IS ASKING IF THERE IS AN EARLIER TIME THAT DAY FOR THE APPT.    Type of visit:  F/U/INJ    Requested date: 2/9/22    If rescheduling, when is the original appointment: 2/9/22    Additional notes EARLIER TIME?

## 2022-01-08 ENCOUNTER — DOCUMENTATION (OUTPATIENT)
Dept: INTERNAL MEDICINE | Facility: CLINIC | Age: 82
End: 2022-01-08

## 2022-01-08 NOTE — PROGRESS NOTES
Patient called on-call on 1/8/22 at 11 AM— patient reporting new onset dizziness with headache. Patient reports years ago (unable to tell me which year) she was diagnosed with migraines which she was prescribed gabapentin for. Patient is asking if it’s okay to take an extra dose. Patient advised due to the new onset of symptoms and being unable to walk without feeling like she’s going to fall over, she should go be assessed at the ER. P atient states she would like to try Tylenol first but if that doesn’t make headache better she will go. I explained to patient I cannot diagnose her over the phone based upon symptoms (concern for possible posterior stroke risk). Patient verbalized understanding.

## 2022-01-11 ENCOUNTER — OFFICE VISIT (OUTPATIENT)
Dept: INTERNAL MEDICINE | Facility: CLINIC | Age: 82
End: 2022-01-11

## 2022-01-11 VITALS
TEMPERATURE: 97.5 F | SYSTOLIC BLOOD PRESSURE: 125 MMHG | BODY MASS INDEX: 32.11 KG/M2 | DIASTOLIC BLOOD PRESSURE: 68 MMHG | HEIGHT: 63 IN | OXYGEN SATURATION: 98 % | HEART RATE: 79 BPM | WEIGHT: 181.2 LBS

## 2022-01-11 DIAGNOSIS — Z76.89 ENCOUNTER TO ESTABLISH CARE: Primary | ICD-10-CM

## 2022-01-11 DIAGNOSIS — H61.22 IMPACTED CERUMEN OF LEFT EAR: ICD-10-CM

## 2022-01-11 DIAGNOSIS — E78.5 HYPERLIPIDEMIA, UNSPECIFIED HYPERLIPIDEMIA TYPE: ICD-10-CM

## 2022-01-11 DIAGNOSIS — R73.03 PRE-DIABETES: ICD-10-CM

## 2022-01-11 DIAGNOSIS — R12 HEARTBURN: ICD-10-CM

## 2022-01-11 DIAGNOSIS — G43.809 OTHER MIGRAINE WITHOUT STATUS MIGRAINOSUS, NOT INTRACTABLE: ICD-10-CM

## 2022-01-11 PROCEDURE — 99214 OFFICE O/P EST MOD 30 MIN: CPT | Performed by: FAMILY MEDICINE

## 2022-01-11 NOTE — ASSESSMENT & PLAN NOTE
Pt does not want to have lavage today,   She has drops at home, will start to use again. Lavage next visit.

## 2022-01-11 NOTE — PROGRESS NOTES
"Chief Complaint  Establish Care (new patient ) and Headache    Subjective    History of Present Illness {CC  Problem List  Visit  Diagnosis   Encounters  Notes  Medications  Labs  Result Review Imaging  Media :23}     Ashley Motta presents to [facility] for   History of Present Illness   80 yo female present to establish care. Pt is new to me, previoiulsy seeing Dr. Johnston. She has a history of sleep apnea, hyperlipidemia, arthritis, gerd, and migraine.   Headache leads to dizziness. Episode over the weekend, improved with tylenol and hydration.  Last migraine was 4 years. She has taken gabapetin since that time. Advise to follow up with Neurology , however never seen the specialist.  She is concern and would like to go at this time. She denies vision changes, weakness or loss of consciousness.          Social History     Socioeconomic History   • Marital status:    Tobacco Use   • Smoking status: Never Smoker   • Smokeless tobacco: Never Used   Vaping Use   • Vaping Use: Never used   Substance and Sexual Activity   • Alcohol use: No   • Drug use: Never   • Sexual activity: Not Currently     Birth control/protection: Post-menopausal, Surgical     Comment: .     Objective     Vital Signs:   /68 (BP Location: Left arm, Patient Position: Sitting, Cuff Size: Adult)   Pulse 79   Temp 97.5 °F (36.4 °C) (Temporal)   Ht 160 cm (62.99\")   Wt 82.2 kg (181 lb 3.2 oz)   SpO2 98%   BMI 32.11 kg/m²   Physical Exam  Constitutional:       General: She is not in acute distress.     Appearance: She is not ill-appearing.   HENT:      Head: Normocephalic.      Right Ear: There is impacted cerumen.      Left Ear: There is impacted cerumen.   Cardiovascular:      Rate and Rhythm: Regular rhythm.      Heart sounds: Normal heart sounds.   Pulmonary:      Effort: No respiratory distress.      Breath sounds: Normal breath sounds. No wheezing.   Musculoskeletal:      Right " lower leg: No edema.      Left lower leg: No edema.   Neurological:      Mental Status: She is alert.        Result Review  Data Reviewed:{ Labs  Result Review  Imaging  Med Tab  Media :23}   The following data was reviewed by: Angela Carbajal MD on 01/11/2022  Lab Results - Last 18 Months   Lab Units 11/02/21  0739 06/09/21  0742 02/08/21  0817 10/05/20  0807 10/05/20  0807   BUN mg/dL 12 19 14   < > 16   CREATININE mg/dL 0.66 0.86 0.67   < > 0.81   EGFR IF NONAFRICN AM mL/min/1.73 86 64 85   < > 68   SODIUM mmol/L 138 138 140   < > 140   POTASSIUM mmol/L 4.0 4.5 4.1   < > 4.5   CHLORIDE mmol/L 106 105 105   < > 105   CALCIUM mg/dL 9.2 9.5 9.5   < > 9.6   ALBUMIN g/dL 4.50 4.40 4.20   < > 4.50   BILIRUBIN mg/dL 0.3 0.3 0.3   < > 0.3   ALK PHOS U/L 80 83 85   < > 88   AST (SGOT) U/L 35* 35* 33*   < > 29   ALT (SGPT) U/L 41* 40* 42*   < > 37*   TRIGLYCERIDES mg/dL 130 120 133   < > 122   HDL CHOL mg/dL 49 45 52   < > 55   VLDL CHOL mg/dL 23 22 23   < > 24.4   LDL CHOL mg/dL 37 51 35   < > 43   LDL/HDL RATIO   --   --   --   --  0.77   HEMOGLOBIN A1C % 6.20* 6.00* 6.10*   < > 5.60   WBC 10*3/mm3 7.69  --  7.14  --   --    RBC 10*6/mm3 4.65  --  4.81  --   --    HEMATOCRIT % 41.8  --  43.2  --   --    MCV fL 89.9  --  89.8  --   --    MCH pg 29.7  --  29.9  --   --     < > = values in this interval not displayed.            Current Outpatient Medications:   •  aspirin 81 MG tablet, Take 81 mg by mouth daily., Disp: , Rfl:   •  B Complex Vitamins (VITAMIN B COMPLEX PO), Take  by mouth., Disp: , Rfl:   •  Calcium Carbonate-Vitamin D (CALCIUM + D PO), Take  by mouth 2 (Two) Times a Day., Disp: , Rfl:   •  Coenzyme Q10 (COQ10) 200 MG capsule, Take  by mouth., Disp: , Rfl:   •  ECHINACEA-ZINC-VITAMIN C PO, Take 1,000 mg by mouth., Disp: , Rfl:   •  gabapentin (NEURONTIN) 100 MG capsule, Take 1 capsule by mouth every night at bedtime., Disp: 90 capsule, Rfl: 1  •  Magnesium 400 MG tablet, Take  by mouth., Disp: , Rfl:    •  Misc Natural Products (GLUCOSAMINE CHONDROITIN VIT D3) capsule, Take  by mouth., Disp: , Rfl:   •  Multiple Vitamins-Minerals (MULTIVITAMIN ADULT PO), Take  by mouth., Disp: , Rfl:   •  Omega-3 Fatty Acids (FISH OIL) 1000 MG capsule capsule, Take  by mouth daily with breakfast., Disp: , Rfl:   •  pantoprazole (PROTONIX) 20 MG EC tablet, Take 1 tablet by mouth Daily., Disp: 90 tablet, Rfl: 1  •  Probiotic Product (PROBIOTIC DAILY PO), Take  by mouth., Disp: , Rfl:   •  rOPINIRole (REQUIP) 1 MG tablet, Take one tablet 1 hour before bedtime., Disp: 90 tablet, Rfl: 1  •  simvastatin (ZOCOR) 40 MG tablet, TAKE 1 TABLET BY MOUTH AT BEDTIME, Disp: 90 tablet, Rfl: 3     Assessment and Plan {CC Problem List  Visit Diagnosis  ROS  Review (Popup)  Health Maintenance  Quality  BestPractice  Medications  SmartSets  SnapShot Encounters  Media :23}   Problem List Items Addressed This Visit        Cardiac and Vasculature    Hyperlipidemia    Current Assessment & Plan     Lipid abnormalities are Chronic.  Pt to increase fruits and veggies, increase exercise. fasting labs adan  few months, continue simvastatin as prescribed.   Lipids will be reassessed in 3 months.         Relevant Orders    Comprehensive metabolic panel    Lipid panel    CBC, No Differential/Platelet ([facility])    Urinalysis With Microscopic If Indicated (No Culture) - Urine, Clean Catch       ENT    Impacted cerumen of left ear    Current Assessment & Plan     Pt does not want to have lavage today,   She has drops at home, will start to use again. Lavage next visit.             Endocrine and Metabolic    Pre-diabetes    Relevant Orders    Hemoglobin A1c    CBC, No Differential/Platelet ([facility])       Gastrointestinal Abdominal     Heartburn    Relevant Orders    CBC, No Differential/Platelet ([facility])       Neuro    Headache, migraine    Current Assessment & Plan     Headaches are chroinc, doing well until recently.  She had migraine a few days ago.  Would like to see neurology to evaluate. Never followup as instructed 4 years ago.  .  Pt to keep hydrated, monitor for any triggers.   Call if worsening symptoms or issues.   Seek medical attention if dizziness persist or neurologic changes.            Relevant Orders    Ambulatory Referral to Neurology    CBC, No Differential/Platelet ([facility])      Other Visit Diagnoses     Encounter to establish care    -  Primary            Follow Up   Return in about 3 months (around 4/11/2022).  Patient was given instructions and counseling regarding her condition or for health maintenance advice. Please see specific information pulled into the AVS if appropriate.    EpicAct:[email or web address]_ORDERS,RunParams:STARTUPTYPE=FOLLOW    [email or web address]_DISCHARGE

## 2022-01-11 NOTE — ASSESSMENT & PLAN NOTE
Headaches are chroinc, doing well until recently.  She had migraine a few days ago. Would like to see neurology to evaluate. Never followup as instructed 4 years ago.  .  Pt to keep hydrated, monitor for any triggers.   Call if worsening symptoms or issues.   Seek medical attention if dizziness persist or neurologic changes.

## 2022-01-11 NOTE — ASSESSMENT & PLAN NOTE
Lipid abnormalities are Chronic.  Pt to increase fruits and veggies, increase exercise. fasting labs adan  few months, continue simvastatin as prescribed.   Lipids will be reassessed in 3 months.

## 2022-01-14 ENCOUNTER — TELEPHONE (OUTPATIENT)
Dept: INTERNAL MEDICINE | Facility: CLINIC | Age: 82
End: 2022-01-14

## 2022-01-14 NOTE — TELEPHONE ENCOUNTER
Hub staff attempted to follow warm transfer process and was unsuccessful     Caller: Ashley Motta    Relationship to patient: Self    Best call back number: [phone]    Patient is needing: PATIENT CALLED IN AND WAS FOLLOWING UP ON HER REQUEST FOR REFERRAL TO NEUROLOGY

## 2022-01-14 NOTE — TELEPHONE ENCOUNTER
PATIENT CALLED NEURO HUB TO SCHED FROM REFERRAL. OUR SOONEST AVAIL WITH ANY PROVIDER WAS 3-16 WITH DR. RAO AND WE'VE ADDED TO WAITLIST.   PATIENT ASKED ME WHAT SHE SHOULD DO UNTIL THEN FOR HER DIZZY/MIGRAINE SPELLS. I TOLD HER WE COULDN'T OFFER ANY ADVICE UNTIL SEEN. I OFFERED TO TRANSFER HER TO PRIMARY HUB TO SEND A MESSAGE TO HER PROVIDER BUT SHE SAID SHE DIDN'T WANT TO BOTHER SINCE THEY'D JUST TOLD HER TO SCHED WITH NEURO TODAY    [phone]

## 2022-01-14 NOTE — TELEPHONE ENCOUNTER
Is spoke with Pt, she has stated that her headache is worsening and she had some mild dizziness yesterday on 1/13/2022.    Pt advised that the referral was entered on 1/11/2022 and every where is short staffed or behind on paperwork.     I advised the Pt of the Neurologist  that she was referred to, Dr. Christiano Valencia and Pt provided with contact info and address. I told the Pt she can call the office their and see if she can be scheduled yet or if the office needs anything else.     Pt verbalized understanding and was very thankful.      Address: [address] [place] [address]   Phone: [phone]

## 2022-01-18 NOTE — TELEPHONE ENCOUNTER
Please get samples of ubrelvy 50 for patient to  .    Spoke with her she is having daily headaches which is leading to feeling dizziness. States she has called neurology appt set for march.

## 2022-02-09 ENCOUNTER — CLINICAL SUPPORT (OUTPATIENT)
Dept: ORTHOPEDIC SURGERY | Facility: CLINIC | Age: 82
End: 2022-02-09

## 2022-02-09 VITALS — TEMPERATURE: 98.4 F | HEIGHT: 63 IN | BODY MASS INDEX: 31.89 KG/M2 | WEIGHT: 180 LBS

## 2022-02-09 DIAGNOSIS — M25.562 PAIN IN BOTH KNEES, UNSPECIFIED CHRONICITY: Primary | ICD-10-CM

## 2022-02-09 DIAGNOSIS — M17.0 PRIMARY OSTEOARTHRITIS OF BOTH KNEES: ICD-10-CM

## 2022-02-09 DIAGNOSIS — M25.561 PAIN IN BOTH KNEES, UNSPECIFIED CHRONICITY: Primary | ICD-10-CM

## 2022-02-09 PROCEDURE — 20610 DRAIN/INJ JOINT/BURSA W/O US: CPT | Performed by: ORTHOPAEDIC SURGERY

## 2022-02-09 PROCEDURE — 73562 X-RAY EXAM OF KNEE 3: CPT | Performed by: ORTHOPAEDIC SURGERY

## 2022-02-09 RX ORDER — TRIAMCINOLONE ACETONIDE 40 MG/ML
80 INJECTION, SUSPENSION INTRA-ARTICULAR; INTRAMUSCULAR
Status: COMPLETED | OUTPATIENT
Start: 2022-02-09 | End: 2022-02-09

## 2022-02-09 RX ADMIN — TRIAMCINOLONE ACETONIDE 80 MG: 40 INJECTION, SUSPENSION INTRA-ARTICULAR; INTRAMUSCULAR at 09:19

## 2022-02-09 NOTE — PROGRESS NOTES
Patient seen for recurrent bilateral knee injections which were performed.  AP lateral 40 degree PA x-ray bilateral knees taken the office today for complaints of pain with comparison views show advanced arthritic change.  Large Joint Arthrocentesis: R knee  Date/Time: 2/9/2022 9:19 AM  Consent given by: patient  Site marked: site marked  Timeout: Immediately prior to procedure a time out was called to verify the correct patient, procedure, equipment, support staff and site/side marked as required   Supporting Documentation  Indications: pain   Procedure Details  Location: knee - R knee  Preparation: Patient was prepped and draped in the usual sterile fashion  Needle gauge: 21G.  Approach: anteromedial  Medications administered: 4 mL lidocaine (cardiac); 80 mg triamcinolone acetonide 40 MG/ML  Patient tolerance: patient tolerated the procedure well with no immediate complications    Large Joint Arthrocentesis: L knee  Date/Time: 2/9/2022 9:19 AM  Consent given by: patient  Site marked: site marked  Timeout: Immediately prior to procedure a time out was called to verify the correct patient, procedure, equipment, support staff and site/side marked as required   Supporting Documentation  Indications: pain   Procedure Details  Location: knee - L knee  Preparation: Patient was prepped and draped in the usual sterile fashion  Needle gauge: 21G.  Approach: anteromedial  Medications administered: 4 mL lidocaine (cardiac); 80 mg triamcinolone acetonide 40 MG/ML  Patient tolerance: patient tolerated the procedure well with no immediate complications

## 2022-03-01 ENCOUNTER — TELEPHONE (OUTPATIENT)
Dept: NEUROLOGY | Facility: CLINIC | Age: 82
End: 2022-03-01

## 2022-03-01 NOTE — TELEPHONE ENCOUNTER
Caller: Ashley Motta    Relationship: Self    Best call back number: [phone]    What is the best time to reach you:  ANY     Who are you requesting to speak with (clinical staff, provider,  specific staff member):  ERICA     Do you know the name of the person who called:  ERICA     What was the call regarding:  APPT CHANGE PT DOESN'T WANT TO GO TO [place] WANTS ORIGINAL APPT BACK IF POSSIBLE  STATED SHE WASN'T SURE WHY THIS WAS CHANGED NO NOTES IN CHART      Do you require a callback:  YES

## 2022-03-01 NOTE — TELEPHONE ENCOUNTER
Patient called to reschedule. States she prefers to stay at [facility] office. Please advise. It did not let me pull up schedule at [facility].

## 2022-03-02 ENCOUNTER — OFFICE VISIT (OUTPATIENT)
Dept: NEUROLOGY | Facility: CLINIC | Age: 82
End: 2022-03-02

## 2022-03-02 VITALS
HEIGHT: 63 IN | WEIGHT: 181 LBS | SYSTOLIC BLOOD PRESSURE: 132 MMHG | DIASTOLIC BLOOD PRESSURE: 72 MMHG | HEART RATE: 68 BPM | BODY MASS INDEX: 32.07 KG/M2

## 2022-03-02 DIAGNOSIS — R42 DIZZINESS: Primary | ICD-10-CM

## 2022-03-02 DIAGNOSIS — R51.9 NONINTRACTABLE HEADACHE, UNSPECIFIED CHRONICITY PATTERN, UNSPECIFIED HEADACHE TYPE: ICD-10-CM

## 2022-03-02 DIAGNOSIS — G62.9 NEUROPATHY: ICD-10-CM

## 2022-03-02 PROCEDURE — 99204 OFFICE O/P NEW MOD 45 MIN: CPT | Performed by: STUDENT IN AN ORGANIZED HEALTH CARE EDUCATION/TRAINING PROGRAM

## 2022-03-02 NOTE — TELEPHONE ENCOUNTER
I got the Message yesterday that she changed her mind and did not want to go to [place] . I  rescheduled her for 05/13/2022 @ 11 am at the [facility] location  and left a message on her voicemail ,that if she had problems or concerns please to call me back

## 2022-03-02 NOTE — TELEPHONE ENCOUNTER
Caller: Ashley Motta    Relationship: Self    Best call back number: [phone]    What was the call regarding: PT CALLING TO ASK IF THE 3PM TIME SLOT IS STILL AVAILABLE AS SHE WOULD PREFER TO SEE DR. RAO TODAY @ [place] OFFICE.    CALLED OVER TO OFFICE, SPOKE W/ RASHARD. STATES THAT YU WAS NOT AVAILABLE, REQUESTED TO SEND A MESSAGE TO CLINICAL STAFF.    ADVISED PT SOMEONE WOULD CALL HER BACK IF 3PM APPT IS STILL AVAILABLE.    Do you require a callback: YES, PLEASE    PLEASE REVIEW AND ADVISE.

## 2022-03-02 NOTE — PROGRESS NOTES
Chief Complaint   Patient presents with   • Migraine     Patient states she has it since 4 years ,was so dizzy that she could not stand it . Whent to Hospital and then saw PCP .       Patient ID: Ashley Motta is a 82 y.o. female.    HPI:    The following portions of the patient's history were reviewed and updated as appropriate: allergies, current medications, past family history, past medical history, past social history, past surgical history and problem list.    Review of Systems   Constitutional: Positive for activity change and fatigue. Negative for chills.   HENT: Negative for congestion, ear pain, nosebleeds, sinus pain, tinnitus and voice change.    Eyes: Positive for photophobia. Negative for visual disturbance.   Respiratory: Negative for chest tightness, shortness of breath and wheezing.    Cardiovascular: Negative for chest pain, palpitations and leg swelling.   Gastrointestinal: Negative for abdominal pain, blood in stool, constipation, nausea and vomiting.   Endocrine: Negative for cold intolerance and heat intolerance.   Genitourinary: Negative for difficulty urinating, flank pain, hematuria, urgency and vaginal discharge.   Musculoskeletal: Negative for back pain, gait problem, neck pain and neck stiffness.   Allergic/Immunologic: Negative for food allergies.   Neurological: Positive for dizziness, light-headedness and headaches. Negative for tremors and speech difficulty.   Hematological: Does not bruise/bleed easily.   Psychiatric/Behavioral: Positive for sleep disturbance. Negative for agitation, confusion and decreased concentration. The patient is not nervous/anxious.    Ms. Motta is an 83 yo with prediabetes, HLD, sleep apnea, arthritis, headache that presents to ne urology clinic for evaluation of headache and dziziness. Had severely headache with dizziness in 2018. Started on GBP 100mg and never had it that severely again. Can feel not as clear-headed while feeling headaches. Regarding  dizziness, able to walk and talk and drive. Once a month can feel can feel dizziness; clarifies it is more of a head feeling woozy and lightheadedness. This wooziness lasts seconds to minutes. When she looks up while shopping, she can feel dizzy. Rolling over in bed and laying down she gets dizzy but not when turning head when driving; this occurs once a month. Feels that these headaches are less severe and different than single severe headache in 2018.     Headaches  Onset 2018  Location cap like area over the head  Wake up and there's a headache  Has sleep apnea and uses CPAP machine. Sees sleep doctor once a year.  Duration - Can last for days  Severity 3/10  ASSOCIATED SYMPTOMS: dizziness and lightheadedness. Not worsened by light or sound, no nausea and vomiting.   Stress level is okay.  Alleviating/exacerbating - takes tylenol which helps  Pain character - dull   CARDIAC HISTORY: No chest pain, DM, no current tobacco. Has high cholesterol. No HTN, no MI, stroke, or CAD  Frequency - twice a month     Vitals:    03/02/22 1425   BP: 132/72   Pulse: 68       Neurologic Exam     Mental Status   Attention: normal. Concentration: normal.   Speech: speech is normal   Level of consciousness: alert    Cranial Nerves     CN II   Visual fields full to confrontation.   Visual acuity: normal    CN III, IV, VI   Pupils are equal, round, and reactive to light.  Extraocular motions are normal.     CN V   Facial sensation intact.     CN VII   Facial expression full, symmetric.     CN VIII   Hearing: intact    CN IX, X   Palate: symmetric    CN XI   Right trapezius strength: normal  Left trapezius strength: normal    CN XII   Tongue: not atrophic  Fasciculations: absent  Tongue deviation: none  Left pupil not perfectly round     Motor Exam     Strength   Right deltoid: 5/5  Left deltoid: 5/5  Right biceps: 5/5  Left biceps: 5/5  Right triceps: 5/5  Left triceps: 5/5  Right iliopsoas: 5/5  Left iliopsoas: 5/5  Right quadriceps:  5/5  Left quadriceps: 5/5  Right hamstrin/5  Left hamstrin/5  Right anterior tibial: 5/5  Left anterior tibial: 5/5  Right gastroc: 5/5  Left gastroc: 5/5Grip 5 out of 5 bilaterally     Sensory Exam   Right arm light touch: normal  Left arm light touch: normal  Right leg light touch: normal  Left leg light touch: normal  Right arm vibration: normal  Left arm vibration: normal  Right leg vibration: decreased from knee  Left leg vibration: decreased from knee  Right leg proprioception: normal  Left leg proprioception: normal    Gait, Coordination, and Reflexes     Coordination   Finger to nose coordination: normal  Heel to shin coordination: normal  Tandem walking coordination: abnormal    Reflexes   Right brachioradialis: 1+  Left brachioradialis: 2+  Right biceps: 1+  Left biceps: 2+  Right patellar: 0  Left patellar: 0  Right achilles: 1+  Left achilles: 1+Feels unsteady but does not lose balance when assessing Romberg. Unable to do tandem independently       Physical Exam  Eyes:      Extraocular Movements: EOM normal.      Pupils: Pupils are equal, round, and reactive to light.   Neurological:      Coordination: Finger-Nose-Finger Test and Heel to Shin Test normal.      Gait: Tandem walk abnormal.      Deep Tendon Reflexes:      Reflex Scores:       Bicep reflexes are 1+ on the right side and 2+ on the left side.       Brachioradialis reflexes are 1+ on the right side and 2+ on the left side.       Patellar reflexes are 0 on the right side and 0 on the left side.       Achilles reflexes are 1+ on the right side and 1+ on the left side.  Psychiatric:         Speech: Speech normal.         Procedures    Assessment/Plan:    Patient has some findings of numbness and feelings of dizziness. Has occasional headache that is treated with OTC medication. Discussed that she is on a low dose of gabapentin and this dose could be lowered to see if it has any effect at all vs increased to a more therapeutic dose to see  if it could further help; after discussion, pt would like to keep regimen the same.    -Will obtain labs as below for dizziness and neuropathy.   -continue gabapentin 100mg daily for now for prevention of headache.  -MRI brain to evaluate for causes of headache, dizziness, neuropathy likely peripheral but will rule out intracranial causes with study as well; pt notes these were less severe and different than headache in 2018.  Return in about 19 weeks (around 7/13/2022).       Diagnoses and all orders for this visit:    1. Dizziness (Primary)  -     Vitamin B12  -     Vitamin E  -     HEATH  -     ANCA Panel  -     TSH Rfx On Abnormal To Free T4  -     MRI Brain With & Without Contrast; Future    2. Neuropathy  -     Vitamin B12  -     Vitamin E  -     HEATH  -     ANCA Panel  -     TSH Rfx On Abnormal To Free T4    3. Nonintractable headache, unspecified chronicity pattern, unspecified headache type  -     MRI Brain With & Without Contrast; Future           Christiano Allan MD

## 2022-03-02 NOTE — TELEPHONE ENCOUNTER
PT CALLING BACK THE [facility] APPT DOES NOT WORK FOR HER SHE WILL BE OUT OF TOWN . PT RESCHEDULED FOR 03/17/ @ 8  @ [facility]  UNLESS [facility] IS GOING TO BE OUT OF OFFICE HOWEVER TEMPLATE WAS OPEN FOR THIS SLOT  . IF THIS NEEDS TO BE RESCHEDULED PLEASE DO NOT DO SO WITHOUT SPEAKING TO PT FIRST  THANK YOU  PT STATES SHE IS HAVING TERRIBLE HEADACHES AND NEEDS TO GET IN SOONER THAN LATER

## 2022-03-02 NOTE — TELEPHONE ENCOUNTER
Patient is taking care off she switched back to today , I called her and she would love to come in today so we put her on the schedule same time as she canceled ,won patel

## 2022-03-07 ENCOUNTER — LAB (OUTPATIENT)
Dept: LAB | Facility: HOSPITAL | Age: 82
End: 2022-03-07

## 2022-03-07 LAB
TSH SERPL DL<=0.05 MIU/L-ACNC: 2.21 UIU/ML (ref 0.27–4.2)
VIT B12 BLD-MCNC: 1451 PG/ML (ref 211–946)

## 2022-03-07 PROCEDURE — 82607 VITAMIN B-12: CPT | Performed by: STUDENT IN AN ORGANIZED HEALTH CARE EDUCATION/TRAINING PROGRAM

## 2022-03-07 PROCEDURE — 86037 ANCA TITER EACH ANTIBODY: CPT | Performed by: STUDENT IN AN ORGANIZED HEALTH CARE EDUCATION/TRAINING PROGRAM

## 2022-03-07 PROCEDURE — 85027 COMPLETE CBC AUTOMATED: CPT | Performed by: FAMILY MEDICINE

## 2022-03-07 PROCEDURE — 80053 COMPREHEN METABOLIC PANEL: CPT | Performed by: FAMILY MEDICINE

## 2022-03-07 PROCEDURE — 80061 LIPID PANEL: CPT | Performed by: FAMILY MEDICINE

## 2022-03-07 PROCEDURE — 84443 ASSAY THYROID STIM HORMONE: CPT | Performed by: FAMILY MEDICINE

## 2022-03-07 PROCEDURE — 84446 ASSAY OF VITAMIN E: CPT | Performed by: STUDENT IN AN ORGANIZED HEALTH CARE EDUCATION/TRAINING PROGRAM

## 2022-03-07 PROCEDURE — 83520 IMMUNOASSAY QUANT NOS NONAB: CPT | Performed by: STUDENT IN AN ORGANIZED HEALTH CARE EDUCATION/TRAINING PROGRAM

## 2022-03-07 PROCEDURE — 86038 ANTINUCLEAR ANTIBODIES: CPT | Performed by: STUDENT IN AN ORGANIZED HEALTH CARE EDUCATION/TRAINING PROGRAM

## 2022-03-07 PROCEDURE — 83036 HEMOGLOBIN GLYCOSYLATED A1C: CPT | Performed by: FAMILY MEDICINE

## 2022-03-07 PROCEDURE — 36415 COLL VENOUS BLD VENIPUNCTURE: CPT | Performed by: STUDENT IN AN ORGANIZED HEALTH CARE EDUCATION/TRAINING PROGRAM

## 2022-03-08 LAB
ANA SER QL: NEGATIVE
C-ANCA TITR SER IF: NORMAL TITER
MYELOPEROXIDASE AB SER IA-ACNC: <9 U/ML (ref 0–9)
P-ANCA ATYPICAL TITR SER IF: NORMAL TITER
P-ANCA TITR SER IF: NORMAL TITER
PROTEINASE3 AB SER IA-ACNC: <3.5 U/ML (ref 0–3.5)

## 2022-03-09 ENCOUNTER — PATIENT ROUNDING (BHMG ONLY) (OUTPATIENT)
Dept: NEUROLOGY | Facility: CLINIC | Age: 82
End: 2022-03-09

## 2022-03-10 ENCOUNTER — TELEPHONE (OUTPATIENT)
Dept: INTERNAL MEDICINE | Facility: CLINIC | Age: 82
End: 2022-03-10

## 2022-03-10 NOTE — TELEPHONE ENCOUNTER
Pt would like to come by and  a copy of her labs/please review and add any notations/lab letter and let me know when ready and I will call the pt to .

## 2022-03-11 NOTE — TELEPHONE ENCOUNTER
Caller: Ashley Motta    Relationship to patient: Self    Best call back number: [phone]    Patient is needing: PATIENT STATES SHE NEEDS TO  LAB REPORT TODAY 3-11-22 FOR AN EARLY MORNING APPT Monday 3-14-22  PLEASE ADVISE

## 2022-03-11 NOTE — TELEPHONE ENCOUNTER
Lab results are at the  for the pt to  I had called yesterday and left msg   I called the pt back and advised they are at the  to be

## 2022-03-14 ENCOUNTER — OFFICE VISIT (OUTPATIENT)
Dept: INTERNAL MEDICINE | Facility: CLINIC | Age: 82
End: 2022-03-14

## 2022-03-14 VITALS
DIASTOLIC BLOOD PRESSURE: 66 MMHG | BODY MASS INDEX: 32.25 KG/M2 | SYSTOLIC BLOOD PRESSURE: 122 MMHG | HEIGHT: 63 IN | TEMPERATURE: 98.4 F | WEIGHT: 182 LBS | HEART RATE: 92 BPM | OXYGEN SATURATION: 98 %

## 2022-03-14 DIAGNOSIS — R73.03 PRE-DIABETES: ICD-10-CM

## 2022-03-14 DIAGNOSIS — E78.5 HYPERLIPIDEMIA, UNSPECIFIED HYPERLIPIDEMIA TYPE: Primary | ICD-10-CM

## 2022-03-14 PROCEDURE — 99213 OFFICE O/P EST LOW 20 MIN: CPT | Performed by: FAMILY MEDICINE

## 2022-03-14 RX ORDER — SIMVASTATIN 20 MG
20 TABLET ORAL NIGHTLY
Qty: 90 TABLET | Refills: 3 | Status: SHIPPED | OUTPATIENT
Start: 2022-03-14 | End: 2023-03-06

## 2022-03-14 NOTE — ASSESSMENT & PLAN NOTE
Lipid abnormalities are completely normal.  Has elevation in liver enzymes, advise to cut cholesterol medication in half. .  Will take 1/2 tab of atorvastatin. Recheck next visit.   Lipids will be reassessed 4 months. .   Rene Robles is a 13 year old male who presents for a telehealth visit via phone and/or live interacive video with a secure connection.  This visit was conducted via telehealth as a precaution due to the COIVD-19 pandemic.  This visit was not recorded or stored.   The encounter involved myself, the patient and the parent.      Consent for telehealth visit was verified including risk of electronic data, possibility of equipment failure or need for in person visit if condition cannot be fully assessed by telehealth.      Patient was at home with Mom during the encounter. A verbal consent was given by the family for this encounter and the family was informed that there might be a copay and encounter will be billed to the insurance company or the family responsible.     Provider location: [facility]  Patient Location: Home with Mom     Reason for visit: Follow up - abdominal pain and loose stools  Reason for use of telehealth: COVID-19 pandemic    Visit started: 2:55 pm  Visit ended: 3:10 PM  Duration:15 minutes    HISTORY:  Rene is an 13 year old male who presents with his Mom for a follow up visit. He was last seen 6/16/20 by Dr Malcolm for a h\o chronic diarrhea. Symptoms started a few months prior and follow the pattern of normal soft stools, loose stool for a few days, then no bowel movement for several days, then back to normal soft stools. No nocturnal symptoms or bleeding. Complains of a lot of gas, but no abdominal pain, nausea, or vomiting. Weight was stable. Based on his stool pattern Dr. Malcolm he might have stool retention with overflow diarrhea. Due to chronicity labs were ordered and if normal recommended he start Miralax 17 grams/day. Per Dr. Malcolm mild anemia noted (Hgb 10.7) ESR and CRP very slightly elevated otherwise normal.    At his last visit on 7/16/20 Rene continued to complain of abdominal pain on a daily basis, always associated with eating. No specific food triggers, it was with  everything he ate. Pain was a cramping periumbilical pain can last 30 min - few hours. Diarrhea continued, but improved to only 1-2 times per week. No diarrhea or blood in the stool. No nocturnal symptoms and having normal soft stools between diarrhea. He was on daily bowel regimen of Miralax 17 grams per day. Denied nausea or vomiting. Appetite was decreased because he is \"afraid to eat\" due to pain and then the need to go to the bathroom. Denies dysphagia or regurgitation. No weight loss (weight is up 2 lbs since last visit).Rene has been pretty sedentary during quarantine, but last weekend soccer practice restarted so is now more active. Mom is trying to make healthy diet changes as well, used to drink a lot of soda and juice, but now mostly water and will have low-sugar Gatorade (G2). Previously checked labs were significant for mild anemia (Hgb 10.7), CRP and ESR were also minimally elevated. I ordered repeat CBC, CRP, ESR, and also checked a thyroid panel. Recommended he start Bentyl 10 mg BID before meals to see if any improvement in symptoms. Repeat CBC was normal, no anemia. Normal thyroid panel, but ESR and CRP were still elevated.     Since he was last seen Mom states that Rene's symptoms are the same. He continues to complain of abdominal pain 3-4 times a week. Pain is still same cramping periumbilical abdominal pain.  Continues to have diarrhea 3-4 times a week as well. When he has diarrhea it is 1 time, no blood in stool or nocturnal symptoms. The day after diarrhea has no bowel movements then next day back to diarrhea. No fecal soiling. Normal urine output. Appetite is very poor, worse than before eating only 1 time a day. Per Mom he doesn't want to eat because it \"bothers his stomach\".  Is taking Bentyl 10 mg TID prior to  Meals, which does help a little. Denies nausea, vomiting, regurgitation, or dysphagia. No specific food triggers noted. No weight loss that Mom has noticed, last visit he  gained weight despite symptoms. Rene has otherwise been healthy, no recent illnesses or fevers.    ALLERGIES:  No Known Allergies     Current Outpatient Medications   Medication Sig Dispense Refill   • dicyclomine (BENTYL) 10 MG capsule Take 1 capsule by mouth twice daily before meals 60 capsule 1   • albuterol 108 (90 Base) MCG/ACT inhaler Inhale 2 puffs into the lungs every 4 hours as needed for Shortness of Breath or Wheezing.     • fluticasone (FLOVENT HFA) 110 MCG/ACT inhaler Inhale 2 puffs into the lungs 2 times daily. With spacer 12 g 12   • albuterol (VENTOLIN) (2.5 MG/3ML) 0.083% nebulizer solution Take 3 mLs by nebulization every 6 hours as needed for Wheezing. 375 mL 12     No current facility-administered medications for this visit.      Past Medical History:   Diagnosis Date   • Bronchiolitis    • Overweight peds (BMI 85-94.9 percentile) 5/26/2020   • RAD (reactive airway disease)      Past Surgical History:   Procedure Laterality Date   • Adenoidectomy     • Appendectomy     • Inguinal hernia repair     • Lymph node biopsy     • Tonsillectomy       Family History   Problem Relation Age of Onset   • Cancer Maternal Grandmother    • Diabetes Maternal Grandmother    • Hypertension Maternal Grandmother    • Diabetes Maternal Grandfather    • Hypertension Maternal Grandfather    • Diabetes Paternal Grandmother    • Diabetes Paternal Grandfather        Wt Readings from Last 4 Encounters:   07/16/20 51.8 kg (114 lb 1.4 oz) (93 %, Z= 1.45)*   06/16/20 50.9 kg (112 lb 3.4 oz) (92 %, Z= 1.43)*   05/26/20 49.9 kg (110 lb) (92 %, Z= 1.38)*   12/17/19 44.7 kg (98 lb 7 oz) (88 %, Z= 1.16)*     * Growth percentiles are based on CDC (Boys, 2-20 Years) data.       LABS:  Component      Latest Ref Rng & Units 7/16/2020   WBC      4.2 - 13.5 K/mcL 6.3   RBC      3.90 - 5.30 mil/mcL 4.44   HGB      11.5 - 15.5 g/dL 12.0   HCT      35.0 - 45.0 % 36.8   MCV      77.0 - 95.0 fl 82.9   MCH      25.0 - 33.0 pg 27.0   MCHC       31.0 - 37.0 g/dL 32.6   RDW-CV      11.0 - 15.0 % 13.2   PLT      140 - 450 K/mcL 342   NRBC      0 /100 WBC 0   DIFFERENTIAL TYPE       AUTOMATED DIFFERENTIAL   Neutrophil      % 56   LYMPH      % 34   MONO      % 7   EOSIN      % 3   BASO      % 0   Percent Immature Granuloctyes      % 0   Absolute Neutrophil      1.8 - 8.0 K/mcL 3.5   Absolute Lymph      1.5 - 6.5 K/mcL 2.2   Absolute Mono      0.0 - 0.8 K/mcL 0.4   Absolute Eos      0.1 - 0.7 K/mcL 0.2   Absolute Baso      0.0 - 0.2 K/mcL 0.0   Absolute Immature Granulocytes      0 - 0.2 K/mcl 0.0   T4, FREE      0.8 - 1.4 ng/dL 1.2   TSH      0.662 - 4.010 mcUnits/mL 3.516   C-REACTIVE PROTEIN      <1.0 mg/dL 1.1 (H)   ESR      0 - 20 mm/hr 29 (H)     Component      Latest Ref Rng & Units 6/16/2020   WBC      4.2 - 13.5 K/mcL 8.6   RBC      3.90 - 5.30 mil/mcL 4.01   HGB      11.5 - 15.5 g/dL 10.7 (L)   HCT      35.0 - 45.0 % 33.1 (L)   MCV      77.0 - 95.0 fl 82.5   MCH      25.0 - 33.0 pg 26.7   MCHC      31.0 - 37.0 g/dL 32.3   RDW-CV      11.0 - 15.0 % 13.5   PLT      140 - 450 K/mcL 314   NRBC      0 /100 WBC 0   DIFFERENTIAL TYPE       AUTOMATED DIFFERENTIAL   Neutrophil      % 66   LYMPH      % 25   MONO      % 6   EOSIN      % 3   BASO      % 0   Percent Immature Granuloctyes      % 0   Absolute Neutrophil      1.8 - 8.0 K/mcL 5.6   Absolute Lymph      1.5 - 6.5 K/mcL 2.2   Absolute Mono      0.0 - 0.8 K/mcL 0.5   Absolute Eos      0.1 - 0.7 K/mcL 0.3   Absolute Baso      0.0 - 0.2 K/mcL 0.0   Absolute Immature Granulocytes      0 - 0.2 K/mcl 0.0   Fasting Status      hrs 0   Sodium      135 - 145 mmol/L 142   Potassium      3.4 - 5.1 mmol/L 4.2   Chloride      98 - 107 mmol/L 109 (H)   CO2      21 - 32 mmol/L 28   ANION GAP      10 - 20 mmol/L 9 (L)   Glucose      65 - 99 mg/dL 94   BUN      5 - 18 mg/dL 10   Creatinine      0.38 - 1.15 mg/dL 0.54   GFR Estimate,        Not calculated.   GFR Estimate, Non        Not  calculated.   BUN/CREATININE RATIO      7 - 25 18   CALCIUM      8.0 - 11.0 mg/dL 9.1   TOTAL BILIRUBIN      0.2 - 1.4 mg/dL 0.3   AST/SGOT      10 - 45 Units/L 17   ALT/SGPT      10 - 35 Units/L 24   ALK PHOSPHATASE      115 - 445 Units/L 203   TOTAL PROTEIN      6.0 - 8.0 g/dL 7.4   Albumin      3.6 - 5.1 g/dL 3.9   GLOBULIN      2.0 - 4.0 g/dL 3.5   A/G Ratio, Serum      1.0 - 2.4 1.1   IGA      44 - 395 mg/dL 158   Tissue Transglutaminase Antibody IgA      <20 UNITS 5   ESR      0 - 20 mm/hr 21 (H)   C-REACTIVE PROTEIN      <1.0 mg/dL 1.0 (H)       EXAMINATION:  * limited due to the fact the visit was conducted via video.  Patient is well appearing, interactive during the visit not in distress  Pulmonary: Breathing comfortably  Abdomen: No abdominal distention noted, soft, no tenderness on palpation (completed by mom)  Skin: No rashes noted from video    PLAN:  Rene is an 13 year old male with periumbilical abdominal pain, diarrhea, and elevated inflammatory markers. No improvement in symptoms since starting Bentyl TID and repeat ESR and CRP are even more elevated. Will check stool studies on him and proceed with an EGD/Colon. Why the procedures are being done were discussed with Mom and risks were discussed as well. She is aware of Covid testing being done prior to his procedure as well. I would like to see Rene 2 week after the procedures to discuss pathology results. Mom was in agreement with the above plan.    Alina Mejia, AIMEE,CPNP-PC

## 2022-03-14 NOTE — ASSESSMENT & PLAN NOTE
A1c elevated  She is going to monitor her intake of sweets. Cut back   Increase exercise.  Advise need to recheck

## 2022-03-14 NOTE — PROGRESS NOTES
"Chief Complaint  Hyperlipidemia (2 month follow up)    Subjective    History of Present Illness {CC  Problem List  Visit  Diagnosis   Encounters  Notes  Medications  Labs  Result Review Imaging  Media :23}     Ashley Motta presents to [facility] for   History of Present Illness   83 yo female present for follow up visit. Pt states she has seen neurology since last appt. Awaiting to hear about recent lab evaluation.   She is taken cholesterol medication for several years. Has a family history of heart disease. Does not remember if cholesterol was elevated at that time.    Monitoring diet. Trying to get fruits a veggies. States has to work on sweets, ice cream.       Objective     Vital Signs:   /66 (BP Location: Left arm, Patient Position: Sitting, Cuff Size: Adult)   Pulse 92   Temp 98.4 °F (36.9 °C)   Ht 160 cm (62.99\")   Wt 82.6 kg (182 lb)   SpO2 98%   BMI 32.25 kg/m²      Physical Exam  Constitutional:       General: She is not in acute distress.     Appearance: She is not ill-appearing.   HENT:      Head: Normocephalic.   Eyes:      Conjunctiva/sclera: Conjunctivae normal.   Cardiovascular:      Rate and Rhythm: Regular rhythm.      Heart sounds: Normal heart sounds.   Pulmonary:      Effort: No respiratory distress.      Breath sounds: Normal breath sounds. No wheezing.   Musculoskeletal:      Right lower leg: No edema.   Neurological:      Mental Status: She is alert.   Psychiatric:         Mood and Affect: Mood normal.        Result Review  Data Reviewed:{ Labs  Result Review  Imaging  Med Tab  Media :23}   The following data was reviewed by: Angela Carbajal MD on 03/14/2022  Lab Results - Last 18 Months   Lab Units 03/07/22  0840 11/02/21  0739 06/09/21  0742 02/08/21  0817 10/05/20  0807   BUN mg/dL 11 12 19 14 16   CREATININE mg/dL 0.69 0.66 0.86 0.67 0.81   EGFR IF NONAFRICN AM mL/min/1.73  --  86 64 85 68   SODIUM mmol/L 140 138 138 140 140 "   POTASSIUM mmol/L 4.0 4.0 4.5 4.1 4.5   CHLORIDE mmol/L 105 106 105 105 105   CALCIUM mg/dL 9.7 9.2 9.5 9.5 9.6   ALBUMIN g/dL 4.40 4.50 4.40 4.20 4.50   BILIRUBIN mg/dL 0.5 0.3 0.3 0.3 0.3   ALK PHOS U/L 74 80 83 85 88   AST (SGOT) U/L 49* 35* 35* 33* 29   ALT (SGPT) U/L 58* 41* 40* 42* 37*   TRIGLYCERIDES mg/dL 102 130 120 133 122   HDL CHOL mg/dL 51 49 45 52 55   VLDL CHOL mg/dL 19 23 22 23 24.4   LDL CHOL mg/dL 38 37 51 35 43   LDL/HDL RATIO  0.72  --   --   --  0.77   HEMOGLOBIN A1C % 6.50* 6.20* 6.00* 6.10* 5.60   WBC 10*3/mm3 5.97 7.69  --  7.14  --    RBC 10*6/mm3 4.73 4.65  --  4.81  --    HEMATOCRIT % 42.6 41.8  --  43.2  --    MCV fL 90.1 89.9  --  89.8  --    MCH pg 30.0 29.7  --  29.9  --    TSH uIU/mL 2.210  --   --   --   --             Current Outpatient Medications:   •  aspirin 81 MG tablet, Take 81 mg by mouth daily., Disp: , Rfl:   •  B Complex Vitamins (VITAMIN B COMPLEX PO), Take  by mouth., Disp: , Rfl:   •  Calcium Carbonate-Vitamin D (CALCIUM + D PO), Take  by mouth 2 (Two) Times a Day., Disp: , Rfl:   •  Coenzyme Q10 (COQ10) 200 MG capsule, Take  by mouth., Disp: , Rfl:   •  ECHINACEA-ZINC-VITAMIN C PO, Take 1,000 mg by mouth., Disp: , Rfl:   •  gabapentin (NEURONTIN) 100 MG capsule, Take 1 capsule by mouth every night at bedtime., Disp: 90 capsule, Rfl: 1  •  Magnesium 400 MG tablet, Take  by mouth., Disp: , Rfl:   •  Misc Natural Products (GLUCOSAMINE CHONDROITIN VIT D3) capsule, Take  by mouth., Disp: , Rfl:   •  Multiple Vitamins-Minerals (MULTIVITAMIN ADULT PO), Take  by mouth., Disp: , Rfl:   •  Omega-3 Fatty Acids (FISH OIL) 1000 MG capsule capsule, Take  by mouth daily with breakfast., Disp: , Rfl:   •  pantoprazole (PROTONIX) 20 MG EC tablet, Take 1 tablet by mouth Daily., Disp: 90 tablet, Rfl: 1  •  Probiotic Product (PROBIOTIC DAILY PO), Take  by mouth., Disp: , Rfl:   •  rOPINIRole (REQUIP) 1 MG tablet, Take one tablet 1 hour before bedtime., Disp: 90 tablet, Rfl: 1  •   simvastatin (ZOCOR) 20 MG tablet, Take 1 tablet by mouth Every Night., Disp: 90 tablet, Rfl: 3     Assessment and Plan {CC Problem List  Visit Diagnosis  ROS  Review (Popup)  Health Maintenance  Quality  BestPractice  Medications  SmartSets  SnapShot Encounters  Media :23}   Problem List Items Addressed This Visit        Cardiac and Vasculature    Hyperlipidemia - Primary    Current Assessment & Plan     Lipid abnormalities are completely normal.  Has elevation in liver enzymes, advise to cut cholesterol medication in half. .  Will take 1/2 tab of atorvastatin. Recheck next visit.   Lipids will be reassessed 4 months. .           Relevant Medications    simvastatin (ZOCOR) 20 MG tablet       Endocrine and Metabolic    Pre-diabetes    Current Assessment & Plan     A1c elevated  She is going to monitor her intake of sweets. Cut back   Increase exercise.  Advise need to recheck                 Follow Up   Return in about 4 months (around 7/14/2022) for Medicare Wellness.  Patient was given instructions and counseling regarding her condition or for health maintenance advice. Please see specific information pulled into the AVS if appropriate.    EpicAct:MR_WS_AMB_ORDERS,RunParams:STARTUPTYPE=FOLLOW    MR_[email or web address]_DISCHARGE

## 2022-03-17 LAB
A-TOCOPHEROL VIT E SERPL-MCNC: 10.1 MG/L (ref 9–29)
GAMMA TOCOPHEROL SERPL-MCNC: 0.4 MG/L (ref 0.5–4.9)

## 2022-03-25 ENCOUNTER — HOSPITAL ENCOUNTER (OUTPATIENT)
Dept: MRI IMAGING | Facility: HOSPITAL | Age: 82
Discharge: HOME | End: 2022-03-25
Admitting: STUDENT IN AN ORGANIZED HEALTH CARE EDUCATION/TRAINING PROGRAM

## 2022-03-25 DIAGNOSIS — R42 DIZZINESS: ICD-10-CM

## 2022-03-25 DIAGNOSIS — R51.9 NONINTRACTABLE HEADACHE, UNSPECIFIED CHRONICITY PATTERN, UNSPECIFIED HEADACHE TYPE: ICD-10-CM

## 2022-03-25 PROCEDURE — A9577 INJ MULTIHANCE: HCPCS | Performed by: STUDENT IN AN ORGANIZED HEALTH CARE EDUCATION/TRAINING PROGRAM

## 2022-03-25 PROCEDURE — 0 GADOBENATE DIMEGLUMINE 529 MG/ML SOLUTION: Performed by: STUDENT IN AN ORGANIZED HEALTH CARE EDUCATION/TRAINING PROGRAM

## 2022-03-25 PROCEDURE — 70553 MRI BRAIN STEM W/O & W/DYE: CPT

## 2022-03-25 RX ADMIN — GADOBENATE DIMEGLUMINE 16 ML: 529 INJECTION, SOLUTION INTRAVENOUS at 10:30

## 2022-04-04 ENCOUNTER — TELEPHONE (OUTPATIENT)
Dept: NEUROLOGY | Facility: CLINIC | Age: 82
End: 2022-04-04

## 2022-04-04 NOTE — TELEPHONE ENCOUNTER
Caller: Ashley Motta    Relationship: Self    Best call back number: [phone]    Caller requesting test results: SELF    What test was performed: MRI BRAIN WWO CONTRAST & LAB RESULTS    When was the test performed: 3/25/22 & 3/7/22    Where was the test performed:  [facility]    Additional notes: PT CALLING FOR RESULTS.    PLEASE REVIEW AND ADVISE.

## 2022-04-18 ENCOUNTER — TELEPHONE (OUTPATIENT)
Dept: INTERNAL MEDICINE | Facility: CLINIC | Age: 82
End: 2022-04-18

## 2022-04-18 DIAGNOSIS — H92.09 OTALGIA, UNSPECIFIED LATERALITY: ICD-10-CM

## 2022-04-18 DIAGNOSIS — R42 DIZZINESS: Primary | ICD-10-CM

## 2022-04-18 DIAGNOSIS — G44.209 ACUTE NON INTRACTABLE TENSION-TYPE HEADACHE: ICD-10-CM

## 2022-04-18 NOTE — TELEPHONE ENCOUNTER
Pt was told by Neurology that she needs to see ENT since her MRI didn't show any reason for her   She has seen Dr Garcia in the past but called and was told she still needs to be referred.  Referral placed for you to sign off on

## 2022-04-20 ENCOUNTER — APPOINTMENT (OUTPATIENT)
Dept: MRI IMAGING | Facility: HOSPITAL | Age: 82
End: 2022-04-20

## 2022-04-25 ENCOUNTER — OFFICE VISIT (OUTPATIENT)
Dept: INTERNAL MEDICINE | Facility: CLINIC | Age: 82
End: 2022-04-25

## 2022-04-25 VITALS
SYSTOLIC BLOOD PRESSURE: 115 MMHG | WEIGHT: 176 LBS | HEIGHT: 63 IN | RESPIRATION RATE: 18 BRPM | TEMPERATURE: 98 F | DIASTOLIC BLOOD PRESSURE: 72 MMHG | HEART RATE: 77 BPM | BODY MASS INDEX: 31.18 KG/M2 | OXYGEN SATURATION: 97 %

## 2022-04-25 DIAGNOSIS — B02.9 HERPES ZOSTER WITHOUT COMPLICATION: Primary | ICD-10-CM

## 2022-04-25 PROCEDURE — 99213 OFFICE O/P EST LOW 20 MIN: CPT | Performed by: NURSE PRACTITIONER

## 2022-04-25 RX ORDER — VALACYCLOVIR HYDROCHLORIDE 1 G/1
1000 TABLET, FILM COATED ORAL 3 TIMES DAILY
Qty: 21 TABLET | Refills: 0 | Status: SHIPPED | OUTPATIENT
Start: 2022-04-25 | End: 2022-05-02

## 2022-04-25 NOTE — PROGRESS NOTES
"Chief Complaint  Rash (Pt presents here today with concerns of a rash on the R arm, Pt states it does hurt.)    Subjective          Ashley Motta presents to [facility]  History of Present Illness    Patient is a pleasant 82-year-old female who sees Dr. Carbajal here in the office.  Patient presents to the clinic today with complaints of a Rash that is painful on the R upper arm x 5 days.  Patient has a history of herpes zoster.  She is vaccinated with the zoster vaccine.  Patient denies any fever or chills at this time.    Objective   Vital Signs:   /72 (BP Location: Left arm, Patient Position: Sitting, Cuff Size: Large Adult)   Pulse 77   Temp 98 °F (36.7 °C)   Resp 18   Ht 160 cm (62.99\")   Wt 79.8 kg (176 lb)   SpO2 97%   BMI 31.19 kg/m²            Physical Exam  Vitals and nursing note reviewed.   Constitutional:       Appearance: Normal appearance.   HENT:      Head: Normocephalic.      Nose: Nose normal.      Mouth/Throat:      Mouth: Mucous membranes are moist.   Cardiovascular:      Rate and Rhythm: Normal rate and regular rhythm.   Pulmonary:      Effort: Pulmonary effort is normal. No respiratory distress.      Breath sounds: Normal breath sounds. No stridor. No wheezing, rhonchi or rales.   Chest:      Chest wall: No tenderness.   Skin:     General: Skin is warm.      Findings: Rash present.      Comments: Right inner arm rash that is linear in pattern with blisters x5 days.  Painful and itchy.   Neurological:      Mental Status: She is alert and oriented to person, place, and time.   Psychiatric:         Behavior: Behavior normal.        Result Review :            Office Visit with Angela Carbajal MD (03/14/2022)  CBC (No Diff) (03/07/2022 8:40 AM)  Comprehensive metabolic panel (03/07/2022 8:40 AM)       Assessment and Plan    Diagnoses and all orders for this visit:    1. Herpes zoster without complication (Primary)    Other orders  -     valACYclovir " (Valtrex) 1000 MG tablet; Take 1 tablet by mouth 3 (Three) Times a Day for 7 days.  Dispense: 21 tablet; Refill: 0      Patient will stop at the pharmacy and  her prescription and take as directed.  Patient will follow back up in the office as needed or if her symptoms become worse.  She knows to keep them covered and stay away from immunocompromised individuals.  Patient verbalized understanding of treatment plan at this time.  Patient knows if she has a rash like this she needs to be seen within the first 2 days.      Follow Up   Return if symptoms worsen or fail to improve.  Patient was given instructions and counseling regarding her condition or for health maintenance advice. Please see specific information pulled into the AVS if appropriate.

## 2022-05-02 ENCOUNTER — TELEPHONE (OUTPATIENT)
Dept: INTERNAL MEDICINE | Facility: CLINIC | Age: 82
End: 2022-05-02

## 2022-05-02 NOTE — TELEPHONE ENCOUNTER
Caller: Ashley Motta    Relationship: Self    Best call back number: [phone] (H)    What is the best time to reach you: ANYTIME, ASAP    Who are you requesting to speak with (clinical staff, provider,  specific staff member): CLINICAL STAFF/ LALY SHEA    Do you know the name of the person who called: Ashley Motta    What was the call regarding: PATIENT STATES SHE IS FEELING BETTER BUT THE RASH STILL ITCHES AND IT IS IRRITATED AND FEELS HEAVY AND IS ON HER RIGHT ARM AND SHE IS RIGHT HANDED AND SHE HAS FINISHED THE MEDICATION THAT SHE WAS GIVEN, PLEASE ADVISE THE NEXT STEPS THE PATIENT SHOULD TAKE FOR TREATMENT ASAP    Do you require a callback: YES, ASAP

## 2022-05-09 ENCOUNTER — CLINICAL SUPPORT (OUTPATIENT)
Dept: ORTHOPEDIC SURGERY | Facility: CLINIC | Age: 82
End: 2022-05-09

## 2022-05-09 VITALS — TEMPERATURE: 96.2 F | BODY MASS INDEX: 31.89 KG/M2 | HEIGHT: 63 IN | WEIGHT: 180 LBS

## 2022-05-09 DIAGNOSIS — M17.0 PRIMARY OSTEOARTHRITIS OF BOTH KNEES: Primary | ICD-10-CM

## 2022-05-09 PROCEDURE — 20610 DRAIN/INJ JOINT/BURSA W/O US: CPT | Performed by: ORTHOPAEDIC SURGERY

## 2022-05-09 PROCEDURE — 99214 OFFICE O/P EST MOD 30 MIN: CPT | Performed by: ORTHOPAEDIC SURGERY

## 2022-05-09 RX ORDER — LIDOCAINE HYDROCHLORIDE 20 MG/ML
4 INJECTION, SOLUTION EPIDURAL; INFILTRATION; INTRACAUDAL; PERINEURAL
Status: COMPLETED | OUTPATIENT
Start: 2022-05-09 | End: 2022-05-09

## 2022-05-09 RX ORDER — METHYLPREDNISOLONE ACETATE 80 MG/ML
80 INJECTION, SUSPENSION INTRA-ARTICULAR; INTRALESIONAL; INTRAMUSCULAR; SOFT TISSUE
Status: COMPLETED | OUTPATIENT
Start: 2022-05-09 | End: 2022-05-09

## 2022-05-09 RX ADMIN — METHYLPREDNISOLONE ACETATE 80 MG: 80 INJECTION, SUSPENSION INTRA-ARTICULAR; INTRALESIONAL; INTRAMUSCULAR; SOFT TISSUE at 08:17

## 2022-05-09 RX ADMIN — METHYLPREDNISOLONE ACETATE 80 MG: 80 INJECTION, SUSPENSION INTRA-ARTICULAR; INTRALESIONAL; INTRAMUSCULAR; SOFT TISSUE at 08:16

## 2022-05-09 RX ADMIN — LIDOCAINE HYDROCHLORIDE 4 ML: 20 INJECTION, SOLUTION EPIDURAL; INFILTRATION; INTRACAUDAL; PERINEURAL at 08:16

## 2022-05-09 RX ADMIN — LIDOCAINE HYDROCHLORIDE 4 ML: 20 INJECTION, SOLUTION EPIDURAL; INFILTRATION; INTRACAUDAL; PERINEURAL at 08:17

## 2022-05-09 NOTE — PROGRESS NOTES
Patient Name: Ashley Motta   YOB: 1940  Referring Primary Care Physician: Angela Carbajal MD  BMI: Body mass index is 31.89 kg/m².    Chief Complaint:    Chief Complaint   Patient presents with   • Left Knee - Follow-up, Pain   • Right Knee - Follow-up, Pain        HPI:     Ashley Motta is a 82 y.o. female who presents today for evaluation of   Chief Complaint   Patient presents with   • Left Knee - Follow-up, Pain   • Right Knee - Follow-up, Pain   .  Patient seen today chronic bilateral knee pain.  She usually does well cortisone injections but said the last ones lasted maybe 6 weeks and she has been having quite a bit of problems last 6 weeks reviewing her chart she did have aqua therapy a total of 23 visits back in the November time area she says she is had trouble doing her home program but a therapy did help but just not lasting.  She is asking about other options.      Subjective   Medications:   Home Medications:  Current Outpatient Medications on File Prior to Visit   Medication Sig   • aspirin 81 MG tablet Take 81 mg by mouth daily.   • B Complex Vitamins (VITAMIN B COMPLEX PO) Take  by mouth.   • Calcium Carbonate-Vitamin D (CALCIUM + D PO) Take  by mouth 2 (Two) Times a Day.   • Coenzyme Q10 (COQ10) 200 MG capsule Take  by mouth.   • ECHINACEA-ZINC-VITAMIN C PO Take 1,000 mg by mouth.   • gabapentin (NEURONTIN) 100 MG capsule Take 1 capsule by mouth every night at bedtime.   • Magnesium 400 MG tablet Take  by mouth.   • Misc Natural Products (GLUCOSAMINE CHONDROITIN VIT D3) capsule Take  by mouth.   • Multiple Vitamins-Minerals (MULTIVITAMIN ADULT PO) Take  by mouth.   • Omega-3 Fatty Acids (FISH OIL) 1000 MG capsule capsule Take  by mouth daily with breakfast.   • pantoprazole (PROTONIX) 20 MG EC tablet Take 1 tablet by mouth Daily.   • Probiotic Product (PROBIOTIC DAILY PO) Take  by mouth.   • rOPINIRole (REQUIP) 1 MG tablet Take one tablet 1 hour before bedtime.   • simvastatin  (ZOCOR) 20 MG tablet Take 1 tablet by mouth Every Night.     No current facility-administered medications on file prior to visit.     Current Medications:  Scheduled Meds:  Continuous Infusions:No current facility-administered medications for this visit.    PRN Meds:.    I have reviewed the patient's medical history in detail and updated the computerized patient record.  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 TERRY CHEUNG   • Osteoarthritis    • Osteopenia    • PLMD (periodic limb movement disorder)    • Postmenopausal atrophic vaginitis    • Prediabetes    • Presbyopia    • Rectal nodule 03/2020    REFERRED TO DR. PAMELA NAOPLES   • Rectocele 07/2017   • Regular astigmatism of both eyes    • Rheumatoid arthritis (HCC)    • RLS (restless legs syndrome)    • Seborrheic keratosis    • Skin cancer 04/2015    LOWER LEG, FOLLOWED BY DR. EDI SANCHEZ   • Urinary incontinence 09/2017   • Vaginal vault prolapse 09/2017   • Vertigo 10/18/2018    [facility]ITT[facility] TO [facility]   • Vitreoretinal degeneration of both eyes         Past Surgical History:   Procedure Laterality  Date   • CATARACT EXTRACTION, BILATERAL Bilateral 2015   • COLONOSCOPY N/A 02/27/2015    1 TUBULAR ADENOMA POLYP IN DISTAL ASCENDING[facility]   • COLONOSCOPY N/A 3/4/2020    10 MM SESSILE SERRATED ADENOMA POLYP IN ASCENDING, 3 MM HYPERPLASTIC POLYP IN RECTUM, 7 MM ANAL NODULE, DR. SARAH LIRIANO AT [facility]   • HYSTERECTOMY N/A 01/19/2004    KAYLEE WITH BSO, DR. RAFIA MORTENSEN AT [facility]   • KNEE ARTHROSCOPY Left 2013   • KNEE ARTHROSCOPY Right 2010   • MELISSA CULDOPLASTY N/A 01/19/2004    DR. RAFIA MORTENSEN AT [facility]   • SACROCOLPOPEXY N/A         Social History     Occupational History   • Not on file   Tobacco Use   • Smoking status: Never Smoker   • Smokeless tobacco: Never Used   Vaping Use   • Vaping Use: Never used   Substance and Sexual Activity   • Alcohol use: No   • Drug use: Never   • Sexual activity: Not Currently     Birth control/protection: Post-menopausal, Surgical     Comment: .      Social History     Social History Narrative   • Not on file        Family History   Problem Relation Age of Onset   • Heart disease Mother    • Lung cancer Father    • Hypertension Father    • Heart disease Father    • Cancer Father    • Heart attack Brother    • Emphysema Brother    • COPD Brother    • Depression Brother    • Heart attack Brother         Had stents placed 2009       ROS: 14 point review of systems was performed and all other systems were reviewed and are negative except for documented findings in HPI and today's encounter.     Allergies: No Known Allergies  Constitutional:  Denies fever, shaking or chills   Eyes:  Denies change in visual acuity   HENT:  Denies nasal congestion or sore throat   Respiratory:  Denies cough or shortness of breath   Cardiovascular:  Denies chest pain or severe LE edema   GI:  Denies abdominal pain, nausea, vomiting, bloody stools or diarrhea   Musculoskeletal:  Numbness, tingling, pain, or loss of motor function only as noted above in history of present  "illness.  : Denies painful urination or hematuria  Integument:  Denies rash, lesion or ulceration   Neurologic:  Denies headache or focal weakness  Endocrine:  Denies lymphadenopathy  Psych:  Denies confusion or change in mental status   Hem:  Denies active bleeding    OBJECTIVE:  Physical Exam: 82 y.o. female  Wt Readings from Last 3 Encounters:   05/09/22 81.6 kg (180 lb)   04/25/22 79.8 kg (176 lb)   03/14/22 82.6 kg (182 lb)     Ht Readings from Last 1 Encounters:   05/09/22 160 cm (63\")     Body mass index is 31.89 kg/m².  Vitals:    05/09/22 0818   Temp: 96.2 °F (35.7 °C)     Vital signs reviewed.     General Appearance:    Alert, cooperative, in no acute distress                  Eyes: conjunctiva clear  ENT: external ears and nose atraumatic  CV: no peripheral edema  Resp: normal respiratory effort  Skin: no rashes or wounds; normal turgor  Psych: mood and affect appropriate  Lymph: no nodes appreciated  Neuro: gross sensation intact  Vascular:  Palpable peripheral pulse in noted extremity  Musculoskeletal Extremities: Exam today shows bilateral knees with crepitation synovitis swelling varus medial joint line tenderness and some pseudolaxity she has moderate sized Baker's cyst her calves are soft however    Radiology:   AP lateral 40 degree PA x-ray bilateral knees taken the office in February with comparison view shows advanced arthritic change with narrowing of the joint small osteophytes and some sclerosis        Assessment:     ICD-10-CM ICD-9-CM   1. Primary osteoarthritis of both knees  M17.0 715.16        MDM/Plan:   The diagnosis(es), natural history, pathophysiology and treatment for diagnosis(es) were discussed. Opportunity given and questions answered.  Biomechanics of pertinent body areas discussed.  When appropriate, the use of ambulatory aids discussed.    Inflammation/pain control; with cold, heat, elevation and/or liniments discussed as appropriate  Cortisone Injection. See procedure " "note.  PT referral.  Discussed total joint replacement I reviewed anatomy of a total joint arthroplasty in laymen's terms, as well as typical postoperative recovery and possibly 6-12 months for maximal recovery, and possible need for rehabilitation stay after hospitalization. We also discussed risks, benefits, alternatives, and limitations of procedure with risks including but not limited to neurovascular damage, bleeding, infection, malalignment, chronic pain, failure of implants, osteolysis, loosening of implants, loss of motion, weakness, stiffness, instability, DVT, pulmonary embolus, death, stroke, complex regional pain syndrome, myocardial infarction, and need for additional procedures. Concept of substitution vs. replacement discussed.  No guarantees were given regarding results of surgery.  Patient verbalized understanding, and was given the opportunity to ask and have all questions answered today.  I did discuss surgery risk benefits complications and alternatives and she is thinking about it she want to try injections again and see how they do encourage her to do some \"up rehab\" [facility] physical therapy and put in an order for that.  I want to see her back in 6 weeks to see how she is doing with the therapy and injections if she not doing well may consider possibility of surgery further      5/9/2022    Dictated utilizing [employer] dictation    Large Joint Arthrocentesis: R knee  Date/Time: 5/9/2022 8:16 AM  Consent given by: patient  Site marked: site marked  Timeout: Immediately prior to procedure a time out was called to verify the correct patient, procedure, equipment, support staff and site/side marked as required   Supporting Documentation  Indications: pain, joint swelling and diagnostic evaluation   Procedure Details  Location: knee - R knee  Preparation: Patient was prepped and draped in the usual sterile fashion  Needle gauge: 21G.  Medications administered: 80 mg methylPREDNISolone acetate 80 " MG/ML; 4 mL lidocaine PF 2% 2 %  Patient tolerance: patient tolerated the procedure well with no immediate complications    Large Joint Arthrocentesis: L knee  Date/Time: 5/9/2022 8:17 AM  Consent given by: patient  Site marked: site marked  Timeout: Immediately prior to procedure a time out was called to verify the correct patient, procedure, equipment, support staff and site/side marked as required   Supporting Documentation  Indications: pain   Procedure Details  Location: knee - L knee  Preparation: Patient was prepped and draped in the usual sterile fashion  Needle gauge: 21G.  Medications administered: 80 mg methylPREDNISolone acetate 80 MG/ML; 4 mL lidocaine PF 2% 2 %  Patient tolerance: patient tolerated the procedure well with no immediate complications

## 2022-05-23 DIAGNOSIS — G25.81 RESTLESS LEG SYNDROME: ICD-10-CM

## 2022-05-24 RX ORDER — ROPINIROLE 1 MG/1
TABLET, FILM COATED ORAL
Qty: 90 TABLET | Refills: 0 | Status: SHIPPED | OUTPATIENT
Start: 2022-05-24 | End: 2022-10-19

## 2022-05-25 DIAGNOSIS — G43.809 OTHER MIGRAINE WITHOUT STATUS MIGRAINOSUS, NOT INTRACTABLE: ICD-10-CM

## 2022-05-26 RX ORDER — GABAPENTIN 100 MG/1
100 CAPSULE ORAL
Qty: 90 CAPSULE | Refills: 1 | Status: SHIPPED | OUTPATIENT
Start: 2022-05-26 | End: 2022-07-29

## 2022-06-27 ENCOUNTER — TELEPHONE (OUTPATIENT)
Dept: INTERNAL MEDICINE | Facility: CLINIC | Age: 82
End: 2022-06-27

## 2022-06-29 ENCOUNTER — TELEPHONE (OUTPATIENT)
Dept: ORTHOPEDIC SURGERY | Facility: CLINIC | Age: 82
End: 2022-06-29

## 2022-06-29 ENCOUNTER — TELEPHONE (OUTPATIENT)
Dept: INTERNAL MEDICINE | Facility: CLINIC | Age: 82
End: 2022-06-29

## 2022-06-29 NOTE — TELEPHONE ENCOUNTER
Caller: JACK    Relationship to patient: SELF    Best call back number: [phone]    Chief complaint: NAYLA KNEES    Type of visit: INJECTION    Requested date: SEPTEMBER

## 2022-06-29 NOTE — TELEPHONE ENCOUNTER
Caller: Ashley Motta    Relationship: Self    Best call back number: [phone]     What orders are you requesting (i.e. lab or imaging): LAB ORDERS FOR VITAMIN E     In what timeframe would the patient need to come in: ASAP     Where will you receive your lab/imaging services:  [facility]     Additional notes: N/A

## 2022-07-20 DIAGNOSIS — B02.9 HERPES ZOSTER WITHOUT COMPLICATION: ICD-10-CM

## 2022-07-20 RX ORDER — FAMCICLOVIR 500 MG/1
500 TABLET ORAL 3 TIMES DAILY
Qty: 21 TABLET | Refills: 0 | Status: SHIPPED | OUTPATIENT
Start: 2022-07-20 | End: 2022-07-27

## 2022-07-20 NOTE — TELEPHONE ENCOUNTER
Caller: Ashley Motta    Relationship: Self    Best call back number: [phone]      Requested Prescriptions:   Requested Prescriptions     Pending Prescriptions Disp Refills   • famciclovir (FAMVIR) 500 MG tablet 21 tablet 0     Sig: Take 1 tablet by mouth 3 (Three) Times a Day for 7 days.        Pharmacy where request should be sent: [facility] [address] [place] [address] [facility] - [phone]  - [phone]     Additional details provided by patient: PATIENT IS HAVING ANOTHER SHINGLES OUTBREAK AND WAS PRESCRIBED THIS LAST TIME AND IS ASKING FOR REFILL.    Does the patient have less than a 3 day supply:  [x] Yes  [] No    Maryellen Hooks, PCT   07/20/22 11:31 EDT

## 2022-07-21 ENCOUNTER — TELEPHONE (OUTPATIENT)
Dept: INTERNAL MEDICINE | Facility: CLINIC | Age: 82
End: 2022-07-21

## 2022-07-21 NOTE — TELEPHONE ENCOUNTER
Caller: Ashley Motta    Relationship to patient: Self    Best call back number: [phone]    Patient is needing: PT CALLED TO GET UPDATE ON ORDERS REQUESTED BY ENT (SEE CLOSED ENCOUNTER FROM 6/27/22). UNABLE TO WARM TRANSFER.

## 2022-07-29 ENCOUNTER — OFFICE VISIT (OUTPATIENT)
Dept: INTERNAL MEDICINE | Facility: CLINIC | Age: 82
End: 2022-07-29

## 2022-07-29 VITALS
TEMPERATURE: 98.3 F | SYSTOLIC BLOOD PRESSURE: 136 MMHG | HEART RATE: 88 BPM | OXYGEN SATURATION: 98 % | HEIGHT: 63 IN | BODY MASS INDEX: 28.88 KG/M2 | WEIGHT: 163 LBS | DIASTOLIC BLOOD PRESSURE: 80 MMHG

## 2022-07-29 DIAGNOSIS — R42 DIZZINESS: Primary | ICD-10-CM

## 2022-07-29 PROCEDURE — 99213 OFFICE O/P EST LOW 20 MIN: CPT | Performed by: FAMILY MEDICINE

## 2022-07-29 RX ORDER — SILICONE ADHESIVE 1.5" X 3"
SHEET (EA) TOPICAL
COMMUNITY
Start: 2022-06-24 | End: 2023-03-27

## 2022-07-29 NOTE — ASSESSMENT & PLAN NOTE
Improving the last 4 weeks  Advised by ENT to have evaluation of carotids  Discussion today issues may have been side effect of gabapentin as no issues since she stop medication   Continue to not get up swiftly from seated position  Referral placed for US of carotids

## 2022-07-29 NOTE — PROGRESS NOTES
"    Chief Complaint  Dizziness (Dizziness and balance) and Balance Issues (Discuss the neurologist visit and ENT)    Subjective    History of Present Illness {CC  Problem List  Visit  Diagnosis   Encounters  Notes  Medications  Labs  Result Review Imaging  Media :23}     Ashley Motta presents to [facility] for   History of Present Illness   81 yo female present for follow up visit.  She states she has seen ENT told only equibrium issue in one ear. Dizziness comes and goes. States worse when she is getting up. But never no when it is going to happen.   She was advised to look into her carotid artery as a cause.   The last episode has been about a month ago.   No longer taking gabapentin stop taking about 6 weeks ago, after neurologist mention if doing well she could stop taking. Medication given to her for help with migraines previously.      Social History     Socioeconomic History   • Marital status:    Tobacco Use   • Smoking status: Never Smoker   • Smokeless tobacco: Never Used   Vaping Use   • Vaping Use: Never used   Substance and Sexual Activity   • Alcohol use: No   • Drug use: Never   • Sexual activity: Not Currently     Birth control/protection: Post-menopausal, Surgical     Comment: .      Objective     Vital Signs:   /80   Pulse 88   Temp 98.3 °F (36.8 °C)   Ht 160 cm (63\")   Wt 73.9 kg (163 lb)   SpO2 98%   BMI 28.87 kg/m²   Physical Exam  Constitutional:       General: She is not in acute distress.  HENT:      Head: Normocephalic.   Neck:      Vascular: No carotid bruit.   Cardiovascular:      Rate and Rhythm: Regular rhythm.      Heart sounds: Normal heart sounds.   Pulmonary:      Breath sounds: Normal breath sounds.   Musculoskeletal:      Cervical back: Neck supple.   Neurological:      Mental Status: She is alert.        Result Review  Data Reviewed:{ Labs  Result Review  Imaging  Med Tab  Media :23}   The following data was " reviewed by: Angela Carbajal MD on 07/29/2022  Lab Results - Last 18 Months   Lab Units 03/07/22  0840 11/02/21  0739 06/09/21  0742 02/08/21  0817   BUN mg/dL 11 12 19 14   CREATININE mg/dL 0.69 0.66 0.86 0.67   EGFR IF NONAFRICN AM mL/min/1.73  --  86 64 85   SODIUM mmol/L 140 138 138 140   POTASSIUM mmol/L 4.0 4.0 4.5 4.1   CHLORIDE mmol/L 105 106 105 105   CALCIUM mg/dL 9.7 9.2 9.5 9.5   ALBUMIN g/dL 4.40 4.50 4.40 4.20   BILIRUBIN mg/dL 0.5 0.3 0.3 0.3   ALK PHOS U/L 74 80 83 85   AST (SGOT) U/L 49* 35* 35* 33*   ALT (SGPT) U/L 58* 41* 40* 42*   TRIGLYCERIDES mg/dL 102 130 120 133   HDL CHOL mg/dL 51 49 45 52   VLDL CHOL mg/dL 19 23 22 23   LDL CHOL mg/dL 38 37 51 35   LDL/HDL RATIO  0.72  --   --   --    HEMOGLOBIN A1C % 6.50* 6.20* 6.00* 6.10*   WBC 10*3/mm3 5.97 7.69  --  7.14   RBC 10*6/mm3 4.73 4.65  --  4.81   HEMATOCRIT % 42.6 41.8  --  43.2   MCV fL 90.1 89.9  --  89.8   MCH pg 30.0 29.7  --  29.9   TSH uIU/mL 2.210  --   --   --               Current Outpatient Medications:   •  aspirin 81 MG tablet, Take 81 mg by mouth daily., Disp: , Rfl:   •  B Complex Vitamins (VITAMIN B COMPLEX PO), Take  by mouth., Disp: , Rfl:   •  Calcium Carbonate-Vitamin D (CALCIUM + D PO), Take  by mouth 2 (Two) Times a Day., Disp: , Rfl:   •  Coenzyme Q10 (COQ10) 200 MG capsule, Take  by mouth., Disp: , Rfl:   •  ECHINACEA-ZINC-VITAMIN C PO, Take 1,000 mg by mouth., Disp: , Rfl:   •  Magnesium 400 MG tablet, Take  by mouth., Disp: , Rfl:   •  Misc Natural Products (GLUCOSAMINE CHONDROITIN VIT D3) capsule, Take  by mouth., Disp: , Rfl:   •  Multiple Vitamins-Minerals (MULTIVITAMIN ADULT PO), Take  by mouth., Disp: , Rfl:   •  Omega-3 Fatty Acids (FISH OIL) 1000 MG capsule capsule, Take  by mouth daily with breakfast., Disp: , Rfl:   •  pantoprazole (PROTONIX) 20 MG EC tablet, Take 1 tablet by mouth Daily., Disp: 90 tablet, Rfl: 1  •  Probiotic Product (PROBIOTIC DAILY PO), Take  by mouth., Disp: , Rfl:   •  rOPINIRole  (REQUIP) 1 MG tablet, Take one tablet 1 hour before bedtime., Disp: 90 tablet, Rfl: 0  •  simvastatin (ZOCOR) 20 MG tablet, Take 1 tablet by mouth Every Night., Disp: 90 tablet, Rfl: 3  •  sodium chloride (MICHEL 128) 5 % ophthalmic solution, INSTILL 1 DROP INTO EACH EYE THREE TIMES DAILY, Disp: , Rfl:       Assessment and Plan {CC Problem List  Visit Diagnosis  ROS  Review (Popup)  Health Maintenance  Quality  BestPractice  Medications  SmartSets  SnapShot Encounters  Media :23}   Problem List Items Addressed This Visit        Symptoms and Signs    Dizziness - Primary    Current Assessment & Plan     Improving the last 4 weeks  Advised by ENT to have evaluation of carotids  Discussion today issues may have been side effect of gabapentin as no issues since she stop medication   Continue to not get up swiftly from seated position  Referral placed for US of carotids         Relevant Orders    US Carotid Bilateral          Follow Up   Return in about 3 months (around 10/29/2022) for Medicare Wellness.  Patient was given instructions and counseling regarding her condition or for health maintenance advice. Please see specific information pulled into the AVS if appropriate.    EpicAct:MR_WS_AMB_ORDERS,RunParams:STARTUPTYPE=FOLLOW    MR_WS_AMB_DISCHARGE

## 2022-08-04 ENCOUNTER — TELEPHONE (OUTPATIENT)
Dept: INTERNAL MEDICINE | Facility: CLINIC | Age: 82
End: 2022-08-04

## 2022-08-04 NOTE — TELEPHONE ENCOUNTER
Caller: Ashley Motta    Relationship: Self    Best call back number: [phone]     What is the best time to reach you: ANY TIME     Who are you requesting to speak with (clinical staff, provider,  specific staff member): CLINICAL    What was the call regarding: PATIENT IS CALLING REQUESTING A CALLBACK WITH THE STATUS OF THE REFERRAL FOR HER TO GET THE CAROTID ARTERY SCAN.    PLEASE ADVISE PATIENT.     Do you require a callback: YES

## 2022-08-12 ENCOUNTER — CLINICAL SUPPORT (OUTPATIENT)
Dept: ORTHOPEDIC SURGERY | Facility: CLINIC | Age: 82
End: 2022-08-12

## 2022-08-12 VITALS — WEIGHT: 154 LBS | TEMPERATURE: 97.5 F | HEIGHT: 63 IN | BODY MASS INDEX: 27.29 KG/M2

## 2022-08-12 DIAGNOSIS — M17.0 PRIMARY OSTEOARTHRITIS OF BOTH KNEES: Primary | ICD-10-CM

## 2022-08-12 PROCEDURE — 73562 X-RAY EXAM OF KNEE 3: CPT | Performed by: NURSE PRACTITIONER

## 2022-08-12 PROCEDURE — 20610 DRAIN/INJ JOINT/BURSA W/O US: CPT | Performed by: NURSE PRACTITIONER

## 2022-08-12 RX ORDER — METHYLPREDNISOLONE ACETATE 80 MG/ML
80 INJECTION, SUSPENSION INTRA-ARTICULAR; INTRALESIONAL; INTRAMUSCULAR; SOFT TISSUE
Status: COMPLETED | OUTPATIENT
Start: 2022-08-12 | End: 2022-08-12

## 2022-08-12 RX ADMIN — METHYLPREDNISOLONE ACETATE 80 MG: 80 INJECTION, SUSPENSION INTRA-ARTICULAR; INTRALESIONAL; INTRAMUSCULAR; SOFT TISSUE at 09:12

## 2022-08-12 RX ADMIN — METHYLPREDNISOLONE ACETATE 80 MG: 80 INJECTION, SUSPENSION INTRA-ARTICULAR; INTRALESIONAL; INTRAMUSCULAR; SOFT TISSUE at 09:11

## 2022-08-12 NOTE — PROGRESS NOTES
Ashley Motta is here today for worsening Bilateral knee pain. Knee injection was discussed with the patient in detail, including indication, risks, benefits, and alternatives. Verbal consent was given for the procedure. Injection site was aseptically prepared with Betadine and alcohol swabs.     Radiology:   AP, lateral, sunrise of the bilateral knee obtained in the office today due to pain, with comparison views shows advanced tricompartmental degenerative changes, most significantly  medial and patellofemoral compartment, with osteophyte formation and subchondral sclerosis      KNEE Injection Procedure Note:    Large Joint Arthrocentesis: R knee  Date/Time: 8/12/2022 9:11 AM  Consent given by: patient  Site marked: site marked  Timeout: Immediately prior to procedure a time out was called to verify the correct patient, procedure, equipment, support staff and site/side marked as required   Supporting Documentation  Indications: pain   Procedure Details  Location: knee - R knee  Preparation: Patient was prepped and draped in the usual sterile fashion  Needle gauge: 21.  Approach: anterolateral  Medications administered: 80 mg methylPREDNISolone acetate 80 MG/ML; 2 mL lidocaine (cardiac)  Patient tolerance: patient tolerated the procedure well with no immediate complications    Large Joint Arthrocentesis: L knee  Date/Time: 8/12/2022 9:12 AM  Consent given by: patient  Site marked: site marked  Timeout: Immediately prior to procedure a time out was called to verify the correct patient, procedure, equipment, support staff and site/side marked as required   Supporting Documentation  Indications: pain   Procedure Details  Location: knee - L knee  Preparation: Patient was prepped and draped in the usual sterile fashion  Needle gauge: 21.  Approach: anterolateral  Medications administered: 80 mg methylPREDNISolone acetate 80 MG/ML; 2 mL lidocaine (cardiac)  Patient tolerance: patient tolerated the procedure well with no  immediate complications        Ashley Motta tolerated the procedure well. A Bandage was applied to the injection site. At the conclusion of the injection I discussed the importance of continued quad strengthening exercises on a daily basis. I will see the patient back if the symptoms should fail to improve or worsen.    Doris Johnson, APRN  8/12/2022      Much of this encounter note is an electronic transcription/translation of spoken language to printed text. The electronic translation of spoken language may permit erroneous, or at times, nonsensical words or phrases to be inadvertently transcribed; Although I have reviewed the note for such errors, some may still exist

## 2022-08-13 NOTE — THERAPY TREATMENT NOTE
Outpatient Physical Therapy Ortho Treatment Note  [facility]     Patient Name: Ashley Motta  : 1940  MRN: [id number]  Today's Date: 2019      Visit Date: 2019    Visit Dx:    ICD-10-CM ICD-9-CM   1. Chronic pain of left knee M25.562 719.46    G89.29 338.29   2. Difficulty walking R26.2 719.7   3. Meralgia paresthetica of left side G57.12 355.1       Patient Active Problem List   Diagnosis   • Osteopenia   • Hyperlipidemia   • Elevated fasting glucose   • Osteoarthritis   • BALDEMAR on CPAP   • Pelvic relaxation due to vaginal prolapse   • Chronic pain of both knees   • Arthritis of right knee   • Arthritis of left knee   • Arthritis of both knees   • Restless legs   • Vertigo   • Headache, migraine        Past Medical History:   Diagnosis Date   • Elevated fasting glucose    • GERD (gastroesophageal reflux disease)    • Goiter     THYROID POLYP SEES    • Hyperlipidemia    • Hypersomnia    • BALDEMAR on CPAP    • Osteoarthritis    • Osteopenia    • RLS (restless legs syndrome)         Past Surgical History:   Procedure Laterality Date   • CATARACT EXTRACTION, BILATERAL Bilateral    • COLONOSCOPY N/A 2015    DR. SARAH LIRIANO   • HYSTERECTOMY  2004    Total   • KNEE SURGERY Left    • KNEE SURGERY Right    • SHOULDER ARTHROSCOPY Left        PT Ortho     Row Name 19 0700       Subjective Comments    Subjective Comments  Left side is weak and get some N/T from front of thigh to front of knee.  -SI       Subjective Pain    Able to rate subjective pain?  yes  -SI    Pre-Treatment Pain Level  4  -SI    Subjective Pain Comment  left knee pain 4 in AM  -SI      User Key  (r) = Recorded By, (t) = Taken By, (c) = Cosigned By    Initials Name Provider Type    Em Barkley, PTA Physical Therapy Assistant                      PT Assessment/Plan     Row Name 19 1667          PT Assessment    Assessment Comments  Very active lady, and very receptive to teaching.  N/T  intermittent anterior left thigh,  Daily left knee pain.  Tolerated ex in PT well  -SI       User Key  (r) = Recorded By, (t) = Taken By, (c) = Cosigned By    Initials Name Provider Type    Em Barkley PTA Physical Therapy Assistant            Exercises     Row Name 07/19/19 0700             Subjective Comments    Subjective Comments  Left side is weak and get some N/T from front of thigh to front of knee.  -SI         Subjective Pain    Able to rate subjective pain?  yes  -SI      Pre-Treatment Pain Level  4  -SI      Subjective Pain Comment  left knee pain 4 in AM  -SI         Total Minutes    80528 - PT Therapeutic Exercise Minutes  45  -SI         Exercise 1    Exercise Name 1  PPT  -SI      Cueing 1  Verbal  -SI      Reps 1  10  -SI      Time 1  5s  -SI         Exercise 2    Exercise Name 2  Modified Piriformis Stretch  -SI      Cueing 2  Verbal  -SI      Reps 2  3  -SI      Time 2  20s  -SI         Exercise 3    Exercise Name 3  90/90 HS stretch  -SI      Cueing 3  Verbal  -SI      Sets 3  3  -SI      Reps 3  20 ankle pumps during hold  -SI         Exercise 4    Exercise Name 4  seated QS into blue ball  -SI      Cueing 4  Verbal  -SI      Reps 4  15  -SI      Time 4  5s  -SI         Exercise 5    Exercise Name 5  supine hip flexor stretch off edge of mat  -SI      Reps 5  3  -SI      Time 5  20  -SI         Exercise 6    Exercise Name 6  Nustep  -SI      Time 6  5 minutes  -SI         Exercise 7    Exercise Name 7  HL hip abdution  -SI      Sets 7  2  -SI      Reps 7  10  -SI         Exercise 8    Exercise Name 8  SAQ  -SI      Sets 8  2  -SI      Reps 8  10  -SI         Exercise 9    Exercise Name 9  LAQ 2 lbs  -SI      Sets 9  2  -SI      Reps 9  10  -SI      Additional Comments  added to HEP  -SI         Exercise 10    Exercise Name 10  supine hip flexor stretch with leg off mat table  -SI      Reps 10  3  -SI      Time 10  20  -SI         Exercise 11    Exercise Name 11  glute set  -SI      Reps  11  10  -SI      Time 11  5  -SI         Exercise 12    Exercise Name 12  SLR  -SI      Reps 12  2  -SI      Time 12  10  -SI      Additional Comments  added to HEP  -SI         Exercise 13    Exercise Name 13  Hip abd on side  -SI      Sets 13  2  -SI      Reps 13  10  -SI      Additional Comments  added to HEP  -SI        User Key  (r) = Recorded By, (t) = Taken By, (c) = Cosigned By    Initials Name Provider Type    SI Em Jacob PTA Physical Therapy Assistant                           Therapy Education  Given: HEP, Symptoms/condition management(Discussed her activities at [facility] and doing water 5 days a week.)  Program: Progressed  How Provided: Verbal, Demonstration, Written  Provided to: Patient  Level of Understanding: Teach back education performed              Time Calculation:   Start Time: 0700  Stop Time: 0745  Time Calculation (min): 45 min  Total Timed Code Minutes- PT: 45 minute(s)  Therapy Charges for Today     Code Description Service Date Service Provider Modifiers Qty    [id number] HC PT THER PROC EA 15 MIN 7/19/2019 Em Jacob PTA GP 3                    Em Jacob PTA  7/19/2019      Home

## 2022-08-16 ENCOUNTER — APPOINTMENT (OUTPATIENT)
Dept: ULTRASOUND IMAGING | Facility: HOSPITAL | Age: 82
End: 2022-08-16

## 2022-08-16 ENCOUNTER — OFFICE VISIT (OUTPATIENT)
Dept: INTERNAL MEDICINE | Facility: CLINIC | Age: 82
End: 2022-08-16

## 2022-08-16 VITALS
HEART RATE: 81 BPM | WEIGHT: 158 LBS | HEIGHT: 63 IN | SYSTOLIC BLOOD PRESSURE: 112 MMHG | BODY MASS INDEX: 28 KG/M2 | TEMPERATURE: 98.4 F | OXYGEN SATURATION: 98 % | DIASTOLIC BLOOD PRESSURE: 62 MMHG

## 2022-08-16 DIAGNOSIS — Z12.31 ENCOUNTER FOR SCREENING MAMMOGRAM FOR MALIGNANT NEOPLASM OF BREAST: ICD-10-CM

## 2022-08-16 DIAGNOSIS — E78.5 HYPERLIPIDEMIA, UNSPECIFIED HYPERLIPIDEMIA TYPE: ICD-10-CM

## 2022-08-16 DIAGNOSIS — Z78.0 POST-MENOPAUSAL: ICD-10-CM

## 2022-08-16 DIAGNOSIS — R73.03 PRE-DIABETES: Primary | ICD-10-CM

## 2022-08-16 DIAGNOSIS — R42 DIZZINESS: ICD-10-CM

## 2022-08-16 DIAGNOSIS — E56.0 VITAMIN E DEFICIENCY: ICD-10-CM

## 2022-08-16 PROCEDURE — 1170F FXNL STATUS ASSESSED: CPT | Performed by: FAMILY MEDICINE

## 2022-08-16 PROCEDURE — G0439 PPPS, SUBSEQ VISIT: HCPCS | Performed by: FAMILY MEDICINE

## 2022-08-16 PROCEDURE — 1125F AMNT PAIN NOTED PAIN PRSNT: CPT | Performed by: FAMILY MEDICINE

## 2022-08-16 PROCEDURE — 1159F MED LIST DOCD IN RCRD: CPT | Performed by: FAMILY MEDICINE

## 2022-08-16 NOTE — ASSESSMENT & PLAN NOTE
Lipid abnormalities are chronic, stable previously.  check today .  taking 20mg currently due to liver changes seen previously    Check cmp today   Lipids will be reassessed in 6 months.

## 2022-08-16 NOTE — PROGRESS NOTES
The ABCs of the Annual Wellness Visit  Subsequent Medicare Wellness Visit    Chief Complaint   Patient presents with   • Medicare Wellness-subsequent      Subjective    History of Present Illness:  Ashley Motta is a 82 y.o. female who presents for a Subsequent Medicare Wellness Visit.    The following portions of the patient's history were reviewed and   updated as appropriate: allergies, current medications, past family history, past medical history, past social history, past surgical history and problem list.    Compared to one year ago, the patient feels her physical   health is worse, due to dizziness she is currently having for the last several months.     Compared to one year ago, the patient feels her mental   health is the same.    Recent Hospitalizations:  She was not admitted to the hospital during the last year.       Current Medical Providers:  Patient Care Team:  Angela Carbajal MD as PCP - General (Family Medicine)    Outpatient Medications Prior to Visit   Medication Sig Dispense Refill   • aspirin 81 MG tablet Take 81 mg by mouth daily.     • B Complex Vitamins (VITAMIN B COMPLEX PO) Take  by mouth.     • Calcium Carbonate-Vitamin D (CALCIUM + D PO) Take  by mouth 2 (Two) Times a Day.     • Coenzyme Q10 (COQ10) 200 MG capsule Take  by mouth.     • ECHINACEA-ZINC-VITAMIN C PO Take 1,000 mg by mouth.     • Magnesium 400 MG tablet Take  by mouth.     • Misc Natural Products (GLUCOSAMINE CHONDROITIN VIT D3) capsule Take  by mouth.     • Multiple Vitamins-Minerals (MULTIVITAMIN ADULT PO) Take  by mouth.     • Omega-3 Fatty Acids (FISH OIL) 1000 MG capsule capsule Take  by mouth daily with breakfast.     • pantoprazole (PROTONIX) 20 MG EC tablet Take 1 tablet by mouth Daily. 90 tablet 1   • Probiotic Product (PROBIOTIC DAILY PO) Take  by mouth.     • rOPINIRole (REQUIP) 1 MG tablet Take one tablet 1 hour before bedtime. 90 tablet 0   • simvastatin (ZOCOR) 20 MG tablet Take 1 tablet by mouth Every Night.  90 tablet 3   • sodium chloride (IMCHEL 128) 5 % ophthalmic solution INSTILL 1 DROP INTO EACH EYE THREE TIMES DAILY     • vitamin E 100 UNIT capsule Take 100 Units by mouth Daily.       No facility-administered medications prior to visit.       No opioid medication identified on active medication list. I have reviewed chart for other potential  high risk medication/s and harmful drug interactions in the elderly.          Aspirin is on active medication list. Aspirin use is indicated based on review of current medical condition/s. Pros and cons of this therapy have been discussed today. Benefits of this medication outweigh potential harm.  Patient has been encouraged to continue taking this medication.  .      Patient Active Problem List   Diagnosis   • Osteopenia   • Hyperlipidemia   • Pre-diabetes   • Osteoarthritis   • BALDEMAR on CPAP   • Pelvic relaxation due to vaginal prolapse   • Chronic pain of both knees   • Arthritis of right knee   • Arthritis of left knee   • Arthritis of both knees   • Restless leg syndrome   • Dizziness   • Headache, migraine   • Heartburn   • Internal hemorrhoids with complication   • Impacted cerumen of left ear   • Vitamin E deficiency     Advance Care Planning  Advance Directive is not on file.  ACP discussion was held with the patient during this visit. Patient has an advance directive (not in EMR), copy requested.    Review of Systems   Constitutional: Negative for chills and fever.   HENT: Negative for congestion, rhinorrhea and sneezing.    Eyes: Negative for visual disturbance.   Respiratory: Negative for cough and shortness of breath.    Cardiovascular: Negative for chest pain and leg swelling.   Gastrointestinal: Negative for abdominal pain, constipation, diarrhea, nausea and vomiting.   Genitourinary: Negative for difficulty urinating, vaginal bleeding and vaginal discharge.   Musculoskeletal: Positive for arthralgias. Negative for back pain.   Skin: Negative for rash.  "  Neurological: Positive for dizziness.   Hematological: Does not bruise/bleed easily.   Psychiatric/Behavioral: Negative for behavioral problems, decreased concentration and sleep disturbance.        Objective    Vitals:    08/16/22 0759   BP: 112/62   Pulse: 81   Temp: 98.4 °F (36.9 °C)   SpO2: 98%   Weight: 71.7 kg (158 lb)   Height: 160 cm (63\")   PainSc:   2     Estimated body mass index is 27.99 kg/m² as calculated from the following:    Height as of this encounter: 160 cm (63\").    Weight as of this encounter: 71.7 kg (158 lb).    BMI is >= 25 and <30. (Overweight) The following options were offered after discussion;: continue to monitor diet. High fiber, low fat and sugar      Does the patient have evidence of cognitive impairment? No    Physical Exam  Constitutional:       General: She is not in acute distress.     Appearance: She is not ill-appearing.   HENT:      Head: Normocephalic.      Right Ear: Tympanic membrane normal.      Left Ear: Tympanic membrane normal.      Mouth/Throat:      Pharynx: No oropharyngeal exudate.      Comments: Roof of mouth two nodules  Eyes:      Conjunctiva/sclera: Conjunctivae normal.   Cardiovascular:      Rate and Rhythm: Regular rhythm.      Heart sounds: Normal heart sounds.   Pulmonary:      Effort: No respiratory distress.      Breath sounds: Normal breath sounds. No wheezing.   Abdominal:      General: Bowel sounds are normal. There is no distension.      Palpations: Abdomen is soft.      Tenderness: There is no abdominal tenderness.   Musculoskeletal:      Cervical back: Normal range of motion and neck supple.      Right lower leg: No edema.      Left lower leg: No edema.   Skin:     Findings: No rash.   Neurological:      Mental Status: She is alert and oriented to person, place, and time.   Psychiatric:         Mood and Affect: Mood normal.                 HEALTH RISK ASSESSMENT    Smoking Status:  Social History     Tobacco Use   Smoking Status Never Smoker "   Smokeless Tobacco Never Used     Alcohol Consumption:  Social History     Substance and Sexual Activity   Alcohol Use No     Fall Risk Screen:    MILDREDADI Fall Risk Assessment was completed, and patient is at LOW risk for falls.Assessment completed on:8/16/2022    Depression Screening:  PHQ-2/PHQ-9 Depression Screening 8/16/2022   Retired PHQ-9 Total Score -   Retired Total Score -   Little Interest or Pleasure in Doing Things 0-->not at all   Feeling Down, Depressed or Hopeless 0-->not at all   PHQ-9: Brief Depression Severity Measure Score 0       Health Habits and Functional and Cognitive Screening:  Functional & Cognitive Status 8/16/2022   Do you have difficulty preparing food and eating? No   Do you have difficulty bathing yourself, getting dressed or grooming yourself? No   Do you have difficulty using the toilet? No   Do you have difficulty moving around from place to place? No   Do you have trouble with steps or getting out of a bed or a chair? No   Current Diet Well Balanced Diet   Dental Exam Up to date   Eye Exam Up to date   Exercise (times per week) 3 times per week   Current Exercises Include Walking   Current Exercise Activities Include -   Do you need help using the phone?  No   Are you deaf or do you have serious difficulty hearing?  No   Do you need help with transportation? No   Do you need help shopping? No   Do you need help preparing meals?  No   Do you need help with housework?  No   Do you need help with laundry? No   Do you need help taking your medications? No   Do you need help managing money? No   Do you ever drive or ride in a car without wearing a seat belt? No   Have you felt unusual stress, anger or loneliness in the last month? No   Who do you live with? Spouse   If you need help, do you have trouble finding someone available to you? No   Have you been bothered in the last four weeks by sexual problems? No   Do you have difficulty concentrating, remembering or making decisions? No        Age-appropriate Screening Schedule:  Refer to the list below for future screening recommendations based on patient's age, sex and/or medical conditions. Orders for these recommended tests are listed in the plan section. The patient has been provided with a written plan.    Health Maintenance   Topic Date Due   • INFLUENZA VACCINE  10/01/2022   • DXA SCAN  10/15/2022   • MAMMOGRAM  11/22/2022   • LIPID PANEL  03/07/2023   • TDAP/TD VACCINES (3 - Td or Tdap) 06/01/2032   • ZOSTER VACCINE  Completed              Assessment & Plan   CMS Preventative Services Quick Reference  Risk Factors Identified During Encounter  Immunizations Discussed/Encouraged (specific Immunizations; [id number]    The above risks/problems have been discussed with the patient.  Follow up actions/plans if indicated are seen below in the Assessment/Plan Section.  Pertinent information has been shared with the patient in the After Visit Summary.    Diagnoses and all orders for this visit:    1. Pre-diabetes (Primary)  -     Comprehensive metabolic panel  -     Hemoglobin A1c    2. Hyperlipidemia, unspecified hyperlipidemia type  Assessment & Plan:  Lipid abnormalities are chronic, stable previously.  check today .  taking 20mg currently due to liver changes seen previously    Check cmp today   Lipids will be reassessed in 6 months.    Orders:  -     Lipid panel    3. Vitamin E deficiency  Assessment & Plan:  States she was advised by neurology to take supplement fr vitamin E def. She is taking daily for the last few months.     Orders:  -     Vitamin E    4. Encounter for screening mammogram for malignant neoplasm of breast  -     Mammo screening digital tomosynthesis bilateral w CAD; Future    5. Post-menopausal  -     DEXA Bone Density Axial; Future    6. Dizziness  -     Duplex Carotid Ultrasound CAR; Future  Appointment canceled for previous US ordered a few weeks ago. Placed new order today     Follow Up:   Return in about 6 months  (around 2/16/2023) for Recheck.     An After Visit Summary and PPPS were made available to the patient.

## 2022-08-16 NOTE — ASSESSMENT & PLAN NOTE
States she was advised by neurology to take supplement fr vitamin E def. She is taking daily for the last few months.

## 2022-08-19 ENCOUNTER — HOSPITAL ENCOUNTER (OUTPATIENT)
Dept: CARDIOLOGY | Facility: HOSPITAL | Age: 82
Discharge: HOME | End: 2022-08-19
Admitting: FAMILY MEDICINE

## 2022-08-19 DIAGNOSIS — R42 DIZZINESS: ICD-10-CM

## 2022-08-19 LAB
BH CV XLRA MEAS LEFT DIST CCA EDV: 14.1 CM/SEC
BH CV XLRA MEAS LEFT DIST CCA PSV: 65.2 CM/SEC
BH CV XLRA MEAS LEFT DIST ICA EDV: -23.5 CM/SEC
BH CV XLRA MEAS LEFT DIST ICA PSV: -79.2 CM/SEC
BH CV XLRA MEAS LEFT MID ICA EDV: -24.6 CM/SEC
BH CV XLRA MEAS LEFT MID ICA PSV: -83.8 CM/SEC
BH CV XLRA MEAS LEFT PROX CCA EDV: 12.9 CM/SEC
BH CV XLRA MEAS LEFT PROX CCA PSV: 73.3 CM/SEC
BH CV XLRA MEAS LEFT PROX ECA EDV: -5.9 CM/SEC
BH CV XLRA MEAS LEFT PROX ECA PSV: -67.6 CM/SEC
BH CV XLRA MEAS LEFT PROX ICA EDV: -20 CM/SEC
BH CV XLRA MEAS LEFT PROX ICA PSV: -67.2 CM/SEC
BH CV XLRA MEAS LEFT PROX SCLA PSV: 108 CM/SEC
BH CV XLRA MEAS LEFT VERTEBRAL A EDV: 14.1 CM/SEC
BH CV XLRA MEAS LEFT VERTEBRAL A PSV: 52.2 CM/SEC
BH CV XLRA MEAS RIGHT DIST CCA EDV: 14.7 CM/SEC
BH CV XLRA MEAS RIGHT DIST CCA PSV: 69.2 CM/SEC
BH CV XLRA MEAS RIGHT DIST ICA EDV: 24.1 CM/SEC
BH CV XLRA MEAS RIGHT DIST ICA PSV: 85.7 CM/SEC
BH CV XLRA MEAS RIGHT MID ICA EDV: -20.5 CM/SEC
BH CV XLRA MEAS RIGHT MID ICA PSV: -69.8 CM/SEC
BH CV XLRA MEAS RIGHT PROX CCA EDV: 14.7 CM/SEC
BH CV XLRA MEAS RIGHT PROX CCA PSV: 94.4 CM/SEC
BH CV XLRA MEAS RIGHT PROX ECA EDV: -7.6 CM/SEC
BH CV XLRA MEAS RIGHT PROX ECA PSV: -94.4 CM/SEC
BH CV XLRA MEAS RIGHT PROX ICA EDV: -10.2 CM/SEC
BH CV XLRA MEAS RIGHT PROX ICA PSV: -49.9 CM/SEC
BH CV XLRA MEAS RIGHT PROX SCLA PSV: 122 CM/SEC
BH CV XLRA MEAS RIGHT VERTEBRAL A EDV: -12.2 CM/SEC
BH CV XLRA MEAS RIGHT VERTEBRAL A PSV: -57.7 CM/SEC
LEFT ARM BP: NORMAL MMHG
MAXIMAL PREDICTED HEART RATE: 140 BPM
RIGHT ARM BP: NORMAL MMHG
STRESS TARGET HR: 119 BPM

## 2022-08-19 PROCEDURE — 93880 EXTRACRANIAL BILAT STUDY: CPT

## 2022-08-20 LAB
A-TOCOPHEROL VIT E SERPL-MCNC: 13.7 MG/L (ref 9–29)
ALBUMIN SERPL-MCNC: 4.6 G/DL (ref 3.7–4.7)
ALBUMIN/GLOB SERPL: 2.1 {RATIO} (ref 1.2–2.2)
ALP SERPL-CCNC: 91 IU/L (ref 44–121)
ALT SERPL-CCNC: 27 IU/L (ref 0–32)
AST SERPL-CCNC: 22 IU/L (ref 0–40)
BILIRUB SERPL-MCNC: 0.3 MG/DL (ref 0–1.2)
BUN SERPL-MCNC: 11 MG/DL (ref 8–27)
BUN/CREAT SERPL: 15 (ref 12–28)
CALCIUM SERPL-MCNC: 9.7 MG/DL (ref 8.7–10.3)
CHLORIDE SERPL-SCNC: 101 MMOL/L (ref 96–106)
CHOLEST SERPL-MCNC: 123 MG/DL (ref 100–199)
CO2 SERPL-SCNC: 23 MMOL/L (ref 20–29)
CREAT SERPL-MCNC: 0.72 MG/DL (ref 0.57–1)
EGFRCR-CYS SERPLBLD CKD-EPI 2021: 84 ML/MIN/1.73
GAMMA TOCOPHEROL SERPL-MCNC: 0.4 MG/L (ref 0.5–4.9)
GLOBULIN SER CALC-MCNC: 2.2 G/DL (ref 1.5–4.5)
GLUCOSE SERPL-MCNC: 106 MG/DL (ref 65–99)
HBA1C MFR BLD: 6.4 % (ref 4.8–5.6)
HDLC SERPL-MCNC: 53 MG/DL
LDLC SERPL CALC-MCNC: 46 MG/DL (ref 0–99)
POTASSIUM SERPL-SCNC: 4.7 MMOL/L (ref 3.5–5.2)
PROT SERPL-MCNC: 6.8 G/DL (ref 6–8.5)
SODIUM SERPL-SCNC: 141 MMOL/L (ref 134–144)
TRIGL SERPL-MCNC: 141 MG/DL (ref 0–149)
VLDLC SERPL CALC-MCNC: 24 MG/DL (ref 5–40)

## 2022-09-19 ENCOUNTER — OFFICE VISIT (OUTPATIENT)
Dept: NEUROLOGY | Facility: CLINIC | Age: 82
End: 2022-09-19

## 2022-09-19 VITALS
RESPIRATION RATE: 16 BRPM | SYSTOLIC BLOOD PRESSURE: 112 MMHG | WEIGHT: 158 LBS | HEART RATE: 60 BPM | BODY MASS INDEX: 28 KG/M2 | HEIGHT: 63 IN | DIASTOLIC BLOOD PRESSURE: 60 MMHG

## 2022-09-19 DIAGNOSIS — G62.9 NEUROPATHY: ICD-10-CM

## 2022-09-19 DIAGNOSIS — R42 DIZZINESS: Primary | ICD-10-CM

## 2022-09-19 PROCEDURE — 99213 OFFICE O/P EST LOW 20 MIN: CPT | Performed by: STUDENT IN AN ORGANIZED HEALTH CARE EDUCATION/TRAINING PROGRAM

## 2022-10-11 ENCOUNTER — OFFICE VISIT (OUTPATIENT)
Dept: INTERNAL MEDICINE | Facility: CLINIC | Age: 82
End: 2022-10-11

## 2022-10-11 VITALS
OXYGEN SATURATION: 99 % | SYSTOLIC BLOOD PRESSURE: 144 MMHG | HEIGHT: 63 IN | TEMPERATURE: 98.2 F | HEART RATE: 83 BPM | BODY MASS INDEX: 28.53 KG/M2 | WEIGHT: 161 LBS | DIASTOLIC BLOOD PRESSURE: 62 MMHG

## 2022-10-11 DIAGNOSIS — J30.89 ENVIRONMENTAL AND SEASONAL ALLERGIES: Primary | ICD-10-CM

## 2022-10-11 PROCEDURE — 99213 OFFICE O/P EST LOW 20 MIN: CPT | Performed by: FAMILY MEDICINE

## 2022-10-11 RX ORDER — LORATADINE 10 MG/1
10 TABLET ORAL DAILY
Qty: 30 TABLET | Refills: 1 | Status: SHIPPED | OUTPATIENT
Start: 2022-10-11 | End: 2023-03-27

## 2022-10-11 RX ORDER — FLUTICASONE PROPIONATE 50 MCG
2 SPRAY, SUSPENSION (ML) NASAL DAILY
Qty: 16 G | Refills: 2 | Status: SHIPPED | OUTPATIENT
Start: 2022-10-11

## 2022-10-11 RX ORDER — BENZONATATE 200 MG/1
200 CAPSULE ORAL 3 TIMES DAILY PRN
Qty: 30 CAPSULE | Refills: 0 | Status: SHIPPED | OUTPATIENT
Start: 2022-10-11 | End: 2022-10-21

## 2022-10-11 NOTE — PROGRESS NOTES
"    Chief Complaint  Cough (Cough and throat is irritated )    Subjective    History of Present Illness {CC  Problem List  Visit  Diagnosis   Encounters  Notes  Medications  Labs  Result Review Imaging  Media :23}     Ashley Motta presents to [facility] for   History of Present Illness     States she had cough for the last few months. No fevers or chills. Dry cough  Taking cough drops  No medication.    some runny nose.   Headache.   Throat irritation    Social History     Socioeconomic History   • Marital status:    Tobacco Use   • Smoking status: Never   • Smokeless tobacco: Never   Vaping Use   • Vaping Use: Never used   Substance and Sexual Activity   • Alcohol use: No   • Drug use: Never   • Sexual activity: Not Currently     Birth control/protection: Post-menopausal, Surgical     Comment: .      Objective     Vital Signs:   /62   Pulse 83   Temp 98.2 °F (36.8 °C)   Ht 160 cm (63\")   Wt 73 kg (161 lb)   SpO2 99%   BMI 28.52 kg/m²   Physical Exam  Constitutional:       General: She is not in acute distress.     Appearance: She is not ill-appearing.   HENT:      Mouth/Throat:      Mouth: Mucous membranes are moist.      Pharynx: Oropharynx is clear. No oropharyngeal exudate.   Cardiovascular:      Rate and Rhythm: Regular rhythm.      Heart sounds: Normal heart sounds.   Pulmonary:      Effort: Pulmonary effort is normal. No respiratory distress.      Breath sounds: No wheezing.   Lymphadenopathy:      Cervical: No cervical adenopathy.   Neurological:      Mental Status: She is alert.        Result Review  Data Reviewed:{ Labs  Result Review  Imaging  Med Tab  Media :23}   The following data was reviewed by: Angela Carbajal MD on 10/11/2022  Lab Results - Last 18 Months   Lab Units 08/16/22  0854 03/07/22  0840 11/02/21  0739 06/09/21  0742   BUN mg/dL 11 11 12 19   CREATININE mg/dL 0.72 0.69 0.66 0.86   EGFR IF NONAFRICN AM mL/min/1.73  --  "  --  86 64   SODIUM mmol/L 141 140 138 138   POTASSIUM mmol/L 4.7 4.0 4.0 4.5   CHLORIDE mmol/L 101 105 106 105   CALCIUM mg/dL 9.7 9.7 9.2 9.5   ALBUMIN g/dL 4.6 4.40 4.50 4.40   BILIRUBIN mg/dL 0.3 0.5 0.3 0.3   ALK PHOS IU/L 91 74 80 83   AST (SGOT) IU/L 22 49* 35* 35*   ALT (SGPT) IU/L 27 58* 41* 40*   CHOLESTEROL mg/dL 123  --   --   --    TRIGLYCERIDES mg/dL 141 102 130 120   HDL CHOL mg/dL 53 51 49 45   VLDL CHOL mg/dL  --  19 23 22   VLDL CHOLESTEROL LEA mg/dL 24  --   --   --    LDL CHOL mg/dL 46 38 37 51   LDL/HDL RATIO   --  0.72  --   --    HEMOGLOBIN A1C % 6.4* 6.50* 6.20* 6.00*   WBC 10*3/mm3  --  5.97 7.69  --    RBC 10*6/mm3  --  4.73 4.65  --    HEMATOCRIT %  --  42.6 41.8  --    MCV fL  --  90.1 89.9  --    MCH pg  --  30.0 29.7  --    TSH uIU/mL  --  2.210  --   --               Current Outpatient Medications:   •  aspirin 81 MG tablet, Take 81 mg by mouth daily., Disp: , Rfl:   •  B Complex Vitamins (VITAMIN B COMPLEX PO), Take  by mouth., Disp: , Rfl:   •  Calcium Carbonate-Vitamin D (CALCIUM + D PO), Take  by mouth 2 (Two) Times a Day., Disp: , Rfl:   •  Coenzyme Q10 (COQ10) 200 MG capsule, Take  by mouth., Disp: , Rfl:   •  ECHINACEA-ZINC-VITAMIN C PO, Take 1,000 mg by mouth., Disp: , Rfl:   •  Magnesium 400 MG tablet, Take  by mouth., Disp: , Rfl:   •  Misc Natural Products (GLUCOSAMINE CHONDROITIN VIT D3) capsule, Take  by mouth., Disp: , Rfl:   •  Multiple Vitamins-Minerals (MULTIVITAMIN ADULT PO), Take  by mouth., Disp: , Rfl:   •  Omega-3 Fatty Acids (FISH OIL) 1000 MG capsule capsule, Take  by mouth daily with breakfast., Disp: , Rfl:   •  pantoprazole (PROTONIX) 20 MG EC tablet, Take 1 tablet by mouth Daily., Disp: 90 tablet, Rfl: 1  •  Probiotic Product (PROBIOTIC DAILY PO), Take  by mouth., Disp: , Rfl:   •  rOPINIRole (REQUIP) 1 MG tablet, Take one tablet 1 hour before bedtime., Disp: 90 tablet, Rfl: 0  •  simvastatin (ZOCOR) 20 MG tablet, Take 1 tablet by mouth Every Night., Disp:  90 tablet, Rfl: 3  •  sodium chloride (MICHEL 128) 5 % ophthalmic solution, INSTILL 1 DROP INTO EACH EYE THREE TIMES DAILY, Disp: , Rfl:   •  vitamin E 100 UNIT capsule, Take 100 Units by mouth Daily., Disp: , Rfl:   •  benzonatate (TESSALON) 200 MG capsule, Take 1 capsule by mouth 3 (Three) Times a Day As Needed for Cough for up to 10 days., Disp: 30 capsule, Rfl: 0  •  fluticasone (Flonase) 50 MCG/ACT nasal spray, 2 sprays into the nostril(s) as directed by provider Daily., Disp: 16 g, Rfl: 2  •  loratadine (Claritin) 10 MG tablet, Take 1 tablet by mouth Daily., Disp: 30 tablet, Rfl: 1      Assessment and Plan {CC Problem List  Visit Diagnosis  ROS  Review (Popup)  Health Maintenance  Quality  BestPractice  Medications  SmartSets  SnapShot Encounters  Media :23}   Problem List Items Addressed This Visit        Allergies and Adverse Reactions    Environmental and seasonal allergies - Primary    Relevant Medications    loratadine (Claritin) 10 MG tablet    fluticasone (Flonase) 50 MCG/ACT nasal spray    benzonatate (TESSALON) 200 MG capsule       Follow Up   Return if symptoms worsen or fail to improve.  Patient was given instructions and counseling regarding her condition or for health maintenance advice. Please see specific information pulled into the AVS if appropriate.    EpicAct:[email or web address]_ORDERS,RunParams:STARTUPTYPE=FOLLOW    [email or web address]_DISCHARGE

## 2022-10-13 ENCOUNTER — HOSPITAL ENCOUNTER (OUTPATIENT)
Dept: PHYSICAL THERAPY | Facility: HOSPITAL | Age: 82
Setting detail: THERAPIES SERIES
Discharge: HOME | End: 2022-10-13

## 2022-10-13 DIAGNOSIS — R26.81 UNSTEADY GAIT: ICD-10-CM

## 2022-10-13 DIAGNOSIS — R42 DIZZINESS: Primary | ICD-10-CM

## 2022-10-13 PROCEDURE — 97162 PT EVAL MOD COMPLEX 30 MIN: CPT

## 2022-10-13 NOTE — THERAPY EVALUATION
Outpatient Physical Therapy Vestibular Initial Evaluation  [facility]     Patient Name: Ashley Motta  : 1940  MRN: [id number]  Today's Date: 10/13/2022      Visit Date: 10/13/2022    Patient Active Problem List   Diagnosis   • Osteopenia   • Hyperlipidemia   • Pre-diabetes   • Osteoarthritis   • BALDEMAR on CPAP   • Pelvic relaxation due to vaginal prolapse   • Chronic pain of both knees   • Arthritis of right knee   • Arthritis of left knee   • Arthritis of both knees   • Restless leg syndrome   • Dizziness   • Headache, migraine   • Heartburn   • Internal hemorrhoids with complication   • Impacted cerumen of left ear   • Vitamin E deficiency        Past Medical History:   Diagnosis Date   • Actinic keratosis    • Anxiety    • Arthritis    • 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 SARAH LIRIANO   • Difficulty walking    • Difficulty walking    • Dry eye syndrome, bilateral    • Eczema 2014   • Elevated fasting glucose    • Endothelial corneal dystrophy 2020   • Genital prolapse 10/2017   • GERD (gastroesophageal reflux disease)    • Goiter, nontoxic, multinodular 2017    THYROID POLYP SEES    • Hemorrhoids    • Herpes zoster 2018   • Hyperlipidemia    • Hypersomnia 2016   • Impaired fasting glucose    • Meralgia paresthetica, left lower limb    • Migraine    • Myopia of both eyes    • Nuclear sclerosis    • BALDEMAR on CPAP     FOLLOWED BY DR. TERRY CHEUNG   • Osteoarthritis    • Osteopenia    • PLMD (periodic limb movement disorder)    • Postmenopausal atrophic vaginitis    • Prediabetes    • Presbyopia    • Rectal nodule 2020    REFERRED TO DR. PAMELA NAPOLES   • Rectocele 2017   • Regular astigmatism of both eyes    • Rheumatoid arthritis (HCC)    • RLS (restless legs syndrome)    • Seborrheic keratosis    • Skin cancer 2015    LOWER LEG,  "FOLLOWED BY DR. EDI SANCHEZ   • Urinary incontinence 09/2017   • Vaginal vault prolapse 09/2017   • Vertigo 10/18/2018    ADMITTED TO [facility]   • Vitreoretinal degeneration of both eyes         Past Surgical History:   Procedure Laterality Date   • CATARACT EXTRACTION, BILATERAL Bilateral 2015   • COLONOSCOPY N/A 02/27/2015    1 TUBULAR ADENOMA POLYP IN DISTAL ASCENDING, [facility]   • COLONOSCOPY N/A 3/4/2020    10 MM SESSILE SERRATED ADENOMA POLYP IN ASCENDING, 3 MM HYPERPLASTIC POLYP IN RECTUM, 7 MM ANAL NODULE, DR. SARAH LIRIANO AT [facility]   • HYSTERECTOMY N/A 01/19/2004    KAYLEE WITH BSO, DR. RAFIA MORTENSEN AT [facility]   • KNEE ARTHROSCOPY Left 2013   • KNEE ARTHROSCOPY Right 2010   • MELISSA CULDOPLASTY N/A 01/19/2004    DR. RAFIA MORTENSEN AT [facility]   • SACROCOLPOPEXY N/A          Visit Dx:     ICD-10-CM ICD-9-CM   1. Dizziness  R42 780.4   2. Unsteady gait  R26.81 781.2        Patient History     Row Name 10/13/22 1517             History    Chief Complaint Balance Problems;Difficulty Walking;Dizziness  -SHASTA      Date Current Problem(s) Began 09/19/22  referral date  -SHASTA      Brief Description of Current Complaint Pt reports in 2018 she got out of bed one morning and had sudden onset of dizziness and went to ER. She said she was diagnosed with \"complex migraines\" and provided with medicine. She reports she has had this dizziness ever since which effects her balance and seems to worsen with position changes such as supine to sit and rolling in bed. Pt has been working with neurologist and she mentioned dizziness last appt and he felt pt would benefit from vestibular PT.  -SHASTA      Patient/Caregiver Goals Relief from dizziness;Improve mobility  \"help with balance\"  -SHASTA      Occupation/sports/leisure activities retired, stays busy with her [employer]  -SHASTA         Pain     Pain Location Knee  -SHASTA      Pain at Present 4  -SHASTA         Fall Risk Assessment    Any falls in the past year: No  -SHASTA         Daily " "Activities    Primary Language English  -SHASTA      Are you able to read Yes  -SHASTA      Are you able to write Yes  -SHASTA      How does patient learn best? Listening;Reading;Demonstration  -SHASTA      Teaching needs identified Home Exercise Program;Falls Prevention;Home Safety;Management of Condition  -SHASTA      Patient is concerned about/has problems with Difficulty with self care (i.e. bathing, dressing, toileting:;Performing home management (household chores, shopping, care of dependents);Transfers (getting out of a chair, bed);Walking;Standing  -SHASTA      Does patient have problems with the following? Anxiety  -SHASTA      Pt Participated in POC and Goals Yes  -SHASTA         Safety    Are you being hurt, hit, or frightened by anyone at home or in your life? No  -SHASTA      Are you being neglected by a caregiver No  -SHASTA            User Key  (r) = Recorded By, (t) = Taken By, (c) = Cosigned By    Initials Name Provider Type    Rosey Boyle, PT Physical Therapist                 Vestibular Tito     Row Name 10/13/22 6541             Subjective Comments    Subjective Comments Pt reports imbalance over past few years. She does use a cane sometimes for balance but didn't bring it today.  -SHASTA         Pain Assessment    Pain Assessment 0-10  -SHASTA      Pain Score 4  -SHASTA      Pain Type Chronic pain  Pt states she gets \"cortisone\" shots every 3 months.  -SHASTA      Pain Location Knee  -SHASTA      Pain Orientation Right;Left  -SHASTA         Vestibular Objective    General Observation Pt is well nourished female who arrived ambulating independently but slowly due to painful chronic knee pain and imbalance. Pt felt more imbalance after PT so PT ambulated with pt to her car while she used a cane from therapy. Pt's   24 and now pt lives alone.  -SHASTA      Use of Assistive Devices intermittent use of cane  -SHASTA         Cognition    Orientation Level Oriented to place;Oriented to time;Oriented to situation;Oriented to person  -SHASTA         " Symptom Behavior    Type of Dizziness Imbalance;Spinning;Lightheadedness;Unsteady with head/body turns  -SHASTA      Frequency of Dizziness Several Times a Day  -SHASTA      Duration of Dizziness Seconds;Minutes  spinning seconds but imbalance can be for minutes  -SHASTA      Aggravating Factors Looking up to the ceiling;Lying supine;Turning head quickly;Turning body quickly;Supine to sit;Rolling to right;Rolling to left;Forward bending  -SHASTA      Relieving Factors Head stationary;Rest;Slow movements  see Dizziness Handicap Inventory  -SHASTA         Occulomotor Exam Fixation Present    Spontaneous Nystagmus Absent  no fixation present  -SHASTA      Smooth Pursuit Symptom Provoking  no fixation present; c/o woozy feeling  -SHASTA      Saccades Right:;Left:;Undershoots  No fixation present; c/o feeling woozy  -SHASTA         Vestibulo-Occular Reflex (VOR)    VOR to Fast Head Movement/Head Thrust Test Other  some saccadic movement with head turns  -SHASTA         VOR with Occulomotor Exam Fixation Absent     Head-Shaking Nystagmus Horizontal Absent  c/o spinning sensation  -SHASTA      Head-Shaking Nystagmus Vertical Absent  c/o spinning sensation  -SHASTA         Positional Testing    Positional Testing With infrared goggles  -SHASTA      Vertebrobasilar Artery Screen - Right Negative  -SHASTA      Vertebrobasilar Artery Screen - Left Negative  -SHASTA      Teresa-Hallpike Right Onset Time  did not note nystagmus but pt c/o spinning sensation head off mat and to sit for ~ 10 seconds  -SHASTA      Teresa-Hallpike Left Onset Time  did not note nystagmus but pt c/o spinning sensation head off mat and to sit for ~ 20 seconds; she reported symptoms stronger on L  -SHASTA         General ROM    GENERAL ROM COMMENTS AROM WFLs all extremities  -SHASTA         Sensation    Sensation Left Extremity Intact;Right Extremity Intact  although pt states neurologist told her she has neuropathy in toes/forefoot.  -SHASTA         Strength    Lumbar/Hip --  Strength overall 4+/5 except B knee extension 4/5 due  to pain  -SHASTA            User Key  (r) = Recorded By, (t) = Taken By, (c) = Cosigned By    Initials Name Provider Type    Rosey Boyle, PT Physical Therapist                   PT Neuro     Row Name 10/13/22 5474             Vision-Basic Assessment    Current Vision Wears glasses all the time  -SHASTA         Transfers    Sit-Stand Kimball (Transfers) modified independence  -SHASTA      Stand-Sit [place] (Transfers) modified independence  -SHASTA      Comment, (Transfers) slow transfer due to imbalance and painful knees  -SHASTA         Gait/Stairs (Locomotion)    [place]ball Level (Gait) independent;contact guard  indep at beginning of session; CG after CRT  -SHASTA      Assistive Device (Gait) cane, quad tip  borrowed PT's while pt ambulated to her car  -SHASTA      Deviations/Abnormal Patterns (Gait) bilateral deviations;antalgic;base of support, wide;gait speed decreased;weight shifting decreased;stride length decreased  -SHASTA      Bilateral Gait Deviations lateral trunk flexion  -SHASTA      [place]ball Level (Stairs) modified independence  -SHASTA      Handrail Location (Stairs) left side (ascending);left side (descending)  -SHASTA      Number of Steps (Stairs) 4  -SHASTA      Ascending Technique (Stairs) step-to-step  -SHASTA      Descending Technique (Stairs) step-to-step  nearly turns sideways  -SHASTA      Stairs, Safety Issues weight-shifting ability decreased  -SHASTA      Stairs, Impairments pain;impaired balance  -SHASTA      Comment, (Gait/Stairs) see DGI  -SHASTA            User Key  (r) = Recorded By, (t) = Taken By, (c) = Cosigned By    Initials Name Provider Type    Rosey Boyle, PT Physical Therapist                      Therapy Education  Education Details: Post CRT instructions provided and reviewed. Dez/Lety exercise to start in 2 days.  Given: HEP, Symptoms/condition management  Program: New  How Provided: Verbal, Written  Provided to: Patient  Level of Understanding: Teach back education performed, Verbalized       OP  Exercises     Row Name 10/13/22 1517             Subjective Comments    Subjective Comments Pt reports imbalance over past few years. She does use a cane sometimes for balance but didn't bring it today.  -         Exercise 1    Exercise Name 1 L CRT  -      Cueing 1 Verbal;Tactile  -      Reps 1 1  -SHASTA      Additional Comments Pt sat for ~ 15 minutes post CRT, provided with Ginger Ale. PT assisted pt out to her car today.  -            User Key  (r) = Recorded By, (t) = Taken By, (c) = Cosigned By    Initials Name Provider Type    Rosey Boyle, PT Physical Therapist                             PT OP Goals     Row Name 10/13/22 1517          PT Short Term Goals    STG Date to Achieve 11/10/22  -     STG 1 Pt will be independent with HEP to reduce vestibular symptoms.  -     STG 2 Pt will report reduction of vestibular symptoms with position changes by 50% of time.  -     STG 3 Pt to score 15/24 on the DGI to indicate improved balance and reduced risk for falls.  -        Long Term Goals    LTG Date to Achieve 12/08/22  -     LTG 1 Pt will be independent with HEP to reduce vestibular symptoms and improve pt's balance.  -     LTG 2 Pt to score 18/24 on the DGI to indicate improved balance and reduced risk for falls.  -     LTG 3 Pt will score < or = 12 on the Dizziness Handicap Inventory to indicate improved perceived positional and activity tolerance.  -     LTG 4 Pt will report increased use of quad tipped cane in the community to improve balance and reduce fall risk.  -        Time Calculation    PT Goal Re-Cert Due Date 11/10/22  -           User Key  (r) = Recorded By, (t) = Taken By, (c) = Cosigned By    Initials Name Provider Type    Rosey Boyle, PT Physical Therapist                 PT Assessment/Plan     Row Name 10/13/22 4159          PT Assessment    Functional Limitations Decreased safety during functional activities;Impaired gait;Limitation in home  management;Performance in self-care ADL;Performance in leisure activities  -SHASTA     Impairments Balance;Gait;Pain  dizziness/spinning sensation  -SHASTA     Assessment Comments Pt is a 83 y/o female who is referred to PT with dizziness. Pt reports ~ 4 year history of dizziness/spinning sensation that began suddenly after she got out of bed. Her Dizziness Handicap Inventory socre of 30 indicates mild symptoms but does c/o imbalance related to this issue as well.  She reports she has learned to compensate and avoid certain movements that provoke her symptoms. Pt had positive B Henderson-Hallpike L > R for symptoms. Therefore L CRT completed. Pt felt more unsteady and PT accommpanied pt to her car today. Pt scored 11/24 on the DGI some related to her vestibular symptoms but PT also feels her chronic B knee pain affects her balance as well. A cane might help and instructed pt to use more at least in the community. Pt will benefit from outpt PT for balance and vestibular rehab.  -SHASTA     Please refer to paper survey for additional self-reported information Yes  -SHASTA     Rehab Potential Good  -SHASTA     Patient/caregiver participated in establishment of treatment plan and goals Yes  -SHASTA     Patient would benefit from skilled therapy intervention Yes  -SHASTA        PT Plan    PT Frequency 1x/week  -SHASTA     Predicted Duration of Therapy Intervention (PT) 6-8 weeks  -SHASTA     Planned CPT's? PT EVAL MOD COMPLELITY: 46532;PT THER PROC EA 15 MIN: 33286;PT THER ACT EA 15 MIN: 52083;PT NEUROMUSC RE-EDUCATION EA 15 MIN: 36849;PT CANALITH REPOSITIONIN  -SHASTA     Physical Therapy Interventions (Optional Details) balance training;gait training;home exercise program;neuromuscular re-education;patient/family education;transfer training;vestibular training  -SHASTA           User Key  (r) = Recorded By, (t) = Taken By, (c) = Cosigned By    Initials Name Provider Type    Rosey Boyle, PT Physical Therapist                 Outcome Measure Options:  Dizziness Handicap Inventory, Dynamic Gait Index  Dizziness Handicap Inventory Score: 30  Dizziness Handicap Inventory  Dizziness Handicap Inventory Comments: indicates mild handicap  Dynamic Gait Index (DGI)  Gait Level Surface: Mild impairment: walks 20’, uses assistive devices, slower speed, mild gait deviations  Change in Gait Speed: Mild impairment: Is able to change speed but demonstrates mild gait deviations, or no gait deviations but unable to achieve a significant change in velocity, or uses as assistive device  Gait with Horizontal Head Turns: Mild impairment: Performs head turns smoothly with slight change in gait velocity, i.e. minor disruption to smooth gait path or uses walking aid.  Gait with Vertical Head Turns: Moderate impairment: Performs head turns with moderate change in gait velocity, slows down,staggers, but recovers, can continue to walk.  Gait and Pivot Turn: Mild impairment: pivot turns safely in >3 seconds and stops with no loss of balance.  Step Over Obstacle: Severe impairment: Cannot perform without assistance.  Step Around Obstacles: Moderate impairment: Is able to clear cones but must significantly slow speed to accomplish task, or requires verbal cueing.  Steps: Moderate impairment: Two feet to a stair, must use rail.  Dynamic Gait Index Score: 11  Dynamic Gait Index Comments: no device      Time Calculation:   Start Time: 1517  Stop Time: 1607  Time Calculation (min): 50 min  Total Timed Code Minutes- PT: 50 minute(s)  Untimed Charges  PT Eval/Re-eval Minutes: 50  Total Minutes  Untimed Charges Total Minutes: 50   Total Minutes: 50   Therapy Charges for Today     Code Description Service Date Service Provider Modifiers Qty    [id number] HC PT EVAL MOD COMPLEXITY 3 10/13/2022 Rosey Vegas, PT GP 1          PT G-Codes  Outcome Measure Options: Dizziness Handicap Inventory, Dynamic Gait Index     Patient was wearing a face mask during this therapy encounter. Therapist used  appropriate personal protective equipment including mask and gloves.  Mask used was standard procedure mask. Appropriate PPE was worn during the entire therapy session. Hand hygiene was completed before and after therapy session. Patient is not in enhanced droplet precautions.         Rosey Vegas, PT  10/13/2022

## 2022-10-17 ENCOUNTER — OFFICE VISIT (OUTPATIENT)
Dept: SLEEP MEDICINE | Facility: HOSPITAL | Age: 82
End: 2022-10-17

## 2022-10-17 VITALS
DIASTOLIC BLOOD PRESSURE: 74 MMHG | BODY MASS INDEX: 28.53 KG/M2 | SYSTOLIC BLOOD PRESSURE: 133 MMHG | HEIGHT: 63 IN | HEART RATE: 83 BPM | WEIGHT: 161 LBS | OXYGEN SATURATION: 99 %

## 2022-10-17 DIAGNOSIS — Z99.89 OSA ON CPAP: Primary | ICD-10-CM

## 2022-10-17 DIAGNOSIS — G47.33 OSA ON CPAP: Primary | ICD-10-CM

## 2022-10-17 PROBLEM — J30.89 ENVIRONMENTAL AND SEASONAL ALLERGIES: Status: ACTIVE | Noted: 2022-10-17

## 2022-10-17 PROCEDURE — G0463 HOSPITAL OUTPT CLINIC VISIT: HCPCS

## 2022-10-17 NOTE — PROGRESS NOTES
[facility]         Dr Fredi Harrison  [unfilled]  Patient Care Team:  Angela Carbajal MD as PCP - General (Family Medicine)    Chief Complaint:Study-2016- PSG:  Split study was performed.  Diagnostic portion from 10:44 PM to 1:47 AM.  Sleep efficiency 93% with 2.83 total sleep time.  Sleep distribution showed decreased REM sleep at 17%.  AHI index is 7.8 indicating mild obstructive sleep apnea.  In supine position index 11.2 and in rem sleep index 39 which is severe.  Saturation remained above 89%.  Arousal index was 16.  He 11 index high at 83 with PLM arousal index of 11.  Snoring for 7.67% of total sleep time.     Titration study from 1:47 AM to 6:02 AM.  Sleep efficiency 95%. Sleep distribution some decrease in slow-wave sleep with improvement in rem sleep to 19%.PLM index improved somewhat with PLM arousal index of 3.0 but total PLM index was 54.  CPAP increased up to 11 cm.  Maximum sleep at the11 cm water pressure.  Air leaks noted as the pressures were increased.  AHI index is less than 5 at final pressure setting.    Interval History: Patient here for compliance visit.  Currently set up on CPAP 11 cm.  Compliance today 83% average daily use 5 hours 25 minutes.  AHI and leak look excellent.  Unfortunately she lost her  recently.  Goes to bed 10 PM gets up 7 AM gets about 7 hours of sleep more rested with CPAP.  No tobacco no alcohol or caffeine abuse.  Currently has a full facemask that fits well.  Supplies have been adequate.  [place] 0 out of 24 within normal limits    REVIEW OF SYSTEMS:   CARDIOVASCULAR: No chest pain, chest pressure or chest discomfort. No palpitations or edema.   RESPIRATORY: No shortness of breath, cough or sputum.   GASTROINTESTINAL: No anorexia, nausea, vomiting or diarrhea. No abdominal pain or blood.   HEMATOLOGIC: No bleeding or bruising.     Ventilator/Non-Invasive Ventilation Settings (From admission, onward)    None            Vital Signs  Heart Rate:  " [83] 83  BP: (133)/(74) 133/74  [unfilled]  Flowsheet Rows    Flowsheet Row First Filed Value   Admission Height 160 cm (63\") Documented at 10/17/2022 0943   Admission Weight 73 kg (161 lb) Documented at 10/17/2022 0943          Physical Exam:  Patient is examined using the personal protective equipment as per guidelines from infection control for this particular patient as enacted.  Hand hygiene was performed before and after patient interaction.   General Appearance:    Alert, cooperative, in no acute distress.  Following simple commands  ENT Mallampati between 3 and 4 no nasal congestion  Neck midline trachea, no thyromegaly   Lungs:     Clear to auscultation, respirations regular, even and                  unlabored    Heart:    Regular rhythm and normal rate, normal S1 and S2, no            murmur, no gallop, no rub, no click   Chest Wall:    No abnormalities observed   Abdomen:     Normal bowel sounds, no masses, no organomegaly, soft        nontender, nondistended, no guarding, no rebound                tenderness   Extremities:   Moves all extremities well, no edema, no cyanosis, no             redness  CNS no focal neurological deficits normal sensory exam  Skin no rashes no nodules  Musculoskeletal no cyanosis no clubbing normal range of motion     Results Review:                                          I reviewed the patient's new clinical results.  I personally viewed and interpreted the patient's chest x-ray.        Medication Review:       No current facility-administered medications for this visit.      ASSESSMENT:   BALDEMAR on CPAP  Restless leg syndrome controlled with Requip    PLAN:  Reviewed compliance download with the patient  Compliance decent with AHI and leak within normal limits  Continue current CPAP 11 cm  Sleep hygiene measures  Avoidance of alcohol and caffeine close to bedtime  Restless leg currently under control with Requip  I will follow-up in 1 year      Fredi Sayied, " MD  10/17/22  09:54 EDT

## 2022-10-19 DIAGNOSIS — G25.81 RESTLESS LEG SYNDROME: ICD-10-CM

## 2022-10-19 RX ORDER — ROPINIROLE 1 MG/1
TABLET, FILM COATED ORAL
Qty: 90 TABLET | Refills: 0 | Status: SHIPPED | OUTPATIENT
Start: 2022-10-19 | End: 2022-11-29

## 2022-10-20 ENCOUNTER — HOSPITAL ENCOUNTER (OUTPATIENT)
Dept: PHYSICAL THERAPY | Facility: HOSPITAL | Age: 82
Setting detail: THERAPIES SERIES
Discharge: HOME | End: 2022-10-20

## 2022-10-20 DIAGNOSIS — R26.81 UNSTEADY GAIT: ICD-10-CM

## 2022-10-20 DIAGNOSIS — R42 DIZZINESS: Primary | ICD-10-CM

## 2022-10-20 PROCEDURE — 97112 NEUROMUSCULAR REEDUCATION: CPT

## 2022-10-20 PROCEDURE — 95992 CANALITH REPOSITIONING PROC: CPT

## 2022-10-20 NOTE — THERAPY TREATMENT NOTE
Outpatient Physical Therapy Vestibular Treatment Note  [facility]     Patient Name: Ashley Motta  : 1940  MRN: [id number]  Today's Date: 10/20/2022      Visit Date: 10/20/2022    Visit Dx:     ICD-10-CM ICD-9-CM   1. Dizziness  R42 780.4   2. Unsteady gait  R26.81 781.2       Patient Active Problem List   Diagnosis   • Osteopenia   • Hyperlipidemia   • Pre-diabetes   • Osteoarthritis   • BALDEMAR on CPAP   • Pelvic relaxation due to vaginal prolapse   • Chronic pain of both knees   • Arthritis of right knee   • Arthritis of left knee   • Arthritis of both knees   • Restless leg syndrome   • Dizziness   • Headache, migraine   • Heartburn   • Internal hemorrhoids with complication   • Impacted cerumen of left ear   • Vitamin E deficiency   • Environmental and seasonal allergies        Vestibular Eval     Row Name 10/20/22 6020             Subjective Comments    Subjective Comments Pt said she still has dizziness with Garces/Daroff with lying to R side. Still compensating to reduce symptoms.  -SHASTA         Pain Assessment    Pain Score 4  -SHASTA      Pain Type Chronic pain  -SHASTA      Pain Location Knee  -SHASTA      Pain Orientation Right;Left  -SHASTA         Vestibular Objective    General Observation Pt ambulating independently bringing her too tall cane of her 's  but slowly due to painful chronic knee pain. Pt was able to ambulate out of department on her own using cane (lowered by PT to fit her).  -SHASTA      Use of Assistive Devices intermittent use of cane  -SHASTA         Cognition    Orientation Level Oriented to place;Oriented to time;Oriented to situation;Oriented to person  -SHASTA         Symptom Behavior    Type of Dizziness Imbalance;Spinning;Unsteady with head/body turns  woozy  -SHASTA      Frequency of Dizziness Several Times a Day  -SHASTA      Duration of Dizziness Seconds;Minutes  -SHASTA      Aggravating Factors Looking up to the ceiling;Lying supine;Turning head quickly;Turning body quickly;Supine to sit;Rolling to  right;Rolling to left;Forward bending  -SHASTA      Relieving Factors Head stationary;Rest;Slow movements  Pt states she avoids some movements  -SHASTA         Occulomotor Exam Fixation Present    Saccades Right:;Left:;Undershoots  No fixation present; c/o feeling woozy/dizzy  -SHASTA         Positional Testing    Teresa-Hallpike Right No nystagmus  -SHASTA      Teresa-Hallpike Right Onset Time  Head off mat: first 2 times no symptoms, last 2 during CRT c/o spinning and woozy lasting ~ 30 seconds; to sit felt woozy, spinning for ~ 45 seconds.  -SHASTA      Allen-Hallpike Left No nystagmus  -SHASTA      Allen-Hallpike Left Onset Time  Head off mat: no symptoms; to sit: c/o feeling woozy ~ 20 seconds  -SHASTA            User Key  (r) = Recorded By, (t) = Taken By, (c) = Cosigned By    Initials Name Provider Type    Rosey Boyle, PT Physical Therapist                             PT Assessment/Plan     Row Name 10/20/22 1613          PT Assessment    Assessment Comments Pt had B positive Teresa-Hallpike but R > L and therefore R CRT completed. Pt was able to ambulate out of PT gym on her own using her cane and not as unsteady after treatment as compared to last week. Pt was receptive to additional HEP issued today.  -SHASTA           User Key  (r) = Recorded By, (t) = Taken By, (c) = Cosigned By    Initials Name Provider Type    Rosey Boyle, PT Physical Therapist                    OP Exercises     Row Name 10/20/22 1618 10/20/22 1510          Subjective Comments    Subjective Comments -- Pt said she still has dizziness with Garces/Daroff with lying to R side. Still compensating to reduce symptoms.  -SHSATA        Total Minutes    25273 -  PT Neuromuscular Reeducation Minutes 25  -SHASTA --        Exercise 2    Exercise Name 2 -- R CRT  -SHASTA     Cueing 2 -- Verbal;Tactile  -SHASTA     Reps 2 -- 2  -SHASTA     Additional Comments -- Pt c/o feeling dizzy upon sitting after each but sat ~ 10 minutes post each. She was able to ambulate on her own out of gym using her  Tripod cane.  -SHASTA        Exercise 3    Exercise Name 3 -- Sitting: Saccades  -SHASTA     Cueing 3 -- Verbal;Tactile;Demo  -SHASTA     Reps 3 -- 8-10 each way  -SHASTA     Additional Comments -- c/o increased dizziness  -SHASTA           User Key  (r) = Recorded By, (t) = Taken By, (c) = Cosigned By    Initials Name Provider Type    Rosey Boyle, PT Physical Therapist                                Therapy Education  Education Details: Reviewed post CRT instructions. Reviewed Linnea/Ariadne (pt demonstrated x1 prior to CRT); added 2 additional HEP of saccades and target - all exercises to start in 2 days.  Given: HEP, Symptoms/condition management  Program: Progressed  How Provided: Verbal, Written, Demonstration  Provided to: Patient  Level of Understanding: Teach back education performed, Verbalized, Demonstrated              Time Calculation:   Start Time: 1510  Stop Time: 1555  Time Calculation (min): 45 min  Total Timed Code Minutes- PT: 45 minute(s)  Timed Charges  23290 -  PT Neuromuscular Reeducation Minutes: 25  Untimed Charges  PT Canalith Repositionin  Total Minutes  Timed Charges Total Minutes: 25  Untimed Charges Total Minutes: 20   Total Minutes: 45   Therapy Charges for Today     Code Description Service Date Service Provider Modifiers Qty    12097867275 HC PT NEUROMUSC RE EDUCATION EA 15 MIN 10/20/2022 Rosey Vegas, PT GP 2    95021290072 HC PT CANALITH REPOSITIONING PER DAY 10/20/2022 Rosey Vegas, PT GP 1             Patient was wearing a face mask during this therapy encounter. Therapist used appropriate personal protective equipment including mask and gloves.  Mask used was standard procedure mask. Appropriate PPE was worn during the entire therapy session. Hand hygiene was completed before and after therapy session. Patient is not in enhanced droplet precautions.           Rosey Vegas, RAYMOND  10/20/2022

## 2022-10-27 ENCOUNTER — HOSPITAL ENCOUNTER (OUTPATIENT)
Dept: PHYSICAL THERAPY | Facility: HOSPITAL | Age: 82
Setting detail: THERAPIES SERIES
Discharge: HOME | End: 2022-10-27

## 2022-10-27 DIAGNOSIS — R42 DIZZINESS: Primary | ICD-10-CM

## 2022-10-27 DIAGNOSIS — R26.81 UNSTEADY GAIT: ICD-10-CM

## 2022-10-27 PROCEDURE — 97112 NEUROMUSCULAR REEDUCATION: CPT

## 2022-10-27 NOTE — THERAPY TREATMENT NOTE
"    Outpatient Physical Therapy Vestibular Treatment Note  [facility]     Patient Name: Ashley Motta  : 1940  MRN: [id number]  Today's Date: 10/27/2022      Visit Date: 10/27/2022    Visit Dx:     ICD-10-CM ICD-9-CM   1. Dizziness  R42 780.4   2. Unsteady gait  R26.81 781.2       Patient Active Problem List   Diagnosis   • Osteopenia   • Hyperlipidemia   • Pre-diabetes   • Osteoarthritis   • BALDEMAR on CPAP   • Pelvic relaxation due to vaginal prolapse   • Chronic pain of both knees   • Arthritis of right knee   • Arthritis of left knee   • Arthritis of both knees   • Restless leg syndrome   • Dizziness   • Headache, migraine   • Heartburn   • Internal hemorrhoids with complication   • Impacted cerumen of left ear   • Vitamin E deficiency   • Environmental and seasonal allergies        Vestibular Eval     Row Name 10/27/22 1515             Pain Assessment    Pain Score 4  -SHASTA      Pain Type Chronic pain  -SHASTA      Pain Location Knee  -SHASTA      Pain Orientation Right;Left  -SHASTA         Vestibular Objective    General Observation Pt ambulating independently tripod cane.  -SHASTA      Use of Assistive Devices Not using cane much at all, mostly just to PT  -SHASTA         Cognition    Orientation Level Oriented to place;Oriented to time;Oriented to situation;Oriented to person  -SHASTA         Symptom Behavior    Type of Dizziness Imbalance;Unsteady with head/body turns;Other  \"woozy\"  -SHASTA      Frequency of Dizziness Several Times a Day  -SHASTA      Duration of Dizziness Seconds  -SHASTA      Aggravating Factors Rolling to right;Rolling to left;Turning body quickly;Forward bending  from standing -- feels like going to fall over  -SHASTA      Relieving Factors Head stationary;Rest;Slow movements  -SHASTA         Positional Testing    Gibson-Hallpike Right No nystagmus  -SHASTA      Teresa-Hallpike Right Onset Time  no symptoms head off mat, or to sit  -SHASTA      Gibson-Hallpike Left No nystagmus  -SHASTA      Gibson-Hallpike Left Onset Time  Head off mat - no " "symptoms, to sit \"woozy\" feeling ~ 10 seconds  -SHASTA      Horizontal Roll Test Right No nystagmus  -SHASTA      Horizontal Roll Test Right Onset Time  no symptoms  -SHASTA      Horizontal Roll Test Left No nystagmus  -SHASTA      Horizontal Roll Test Left Onset Time  no symptoms -- also rolled from L to R and the full roll R to L and pt did not have any symptoms today.  -SHASTA            User Key  (r) = Recorded By, (t) = Taken By, (c) = Cosigned By    Initials Name Provider Type    Rosey Boyle, PT Physical Therapist                   PT Neuro     Row Name 10/27/22 1515             Subjective Comments    Subjective Comments Pt reports she is about 60% improved since PT began. Less spinning sensation now but still has imbalance, \"whoozy\" feeling with some movements.  -SHASTA         Precautions and Contraindications    Precautions/Limitations fall precautions  -SHASTA            User Key  (r) = Recorded By, (t) = Taken By, (c) = Cosigned By    Initials Name Provider Type    Rosey Boyle, PT Physical Therapist                       PT Assessment/Plan     Row Name 10/27/22 1622          PT Assessment    Assessment Comments Pt reports ~ 60% reduction in vestibular symptoms especially with spinning sensation. Still c/o woozy and off balance symptoms. Pt had negative B Teresa-Hallpike today. Negative horizontal test also. Focused on habituation exercises today to help reduce her symptoms. Pt should be using her cane more for improved gait and safety with balance.  -SHASTA           User Key  (r) = Recorded By, (t) = Taken By, (c) = Cosigned By    Initials Name Provider Type    Rosey Boyle, PT Physical Therapist                    OP Exercises     Row Name 10/27/22 1624 10/27/22 1515          Subjective Comments    Subjective Comments -- Pt reports she is about 60% improved since PT began. Less spinning sensation now but still has imbalance, \"whoozy\" feeling with some movements.  -SHASTA        Total Minutes    01828 -  PT " Neuromuscular Reeducation Minutes 45  -SHASTA --        Exercise 3    Exercise Name 3 -- Sitting: Saccades  -SHASTA     Cueing 3 -- Verbal;Tactile;Demo  -SHASTA     Reps 3 -- 8-10 each way  -SHASTA     Additional Comments -- only symptoms were difficulty with increased speed and also keeping focused on letter with saccades  -SHASTA        Exercise 4    Exercise Name 4 -- Sitting: target exercise  -SHASTA     Cueing 4 -- Verbal;Demo  -SHASTA     Reps 4 -- 10 horizontally  -SHASTA     Additional Comments -- c/o head hurting, woozy  -SHASTA        Exercise 5    Exercise Name 5 -- Standing: bend over to retrieve cone from floor and place on mat behind her.  -SHASTA     Cueing 5 -- Verbal;Tactile;Demo  -SHASTA     Reps 5 -- 5 L and R side  -SHASTA     Additional Comments -- Pt tended to hold head up instead of flexing forward toward floor, c/o feeling imbalanced and woozy. Tried with pt holding counter while bending over similar symptoms but less severe. Sitting and tip forhead to knee and sit up - no symptoms.  -SHASTA        Exercise 6    Exercise Name 6 -- Garces/Daroff  -SHASTA     Reps 6 -- 1 x each direction  -SHASTA     Additional Comments -- To R sidelying and to sit from R - no symptoms; to L woozy briefly, to sit woozy briefly.  -SHASTA           User Key  (r) = Recorded By, (t) = Taken By, (c) = Cosigned By    Initials Name Provider Type    Rosey Boyle, PT Physical Therapist                                Therapy Education  Education Details: HEP: continue with saccades, target and 2 additional today: ambulate with head turns, bend over to retrieve object from floor while holding counter. Encouraged pt to use her cane more for improved gait and safety.  Given: HEP, Symptoms/condition management, Fall prevention and home safety  Program: Progressed, Reinforced  How Provided: Verbal, Written, Demonstration  Provided to: Patient  Level of Understanding: Teach back education performed, Verbalized, Demonstrated              Time Calculation:   Start Time: 1515  Stop  Time: 1600  Time Calculation (min): 45 min  Total Timed Code Minutes- PT: 45 minute(s)  Timed Charges  83233 -  PT Neuromuscular Reeducation Minutes: 45  Total Minutes  Timed Charges Total Minutes: 45   Total Minutes: 45   Therapy Charges for Today     Code Description Service Date Service Provider Modifiers Qty    12293899645 HC PT NEUROMUSC RE EDUCATION EA 15 MIN 10/27/2022 Rosey Vegas, PT GP 3             Patient was wearing a face mask during this therapy encounter. Therapist used appropriate personal protective equipment including mask and gloves.  Mask used was standard procedure mask. Appropriate PPE was worn during the entire therapy session. Hand hygiene was completed before and after therapy session. Patient is not in enhanced droplet precautions.           Rosey Vegas, RAYMOND  10/27/2022

## 2022-11-03 ENCOUNTER — HOSPITAL ENCOUNTER (OUTPATIENT)
Dept: PHYSICAL THERAPY | Facility: HOSPITAL | Age: 82
Setting detail: THERAPIES SERIES
Discharge: HOME | End: 2022-11-03

## 2022-11-03 DIAGNOSIS — R26.81 UNSTEADY GAIT: ICD-10-CM

## 2022-11-03 DIAGNOSIS — R42 DIZZINESS: Primary | ICD-10-CM

## 2022-11-03 PROCEDURE — 97112 NEUROMUSCULAR REEDUCATION: CPT

## 2022-11-03 NOTE — THERAPY TREATMENT NOTE
"    Outpatient Physical Therapy Vestibular Treatment Note  [facility]     Patient Name: Ashley Motta  : 1940  MRN: [id number]  Today's Date: 11/3/2022      Visit Date: 2022    Visit Dx:     ICD-10-CM ICD-9-CM   1. Dizziness  R42 780.4   2. Unsteady gait  R26.81 781.2       Patient Active Problem List   Diagnosis   • Osteopenia   • Hyperlipidemia   • Pre-diabetes   • Osteoarthritis   • BALDEMAR on CPAP   • Pelvic relaxation due to vaginal prolapse   • Chronic pain of both knees   • Arthritis of right knee   • Arthritis of left knee   • Arthritis of both knees   • Restless leg syndrome   • Dizziness   • Headache, migraine   • Heartburn   • Internal hemorrhoids with complication   • Impacted cerumen of left ear   • Vitamin E deficiency   • Environmental and seasonal allergies        Vestibular Eval     Row Name 22 1516             Pain Assessment    Pain Score 6  -SHASTA      Pain Type Chronic pain  -SHASTA      Pain Location Knee  -SHASTA      Pain Orientation Left  -SHASTA         Vestibular Objective    General Observation Pt ambulating independently tripod cane but practically carrying it.  -SHASTA      Use of Assistive Devices Not using cane much at all, mostly just to PT  -SHASTA         Symptom Behavior    Type of Dizziness Imbalance;Unsteady with head/body turns  -SHASTA      Frequency of Dizziness Once a day;Several Times a Day  symptoms of imbalance improves if pt uses cane  -SHASTA      Aggravating Factors Rolling to right;Turning head quickly  -SHASTA      Relieving Factors Slow movements;Head stationary  pt said she is feeling better bending over to retrieve object from floor since doing that as an exercise/HEP.  -SHASTA            User Key  (r) = Recorded By, (t) = Taken By, (c) = Cosigned By    Initials Name Provider Type    Rosey Boyle, PT Physical Therapist                   PT Neuro     Row Name 22 8099             Subjective Comments    Subjective Comments Pt said she is 70% better; \"very little woozy\". Pt " "said she is only using her cane when she comes to PT. If she comes upon a curb and has not cane, she has to wait for someone to come along to assist her.  -SHASTA         Precautions and Contraindications    Precautions/Limitations fall precautions  -SHASTA         Subjective Pain    Able to rate subjective pain? --  -SHASTA      Pre-Treatment Pain Level --  -SHASTA      Post-Treatment Pain Level --  -SHASTA      Subjective Pain Comment --  -SHASTA         Bed Mobility    Bed Mobility rolling left;rolling right  -SHASTA      Rolling Left [place]uthrie (Bed Mobility) independent  -SHASTA      Rolling Right [place] (Bed Mobility) independent  -SHASTA      Comment, (Bed Mobility) Pt rolled from L to R and vice versa 3 x today and did not have any dizziness or spinning.  -SHASTA         Gait/Stairs (Locomotion)    Guthrie Level (Gait) independent  -SHASTA      Assistive Device (Gait) cane, quad tip  -SHASTA      Deviations/Abnormal Patterns (Gait) bilateral deviations;antalgic;base of support, wide;gait speed decreased;weight shifting decreased;stride length decreased  -SHASTA      Bilateral Gait Deviations lateral trunk flexion  -SHASTA      Gait Assessment/Intervention Pt had poor sequence with cane, practically carrying cane and ambulating with wide BRENDA. Cued pt to decreased BRENDA and cane sequencing and pt very pleased, decrease in antalgic gait.  -SHASTA      Guthrie Level (Stairs) modified independence  -SHASTA      Assistive Device (Stairs) cane, quad tip  -SHASTA      Handrail Location (Stairs) left side (ascending);left side (descending)  -SHASTA      Number of Steps (Stairs) 4  -SHASTA      Ascending Technique (Stairs) step-to-step  -SHASTA      Descending Technique (Stairs) step-to-step  -SHASTA         Curb Negotiation (Mobility)    G[place]rie, Curb Negotiation modified independence  cues for technique  -SHASTA      Assistive Device (Curb Negotiation) cane, tripod  -SHASTA      Comment, Curb Negotiation (Mobility) 6\"  -SHASTA         Balance Skills Training    Gait Balance-Level of " "Assistance Close supervision;Contact guard  -SHASTA      Gait Balance Support No upper extremity supported;assistive device  -SHASTA      Gait Balance Activities stepping over object  over shoebox -- could not step over wihtout assist without device. Did better with cane.  -SHASTA            User Key  (r) = Recorded By, (t) = Taken By, (c) = Cosigned By    Initials Name Provider Type    Rosey Boyle, PT Physical Therapist                       PT Assessment/Plan     Row Name 11/03/22 1631          PT Assessment    Assessment Comments Pt continues to report reduction in vestibular symptoms and unable to elicit any symptoms with rolling that pt says occurs sometimes at home. Spent much time on gait with cane on level surface, stairs, curb and gait quality. Much improved gait quality with cane but still needing to convince pt that the cane helps her. Some issues she has now is strictly balance issues related to not using cane and with her painful arthritic knees affecting her.  -SHASTA           User Key  (r) = Recorded By, (t) = Taken By, (c) = Cosigned By    Initials Name Provider Type    Rosey Boyle, PT Physical Therapist                    OP Exercises     Row Name 11/03/22 1635 11/03/22 1516          Subjective Comments    Subjective Comments -- Pt said she is 70% better; \"very little woozy\". Pt said she is only using her cane when she comes to PT. If she comes upon a curb and has not cane, she has to wait for someone to come along to assist her.  -SHASTA        Subjective Pain    Able to rate subjective pain? -- --  -SHASTA     Pre-Treatment Pain Level -- --  -SHASTA     Post-Treatment Pain Level -- --  -SHASTA     Subjective Pain Comment -- --  -SHASTA        Total Minutes    58966 -  PT Neuromuscular Reeducation Minutes 44  -SHASTA --        Exercise 5    Exercise Name 5 -- Standing: bend over to retrieve cone from floor and place on table beside her. Also reach over head on shelf to move cones.  -SHASTA     Additional Comments -- Pt " mentioned she feels off balance when tips head far back to look up or look down. Reviewed she can reach to hold onto something.  -SHASTA           User Key  (r) = Recorded By, (t) = Taken By, (c) = Cosigned By    Initials Name Provider Type    Rosey Boyle, PT Physical Therapist                                Therapy Education  Education Details: Reviewed gait with cane on level floor, stairs, curb, cues to reduce BRENDA and cane sequence.  Given: HEP, Symptoms/condition management, Fall prevention and home safety  Program: Reinforced  How Provided: Verbal, Written, Demonstration  Provided to: Patient  Level of Understanding: Teach back education performed, Verbalized, Demonstrated              Time Calculation:   Start Time: 1516  Stop Time: 1600  Time Calculation (min): 44 min  Total Timed Code Minutes- PT: 44 minute(s)  Timed Charges  02789 -  PT Neuromuscular Reeducation Minutes: 44  Total Minutes  Timed Charges Total Minutes: 44   Total Minutes: 44   Therapy Charges for Today     Code Description Service Date Service Provider Modifiers Qty    03669187732 HC PT NEUROMUSC RE EDUCATION EA 15 MIN 11/3/2022 Rosey Vegas, PT GP 3             Patient was wearing a face mask during this therapy encounter. Therapist used appropriate personal protective equipment including mask and gloves.  Mask used was standard procedure mask. Appropriate PPE was worn during the entire therapy session. Hand hygiene was completed before and after therapy session. Patient is not in enhanced droplet precautions.           Rosey Vegas, PT  11/3/2022

## 2022-11-10 ENCOUNTER — HOSPITAL ENCOUNTER (OUTPATIENT)
Dept: PHYSICAL THERAPY | Facility: HOSPITAL | Age: 82
Setting detail: THERAPIES SERIES
Discharge: HOME | End: 2022-11-10

## 2022-11-10 DIAGNOSIS — R42 DIZZINESS: Primary | ICD-10-CM

## 2022-11-10 DIAGNOSIS — R26.81 UNSTEADY GAIT: ICD-10-CM

## 2022-11-10 PROCEDURE — 97112 NEUROMUSCULAR REEDUCATION: CPT

## 2022-11-10 NOTE — THERAPY DISCHARGE NOTE
Outpatient Physical Therapy Neuro Treatment Note/Discharge Summary  [facility]     Patient Name: Ashley Motta  : 1940  MRN: [id number]  Today's Date: 11/10/2022      Visit Date: 11/10/2022    Visit Dx:    ICD-10-CM ICD-9-CM   1. Dizziness  R42 780.4   2. Unsteady gait  R26.81 781.2       Patient Active Problem List   Diagnosis   • Osteopenia   • Hyperlipidemia   • Pre-diabetes   • Osteoarthritis   • BALDEMAR on CPAP   • Pelvic relaxation due to vaginal prolapse   • Chronic pain of both knees   • Arthritis of right knee   • Arthritis of left knee   • Arthritis of both knees   • Restless leg syndrome   • Dizziness   • Headache, migraine   • Heartburn   • Internal hemorrhoids with complication   • Impacted cerumen of left ear   • Vitamin E deficiency   • Environmental and seasonal allergies             PT Neuro     Row Name 11/10/22 7003             Transfers    Sit-Stand [place] (Transfers) independent  -SHASTA      Stand-Sit [place] (Transfers) independent  -SHASTA         Gait/Stairs (Locomotion)    [place] Level (Gait) independent  -SHASTA      Assistive Device (Gait) other (see comments)  no device  -SHASTA      Deviations/Abnormal Patterns (Gait) bilateral deviations;weight shifting decreased  -SHASTA      Bilateral Gait Deviations lateral trunk flexion  to L > R  -SHASTA      [place] Level (Stairs) independent  -SHASTA      Handrail Location (Stairs) left side (ascending);left side (descending)  -SHASTA      Number of Steps (Stairs) 4 x 2  -SHASTA      Ascending Technique (Stairs) step-to-step;step-over-step  prefers step to step due to painful knee  -SHASTA      Descending Technique (Stairs) step-to-step;step-over-step  -SHASTA      Stairs, Impairments pain  -SHASTA      Comment, (Gait/Stairs) see DGI  -SHASTA            User Key  (r) = Recorded By, (t) = Taken By, (c) = Cosigned By    Initials Name Provider Type    SHASTA Rosey Vegas, PT Physical Therapist                   Vestibular Eval     Row Name 11/10/22 3061              "Subjective Comments    Subjective Comments Pt said she has decided that she wants a L TKR. She hopes to talk to orthopedist next week when she has her cortisone shot to L knee. Pt denies any dizziness/spinning now. She reports improved balance and has been walking \"like you told me with my feet closer together.\" She keeps her cane in her truck and uses it when she has to walk long distance or feels more imbalanced. Pt denies any concerns about d/c PT at this time.  -SHASTA         Pain Assessment    Pain Assessment 0-10  -SHASTA      Pain Score 5  -SHASTA      Pain Type Chronic pain  -SHASTA      Pain Location Knee  -SHASTA      Pain Orientation Left  -SHASTA         Vestibular Objective    General Observation Pt ambulating independently without assistive device.  -SHASTA         Cognition    Orientation Level Oriented to place;Oriented to time;Oriented to situation;Oriented to person  -SHASTA         Symptom Behavior    Type of Dizziness Imbalance  intermittent imbalance; see Dizziness Handicap Inventory  -SHASTA      Relieving Factors Slow movements  or uses cane  -SHASTA         Occulomotor Exam Fixation Present    Smooth Pursuit Normal  -SHASTA      Saccades Intact  -SHASTA         VOR with Occulomotor Exam Fixation Absent     Head-Shaking Nystagmus Horizontal Absent  c/o feeling a little \"woozy\"  -SHASTA      Head-Shaking Nystagmus Vertical Absent  c/o feeling a little \"woozy\"  -SHASTA         Positional Testing    Teresa-Hallpike Right No nystagmus  -SHASTA      Teresa-Hallpike Right Onset Time  Head off mat: felt a little \"woozy\", no spinning; to sit no symptoms  -SHASTA      Mooers-Hallpike Left No nystagmus  -SHASTA      Mooers-Hallpike Left Onset Time  Head off mat: a little \"woozy\"; to sit felt off a little off balance. No spinning.  -SHASTA            User Key  (r) = Recorded By, (t) = Taken By, (c) = Cosigned By    Initials Name Provider Type    SHASTA Rosey Vegas PT Physical Therapist                   PT Assessment/Plan     Row Name 11/10/22 4174          PT Assessment    " "Assessment Comments Pt reports reduction in spinning sensation with score of 4 on the Dizziness Handicap Inventory (reduction of 26 points). She did c/o wooziness with Pine-Hallpike but no spinning which might be related to BP and change of position. Pt improved gait quality with less antalgic gait with cues to decrease BRENDA. Pt reports using her cane a little more in the community now. Pt scored 18/24 on the DGI today (increased score by 7 points) indicating improved balance. However, her L knee pain still interferes with her gait and pt will be talking to orthopedist about setting up TKR surgery soon. Pt has met PT goals addressing vestibular and balance issues and will be d/c today.  -SHASTA           User Key  (r) = Recorded By, (t) = Taken By, (c) = Cosigned By    Initials Name Provider Type    Rosey Boyle, PT Physical Therapist                      OP Exercises     Row Name 11/10/22 1524 11/10/22 1402          Subjective Comments    Subjective Comments -- Pt said she has decided that she wants a L TKR. She hopes to talk to orthopedist next week when she has her cortisone shot to L knee. Pt denies any dizziness/spinning now. She reports improved balance and has been walking \"like you told me with my feet closer together.\" She keeps her cane in her truck and uses it when she has to walk long distance or feels more imbalanced. Pt denies any concerns about d/c PT at this time.  -SHASTA        Total Minutes    07134 -  PT Neuromuscular Reeducation Minutes 48  -SHASTA --           User Key  (r) = Recorded By, (t) = Taken By, (c) = Cosigned By    Initials Name Provider Type    Rosey Boyle, PT Physical Therapist                               PT OP Goals     Row Name 11/10/22 1402          PT Short Term Goals    STG 1 Pt will be independent with HEP to reduce vestibular symptoms.  -SHASTA     STG 1 Progress Met  -SHASTA     STG 2 Pt will report reduction of vestibular symptoms with position changes by 50% of time.  -SHASTA     " STG 2 Progress Met  -SHASTA     STG 3 Pt to score 15/24 on the DGI to indicate improved balance and reduced risk for falls.  -SHASTA     STG 3 Progress Met  -SHASTA     STG 3 Progress Comments 18/24 on retest today  -SHASTA        Long Term Goals    LTG 1 Pt will be independent with HEP to reduce vestibular symptoms and improve pt's balance.  -SHASTA     LTG 1 Progress Met  -SHASTA     LTG 2 Pt to score 18/24 on the DGI to indicate improved balance and reduced risk for falls.  -SHASTA     LTG 2 Progress Met  -SHASTA     LTG 2 Progress Comments 18/24 on retest today  -SHASTA     LTG 3 Pt will score < or = 12 on the Dizziness Handicap Inventory to indicate improved perceived positional and activity tolerance.  -SHASTA     LTG 3 Progress Met  -SHASTA     LTG 3 Progress Comments score of 4 today  -SHASTA     LTG 4 Pt will report increased use of quad tipped cane in the community to improve balance and reduce fall risk.  -SHASTA     LTG 4 Progress Partially Met  -SHASTA           User Key  (r) = Recorded By, (t) = Taken By, (c) = Cosigned By    Initials Name Provider Type    Rosey Boyle PT Physical Therapist                Therapy Education  Education Details: Reviewed HEP with pt doing visual tracking, saccades and target exercises. Practiced step over shoebox with/without cane.  Given: HEP, Symptoms/condition management, Fall prevention and home safety  Program: Reinforced  How Provided: Verbal, Demonstration  Provided to: Patient  Level of Understanding: Teach back education performed, Verbalized, Demonstrated    Outcome Measure Options: Dizziness Handicap Inventory, Dynamic Gait Index  Dynamic Gait Index (DGI)  Gait Level Surface: Mild impairment: walks 20’, uses assistive devices, slower speed, mild gait deviations  Change in Gait Speed: Normal: Able to smoothly change walking speed without loss of balance or gait deviation. Shows significant difference in walking speeds between normal, fast and slow paces.  Gait with Horizontal Head Turns: Mild impairment:  Performs head turns smoothly with slight change in gait velocity, i.e. minor disruption to smooth gait path or uses walking aid.  Gait with Vertical Head Turns: Normal: Performs head turns smoothly with no change in gait.  Gait and Pivot Turn: Normal: Pivot turns safely within 3 seconds and stops quickly with no loss of balance.  Step Over Obstacle: Moderate impairment: Is able to step over box but must stop, then step over. May require verbal cueing.  Step Around Obstacles: Normal: Is able to walk safely around cones safely without changing gait speed, no evidence of imbalance.  Steps: Moderate impairment: Two feet to a stair, must use rail.  Dynamic Gait Index Score: 18  Dynamic Gait Index Comments: no device    Time Calculation:   Start Time: 1402  Stop Time: 1450  Time Calculation (min): 48 min  Total Timed Code Minutes- PT: 48 minute(s)  Timed Charges  57622 -  PT Neuromuscular Reeducation Minutes: 48  Total Minutes  Timed Charges Total Minutes: 48   Total Minutes: 48     Therapy Charges for Today     Code Description Service Date Service Provider Modifiers Qty    95256431596 HC PT NEUROMUSC RE EDUCATION EA 15 MIN 11/10/2022 Rosey Vegas, PT GP 3          PT G-Codes  Outcome Measure Options: Dizziness Handicap Inventory, Dynamic Gait Index     OP PT Discharge Summary  Date of Discharge: 11/10/22  Reason for Discharge: All goals achieved  Outcomes Achieved: Able to achieve all goals within established timeline  Discharge Destination: Home without follow-up, Home with home program    Patient was wearing a face mask during this therapy encounter. Therapist used appropriate personal protective equipment including mask and gloves.  Mask used was standard procedure mask. Appropriate PPE was worn during the entire therapy session. Hand hygiene was completed before and after therapy session. Patient is not in enhanced droplet precautions.         Rosey Vegas, PT  11/10/2022

## 2022-11-17 ENCOUNTER — OFFICE VISIT (OUTPATIENT)
Dept: INTERNAL MEDICINE | Facility: CLINIC | Age: 82
End: 2022-11-17

## 2022-11-17 ENCOUNTER — APPOINTMENT (OUTPATIENT)
Dept: PHYSICAL THERAPY | Facility: HOSPITAL | Age: 82
End: 2022-11-17

## 2022-11-17 VITALS
HEIGHT: 63 IN | HEART RATE: 63 BPM | SYSTOLIC BLOOD PRESSURE: 124 MMHG | DIASTOLIC BLOOD PRESSURE: 60 MMHG | TEMPERATURE: 97.9 F | BODY MASS INDEX: 28.88 KG/M2 | WEIGHT: 163 LBS | OXYGEN SATURATION: 99 %

## 2022-11-17 DIAGNOSIS — R05.3 CHRONIC COUGH: Primary | ICD-10-CM

## 2022-11-17 PROCEDURE — 99213 OFFICE O/P EST LOW 20 MIN: CPT | Performed by: FAMILY MEDICINE

## 2022-11-17 RX ORDER — PANTOPRAZOLE SODIUM 20 MG/1
20 TABLET, DELAYED RELEASE ORAL DAILY
Qty: 90 TABLET | Refills: 1 | Status: SHIPPED | OUTPATIENT
Start: 2022-11-17

## 2022-11-17 RX ORDER — ALBUTEROL SULFATE 90 UG/1
2 AEROSOL, METERED RESPIRATORY (INHALATION) EVERY 6 HOURS PRN
Qty: 18 G | Refills: 1 | Status: SHIPPED | OUTPATIENT
Start: 2022-11-17 | End: 2023-03-27

## 2022-11-17 NOTE — PROGRESS NOTES
"    Chief Complaint  Cough    Subjective    History of Present Illness {CC  Problem List  Visit  Diagnosis   Encounters  Notes  Medications  Labs  Result Review Imaging  Media :23}     Ashley Motta presents to [facility] for   History of Present Illness       81 yo female present for follow up on cough. She states she is still has some cough despite medication she has taken for cough. Cough worse in the evening.    No shortness of breath.       Social History     Socioeconomic History   • Marital status:    Tobacco Use   • Smoking status: Never   • Smokeless tobacco: Never   Vaping Use   • Vaping Use: Never used   Substance and Sexual Activity   • Alcohol use: No   • Drug use: Never   • Sexual activity: Not Currently     Birth control/protection: Post-menopausal, Surgical     Comment: .      Objective     Vital Signs:   /60   Pulse 63   Temp 97.9 °F (36.6 °C)   Ht 160 cm (63\")   Wt 73.9 kg (163 lb)   SpO2 99%   BMI 28.87 kg/m²   Physical Exam  Constitutional:       General: She is not in acute distress.     Appearance: She is not ill-appearing.   HENT:      Head: Normocephalic.   Eyes:      Conjunctiva/sclera: Conjunctivae normal.   Cardiovascular:      Rate and Rhythm: Regular rhythm.      Heart sounds: Normal heart sounds.   Pulmonary:      Effort: No respiratory distress.      Breath sounds: Normal breath sounds. No wheezing.   Abdominal:      Palpations: Abdomen is soft.   Neurological:      Mental Status: She is alert and oriented to person, place, and time.   Psychiatric:         Mood and Affect: Mood normal.        Result Review  Data Reviewed:{ Labs  Result Review  Imaging  Med Tab  Media :23}   The following data was reviewed by: Angela Carbajal MD on 11/17/2022  Lab Results - Last 18 Months   Lab Units 08/16/22  0854 03/07/22  0840 11/02/21  0739 06/09/21  0742   BUN mg/dL 11 11 12 19   CREATININE mg/dL 0.72 0.69 0.66 0.86   EGFR IF " NONAFRICN AM mL/min/1.73  --   --  86 64   SODIUM mmol/L 141 140 138 138   POTASSIUM mmol/L 4.7 4.0 4.0 4.5   CHLORIDE mmol/L 101 105 106 105   CALCIUM mg/dL 9.7 9.7 9.2 9.5   ALBUMIN g/dL 4.6 4.40 4.50 4.40   BILIRUBIN mg/dL 0.3 0.5 0.3 0.3   ALK PHOS IU/L 91 74 80 83   AST (SGOT) IU/L 22 49* 35* 35*   ALT (SGPT) IU/L 27 58* 41* 40*   CHOLESTEROL mg/dL 123  --   --   --    TRIGLYCERIDES mg/dL 141 102 130 120   HDL CHOL mg/dL 53 51 49 45   VLDL CHOL mg/dL  --  19 23 22   VLDL CHOLESTEROL LEA mg/dL 24  --   --   --    LDL CHOL mg/dL 46 38 37 51   LDL/HDL RATIO   --  0.72  --   --    HEMOGLOBIN A1C % 6.4* 6.50* 6.20* 6.00*   WBC 10*3/mm3  --  5.97 7.69  --    RBC 10*6/mm3  --  4.73 4.65  --    HEMATOCRIT %  --  42.6 41.8  --    MCV fL  --  90.1 89.9  --    MCH pg  --  30.0 29.7  --    TSH uIU/mL  --  2.210  --   --               Current Outpatient Medications:   •  aspirin 81 MG tablet, Take 81 mg by mouth daily., Disp: , Rfl:   •  B Complex Vitamins (VITAMIN B COMPLEX PO), Take  by mouth., Disp: , Rfl:   •  Calcium Carbonate-Vitamin D (CALCIUM + D PO), Take  by mouth 2 (Two) Times a Day., Disp: , Rfl:   •  Coenzyme Q10 (COQ10) 200 MG capsule, Take  by mouth., Disp: , Rfl:   •  ECHINACEA-ZINC-VITAMIN C PO, Take 1,000 mg by mouth., Disp: , Rfl:   •  fluticasone (Flonase) 50 MCG/ACT nasal spray, 2 sprays into the nostril(s) as directed by provider Daily., Disp: 16 g, Rfl: 2  •  loratadine (Claritin) 10 MG tablet, Take 1 tablet by mouth Daily., Disp: 30 tablet, Rfl: 1  •  Magnesium 400 MG tablet, Take  by mouth., Disp: , Rfl:   •  Misc Natural Products (GLUCOSAMINE CHONDROITIN VIT D3) capsule, Take  by mouth., Disp: , Rfl:   •  Multiple Vitamins-Minerals (MULTIVITAMIN ADULT PO), Take  by mouth., Disp: , Rfl:   •  Omega-3 Fatty Acids (FISH OIL) 1000 MG capsule capsule, Take  by mouth daily with breakfast., Disp: , Rfl:   •  pantoprazole (Protonix) 20 MG EC tablet, Take 1 tablet by mouth Daily. Before dinner, Disp: 90  tablet, Rfl: 1  •  Probiotic Product (PROBIOTIC DAILY PO), Take  by mouth., Disp: , Rfl:   •  rOPINIRole (REQUIP) 1 MG tablet, TAKE ONE TABLET BY MOUTH ONE HOUR BEFORE BEDTIME, Disp: 90 tablet, Rfl: 0  •  simvastatin (ZOCOR) 20 MG tablet, Take 1 tablet by mouth Every Night., Disp: 90 tablet, Rfl: 3  •  sodium chloride (MICHEL 128) 5 % ophthalmic solution, INSTILL 1 DROP INTO EACH EYE THREE TIMES DAILY, Disp: , Rfl:   •  vitamin E 100 UNIT capsule, Take 100 Units by mouth Daily., Disp: , Rfl:   •  albuterol sulfate  (90 Base) MCG/ACT inhaler, Inhale 2 puffs Every 6 (Six) Hours As Needed for Wheezing (cough)., Disp: 18 g, Rfl: 1      Assessment and Plan {CC Problem List  Visit Diagnosis  ROS  Review (Popup)  Health Maintenance  Quality  BestPractice  Medications  SmartSets  SnapShot Encounters  Media :23}   Problem List Items Addressed This Visit        Pulmonary and Pneumonias    Chronic cough - Primary    Current Assessment & Plan     Chronic not improved with cough medication   Not takng GERD medicaiton ,stop several months ago   Will restart taking 30 min prior to bedtime  pumonary referral placed for further evaluation if medication for GERD does not improve cough         Relevant Orders    Ambulatory Referral to Pulmonology       Follow Up   Return if symptoms worsen or fail to improve.  Patient was given instructions and counseling regarding her condition or for health maintenance advice. Please see specific information pulled into the AVS if appropriate.    EpicAct:[email or web address]_ORDERS,RunParams:STARTUPTYPE=FOLLOW    [email or web address]_DISCHARGE

## 2022-11-18 ENCOUNTER — CLINICAL SUPPORT (OUTPATIENT)
Dept: ORTHOPEDIC SURGERY | Facility: CLINIC | Age: 82
End: 2022-11-18

## 2022-11-18 VITALS — HEIGHT: 63 IN | BODY MASS INDEX: 28.83 KG/M2 | WEIGHT: 162.7 LBS | RESPIRATION RATE: 12 BRPM | TEMPERATURE: 96.7 F

## 2022-11-18 DIAGNOSIS — M17.0 PRIMARY OSTEOARTHRITIS OF BOTH KNEES: Primary | ICD-10-CM

## 2022-11-18 PROCEDURE — 20610 DRAIN/INJ JOINT/BURSA W/O US: CPT | Performed by: NURSE PRACTITIONER

## 2022-11-18 RX ORDER — METHYLPREDNISOLONE ACETATE 80 MG/ML
80 INJECTION, SUSPENSION INTRA-ARTICULAR; INTRALESIONAL; INTRAMUSCULAR; SOFT TISSUE
Status: COMPLETED | OUTPATIENT
Start: 2022-11-18 | End: 2022-11-18

## 2022-11-18 RX ORDER — LIDOCAINE HYDROCHLORIDE 10 MG/ML
1 INJECTION, SOLUTION EPIDURAL; INFILTRATION; INTRACAUDAL; PERINEURAL
Status: COMPLETED | OUTPATIENT
Start: 2022-11-18 | End: 2022-11-18

## 2022-11-18 RX ADMIN — LIDOCAINE HYDROCHLORIDE 1 ML: 10 INJECTION, SOLUTION EPIDURAL; INFILTRATION; INTRACAUDAL; PERINEURAL at 08:41

## 2022-11-18 RX ADMIN — METHYLPREDNISOLONE ACETATE 80 MG: 80 INJECTION, SUSPENSION INTRA-ARTICULAR; INTRALESIONAL; INTRAMUSCULAR; SOFT TISSUE at 08:41

## 2022-11-18 NOTE — PROGRESS NOTES
Ashley Motta is here today for worsening Bilateral knee pain. Knee injection was discussed with the patient in detail, including indication, risks, benefits, and alternatives. Verbal consent was given for the procedure. Injection site was aseptically prepared.      KNEE Injection Procedure Note:    Large Joint Arthrocentesis: R knee  Date/Time: 11/18/2022 8:41 AM  Consent given by: patient  Site marked: site marked  Timeout: Immediately prior to procedure a time out was called to verify the correct patient, procedure, equipment, support staff and site/side marked as required   Supporting Documentation  Indications: pain, joint swelling and diagnostic evaluation   Procedure Details  Location: knee - R knee  Preparation: Patient was prepped and draped in the usual sterile fashion  Needle gauge: 21G.  Medications administered: 80 mg methylPREDNISolone acetate 80 MG/ML; 1 mL lidocaine PF 1% 1 %  Patient tolerance: patient tolerated the procedure well with no immediate complications    Large Joint Arthrocentesis: L knee  Date/Time: 11/18/2022 8:41 AM  Consent given by: patient  Site marked: site marked  Timeout: Immediately prior to procedure a time out was called to verify the correct patient, procedure, equipment, support staff and site/side marked as required   Supporting Documentation  Indications: pain   Procedure Details  Location: knee - L knee  Preparation: Patient was prepped and draped in the usual sterile fashion  Needle gauge: 21G.  Medications administered: 80 mg methylPREDNISolone acetate 80 MG/ML; 1 mL lidocaine PF 1% 1 %  Patient tolerance: patient tolerated the procedure well with no immediate complications      Ashley Motta tolerated the procedure well. A Bandage was applied to the injection site. At the conclusion of the injection I discussed the importance of continued quad strengthening exercises on a daily basis. I will see the patient back if the symptoms should fail to improve or  worsen.    -Patient states she is thinking that she may need to do the knee surgery soon.  She said that the left is probably going to be the first knee.  She states she has nobody at home however her  recently passed away and she is living on her own.  I discussed rehab and the insurance making determinations about that, she verbalized understanding.  I instructed her that I will put an order in for physical therapy and she should start this to see if we can improve her knees in the meantime.  She can schedule a follow-up appointment with Dr. Rico in 6 weeks to discuss surgery.  She verbalized understanding is in agreement this plan.    Doris Johnson, APRN  11/18/2022    Dictated Utilizing Dragon Dictation

## 2022-11-20 PROBLEM — R05.3 CHRONIC COUGH: Status: ACTIVE | Noted: 2022-11-20

## 2022-11-20 NOTE — ASSESSMENT & PLAN NOTE
Chronic not improved with cough medication   Not takng GERD medicaiton ,stop several months ago   Will restart taking 30 min prior to bedtime  pumonary referral placed for further evaluation if medication for GERD does not improve cough

## 2022-11-28 DIAGNOSIS — G25.81 RESTLESS LEG SYNDROME: ICD-10-CM

## 2022-11-29 RX ORDER — ROPINIROLE 1 MG/1
TABLET, FILM COATED ORAL
Qty: 90 TABLET | Refills: 1 | Status: SHIPPED | OUTPATIENT
Start: 2022-11-29

## 2022-12-01 ENCOUNTER — APPOINTMENT (OUTPATIENT)
Dept: PHYSICAL THERAPY | Facility: HOSPITAL | Age: 82
End: 2022-12-01
Payer: MEDICARE

## 2022-12-15 ENCOUNTER — APPOINTMENT (OUTPATIENT)
Dept: BONE DENSITY | Facility: HOSPITAL | Age: 82
End: 2022-12-15

## 2022-12-15 ENCOUNTER — APPOINTMENT (OUTPATIENT)
Dept: MAMMOGRAPHY | Facility: HOSPITAL | Age: 82
End: 2022-12-15

## 2022-12-16 ENCOUNTER — HOSPITAL ENCOUNTER (OUTPATIENT)
Dept: PHYSICAL THERAPY | Facility: HOSPITAL | Age: 82
Setting detail: THERAPIES SERIES
Discharge: HOME | End: 2022-12-16
Payer: MEDICARE

## 2022-12-16 DIAGNOSIS — R26.2 DIFFICULTY WALKING: ICD-10-CM

## 2022-12-16 DIAGNOSIS — G89.29 CHRONIC PAIN OF BOTH KNEES: Primary | ICD-10-CM

## 2022-12-16 DIAGNOSIS — M25.562 CHRONIC PAIN OF BOTH KNEES: Primary | ICD-10-CM

## 2022-12-16 DIAGNOSIS — M25.561 CHRONIC PAIN OF BOTH KNEES: Primary | ICD-10-CM

## 2022-12-16 PROCEDURE — 97161 PT EVAL LOW COMPLEX 20 MIN: CPT

## 2022-12-16 PROCEDURE — 97110 THERAPEUTIC EXERCISES: CPT

## 2022-12-16 NOTE — THERAPY EVALUATION
Outpatient Physical Therapy Ortho Initial Evaluation  [facility]     Patient Name: Ashley Motta  : 1940  MRN: [id number]  Today's Date: 2022      Visit Date: 2022    Patient Active Problem List   Diagnosis   • Osteopenia   • Hyperlipidemia   • Pre-diabetes   • Osteoarthritis   • BALDEMAR on CPAP   • Pelvic relaxation due to vaginal prolapse   • Chronic pain of both knees   • Arthritis of right knee   • Arthritis of left knee   • Arthritis of both knees   • Restless leg syndrome   • Dizziness   • Headache, migraine   • Heartburn   • Internal hemorrhoids with complication   • Impacted cerumen of left ear   • Vitamin E deficiency   • Environmental and seasonal allergies   • Chronic cough        Past Medical History:   Diagnosis Date   • Actinic keratosis    • Anxiety    • Arthritis    • 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 SARAH LIRIANO   • Difficulty walking    • Difficulty walking    • Dry eye syndrome, bilateral    • Eczema 2014   • Elevated fasting glucose    • Endothelial corneal dystrophy 2020   • Genital prolapse 10/2017   • GERD (gastroesophageal reflux disease)    • Goiter, nontoxic, multinodular 2017    THYROID POLYP SEES    • Hemorrhoids    • Herpes zoster 2018   • Hyperlipidemia    • Hypersomnia 2016   • Impaired fasting glucose    • Meralgia paresthetica, left lower limb    • Migraine    • Myopia of both eyes    • Nuclear sclerosis    • BALDEMAR on CPAP     FOLLOWED BY DR. TERRY CHEUNG   • Osteoarthritis    • Osteopenia    • PLMD (periodic limb movement disorder)    • Postmenopausal atrophic vaginitis    • Prediabetes    • Presbyopia    • Rectal nodule 2020    REFERRED TO DR. PAMELA NAPOLES   • Rectocele 2017   • Regular astigmatism of both eyes    • Rheumatoid arthritis (HCC)    • RLS (restless legs syndrome)    •  Seborrheic keratosis    • Skin cancer 04/2015    LOWER LEG, FOLLOWED BY DR. EDI SANCHEZ   • Urinary incontinence 09/2017   • Vaginal vault prolapse 09/2017   • Vertigo 10/18/2018    [facility]ITTED TO [facility]   • Vitreoretinal degeneration of both eyes         Past Surgical History:   Procedure Laterality Date   • CATARACT EXTRACTION, BILATERAL Bilateral 2015   • COLONOSCOPY N/A 02/27/2015    1 TUBULAR ADENOMA POLYP IN DISTAL ASCENDING, [facility]   • COLONOSCOPY N/A 3/4/2020    10 MM SESSILE SERRATED ADENOMA POLYP IN ASCENDING, 3 MM HYPERPLASTIC POLYP IN RECTUM, 7 MM ANAL NODULE, DR. SARAH LIRIANO AT [facility]   • HYSTERECTOMY N/A 01/19/2004    KAYLEE WITH BSO, DR. RAFIA MORTENSEN AT [facility]   • KNEE ARTHROSCOPY Left 2013   • KNEE ARTHROSCOPY Right 2010   • MELISSA CULDOPLASTY N/A 01/19/2004    DR. RAFIA MORTENSEN AT [facility]   • SACROCOLPOPEXY N/A        Visit Dx:     ICD-10-CM ICD-9-CM   1. Chronic pain of both knees  M25.561 719.46    M25.562 338.29    G89.29    2. Difficulty walking  R26.2 719.7          Patient History     Row Name 12/16/22 0900             History    Chief Complaint Pain  -LB      Type of Pain Knee pain  -LB      Date Current Problem(s) Began 12/16/21  -LB      Brief Description of Current Complaint Pt reports chronic B knee pain. L > R usually with L calf swelling and radiating pain to L foot. R knee pain is worsening as well with intermittent days worse than L. She has appt. with Dr. Liriano 1/6/23 to discuss surgical intervention. She has had injections for years and states the last 2 have been less effective. She lives in one [place] home with 4 MILDRED. She does not use AD. She denies falls this year. Knees are worse with standing in place or walking.  -LB      Previous treatment for THIS PROBLEM Injections  -LB      Patient/Caregiver Goals Relieve pain;Return to prior level of function;Improve mobility;Improve strength;Know what to do to help the symptoms  -LB      Hand Dominance right-handed  -LB       Occupation/sports/leisure activities Retired, walks for exercise.  -LB      Patient seeing anyone else for problem(s)? yes  -LB      How has patient tried to help current problem? injections  -LB      What clinical tests have you had for this problem? X-ray  -LB      Results of Clinical Tests endstage OA B  -LB      History of Previous Related Injuries chronic knee pain  -LB         Pain     Pain Location Knee  -LB      Pain at Present 8  -LB      Pain at Best 4  -LB      Pain at Worst 9  -LB      Pain Frequency Intermittent  -LB      Pain Description Aching;Sore  -LB      What Performance Factors Make the Current Problem(s) WORSE? standing, walking  -LB      What Performance Factors Make the Current Problem(s) BETTER? rest  -LB      Tolerance Time- Standing short time period  -LB      Tolerance Time- Sitting reduced pain  -LB      Tolerance Time- Walking inc pain  -LB      Tolerance Time- Lying no issue  -LB      Is your sleep disturbed? No  -LB      What position do you sleep in? Supine  -LB      Difficulties at work? Pt retired.  -LB      Difficulties with ADL's? Inc pain with ADLs.  -LB      Difficulties with recreational activities? Unable to walk for exercise.  -LB         Fall Risk Assessment    Any falls in the past year: No  -LB         Services    Prior Rehab/Home Health Experiences Yes  -LB      When was the prior experience with Rehab/Home Health 2022  -LB      Where was the prior experience with Rehab/Home Health [facility]  -LB      Are you currently receiving Home Health services No  -LB      Do you plan to receive Home Health services in the near future No  -LB         Daily Activities    Primary Language English  -LB      Pt Participated in POC and Goals Yes  -LB         Safety    Are you being hurt, hit, or frightened by anyone at home or in your life? No  -LB      Are you being neglected by a caregiver No  -LB      Have you had any of the following issues with N/A  -LB            User Key  (r) =  Recorded By, (t) = Taken By, (c) = Cosigned By    Initials Name Provider Type    LB Alexus Rachel, PT Physical Therapist                 PT Ortho     Row Name 12/16/22 0900       Subjective Comments    Subjective Comments My L calf muscle has been hurting.  -LB       Subjective Pain    Able to rate subjective pain? yes  -LB    Pre-Treatment Pain Level 6  -LB       Myotomal Screen- Lower Quarter Clearing    Hip flexion (L2) Bilateral:;4+ (Good +)  -LB    Knee extension (L3) Left:;4+ (Good +);Right:;4 (Good)  -LB    Ankle DF (L4) Right:;4+ (Good +);Left:;4 (Good)  -LB    Ankle PF (S1) Bilateral:;4- (Good -)  -LB    Knee flexion (S2) Bilateral:;5 (Normal)  -LB       Lumbar/SI Special Tests    Slump Test (Neural Tension) Left:;Positive  -LB    SLR (Neural Tension) Negative  -LB       Knee Palpation    Medial Joint Line Right:;Tender  -LB    Lateral Joint Line Right:;Tender  -LB       Knee Special Tests    Anterior drawer (ACL lesion) Negative  -LB    Posterior drawer (PCL lesion) Negative  -LB    Valgus stress (MCL lesion) Negative  -LB    Varus stress (LCL lesion) Negative  -LB    Colt’s test (meniscal lesion) Negative  -LB       General ROM    GENERAL ROM COMMENTS L knee lacking 4 deg-130 deg; R knee 0-124 deg  -LB       MMT (Manual Muscle Testing)    General MMT Comments dec B quad strength, otherwise strength WFL  -LB       Sensation    Sensation WNL? WNL  -LB       Lower Extremity Flexibility    Hamstrings Bilateral:;Moderately limited  -LB    Hip Flexors Bilateral:;Mildly limited  -LB    Quadriceps Bilateral:;Mildly limited  -LB    Hip External Rotators Bilateral:;Mildly limited  -LB    Hip Internal Rotators Bilateral:;Mildly limited  -LB       Gait/Stairs (Locomotion)    Ray Level (Gait) independent  -LB    Distance in Feet (Gait) 50  -LB    Pattern (Gait) step-to  -LB    Deviations/Abnormal Patterns (Gait) bilateral deviations;weight shifting decreased;antalgic;gait speed decreased  -LB           User Key  (r) = Recorded By, (t) = Taken By, (c) = Cosigned By    Initials Name Provider Type    Alexus Ward PT Physical Therapist                            Therapy Education  Education Details: issued HEP, discussed need for strengthening to improve joint stability and reduce joint forces  Given: HEP, Symptoms/condition management, Mobility training, Pain management  Program: New  How Provided: Verbal, Demonstration, Written  Provided to: Patient  Level of Understanding: Teach back education performed, Verbalized, Demonstrated      PT OP Goals     Row Name 12/16/22 0900          PT Short Term Goals    STG Date to Achieve 12/30/22  -LB     STG 1 Pt will demonstrate understanding and compliance with initial HEP.  -LB     STG 1 Progress New  -LB     STG 2 Pt will verbalize reduced L calf pain by 50%.  -LB     STG 2 Progress New  -LB     STG 3 Pt will verbalize pain at worse dec from 9/10 to 6/10 or better.  -LB     STG 3 Progress New  -LB        Long Term Goals    LTG Date to Achieve 01/15/23  -LB     LTG 1 Pt will demonstrate R knee extension to 0 deg.  -LB     LTG 1 Progress New  -LB     LTG 2 Pt will demonstrate B quad strength improved to 5/5 to prepare for TKR.  -LB     LTG 2 Progress New  -LB     LTG 3 Pt will demonstrate understanding and compliance with advanced HEP to allow continued strengthening in preparation of TKR.  -LB     LTG 3 Progress New  -LB        Time Calculation    PT Goal Re-Cert Due Date 03/16/23  -LB           User Key  (r) = Recorded By, (t) = Taken By, (c) = Cosigned By    Initials Name Provider Type    Alexus Ward PT Physical Therapist                 PT Assessment/Plan     Row Name 12/16/22 1001          PT Assessment    Functional Limitations Impaired gait;Impaired locomotion;Limitation in home management;Limitations in community activities;Limitations in functional capacity and performance;Performance in leisure activities;Performance in self-care ADL  -LB     Impairments  Edema;Endurance;Gait;Impaired flexibility;Impaired muscle power;Joint mobility;Joint integrity;Locomotion;Muscle strength;Pain;Range of motion  -LB     Assessment Comments Pt is 82 y.o. female referred to outpatient physical therapy for evaluation and treatment of  evolving  bilateral knee pain, L primarily radiating down calf to foot, R at anterior knee along patella. Pain inc with standing/walking and chronic in nature.  Patient presents with near full AROM, pain with resisted movement, TTP R knee joint line, negative for L calf redness, hot, tender, swelling. PMHx consistent with chronic B knee pain, BPPV. Personal factors affecting her care include 4 MILDRED, unable to walk for exercise, considering TKR next year. Pt demonstrates signs and symptoms  consistent with referring diagnosis.  Pt scored 100% disability on the KOS ADL. Pt is limited in their ability to participate in walking, standing, exercise. She will benefit from continued skilled PT services to address functional deficits. Thank you for this referral.  -LB     Rehab Potential Good  -LB     Patient/caregiver participated in establishment of treatment plan and goals Yes  -LB     Patient would benefit from skilled therapy intervention Yes  -LB        PT Plan    PT Frequency 1x/week;2x/week  -LB     Predicted Duration of Therapy Intervention (PT) 6-8 visits  -LB     Planned CPT's? PT EVAL LOW COMPLEXITY: 41163;PT RE-EVAL: 58732;PT THER PROC EA 15 MIN: 77180;PT THER ACT EA 15 MIN: 75436;PT MANUAL THERAPY EA 15 MIN: 17360;PT NEUROMUSC RE-EDUCATION EA 15 MIN: 86069;PT GAIT TRAINING EA 15 MIN: 32485;PT SELF CARE/HOME MGMT/TRAIN EA 15: 44564  -LB     PT Plan Comments focus on L LE flexibility, radiculopathy vs. knee pain, hip/core strengthening, consider Nustep warm up, hip bridge, lateral tband, LAQ, standing HR/HS curl.  -LB           User Key  (r) = Recorded By, (t) = Taken By, (c) = Cosigned By    Initials Name Provider Type    Alexus Ward, PT Physical  Therapist                   OP Exercises     Row Name 12/16/22 0900             Subjective Comments    Subjective Comments My L calf muscle has been hurting.  -LB         Subjective Pain    Able to rate subjective pain? yes  -LB      Pre-Treatment Pain Level 6  -LB         Total Minutes    65513 - PT Therapeutic Exercise Minutes 15  -LB         Exercise 1    Exercise Name 1 seated HS stretch  -LB      Reps 1 3  -LB      Time 1 20  -LB         Exercise 2    Exercise Name 2 supine QS  -LB      Sets 2 2  -LB      Reps 2 10  -LB      Time 2 5  -LB         Exercise 3    Exercise Name 3 HL glute set  -LB      Sets 3 2  -LB      Reps 3 10  -LB      Time 3 5  -LB         Exercise 4    Exercise Name 4 HL hip abduction  -LB      Sets 4 2  -LB      Reps 4 1  -LB      Time 4 GTB  -LB         Exercise 5    Exercise Name 5 SAQ  -LB      Sets 5 2  -LB      Reps 5 10  -LB      Time 5 2  -LB         Exercise 6    Exercise Name 6 supine L sciatic nerve glide  -LB      Sets 6 3  -LB      Reps 6 10  -LB            User Key  (r) = Recorded By, (t) = Taken By, (c) = Cosigned By    Initials Name Provider Type    Alexus Ward, PT Physical Therapist                              Outcome Measure Options: Knee Outcome Score- ADL  Knee Outcome Survey Activities of Daily Living Scale  Symptoms: Pain: The symptom prevents me from all daily activity  Symptoms: Stiffness: The symptom prevents me from all daily activity  Symptoms: Swelling: The symptom prevents me from all daily activity  Symptoms: Giving way, buckling, or shifting of the knee: The symptom prevents me from all daily activity  Symptoms: Weakness: The symptom prevents me from all daily activity  Symptoms: Limping: The symptom prevents me from all daily activity  Functional Limitations with ADL's: Walk: I am unable to  Functional Limitations with ADL's: Go up stairs: I am unable to  Functional Limitations with ADL's: Go down stairs: I am unable to  Functional Limitations with  ADL's: Stand: I am unable to  Functional Limitations with ADL's: Kneel on front of your knee: I am unable to  Functional Limitations with ADL's: Squat: I am unable to  Functional Limitations with ADL's: Sit with your knee bent: I am unable to  Functional Limitations with ADL's: Rise from a chair: I am unable to  Knee Outcome Summary ADL's Score: 0 %  Knee Outcome Score Comments: 100% disability      Time Calculation:     Start Time: 0915  Stop Time: 1000  Time Calculation (min): 45 min  Total Timed Code Minutes- PT: 15 minute(s)  Timed Charges  03547 - PT Therapeutic Exercise Minutes: 15  Total Minutes  Timed Charges Total Minutes: 15   Total Minutes: 15     Therapy Charges for Today     Code Description Service Date Service Provider Modifiers Qty    42508131361 HC PT THER PROC EA 15 MIN 12/16/2022 Alexus Rachel, PT GP 1    57893241525 HC PT EVAL LOW COMPLEXITY 2 12/16/2022 Alexus Rachel, PT GP 1          PT G-Codes  Outcome Measure Options: Knee Outcome Score- ADL         Alexus Rachel PT  12/16/2022

## 2022-12-19 ENCOUNTER — HOSPITAL ENCOUNTER (OUTPATIENT)
Dept: PHYSICAL THERAPY | Facility: HOSPITAL | Age: 82
Setting detail: THERAPIES SERIES
Discharge: HOME | End: 2022-12-19
Payer: MEDICARE

## 2022-12-19 DIAGNOSIS — R26.2 DIFFICULTY WALKING: ICD-10-CM

## 2022-12-19 DIAGNOSIS — M25.561 CHRONIC PAIN OF BOTH KNEES: Primary | ICD-10-CM

## 2022-12-19 DIAGNOSIS — G89.29 CHRONIC PAIN OF BOTH KNEES: Primary | ICD-10-CM

## 2022-12-19 DIAGNOSIS — M25.562 CHRONIC PAIN OF BOTH KNEES: Primary | ICD-10-CM

## 2022-12-19 PROCEDURE — 97110 THERAPEUTIC EXERCISES: CPT | Performed by: PHYSICAL THERAPIST

## 2022-12-19 PROCEDURE — 97140 MANUAL THERAPY 1/> REGIONS: CPT | Performed by: PHYSICAL THERAPIST

## 2022-12-19 NOTE — THERAPY TREATMENT NOTE
Outpatient Physical Therapy Ortho Treatment Note  [facility]     Patient Name: Ashley Motta  : 1940  MRN: [id number]  Today's Date: 2022      Visit Date: 2022    Visit Dx:    ICD-10-CM ICD-9-CM   1. Chronic pain of both knees  M25.561 719.46    M25.562 338.29    G89.29    2. Difficulty walking  R26.2 719.7       Patient Active Problem List   Diagnosis   • Osteopenia   • Hyperlipidemia   • Pre-diabetes   • Osteoarthritis   • BALDEMAR on CPAP   • Pelvic relaxation due to vaginal prolapse   • Chronic pain of both knees   • Arthritis of right knee   • Arthritis of left knee   • Arthritis of both knees   • Restless leg syndrome   • Dizziness   • Headache, migraine   • Heartburn   • Internal hemorrhoids with complication   • Impacted cerumen of left ear   • Vitamin E deficiency   • Environmental and seasonal allergies   • Chronic cough        Past Medical History:   Diagnosis Date   • Actinic keratosis    • Anxiety    • Arthritis    • 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 SARAH LIRIANO   • Difficulty walking    • Difficulty walking    • Dry eye syndrome, bilateral    • Eczema 2014   • Elevated fasting glucose    • Endothelial corneal dystrophy 2020   • Genital prolapse 10/2017   • GERD (gastroesophageal reflux disease)    • Goiter, nontoxic, multinodular 2017    THYROID POLYP SEES    • Hemorrhoids    • Herpes zoster 2018   • Hyperlipidemia    • Hypersomnia 2016   • Impaired fasting glucose    • Meralgia paresthetica, left lower limb    • Migraine    • Myopia of both eyes    • Nuclear sclerosis    • BALDEMAR on CPAP     FOLLOWED BY DR. TERRY CHEUNG   • Osteoarthritis    • Osteopenia    • PLMD (periodic limb movement disorder)    • Postmenopausal atrophic vaginitis    • Prediabetes    • Presbyopia    • Rectal nodule 2020    REFERRED TO   PAMELA NAPOLES   • Rectocele 07/2017   • Regular astigmatism of both eyes    • Rheumatoid arthritis (HCC)    • RLS (restless legs syndrome)    • Seborrheic keratosis    • Skin cancer 04/2015    LOWER LEG, FOLLOWED BY DR. EDI SANCHEZ   • Urinary incontinence 09/2017   • Vaginal vault prolapse 09/2017   • Vertigo 10/18/2018    ADMITTED TO [facility]   • Vitreoretinal degeneration of both eyes         Past Surgical History:   Procedure Laterality Date   • CATARACT EXTRACTION, BILATERAL Bilateral 2015   • COLONOSCOPY N/A 02/27/2015    1 TUBULAR ADENOMA POLYP IN DISTAL ASCENDING[facility]   • COLONOSCOPY N/A 3/4/2020    10 MM SESSILE SERRATED ADENOMA POLYP IN ASCENDING, 3 MM HYPERPLASTIC POLYP IN RECTUM, 7 MM ANAL NODULE, DR. SARAH LIRIANO AT [facility]   • HYSTERECTOMY N/A 01/19/2004    KAYLEE WITH BSO, DR. RAFIA MORTENSEN AT [facility]   • KNEE ARTHROSCOPY Left 2013   • KNEE ARTHROSCOPY Right 2010   • MELISSA CULDOPLASTY N/A 01/19/2004    DR. RAFIA MORTENSEN AT [facility]   • SACROCOLPOPEXY N/A                         PT Assessment/Plan     Row Name 12/19/22 1534          PT Assessment    Assessment Comments Ms. Motta returns for her first follow up PT session for bilateral knee pain/L calf pain. She reports increased knee pain walking in from the parking lot. She is not using AD. Reviewed possible benefits of the use of an AD to decrease stress to her tissues, espeically given the increase BRENDA/lateral sway bilaterally.  Encourged her to consider trial of ice to her knees to see if this helps. We reviewed current HEP and added several new exercises, only 2 for home (HR, gastroc stretch). She reports pain witih QS bilaterally, so we trialed decreased force and time to develop force, but she continued to report pain. Trial of gentle STM L posterior hip girdle tissue.  Ms. Motta is to return to ortho MD early next year to discuss possible TKA. She remains a good candidate for skilled physical therapy.  -GJ        PT Plan    PT  Plan Comments asses response to exercises, STM. Continue to work on hip girdle LE strength, likely keep HEP simple, ? SLR  -GJ           User Key  (r) = Recorded By, (t) = Taken By, (c) = Cosigned By    Initials Name Provider Type    GJ Jose Singh, PT Physical Therapist                   OP Exercises     Row Name 12/19/22 1451 12/19/22 1400          Subjective Comments    Subjective Comments -- it hurts just to walk in from the parking lot (knees)  -GJ        Total Minutes    47359 - PT Therapeutic Exercise Minutes 30  -GJ --     56559 - PT Manual Therapy Minutes 9  -GJ --        Exercise 1    Exercise Name 1 -- seated HS stretch  -GJ     Cueing 1 -- Verbal;Tactile  -GJ     Reps 1 -- 3  -GJ     Time 1 -- 20  -GJ        Exercise 2    Exercise Name 2 -- supine QS  -GJ     Cueing 2 -- Verbal;Tactile  -GJ     Sets 2 -- --  -GJ     Reps 2 -- 10  -GJ     Time 2 -- 5  -GJ     Additional Comments -- pt reports increase in pain  -GJ        Exercise 3    Exercise Name 3 -- HL glute set  -GJ     Cueing 3 -- Verbal;Tactile;Demo  -GJ     Sets 3 -- 2  -GJ     Reps 3 -- 10  -GJ     Time 3 -- 5  -GJ        Exercise 4    Exercise Name 4 -- HL hip abduction  -GJ     Cueing 4 -- Verbal;Demo  -GJ     Sets 4 -- 2  -GJ     Reps 4 -- 10  -GJ     Time 4 -- GTB  -GJ     Additional Comments -- 3s  -GJ        Exercise 5    Exercise Name 5 -- SAQ, B  -GJ     Cueing 5 -- Verbal;Tactile;Demo  -GJ     Sets 5 -- 2  -GJ     Reps 5 -- 10  -GJ     Time 5 -- 2  -GJ     Additional Comments -- 2#  -GJ        Exercise 6    Exercise Name 6 -- supine L sciatic nerve glide  -GJ     Cueing 6 -- Verbal  -GJ     Sets 6 -- 3  -GJ     Reps 6 -- 10  -GJ        Exercise 7    Exercise Name 7 -- Nustep  -GJ     Time 7 -- 5 min  -GJ     Additional Comments -- UE/LE, L 5  -GJ        Exercise 8    Exercise Name 8 -- HR  -GJ     Cueing 8 -- Verbal;Demo  -GJ     Reps 8 -- 15  -GJ        Exercise 9    Exercise Name 9 -- standing HS curl, B  -GJ     Cueing 9 --  Verbal;Demo  -GJ     Reps 9 -- 15  -GJ     Time 9 -- 0#  -GJ     Additional Comments -- 1 hand support  -GJ        Exercise 10    Exercise Name 10 -- standing gastroc stretch at wall  -GJ     Cueing 10 -- Verbal;Demo  -GJ     Reps 10 -- 3  -GJ     Time 10 -- 20s  -GJ           User Key  (r) = Recorded By, (t) = Taken By, (c) = Cosigned By    Initials Name Provider Type    Jose Thomas, PT Physical Therapist                         Manual Rx (last 36 hours)     Manual Treatments     Row Name 12/19/22 1500 12/19/22 1451          Total Minutes    42712 - PT Manual Therapy Minutes -- 9  -GJ        Manual Rx 1    Manual Rx 1 Location STM L posterior glut tissues  -GJ --     Manual Rx 1 Type pt in R SL  -GJ --           User Key  (r) = Recorded By, (t) = Taken By, (c) = Cosigned By    Initials Name Provider Type    Jose Thomas, PT Physical Therapist                 PT OP Goals     Row Name 12/19/22 1400          PT Short Term Goals    STG Date to Achieve 12/30/22  -GJ     STG 1 Pt will demonstrate understanding and compliance with initial HEP.  -GJ     STG 1 Progress Ongoing  -GJ     STG 2 Pt will verbalize reduced L calf pain by 50%.  -GJ     STG 2 Progress Ongoing  -GJ     STG 3 Pt will verbalize pain at worse dec from 9/10 to 6/10 or better.  -GJ     STG 3 Progress Ongoing  -GJ        Long Term Goals    LTG Date to Achieve 01/15/23  -GJ     LTG 1 Pt will demonstrate R knee extension to 0 deg.  -GJ     LTG 1 Progress Ongoing  -GJ     LTG 2 Pt will demonstrate B quad strength improved to 5/5 to prepare for TKR.  -GJ     LTG 2 Progress Ongoing  -GJ     LTG 3 Pt will demonstrate understanding and compliance with advanced HEP to allow continued strengthening in preparation of TKR.  -GJ     LTG 3 Progress Ongoing  -GJ           User Key  (r) = Recorded By, (t) = Taken By, (c) = Cosigned By    Initials Name Provider Type    Jose Thomas, PT Physical Therapist                Therapy Education  Education  Details: discussed benefits of the use of cane to decrease stress to tissues, encoruaged pt to consider using ice 10 min 1-2 x's per day vs. heat that she is using.  Given: HEP, Symptoms/condition management, Pain management, Posture/body mechanics, Mobility training, Edema management  Program: New, Reinforced, Progressed  How Provided: Verbal, Demonstration, Written  Provided to: Patient  Level of Understanding: Teach back education performed, Demonstrated, Verbalized              Time Calculation:   Start Time: 1450  Stop Time: 1530  Time Calculation (min): 40 min  Timed Charges  01356 - PT Therapeutic Exercise Minutes: 30  72367 - PT Manual Therapy Minutes: 9  Total Minutes  Timed Charges Total Minutes: 39   Total Minutes: 39  Therapy Charges for Today     Code Description Service Date Service Provider Modifiers Qty    [id number]  PT THER PROC EA 15 MIN 12/19/2022 Jose Singh, PT GP 2    [id number]  PT MANUAL THERAPY EA 15 MIN 12/19/2022 Jose Singh, PT GP 1                    Jose Singh, PT  12/19/2022

## 2023-01-04 ENCOUNTER — OFFICE VISIT (OUTPATIENT)
Dept: ORTHOPEDIC SURGERY | Facility: CLINIC | Age: 83
End: 2023-01-04
Payer: MEDICARE

## 2023-01-04 VITALS — WEIGHT: 167.8 LBS | BODY MASS INDEX: 29.73 KG/M2 | TEMPERATURE: 97.1 F | HEIGHT: 63 IN

## 2023-01-04 DIAGNOSIS — M17.11 ARTHRITIS OF RIGHT KNEE: Primary | ICD-10-CM

## 2023-01-04 PROCEDURE — 1160F RVW MEDS BY RX/DR IN RCRD: CPT | Performed by: ORTHOPAEDIC SURGERY

## 2023-01-04 PROCEDURE — 1159F MED LIST DOCD IN RCRD: CPT | Performed by: ORTHOPAEDIC SURGERY

## 2023-01-04 PROCEDURE — 99214 OFFICE O/P EST MOD 30 MIN: CPT | Performed by: ORTHOPAEDIC SURGERY

## 2023-01-04 RX ORDER — CHLORHEXIDINE GLUCONATE 500 MG/1
1 CLOTH TOPICAL TAKE AS DIRECTED
Status: CANCELLED | OUTPATIENT
Start: 2023-01-04

## 2023-01-04 RX ORDER — PREGABALIN 75 MG/1
150 CAPSULE ORAL ONCE
Status: CANCELLED | OUTPATIENT
Start: 2023-04-11 | End: 2023-01-04

## 2023-01-04 RX ORDER — CEFAZOLIN SODIUM 2 G/100ML
2 INJECTION, SOLUTION INTRAVENOUS ONCE
Status: CANCELLED | OUTPATIENT
Start: 2023-04-11 | End: 2023-01-04

## 2023-01-04 RX ORDER — MELOXICAM 15 MG/1
15 TABLET ORAL ONCE
Status: CANCELLED | OUTPATIENT
Start: 2023-04-11 | End: 2023-01-04

## 2023-01-04 RX ORDER — ACETAMINOPHEN 325 MG/1
1000 TABLET ORAL ONCE
Status: CANCELLED | OUTPATIENT
Start: 2023-04-11 | End: 2023-01-04

## 2023-01-04 NOTE — PROGRESS NOTES
Patient Name: Ashley Motta   YOB: 1940  Referring Primary Care Physician: Agnela Carbajal MD  BMI: Body mass index is 29.72 kg/m².    Chief Complaint:    Chief Complaint   Patient presents with   • Left Knee - Follow-up   Right knee pain    HPI:     Ashley Motta is a 82 y.o. female who presents today for evaluation of   Chief Complaint   Patient presents with   • Left Knee - Follow-up   .  Patient follows up today has been having trouble chronically in her left knee but now the right knee bothers her more.  She is had injections in including viscosupplementation which did not help.  She is been to physical therapy and does water exercises and the pain is interfering with quality of her life she wishes to talk about surgical options at this point.      Subjective   Medications:   Home Medications:  Current Outpatient Medications on File Prior to Visit   Medication Sig   • albuterol sulfate  (90 Base) MCG/ACT inhaler Inhale 2 puffs Every 6 (Six) Hours As Needed for Wheezing (cough).   • aspirin 81 MG tablet Take 81 mg by mouth daily.   • B Complex Vitamins (VITAMIN B COMPLEX PO) Take  by mouth.   • Calcium Carbonate-Vitamin D (CALCIUM + D PO) Take  by mouth 2 (Two) Times a Day.   • Coenzyme Q10 (COQ10) 200 MG capsule Take  by mouth.   • ECHINACEA-ZINC-VITAMIN C PO Take 1,000 mg by mouth.   • fluticasone (Flonase) 50 MCG/ACT nasal spray 2 sprays into the nostril(s) as directed by provider Daily.   • loratadine (Claritin) 10 MG tablet Take 1 tablet by mouth Daily.   • Magnesium 400 MG tablet Take  by mouth.   • Misc Natural Products (GLUCOSAMINE CHONDROITIN VIT D3) capsule Take  by mouth.   • Multiple Vitamins-Minerals (MULTIVITAMIN ADULT PO) Take  by mouth.   • Omega-3 Fatty Acids (FISH OIL) 1000 MG capsule capsule Take  by mouth daily with breakfast.   • pantoprazole (Protonix) 20 MG EC tablet Take 1 tablet by mouth Daily. Before dinner   • Probiotic Product (PROBIOTIC DAILY PO) Take  by  mouth.   • rOPINIRole (REQUIP) 1 MG tablet TAKE 1 TABLET BY MOUTH ONE HOUR BEFORE BEDTIME   • simvastatin (ZOCOR) 20 MG tablet Take 1 tablet by mouth Every Night.   • sodium chloride (MICHEL 128) 5 % ophthalmic solution INSTILL 1 DROP INTO EACH EYE THREE TIMES DAILY   • vitamin E 100 UNIT capsule Take 100 Units by mouth Daily.     No current facility-administered medications on file prior to visit.     Current Medications:  Scheduled Meds:  Continuous Infusions:No current facility-administered medications for this visit.    PRN Meds:.    I have reviewed the patient's medical history in detail and updated the computerized patient record.  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 TERRY CHEUNG   • Osteoarthritis    • Osteopenia    • PLMD (periodic limb movement disorder)    • Postmenopausal atrophic vaginitis    • Prediabetes    • Presbyopia    • Rectal nodule 03/2020    REFERRED TO DR. PAMELA NAPOLES   • Rectocele 07/2017   • Regular astigmatism of both eyes    • Rheumatoid arthritis (HCC)    • RLS (restless legs syndrome)    • Seborrheic keratosis    • Skin  cancer 04/2015    LOWER LEG, FOLLOWED BY DR. EDI SANCHEZ   • Urinary incontinence 09/2017   • Vaginal vault prolapse 09/2017   • Vertigo 10/18/2018    ADMITTED TO [facility]   • Vitreoretinal degeneration of both eyes         Past Surgical History:   Procedure Laterality Date   • CATARACT EXTRACTION, BILATERAL Bilateral 2015   • COLONOSCOPY N/A 02/27/2015    1 TUBULAR ADENOMA POLYP IN DISTAL [facility]END[facility]   • COLONOSCOPY N/A 3/4/2020    10 MM SESSILE SERRATED ADENOMA POLYP IN ASCENDING, 3 MM HYPERPLASTIC POLYP IN RECTUM, 7 MM ANAL NODULE, DR. SARAH LIRIANO AT [facility]   • HYSTERECTOMY N/A 01/19/2004    KAYLEE WITH BSO, DR. RAFIA MORTENSEN AT [facility]   • KNEE ARTHROSCOPY Left 2013   • KNEE ARTHROSCOPY Right 2010   • MELISSA CULDOPLASTY N/A 01/19/2004    DR. RAFIA MORTENSEN AT [facility]   • SACROCOLPOPEXY N/A         Social History     Occupational History   • Not on file   Tobacco Use   • Smoking status: Never   • Smokeless tobacco: Never   Vaping Use   • Vaping Use: Never used   Substance and Sexual Activity   • Alcohol use: No   • Drug use: Never   • Sexual activity: Not Currently     Birth control/protection: Post-menopausal, Surgical     Comment: .      Social History     Social History Narrative   • Not on file        Family History   Problem Relation Age of Onset   • Heart disease Mother    • Lung cancer Father    • Hypertension Father    • Heart disease Father    • Cancer Father    • Heart attack Brother    • Emphysema Brother    • COPD Brother    • Depression Brother    • Heart attack Brother         Had stents placed 2009       ROS: 14 point review of systems was performed and all other systems were reviewed and are negative except for documented findings in HPI and today's encounter.     Allergies: No Known Allergies  Constitutional:  Denies fever, shaking or chills   Eyes:  Denies change in visual acuity   HENT:  Denies nasal congestion or sore throat   Respiratory:  Denies cough or  shortness of breath   Cardiovascular:  Denies chest pain or severe LE edema   GI:  Denies abdominal pain, nausea, vomiting, bloody stools or diarrhea   Musculoskeletal:  Numbness, tingling, pain, or loss of motor function only as noted above in history of present illness.  : Denies painful urination or hematuria  Integument:  Denies rash, lesion or ulceration   Neurologic:  Denies headache or focal weakness  Endocrine:  Denies lymphadenopathy  Psych:  Denies confusion or change in mental status   Hem:  Denies active bleeding    OBJECTIVE:  Physical Exam: 82 y.o. female  Wt Readings from Last 3 Encounters:   01/04/23 76.1 kg (167 lb 12.8 oz)   11/18/22 73.8 kg (162 lb 11.2 oz)   11/17/22 73.9 kg (163 lb)     Ht Readings from Last 1 Encounters:   01/04/23 160 cm (63\")     Body mass index is 29.72 kg/m².  Vitals:    01/04/23 1030   Temp: 97.1 °F (36.2 °C)     Vital signs reviewed.     General Appearance:    Alert, cooperative, in no acute distress                  Eyes: conjunctiva clear  ENT: external ears and nose atraumatic  CV: no peripheral edema  Resp: normal respiratory effort  Skin: no rashes or wounds; normal turgor  Psych: mood and affect appropriate  Lymph: no nodes appreciated  Neuro: gross sensation intact  Vascular:  Palpable peripheral pulse in noted extremity  Musculoskeletal Extremities: Right knee has swelling crepitation synovitis medial joint line tenderness and some pseudolaxity hips noncontributory    Radiology:   AP lateral 40 degree PA x-ray of the knees taken in August viewed by me at this time for purposes of surgical scheduling show advanced arthritic change with subchondral sclerosis narrowing and advanced arthritic change.        Assessment:     ICD-10-CM ICD-9-CM   1. Arthritis of right knee  M17.11 716.96        MDM/Plan:   The diagnosis(es), natural history, pathophysiology and treatment for diagnosis(es) were discussed. Opportunity given and questions answered.  Biomechanics of  pertinent body areas discussed.  When appropriate, the use of ambulatory aids discussed.    MEDICAL RECORDS reviewed from other provider(s) for past and current medical history pertinent to this complaint.  Total Knee Replacement : Continuation of conservative management vs. TKA: I reviewed anatomy of a total knee arthroplasty in laymen's terms, as well as typical postoperative recovery and possibly 6-12 months for maximal recovery, and possible need for rehabilitation stay after hospitalization. We also discussed risks, benefits, alternatives, and limitations of procedure with risks including but not limited to neurovascular damage, bleeding, infection, malalignment, chronic pain, failure of implants, osteolysis, loosening of implants, loss of motion, weakness, stiffness, instability, DVT, pulmonary embolus, death, stroke, complex regional pain syndrome, myocardial infarction, and need for additional procedures. Concept of substitution vs. replacement discussed.  No guarantees were given regarding results of surgery.  Patient verbalized understanding, and was given the opportunity to ask and have all questions answered today. She is sort of between internist but we need to get medical clearance.  Her A1c is of always run in the 6 range.      1/4/2023    Dictated utilizing Dragon dictation

## 2023-01-20 ENCOUNTER — HOSPITAL ENCOUNTER (OUTPATIENT)
Dept: PHYSICAL THERAPY | Facility: HOSPITAL | Age: 83
Setting detail: THERAPIES SERIES
Discharge: HOME | End: 2023-01-20
Payer: MEDICARE

## 2023-01-20 DIAGNOSIS — M25.562 CHRONIC PAIN OF BOTH KNEES: Primary | ICD-10-CM

## 2023-01-20 DIAGNOSIS — R26.2 DIFFICULTY WALKING: ICD-10-CM

## 2023-01-20 DIAGNOSIS — G89.29 CHRONIC PAIN OF BOTH KNEES: Primary | ICD-10-CM

## 2023-01-20 DIAGNOSIS — M25.561 CHRONIC PAIN OF BOTH KNEES: Primary | ICD-10-CM

## 2023-01-20 PROCEDURE — 97110 THERAPEUTIC EXERCISES: CPT

## 2023-01-20 NOTE — THERAPY PROGRESS REPORT/RE-CERT
Outpatient Physical Therapy Ortho Progress Note  [facility]     Patient Name: Ashley Motta  : 1940  MRN: [id number]  Today's Date: 2023      Visit Date: 2023    Visit Dx:    ICD-10-CM ICD-9-CM   1. Chronic pain of both knees  M25.561 719.46    M25.562 338.29    G89.29    2. Difficulty walking  R26.2 719.7       Patient Active Problem List   Diagnosis   • Osteopenia   • Hyperlipidemia   • Pre-diabetes   • Osteoarthritis   • BALDEMAR on CPAP   • Pelvic relaxation due to vaginal prolapse   • Chronic pain of both knees   • Arthritis of right knee   • Arthritis of left knee   • Arthritis of both knees   • Restless leg syndrome   • Dizziness   • Headache, migraine   • Heartburn   • Internal hemorrhoids with complication   • Impacted cerumen of left ear   • Vitamin E deficiency   • Environmental and seasonal allergies   • Chronic cough        Past Medical History:   Diagnosis Date   • Actinic keratosis    • Anxiety    • Arthritis    • 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 SARAH LIRIANO   • Difficulty walking    • Difficulty walking    • Dry eye syndrome, bilateral    • Eczema 2014   • Elevated fasting glucose    • Endothelial corneal dystrophy 2020   • Genital prolapse 10/2017   • GERD (gastroesophageal reflux disease)    • Goiter, nontoxic, multinodular 2017    THYROID POLYP SEES    • Hemorrhoids    • Herpes zoster 2018   • Hyperlipidemia    • Hypersomnia 2016   • Impaired fasting glucose    • Meralgia paresthetica, left lower limb    • Migraine    • Myopia of both eyes    • Nuclear sclerosis    • BALDEMAR on CPAP     FOLLOWED BY DR. TERRY CHEUNG   • Osteoarthritis    • Osteopenia    • PLMD (periodic limb movement disorder)    • Postmenopausal atrophic vaginitis    • Prediabetes    • Presbyopia    • Rectal nodule 2020    REFERRED TO   PAMELA NAPOLES   • Rectocele 07/2017   • Regular astigmatism of both eyes    • Rheumatoid arthritis (HCC)    • RLS (restless legs syndrome)    • Seborrheic keratosis    • Skin cancer 04/2015    LOWER LEG, FOLLOWED BY DR. EDI SANCHEZ   • Urinary incontinence 09/2017   • Vaginal vault prolapse 09/2017   • Vertigo 10/18/2018    ADMITTED TO [facility]   • Vitreoretinal degeneration of both eyes         Past Surgical History:   Procedure Laterality Date   • CATARACT EXTRACTION, BILATERAL Bilateral 2015   • COLONOSCOPY N/A 02/27/2015    1 TUBULAR ADENOMA POLYP IN DISTAL ASCENDING[facility]   • COLONOSCOPY N/A 3/4/2020    10 MM SESSILE SERRATED ADENOMA POLYP IN ASCENDING, 3 MM HYPERPLASTIC POLYP IN RECTUM, 7 MM ANAL NODULE, DR. SARAH LIRIANO AT [facility]   • HYSTERECTOMY N/A 01/19/2004    KAYLEE WITH BSO, DR. RAFIA MORTENSEN AT [facility]   • KNEE ARTHROSCOPY Left 2013   • KNEE ARTHROSCOPY Right 2010   • MELISSA CULDOPLASTY N/A 01/19/2004    DR. RAFIA MORTENSEN AT [facility]   • SACROCOLPOPEXY N/A         PT Ortho     Row Name 01/20/23 1200       Myotomal Screen- Lower Quarter Clearing    Hip flexion (L2) Bilateral:;4+ (Good +)  -RS    Knee extension (L3) Left:;4+ (Good +);Right:;4 (Good)  -RS    Ankle PF (S1) Bilateral:;4 (Good)  -RS    Knee flexion (S2) Bilateral:;5 (Normal)  -RS       General ROM    GENERAL ROM COMMENTS L knee lacking 2 degrees  -RS          User Key  (r) = Recorded By, (t) = Taken By, (c) = Cosigned By    Initials Name Provider Type    RS Bere Bautista, PT Physical Therapist                             PT Assessment/Plan     Row Name 01/20/23 1300          PT Assessment    Functional Limitations Impaired gait;Impaired locomotion;Limitation in home management;Limitations in community activities;Limitations in functional capacity and performance;Performance in leisure activities;Performance in self-care ADL  -RS     Impairments Edema;Endurance;Gait;Impaired flexibility;Impaired muscle power;Joint  mobility;Joint integrity;Locomotion;Muscle strength;Pain;Range of motion  -RS     Assessment Comments The pt has been seen by PT for 3 sessions focused on R> L knee pain, prehab. Treatment has included manual therapy, therex, and pt education and progress toward goals has been good. She has met 2/3 STG and 0 LTG however is progressing toward remaining ummet goals. She reports 50% improvement in L calf pain and demonstrates slight improvement in L knee extension compared to initial eval. Pt goes to the gym 2x per week therefore reviewed which activities she can safely incorporate (rower, leg press, bike), pt reports understanding. Also discussed post op expectations with pt (pain, meds, assist, etc), will continue to reinforce. She remains appropriate for PT.  -RS        PT Plan    PT Plan Comments Resume manual as needed, progress HEP as tolerance allows. consider step ups  -RS           User Key  (r) = Recorded By, (t) = Taken By, (c) = Cosigned By    Initials Name Provider Type    RS Bere Bautista, PT Physical Therapist                   OP Exercises     Row Name 01/20/23 1200             Subjective Comments    Subjective Comments Pt reports no significant change since last session, she has pain in R knee. She would like to know what she can do at the gym. The massage helped last time.  -RS         Subjective Pain    Able to rate subjective pain? yes  -RS      Pre-Treatment Pain Level 7  -RS         Total Minutes    34034 - PT Therapeutic Exercise Minutes 39  -RS         Exercise 1    Exercise Name 1 seated HS stretch  -RS      Cueing 1 Verbal;Tactile  -RS      Reps 1 3  -RS      Time 1 20  -RS         Exercise 2    Exercise Name 2 supine QS  -RS      Cueing 2 Verbal;Tactile  -RS      Reps 2 15  -RS      Time 2 5  -RS         Exercise 3    Exercise Name 3 small ROM bridge  -RS      Cueing 3 Verbal;Tactile;Demo  -RS      Sets 3 2  -RS      Reps 3 10  -RS      Time 3 5  -RS         Exercise 4    Exercise Name 4  clamshell  -RS      Cueing 4 Verbal;Demo  -RS      Sets 4 2  -RS      Reps 4 10  -RS         Exercise 5    Exercise Name 5 SAQ, B  -RS      Cueing 5 Verbal;Tactile;Demo  -RS      Sets 5 2  -RS      Reps 5 10  -RS      Time 5 2  -RS      Additional Comments 2#  -RS         Exercise 6    Exercise Name 6 STS no UE assist  -RS      Cueing 6 Verbal;Demo  -RS      Sets 6 2  -RS      Reps 6 10  -RS         Exercise 7    Exercise Name 7 Nustep  -RS      Time 7 5 min  -RS      Additional Comments UE/LE, L 5  -RS         Exercise 8    Exercise Name 8 HR  -RS      Cueing 8 Verbal;Demo  -RS      Reps 8 20  -RS         Exercise 9    Exercise Name 9 standing HS curl, B  -RS      Cueing 9 Verbal;Demo  -RS      Sets 9 2  -RS      Reps 9 10  -RS      Time 9 2#  -RS      Additional Comments 1 hand support  -RS         Exercise 10    Exercise Name 10 standing gastroc stretch at wall  -RS      Cueing 10 Verbal;Demo  -RS      Reps 10 3  -RS      Time 10 20s  -RS         Exercise 11    Exercise Name 11 leg press BLE  -RS      Cueing 11 Verbal;Demo  -RS      Sets 11 2  -RS      Reps 11 10  -RS      Time 11 60#  -RS         Exercise 12    Exercise Name 12 update of goals/objective/expectations post op  -RS            User Key  (r) = Recorded By, (t) = Taken By, (c) = Cosigned By    Initials Name Provider Type    RS Bere Bautista, PT Physical Therapist                              PT OP Goals     Row Name 01/20/23 1200          PT Short Term Goals    STG Date to Achieve 12/30/22  -RS     STG 1 Pt will demonstrate understanding and compliance with initial HEP.  -RS     STG 1 Progress Met  -RS     STG 2 Pt will verbalize reduced L calf pain by 50%.  -RS     STG 2 Progress Met  -RS     STG 2 Progress Comments pt reports approx 50% improvement  -RS     STG 3 Pt will verbalize pain at worse dec from 9/10 to 6/10 or better.  -RS     STG 3 Progress Ongoing;Progressing  -RS     STG 3 Progress Comments pain at highest 7-8/10  -RS        Long  Term Goals    LTG Date to Achieve 01/15/23  -RS     LTG 1 Pt will demonstrate R knee extension to 0 deg.  -RS     LTG 1 Progress Ongoing;Progressing  -RS     LTG 1 Progress Comments lacking 2 degrees  -RS     LTG 2 Pt will demonstrate B quad strength improved to 5/5 to prepare for TKR.  -RS     LTG 2 Progress Progressing;Ongoing  -RS     LTG 3 Pt will demonstrate understanding and compliance with advanced HEP to allow continued strengthening in preparation of TKR.  -RS     LTG 3 Progress Ongoing;Progressing  -RS           User Key  (r) = Recorded By, (t) = Taken By, (c) = Cosigned By    Initials Name Provider Type    RS Bere Bautista, PT Physical Therapist                Therapy Education  Education Details: review of gym routine, expectations post op  Given: HEP  Program: Reinforced, New  How Provided: Verbal, Demonstration  Provided to: Patient  Level of Understanding: Verbalized, Demonstrated              Time Calculation:   Start Time: 1230  Stop Time: 1315  Time Calculation (min): 45 min  Timed Charges  08992 - PT Therapeutic Exercise Minutes: 39  Total Minutes  Timed Charges Total Minutes: 39   Total Minutes: 39  Therapy Charges for Today     Code Description Service Date Service Provider Modifiers Qty    [id number] HC PT THER PROC EA 15 MIN 1/20/2023 Bere Bautista, PT GP 3                    Bere Bautista PT  1/20/2023

## 2023-01-23 ENCOUNTER — HOSPITAL ENCOUNTER (OUTPATIENT)
Dept: PHYSICAL THERAPY | Facility: HOSPITAL | Age: 83
Setting detail: THERAPIES SERIES
Discharge: HOME | End: 2023-01-23
Payer: MEDICARE

## 2023-01-23 DIAGNOSIS — R26.2 DIFFICULTY WALKING: ICD-10-CM

## 2023-01-23 DIAGNOSIS — G89.29 CHRONIC PAIN OF BOTH KNEES: Primary | ICD-10-CM

## 2023-01-23 DIAGNOSIS — M25.561 CHRONIC PAIN OF BOTH KNEES: Primary | ICD-10-CM

## 2023-01-23 DIAGNOSIS — M25.562 CHRONIC PAIN OF BOTH KNEES: Primary | ICD-10-CM

## 2023-01-23 PROCEDURE — 97110 THERAPEUTIC EXERCISES: CPT | Performed by: PHYSICAL THERAPIST

## 2023-01-23 NOTE — THERAPY TREATMENT NOTE
Outpatient Physical Therapy Ortho Treatment Note  [facility]     Patient Name: Ashley Motta  : 1940  MRN: [id number]  Today's Date: 2023      Visit Date: 2023    Visit Dx:    ICD-10-CM ICD-9-CM   1. Chronic pain of both knees  M25.561 719.46    M25.562 338.29    G89.29    2. Difficulty walking  R26.2 719.7       Patient Active Problem List   Diagnosis   • Osteopenia   • Hyperlipidemia   • Pre-diabetes   • Osteoarthritis   • BALDEMAR on CPAP   • Pelvic relaxation due to vaginal prolapse   • Chronic pain of both knees   • Arthritis of right knee   • Arthritis of left knee   • Arthritis of both knees   • Restless leg syndrome   • Dizziness   • Headache, migraine   • Heartburn   • Internal hemorrhoids with complication   • Impacted cerumen of left ear   • Vitamin E deficiency   • Environmental and seasonal allergies   • Chronic cough        Past Medical History:   Diagnosis Date   • Actinic keratosis    • Anxiety    • Arthritis    • 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 SARAH LIRIANO   • Difficulty walking    • Difficulty walking    • Dry eye syndrome, bilateral    • Eczema 2014   • Elevated fasting glucose    • Endothelial corneal dystrophy 2020   • Genital prolapse 10/2017   • GERD (gastroesophageal reflux disease)    • Goiter, nontoxic, multinodular 2017    THYROID POLYP SEES    • Hemorrhoids    • Herpes zoster 2018   • Hyperlipidemia    • Hypersomnia 2016   • Impaired fasting glucose    • Meralgia paresthetica, left lower limb    • Migraine    • Myopia of both eyes    • Nuclear sclerosis    • BALDEMAR on CPAP     FOLLOWED BY DR. TERRY CHEUNG   • Osteoarthritis    • Osteopenia    • PLMD (periodic limb movement disorder)    • Postmenopausal atrophic vaginitis    • Prediabetes    • Presbyopia    • Rectal nodule 2020    REFERRED TO   PAMLEA NAPOLES   • Rectocele 07/2017   • Regular astigmatism of both eyes    • Rheumatoid arthritis (HCC)    • RLS (restless legs syndrome)    • Seborrheic keratosis    • Skin cancer 04/2015    LOWER LEG, FOLLOWED BY DR. EDI SANCHEZ   • Urinary incontinence 09/2017   • Vaginal vault prolapse 09/2017   • Vertigo 10/18/2018    ADMITTED TO [facility]   • Vitreoretinal degeneration of both eyes         Past Surgical History:   Procedure Laterality Date   • CATARACT EXTRACTION, BILATERAL Bilateral 2015   • COLONOSCOPY N/A 02/27/2015    1 TUBULAR ADENOMA POLYP IN DISTAL ASCENDING[facility]   • COLONOSCOPY N/A 3/4/2020    10 MM SESSILE SERRATED ADENOMA POLYP IN ASCENDING, 3 MM HYPERPLASTIC POLYP IN RECTUM, 7 MM ANAL NODULE, DR. SARAH LIRIANO AT [facility]   • HYSTERECTOMY N/A 01/19/2004    KAYLEE WITH BSO, DR. RAFIA MORTENSEN AT [facility]   • KNEE ARTHROSCOPY Left 2013   • KNEE ARTHROSCOPY Right 2010   • MELISSA CULDOPLASTY N/A 01/19/2004    DR. RAFIA MORTENSEN AT [facility]   • SACROCOLPOPEXY N/A                         PT Assessment/Plan     Row Name 01/23/23 1541          PT Assessment    Assessment Comments Ms. Motta returns for her prehab for TKA (sx scheduled for April 2023).  She reports good resolution of her calf pain following manual work, so we held on any manual today. Reviewed current program and discusssed gym activities to perform. Discussed pre/post surgery considerations including but not limited to placement after hospitalization.  She signed [employer] consent form for post surgery outpatient PT. We discussed that if she continues to progress at current rate in the clinic and remains independent with gym activities, we may hold on several of her scheduled visits allowing for optimal utilization of visits post operatively. Current thought process is to see her once more this week, likely cancel her Tuesday visit next week, see on Thursday and possibly hold on remaining visits.  She voices understanding of this plan,  however review and adjust schedule as necessary.  -GJ        PT Plan    PT Plan Comments likely see once more this week, cancel next tuesday, and transition to independent management after her next thursay appt.  Ensure independence with HEP. Answer questions.  -GJ           User Key  (r) = Recorded By, (t) = Taken By, (c) = Cosigned By    Initials Name Provider Type    GJ Jose Singh, PT Physical Therapist                   OP Exercises     Row Name 01/23/23 1447 01/23/23 1400          Subjective Comments    Subjective Comments -- my calf feels better  -GJ        Total Minutes    31928 - PT Therapeutic Exercise Minutes 40  -GJ --        Exercise 1    Exercise Name 1 -- seated HS stretch  -GJ     Cueing 1 -- Verbal;Tactile  -GJ     Reps 1 -- 3  -GJ     Time 1 -- 20  -GJ        Exercise 2    Exercise Name 2 -- supine QS  -GJ     Cueing 2 -- Verbal;Tactile  -GJ     Reps 2 -- 15  -GJ     Time 2 -- 5  -GJ        Exercise 3    Exercise Name 3 -- small ROM bridge  -GJ     Cueing 3 -- Verbal;Tactile;Demo  -GJ     Sets 3 -- 2  -GJ     Reps 3 -- 10  -GJ     Time 3 -- 5  -GJ        Exercise 4    Exercise Name 4 -- clamshell  -GJ     Cueing 4 -- Verbal;Demo  -GJ     Sets 4 -- 2  -GJ     Reps 4 -- 10  -GJ        Exercise 5    Exercise Name 5 -- SAQ, B  -GJ     Cueing 5 -- Verbal;Tactile;Demo  -GJ     Sets 5 -- 2  -GJ     Reps 5 -- 10  -GJ     Time 5 -- 2  -GJ     Additional Comments -- 2#  -GJ        Exercise 6    Exercise Name 6 -- STS no UE assist  -GJ     Cueing 6 -- Verbal;Demo  -GJ     Sets 6 -- 2  -GJ     Reps 6 -- 10  -GJ        Exercise 7    Exercise Name 7 -- Nustep  -GJ     Time 7 -- 5 min  -GJ        Exercise 8    Exercise Name 8 -- HR  -GJ     Cueing 8 -- Verbal;Demo  -GJ     Reps 8 -- 20  -GJ        Exercise 9    Exercise Name 9 -- standing HS curl, B  -GJ     Cueing 9 -- Verbal;Demo  -GJ     Sets 9 -- 2  -GJ     Reps 9 -- 10  -GJ     Time 9 -- 2#  -GJ        Exercise 10    Exercise Name 10 -- standing gastroc  stretch at wall  -GJ     Cueing 10 -- Verbal;Demo  -GJ     Reps 10 -- 3  -GJ     Time 10 -- 20s  -GJ        Exercise 11    Exercise Name 11 -- leg press BLE  -GJ     Cueing 11 -- Verbal;Demo  -GJ     Sets 11 -- 2  -GJ     Reps 11 -- 15  -GJ     Time 11 -- 70#  -GJ        Exercise 12    Exercise Name 12 -- step up  -GJ     Cueing 12 -- Verbal;Demo  -GJ     Reps 12 -- 2 x 12, B  -GJ     Time 12 -- 6 incfh  -GJ           User Key  (r) = Recorded By, (t) = Taken By, (c) = Cosigned By    Initials Name Provider Type    Jose Thomas, PT Physical Therapist                              PT OP Goals     Row Name 01/23/23 1400          PT Short Term Goals    STG Date to Achieve 12/30/22  -GJ     STG 1 Pt will demonstrate understanding and compliance with initial HEP.  -GJ     STG 1 Progress Met  -GJ     STG 2 Pt will verbalize reduced L calf pain by 50%.  -GJ     STG 2 Progress Met  -GJ     STG 3 Pt will verbalize pain at worse dec from 9/10 to 6/10 or better.  -GJ     STG 3 Progress Ongoing;Progressing  -GJ        Long Term Goals    LTG Date to Achieve 01/15/23  -GJ     LTG 1 Pt will demonstrate R knee extension to 0 deg.  -GJ     LTG 1 Progress Ongoing;Progressing  -GJ     LTG 2 Pt will demonstrate B quad strength improved to 5/5 to prepare for TKR.  -GJ     LTG 2 Progress Progressing;Ongoing  -GJ     LTG 3 Pt will demonstrate understanding and compliance with advanced HEP to allow continued strengthening in preparation of TKR.  -GJ     LTG 3 Progress Ongoing;Progressing  -GJ           User Key  (r) = Recorded By, (t) = Taken By, (c) = Cosigned By    Initials Name Provider Type    Jose Thomas, PT Physical Therapist                Therapy Education  Education Details: discussed gym activities that are good for prehab of knee.  Discussed pre-surgical considerations and thoughts on where to discharge to after hospitalization, had pt sign consent form for [employer]., Answered questions  Given: HEP, Symptoms/condition  management, Pain management, Posture/body mechanics, Mobility training, Edema management  Program: Reinforced, Progressed, New  How Provided: Verbal, Demonstration  Provided to: Patient  Level of Understanding: Teach back education performed, Verbalized, Demonstrated              Time Calculation:   Start Time: 1445  Stop Time: 1530  Time Calculation (min): 45 min  Timed Charges  21530 - PT Therapeutic Exercise Minutes: 40  Total Minutes  Timed Charges Total Minutes: 40   Total Minutes: 40  Therapy Charges for Today     Code Description Service Date Service Provider Modifiers Qty    79693541684  PT THER PROC EA 15 MIN 1/23/2023 Jose Singh, PT GP 3                    Jose Singh, PT  1/23/2023

## 2023-01-25 ENCOUNTER — HOSPITAL ENCOUNTER (OUTPATIENT)
Dept: PHYSICAL THERAPY | Facility: HOSPITAL | Age: 83
Setting detail: THERAPIES SERIES
Discharge: HOME | End: 2023-01-25
Payer: MEDICARE

## 2023-01-25 DIAGNOSIS — M25.562 CHRONIC PAIN OF BOTH KNEES: Primary | ICD-10-CM

## 2023-01-25 DIAGNOSIS — R26.2 DIFFICULTY WALKING: ICD-10-CM

## 2023-01-25 DIAGNOSIS — G89.29 CHRONIC PAIN OF BOTH KNEES: Primary | ICD-10-CM

## 2023-01-25 DIAGNOSIS — M25.561 CHRONIC PAIN OF BOTH KNEES: Primary | ICD-10-CM

## 2023-01-25 PROCEDURE — 97110 THERAPEUTIC EXERCISES: CPT

## 2023-01-25 NOTE — THERAPY TREATMENT NOTE
Outpatient Physical Therapy Ortho Treatment Note  [facility]     Patient Name: Ashley Motta  : 1940  MRN: [id number]  Today's Date: 2023      Visit Date: 2023    Visit Dx:    ICD-10-CM ICD-9-CM   1. Chronic pain of both knees  M25.561 719.46    M25.562 338.29    G89.29    2. Difficulty walking  R26.2 719.7       Patient Active Problem List   Diagnosis   • Osteopenia   • Hyperlipidemia   • Pre-diabetes   • Osteoarthritis   • BALDEMAR on CPAP   • Pelvic relaxation due to vaginal prolapse   • Chronic pain of both knees   • Arthritis of right knee   • Arthritis of left knee   • Arthritis of both knees   • Restless leg syndrome   • Dizziness   • Headache, migraine   • Heartburn   • Internal hemorrhoids with complication   • Impacted cerumen of left ear   • Vitamin E deficiency   • Environmental and seasonal allergies   • Chronic cough        Past Medical History:   Diagnosis Date   • Actinic keratosis    • Anxiety    • Arthritis    • 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 SARAH LIRIANO   • Difficulty walking    • Difficulty walking    • Dry eye syndrome, bilateral    • Eczema 2014   • Elevated fasting glucose    • Endothelial corneal dystrophy 2020   • Genital prolapse 10/2017   • GERD (gastroesophageal reflux disease)    • Goiter, nontoxic, multinodular 2017    THYROID POLYP SEES    • Hemorrhoids    • Herpes zoster 2018   • Hyperlipidemia    • Hypersomnia 2016   • Impaired fasting glucose    • Meralgia paresthetica, left lower limb    • Migraine    • Myopia of both eyes    • Nuclear sclerosis    • BALDEMAR on CPAP     FOLLOWED BY DR. TERRY CHEUNG   • Osteoarthritis    • Osteopenia    • PLMD (periodic limb movement disorder)    • Postmenopausal atrophic vaginitis    • Prediabetes    • Presbyopia    • Rectal nodule 2020    REFERRED TO   PAMELA NAPOLES   • Rectocele 07/2017   • Regular astigmatism of both eyes    • Rheumatoid arthritis (HCC)    • RLS (restless legs syndrome)    • Seborrheic keratosis    • Skin cancer 04/2015    LOWER LEG, FOLLOWED BY DR. EDI SANCHEZ   • Urinary incontinence 09/2017   • Vaginal vault prolapse 09/2017   • Vertigo 10/18/2018    ADMITTED TO [facility]   • Vitreoretinal degeneration of both eyes         Past Surgical History:   Procedure Laterality Date   • CATARACT EXTRACTION, BILATERAL Bilateral 2015   • COLONOSCOPY N/A 02/27/2015    1 TUBULAR ADENOMA POLYP IN DISTAL ASCENDING[facility]   • COLONOSCOPY N/A 3/4/2020    10 MM SESSILE SERRATED ADENOMA POLYP IN ASCENDING, 3 MM HYPERPLASTIC POLYP IN RECTUM, 7 MM ANAL NODULE, DR. SARAH LIRIANO AT [facility]   • HYSTERECTOMY N/A 01/19/2004    KAYLEE WITH BSO, DR. RAFIA MORTENSEN AT [facility]   • KNEE ARTHROSCOPY Left 2013   • KNEE ARTHROSCOPY Right 2010   • MELISSA CULDOPLASTY N/A 01/19/2004    DR. RAFIA MORTENSEN AT [facility]   • SACROCOLPOPEXY N/A                         PT Assessment/Plan     Row Name 01/25/23 1100          PT Assessment    Assessment Comments Ms. Motta presents to clinic with no new complaint, verbalizes understanding of prehab program and continued discussion regarding post-operative expectations including discharge, driving, etc. redcued frequency of visit to 1x/week next week and pending continued improvement in confidence and independence may D/C following next visit. She required minimal to no cueing with current program.  -        PT Plan    PT Plan Comments D/C?  -           User Key  (r) = Recorded By, (t) = Taken By, (c) = Cosigned By    Initials Name Provider Type     Bebe Carpenter, PT Physical Therapist                   OP Exercises     Row Name 01/25/23 1000             Subjective Comments    Subjective Comments it still hurts but I feel good with what I have  -         Total Minutes    67370 - PT Therapeutic Exercise Minutes 38  -    "      Exercise 1    Exercise Name 1 seated HS stretch  -MH      Cueing 1 Verbal;Tactile  -MH      Reps 1 3  -MH      Time 1 20  -MH         Exercise 2    Exercise Name 2 supine QS  -MH      Cueing 2 Verbal;Tactile  -MH      Reps 2 15  -MH      Time 2 5  -MH         Exercise 3    Exercise Name 3 small ROM bridge  -MH      Cueing 3 Verbal;Tactile;Demo  -MH      Sets 3 2  -MH      Reps 3 10  -MH      Time 3 5  -MH         Exercise 4    Exercise Name 4 clamshell  -MH      Cueing 4 Verbal;Demo  -MH      Sets 4 2  -MH      Reps 4 10  -MH         Exercise 5    Exercise Name 5 SAQ, B  -MH      Cueing 5 Verbal;Tactile;Demo  -MH      Sets 5 2  -MH      Reps 5 10  -MH      Time 5 2  -MH      Additional Comments 2#  -MH         Exercise 7    Exercise Name 7 Nustep  -MH      Cueing 7 Verbal  -MH      Time 7 5 min  -MH         Exercise 8    Exercise Name 8 HR  -MH      Cueing 8 Verbal;Demo  -MH      Reps 8 20  -MH         Exercise 9    Exercise Name 9 standing HS curl, B  -MH      Cueing 9 Verbal;Demo  -MH      Sets 9 2  -MH      Reps 9 10  -MH      Time 9 2#  -MH         Exercise 10    Exercise Name 10 standing gastroc stretch at wall  -      Cueing 10 Verbal;Demo  -MH      Reps 10 3  -MH      Time 10 20s  -MH         Exercise 12    Exercise Name 12 step up  -MH      Cueing 12 Verbal;Demo  -MH      Reps 12 1x 12 B  -MH      Time 12 6\"  -            User Key  (r) = Recorded By, (t) = Taken By, (c) = Cosigned By    Initials Name Provider Type    Bebe Pedersen, PT Physical Therapist                              PT OP Goals     Row Name 01/25/23 1000          PT Short Term Goals    STG Date to Achieve 12/30/22  -     STG 1 Pt will demonstrate understanding and compliance with initial HEP.  -     STG 1 Progress Met  -     STG 2 Pt will verbalize reduced L calf pain by 50%.  -     STG 2 Progress Met  -     STG 3 Pt will verbalize pain at worse dec from 9/10 to 6/10 or better.  -     STG 3 Progress " Ongoing;Progressing  -        Long Term Goals    LTG Date to Achieve 01/15/23  -     LTG 1 Pt will demonstrate R knee extension to 0 deg.  -     LTG 1 Progress Ongoing;Progressing  -     LTG 2 Pt will demonstrate B quad strength improved to 5/5 to prepare for TKR.  -     LTG 2 Progress Progressing;Ongoing  -     LTG 3 Pt will demonstrate understanding and compliance with advanced HEP to allow continued strengthening in preparation of TKR.  -     LTG 3 Progress Ongoing;Progressing  -           User Key  (r) = Recorded By, (t) = Taken By, (c) = Cosigned By    Initials Name Provider Type     Bebe Carpenter, PT Physical Therapist                Therapy Education  Given: Symptoms/condition management  Program: Reinforced  How Provided: Verbal  Provided to: Patient  Level of Understanding: Verbalized, Demonstrated              Time Calculation:   Start Time: 1048  Stop Time: 1126  Time Calculation (min): 38 min  Total Timed Code Minutes- PT: 38 minute(s)  Timed Charges  90930 - PT Therapeutic Exercise Minutes: 38  Total Minutes  Timed Charges Total Minutes: 38   Total Minutes: 38  Therapy Charges for Today     Code Description Service Date Service Provider Modifiers Qty    21725961764 HC PT THER PROC EA 15 MIN 1/25/2023 Bebe Carpenter, PT GP 3                    Bebe Carpenter PT  1/25/2023

## 2023-01-31 ENCOUNTER — APPOINTMENT (OUTPATIENT)
Dept: PHYSICAL THERAPY | Facility: HOSPITAL | Age: 83
End: 2023-01-31
Payer: MEDICARE

## 2023-02-02 ENCOUNTER — APPOINTMENT (OUTPATIENT)
Dept: PHYSICAL THERAPY | Facility: HOSPITAL | Age: 83
End: 2023-02-02
Payer: MEDICARE

## 2023-02-06 ENCOUNTER — HOSPITAL ENCOUNTER (OUTPATIENT)
Dept: PHYSICAL THERAPY | Facility: HOSPITAL | Age: 83
Setting detail: THERAPIES SERIES
Discharge: HOME | End: 2023-02-06
Payer: MEDICARE

## 2023-02-06 DIAGNOSIS — M25.562 CHRONIC PAIN OF BOTH KNEES: Primary | ICD-10-CM

## 2023-02-06 DIAGNOSIS — R26.2 DIFFICULTY WALKING: ICD-10-CM

## 2023-02-06 DIAGNOSIS — M25.561 CHRONIC PAIN OF BOTH KNEES: Primary | ICD-10-CM

## 2023-02-06 DIAGNOSIS — G89.29 CHRONIC PAIN OF BOTH KNEES: Primary | ICD-10-CM

## 2023-02-06 PROCEDURE — 97110 THERAPEUTIC EXERCISES: CPT | Performed by: PHYSICAL THERAPIST

## 2023-02-06 NOTE — THERAPY TREATMENT NOTE
Outpatient Physical Therapy Ortho Treatment Note  [facility]     Patient Name: Ashley Motta  : 1940  MRN: [id number]  Today's Date: 2023      Visit Date: 2023    Visit Dx:    ICD-10-CM ICD-9-CM   1. Chronic pain of both knees  M25.561 719.46    M25.562 338.29    G89.29    2. Difficulty walking  R26.2 719.7       Patient Active Problem List   Diagnosis   • Osteopenia   • Hyperlipidemia   • Pre-diabetes   • Osteoarthritis   • BALDEMAR on CPAP   • Pelvic relaxation due to vaginal prolapse   • Chronic pain of both knees   • Arthritis of right knee   • Arthritis of left knee   • Arthritis of both knees   • Restless leg syndrome   • Dizziness   • Headache, migraine   • Heartburn   • Internal hemorrhoids with complication   • Impacted cerumen of left ear   • Vitamin E deficiency   • Environmental and seasonal allergies   • Chronic cough        Past Medical History:   Diagnosis Date   • Actinic keratosis    • Anxiety    • Arthritis    • 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 SARAH LIRIANO   • Difficulty walking    • Difficulty walking    • Dry eye syndrome, bilateral    • Eczema 2014   • Elevated fasting glucose    • Endothelial corneal dystrophy 2020   • Genital prolapse 10/2017   • GERD (gastroesophageal reflux disease)    • Goiter, nontoxic, multinodular 2017    THYROID POLYP SEES    • Hemorrhoids    • Herpes zoster 2018   • Hyperlipidemia    • Hypersomnia 2016   • Impaired fasting glucose    • Meralgia paresthetica, left lower limb    • Migraine    • Myopia of both eyes    • Nuclear sclerosis    • BALDEMAR on CPAP     FOLLOWED BY DR. TERRY CHEUNG   • Osteoarthritis    • Osteopenia    • PLMD (periodic limb movement disorder)    • Postmenopausal atrophic vaginitis    • Prediabetes    • Presbyopia    • Rectal nodule 2020    REFERRED TO   PAMELA NAPOLES   • Rectocele 07/2017   • Regular astigmatism of both eyes    • Rheumatoid arthritis (HCC)    • RLS (restless legs syndrome)    • Seborrheic keratosis    • Skin cancer 04/2015    LOWER LEG, FOLLOWED BY DR. EDI SANCHEZ   • Urinary incontinence 09/2017   • Vaginal vault prolapse 09/2017   • Vertigo 10/18/2018    ADMITTED TO [facility]   • Vitreoretinal degeneration of both eyes         Past Surgical History:   Procedure Laterality Date   • CATARACT EXTRACTION, BILATERAL Bilateral 2015   • COLONOSCOPY N/A 02/27/2015    1 TUBULAR ADENOMA POLYP IN DISTAL ASCENDING[facility]   • COLONOSCOPY N/A 3/4/2020    10 MM SESSILE SERRATED ADENOMA POLYP IN ASCENDING, 3 MM HYPERPLASTIC POLYP IN RECTUM, 7 MM ANAL NODULE, DR. SARAH LIRIANO AT [facility]   • HYSTERECTOMY N/A 01/19/2004    KAYLEE WITH BSO, DR. RAFIA MORTENSEN AT [facility]   • KNEE ARTHROSCOPY Left 2013   • KNEE ARTHROSCOPY Right 2010   • MELISSA CULDOPLASTY N/A 01/19/2004    DR. RAFIA MORTENSEN AT [facility]   • SACROCOLPOPEXY N/A                         PT Assessment/Plan     Row Name 02/06/23 1003          PT Assessment    Assessment Comments Ms. Motta returns for prehab of R knee.  We reviewed post op considerations. She has joined a gym, so we reviewed do's and don'ts in the gym, mainly encouraging pt to listen to her body.  We reviewed current program with intermittent cuing.  We have placed her on the wait list for next week, after which we will likely make a full transition to independent program prior to her R TKA.  She is in agreement with this plan.  -GJ        PT Plan    PT Plan Comments to see likely once more prior to transition to independent program.  -GJ           User Key  (r) = Recorded By, (t) = Taken By, (c) = Cosigned By    Initials Name Provider Type    Jose Thomas, PT Physical Therapist                   OP Exercises     Row Name 02/06/23 0919 02/06/23 0900          Total Minutes    67100 - PT Therapeutic Exercise Minutes 40  -GJ --  "       Exercise 1    Exercise Name 1 -- seated HS stretch  -GJ     Cueing 1 -- Verbal;Tactile  -GJ     Reps 1 -- 3  -GJ     Time 1 -- 20  -GJ        Exercise 2    Exercise Name 2 -- supine QS  -GJ     Cueing 2 -- Verbal;Tactile  -GJ     Reps 2 -- 15  -GJ     Time 2 -- 5  -GJ        Exercise 3    Exercise Name 3 -- small ROM bridge  -GJ     Cueing 3 -- Verbal;Tactile;Demo  -GJ     Sets 3 -- 2  -GJ     Reps 3 -- 10  -GJ     Time 3 -- 5  -GJ        Exercise 4    Exercise Name 4 -- clamshell  -GJ     Cueing 4 -- Verbal;Demo  -GJ     Sets 4 -- 2  -GJ     Reps 4 -- 10  -GJ        Exercise 7    Exercise Name 7 -- Nustep  -GJ     Cueing 7 -- Verbal  -GJ     Time 7 -- 5 min  -GJ        Exercise 8    Exercise Name 8 -- HR  -GJ     Cueing 8 -- Verbal;Demo  -GJ     Reps 8 -- 20  -GJ        Exercise 9    Exercise Name 9 -- standing HS curl, B  -GJ     Cueing 9 -- Verbal;Demo  -GJ     Sets 9 -- 2  -GJ     Reps 9 -- 10  -GJ     Time 9 -- 2#  -GJ        Exercise 10    Exercise Name 10 -- standing gastroc stretch at wall  -GJ     Cueing 10 -- Verbal;Demo  -GJ     Reps 10 -- 3  -GJ     Time 10 -- 20s  -GJ        Exercise 11    Exercise Name 11 -- leg press BLE  -GJ     Cueing 11 -- Verbal;Demo  -GJ     Sets 11 -- 2  -GJ     Reps 11 -- 15  -GJ     Time 11 -- 70#  -GJ        Exercise 12    Exercise Name 12 -- step up  -GJ     Cueing 12 -- Verbal;Demo  -GJ     Reps 12 -- 1x 12 B  -GJ     Time 12 -- 6\"  -GJ           User Key  (r) = Recorded By, (t) = Taken By, (c) = Cosigned By    Initials Name Provider Type    GJ Jose Singh, PT Physical Therapist                              PT OP Goals     Row Name 02/06/23 0900          PT Short Term Goals    STG Date to Achieve 12/30/22  -GJ     STG 1 Pt will demonstrate understanding and compliance with initial HEP.  -GJ     STG 1 Progress Met  -GJ     STG 2 Pt will verbalize reduced L calf pain by 50%.  -GJ     STG 2 Progress Met  -GJ     STG 3 Pt will verbalize pain at worse dec from 9/10 to " 6/10 or better.  -GJ     STG 3 Progress Ongoing;Progressing  -GJ        Long Term Goals    LTG Date to Achieve 01/15/23  -GJ     LTG 1 Pt will demonstrate R knee extension to 0 deg.  -GJ     LTG 1 Progress Ongoing;Progressing  -GJ     LTG 2 Pt will demonstrate B quad strength improved to 5/5 to prepare for TKR.  -GJ     LTG 2 Progress Progressing;Ongoing  -GJ     LTG 3 Pt will demonstrate understanding and compliance with advanced HEP to allow continued strengthening in preparation of TKR.  -GJ     LTG 3 Progress Ongoing;Progressing  -GJ           User Key  (r) = Recorded By, (t) = Taken By, (c) = Cosigned By    Initials Name Provider Type    GJ Jose Singh, PT Physical Therapist                Therapy Education  Education Details: discussed gym activities do's and don'ts (she has joined [employer]). Discussed slow progression of gym activities, using 10% rule for progression.  Given: HEP, Symptoms/condition management, Posture/body mechanics, Pain management, Mobility training, Edema management  Program: Reinforced  How Provided: Verbal, Demonstration  Provided to: Patient  Level of Understanding: Teach back education performed, Verbalized, Demonstrated              Time Calculation:   Start Time: 0919  Stop Time: 1000  Time Calculation (min): 41 min  Timed Charges  77748 - PT Therapeutic Exercise Minutes: 40  Total Minutes  Timed Charges Total Minutes: 40   Total Minutes: 40  Therapy Charges for Today     Code Description Service Date Service Provider Modifiers Qty    95911581623 HC PT THER PROC EA 15 MIN 2/6/2023 Jose Singh, PT GP 3                    Jose Singh, PT  2/6/2023

## 2023-02-08 ENCOUNTER — APPOINTMENT (OUTPATIENT)
Dept: PHYSICAL THERAPY | Facility: HOSPITAL | Age: 83
End: 2023-02-08
Payer: MEDICARE

## 2023-02-15 NOTE — PROGRESS NOTES
"Chief Complaint   Patient presents with   • Hyperlipidemia     4 month follow up        Subjective   Ashley Motta is a 77 y.o. female     HPI: Hyperlipidemia:  This is a chronic problem.   No management changes were made at her last appointment.   Her most recent lipid panel was done on 7/7/2017.  Total cholesterol was 132, Triglycerides 112, HDL 48, LDL 62.   Current treatment: statin, fishoil, baby asa.   Prudent diet and regular exercise have also been recommended. By report, there is good compliance with treatment and good tolerance.    She has not experienced statin associated myalgias, dyspepsia.  She has not had liver enzyme elevation. Medical history is significant for elevated fasting glucose, negative for coronary artery disease, stroke, hypertension    Elevated fasting glucose:  She  has had elevated fasting glucose in the past.  She denies polyuria, polydipsia, vision change appetite change, unexplained weight loss.   Lab Results   Component Value Date    HGBA1C 5.93 (H) 07/07/2017      BALDEMAR: On CPAP for one year now.    She has had colorectal consultation for hemorrhoids.  She also has a rectocele.  They recommended she see a gynecologist for that.    The following portions of the patient's history were reviewed and updated as appropriate: allergies, current medications, past social history, problem list, past surgical history    Review of Systems   Constitutional: Negative for activity change and appetite change.   HENT: Negative for nosebleeds.    Eyes: Negative for visual disturbance.   Respiratory: Negative for cough and shortness of breath.    Cardiovascular: Negative for chest pain, palpitations and leg swelling.   Neurological: Negative for headaches.   Psychiatric/Behavioral: Negative for sleep disturbance.           Objective     /73  Ht 62\" (157.5 cm)  Wt 171 lb (77.6 kg)  BMI 31.28 kg/m2     Physical Exam    Assessment/Plan   Problem List Items Addressed This Visit        " Cardiovascular and Mediastinum    Hypercholesterolemia       Endocrine    Elevated fasting glucose    Relevant Orders    Hemoglobin A1c      Other Visit Diagnoses     Rectocele    -  Primary    Relevant Orders    Ambulatory Referral to Gynecology    Hyperlipidemia, unspecified hyperlipidemia type        Relevant Orders    Comprehensive Metabolic Panel    Lipid Panel    CBC (No Diff)    Urinalysis With / Microscopic If Indicated    Encounter for medication management                 Dressing (No Sutures): dry sterile dressing

## 2023-02-16 ENCOUNTER — HOSPITAL ENCOUNTER (OUTPATIENT)
Dept: PHYSICAL THERAPY | Facility: HOSPITAL | Age: 83
Setting detail: THERAPIES SERIES
Discharge: HOME | End: 2023-02-16
Payer: MEDICARE

## 2023-02-16 DIAGNOSIS — R26.81 UNSTEADY GAIT: ICD-10-CM

## 2023-02-16 DIAGNOSIS — M17.0 PRIMARY OSTEOARTHRITIS OF BOTH KNEES: ICD-10-CM

## 2023-02-16 DIAGNOSIS — G89.29 CHRONIC PAIN OF BOTH KNEES: Primary | ICD-10-CM

## 2023-02-16 DIAGNOSIS — M25.561 CHRONIC PAIN OF BOTH KNEES: Primary | ICD-10-CM

## 2023-02-16 DIAGNOSIS — R26.2 DIFFICULTY WALKING: ICD-10-CM

## 2023-02-16 DIAGNOSIS — M25.562 CHRONIC PAIN OF BOTH KNEES: Primary | ICD-10-CM

## 2023-02-16 DIAGNOSIS — R42 DIZZINESS: ICD-10-CM

## 2023-02-16 PROCEDURE — 97110 THERAPEUTIC EXERCISES: CPT

## 2023-02-16 NOTE — THERAPY PROGRESS REPORT/RE-CERT
Outpatient Physical Therapy Ortho Progress Note  [facility]     Patient Name: Ashley Motta  : 1940  MRN: [id number]  Today's Date: 2023      Visit Date: 2023    Patient Active Problem List   Diagnosis   • Osteopenia   • Hyperlipidemia   • Pre-diabetes   • Osteoarthritis   • BALDEMAR on CPAP   • Pelvic relaxation due to vaginal prolapse   • Chronic pain of both knees   • Arthritis of right knee   • Arthritis of left knee   • Arthritis of both knees   • Restless leg syndrome   • Dizziness   • Headache, migraine   • Heartburn   • Internal hemorrhoids with complication   • Impacted cerumen of left ear   • Vitamin E deficiency   • Environmental and seasonal allergies   • Chronic cough        Past Medical History:   Diagnosis Date   • Actinic keratosis    • Anxiety    • Arthritis    • 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 SARAH LIRIANO   • Difficulty walking    • Difficulty walking    • Dry eye syndrome, bilateral    • Eczema 2014   • Elevated fasting glucose    • Endothelial corneal dystrophy 2020   • Genital prolapse 10/2017   • GERD (gastroesophageal reflux disease)    • Goiter, nontoxic, multinodular 2017    THYROID POLYP SEES    • Hemorrhoids    • Herpes zoster 2018   • Hyperlipidemia    • Hypersomnia 2016   • Impaired fasting glucose    • Meralgia paresthetica, left lower limb    • Migraine    • Myopia of both eyes    • Nuclear sclerosis    • BALDEMAR on CPAP     FOLLOWED BY DR. TERRY CHEUNG   • Osteoarthritis    • Osteopenia    • PLMD (periodic limb movement disorder)    • Postmenopausal atrophic vaginitis    • Prediabetes    • Presbyopia    • Rectal nodule 2020    REFERRED TO DR. PAMELA NAPOLES   • Rectocele 2017   • Regular astigmatism of both eyes    • Rheumatoid arthritis (HCC)    • RLS (restless legs syndrome)    • Seborrheic  keratosis    • Skin cancer 04/2015    LOWER LEG, FOLLOWED BY DR. EDI SANCHEZ   • Urinary incontinence 09/2017   • Vaginal vault prolapse 09/2017   • Vertigo 10/18/2018    ADMITTED TO [facility]   • Vitreoretinal degeneration of both eyes         Past Surgical History:   Procedure Laterality Date   • CATARACT EXTRACTION, BILATERAL Bilateral 2015   • COLONOSCOPY N/A 02/27/2015    1 TUBULAR ADENOMA POLYP IN DISTAL ASCENDING[facility]   • COLONOSCOPY N/A 3/4/2020    10 MM SESSILE SERRATED ADENOMA POLYP IN ASCENDING, 3 MM HYPERPLASTIC POLYP IN RECTUM, 7 MM ANAL NODULE, DR. SARAH LIRIANO AT [facility]   • HYSTERECTOMY N/A 01/19/2004    KAYLEE WITH BSO, DR. RAFIA MORTENSEN AT [facility]   • KNEE ARTHROSCOPY Left 2013   • KNEE ARTHROSCOPY Right 2010   • MELISSA CULDOPLASTY N/A 01/19/2004    DR. RAFIA MORTENSEN AT [facility]   • SACROCOLPOPEXY N/A        Visit Dx:     ICD-10-CM ICD-9-CM   1. Chronic pain of both knees  M25.561 719.46    M25.562 338.29    G89.29    2. Difficulty walking  R26.2 719.7   3. Dizziness  R42 780.4   4. Unsteady gait  R26.81 781.2   5. Primary osteoarthritis of both knees  M17.0 715.16                             Therapy Education  Education Details: [id number] - see educationn in OP Exercises  Given: HEP, Symptoms/condition management, Pain management, Posture/body mechanics  Program: Reinforced, Progressed  How Provided: Verbal, Demonstration, Written  Provided to: Patient  Level of Understanding: Verbalized, Demonstrated      PT OP Goals     Row Name 02/16/23 1500          PT Short Term Goals    STG Date to Achieve 12/30/22  -     STG 1 Pt will demonstrate understanding and compliance with initial HEP.  -     STG 1 Progress Met  -     STG 2 Pt will verbalize reduced L calf pain by 50%.  -     STG 2 Progress Met  -     STG 3 Pt will verbalize pain at worse dec from 9/10 to 6/10 or better.  -     STG 3 Progress Progressing  -        Long Term Goals    LTG Date to Achieve 01/15/23  -ANDREY      LTG 1 Pt will demonstrate R knee extension to 0 deg.  -ANDREY     LTG 1 Progress Progressing  -ANDREY     LTG 2 Pt will demonstrate B quad strength improved to 5/5 to prepare for TKR.  -ANDREY     LTG 2 Progress Progressing  -ANDREY     LTG 2 Progress Comments 4/5 R, poor isolated quad reccruitment, quad lag  -ANDREY     LTG 3 Pt will demonstrate understanding and compliance with advanced HEP to allow continued strengthening in preparation of TKR.  -ANDREY     LTG 3 Progress Progressing  -ANDREY           User Key  (r) = Recorded By, (t) = Taken By, (c) = Cosigned By    Initials Name Provider Type    Ambreen Mccallum, PT Physical Therapist                 PT Assessment/Plan     Row Name 02/16/23 1500          PT Assessment    Assessment Comments Ms Motta has been seen for 7 visits for bilateral knee pain, L primarily radiating down calf to foot, R at anterior knee along patella. Her L knee pain improved particularly after addressing soft tissue restrictions. Her right knee was found to be more painful and limited in flexion. She had some difficulty recruiting R quad today and required cuing and reinforcement of HEP. Issued copy of post-op TKA exercises for her to become more familar. She has met 2/3 STGs and 0/3 LTGs. She will benefit from one more follow-up to assess ROM, MMT progress and address any ADL or mobility concerns.  -ANDREY        PT Plan    PT Plan Comments review post-op TKA ex's for improved familiarity, assess quad recruitment and strength  -ANDREY           User Key  (r) = Recorded By, (t) = Taken By, (c) = Cosigned By    Initials Name Provider Type    Ambreen Mccallum, PT Physical Therapist                   OP Exercises     Row Name 02/16/23 1503             Subjective Pain    Able to rate subjective pain? yes  -ANDREY      Pre-Treatment Pain Level 7  -ANDREY         Total Minutes    74485 - PT Therapeutic Exercise Minutes 40  -ANDREY         Exercise 1    Exercise Name 1 seated HS stretch  -ANDREY      Cueing 1 Verbal;Tactile  -ANDREY       "Reps 1 3  -JA      Time 1 20  -JA         Exercise 2    Exercise Name 2 supine QS  -JA      Cueing 2 Verbal;Tactile  -JA      Reps 2 15  -JA      Time 2 5  -JA      Additional Comments required copious cues to reduce glutes and other muscle substitutions, had her perform on L as well  -JA         Exercise 3    Exercise Name 3 small ROM bridge  -JA      Cueing 3 Verbal;Tactile;Demo  -JA      Sets 3 2  -JA      Reps 3 10  -JA      Time 3 5  -JA         Exercise 4    Exercise Name 4 clamshell  -JA      Cueing 4 Verbal;Demo  -JA      Sets 4 2  -JA      Reps 4 10  -JA      Additional Comments cued for position/form, tip forward and don't rotate too high  -JA         Exercise 5    Exercise Name 5 supine heel slides with and without strap  -JA      Cueing 5 Verbal;Tactile;Demo  -JA      Sets 5 2  -JA      Reps 5 10  -JA      Additional Comments wood sliding board  -JA         Exercise 6    Exercise Name 6 supine hip abd \"windshield wiper\"  -JA      Cueing 6 Verbal;Tactile;Demo  -JA      Reps 6 10  -JA         Exercise 7    Exercise Name 7 Nustep  -JA      Cueing 7 Verbal  -JA      Time 7 5 min  -JA         Exercise 8    Exercise Name 8 HR  -JA      Cueing 8 Verbal;Demo  -JA      Reps 8 20  -JA         Exercise 9    Exercise Name 9 standing HS curl, B  -JA      Cueing 9 Verbal;Demo  -JA      Sets 9 2  -JA      Reps 9 10  -JA      Time 9 2#  -JA      Additional Comments cued to maintain neutral hip  -JA         Exercise 10    Exercise Name 10 standing gastroc stretch at wall  -JA      Cueing 10 Verbal;Demo  -JA      Reps 10 3  -JA      Time 10 20s  -JA      Additional Comments performed in sitting today with strap to practice post-op calf stretch  -JA         Exercise 11    Exercise Name 11 leg press BLE  -JA      Cueing 11 Verbal;Demo  -JA      Reps 11 verbal review only  -JA         Exercise 12    Exercise Name 12 step up  -JA      Cueing 12 Verbal;Demo  -JA      Reps 12 --  -JA         Exercise 13    Exercise Name 13 " Education: printed post-op knee exercises (same as booklet from hospital) and performed any she wasn;t familiar with. Focused on understanding the exercisees with long hold are stretches and higher reps short hold are strengthening exercises that should cause muscle fatigue.  -JA      Time 13 7 min  -JA         Exercise 14    Exercise Name 14 ankle pumps  -JA      Reps 14 10  -JA         Exercise 15    Exercise Name 15 LAQ 2#  -JA      Cueing 15 Verbal;Tactile;Demo  -JA      Sets 15 R x2 , L x1  -JA      Reps 15 10  -JA         Exercise 16    Exercise Name 16 STS witih tip from hips, involved leg slightly forward  -JA      Cueing 16 Verbal;Demo  -JA      Reps 16 6  -JA            User Key  (r) = Recorded By, (t) = Taken By, (c) = Cosigned By    Initials Name Provider Type    Ambreen Mccallum, PT Physical Therapist                                        Time Calculation:     Start Time: 1415  Stop Time: 1500  Time Calculation (min): 45 min  Timed Charges  44006 - PT Therapeutic Exercise Minutes: 40  Total Minutes  Timed Charges Total Minutes: 40   Total Minutes: 40     Therapy Charges for Today     Code Description Service Date Service Provider Modifiers Qty    42944769349 HC PT THER PROC EA 15 MIN 2/16/2023 Ambreen Miller, PT GP 3                    Ambreen Miller PT  2/16/2023

## 2023-02-17 ENCOUNTER — OFFICE VISIT (OUTPATIENT)
Dept: FAMILY MEDICINE CLINIC | Facility: CLINIC | Age: 83
End: 2023-02-17
Payer: MEDICARE

## 2023-02-17 VITALS
SYSTOLIC BLOOD PRESSURE: 125 MMHG | TEMPERATURE: 97.8 F | HEIGHT: 63 IN | WEIGHT: 171.6 LBS | DIASTOLIC BLOOD PRESSURE: 75 MMHG | BODY MASS INDEX: 30.41 KG/M2 | OXYGEN SATURATION: 98 % | HEART RATE: 88 BPM | RESPIRATION RATE: 15 BRPM

## 2023-02-17 DIAGNOSIS — M17.11 ARTHRITIS OF RIGHT KNEE: ICD-10-CM

## 2023-02-17 DIAGNOSIS — R73.03 PRE-DIABETES: ICD-10-CM

## 2023-02-17 DIAGNOSIS — E78.5 HYPERLIPIDEMIA, UNSPECIFIED HYPERLIPIDEMIA TYPE: Primary | ICD-10-CM

## 2023-02-17 PROCEDURE — 99214 OFFICE O/P EST MOD 30 MIN: CPT | Performed by: FAMILY MEDICINE

## 2023-02-17 NOTE — PROGRESS NOTES
"    Chief Complaint  Diabetes (F/u)    Subjective    History of Present Illness {CC  Problem List  Visit  Diagnosis   Encounters  Notes  Medications  Labs  Result Review Imaging  Media :23}     Ashley Motta presents to [facility] for   History of Present Illness     82 yo female present for follow up visit. She is doing well, due to have R knee replacement in April.    April 11th scheduled for blood work with ortho.    She is taking all medication as prescribed.  She is trying to monitor diet.   She hopes to go to Rehab after procedure as she lives alone.      Social History     Socioeconomic History   • Marital status:    Tobacco Use   • Smoking status: Never   • Smokeless tobacco: Never   Vaping Use   • Vaping Use: Never used   Substance and Sexual Activity   • Alcohol use: No   • Drug use: Never   • Sexual activity: Not Currently     Birth control/protection: Post-menopausal, Surgical     Comment: .      Objective     Vital Signs:   /75 (BP Location: Right arm, Patient Position: Sitting, Cuff Size: Adult)   Pulse 88   Temp 97.8 °F (36.6 °C) (Infrared)   Resp 15   Ht 160 cm (62.99\")   Wt 77.8 kg (171 lb 9.6 oz)   SpO2 98%   BMI 30.41 kg/m²   Physical Exam  Constitutional:       General: She is not in acute distress.     Appearance: She is not ill-appearing.   HENT:      Head: Normocephalic.   Eyes:      Conjunctiva/sclera: Conjunctivae normal.   Cardiovascular:      Rate and Rhythm: Regular rhythm.      Heart sounds: Normal heart sounds.   Pulmonary:      Effort: No respiratory distress.      Breath sounds: Normal breath sounds. No wheezing.   Neurological:      Mental Status: She is alert.        Result Review  Data Reviewed:{ Labs  Result Review  Imaging  Med Tab  Media :23}   The following data was reviewed by: Angela Carbajal MD on 02/17/2023  Lab Results - Last 18 Months   Lab Units 08/16/22  0854 03/07/22  0840 11/02/21  0739   BUN " mg/dL 11 11 12   CREATININE mg/dL 0.72 0.69 0.66   EGFR IF NONAFRICN AM mL/min/1.73  --   --  86   SODIUM mmol/L 141 140 138   POTASSIUM mmol/L 4.7 4.0 4.0   CHLORIDE mmol/L 101 105 106   CALCIUM mg/dL 9.7 9.7 9.2   ALBUMIN g/dL 4.6 4.40 4.50   BILIRUBIN mg/dL 0.3 0.5 0.3   ALK PHOS IU/L 91 74 80   AST (SGOT) IU/L 22 49* 35*   ALT (SGPT) IU/L 27 58* 41*   CHOLESTEROL mg/dL 123  --   --    TRIGLYCERIDES mg/dL 141 102 130   HDL CHOL mg/dL 53 51 49   VLDL CHOL mg/dL  --  19 23   VLDL CHOLESTEROL LEA mg/dL 24  --   --    LDL CHOL mg/dL 46 38 37   LDL/HDL RATIO   --  0.72  --    HEMOGLOBIN A1C % 6.4* 6.50* 6.20*   WBC 10*3/mm3  --  5.97 7.69   RBC 10*6/mm3  --  4.73 4.65   HEMATOCRIT %  --  42.6 41.8   MCV fL  --  90.1 89.9   MCH pg  --  30.0 29.7   TSH uIU/mL  --  2.210  --               Current Outpatient Medications:   •  aspirin 81 MG tablet, Take 81 mg by mouth daily., Disp: , Rfl:   •  B Complex Vitamins (VITAMIN B COMPLEX PO), Take  by mouth., Disp: , Rfl:   •  Calcium Carbonate-Vitamin D (CALCIUM + D PO), Take  by mouth 2 (Two) Times a Day., Disp: , Rfl:   •  Coenzyme Q10 (COQ10) 200 MG capsule, Take  by mouth., Disp: , Rfl:   •  ECHINACEA-ZINC-VITAMIN C PO, Take 1,000 mg by mouth., Disp: , Rfl:   •  Magnesium 400 MG tablet, Take  by mouth., Disp: , Rfl:   •  Misc Natural Products (GLUCOSAMINE CHONDROITIN VIT D3) capsule, Take  by mouth., Disp: , Rfl:   •  Multiple Vitamins-Minerals (MULTIVITAMIN ADULT PO), Take  by mouth., Disp: , Rfl:   •  Omega-3 Fatty Acids (FISH OIL) 1000 MG capsule capsule, Take  by mouth daily with breakfast., Disp: , Rfl:   •  pantoprazole (Protonix) 20 MG EC tablet, Take 1 tablet by mouth Daily. Before dinner, Disp: 90 tablet, Rfl: 1  •  rOPINIRole (REQUIP) 1 MG tablet, TAKE 1 TABLET BY MOUTH ONE HOUR BEFORE BEDTIME, Disp: 90 tablet, Rfl: 1  •  simvastatin (ZOCOR) 20 MG tablet, Take 1 tablet by mouth Every Night., Disp: 90 tablet, Rfl: 3  •  vitamin E 100 UNIT capsule, Take 100 Units by  mouth Daily., Disp: , Rfl:   •  albuterol sulfate  (90 Base) MCG/ACT inhaler, Inhale 2 puffs Every 6 (Six) Hours As Needed for Wheezing (cough)., Disp: 18 g, Rfl: 1  •  fluticasone (Flonase) 50 MCG/ACT nasal spray, 2 sprays into the nostril(s) as directed by provider Daily., Disp: 16 g, Rfl: 2  •  loratadine (Claritin) 10 MG tablet, Take 1 tablet by mouth Daily., Disp: 30 tablet, Rfl: 1  •  Probiotic Product (PROBIOTIC DAILY PO), Take  by mouth., Disp: , Rfl:   •  sodium chloride (MICHEL 128) 5 % ophthalmic solution, INSTILL 1 DROP INTO EACH EYE THREE TIMES DAILY, Disp: , Rfl:       Assessment and Plan {CC Problem List  Visit Diagnosis  ROS  Review (Popup)  Health Maintenance  Quality  BestPractice  Medications  SmartSets  SnapShot Encounters  Media :23}   Problem List Items Addressed This Visit        Cardiac and Vasculature    Hyperlipidemia - Primary    Current Assessment & Plan     Lipid abnormalities are chronic.  continue to monitor lipid panel   Continue current medication.   Lipids will be reassessed in 3 months.         Relevant Orders    Comprehensive metabolic panel    Lipid panel       Endocrine and Metabolic    Pre-diabetes    Relevant Orders    Comprehensive metabolic panel    Hemoglobin A1c    Urinalysis With Microscopic - Urine, Clean Catch (Completed)       Musculoskeletal and Injuries    Arthritis of right knee    Current Assessment & Plan     Chronic   Scheduled for replacement   Continue current pain regiment until surgery   Work with ortho regarding need for rehab after surgery, lives alone             Cough better now, did not start the trelogy given from pulmonary specialist.       Follow Up   Return in about 3 months (around 5/17/2023) for Recheck hypertension and post op.  Patient was given instructions and counseling regarding her condition or for health maintenance advice. Please see specific information pulled into the AVS if  appropriate.    EpicAct:[email or web address]_ORDERS,RunParams:STARTUPTYPE=FOLLOW    [email or web address]_DISCHARGE

## 2023-02-18 LAB
APPEARANCE UR: CLEAR
BACTERIA #/AREA URNS HPF: NORMAL /[HPF]
BILIRUB UR QL STRIP: NEGATIVE
CASTS URNS QL MICRO: NORMAL /LPF
COLOR UR: YELLOW
EPI CELLS #/AREA URNS HPF: NORMAL /HPF (ref 0–10)
GLUCOSE UR QL STRIP: NEGATIVE
HGB UR QL STRIP: NEGATIVE
KETONES UR QL STRIP: NEGATIVE
LEUKOCYTE ESTERASE UR QL STRIP: NEGATIVE
MICRO URNS: NORMAL
MICRO URNS: NORMAL
NITRITE UR QL STRIP: NEGATIVE
PH UR STRIP: 6 [PH] (ref 5–7.5)
PROT UR QL STRIP: NEGATIVE
RBC #/AREA URNS HPF: NORMAL /HPF (ref 0–2)
SP GR UR STRIP: 1.01 (ref 1–1.03)
UROBILINOGEN UR STRIP-MCNC: 0.2 MG/DL (ref 0.2–1)
WBC #/AREA URNS HPF: NORMAL /HPF (ref 0–5)

## 2023-02-21 ENCOUNTER — OFFICE VISIT (OUTPATIENT)
Dept: NEUROLOGY | Facility: CLINIC | Age: 83
End: 2023-02-21
Payer: MEDICARE

## 2023-02-21 VITALS
HEART RATE: 88 BPM | BODY MASS INDEX: 31.71 KG/M2 | HEIGHT: 63 IN | WEIGHT: 179 LBS | DIASTOLIC BLOOD PRESSURE: 86 MMHG | SYSTOLIC BLOOD PRESSURE: 128 MMHG | OXYGEN SATURATION: 98 %

## 2023-02-21 DIAGNOSIS — R42 DIZZINESS: ICD-10-CM

## 2023-02-21 DIAGNOSIS — G62.9 NEUROPATHY: ICD-10-CM

## 2023-02-21 DIAGNOSIS — R51.9 NONINTRACTABLE HEADACHE, UNSPECIFIED CHRONICITY PATTERN, UNSPECIFIED HEADACHE TYPE: Primary | ICD-10-CM

## 2023-02-21 PROCEDURE — 99213 OFFICE O/P EST LOW 20 MIN: CPT | Performed by: STUDENT IN AN ORGANIZED HEALTH CARE EDUCATION/TRAINING PROGRAM

## 2023-02-21 NOTE — PROGRESS NOTES
"Chief Complaint   Patient presents with   • Dizziness       Patient ID: Ashley Motta is a 83 y.o. female.    HPI:    The following portions of the patient's history were reviewed and updated as appropriate: allergies, current medications, past family history, past medical history, past social history, past surgical history and problem list.    Interval history:    The patient presents to the neurology clinic for evaluation of dizziness and headaches. She also has some numbness. At her last appointment, I scheduled her with vestibular therapy. She has previously failed gabapentin for daily headache. We discussed that CPAP may have been a potential factor in her headaches at her previous appointment as well.    She states that she has been using the CPAP machine since her last visit. Her CPAP was adjusted since her last visit.. She states that she has a headache when she wakes up in the morning, but it usually \"goes away on its own.\" She will take the Tylenol if it is not resolved, and she has to go out. She adds that she has stopped taking gabapentin.     She states that she has not had any episodes of vertigo since her last visit. She has attended physical therapy for vestibular therapy and balance. She denies any falls since her last visit.     She had decreased vibration sense from her knees down on her last exam. She reports that she does not has numbness that she notices in her feet. It does not bother her. She denies drinking alcohol. She had her labs checked previously, and her vitamin B12 was within normal limits, but her A1c was elevated. She states that she is still takin vitamin E supplement.    She notes that she has right knee pain when she walks a mile, and has the right knee surgery scheduled on 04/11/2023. She has been getting the cortisone injections every 3 months for years, and it does not help anymore.       Review of Systems   Neurological: Positive for dizziness, light-headedness and headaches. " Negative for numbness.   Hematological: Bruises/bleeds easily.   Psychiatric/Behavioral: Negative for agitation, behavioral problems, confusion, decreased concentration, hallucinations, self-injury, sleep disturbance and suicidal ideas. The patient is not nervous/anxious.           Vitals:    23 1032   BP: 128/86   Pulse: 88   SpO2: 98%       Neurologic Exam     Mental Status   Attention: normal. Concentration: normal.   Speech: speech is normal   Level of consciousness: alert    Cranial Nerves     CN II   Visual fields full to confrontation.     CN III, IV, VI   Pupils are equal, round, and reactive to light.  Extraocular motions are normal.     CN V   Facial sensation intact.     CN VII   Facial expression full, symmetric.     CN VIII   Hearing: intact    CN IX, X   Palate: symmetric    CN XI   Right trapezius strength: normal  Left trapezius strength: normal    CN XII   Tongue: not atrophic  Fasciculations: absent  Tongue deviation: none  Left pupil not perfectly round     Motor Exam     Strength   Right deltoid: 5/5  Left deltoid: 5/5  Right biceps: 5/5  Left biceps: 5/5  Right triceps: 5/5  Left triceps: 5/5  Right iliopsoas: 5/5  Left iliopsoas: 5/5  Right quadriceps: 5/5  Left quadriceps: 5/5  Right hamstrin/5  Left hamstrin/5  Right anterior tibial: 5/5  Left anterior tibial: 5/5  Right gastroc: 5/5  Left gastroc: 5/5Grip 5 out of 5 bilaterally     Sensory Exam   Right arm light touch: normal  Left arm light touch: normal  Right leg light touch: normal  Left leg light touch: normal  Right arm vibration: normal  Left arm vibration: normal  Right leg vibration: decreased from knee  Left leg vibration: decreased from knee    Gait, Coordination, and Reflexes     Coordination   Finger to nose coordination: normal      Physical Exam  Eyes:      Extraocular Movements: EOM normal.      Pupils: Pupils are equal, round, and reactive to light.   Neurological:      Coordination: Finger-Nose-Finger Test  normal.   Psychiatric:         Speech: Speech normal.         Procedures    Assessment/Plan:         Diagnoses and all orders for this visit:    1. Nonintractable headache, unspecified chronicity pattern, unspecified headache type (Primary)    2. Neuropathy    3. Dizziness           Her symptoms are most consistent with tension headaches or chronic daily headaches. Treatment options have been discussed in case her headaches worsen. She is not interested in starting prescription medication at this time.    She still has signs of neuropathy on her exam today, but it does not bother her on a day-to-day basis. We will continue to monitor her symptoms. Recommended regular exercise, and heathy diet. Advised to limit added sugar in her diet. She had elevated A1c on her recent labs and we discussed this is a likely factor neuropathy, potentially the primary factor. She will continue following up with her PCP to control her blood glucose. She will follow up in 1 year.     I spent 20 minutes caring for this patient on this date of service. This time includes time spent by me in the following activities as necessary: preparing for the visit, reviewing tests, medical records and previous visits, obtaining and/or reviewing a separately obtained history, performing a medically appropriate exam and/or evaluation, counseling and educating the patient, and/or communicating with other healthcare professionals, documenting information in the medical record, independently interpreting results and communicating that information with the patient, and developing a medically appropriate treatment plan with consideration of other conditions, medications, and treatments.      Transcribed from ambient dictation for Christiano Allan MD by Chris Moffett.  02/21/23   12:17 EST    Patient or patient representative verbalized consent to the visit recording.  I have personally performed the services described in this document as transcribed by the  above individual, and it is both accurate and complete.  Christiano Allan MD  2/21/2023  13:05 EST

## 2023-02-21 NOTE — ASSESSMENT & PLAN NOTE
Chronic   Scheduled for replacement   Continue current pain regiment until surgery   Work with ortho regarding need for rehab after surgery, lives alone

## 2023-02-21 NOTE — ASSESSMENT & PLAN NOTE
Lipid abnormalities are chronic.  continue to monitor lipid panel   Continue current medication.   Lipids will be reassessed in 3 months.

## 2023-02-28 ENCOUNTER — HOSPITAL ENCOUNTER (OUTPATIENT)
Dept: MAMMOGRAPHY | Facility: HOSPITAL | Age: 83
Discharge: HOME | End: 2023-02-28
Payer: MEDICARE

## 2023-02-28 ENCOUNTER — HOSPITAL ENCOUNTER (OUTPATIENT)
Dept: BONE DENSITY | Facility: HOSPITAL | Age: 83
Discharge: HOME | End: 2023-02-28
Payer: MEDICARE

## 2023-02-28 ENCOUNTER — APPOINTMENT (OUTPATIENT)
Dept: OTHER | Facility: HOSPITAL | Age: 83
End: 2023-02-28
Payer: MEDICARE

## 2023-02-28 DIAGNOSIS — Z12.31 ENCOUNTER FOR SCREENING MAMMOGRAM FOR MALIGNANT NEOPLASM OF BREAST: ICD-10-CM

## 2023-02-28 DIAGNOSIS — Z09 FOLLOW-UP EXAM: ICD-10-CM

## 2023-02-28 DIAGNOSIS — Z78.0 POST-MENOPAUSAL: ICD-10-CM

## 2023-02-28 PROCEDURE — 77063 BREAST TOMOSYNTHESIS BI: CPT

## 2023-02-28 PROCEDURE — 77080 DXA BONE DENSITY AXIAL: CPT

## 2023-02-28 PROCEDURE — 77067 SCR MAMMO BI INCL CAD: CPT

## 2023-03-06 RX ORDER — SIMVASTATIN 20 MG
TABLET ORAL
Qty: 90 TABLET | Refills: 0 | Status: SHIPPED | OUTPATIENT
Start: 2023-03-06

## 2023-03-27 ENCOUNTER — PRE-ADMISSION TESTING (OUTPATIENT)
Dept: PREADMISSION TESTING | Facility: HOSPITAL | Age: 83
End: 2023-03-27
Payer: MEDICARE

## 2023-03-27 VITALS
OXYGEN SATURATION: 99 % | HEIGHT: 63 IN | RESPIRATION RATE: 18 BRPM | DIASTOLIC BLOOD PRESSURE: 77 MMHG | BODY MASS INDEX: 30.33 KG/M2 | HEART RATE: 85 BPM | TEMPERATURE: 97.5 F | WEIGHT: 171.2 LBS | SYSTOLIC BLOOD PRESSURE: 149 MMHG

## 2023-03-27 DIAGNOSIS — M17.11 ARTHRITIS OF RIGHT KNEE: ICD-10-CM

## 2023-03-27 LAB
ANION GAP SERPL CALCULATED.3IONS-SCNC: 14.8 MMOL/L (ref 5–15)
BUN SERPL-MCNC: 8 MG/DL (ref 8–23)
BUN/CREAT SERPL: 10.5 (ref 7–25)
CALCIUM SPEC-SCNC: 9.7 MG/DL (ref 8.6–10.5)
CHLORIDE SERPL-SCNC: 100 MMOL/L (ref 98–107)
CO2 SERPL-SCNC: 23.2 MMOL/L (ref 22–29)
CREAT SERPL-MCNC: 0.76 MG/DL (ref 0.57–1)
DEPRECATED RDW RBC AUTO: 42.9 FL (ref 37–54)
EGFRCR SERPLBLD CKD-EPI 2021: 78.8 ML/MIN/1.73
ERYTHROCYTE [DISTWIDTH] IN BLOOD BY AUTOMATED COUNT: 13.1 % (ref 12.3–15.4)
GLUCOSE SERPL-MCNC: 105 MG/DL (ref 65–99)
HBA1C MFR BLD: 6.5 % (ref 4.8–5.6)
HCT VFR BLD AUTO: 44 % (ref 34–46.6)
HGB BLD-MCNC: 14.4 G/DL (ref 12–15.9)
MCH RBC QN AUTO: 29.5 PG (ref 26.6–33)
MCHC RBC AUTO-ENTMCNC: 32.7 G/DL (ref 31.5–35.7)
MCV RBC AUTO: 90.2 FL (ref 79–97)
PLATELET # BLD AUTO: 301 10*3/MM3 (ref 140–450)
PMV BLD AUTO: 11.1 FL (ref 6–12)
POTASSIUM SERPL-SCNC: 4.1 MMOL/L (ref 3.5–5.2)
QT INTERVAL: 391 MS
RBC # BLD AUTO: 4.88 10*6/MM3 (ref 3.77–5.28)
SODIUM SERPL-SCNC: 138 MMOL/L (ref 136–145)
WBC NRBC COR # BLD: 6.81 10*3/MM3 (ref 3.4–10.8)

## 2023-03-27 PROCEDURE — 93005 ELECTROCARDIOGRAM TRACING: CPT

## 2023-03-27 PROCEDURE — 85027 COMPLETE CBC AUTOMATED: CPT

## 2023-03-27 PROCEDURE — 93010 ELECTROCARDIOGRAM REPORT: CPT | Performed by: INTERNAL MEDICINE

## 2023-03-27 PROCEDURE — 36415 COLL VENOUS BLD VENIPUNCTURE: CPT

## 2023-03-27 PROCEDURE — 80048 BASIC METABOLIC PNL TOTAL CA: CPT

## 2023-03-27 PROCEDURE — 83036 HEMOGLOBIN GLYCOSYLATED A1C: CPT

## 2023-03-27 RX ORDER — ACETAMINOPHEN 500 MG
500 TABLET ORAL EVERY 6 HOURS PRN
COMMUNITY

## 2023-03-27 NOTE — DISCHARGE INSTRUCTIONS
Arrive to hospital on your day of surgery at  730AM    Take the following medications the morning of surgery:  NONE      If you are on prescription narcotic pain medication to control your pain you may also take that medication the morning of surgery.    General Instructions:  Do not eat solid food after midnight the night before surgery.  You may drink clear liquids day of surgery but must stop at least one hour before your hospital arrival time.  It is beneficial for you to have a clear drink that contains carbohydrates the day of surgery.  We suggest a 12 to 20 ounce bottle of Gatorade or Powerade for non-diabetic patients or a 12 to 20 ounce bottle of G2 or Powerade Zero for diabetic patients. (Pediatric patients, are not advised to drink a 12 to 20 ounce carbohydrate drink)    Clear liquids are liquids you can see through.  Nothing red in color.     Plain water                               Sports drinks  Sodas                                   Gelatin (Jell-O)  Fruit juices without pulp such as white grape juice and apple juice  Popsicles that contain no fruit or yogurt  Tea or coffee (no cream or milk added)  Gatorade / Powerade  G2 / Powerade Zero    Infants may have breast milk up to four hours before surgery.  Infants drinking formula may drink formula up to six hours before surgery.   Patients who avoid smoking, chewing tobacco and alcohol for 4 weeks prior to surgery have a reduced risk of post-operative complications.  Quit smoking as many days before surgery as you can.  Do not smoke, use chewing tobacco or drink alcohol the day of surgery.   If applicable bring your C-PAP/ BI-PAP machine.  Bring any papers given to you in the doctor’s office.  Wear clean comfortable clothes.  Do not wear contact lenses, false eyelashes or make-up.  Bring a case for your glasses.   Bring crutches or walker if applicable.  Remove all piercings.  Leave jewelry and any other valuables at home.  Hair extensions with metal  clips must be removed prior to surgery.  The Pre-Admission Testing nurse will instruct you to bring medications if unable to obtain an accurate list in Pre-Admission Testing.        If you were given a blood bank ID arm band remember to bring it with you the day of surgery.    Preventing a Surgical Site Infection:  For 2 to 3 days before surgery, avoid shaving with a razor because the razor can irritate skin and make it easier to develop an infection.    Any areas of open skin can increase the risk of a post-operative wound infection by allowing bacteria to enter and travel throughout the body.  Notify your surgeon if you have any skin wounds / rashes even if it is not near the expected surgical site.  The area will need assessed to determine if surgery should be delayed until it is healed.  The night prior to surgery shower using a fresh bar of anti-bacterial soap (such as Dial) and clean washcloth.  Sleep in a clean bed with clean clothing.  Do not allow pets to sleep with you.  Shower on the morning of surgery using a fresh bar of anti-bacterial soap (such as Dial) and clean washcloth.  Dry with a clean towel and dress in clean clothing.  Ask your surgeon if you will be receiving antibiotics prior to surgery.  Make sure you, your family, and all healthcare providers clean their hands with soap and water or an alcohol based hand  before caring for you or your wound.    Day of surgery:  Your arrival time is approximately two hours before your scheduled surgery time.  Upon arrival, a Pre-op nurse and Anesthesiologist will review your health history, obtain vital signs, and answer questions you may have.  The only belongings needed at this time will be a list of your home medications and if applicable your C-PAP/BI-PAP machine.  A Pre-op nurse will start an IV and you may receive medication in preparation for surgery, including something to help you relax.     Please be aware that surgery does come with  discomfort.  We want to make every effort to control your discomfort so please discuss any uncontrolled symptoms with your nurse.   Your doctor will most likely have prescribed pain medications.      If you are going home after surgery you will receive individualized written care instructions before being discharged.  A responsible adult must drive you to and from the hospital on the day of your surgery and stay with you for 24 hours.  Discharge prescriptions can be filled by the hospital pharmacy during regular pharmacy hours.  If you are having surgery late in the day/evening your prescription may be e-prescribed to your pharmacy.  Please verify your pharmacy hours or chose a 24 hour pharmacy to avoid not having access to your prescription because your pharmacy has closed for the day.    If you are staying overnight following surgery, you will be transported to your hospital room following the recovery period.  [facility] has all private rooms.    If you have any questions please call Pre-Admission Testing at [phone].  Deductibles and co-payments are collected on the day of service. Please be prepared to pay the required co-pay, deductible or deposit on the day of service as defined by your plan.    Call your surgeon immediately if you experience any of the following symptoms:  Sore Throat  Shortness of Breath or difficulty breathing  Cough  Chills  Body soreness or muscle pain  Headache  Fever  New loss of taste or smell  Do not arrive for your surgery ill.  Your procedure will need to be rescheduled to another time.  You will need to call your physician before the day of surgery to avoid any unnecessary exposure to hospital staff as well as other patients.       CHLORHEXIDINE CLOTH INSTRUCTIONS  The morning of surgery follow these instructions using the Chlorhexidine cloths you've been given.  These steps reduce bacteria on the body.  Do not use the cloths near your eyes, ears mouth,  genitalia or on open wounds.  Throw the cloths away after use but do not try to flush them down a toilet.      Open and remove one cloth at a time from the package.    Leave the cloth unfolded and begin the bathing.  Massage the skin with the cloths using gentle pressure to remove bacteria.  Do not scrub harshly.   Follow the steps below with one 2% CHG cloth per area (6 total cloths).  One cloth for neck, shoulders and chest.  One cloth for both arms, hands, fingers and underarms (do underarms last).  One cloth for the abdomen followed by groin.  One cloth for right leg and foot including between the toes.  One cloth for left leg and foot including between the toes.  The last cloth is to be used for the back of the neck, back and buttocks.    Allow the CHG to air dry 3 minutes on the skin which will give it time to work and decrease the chance of irritation.  The skin may feel sticky until it is dry.  Do not rinse with water or any other liquid or you will lose the beneficial effects of the CHG.  If mild skin irritation occurs, do rinse the skin to remove the CHG.  Report this to the nurse at time of admission.  Do not apply lotions, creams, ointments, deodorants or perfumes after using the clothes. Dress in clean clothes before coming to the hospital.

## 2023-03-28 ENCOUNTER — TELEPHONE (OUTPATIENT)
Dept: ORTHOPEDIC SURGERY | Facility: CLINIC | Age: 83
End: 2023-03-28

## 2023-03-28 NOTE — TELEPHONE ENCOUNTER
Caller: Ashley Motta    Relationship: Self    Best call back number:     What was the call regarding: THE PATIENT STATED SHE GOT A CALL FROM THE OFFICE BUT IS NOT SURE WHAT IT WAS REGARDING.     Do you require a callback: YES

## 2023-04-03 ENCOUNTER — TELEPHONE (OUTPATIENT)
Dept: FAMILY MEDICINE CLINIC | Facility: CLINIC | Age: 83
End: 2023-04-03
Payer: MEDICARE

## 2023-04-03 ENCOUNTER — TELEPHONE (OUTPATIENT)
Dept: ORTHOPEDIC SURGERY | Facility: CLINIC | Age: 83
End: 2023-04-03
Payer: MEDICARE

## 2023-04-03 NOTE — TELEPHONE ENCOUNTER
Caller: Ashley Motta    Relationship: Self    Best call back number: [phone]    What is the best time to reach you: ANY     Who are you requesting to speak with (clinical staff, provider,  specific staff member): CLINICAL STAFF     What was the call regarding: [facility] SURGEON CONTACTED HER TO BECAUSE THEY HAVE NOT RECEIVED THE SURGERY CLEARANCE FORM BACK FROM DR APARICIO. PLEASE FAX -[phone], SURGERY IS SCHEDULED 4/11    Do you require a callback: YES

## 2023-04-03 NOTE — TELEPHONE ENCOUNTER
Forms for clearance faxed, confirmation received, patient informed we have faxed over and left voicemail at Orth[facility] that it was faxed today

## 2023-04-03 NOTE — TELEPHONE ENCOUNTER
Caller: Ashley Motta    Relationship to patient: Self    Best call back number: [phone]    Patient is needing: CALL BACK REGARDING SX

## 2023-04-04 ENCOUNTER — OFFICE VISIT (OUTPATIENT)
Dept: ORTHOPEDIC SURGERY | Facility: CLINIC | Age: 83
End: 2023-04-04
Payer: MEDICARE

## 2023-04-04 VITALS — BODY MASS INDEX: 30.3 KG/M2 | HEIGHT: 63 IN | WEIGHT: 171 LBS | TEMPERATURE: 97.9 F

## 2023-04-04 DIAGNOSIS — R52 PAIN: Primary | ICD-10-CM

## 2023-04-04 DIAGNOSIS — M17.11 ARTHRITIS OF KNEE, RIGHT: ICD-10-CM

## 2023-04-04 PROCEDURE — S0260 H&P FOR SURGERY: HCPCS | Performed by: NURSE PRACTITIONER

## 2023-04-04 PROCEDURE — 73562 X-RAY EXAM OF KNEE 3: CPT | Performed by: NURSE PRACTITIONER

## 2023-04-04 PROCEDURE — 1160F RVW MEDS BY RX/DR IN RCRD: CPT | Performed by: NURSE PRACTITIONER

## 2023-04-04 PROCEDURE — 1159F MED LIST DOCD IN RCRD: CPT | Performed by: NURSE PRACTITIONER

## 2023-04-04 NOTE — H&P (VIEW-ONLY)
"   History & Physical       Patient: Ashley Motta  YOB: 1940  Medical Record Number: [id number]  Wt Readings from Last 3 Encounters:   04/04/23 77.6 kg (171 lb)   03/27/23 77.7 kg (171 lb 3.2 oz)   02/21/23 81.2 kg (179 lb)     Ht Readings from Last 3 Encounters:   04/04/23 160 cm (63\")   03/27/23 160 cm (63\")   02/21/23 160 cm (63\")     Body mass index is 30.29 kg/m².  Facility age limit for growth percentiles is 20 years.    Surgeon:  Dr. Atul Rico    Chief Complaints:   Chief Complaint   Patient presents with   • Right Knee - Follow-up, Pain     Surgery:  Right Total Knee Arthroplasty with [place] Navigation    Subjective:    History of Present Illness: 83 y.o. female presents with   Chief Complaint   Patient presents with   • Right Knee - Follow-up, Pain   . Chronic symptoms have been progressively worsening despite more conservative treatment measures including medications OTC and prescription, cortisone injections and physical therapy.  Symptoms are associated with ability to move, exercise, and perform activities of daily living.  Symptoms are aggravated by weight bearing and ROM necessary for activities of daily living.   Symptoms improve with rest, ice and elevation only minimally.      Allergies: No Known Allergies    Medications:   Home Medications:  Current Outpatient Medications on File Prior to Visit   Medication Sig   • acetaminophen (TYLENOL) 500 MG tablet Take 1 tablet by mouth Every 6 (Six) Hours As Needed for Mild Pain.   • aspirin 81 MG tablet Take 1 tablet by mouth Daily. FOLLOW MD GUIDELINES ON HOLDING FOR DOS   • B Complex Vitamins (VITAMIN B COMPLEX PO) Take 1 tablet by mouth Daily. HOLDING FOR DOS   • Calcium Carbonate-Vitamin D (CALCIUM + D PO) Take 1 tablet by mouth 2 (Two) Times a Day. HOLDING FOR DOS   • CHLORHEXIDINE GLUCONATE CLOTH EX Apply  topically. USE AS DIRECTED   • ECHINACEA-ZINC-VITAMIN C PO Take 1,000 mg by mouth Daily. HOLDING FOR DOS   • fluticasone " (Flonase) 50 MCG/ACT nasal spray 2 sprays into the nostril(s) as directed by provider Daily.   • Magnesium 400 MG tablet Take 1 tablet by mouth Daily. HOLDING FOR DOS   • Misc Natural Products (GLUCOSAMINE CHONDROITIN VIT D3) capsule Take 1 tablet by mouth Daily. HOLDING FOR DOS   • Multiple Vitamins-Minerals (MULTIVITAMIN ADULT PO) Take 1 tablet by mouth Daily. HOLDING FOR DOS   • Omega-3 Fatty Acids (FISH OIL) 1000 MG capsule capsule Take 1 capsule by mouth Daily With Breakfast. HOLDING FOR DOS   • pantoprazole (Protonix) 20 MG EC tablet Take 1 tablet by mouth Daily. Before dinner (Patient taking differently: Take 1 tablet by mouth Daily As Needed. Before dinner)   • rOPINIRole (REQUIP) 1 MG tablet TAKE 1 TABLET BY MOUTH ONE HOUR BEFORE BEDTIME (Patient taking differently: Take 1 tablet by mouth Every Night. TAKE 1 TABLET BY MOUTH ONE HOUR BEFORE BEDTIME)   • simvastatin (ZOCOR) 20 MG tablet TAKE 1 TABLET BY MOUTH ONCE DAILY AT NIGHT (Patient taking differently: Take 1 tablet by mouth Every Night.)   • vitamin E 100 UNIT capsule Take 1 capsule by mouth Daily. HOLDING FOR DOS     No current facility-administered medications on file prior to visit.     Current Medications:  Scheduled Meds:  Continuous Infusions:No current facility-administered medications for this visit.    PRN Meds:.    I have reviewed the patient's medical history in detail and updated the computerized patient record.  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 TERRY CHEUNG   • Osteoarthritis    • Osteopenia    • Postmenopausal atrophic vaginitis    • Rectal nodule 03/2020    REFERRED TO DR. PAMELA NAPOLES   • Rectocele 07/2017   • Restless leg syndrome    • Right knee pain    • RLS (restless legs syndrome)    • Seborrheic keratosis    • Skin cancer 04/2015    LOWER LEG, FOLLOWED BY DR. EDI SANCHEZ   • Vaginal vault prolapse 09/2017   • Vertigo         Past Surgical History:   Procedure Laterality Date   • CATARACT EXTRACTION, BILATERAL Bilateral 2015   • COLONOSCOPY N/A 02/27/2015    1 TUBULAR [facility]EN[facility]   • COLONOSCOPY N/A 03/04/2020    10 MM SESSILE SERRATED ADENOMA POLYP IN ASCENDING, 3 MM HYPERPLASTIC POLYP IN RECTUM, 7 MM ANAL NODULE, DR. SARAH LIRIANO AT [facility]   • HYSTERECTOMY N/A 01/19/2004    KAYLEE WITH BSO, DR. RAFIA MORTENSEN AT [facility]   • KNEE ARTHROSCOPY Left 2013   • KNEE ARTHROSCOPY Right 2010   • MELISSA CULDOPLASTY N/A 01/19/2004    DR. RAFIA MORTENSEN AT [facility]   • SACROCOLPOPEXY N/A         Social History     Occupational History   • Not on file   Tobacco Use   • Smoking status: Never   • Smokeless tobacco: Never   Vaping Use   • Vaping Use: Never used   Substance and Sexual Activity   • Alcohol use: No   • Drug use: Never   • Sexual activity: Not Currently     Birth control/protection: Post-menopausal, Surgical      Social History     Social History Narrative   • Not on file        Family History   Problem Relation Age of Onset   • Heart disease Mother    • Lung cancer Father    • Hypertension Father    • Heart disease Father    • Cancer Father    • Heart attack Brother    • Emphysema Brother    • COPD Brother    • Depression Brother    • Heart attack Brother         Had stents placed 2009   • Breast cancer Neg Hx    • Malig Hyperthermia Neg Hx        ROS: 14 point review of systems was performed and was negative except for documented findings in HPI and today's encounter.  "      Constitutional:  Denies fever, shaking or chills   Eyes:  Denies change in visual acuity   HENT:  Denies nasal congestion or sore throat   Respiratory:  Denies cough or shortness of breath   Cardiovascular:  Denies chest pain or severe LE edema   GI:  Denies abdominal pain, nausea, vomiting, bloody stools or diarrhea   Musculoskeletal:  Denies numbness tingling or loss of motor function except as outlined above in history of present illness.  : Denies painful urination or hematuria  Integument:  Denies rash, lesion or ulceration   Neurologic:  Denies headache or focal weakness  Endocrine:  Denies lymphadenopathy  Psych:  Denies confusion or change in mental status   Hem:  Denies active bleeding    Physical Exam: 83 y.o. female  Wt Readings from Last 3 Encounters:   04/04/23 77.6 kg (171 lb)   03/27/23 77.7 kg (171 lb 3.2 oz)   02/21/23 81.2 kg (179 lb)     Ht Readings from Last 3 Encounters:   04/04/23 160 cm (63\")   03/27/23 160 cm (63\")   02/21/23 160 cm (63\")     Body mass index is 30.29 kg/m².  Facility age limit for growth percentiles is 20 years.  Vitals:    04/04/23 1252   Temp: 97.9 °F (36.6 °C)       Vital signs reviewed.   General Appearance:    Alert, cooperative, in no acute distress                  Eyes: conjunctiva clear  ENT: external ears and nose atraumatic  CV: no peripheral edema  Resp: normal respiratory effort  Skin: no rashes or wounds; normal turgor  Psych: mood and affect appropriate  Lymph: no nodes appreciated  Neuro: gross sensation intact  Vascular:  Palpable peripheral pulse in noted extremity  Musculoskeletal Extremities: KNEE Exam: medial joint line tenderness with crepitation, synovitis, swelling, and joint effusion right knee.        Diagnostic Tests:  Lab Results   Component Value Date    WBC 6.81 03/27/2023    HGB 14.4 03/27/2023    HCT 44.0 03/27/2023    MCV 90.2 03/27/2023     03/27/2023     Lab Results   Component Value Date    GLUCOSE 105 (H) 03/27/2023    " CALCIUM 9.7 03/27/2023     03/27/2023    K 4.1 03/27/2023    CO2 23.2 03/27/2023     03/27/2023    BUN 8 03/27/2023    CREATININE 0.76 03/27/2023    EGFRRESULT 84 08/16/2022    EGFR 78.8 03/27/2023    BCR 10.5 03/27/2023    ANIONGAP 14.8 03/27/2023       EKG:    Progress Note    HEART RATE= 75  bpm  RR Interval= 800  ms  [place] Interval= 131  ms  P Horizontal Axis= 11  deg  P Front Axis= 33  deg  QRSD Interval= 86  ms  QT Interval= 391  ms  QRS Axis= 69  deg  T Wave Axis= 54  deg  - NORMAL ECG -  Sinus rhythm  No change from previous tracing  Electronically Signed By: Kurt Birmingham ([facility]) ([facility]) 27-Mar-2023 15:19:49  Date and Time of Study: 2023-03-27 10:39:28       I have reviewed all the lab & EKG results.     Radiology:   AP, lateral, 40 degree PA of the right knee obtained in the office today due to pain, with comparison views shows advanced tricompartmental degenerative changes, most significantly  medial and patellofemoral compartment with osteophyte formation and subchondral sclerosis      Assessment:  Patient Active Problem List   Diagnosis   • Osteopenia   • Hyperlipidemia   • Pre-diabetes   • Osteoarthritis   • BALDEMAR on CPAP   • Pelvic relaxation due to vaginal prolapse   • Chronic pain of both knees   • Arthritis of right knee   • Arthritis of left knee   • Arthritis of both knees   • Restless leg syndrome   • Dizziness   • Headache, migraine   • Heartburn   • Internal hemorrhoids with complication   • Impacted cerumen of left ear   • Vitamin E deficiency   • Environmental and seasonal allergies   • Chronic cough       Plan:  Dr. Atul Rico reviewed anatomy of a total joint arthroplasty in laymen's terms, as well as typical postoperative recovery and possibly 6-12 months for maximal recovery, and possible need for rehabilitation stay after hospitalization. We also discussed risks, benefits, alternatives, and limitations of procedure with risks including but not limited to neurovascular damage,  bleeding, infection, malalignment, chronic pian, failure of implants, osteolysis, loosening of implants, loss of motion, weakness, stiffness, instability, DVT, pulmonary embolus, death, stroke, complex regional pain syndrome, myocardial infarction, and need for additional procedures. Concept of substitution vs. replacement discussed.  No guarantees were given regarding results of surgery.      Ashley Motta was given the opportunity to ask and have all questions answered today.  The patient voiced understanding of the risks, benefits, and alternative forms of treatment that were discussed and the patient consents to proceed with surgery.       Cleared by PCP on 4/3/2023 Dr. Carbajal    Skin breakdown? WNL  Metal allergy? No  COVID Status:Vaccinated with Booster  DVT Risk Factors: personal history of skin cancer    DVT Prophylaxis:  Eliquis 2.5mg BID x 2 weeks, then resume aspirin 81mg daily   Walker: needs one ordered  Consults: none  Additional orders: none  Pharmacy: rehab    Discharge Plan: POD 1 to [facility] when cleared by physical therapy as safe for discharge - has spoke with facility and verified insurance coverage    To be updated    Date: 4/4/2023  BERNICE Leblanc    Dictated Utilizing Dragon Dictation

## 2023-04-11 ENCOUNTER — APPOINTMENT (OUTPATIENT)
Dept: GENERAL RADIOLOGY | Facility: HOSPITAL | Age: 83
End: 2023-04-11
Payer: MEDICARE

## 2023-04-11 ENCOUNTER — HOSPITAL ENCOUNTER (OUTPATIENT)
Facility: HOSPITAL | Age: 83
Discharge: NURSING, REHAB OR LONG-TERM CARE | End: 2023-04-13
Attending: ORTHOPAEDIC SURGERY | Admitting: ORTHOPAEDIC SURGERY
Payer: MEDICARE

## 2023-04-11 ENCOUNTER — ANESTHESIA (OUTPATIENT)
Dept: PERIOP | Facility: HOSPITAL | Age: 83
End: 2023-04-11
Payer: MEDICARE

## 2023-04-11 ENCOUNTER — ANESTHESIA EVENT (OUTPATIENT)
Dept: PERIOP | Facility: HOSPITAL | Age: 83
End: 2023-04-11
Payer: MEDICARE

## 2023-04-11 DIAGNOSIS — Z96.651 S/P TKR (TOTAL KNEE REPLACEMENT), RIGHT: Primary | ICD-10-CM

## 2023-04-11 PROCEDURE — C1776 JOINT DEVICE (IMPLANTABLE): HCPCS | Performed by: ORTHOPAEDIC SURGERY

## 2023-04-11 PROCEDURE — A9270 NON-COVERED ITEM OR SERVICE: HCPCS | Performed by: NURSE PRACTITIONER

## 2023-04-11 PROCEDURE — 63710000001 POLYETHYLENE GLYCOL 17 G PACK: Performed by: NURSE PRACTITIONER

## 2023-04-11 PROCEDURE — 25010000002 FENTANYL CITRATE (PF) 100 MCG/2ML SOLUTION: Performed by: NURSE ANESTHETIST, CERTIFIED REGISTERED

## 2023-04-11 PROCEDURE — 25010000002 DEXAMETHASONE SODIUM PHOSPHATE 20 MG/5ML SOLUTION: Performed by: NURSE ANESTHETIST, CERTIFIED REGISTERED

## 2023-04-11 PROCEDURE — 20985 CPTR-ASST DIR MS PX: CPT | Performed by: ORTHOPAEDIC SURGERY

## 2023-04-11 PROCEDURE — 25010000002 ROPIVACAINE PER 1 MG: Performed by: ORTHOPAEDIC SURGERY

## 2023-04-11 PROCEDURE — 25010000002 HYDRALAZINE PER 20 MG: Performed by: NURSE ANESTHETIST, CERTIFIED REGISTERED

## 2023-04-11 PROCEDURE — 63710000001 ROPINIROLE 1 MG TABLET: Performed by: NURSE PRACTITIONER

## 2023-04-11 PROCEDURE — 25010000002 ONDANSETRON PER 1 MG: Performed by: ANESTHESIOLOGY

## 2023-04-11 PROCEDURE — 63710000001 HYDROCODONE-ACETAMINOPHEN 7.5-325 MG TABLET: Performed by: NURSE PRACTITIONER

## 2023-04-11 PROCEDURE — 63710000001 ATORVASTATIN 20 MG TABLET: Performed by: NURSE PRACTITIONER

## 2023-04-11 PROCEDURE — 25010000002 EPINEPHRINE 1 MG/ML SOLUTION 30 ML VIAL: Performed by: ORTHOPAEDIC SURGERY

## 2023-04-11 PROCEDURE — 25010000002 PROPOFOL 10 MG/ML EMULSION: Performed by: NURSE ANESTHETIST, CERTIFIED REGISTERED

## 2023-04-11 PROCEDURE — G0378 HOSPITAL OBSERVATION PER HR: HCPCS

## 2023-04-11 PROCEDURE — 73560 X-RAY EXAM OF KNEE 1 OR 2: CPT

## 2023-04-11 PROCEDURE — 63710000001 DOCUSATE SODIUM 100 MG CAPSULE: Performed by: NURSE PRACTITIONER

## 2023-04-11 PROCEDURE — 27447 TOTAL KNEE ARTHROPLASTY: CPT | Performed by: ORTHOPAEDIC SURGERY

## 2023-04-11 PROCEDURE — 25010000002 KETOROLAC TROMETHAMINE PER 15 MG: Performed by: ORTHOPAEDIC SURGERY

## 2023-04-11 PROCEDURE — 25010000002 HYDROMORPHONE PER 4 MG: Performed by: NURSE ANESTHETIST, CERTIFIED REGISTERED

## 2023-04-11 PROCEDURE — 25010000002 CEFAZOLIN IN DEXTROSE 2-4 GM/100ML-% SOLUTION: Performed by: NURSE PRACTITIONER

## 2023-04-11 PROCEDURE — C1713 ANCHOR/SCREW BN/BN,TIS/BN: HCPCS | Performed by: ORTHOPAEDIC SURGERY

## 2023-04-11 PROCEDURE — 25010000002 CLONIDINE PER 1 MG: Performed by: ORTHOPAEDIC SURGERY

## 2023-04-11 DEVICE — ATTUNE KNEE SYSTEM FEMORAL CRUCIATE RETAINING SIZE 4 RIGHT CEMENTED
Type: IMPLANTABLE DEVICE | Site: KNEE | Status: FUNCTIONAL
Brand: ATTUNE

## 2023-04-11 DEVICE — ATTUNE KNEE SYSTEM TIBIAL BASE FIXED BEARING SIZE 4 CEMENTED
Type: IMPLANTABLE DEVICE | Site: KNEE | Status: FUNCTIONAL
Brand: ATTUNE

## 2023-04-11 DEVICE — DEV CONTRL TISS STRATAFIXSPIRALMNCRYL PLSPS2 REV3/0 45CM: Type: IMPLANTABLE DEVICE | Site: KNEE | Status: FUNCTIONAL

## 2023-04-11 DEVICE — ATTUNE KNEE SYSTEM TIBIAL INSERT FIXED BEARING CRUCIATE RETAINING 4 6MM AOX
Type: IMPLANTABLE DEVICE | Site: KNEE | Status: FUNCTIONAL
Brand: ATTUNE

## 2023-04-11 DEVICE — CAP KN ATTUNE FB CMT: Type: IMPLANTABLE DEVICE | Status: FUNCTIONAL

## 2023-04-11 DEVICE — SMARTSET HIGH PERFORMANCE MV MEDIUM VISCOSITY BONE CEMENT 40G
Type: IMPLANTABLE DEVICE | Site: KNEE | Status: FUNCTIONAL
Brand: SMARTSET

## 2023-04-11 DEVICE — ATTUNE PATELLA MEDIALIZED DOME 35MM CEMENTED AOX
Type: IMPLANTABLE DEVICE | Site: KNEE | Status: FUNCTIONAL
Brand: ATTUNE

## 2023-04-11 DEVICE — DEV CONTRL TISS STRATAFIX SYMM PDS PLUS VIL CT-1 60CM: Type: IMPLANTABLE DEVICE | Site: KNEE | Status: FUNCTIONAL

## 2023-04-11 RX ORDER — FAMOTIDINE 10 MG/ML
20 INJECTION, SOLUTION INTRAVENOUS ONCE
Status: COMPLETED | OUTPATIENT
Start: 2023-04-11 | End: 2023-04-11

## 2023-04-11 RX ORDER — DROPERIDOL 2.5 MG/ML
0.62 INJECTION, SOLUTION INTRAMUSCULAR; INTRAVENOUS
Status: DISCONTINUED | OUTPATIENT
Start: 2023-04-11 | End: 2023-04-11 | Stop reason: HOSPADM

## 2023-04-11 RX ORDER — ONDANSETRON 2 MG/ML
4 INJECTION INTRAMUSCULAR; INTRAVENOUS ONCE AS NEEDED
Status: DISCONTINUED | OUTPATIENT
Start: 2023-04-11 | End: 2023-04-11 | Stop reason: HOSPADM

## 2023-04-11 RX ORDER — NALOXONE HCL 0.4 MG/ML
0.1 VIAL (ML) INJECTION
Status: DISCONTINUED | OUTPATIENT
Start: 2023-04-11 | End: 2023-04-13 | Stop reason: HOSPADM

## 2023-04-11 RX ORDER — POLYETHYLENE GLYCOL 3350 17 G/17G
17 POWDER, FOR SOLUTION ORAL DAILY
Status: DISCONTINUED | OUTPATIENT
Start: 2023-04-11 | End: 2023-04-13 | Stop reason: HOSPADM

## 2023-04-11 RX ORDER — LABETALOL HYDROCHLORIDE 5 MG/ML
5 INJECTION, SOLUTION INTRAVENOUS
Status: DISCONTINUED | OUTPATIENT
Start: 2023-04-11 | End: 2023-04-11 | Stop reason: HOSPADM

## 2023-04-11 RX ORDER — PREGABALIN 75 MG/1
75 CAPSULE ORAL ONCE
Status: COMPLETED | OUTPATIENT
Start: 2023-04-11 | End: 2023-04-11

## 2023-04-11 RX ORDER — CEFAZOLIN SODIUM 2 G/100ML
2 INJECTION, SOLUTION INTRAVENOUS ONCE
Status: COMPLETED | OUTPATIENT
Start: 2023-04-11 | End: 2023-04-11

## 2023-04-11 RX ORDER — HYDROCODONE BITARTRATE AND ACETAMINOPHEN 7.5; 325 MG/1; MG/1
1 TABLET ORAL EVERY 4 HOURS PRN
Status: DISCONTINUED | OUTPATIENT
Start: 2023-04-11 | End: 2023-04-13 | Stop reason: HOSPADM

## 2023-04-11 RX ORDER — PROMETHAZINE HYDROCHLORIDE 25 MG/1
25 TABLET ORAL ONCE AS NEEDED
Status: DISCONTINUED | OUTPATIENT
Start: 2023-04-11 | End: 2023-04-11 | Stop reason: HOSPADM

## 2023-04-11 RX ORDER — KETAMINE HCL IN NACL, ISO-OSM 100MG/10ML
SYRINGE (ML) INJECTION AS NEEDED
Status: DISCONTINUED | OUTPATIENT
Start: 2023-04-11 | End: 2023-04-11 | Stop reason: SURG

## 2023-04-11 RX ORDER — DEXAMETHASONE SODIUM PHOSPHATE 4 MG/ML
INJECTION, SOLUTION INTRA-ARTICULAR; INTRALESIONAL; INTRAMUSCULAR; INTRAVENOUS; SOFT TISSUE AS NEEDED
Status: DISCONTINUED | OUTPATIENT
Start: 2023-04-11 | End: 2023-04-11 | Stop reason: SURG

## 2023-04-11 RX ORDER — NALOXONE HCL 0.4 MG/ML
0.2 VIAL (ML) INJECTION AS NEEDED
Status: DISCONTINUED | OUTPATIENT
Start: 2023-04-11 | End: 2023-04-11 | Stop reason: HOSPADM

## 2023-04-11 RX ORDER — ROCURONIUM BROMIDE 10 MG/ML
INJECTION, SOLUTION INTRAVENOUS AS NEEDED
Status: DISCONTINUED | OUTPATIENT
Start: 2023-04-11 | End: 2023-04-11 | Stop reason: SURG

## 2023-04-11 RX ORDER — SODIUM CHLORIDE 0.9 % (FLUSH) 0.9 %
3 SYRINGE (ML) INJECTION EVERY 12 HOURS SCHEDULED
Status: DISCONTINUED | OUTPATIENT
Start: 2023-04-11 | End: 2023-04-11 | Stop reason: HOSPADM

## 2023-04-11 RX ORDER — ONDANSETRON 4 MG/1
4 TABLET, FILM COATED ORAL EVERY 6 HOURS PRN
Status: DISCONTINUED | OUTPATIENT
Start: 2023-04-11 | End: 2023-04-13 | Stop reason: HOSPADM

## 2023-04-11 RX ORDER — HYDROCODONE BITARTRATE AND ACETAMINOPHEN 7.5; 325 MG/1; MG/1
1 TABLET ORAL EVERY 4 HOURS PRN
Status: DISCONTINUED | OUTPATIENT
Start: 2023-04-11 | End: 2023-04-11 | Stop reason: HOSPADM

## 2023-04-11 RX ORDER — MAGNESIUM HYDROXIDE 1200 MG/15ML
LIQUID ORAL AS NEEDED
Status: DISCONTINUED | OUTPATIENT
Start: 2023-04-11 | End: 2023-04-11 | Stop reason: HOSPADM

## 2023-04-11 RX ORDER — FENTANYL CITRATE 50 UG/ML
25 INJECTION, SOLUTION INTRAMUSCULAR; INTRAVENOUS
Status: DISCONTINUED | OUTPATIENT
Start: 2023-04-11 | End: 2023-04-11 | Stop reason: HOSPADM

## 2023-04-11 RX ORDER — HYDROCODONE BITARTRATE AND ACETAMINOPHEN 5; 325 MG/1; MG/1
1 TABLET ORAL ONCE AS NEEDED
Status: DISCONTINUED | OUTPATIENT
Start: 2023-04-11 | End: 2023-04-11 | Stop reason: HOSPADM

## 2023-04-11 RX ORDER — HYDRALAZINE HYDROCHLORIDE 20 MG/ML
INJECTION INTRAMUSCULAR; INTRAVENOUS AS NEEDED
Status: DISCONTINUED | OUTPATIENT
Start: 2023-04-11 | End: 2023-04-11 | Stop reason: SURG

## 2023-04-11 RX ORDER — ONDANSETRON 2 MG/ML
4 INJECTION INTRAMUSCULAR; INTRAVENOUS EVERY 6 HOURS PRN
Status: DISCONTINUED | OUTPATIENT
Start: 2023-04-11 | End: 2023-04-13 | Stop reason: HOSPADM

## 2023-04-11 RX ORDER — TRANEXAMIC ACID 100 MG/ML
INJECTION, SOLUTION INTRAVENOUS AS NEEDED
Status: DISCONTINUED | OUTPATIENT
Start: 2023-04-11 | End: 2023-04-11 | Stop reason: SURG

## 2023-04-11 RX ORDER — DOCUSATE SODIUM 100 MG/1
100 CAPSULE, LIQUID FILLED ORAL 2 TIMES DAILY
Status: DISCONTINUED | OUTPATIENT
Start: 2023-04-11 | End: 2023-04-13 | Stop reason: HOSPADM

## 2023-04-11 RX ORDER — LIDOCAINE HYDROCHLORIDE 20 MG/ML
INJECTION, SOLUTION INFILTRATION; PERINEURAL AS NEEDED
Status: DISCONTINUED | OUTPATIENT
Start: 2023-04-11 | End: 2023-04-11 | Stop reason: SURG

## 2023-04-11 RX ORDER — ONDANSETRON 2 MG/ML
INJECTION INTRAMUSCULAR; INTRAVENOUS AS NEEDED
Status: DISCONTINUED | OUTPATIENT
Start: 2023-04-11 | End: 2023-04-11 | Stop reason: SURG

## 2023-04-11 RX ORDER — ATORVASTATIN CALCIUM 20 MG/1
10 TABLET, FILM COATED ORAL DAILY
Status: DISCONTINUED | OUTPATIENT
Start: 2023-04-11 | End: 2023-04-13 | Stop reason: HOSPADM

## 2023-04-11 RX ORDER — ROPINIROLE 1 MG/1
1 TABLET, FILM COATED ORAL NIGHTLY
Status: DISCONTINUED | OUTPATIENT
Start: 2023-04-11 | End: 2023-04-13 | Stop reason: HOSPADM

## 2023-04-11 RX ORDER — HYDROCODONE BITARTRATE AND ACETAMINOPHEN 7.5; 325 MG/1; MG/1
2 TABLET ORAL EVERY 4 HOURS PRN
Status: DISCONTINUED | OUTPATIENT
Start: 2023-04-11 | End: 2023-04-13 | Stop reason: HOSPADM

## 2023-04-11 RX ORDER — PROMETHAZINE HYDROCHLORIDE 25 MG/1
25 SUPPOSITORY RECTAL ONCE AS NEEDED
Status: DISCONTINUED | OUTPATIENT
Start: 2023-04-11 | End: 2023-04-11 | Stop reason: HOSPADM

## 2023-04-11 RX ORDER — MELOXICAM 15 MG/1
15 TABLET ORAL ONCE
Status: COMPLETED | OUTPATIENT
Start: 2023-04-11 | End: 2023-04-11

## 2023-04-11 RX ORDER — FENTANYL CITRATE 50 UG/ML
50 INJECTION, SOLUTION INTRAMUSCULAR; INTRAVENOUS
Status: DISCONTINUED | OUTPATIENT
Start: 2023-04-11 | End: 2023-04-11 | Stop reason: HOSPADM

## 2023-04-11 RX ORDER — SODIUM CHLORIDE, SODIUM LACTATE, POTASSIUM CHLORIDE, CALCIUM CHLORIDE 600; 310; 30; 20 MG/100ML; MG/100ML; MG/100ML; MG/100ML
100 INJECTION, SOLUTION INTRAVENOUS CONTINUOUS
Status: ACTIVE | OUTPATIENT
Start: 2023-04-11 | End: 2023-04-12

## 2023-04-11 RX ORDER — PANTOPRAZOLE SODIUM 40 MG/1
40 TABLET, DELAYED RELEASE ORAL DAILY
Status: DISCONTINUED | OUTPATIENT
Start: 2023-04-11 | End: 2023-04-13 | Stop reason: HOSPADM

## 2023-04-11 RX ORDER — HYDRALAZINE HYDROCHLORIDE 20 MG/ML
5 INJECTION INTRAMUSCULAR; INTRAVENOUS
Status: DISCONTINUED | OUTPATIENT
Start: 2023-04-11 | End: 2023-04-11 | Stop reason: HOSPADM

## 2023-04-11 RX ORDER — SODIUM CHLORIDE 0.9 % (FLUSH) 0.9 %
3-10 SYRINGE (ML) INJECTION AS NEEDED
Status: DISCONTINUED | OUTPATIENT
Start: 2023-04-11 | End: 2023-04-11 | Stop reason: HOSPADM

## 2023-04-11 RX ORDER — HYDROMORPHONE HYDROCHLORIDE 1 MG/ML
0.25 INJECTION, SOLUTION INTRAMUSCULAR; INTRAVENOUS; SUBCUTANEOUS
Status: DISCONTINUED | OUTPATIENT
Start: 2023-04-11 | End: 2023-04-11 | Stop reason: HOSPADM

## 2023-04-11 RX ORDER — BISACODYL 10 MG
10 SUPPOSITORY, RECTAL RECTAL DAILY PRN
Status: DISCONTINUED | OUTPATIENT
Start: 2023-04-11 | End: 2023-04-13 | Stop reason: HOSPADM

## 2023-04-11 RX ORDER — LABETALOL HYDROCHLORIDE 5 MG/ML
INJECTION, SOLUTION INTRAVENOUS AS NEEDED
Status: DISCONTINUED | OUTPATIENT
Start: 2023-04-11 | End: 2023-04-11 | Stop reason: SURG

## 2023-04-11 RX ORDER — ACETAMINOPHEN 500 MG
1000 TABLET ORAL ONCE
Status: COMPLETED | OUTPATIENT
Start: 2023-04-11 | End: 2023-04-11

## 2023-04-11 RX ORDER — FENTANYL CITRATE 50 UG/ML
INJECTION, SOLUTION INTRAMUSCULAR; INTRAVENOUS AS NEEDED
Status: DISCONTINUED | OUTPATIENT
Start: 2023-04-11 | End: 2023-04-11 | Stop reason: SURG

## 2023-04-11 RX ORDER — BISACODYL 5 MG/1
10 TABLET, DELAYED RELEASE ORAL DAILY PRN
Status: DISCONTINUED | OUTPATIENT
Start: 2023-04-11 | End: 2023-04-13 | Stop reason: HOSPADM

## 2023-04-11 RX ORDER — FLUMAZENIL 0.1 MG/ML
0.2 INJECTION INTRAVENOUS AS NEEDED
Status: DISCONTINUED | OUTPATIENT
Start: 2023-04-11 | End: 2023-04-11 | Stop reason: HOSPADM

## 2023-04-11 RX ORDER — DIPHENHYDRAMINE HYDROCHLORIDE 50 MG/ML
12.5 INJECTION INTRAMUSCULAR; INTRAVENOUS
Status: DISCONTINUED | OUTPATIENT
Start: 2023-04-11 | End: 2023-04-11 | Stop reason: HOSPADM

## 2023-04-11 RX ORDER — HYDROMORPHONE HYDROCHLORIDE 1 MG/ML
0.5 INJECTION, SOLUTION INTRAMUSCULAR; INTRAVENOUS; SUBCUTANEOUS
Status: DISCONTINUED | OUTPATIENT
Start: 2023-04-11 | End: 2023-04-13 | Stop reason: HOSPADM

## 2023-04-11 RX ORDER — SODIUM CHLORIDE, SODIUM LACTATE, POTASSIUM CHLORIDE, CALCIUM CHLORIDE 600; 310; 30; 20 MG/100ML; MG/100ML; MG/100ML; MG/100ML
9 INJECTION, SOLUTION INTRAVENOUS CONTINUOUS
Status: DISCONTINUED | OUTPATIENT
Start: 2023-04-11 | End: 2023-04-11

## 2023-04-11 RX ORDER — LIDOCAINE HYDROCHLORIDE 10 MG/ML
0.5 INJECTION, SOLUTION EPIDURAL; INFILTRATION; INTRACAUDAL; PERINEURAL ONCE AS NEEDED
Status: DISCONTINUED | OUTPATIENT
Start: 2023-04-11 | End: 2023-04-11 | Stop reason: HOSPADM

## 2023-04-11 RX ORDER — IPRATROPIUM BROMIDE AND ALBUTEROL SULFATE 2.5; .5 MG/3ML; MG/3ML
3 SOLUTION RESPIRATORY (INHALATION) ONCE AS NEEDED
Status: DISCONTINUED | OUTPATIENT
Start: 2023-04-11 | End: 2023-04-11 | Stop reason: HOSPADM

## 2023-04-11 RX ORDER — ACETAMINOPHEN 325 MG/1
325 TABLET ORAL EVERY 4 HOURS PRN
Status: DISCONTINUED | OUTPATIENT
Start: 2023-04-11 | End: 2023-04-13 | Stop reason: HOSPADM

## 2023-04-11 RX ORDER — PROPOFOL 10 MG/ML
VIAL (ML) INTRAVENOUS AS NEEDED
Status: DISCONTINUED | OUTPATIENT
Start: 2023-04-11 | End: 2023-04-11 | Stop reason: SURG

## 2023-04-11 RX ORDER — CEFAZOLIN SODIUM 2 G/100ML
2 INJECTION, SOLUTION INTRAVENOUS EVERY 8 HOURS
Status: COMPLETED | OUTPATIENT
Start: 2023-04-11 | End: 2023-04-12

## 2023-04-11 RX ORDER — EPHEDRINE SULFATE 50 MG/ML
5 INJECTION, SOLUTION INTRAVENOUS ONCE AS NEEDED
Status: DISCONTINUED | OUTPATIENT
Start: 2023-04-11 | End: 2023-04-11 | Stop reason: HOSPADM

## 2023-04-11 RX ORDER — MIDAZOLAM HYDROCHLORIDE 1 MG/ML
0.5 INJECTION INTRAMUSCULAR; INTRAVENOUS
Status: DISCONTINUED | OUTPATIENT
Start: 2023-04-11 | End: 2023-04-11 | Stop reason: HOSPADM

## 2023-04-11 RX ADMIN — PROPOFOL 130 MG: 10 INJECTION, EMULSION INTRAVENOUS at 13:46

## 2023-04-11 RX ADMIN — HYDROMORPHONE HYDROCHLORIDE 0.25 MG: 1 INJECTION, SOLUTION INTRAMUSCULAR; INTRAVENOUS; SUBCUTANEOUS at 16:40

## 2023-04-11 RX ADMIN — CEFAZOLIN SODIUM 2 G: 2 INJECTION, SOLUTION INTRAVENOUS at 23:27

## 2023-04-11 RX ADMIN — ACETAMINOPHEN 1000 MG: 500 TABLET, FILM COATED ORAL at 11:03

## 2023-04-11 RX ADMIN — FENTANYL CITRATE 50 MCG: 50 INJECTION, SOLUTION INTRAMUSCULAR; INTRAVENOUS at 15:27

## 2023-04-11 RX ADMIN — PREGABALIN 75 MG: 75 CAPSULE ORAL at 11:03

## 2023-04-11 RX ADMIN — Medication 30 MG: at 14:31

## 2023-04-11 RX ADMIN — HYDRALAZINE HYDROCHLORIDE 5 MG: 20 INJECTION INTRAMUSCULAR; INTRAVENOUS at 14:47

## 2023-04-11 RX ADMIN — FENTANYL CITRATE 50 MCG: 50 INJECTION, SOLUTION INTRAMUSCULAR; INTRAVENOUS at 14:17

## 2023-04-11 RX ADMIN — MELOXICAM 15 MG: 15 TABLET ORAL at 11:03

## 2023-04-11 RX ADMIN — HYDROMORPHONE HYDROCHLORIDE 0.25 MG: 1 INJECTION, SOLUTION INTRAMUSCULAR; INTRAVENOUS; SUBCUTANEOUS at 16:35

## 2023-04-11 RX ADMIN — HYDROCODONE BITARTRATE AND ACETAMINOPHEN 1 TABLET: 7.5; 325 TABLET ORAL at 23:30

## 2023-04-11 RX ADMIN — ROPINIROLE 1 MG: 1 TABLET, FILM COATED ORAL at 20:52

## 2023-04-11 RX ADMIN — SODIUM CHLORIDE, POTASSIUM CHLORIDE, SODIUM LACTATE AND CALCIUM CHLORIDE 9 ML/HR: 600; 310; 30; 20 INJECTION, SOLUTION INTRAVENOUS at 11:44

## 2023-04-11 RX ADMIN — ATORVASTATIN CALCIUM 10 MG: 20 TABLET, FILM COATED ORAL at 20:51

## 2023-04-11 RX ADMIN — LABETALOL HYDROCHLORIDE 10 MG: 5 INJECTION, SOLUTION INTRAVENOUS at 15:34

## 2023-04-11 RX ADMIN — FENTANYL CITRATE 50 MCG: 50 INJECTION, SOLUTION INTRAMUSCULAR; INTRAVENOUS at 14:26

## 2023-04-11 RX ADMIN — FAMOTIDINE 20 MG: 10 INJECTION INTRAVENOUS at 11:44

## 2023-04-11 RX ADMIN — TRANEXAMIC ACID 1000 MG: 1 INJECTION, SOLUTION INTRAVENOUS at 13:55

## 2023-04-11 RX ADMIN — ONDANSETRON 4 MG: 2 INJECTION INTRAMUSCULAR; INTRAVENOUS at 15:17

## 2023-04-11 RX ADMIN — DEXAMETHASONE SODIUM PHOSPHATE 8 MG: 4 INJECTION, SOLUTION INTRAMUSCULAR; INTRAVENOUS at 14:06

## 2023-04-11 RX ADMIN — CEFAZOLIN SODIUM 2 G: 2 INJECTION, SOLUTION INTRAVENOUS at 16:53

## 2023-04-11 RX ADMIN — Medication 20 MG: at 14:56

## 2023-04-11 RX ADMIN — CEFAZOLIN SODIUM 2 G: 2 INJECTION, SOLUTION INTRAVENOUS at 13:31

## 2023-04-11 RX ADMIN — HYDRALAZINE HYDROCHLORIDE 5 MG: 20 INJECTION INTRAMUSCULAR; INTRAVENOUS at 15:31

## 2023-04-11 RX ADMIN — LIDOCAINE HYDROCHLORIDE 100 MG: 20 INJECTION, SOLUTION INFILTRATION; PERINEURAL at 13:46

## 2023-04-11 RX ADMIN — FENTANYL CITRATE 50 MCG: 50 INJECTION, SOLUTION INTRAMUSCULAR; INTRAVENOUS at 14:42

## 2023-04-11 RX ADMIN — SUGAMMADEX 200 MG: 100 INJECTION, SOLUTION INTRAVENOUS at 15:29

## 2023-04-11 RX ADMIN — ROCURONIUM BROMIDE 30 MG: 10 INJECTION, SOLUTION INTRAVENOUS at 13:46

## 2023-04-11 RX ADMIN — SODIUM CHLORIDE, POTASSIUM CHLORIDE, SODIUM LACTATE AND CALCIUM CHLORIDE: 600; 310; 30; 20 INJECTION, SOLUTION INTRAVENOUS at 14:54

## 2023-04-11 RX ADMIN — POLYETHYLENE GLYCOL 3350 17 G: 17 POWDER, FOR SOLUTION ORAL at 20:51

## 2023-04-11 RX ADMIN — DOCUSATE SODIUM 100 MG: 100 CAPSULE, LIQUID FILLED ORAL at 20:52

## 2023-04-11 NOTE — ANESTHESIA POSTPROCEDURE EVALUATION
Patient: Ashley Motta    Procedure Summary     Date: 04/11/23 Room / Location: [facility] OR [address] [place] [address] [facility]    Anesthesia Start: 1339 Anesthesia Stop: 1548    Procedure: RIGHT TOTAL KNEE ARTHROPLASTY WITH [place] NAVIGATION (Right: Knee) Diagnosis:       Arthritis of right knee      (Arthritis of right knee [M17.11])    Surgeons: Atul Rico MD Provider: Wilfredo Teague MD    Anesthesia Type: general ASA Status: 3          Anesthesia Type: general    Vitals  Vitals Value Taken Time   /53 04/11/23 1700   Temp 36.4 °C (97.5 °F) 04/11/23 1547   Pulse 82 04/11/23 1715   Resp 16 04/11/23 1700   SpO2 92 % 04/11/23 1715   Vitals shown include unvalidated device data.        Post Anesthesia Care and Evaluation    Patient location during evaluation: bedside  Patient participation: complete - patient participated  Level of consciousness: awake and alert  Pain management: adequate    Airway patency: patent  Anesthetic complications: No anesthetic complications  PONV Status: controlled  Cardiovascular status: blood pressure returned to baseline and acceptable  Respiratory status: acceptable  Hydration status: acceptable

## 2023-04-11 NOTE — ANESTHESIA PREPROCEDURE EVALUATION
Anesthesia Evaluation     no history of anesthetic complications:  NPO Solid Status: > 8 hours  NPO Liquid Status: > 2 hours           Airway   Mallampati: II  Neck ROM: full  no difficulty expected  Comment: [place] Torus on hard palate  Dental    (+) partials    Comment: Upper and lower partials for several anterior teeth    Pulmonary - normal exam   (+) sleep apnea on CPAP,   (-) COPD, asthma, not a smoker    PE comment: nonlabored  Cardiovascular - normal exam  Exercise tolerance: good (4-7 METS)    Rhythm: regular  Rate: normal    (+) hyperlipidemia,   (-) hypertension, valvular problems/murmurs, past MI, CAD, dysrhythmias, angina      Neuro/Psych  (+) headaches, dizziness/light headedness, psychiatric history Anxiety,    (-) seizures, TIA, CVA  GI/Hepatic/Renal/Endo    (+)  GERD,  thyroid problem (multinodular goiter) thyroid nodules  (-) liver disease, no renal diseaseDiabetes: preDM.    Musculoskeletal     (+) arthralgias,   Abdominal    Substance History      OB/GYN          Other   arthritis,    history of cancer (skin cancer)                    Anesthesia Plan    ASA 3     general     intravenous induction     Anesthetic plan, risks, benefits, and alternatives have been provided, discussed and informed consent has been obtained with: patient.        CODE STATUS:

## 2023-04-11 NOTE — ANESTHESIA PROCEDURE NOTES
Airway  Urgency: elective    Date/Time: 4/11/2023 1:50 PM  Airway not difficult    General Information and Staff    Patient location during procedure: OR  Anesthesiologist: Wilfredo Teague MD    Indications and Patient Condition  Indications for airway management: airway protection    Preoxygenated: yes  Mask difficulty assessment: 1 - vent by mask    Final Airway Details  Final airway type: endotracheal airway      Successful airway: ETT  Cuffed: yes   Successful intubation technique: direct laryngoscopy  Endotracheal tube insertion site: oral  Blade: [place]  Blade size: 2  ETT size (mm): 7.0  Cormack-Lehane Classification: grade I - full view of glottis  Placement verified by: chest auscultation and capnometry   Measured from: teeth  ETT/EBT  to teeth (cm): 19  Number of attempts at approach: 1  Assessment: lips, teeth, and gum same as pre-op and atraumatic intubation    Additional Comments  Pre oxygenated  Easy mask vent  Atraumatic intubation  + etco2  Breath sounds equal bilaterally

## 2023-04-11 NOTE — OP NOTE
Operative Note    Name: Ashley Motta  YOB: 1940  MRN: [id number]  BMI: Body mass index is 30.3 kg/m².    DATE OF SURGERY: 4/11/2023    PREOPERATIVE DIAGNOSIS: right knee end-stage osteoarthritis    POSTOPERATIVE DIAGNOSIS: right knee end-stage osteoarthritis    PROCEDURE PERFORMED: right total knee replacement with [place] navigation    SURGEON: Dr. Atul Rico    ASSISTANT: Parish Taylor    IMPLANTS: DepuyAttune    Estimated Blood Loss: 100 mL  Specimens : none  Complications: none  22 Modifier:  none    DESCRIPTION OF PROCEDURE: The patient was taken to the operating room and placed in the supine position. Preoperative antibiotics were administered. Surgical time out was performed. After adequate induction of anesthesia, the leg was prepped and draped in the usual sterile fashion. The leg was exsanguinated with an Esmarch bandage and the tourniquet inflated to 250 mmHg. A midline incision was performed followed by a medial parapatellar arthrotomy. The patella was subluxed laterally.  A portion of the fat pad, ACL, and anterior horns of the meniscus were excised.  The [place] navigation device was affixed and navigated. The distal cut was made. The femur was then sized with a sizing guide. The femoral cutting block was placed and all femoral cuts were performed. The proximal tibia was exposed. Natalie navigation protocol was accomplished.  9 millimeters were removed from the distal femur.  We used the extramedullary tibial cutting guide set for removal of 3 mm of bone off the low side. The tibial cut was performed. The posterior horns of the menisci were excised. The posterior osteophytes were removed. Flexion extension blocks were then used to balance the knee. The tibial cut surface was then sized with the sizing templates and the tibial and femoral trial were then placed. The tray was aligned with the middle third of the tubercle.    Attention was then placed to the patella. The patella  was balanced to track centrally through range of motion.  It measured 23 and patella resurfacing was performed.   At this point all trial components were removed, the knee was copiously irrigated with pulsed lavage, and the knee was injected with anesthetic cocktail solution. The cut surfaces were then dried with clean lap sponges, and the components were cemented, first the tibia, then insertion of the polyethylene spacer, followed by femur. The patella was placed unless patellectomy was chosen. The knee was held in full extension and all excess cement was removed. The cement was allowed to harden.   The knee was then copiously irrigated in standard fashion. The tourniquet was then released. There was excellent hemostasis. We closed the knee in multiple layers in standard fashion.  A sterile dressing was applied. At the end of the case, the sponge and needle counts were reported as being correct. There were no known complications. The patient was then transported to the recovery room.    The surgical assistant performed retraction, suction, hemostasis, and specific limb positioning and manipulation required for accuracy of joint placement, and assistance in wound closure and dressing application.       Atul Rico M.D.

## 2023-04-11 NOTE — PLAN OF CARE
Goal Outcome Evaluation:  Plan of Care Reviewed With: patient        Progress: improving  Outcome Evaluation: vss, nvi, dressing c/d/i, unable to ambulate d/t dizziness upon standing, sleeping in between care, pain controlled, plan to DC to SNF when stable, educated on O2 monitoring and CPAP use

## 2023-04-12 LAB
HCT VFR BLD AUTO: 35.8 % (ref 34–46.6)
HGB BLD-MCNC: 12.3 G/DL (ref 12–15.9)

## 2023-04-12 PROCEDURE — 63710000001 ROPINIROLE 1 MG TABLET: Performed by: NURSE PRACTITIONER

## 2023-04-12 PROCEDURE — A9270 NON-COVERED ITEM OR SERVICE: HCPCS | Performed by: NURSE PRACTITIONER

## 2023-04-12 PROCEDURE — 97162 PT EVAL MOD COMPLEX 30 MIN: CPT

## 2023-04-12 PROCEDURE — 85014 HEMATOCRIT: CPT | Performed by: NURSE PRACTITIONER

## 2023-04-12 PROCEDURE — 63710000001 ATORVASTATIN 20 MG TABLET: Performed by: NURSE PRACTITIONER

## 2023-04-12 PROCEDURE — G0378 HOSPITAL OBSERVATION PER HR: HCPCS

## 2023-04-12 PROCEDURE — 63710000001 PANTOPRAZOLE 40 MG TABLET DELAYED-RELEASE: Performed by: NURSE PRACTITIONER

## 2023-04-12 PROCEDURE — 63710000001 APIXABAN 2.5 MG TABLET: Performed by: NURSE PRACTITIONER

## 2023-04-12 PROCEDURE — 97116 GAIT TRAINING THERAPY: CPT

## 2023-04-12 PROCEDURE — 85018 HEMOGLOBIN: CPT | Performed by: NURSE PRACTITIONER

## 2023-04-12 PROCEDURE — 63710000001 DOCUSATE SODIUM 100 MG CAPSULE: Performed by: NURSE PRACTITIONER

## 2023-04-12 PROCEDURE — 99024 POSTOP FOLLOW-UP VISIT: CPT | Performed by: NURSE PRACTITIONER

## 2023-04-12 PROCEDURE — 63710000001 POLYETHYLENE GLYCOL 17 G PACK: Performed by: NURSE PRACTITIONER

## 2023-04-12 PROCEDURE — 63710000001 HYDROCODONE-ACETAMINOPHEN 7.5-325 MG TABLET: Performed by: NURSE PRACTITIONER

## 2023-04-12 RX ORDER — POLYETHYLENE GLYCOL 3350 17 G/17G
17 POWDER, FOR SOLUTION ORAL DAILY
Qty: 10 PACKET | Refills: 0 | Status: SHIPPED | OUTPATIENT
Start: 2023-04-13 | End: 2023-04-23

## 2023-04-12 RX ORDER — PSEUDOEPHEDRINE HCL 30 MG
100 TABLET ORAL 2 TIMES DAILY
Qty: 60 CAPSULE | Refills: 0 | Status: SHIPPED | OUTPATIENT
Start: 2023-04-12

## 2023-04-12 RX ORDER — HYDROCODONE BITARTRATE AND ACETAMINOPHEN 7.5; 325 MG/1; MG/1
1-2 TABLET ORAL
Qty: 42 TABLET | Refills: 0 | Status: SHIPPED | OUTPATIENT
Start: 2023-04-12

## 2023-04-12 RX ORDER — ONDANSETRON 4 MG/1
4 TABLET, FILM COATED ORAL EVERY 6 HOURS PRN
Qty: 10 TABLET | Refills: 0 | Status: SHIPPED | OUTPATIENT
Start: 2023-04-12

## 2023-04-12 RX ADMIN — HYDROCODONE BITARTRATE AND ACETAMINOPHEN 1 TABLET: 7.5; 325 TABLET ORAL at 14:33

## 2023-04-12 RX ADMIN — ATORVASTATIN CALCIUM 10 MG: 20 TABLET, FILM COATED ORAL at 20:02

## 2023-04-12 RX ADMIN — ROPINIROLE 1 MG: 1 TABLET, FILM COATED ORAL at 20:02

## 2023-04-12 RX ADMIN — HYDROCODONE BITARTRATE AND ACETAMINOPHEN 1 TABLET: 7.5; 325 TABLET ORAL at 08:16

## 2023-04-12 RX ADMIN — APIXABAN 2.5 MG: 2.5 TABLET, FILM COATED ORAL at 20:02

## 2023-04-12 RX ADMIN — PANTOPRAZOLE SODIUM 40 MG: 40 TABLET, DELAYED RELEASE ORAL at 08:16

## 2023-04-12 RX ADMIN — DOCUSATE SODIUM 100 MG: 100 CAPSULE, LIQUID FILLED ORAL at 08:16

## 2023-04-12 RX ADMIN — HYDROCODONE BITARTRATE AND ACETAMINOPHEN 1 TABLET: 7.5; 325 TABLET ORAL at 19:14

## 2023-04-12 RX ADMIN — POLYETHYLENE GLYCOL 3350 17 G: 17 POWDER, FOR SOLUTION ORAL at 08:16

## 2023-04-12 RX ADMIN — APIXABAN 2.5 MG: 2.5 TABLET, FILM COATED ORAL at 08:16

## 2023-04-12 RX ADMIN — HYDROCODONE BITARTRATE AND ACETAMINOPHEN 1 TABLET: 7.5; 325 TABLET ORAL at 23:21

## 2023-04-12 RX ADMIN — DOCUSATE SODIUM 100 MG: 100 CAPSULE, LIQUID FILLED ORAL at 20:02

## 2023-04-12 NOTE — PLAN OF CARE
Goal Outcome Evaluation:  Plan of Care Reviewed With: patient        Progress: improving  Outcome Evaluation: POD#1 of right total knee. Dressing to knee intact. VSS. PO pain medication helping with pain. Up assist x2 to the bsc. Complain of dizziness when OOB. Education provided on safety due to hx of vertigo and reflux. Patient verbalized understanding.

## 2023-04-12 NOTE — PLAN OF CARE
Goal Outcome Evaluation:  Plan of Care Reviewed With: patient           Outcome Evaluation: Pt is 84 y/o F admitted to [facility] on 4/11/23 for R TKA, she is POD1. Pt is indep at baseline, from home alone with 3 MILDRED, no AD. Currently requires CGA to min A for transfers and room ambulation. Ambulation limited to 25' d/t dizziness and fatigue. Pt challenged with turns - 1 LOB needing min A to maintain balance. Tolerated seated LE exercises with no increase in pain. Good knee flexion noted on R. She will benefit from skilled PT to address endurance, balance, and strength to improve mobility. Recommend SNU at this time d/t increased need for assist and pt living alone.

## 2023-04-12 NOTE — PROGRESS NOTES
Continued Stay Note  [facility]     Patient Name: Ashley Motta  MRN: [id number]  Today's Date: 4/12/2023    Admit Date: 4/11/2023    Plan: [facility], approved with bed available 4/13   Discharge Plan     Row Name 04/12/23 1406       Plan    Plan [facility], approved with bed available 4/13    Patient/Family in Agreement with Plan yes    Plan Comments Spoke with pt, verified correct information on facesheet and explained the role of CCP. Pt states she wishes to d/c to [facility], referral was sent in [facility] and spoke with [facility] who states patient is approved with bed available at d/c. Per patient she is unsure if she has a ride to SNF, informed patient we can set up a wheelchair van but it will likely be private pay. Patient is calling friends to see if she can get a ride. CCP to follow. Pharmacy updated.               Discharge Codes    No documentation.                     Nika Méndez RN

## 2023-04-12 NOTE — PROGRESS NOTES
"    Orthopedic Total Joint Progress Note        Patient: Ashley Motta    Date of Admission: 4/11/2023 10:32 AM    YOB: 1940    Medical Record Number: [id number]    Attending Physician: Atul Rico MD      POD # 1 Day Post-Op Procedure(s) (LRB):  RIGHT TOTAL KNEE ARTHROPLASTY WITH SHERI NAVIGATION (Right)       Systemic or Specific Complaints: The patient has had a relatively normal postoperative course.  The patient has had no current complaints. The patient has had improving normal postoperative pain.  The patient has had no issues with the wound.  Patient up in chair eating breakfast upon arrival.  She states she is doing okay.  She states she is having some vertigo.  She has had this in the past and had to have Epley maneuvers done.  She is hoping it will improve throughout the day.  She currently lives alone as her  recently passed away a few months ago and she is concerned about falls especially if her vertigo returns.  She has no other complaints at this time.      Allergies: No Known Allergies    Medications:   Current Medications:  Scheduled Meds:apixaban, 2.5 mg, Oral, Q12H  atorvastatin, 10 mg, Oral, Daily  docusate sodium, 100 mg, Oral, BID  pantoprazole, 40 mg, Oral, Daily  polyethylene glycol, 17 g, Oral, Daily  rOPINIRole, 1 mg, Oral, Nightly      Continuous Infusions:lactated ringers, 100 mL/hr, Last Rate: Stopped (04/11/23 1851)      PRN Meds:.•  acetaminophen  •  bisacodyl  •  bisacodyl  •  HYDROcodone-acetaminophen  •  HYDROcodone-acetaminophen  •  HYDROmorphone **AND** naloxone  •  magnesium hydroxide  •  ondansetron **OR** ondansetron      Physical Exam: 83 y.o. female   Wt Readings from Last 3 Encounters:   04/11/23 77.7 kg (171 lb 3.2 oz)   04/04/23 77.6 kg (171 lb)   03/27/23 77.7 kg (171 lb 3.2 oz)     Ht Readings from Last 3 Encounters:   04/11/23 160 cm (63\")   04/04/23 160 cm (63\")   03/27/23 160 cm (63\")     Body mass index is 30.33 kg/m².    Vitals:    " 04/11/23 1837 04/11/23 2231 04/12/23 0312 04/12/23 0650   BP: 113/57 109/66 102/62 108/61   BP Location: Right arm Right arm Right arm Right arm   Patient Position: Lying Lying Lying Lying   Pulse: 92 84 79 78   Resp: 16 16 16 16   Temp: 97.6 °F (36.4 °C) 97.3 °F (36.3 °C) 97.3 °F (36.3 °C) 97.4 °F (36.3 °C)   TempSrc: Oral Oral Oral Oral   SpO2: 97% 96% 94% 95%   Weight:       Height:            General Appearance:    General: alert and oriented         Abdomen/:     soft non-tender, non-distended, voiding without difficulty       Extremities:   Operative extremity neurovascular status intact. ROM appropriate.  Incision intact w/out signs or symptoms of infection.  No cyanosis, calf is soft and nontender.  Ace bandage and cast padding removed.  Optifoam dressing clean dry and intact no signs of drainage or active bleeding.     Activity: Mobilizing Per P.T.   Weight Bearing: As Tolerated    Diagnostic Tests:   Admission on 04/11/2023   Component Date Value Ref Range Status   • Hemoglobin 04/12/2023 12.3  12.0 - 15.9 g/dL Final   • Hematocrit 04/12/2023 35.8  34.0 - 46.6 % Final       Imaging Results (Last 72 Hours)     Procedure Component Value Units Date/Time    XR Knee 1 or 2 View Right [[id number]] Collected: 04/11/23 1636     Updated: 04/11/23 1640    Narrative:      XR KNEE 1 OR 2 VW RIGHT-  04/11/2023     HISTORY: Right knee arthroplasty.     Distal femoral and proximal tibia components of the right knee  prosthesis are well-seated with no abnormal surrounding bony lucencies.  Soft tissue air is seen. No unexpected findings are noted.       Impression:      1. Satisfactory postoperative appearance of the right knee.     This report was finalized on 4/11/2023 4:37 PM by Dr. Itz Valladares M.D.             Personally viewed ortho images and report     Assessment:  - Doing well 1 Day Post-Op following total joint replacement  - Acute Blood Loss Anemia, preoperative hemoglobin 14.4, postoperative hemoglobin  12.3 - stable  - Post-operative Pain  - Limited mobility, requires use of walker and assistance when OOB.    Patient Active Problem List   Diagnosis   • Osteopenia   • Hyperlipidemia   • Pre-diabetes   • Osteoarthritis   • BALDEMAR on CPAP   • Pelvic relaxation due to vaginal prolapse   • Chronic pain of both knees   • Arthritis of right knee   • Arthritis of left knee   • Arthritis of both knees   • Restless leg syndrome   • Dizziness   • Headache, migraine   • Heartburn   • Internal hemorrhoids with complication   • Impacted cerumen of left ear   • Vitamin E deficiency   • Environmental and seasonal allergies   • Chronic cough        Plan:    - Consults: none  - Continue to monitor labs and/or v/s, for tolerance to post op blood loss.  - Continue efforts to increase mobilization.  - Continue Pain Control Measures.  - Continue incisional Care.  - DVT prophylaxis - Eliquis  - Follow up in office with Atul Rico M.D. In 2 weeks.    Discharge Plan:today or tomorrow to [facility] when accepted and room available and when cleared by physical therapy as safe for discharge    Date: 4/12/2023  BERNICE Leblanc

## 2023-04-12 NOTE — DISCHARGE PLACEMENT REQUEST
"Ashley Motta (83 y.o. Female)     Date of Birth   1940    Social Security Number       Address   [address] [address]    Home Phone   [phone]    MRN   [id number]       Jainism   Yazdanism    Marital Status                               Admission Date   4/11/23    Admission Type   Elective    Admitting Provider   Atul Rico MD    Attending Provider   Atul Rico MD    Department, Room/Bed   [address], P778/1       Discharge Date       Discharge Disposition       Discharge Destination                               Attending Provider: Atul Rico MD    Allergies: No Known Allergies    Isolation: None   Infection: None   Code Status: CPR    Ht: 160 cm (63\")   Wt: 77.7 kg (171 lb 3.2 oz)    Admission Cmt: None   Principal Problem: Arthritis of right knee [M17.11]                 Active Insurance as of 4/11/2023     Primary Coverage     Payor Plan Insurance Group Employer/Plan Group    MEDICARE MEDICARE A & B      Payor Plan Address Payor Plan Phone Number Payor Plan Fax Number Effective Dates    [address] [phone]  2/1/2007 - None Entered    [facility] [id number]       Subscriber Name Subscriber Birth Date Member ID       ASHLEY MOTTA 1940 [id number]           Secondary Coverage     Payor Plan Insurance Group Employer/Plan Group    [facility] SUPP [id number]     Payor Plan Address Payor Plan Phone Number Payor Plan Fax Number Effective Dates    [address]   2/1/2014 - None Entered    [facility] [id number]       Subscriber Name Subscriber Birth Date Member ID       ASHLEY MOTTA 1940 [id number]                 Emergency Contacts      (Rel.) Home Phone Work Phone Mobile Phone    Wendy Izquierdo (Relative) [phone] -- --    ELA IZQUIERDO -- -- [phone]    KaliaWong (Son) -- -- [phone]              "

## 2023-04-12 NOTE — THERAPY EVALUATION
Patient Name: Ashley Motta  : 1940    MRN: [id number]                              Today's Date: 2023       Admit Date: 2023    Visit Dx: No diagnosis found.  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 SARAH LIRIANO   • Eczema 2014   • Endothelial corneal dystrophy 2020   • Genital prolapse 10/2017   • GERD (gastroesophageal reflux disease)    • Goiter, nontoxic, multinodular 2017    THYROID POLYP SEES    • Hemorrhoids    • Herpes zoster 2018   • Hyperlipidemia    • Impaired fasting glucose    • Migraine    • BALDEMAR on CPAP     FOLLOWED BY DR. TERRY CHEUNG   • Osteoarthritis    • Osteopenia    • Postmenopausal atrophic vaginitis    • Rectal nodule 2020    REFERRED TO DR. PAMELA NAPOLES   • Rectocele 2017   • Restless leg syndrome    • Right knee pain    • RLS (restless legs syndrome)    • Seborrheic keratosis    • Skin cancer 2015    LOWER LEG, FOLLOWED BY DR. EDI SANCHEZ   • Vaginal vault prolapse 2017   • Vertigo      Past Surgical History:   Procedure Laterality Date   • CATARACT EXTRACTION, BILATERAL Bilateral    • COLONOSCOPY N/A 2015    1 TUBULAR ADENOMA POLYP IN DISTAL ASCENDING, DR. SARAH LIRIANO AT [facility]. SARAH LIRIANO   • COLONOSCOPY N/A 2020    10 MM SESSILE SERRATED ADENOMA POLYP IN ASCENDING, 3 MM HYPERPLASTIC POLYP IN RECTUM, 7 MM ANAL  DR. SARAH HAMMOND AT [facility]   • HYSTERECTOMY N/A 01/19/2004    KAYLEE WITH BSODR. RAFIA AT [facility]   • KNEE ARTHROSCOPY Left 2013   • KNEE ARTHROSCOPY Right 2010   • MELISSA CULDOPLASTY N/A 01/19/2004    DR. RAFIA MORTENSEN AT [facility]   • SACROCOLPOPEXY N/A    • TOTAL KNEE ARTHROPLASTY Right 4/11/2023    Procedure: RIGHT TOTAL KNEE ARTHROPLASTY WITH SHERI NAVIGATION;  Surgeon: Atul Rico MD;  Location: [facility] OR [facility];  Service: Orthopedics;  Laterality: Right;      General Information     Row Name 04/12/23 1101          Physical Therapy Time and Intention    Document Type evaluation  -ST     Mode of Treatment individual therapy;physical therapy  -     Row Name 04/12/23 1101          General Information    Patient Profile Reviewed yes  -ST     Prior Level of Function independent:  -ST     Existing Precautions/Restrictions fall  -ST     Barriers to Rehab previous functional deficit  -ST     Row Name 04/12/23 1101          Living Environment    People in Home alone  -ST     Row Name 04/12/23 1101          Home Main Entrance    Number of Stairs, Main Entrance three  -ST     Stair Railings, Main Entrance railings safe and in good condition  -ST     Row Name 04/12/23 1101          Stairs Within Home, Primary    Number of Stairs, Within Home, Primary none  -ST     Row Name 04/12/23 1101          Cognition    Orientation Status (Cognition) oriented x 4  -ST     Row Name 04/12/23 1101          Safety Issues, Functional Mobility    Safety Issues Affecting Function (Mobility) impulsivity  -ST     Impairments Affecting Function (Mobility) balance;postural/trunk control;strength;endurance/activity tolerance  -ST     Comment, Safety Issues/Impairments (Mobility) gait belt, nonskid socks  -ST           User Key  (r) = Recorded By, (t) = Taken By, (c) = Cosigned By    Initials Name Provider Type    ST Doris Lazo PT Physical Therapist               Mobility     Row Name 04/12/23 1102          Bed Mobility     Comment, (Bed Mobility) NT, UIC  -[facility] Name 04/12/23 1102          Sit-Stand Transfer    Sit-Stand [place] (Transfers) contact guard;verbal cues  -ST     Assistive Device (Sit-Stand Transfers) walker, front-wheeled  -ST     Comment, (Sit-Stand Transfer) VC for hand placement  -ST     Row Name 04/12/23 1102          Gait/Stairs (Locomotion)    [place] Level (Gait) contact guard;minimum assist (75% patient effort);verbal cues  -ST     Assistive Device (Gait) walker, front-wheeled  -ST     Distance in Feet (Gait) 2 x 25'  -ST     Deviations/Abnormal Patterns (Gait) right sided deviations;bilateral deviations;antalgic;haily decreased;gait speed decreased;weight shifting decreased;stride length decreased  -ST     Bilateral Gait Deviations forward flexed posture;heel strike decreased  -ST     Right Sided Gait Deviations weight shift ability decreased  -ST     [place] Level (Stairs) not tested  -     Comment, (Gait/Stairs) mild lateral sway with walking, increased  time and distance limited by dizziness - aggravated with head turns  -ST     Row Name 04/12/23 1102          Mobility    Extremity Weight-bearing Status right lower extremity  -ST     Right Lower Extremity (Weight-bearing Status) weight-bearing as tolerated (WBAT)  -           User Key  (r) = Recorded By, (t) = Taken By, (c) = Cosigned By    Initials Name Provider Type    Doris Bermeo PT Physical Therapist               Obj/Interventions     [facility] Name 04/12/23 1104          Range of Motion Comprehensive    Comment, General Range of Motion R surgical leg impaired  -[facility] 04/12/23 1104          Strength Comprehensive (MMT)    Comment, General Manual Muscle Testing (MMT) Assessment WFL for mobility, R surgical leg impaired  -[facility] Name 04/12/23 1104          Motor Skills    Therapeutic Exercise other (see comments)  TKA protocol x 10  -[facility] Name 04/12/23 1104          Balance    Comment, Balance CGA to min A  for dynamic balance d/t dizziness and lateral/posterior sway. 1 LOB with turning requiring min A to maintain balance. constant VC for sequencing of turns  -     Row Name 04/12/23 1104          Sensory Assessment (Somatosensory)    Sensory Assessment (Somatosensory) sensation intact  -ST           User Key  (r) = Recorded By, (t) = Taken By, (c) = Cosigned By    Initials Name Provider Type    ST Doris Lazo, PT Physical Therapist               Goals/Plan     Row Name 04/12/23 1107          Bed Mobility Goal 1 (PT)    Activity/Assistive Device (Bed Mobility Goal 1, PT) bed mobility activities, all  -ST     [place] Level/Cues Needed (Bed Mobility Goal 1, PT) standby assist  -ST     Time Frame (Bed Mobility Goal 1, PT) 1 week  -ST     Progress/Outcomes (Bed Mobility Goal 1, PT) new goal  -     Row Name 04/12/23 1107          Transfer Goal 1 (PT)    Activity/Assistive Device (Transfer Goal 1, PT) sit-to-stand/stand-to-sit;bed-to-chair/chair-to-bed  -ST     [place] Level/Cues Needed (Transfer Goal 1, PT) standby assist  -ST     Time Frame (Transfer Goal 1, PT) 1 week  -ST     Progress/Outcome (Transfer Goal 1, PT) new goal  -     Row Name 04/12/23 1107          Gait Training Goal 1 (PT)    Activity/Assistive Device (Gait Training Goal 1, PT) gait (walking locomotion);assistive device use  -ST     [place] Level (Gait Training Goal 1, PT) standby assist  -ST     Distance (Gait Training Goal 1, PT) 50'  -ST           User Key  (r) = Recorded By, (t) = Taken By, (c) = Cosigned By    Initials Name Provider Type    ST Doris Lazo, PT Physical Therapist               Clinical Impression     Row Name 04/12/23 1105          Pain    Pretreatment Pain Rating 4/10  -ST     Posttreatment Pain Rating 4/10  -ST     Pain Location - Side/Orientation Right  -ST     Pain Location - knee  -ST     Pain Intervention(s) Cold applied;Repositioned;Ambulation/increased activity  -ST     Row Name 04/12/23 1105           Plan of Care Review    Plan of Care Reviewed With patient  -ST     Outcome Evaluation Pt is 84 y/o F admitted to [facility] on 4/11/23 for R TKA, she is POD1. Pt is indep at baseline, from home alone with 3 MILDRED, no AD. Currently requires CGA to min A for transfers and room ambulation. Ambulation limited to 25' d/t dizziness and fatigue. Pt challenged with turns - 1 LOB needing min A to maintain balance. Tolerated seated LE exercises with no increase in pain. Good knee flexion noted on R. She will benefit from skilled PT to address endurance, balance, and strength to improve mobility. Recommend SNU at this time d/t increased need for assist and pt living alone.  -     Row Name 04/12/23 1105          Therapy Assessment/Plan (PT)    Criteria for Skilled Interventions Met (PT) yes  -ST     Therapy Frequency (PT) daily  -ST     Row Name 04/12/23 1105          Positioning and Restraints    Pre-Treatment Position sitting in chair/recliner  -ST     Post Treatment Position chair  -ST     In Chair reclined;call light within reach;encouraged to call for assist;exit alarm on  -ST           User Key  (r) = Recorded By, (t) = Taken By, (c) = Cosigned By    Initials Name Provider Type    Doris Bermeo, RAYMOND Physical Therapist               Outcome Measures     Row Name 04/12/23 1107 04/12/23 0816       How much help from another person do you currently need...    Turning from your back to your side while in flat bed without using bedrails? 3  -ST 3  -MN    Moving from lying on back to sitting on the side of a flat bed without bedrails? 3  -ST 3  -MN    Moving to and from a bed to a chair (including a wheelchair)? 3  -ST 3  -MN    Standing up from a chair using your arms (e.g., wheelchair, bedside chair)? 3  -ST 3  -MN    Climbing 3-5 steps with a railing? 2  -ST 2  -MN    To walk in hospital room? 3  -ST 3  -MN    AM-PAC 6 Clicks Score (PT) 17  -ST 17  -MN    Highest level of mobility 5 --> Static standing  -ST 5 -->  Static standing  -MN          User Key  (r) = Recorded By, (t) = Taken By, (c) = Cosigned By    Initials Name Provider Type    Isabela Tello RN Registered Nurse    Doris Bermeo PT Physical Therapist                             Physical Therapy Education     Title: PT OT SLP Therapies (Done)     Topic: Physical Therapy (Done)     Point: Mobility training (Done)     Learning Progress Summary           Patient Acceptance, E,TB, VU,NR by  at 4/12/2023 1108                   Point: Home exercise program (Done)     Learning Progress Summary           Patient Acceptance, E,TB, VU,NR by  at 4/12/2023 1108                   Point: Body mechanics (Done)     Learning Progress Summary           Patient Acceptance, E,TB, VU,NR by  at 4/12/2023 1108                   Point: Precautions (Done)     Learning Progress Summary           Patient Acceptance, E,TB, VU,NR by  at 4/12/2023 1108                               User Key     Initials Effective Dates Name Provider Type Discipline     09/22/22 -  Doris Lazo PT Physical Therapist PT              PT Recommendation and Plan     Plan of Care Reviewed With: patient  Outcome Evaluation: Pt is 84 y/o F admitted to [facility] on 4/11/23 for R TKA, she is POD1. Pt is indep at baseline, from home alone with 3 MILDRED, no AD. Currently requires CGA to min A for transfers and room ambulation. Ambulation limited to 25' d/t dizziness and fatigue. Pt challenged with turns - 1 LOB needing min A to maintain balance. Tolerated seated LE exercises with no increase in pain. Good knee flexion noted on R. She will benefit from skilled PT to address endurance, balance, and strength to improve mobility. Recommend SNU at this time d/t increased need for assist and pt living alone.     Time Calculation:    PT Charges     Row Name 04/12/23 1108             Time Calculation    Start Time 1030  -ST      Stop Time 1059  -ST      Time Calculation (min) 29 min  -ST      PT Received  On 04/12/23  -ST      PT - Next Appointment 04/13/23  -ST      PT Goal Re-Cert Due Date 04/13/23  -ST         Time Calculation- PT    Total Timed Code Minutes- PT 20 minute(s)  -ST         Timed Charges    77365 - PT Therapeutic Exercise Minutes 6  -ST      66587 - Gait Training Minutes  10  -ST      28051 - PT Therapeutic Activity Minutes 4  -ST         Total Minutes    Timed Charges Total Minutes 20  -ST       Total Minutes 20  -ST            User Key  (r) = Recorded By, (t) = Taken By, (c) = Cosigned By    Initials Name Provider Type    ST Doris Lazo, PT Physical Therapist              Therapy Charges for Today     Code Description Service Date Service Provider Modifiers Qty    20396477396 HC GAIT TRAINING EA 15 MIN 4/12/2023 Doris Lazo, PT GP 1    84464572939 HC PT EVAL MOD COMPLEXITY 2 4/12/2023 Doris Lazo, PT GP 1          PT G-Codes  AM-PAC 6 Clicks Score (PT): 17  PT Discharge Summary  Anticipated Discharge Disposition (PT): skilled nursing facility    Doris Lazo, PT  4/12/2023

## 2023-04-12 NOTE — DISCHARGE SUMMARY
Orthopedic Discharge Summary      Patient: Ashley Motta  YOB: 1940  Medical Record Number: [id number]    Attending Physician: Atul Rico MD  Consulting Physician(s):   Consults     No orders found from 3/13/2023 to 4/12/2023.          Date of Admission: 4/11/2023 10:32 AM  Date of Discharge: 4/13/2023    Discharge Diagnosis: HI ARTHRP KNE CONDYLE&PLATU MEDIAL&LAT COMPARTMENTS [23756] (RIGHT TOTAL KNEE ARTHROPLASTY WITH SHERI NAVIGATION),   Acute Blood Loss Anemia, stable  Post-operative Pain  Limited mobility, requires use of walker and assistance when OOB.    Presenting Problem/History of Present Illness: Arthritis of right knee [M17.11]    Allergies: No Known Allergies    Discharge Medications       Discharge Medications      New Medications      Instructions Start Date   apixaban 2.5 MG tablet tablet  Commonly known as: ELIQUIS   Take 1 tablet PO twice a day x2 weeks, then take 1 tablet by mouth daily      docusate sodium 100 MG capsule   100 mg, Oral, 2 Times Daily      HYDROcodone-acetaminophen 7.5-325 MG per tablet  Commonly known as: NORCO   1-2 tablets, Oral, Every 4 to 6 Hours PRN      ondansetron 4 MG tablet  Commonly known as: ZOFRAN   4 mg, Oral, Every 6 Hours PRN      polyethylene glycol 17 g packet  Commonly known as: MIRALAX   17 g, Oral, Daily         Changes to Medications      Instructions Start Date   pantoprazole 20 MG EC tablet  Commonly known as: Protonix  What changed:   · when to take this  · reasons to take this   20 mg, Oral, Daily, Before dinner      rOPINIRole 1 MG tablet  Commonly known as: REQUIP  What changed:   · how much to take  · how to take this  · when to take this   TAKE 1 TABLET BY MOUTH ONE HOUR BEFORE BEDTIME         Continue These Medications      Instructions Start Date   fluticasone 50 MCG/ACT nasal spray  Commonly known as: Flonase   2 sprays, Nasal, Daily      simvastatin 20 MG tablet  Commonly known as: ZOCOR   TAKE 1 TABLET BY MOUTH ONCE DAILY  AT NIGHT         Stop These Medications    acetaminophen 500 MG tablet  Commonly known as: TYLENOL     aspirin 81 MG tablet     CALCIUM + D PO     ECHINACEA-ZINC-VITAMIN C PO     fish oil 1000 MG capsule capsule     Glucosamine Chondroitin Vit D3 capsule     Magnesium 400 MG tablet     multivitamin with minerals tablet tablet     VITAMIN B COMPLEX PO     vitamin E 100 UNIT capsule              Past Medical History:   Diagnosis Date   • Anxiety    • 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 SARAH LIRIANO   • Eczema 07/2014   • Endothelial corneal dystrophy 02/2020   • Genital prolapse 10/2017   • GERD (gastroesophageal reflux disease)    • Goiter, nontoxic, multinodular 05/2017    THYROID POLYP SEES    • Hemorrhoids    • Herpes zoster 05/2018   • Hyperlipidemia    • Impaired fasting glucose    • Migraine    • BALDEMAR on CPAP     FOLLOWED BY DR. TERRY CHEUNG   • Osteoarthritis    • Osteopenia    • Postmenopausal atrophic vaginitis    • Rectal nodule 03/2020    REFERRED TO DR. PAMELA NAPOLES   • Rectocele 07/2017   • Restless leg syndrome    • Right knee pain    • RLS (restless legs syndrome)    • Seborrheic keratosis    • Skin cancer 04/2015    LOWER LEG, FOLLOWED BY DR. EDI SANCHEZ   • Vaginal vault prolapse 09/2017   • Vertigo         Past Surgical History:   Procedure Laterality Date   • CATARACT EXTRACTION, BILATERAL Bilateral 2015   • COLONOSCOPY N/A 02/27/2015    1 [facility]UB[facility]   • COLONOSCOPY N/A 03/04/2020    10 MM SESSILE SERRATED ADENOMA POLYP IN ASCENDING, 3 MM HYPERPLASTIC POLYP IN RECTUM, 7 MM ANAL NODULE, DR. SARAH LIRIANO AT [facility]   • HYSTERECTOMY N/A 01/19/2004    KAYLEE WITH BSO, DR. RAFIA MORTENSEN AT [facility]   • KNEE ARTHROSCOPY Left 2013   • KNEE ARTHROSCOPY Right 2010   • MELISSA CULDOPLASTY N/A 01/19/2004    DR. RAFIA MORTENSEN AT [facility]   • SACROCOLPOPEXY N/A    • TOTAL KNEE ARTHROPLASTY Right 4/11/2023     Procedure: RIGHT TOTAL KNEE ARTHROPLASTY WITH SHERI NAVIGATION;  Surgeon: Atul Rico MD;  [place] [facility] [place] [facility] [place]  Service: Orthopedics;  Laterality: Right;        Social History     Occupational History   • Not on file   Tobacco Use   • Smoking status: Never   • Smokeless tobacco: Never   Vaping Use   • Vaping Use: Never used   Substance and Sexual Activity   • Alcohol use: No   • Drug use: Never   • Sexual activity: Not Currently     Birth control/protection: Post-menopausal, Surgical      Social History     Social History Narrative   • Not on file        Family History   Problem Relation Age of Onset   • Heart disease Mother    • Lung cancer Father    • Hypertension Father    • Heart disease Father    • Cancer Father    • Heart attack Brother    • Emphysema Brother    • COPD Brother    • Depression Brother    • Heart attack Brother         Had stents placed 2009   • Breast cancer Neg Hx    • Malig Hyperthermia Neg Hx          Physical Exam: 83 y.o. female   Body mass index is 30.33 kg/m².  Facility age limit for growth percentiles is 20 years.  Vitals:    04/13/23 0502   BP: 137/62   Pulse: 68   Resp: 16   Temp: 97 °F (36.1 °C)   SpO2: 98%         General Appearance:    Alert, cooperative, in no acute distress                      Vitals:    04/12/23 1419 04/12/23 1830 04/12/23 2228 04/13/23 0502   BP: 109/56 110/62 104/65 137/62   BP Location: Left arm Left arm Right arm Right arm   Patient Position: Sitting Sitting Lying Lying   Pulse: 71 72 73 68   Resp: 16 16 16 16   Temp: 97.2 °F (36.2 °C) 97.4 °F (36.3 °C) 97.4 °F (36.3 °C) 97 °F (36.1 °C)   TempSrc: Oral Oral Oral Oral   SpO2: 97% 99% 94% 98%   Weight:       Height:            DIAGNOSTIC TESTS:   Admission on 04/11/2023   Component Date Value Ref Range Status   • Hemoglobin 04/12/2023 12.3  12.0 - 15.9 g/dL Final   • Hematocrit 04/12/2023 35.8  34.0 - 46.6 % Final   • Hemoglobin 04/13/2023 12.4  12.0 - 15.9 g/dL Final   • Hematocrit 04/13/2023  37.4  34.0 - 46.6 % Final       Imaging Results (Last 72 Hours)     Procedure Component Value Units Date/Time    XR Knee 1 or 2 View Right [[id number]] Collected: 04/11/23 1636     Updated: 04/11/23 1640    Narrative:      XR KNEE 1 OR 2 VW RIGHT-  04/11/2023     HISTORY: Right knee arthroplasty.     Distal femoral and proximal tibia components of the right knee  prosthesis are well-seated with no abnormal surrounding bony lucencies.  Soft tissue air is seen. No unexpected findings are noted.       Impression:      1. Satisfactory postoperative appearance of the right knee.     This report was finalized on 4/11/2023 4:37 PM by Dr. Itz Valladares M.D.             Hospital Course:  83 y.o. female admitted to [facility] to services of Atul Rico MD with Arthritis of right knee [M17.11] on 4/11/2023 and underwent MO ARTHRP KNE CONDYLE&PLATU MEDIAL&LAT COMPARTMENTS [34075] (RIGHT TOTAL KNEE ARTHROPLASTY WITH SHERI NAVIGATION)  Per Atul Rico MD. Antibiotic and VTE prophylaxis were per [facility]IP protocols. Post-operatively the patient transferred to the post-operative floor where the patient underwent mobilization therapy that included active as well as passive ROM exercises. Opioids were titrated to achieve appropriate pain management to allow for participation in mobilization exercises. Vital signs are now stable. On the day of discharge the wound was clean, dry and intact and calf was soft and nontender and Homans sign was negative. Operative extremity neurovascular status remains intact.   Appropriate education re: incision care, activity levels, medications, and follow up visits was completed and all questions were answered. The patient is now deemed stable for discharge.    Condition on Discharge:  Stable    Total Joint Replacement Discharge Instructions: Patient is to continue with physical therapy exercises twice daily and continue working with the physical therapist as ordered  and should be up  walking every 2 hours during the day in addition to the physical therapy exercises. Patient may weight bear as tolerated unless otherwise specified. Continue to ice regularly. Do frequent ankle pumping exercises while you are sitting with legs elevated.  Patient also instructed on deep breathing, coughing, and using  incentive spirometer during hospitalization and encouraged to continue to use at home regularly.        VI. FOLLOW-UP VISITS:  ? Follow up in the office with Dr Atul Liriano in 2 weeks - If already scheduled (see Future Appointments) for date and time, if not yet scheduled, patient to call the office at [phone] to schedule. Prescriptions were given for pain medication, nausea, constipation, and blood thinner therapy.    ? If you have any concerns or suspected complications prior to your follow up visit, please call your surgeon's office. Do not wait until your appointment time if you suspect complications. These will need to be addressed in the office promptly.      Future Appointments   Date Time Provider Department Center   4/27/2023  2:00 PM Doris Johnson APRN [facility] [address] [facility]   5/26/2023  1:00 PM Angela Carbajal MD [facility]   10/23/2023 10:15 AM Fredi Harrison MD [facility] None   10/27/2023  1:30 PM Tomás Martines MD [facility]   2/22/2024 10:00 AM Christiano Allan MD [facility]     Additional Instructions for the Follow-ups that You Need to Schedule     Ambulatory Referral to Home Health   As directed      Face to Face Visit Date: 4/12/2023    Follow-up provider for Plan of Care?: I will be treating the patient on an ongoing basis.  Please send me the Plan of Care for signature.    Follow-up provider: ATUL LIRIANO [[id number]]    Reason/Clinical Findings: post surgical    Describe mobility limitations that make leaving home difficult: Requires the assistance of another to leave home    Nursing/Therapeutic Services Requested: Physical Therapy    PT orders: Total joint  pathway    Frequency: 1 Week 1         Ambulatory Referral to Physical Therapy Evaluate and treat, POST OP   As directed      Right TKA, please schedule approximately 2 weeks postop    Order Comments: Right TKA, please schedule approximately 2 weeks postop     Specialty needed: Evaluate and treat POST OP    Follow-up needed: Yes         Discharge Follow-up with Specialty: Orthopedics; 2 Weeks   As directed      Specialty: Orthopedics    Follow Up: 2 Weeks    Follow Up Details: Return to the office to see Dr. Atul Rico         Obtain informed consent   As directed      Informed Consent Given For: RIGHT TOTAL KNEE ARTHROPLASTY WITH SHERI NAVIGATION    Laterality: Right               Discharge Disposition Plan:today to [facility] when cleared by physical therapy as safe for discharge    Date: 4/13/2023    BERNICE Leblanc    Dictated Utilizing Dragon Dictation

## 2023-04-13 VITALS
HEART RATE: 91 BPM | TEMPERATURE: 97.3 F | BODY MASS INDEX: 30.33 KG/M2 | SYSTOLIC BLOOD PRESSURE: 139 MMHG | OXYGEN SATURATION: 97 % | WEIGHT: 171.2 LBS | HEIGHT: 63 IN | RESPIRATION RATE: 16 BRPM | DIASTOLIC BLOOD PRESSURE: 79 MMHG

## 2023-04-13 LAB
HCT VFR BLD AUTO: 37.4 % (ref 34–46.6)
HGB BLD-MCNC: 12.4 G/DL (ref 12–15.9)

## 2023-04-13 PROCEDURE — A9270 NON-COVERED ITEM OR SERVICE: HCPCS | Performed by: NURSE PRACTITIONER

## 2023-04-13 PROCEDURE — 63710000001 PANTOPRAZOLE 40 MG TABLET DELAYED-RELEASE: Performed by: NURSE PRACTITIONER

## 2023-04-13 PROCEDURE — 85014 HEMATOCRIT: CPT | Performed by: NURSE PRACTITIONER

## 2023-04-13 PROCEDURE — 63710000001 ATORVASTATIN 20 MG TABLET: Performed by: NURSE PRACTITIONER

## 2023-04-13 PROCEDURE — 63710000001 POLYETHYLENE GLYCOL 17 G PACK: Performed by: NURSE PRACTITIONER

## 2023-04-13 PROCEDURE — 63710000001 DOCUSATE SODIUM 100 MG CAPSULE: Performed by: NURSE PRACTITIONER

## 2023-04-13 PROCEDURE — 99024 POSTOP FOLLOW-UP VISIT: CPT | Performed by: NURSE PRACTITIONER

## 2023-04-13 PROCEDURE — 63710000001 HYDROCODONE-ACETAMINOPHEN 7.5-325 MG TABLET: Performed by: NURSE PRACTITIONER

## 2023-04-13 PROCEDURE — 85018 HEMOGLOBIN: CPT | Performed by: NURSE PRACTITIONER

## 2023-04-13 PROCEDURE — G0378 HOSPITAL OBSERVATION PER HR: HCPCS

## 2023-04-13 PROCEDURE — 63710000001 BISACODYL 5 MG TABLET DELAYED-RELEASE: Performed by: NURSE PRACTITIONER

## 2023-04-13 PROCEDURE — 63710000001 APIXABAN 2.5 MG TABLET: Performed by: NURSE PRACTITIONER

## 2023-04-13 RX ADMIN — HYDROCODONE BITARTRATE AND ACETAMINOPHEN 1 TABLET: 7.5; 325 TABLET ORAL at 09:10

## 2023-04-13 RX ADMIN — HYDROCODONE BITARTRATE AND ACETAMINOPHEN 1 TABLET: 7.5; 325 TABLET ORAL at 13:03

## 2023-04-13 RX ADMIN — DOCUSATE SODIUM 100 MG: 100 CAPSULE, LIQUID FILLED ORAL at 09:10

## 2023-04-13 RX ADMIN — HYDROCODONE BITARTRATE AND ACETAMINOPHEN 1 TABLET: 7.5; 325 TABLET ORAL at 05:04

## 2023-04-13 RX ADMIN — APIXABAN 2.5 MG: 2.5 TABLET, FILM COATED ORAL at 09:10

## 2023-04-13 RX ADMIN — BISACODYL 10 MG: 5 TABLET ORAL at 09:11

## 2023-04-13 RX ADMIN — POLYETHYLENE GLYCOL 3350 17 G: 17 POWDER, FOR SOLUTION ORAL at 09:11

## 2023-04-13 RX ADMIN — ATORVASTATIN CALCIUM 10 MG: 20 TABLET, FILM COATED ORAL at 09:10

## 2023-04-13 RX ADMIN — PANTOPRAZOLE SODIUM 40 MG: 40 TABLET, DELAYED RELEASE ORAL at 09:10

## 2023-04-13 NOTE — PLAN OF CARE
Goal Outcome Evaluation:  Plan of Care Reviewed With: patient        Progress: improving  Outcome Evaluation: POD!#2 OF RIGHT TOTAL KNEE. VSS. PO PAIN MEDICATION HELPING WITH PAIN. AMBULATING WITH ASSISTANCE. VOIDING FINE . BRP. EDUCATION PROVIDED ON REFLUX AND SAFETY. PATIENT VERBALIZED UNDERSTANDING. D/C REHAB TODAY.

## 2023-04-13 NOTE — PROGRESS NOTES
"    Orthopedic Total Joint Progress Note        Patient: Ashley Motta    Date of Admission: 4/11/2023 10:32 AM    YOB: 1940    Medical Record Number: [id number]    Attending Physician: Atul Rico MD      POD # 2 Days Post-Op Procedure(s) (LRB):  RIGHT TOTAL KNEE ARTHROPLASTY WITH SHERI NAVIGATION (Right)       Systemic or Specific Complaints: The patient has had a relatively normal postoperative course.  The patient has had no current complaints. The patient has had improving normal postoperative pain.  The patient has had no issues with the wound.  Patient up in chair just returned from  Upon arrival.  She states her pain did increase a little bit today but has been controlled with oral pain medications.  She is waiting to hear she has a ride to call or if her son can take her this afternoon.  She has no other concerns at this time.      Allergies: No Known Allergies    Medications:   Current Medications:  Scheduled Meds:apixaban, 2.5 mg, Oral, Q12H  atorvastatin, 10 mg, Oral, Daily  docusate sodium, 100 mg, Oral, BID  pantoprazole, 40 mg, Oral, Daily  polyethylene glycol, 17 g, Oral, Daily  rOPINIRole, 1 mg, Oral, Nightly      Continuous Infusions:   PRN Meds:.•  acetaminophen  •  bisacodyl  •  bisacodyl  •  HYDROcodone-acetaminophen  •  HYDROcodone-acetaminophen  •  HYDROmorphone **AND** naloxone  •  magnesium hydroxide  •  ondansetron **OR** ondansetron      Physical Exam: 83 y.o. female   Wt Readings from Last 3 Encounters:   04/11/23 77.7 kg (171 lb 3.2 oz)   04/04/23 77.6 kg (171 lb)   03/27/23 77.7 kg (171 lb 3.2 oz)     Ht Readings from Last 3 Encounters:   04/11/23 160 cm (63\")   04/04/23 160 cm (63\")   03/27/23 160 cm (63\")     Body mass index is 30.33 kg/m².    Vitals:    04/12/23 1419 04/12/23 1830 04/12/23 2228 04/13/23 0502   BP: 109/56 110/62 104/65 137/62   BP Location: Left arm Left arm Right arm Right arm   Patient Position: Sitting Sitting Lying Lying   Pulse: 71 72 73 68 "   Resp: 16 16 16 16   Temp: 97.2 °F (36.2 °C) 97.4 °F (36.3 °C) 97.4 °F (36.3 °C) 97 °F (36.1 °C)   TempSrc: Oral Oral Oral Oral   SpO2: 97% 99% 94% 98%   Weight:       Height:            General Appearance:    General: alert and oriented         Abdomen/:     soft non-tender, non-distended, voiding without difficulty       Extremities:   Operative extremity neurovascular status intact. ROM appropriate.  Incision intact w/out signs or symptoms of infection.  No cyanosis, calf is soft and nontender.     Activity: Mobilizing Per P.T.   Weight Bearing: As Tolerated    Diagnostic Tests:   Admission on 04/11/2023   Component Date Value Ref Range Status   • Hemoglobin 04/12/2023 12.3  12.0 - 15.9 g/dL Final   • Hematocrit 04/12/2023 35.8  34.0 - 46.6 % Final   • Hemoglobin 04/13/2023 12.4  12.0 - 15.9 g/dL Final   • Hematocrit 04/13/2023 37.4  34.0 - 46.6 % Final       Imaging Results (Last 72 Hours)     Procedure Component Value Units Date/Time    XR Knee 1 or 2 View Right [[id number]] Collected: 04/11/23 1636     Updated: 04/11/23 1640    Narrative:      XR KNEE 1 OR 2 VW RIGHT-  04/11/2023     HISTORY: Right knee arthroplasty.     Distal femoral and proximal tibia components of the right knee  prosthesis are well-seated with no abnormal surrounding bony lucencies.  Soft tissue air is seen. No unexpected findings are noted.       Impression:      1. Satisfactory postoperative appearance of the right knee.     This report was finalized on 4/11/2023 4:37 PM by Dr. Itz Valladares M.D.             Personally viewed ortho images and report     Assessment:  - Doing well 2 Days Post-Op following total joint replacement  - Acute Blood Loss Anemia, preoperative hemoglobin 14.4, POD #2 hemoglobin 12.4 - stable  - Post-operative Pain  - Limited mobility, requires use of walker and assistance when OOB.    Patient Active Problem List   Diagnosis   • Osteopenia   • Hyperlipidemia   • Pre-diabetes   • Osteoarthritis   • BALDEMAR on  CPAP   • Pelvic relaxation due to vaginal prolapse   • Chronic pain of both knees   • Arthritis of right knee   • Arthritis of left knee   • Arthritis of both knees   • Restless leg syndrome   • Dizziness   • Headache, migraine   • Heartburn   • Internal hemorrhoids with complication   • Impacted cerumen of left ear   • Vitamin E deficiency   • Environmental and seasonal allergies   • Chronic cough        Plan:    - Consults: none  - Continue to monitor labs and/or v/s, for tolerance to post op blood loss.  - Continue efforts to increase mobilization.  - Continue Pain Control Measures.  - Continue incisional Care.  - DVT prophylaxis - Eliquis  - Follow up in office with Atul Rico M.D. In 2 weeks.    Discharge Plan:today to [facility] when accepted, and bed available, and when cleared by physical therapy as safe for discharge    Date: 4/13/2023  BERNICE Leblanc

## 2023-04-13 NOTE — PROGRESS NOTES
Continued Stay Note  [facility]     Patient Name: Ashley Motta  MRN: [id number]  Today's Date: 4/13/2023    Admit Date: 4/11/2023    Plan: [facility], approved with bed available 4/13   Discharge Plan     Row Name 04/13/23 1301       Plan    Final Discharge Disposition Code 03 - skilled nursing facility (SNF)    Final Note Pt being d/c'ed to [facility]. Bed available. Pt to transport via [employer] van at 3pm. Packet on chart and notified nurse/[place].               Discharge Codes    No documentation.               Expected Discharge Date and Time     Expected Discharge Date Expected Discharge Time    Apr 13, 2023             Nika Méndez RN

## 2023-04-17 ENCOUNTER — TELEPHONE (OUTPATIENT)
Dept: ORTHOPEDIC SURGERY | Facility: HOSPITAL | Age: 83
End: 2023-04-17
Payer: MEDICARE

## 2023-04-17 NOTE — TELEPHONE ENCOUNTER
Attempted to reach Ms. Motta to see how she is doing as she is 1 week SP RTK. Message left at this time.     Ms. Motta called me back at this time. She is at [facility] and said  She is doing well. She is working with PT and progressing. They are having a meeting tomorrow to see how she is coming along to meeting her goals. She is pleased with her progress. Her pain is controlled with the pain medication. She is taking it on an as needed basis though she said they give her two at night to rest. BM's are good. Dressing looks good. Ms. Motta doesn't have any questions for me at this time. She was given my contact information should she need anything.

## 2023-04-27 ENCOUNTER — OFFICE VISIT (OUTPATIENT)
Dept: ORTHOPEDIC SURGERY | Facility: CLINIC | Age: 83
End: 2023-04-27
Payer: MEDICARE

## 2023-04-27 VITALS — HEIGHT: 63 IN | TEMPERATURE: 97.6 F | WEIGHT: 171 LBS | BODY MASS INDEX: 30.3 KG/M2

## 2023-04-27 DIAGNOSIS — Z96.651 S/P TKR (TOTAL KNEE REPLACEMENT), RIGHT: ICD-10-CM

## 2023-04-27 DIAGNOSIS — R52 PAIN: Primary | ICD-10-CM

## 2023-04-27 NOTE — PROGRESS NOTES
Ashley Motta : 1940 MRN: [id number] DATE: 2023  Body mass index is 30.29 kg/m².  Vitals:    23 1421   Temp: 97.6 °F (36.4 °C)       DIAGNOSIS: 2 week follow up right total knee arthroplasty    SUBJECTIVE:Patient returns today for 2 week follow up of right total knee replacement.  Patient presents in wheelchair from transportation, she states she is doing well at [facility], she is discharging tomorrow.  She is finished her physical therapy and is doing well, they gave her approximately 102 degree of flexion and about 5 degrees short of full extension.  She has no other complaints at this time.    OBJECTIVE:   Exam:. The incision is healing appropriately. No sign of infection. Range of motion is progressing as expected, ROM approximately 5 degrees short of full extension and approximately 105 degrees of flexion. The calf is soft and nontender with a negative Homans sign.    DIAGNOSTIC STUDIES  2V AP&Lat of the right knee were done in the office today  Indication, findings and comparison: images were reviewed for evaluation of recent knee replacement. They demonstrate a well positioned, well aligned knee replacement without complicating factors noted. In comparison with previous films there has been interval implant placement.    ASSESSMENT: 2 week status post right knee replacement expected healing.  Incision appears well-healing, edges well approximated, no erythema, no open wounds, no signs of active drainage or bleeding noted.    PLAN: 1) dressing and exofin fusion removed, steri-strips applied   2) Order given for PT and pain medicine (as needed)   3) Continue ice PRN   4) Stop Eliquis 2.5mg BID and resume preoperative aspirin 81mg daily   5) Follow up in 4 weeks with repeat Xrays of right knee (2 views)   6) Wait for 3 months after surgery for routine teeth cleaning and continue with taking a dose of antibiotics before dental procedures for 2 yrs post total joint replacement.    Doris Johnson,  APRN  4/27/2023    Dictated Utilizing Dragon Dictation

## 2023-04-28 ENCOUNTER — TRANSCRIBE ORDERS (OUTPATIENT)
Dept: HOME HEALTH SERVICES | Facility: HOME HEALTHCARE | Age: 83
End: 2023-04-28
Payer: MEDICARE

## 2023-04-28 ENCOUNTER — HOME HEALTH ADMISSION (OUTPATIENT)
Dept: HOME HEALTH SERVICES | Facility: HOME HEALTHCARE | Age: 83
End: 2023-04-28
Payer: MEDICARE

## 2023-04-28 DIAGNOSIS — Z96.651 S/P TOTAL KNEE ARTHROPLASTY, RIGHT: Primary | ICD-10-CM

## 2023-05-01 ENCOUNTER — HOME CARE VISIT (OUTPATIENT)
Dept: HOME HEALTH SERVICES | Facility: HOME HEALTHCARE | Age: 83
End: 2023-05-01
Payer: MEDICARE

## 2023-05-01 ENCOUNTER — HOME CARE VISIT (OUTPATIENT)
Dept: HOME HEALTH SERVICES | Facility: HOME HEALTHCARE | Age: 83
End: 2023-05-01

## 2023-05-01 VITALS
TEMPERATURE: 97.8 F | RESPIRATION RATE: 18 BRPM | DIASTOLIC BLOOD PRESSURE: 82 MMHG | OXYGEN SATURATION: 99 % | HEART RATE: 76 BPM | SYSTOLIC BLOOD PRESSURE: 148 MMHG

## 2023-05-01 NOTE — HOME HEALTH
"REASON FOR REFERRAL:  83 year old   female  presents with complaints of : decreased I with ambulation, requiring need for use of assistive device following recent R TKA.    DIAGNOSIS:  aftercare R TKA Dr Rico    4/11/23     PERTINENT MEDICAL HISTORY:   Osteopenia   Hyperlipidemia   Pre-diabetes   Osteoarthritis   BALDEMAR on CPAP    Arthritis of left knee   Restless leg syndrome       PRIOR LEVEL OF FUNCTION: independent self care and mobility    SOCIAL ENVIRONMENT: lives alone in 1st floor apartment/condo; cane;  recently passed away 9/2022;  tub shower; shower seat; ; rolling walker; cane    SUBJECTIVE: \"Im afraid I'll fall so I haven't been in the tub\"    ASSESSMENT: patient ambulating with RW       DATE OF NEXT APPOINTMENT WITH DOCTOR: 5/26/23 Dr Rico      TREATMENT:  adjusted bedside commode and applied splash guard to improve ease of toilet transfer. relocated shower seat to catalan bath to improve ease of transfer. adjusted height of chair to make level. educated in safe shower transfer with aid of grab bars/walker. patient able to teach back mod I with shower transfer when using strategies as instructed by PT. adjusted walker height,assessed ambulation in/out of home using walker and nearby rail. patient able to teach back mod I with technique. instructed in use of SPC using 2 point pattern.  recommended slow transition from walker to cane with continued use of RW for outdoor surfaces until cleared by outpatient PT. instructed to continue with previously instructed HEP BID.  Med reconciliation completed. Educated on incision care per MD.  home safety assessment completed. encouraged patient to remove throw rugs from walkway.  remaining home without concerns  ____________    PLAN FOR NEXT VISIT:   patient not admitted to home health PT services due to not homebound and ready to transition to outpatient PT.  Contacted Dr. Rico to notify and request outpatient PT orders.  Contacted [facility], to " schedule appt. and lyft transportation.  Patient scheduled to begin outpatient PT on 5/2 at 3:00.

## 2023-05-02 ENCOUNTER — HOSPITAL ENCOUNTER (OUTPATIENT)
Dept: PHYSICAL THERAPY | Facility: HOSPITAL | Age: 83
Setting detail: THERAPIES SERIES
Discharge: HOME | End: 2023-05-02
Payer: MEDICARE

## 2023-05-02 DIAGNOSIS — Z96.651 S/P TKR (TOTAL KNEE REPLACEMENT), RIGHT: Primary | ICD-10-CM

## 2023-05-02 DIAGNOSIS — R26.9 GAIT DIFFICULTY: ICD-10-CM

## 2023-05-02 DIAGNOSIS — Z96.651 STATUS POST RIGHT KNEE REPLACEMENT: Primary | ICD-10-CM

## 2023-05-02 DIAGNOSIS — M25.661 DECREASED RANGE OF MOTION (ROM) OF RIGHT KNEE: ICD-10-CM

## 2023-05-02 DIAGNOSIS — M62.81 MUSCLE WEAKNESS OF LOWER EXTREMITY: ICD-10-CM

## 2023-05-02 PROCEDURE — 97110 THERAPEUTIC EXERCISES: CPT

## 2023-05-02 PROCEDURE — 97161 PT EVAL LOW COMPLEX 20 MIN: CPT

## 2023-05-03 NOTE — THERAPY EVALUATION
Outpatient Physical Therapy Ortho Initial Evaluation  [facility]     Patient Name: Ashley Motta  : 1940  MRN: [id number]  Today's Date: 2023      Visit Date: 2023    Patient Active Problem List   Diagnosis   • Osteopenia   • Hyperlipidemia   • Pre-diabetes   • Osteoarthritis   • BALDEMAR on CPAP   • Pelvic relaxation due to vaginal prolapse   • Chronic pain of both knees   • Arthritis of right knee   • Arthritis of left knee   • Arthritis of both knees   • Restless leg syndrome   • Dizziness   • Headache, migraine   • Heartburn   • Internal hemorrhoids with complication   • Impacted cerumen of left ear   • Vitamin E deficiency   • Environmental and seasonal allergies   • Chronic cough        Past Medical History:   Diagnosis Date   • Anxiety    • 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 SARAH LIRIANO   • Eczema 2014   • Endothelial corneal dystrophy 2020   • Genital prolapse 10/2017   • GERD (gastroesophageal reflux disease)    • Goiter, nontoxic, multinodular 2017    THYROID POLYP SEES    • Hemorrhoids    • Herpes zoster 2018   • Hyperlipidemia    • Impaired fasting glucose    • Migraine    • BALDEMAR on CPAP     FOLLOWED BY DR. TERRY CHEUNG   • Osteoarthritis    • Osteopenia    • Postmenopausal atrophic vaginitis    • Rectal nodule 2020    REFERRED TO DR. PAMELA NAPOLES   • Rectocele 2017   • Restless leg syndrome    • Right knee pain    • RLS (restless legs syndrome)    • Seborrheic keratosis    • Skin cancer 2015    LOWER LEG, FOLLOWED BY DR. EDI SANCHEZ   • Vaginal vault prolapse 2017   • Vertigo         Past Surgical History:   Procedure Laterality Date   • CATARACT EXTRACTION, BILATERAL Bilateral    • COLONOSCOPY N/A 2015    1 TUBULAR ADENOMA POLYP IN DISTAL ASCENDING, DR. SARAH LIRIANO AT [facility]. SARAH LIRIANO   • COLONOSCOPY N/A 2020    10 MM SESSILE SERRATED ADENOMA POLYP IN ASCENDING, 3 MM HYPERPLASTIC POLYP  IN RECTUM, 7 MM ANAL NODULE, DR. SARAH LIRIANO AT [facility]   • HYSTERECTOMY N/A 01/19/2004    KAYLEE WITH BSO, DR. RAFIA MORTENSEN AT [facility]   • KNEE ARTHROSCOPY Left 2013   • KNEE ARTHROSCOPY Right 2010   • MELISSA CULDOPLASTY N/A 01/19/2004    DR. RAFIA MORTENSEN AT [facility]   • SACROCOLPOPEXY N/A    • TOTAL KNEE ARTHROPLASTY Right 4/11/2023    Procedure: RIGHT TOTAL KNEE ARTHROPLASTY WITH SHERI NAVIGATION;  Surgeon: Atul Liriano MD;  Location: [facility];  Service: Orthopedics;  Laterality: Right;       Visit Dx:     ICD-10-CM ICD-9-CM   1. Status post right knee replacement  Z96.651 V43.65   2. Decreased range of motion (ROM) of right knee  M25.661 719.56   3. Muscle weakness of lower extremity  M62.81 728.87   4. Gait difficulty  R26.9 781.2          Patient History     Row Name 05/02/23 1500             History    Chief Complaint Difficulty Walking;Muscle weakness;Pain  -JA      Type of Pain Knee pain  R  -JA      Date Current Problem(s) Began 04/11/23  -JA      Brief Description of Current Complaint R TKR 3 weeks ago. Was going to have L knee first however it improved during knee prehab PT, it also has severe OA.  She went to [facility] for rehab and had 1 day of HHPT yesterday who set up shower chair educated her for transfers and worked on exercise/gait training however they recommended she was ready to come to outpatient therapy.  -JA      Patient/Caregiver Goals Improve mobility  -JA         Pain     Pain Location Knee  -JA      Pain at Present 4  -JA      Pain Comments forgot to take pain med prior to therapy  -JA         Fall Risk Assessment    Any falls in the past year: No  -JA         Services    Prior Rehab/Home Health Experiences Yes  -JA      When was the prior experience with Rehab/Home Health yesterday, D/C'd from HHPT  -JA      Do you plan to receive Home Health services in the near future No  -JA         Daily Activities    Primary Language English  -JA      Are you able to read Yes  -JA      Are you able to  write Yes  -JA      How does patient learn best? Listening;Reading;Demonstration  -ANDREY      Teaching needs identified Home Exercise Program;Falls Prevention;Home Safety;Management of Condition  -JA      Patient is concerned about/has problems with Difficulty with self care (i.e. bathing, dressing, toileting:;Performing home management (household chores, shopping, care of dependents);Transfers (getting out of a chair, bed);Walking;Standing  -JA      Does patient have problems with the following? Anxiety  -JA         Safety    Are you being hurt, hit, or frightened by anyone at home or in your life? No  -JA      Are you being neglected by a caregiver No  -JA            User Key  (r) = Recorded By, (t) = Taken By, (c) = Cosigned By    Initials Name Provider Type    Ambreen Mccallum, PT Physical Therapist                 PT Ortho     Row Name 05/02/23 1500       Subjective Pain    Able to rate subjective pain? yes  -ANDREY    Pre-Treatment Pain Level 4  -ANDREY       General ROM    RT Lower Ext Rt Knee Extension/Flexion  -ANDREY       Right Lower Ext    Rt Knee Extension/Flexion AROM   flexion w/foot on the floor  -ANDREY       Balance Skills Training    Balance Comments amb w/RWX, FF posture, decreased stride, decreased haily, decreased heel strike to toe off  -ANDREY          User Key  (r) = Recorded By, (t) = Taken By, (c) = Cosigned By    Initials Name Provider Type    Ambreen Mccallum, PT Physical Therapist                            Therapy Education  Education Details: Instructed in heel prop for hamstring stretching tomorrow. She will bring her HEP and we will update at next visit. Worked on STS and improved her ability to rise from elevated mat without UEs.  Given: HEP, Symptoms/condition management, Pain management  Program: New  How Provided: Verbal, Demonstration  Provided to: Patient  Level of Understanding: Verbalized, Demonstrated      PT OP Goals     Row Name 05/02/23 1500          PT Short Term Goals     STG Date to Achieve 06/02/23  -     STG 1 Pt will be independent with initial HEP.  -JA     STG 1 Progress New  -     STG 2 Pt will demonstrate gait with SPC x150'  with normalized heel strike to toe off.  -JA     STG 2 Progress New  -     STG 3 Pt will demonstrate increased knee AROM 5-110.  -JA     STG 3 Progress New  -JA     STG 4 Pt will  -        Long Term Goals    LTG Date to Achieve 07/02/23  -JA     LTG 1 Pt will be independent with advanced HEP for core and LE strengthening to facilitate return to PLOF.  -JA     LTG 1 Progress New  -JA     LTG 2 Pt will demonstrate normalized gait with least resistrictive or no assistive device.  -JA     LTG 2 Progress New  -JA     LTG 3 Pt will improve KOS score by 10% or greater indicating decrease in perceived functional disability.  -     LTG 3 Progress New  -     LTG 4 Pt will demonstrate in  -JA        Time Calculation    PT Goal Re-Cert Due Date 08/01/23  -           User Key  (r) = Recorded By, (t) = Taken By, (c) = Cosigned By    Initials Name Provider Type    Ambreen Mccallum, PT Physical Therapist                 PT Assessment/Plan     Row Name 05/02/23 1500          PT Assessment    Functional Limitations Impaired gait;Limitation in home management;Limitations in community activities;Limitations in functional capacity and performance;Performance in leisure activities;Performance in self-care ADL  -     Impairments Range of motion;Pain;Muscle strength;Gait;Endurance;Balance;Posture;Poor body mechanics  -     Assessment Comments Ashley Motta is a 83 y.o. female referred to outpatient physical therapy for evaluation and treatment of post-op  right TKR. Patient presents with dependence on RWX for ambulation, decreased R knee ROM and strength, difficulty with transitional movements and impaired functional mobility. Signs and symptoms are consistent with referring diagnosis.  Pertinent comorbidities and personal factors that may affect  progress include, but are not limited to, non-surgical side also has end range knee OA.  This condition is stable. Recommend skilled PT to address functional deficits. Thank you for this referral.  -ANDREY     Please refer to paper survey for additional self-reported information Yes  -JA     Rehab Potential Good  -JA     Patient/caregiver participated in establishment of treatment plan and goals Yes  -JA     Patient would benefit from skilled therapy intervention Yes  -JA        PT Plan    PT Frequency 2x/week  -ANDREY     Predicted Duration of Therapy Intervention (PT) 8-14 visits  -ANDREY     Planned CPT's? PT RE-EVAL: 47711;PT THER PROC EA 15 MIN: 08489;PT THER ACT EA 15 MIN: 34989;PT MANUAL THERAPY EA 15 MIN: 80844;PT NEUROMUSC RE-EDUCATION EA 15 MIN: 32687;PT GAIT TRAINING EA 15 MIN: 56376  -     PT Plan Comments complete KOS, review HEP and update, cont to increase ROM, gait train with 1 UE/SPC?, add STS to HEP  -JA           User Key  (r) = Recorded By, (t) = Taken By, (c) = Cosigned By    Initials Name Provider Type    Ambreen Mccallum, PT Physical Therapist                   OP Exercises     Row Name 05/02/23 1500             Subjective Pain    Able to rate subjective pain? yes  -JA      Pre-Treatment Pain Level 4  -JA         Total Minutes    10125 - PT Therapeutic Exercise Minutes 35  -JA         Exercise 1    Exercise Name 1 Nustep UE/LE  -JA      Cueing 1 Verbal  -JA      Time 1 5 min, seat 8;  -JA         Exercise 2    Exercise Name 2 seated ham stretch  -JA      Reps 2 3  -JA      Time 2 20sec  -JA         Exercise 3    Exercise Name 3 LAQ  -JA      Cueing 3 Verbal  -JA      Sets 3 2  -JA      Reps 3 10  -JA      Additional Comments 2#  -JA         Exercise 4    Exercise Name 4 standing HR  -JA      Sets 4 2  -JA      Reps 4 10  -JA      Time 4 cued to keep weight evenly distributed R/L  -JA         Exercise 5    Exercise Name 5 standing hip abd  -JA      Sets 5 2 R  -JA      Reps 5 10 slowly!  -JA       Time 5 one set on L during rest interval  -JA         Exercise 6    Exercise Name 6 standing ham curls  -JA      Sets 6 2  -JA      Reps 6 10  -JA      Time 6 one set on L during rest interval  -JA         Exercise 7    Exercise Name 7 STS  -JA      Sets 7 2  -JA      Reps 7 5  -JA      Time 7 froom lowest setting of mat next to steps  -JA      Additional Comments worked on weight shift strategy, began with UEs but able to do 2nd set without UEs  -JA         Exercise 8    Exercise Name 8 supine heel prop, overpressure given for ham stretching  -JA      Reps 8 3  -JA      Time 8 30sec  -JA            User Key  (r) = Recorded By, (t) = Taken By, (c) = Cosigned By    Initials Name Provider Type    Ambreen Mccallum, PT Physical Therapist                              Outcome Measure Options: Knee Outcome Score- ADL         Time Calculation:     Start Time: 1505  Stop Time: 1550  Time Calculation (min): 45 min  Timed Charges  16699 - PT Therapeutic Exercise Minutes: 35  Untimed Charges  PT Eval/Re-eval Minutes: 10  Total Minutes  Timed Charges Total Minutes: 35  Untimed Charges Total Minutes: 10   Total Minutes: 45     Therapy Charges for Today     Code Description Service Date Service Provider Modifiers Qty    23402299840 HC PT THER PROC EA 15 MIN 5/2/2023 Ambreen Miller, PT GP 2    76213790929 HC PT EVAL LOW COMPLEXITY 1 5/2/2023 Ambreen Miller, PT GP 1          PT G-Codes  Outcome Measure Options: Knee Outcome Score- ADL         Ambreen Miller PT  5/2/2023

## 2023-05-04 ENCOUNTER — HOSPITAL ENCOUNTER (OUTPATIENT)
Dept: PHYSICAL THERAPY | Facility: HOSPITAL | Age: 83
Setting detail: THERAPIES SERIES
Discharge: HOME | End: 2023-05-04
Payer: MEDICARE

## 2023-05-04 DIAGNOSIS — M25.661 DECREASED RANGE OF MOTION (ROM) OF RIGHT KNEE: ICD-10-CM

## 2023-05-04 DIAGNOSIS — Z96.651 STATUS POST RIGHT KNEE REPLACEMENT: Primary | ICD-10-CM

## 2023-05-04 DIAGNOSIS — R26.9 GAIT DIFFICULTY: ICD-10-CM

## 2023-05-04 DIAGNOSIS — M62.81 MUSCLE WEAKNESS OF LOWER EXTREMITY: ICD-10-CM

## 2023-05-04 PROCEDURE — 97110 THERAPEUTIC EXERCISES: CPT

## 2023-05-04 NOTE — THERAPY TREATMENT NOTE
Outpatient Physical Therapy Ortho Treatment Note  [facility]     Patient Name: Ashley Motta  : 1940  MRN: [id number]  Today's Date: 2023      Visit Date: 2023    Visit Dx:    ICD-10-CM ICD-9-CM   1. Status post right knee replacement  Z96.651 V43.65   2. Decreased range of motion (ROM) of right knee  M25.661 719.56   3. Muscle weakness of lower extremity  M62.81 728.87   4. 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 SARAH LIRIANO   • Eczema 2014   • Endothelial corneal dystrophy 2020   • Genital prolapse 10/2017   • GERD (gastroesophageal reflux disease)    • Goiter, nontoxic, multinodular 2017    THYROID POLYP SEES    • Hemorrhoids    • Herpes zoster 2018   • Hyperlipidemia    • Impaired fasting glucose    • Migraine    • BALDEMAR on CPAP     FOLLOWED BY DR. TERRY CHEUNG   • Osteoarthritis    • Osteopenia    • Postmenopausal atrophic vaginitis    • Rectal nodule 2020    REFERRED TO DR. PAMELA NAPOLES   • Rectocele 2017   • Restless leg syndrome    • Right knee pain    • RLS (restless legs syndrome)    • Seborrheic keratosis    • Skin cancer 2015    LOWER LEG, FOLLOWED BY DR. EDI SANCHEZ   • Vaginal vault prolapse 2017   • Vertigo         Past Surgical History:   Procedure Laterality Date   • CATARACT EXTRACTION,  BILATERAL Bilateral 2015   • COLONOSCOPY N/A 02/27/2015    1 TUBULAR ADENOMA POLYP IN DISTAL [facility]END[facility]   • COLONOSCOPY N/A 03/04/2020    10 MM SESSILE SERRATED ADENOMA POLYP IN ASCENDING, 3 MM HYPERPLASTIC POLYP IN RECTUM, 7 MM ANAL NODULE, DR. SARAH LIRIANO AT [facility]   • HYSTERECTOMY N/A 01/19/2004    KAYLEE WITH BSO, DR. RAFIA MORTENSEN AT [facility]   • KNEE ARTHROSCOPY Left 2013   • KNEE ARTHROSCOPY Right 2010   • MELISSA CULDOPLASTY N/A 01/19/2004    DR. RAFIA MORTENSEN AT [facility]   • SACROCOLPOPEXY N/A    • TOTAL KNEE ARTHROPLASTY Right 4/11/2023    Procedure: RIGHT TOTAL KNEE ARTHROPLASTY WITH SHERI NAVIGATION;  Surgeon: Atul Liriano MD;  Location: [facility] OR [facility];  Service: Orthopedics;  Laterality: Right;                        PT Assessment/Plan     Row Name 05/04/23 0800          PT Assessment    Assessment Comments Mrs. Motta returns for first f/u visit noting she was pretty painful when she got home, she had forgotten to take pain med prior to coming to therapy. She required reinforcement of mechanics of STS. Will need to review HEP  for form/technique. She will benefit from continuing skilled therapy services to restore full ROM and increase strength to improve functional mobility. Completed KOS, pt scored 24%, 100=noperceived functional disability.  -JA        PT Plan    PT Plan Comments work on gait mechanics, STS, cont ROM work  -ANDREY           User Key  (r) = Recorded By, (t) = Taken By, (c) = Cosigned By    Initials Name Provider Type    Ambreen Mccallum, PT Physical Therapist                   OP Exercises     Row Name 05/04/23 0800             Subjective Comments    Subjective Comments Took a pain pill this morning. (Forgot to take one last time, was pretty painful.)  -ANDREY         Subjective Pain    Able to rate subjective pain? yes  -JA      Pre-Treatment Pain Level 3  -JA         Total Minutes    78605 - PT Therapeutic Exercise Minutes 40  -JA         Exercise 1     Exercise Name 1 Nustep UE/LE  -JA      Cueing 1 Verbal  -JA      Time 1 5 min, seat 8;  -JA         Exercise 2    Exercise Name 2 seated ham stretch  -JA      Cueing 2 Verbal;Tactile;Demo  -JA      Reps 2 3  -JA      Time 2 20sec  -JA      Additional Comments copious  -JA         Exercise 3    Exercise Name 3 LAQ  -JA      Cueing 3 Verbal  -JA      Sets 3 2  -JA      Reps 3 10  -JA         Exercise 4    Exercise Name 4 standing HR  -JA      Sets 4 2  -JA      Reps 4 10  -JA      Time 4 cued to keep weight evenly distributed R/L  -JA         Exercise 5    Exercise Name 5 standing hip abd  -JA      Sets 5 2 R  -JA      Reps 5 10 slowly!  -JA      Time 5 one set on L during rest interval  -JA      Additional Comments 2#  -JA         Exercise 6    Exercise Name 6 standing ham curls  -JA      Sets 6 2  -JA      Reps 6 10  -JA      Time 6 one set on L during rest interval  -JA         Exercise 7    Exercise Name 7 STS  -JA      Sets 7 2  -JA      Reps 7 5  -JA      Time 7 from lowest setting of mat next to steps  -JA         Exercise 8    Exercise Name 8 supine heel prop, overpressure given for ham stretching  -JA      Reps 8 3  -JA      Time 8 30sec  -JA      Additional Comments contract-relax for hams between stretches, overpressure during stretch  -JA         Exercise 9    Exercise Name 9 SLR small range w/TA  -JA      Cueing 9 Verbal;Tactile;Demo  -JA      Sets 9 no wt  -JA      Reps 9 10  -JA      Time 9 3sec  -JA      Additional Comments cued that muscle tension should be felt in front of thigh and not hip flexor  -JA         Exercise 10    Exercise Name 10 SAQ  -JA      Cueing 10 Verbal;Tactile  -JA      Sets 10 2R  -JA      Reps 10 10  -JA      Time 10 2#;one set on L during rest interval  -JA         Exercise 11    Exercise Name 11 standing knee flex stretch on step  -JA      Cueing 11 Verbal;Demo  -JA      Sets 11 10  -JA      Reps 11 10sec  -JA         Exercise 12    Exercise Name 12 Practiced stair  climbing/descending with step-to pattern.  -ANDREY      Cueing 12 Verbal;Tactile;Demo  -JA      Reps 12 4  -JA      Additional Comments charlene UEs  -ANDREY            User Key  (r) = Recorded By, (t) = Taken By, (c) = Cosigned By    Initials Name Provider Type    Ambreen Mccallum, PT Physical Therapist                              PT OP Goals     Row Name 05/04/23 0800          PT Short Term Goals    STG Date to Achieve 06/02/23  -ANDREY     STG 1 Pt will be independent with initial HEP.  -JA     STG 1 Progress New  -JA     STG 2 Pt will demonstrate gait with SPC x150'  with normalized heel strike to toe off.  -JA     STG 2 Progress New  -JA     STG 3 Pt will demonstrate increased knee AROM 5-110.  -ANDREY     STG 3 Progress New  -JA     STG 4 Pt will  -JA        Long Term Goals    LTG Date to Achieve 07/02/23  -ANDREY     LTG 1 Pt will be independent with advanced HEP for core and LE strengthening to facilitate return to PLOF.  -JA     LTG 1 Progress New  -ANDREY     LTG 2 Pt will demonstrate normalized gait with least resistrictive or no assistive device.  -JA     LTG 2 Progress New  -JA     LTG 3 Pt will improve KOS score by 30% or greater indicating decrease in perceived functional disability.  -JA     LTG 3 Progress New;Goal Revised  -JA     LTG 4 Pt will demonstrate increased strength to 4/5 or better throughout R LE.  -ANDREY     LTG 4 Progress New  -ANDREY           User Key  (r) = Recorded By, (t) = Taken By, (c) = Cosigned By    Initials Name Provider Type    Ambreen Mccallum, PT Physical Therapist                Therapy Education  Education Details: [id number] printed and performed ther ex on HEP, issued for home. Worked on STS strategy introduced at initial visit to reduce use of UE, she noted improved ease of transition; we were working from higher seat than standard height.  Given: HEP, Mobility training  Program: New  How Provided: Verbal, Demonstration, Written  Provided to: Patient  Level of Understanding: Verbalized,  Demonstrated    Outcome Measure Options: Knee Outcome Score- ADL  Knee Outcome Survey Activities of Daily Living Scale  Symptoms: Pain: The symptom affects my activity severely  Symptoms: Stiffness: The symptom affects my activity severely  Symptoms: Swelling: The symptom affects my activity moderately  Symptoms: Giving way, buckling, or shifting of the knee: I do not have the symptom  Symptoms: Weakness: The symptom affects my activity severely  Symptoms: Limping: The symptom affects my activity severely  Functional Limitations with ADL's: Walk: Activity is very difficult  Functional Limitations with ADL's: Go up stairs: Activity is very difficult  Functional Limitations with ADL's: Go down stairs: Activity is very difficult  Functional Limitations with ADL's: Stand: Activity is very difficult  Functional Limitations with ADL's: Kneel on front of your knee: I am unable to  Functional Limitations with ADL's: Squat: I am unable to  Functional Limitations with ADL's: Sit with your knee bent: Activity is very difficult  Functional Limitations with ADL's: Rise from a chair: Activity is very difficult  Knee Outcome Summary ADL's Score: 24.29 %      Time Calculation:   Start Time: 0800  Stop Time: 0845  Time Calculation (min): 45 min  Timed Charges  67596 - PT Therapeutic Exercise Minutes: 40  Total Minutes  Timed Charges Total Minutes: 40   Total Minutes: 40  Therapy Charges for Today     Code Description Service Date Service Provider Modifiers Qty    66452176134 HC PT THER PROC EA 15 MIN 5/4/2023 Ambreen Miller, PT GP 3          PT G-Codes  Outcome Measure Options: Knee Outcome Score- ADL         Ambreen Miller PT  5/4/2023

## 2023-05-08 ENCOUNTER — HOSPITAL ENCOUNTER (OUTPATIENT)
Dept: PHYSICAL THERAPY | Facility: HOSPITAL | Age: 83
Setting detail: THERAPIES SERIES
Discharge: HOME | End: 2023-05-08
Payer: MEDICARE

## 2023-05-08 DIAGNOSIS — Z96.651 STATUS POST RIGHT KNEE REPLACEMENT: Primary | ICD-10-CM

## 2023-05-08 DIAGNOSIS — R26.9 GAIT DIFFICULTY: ICD-10-CM

## 2023-05-08 DIAGNOSIS — M62.81 MUSCLE WEAKNESS OF LOWER EXTREMITY: ICD-10-CM

## 2023-05-08 DIAGNOSIS — M25.661 DECREASED RANGE OF MOTION (ROM) OF RIGHT KNEE: ICD-10-CM

## 2023-05-08 PROCEDURE — 97110 THERAPEUTIC EXERCISES: CPT

## 2023-05-08 NOTE — THERAPY TREATMENT NOTE
Outpatient Physical Therapy Ortho Treatment Note  [facility]     Patient Name: Ashley Motta  : 1940  MRN: [id number]  Today's Date: 2023      Visit Date: 2023    Visit Dx:    ICD-10-CM ICD-9-CM   1. Status post right knee replacement  Z96.651 V43.65   2. Decreased range of motion (ROM) of right knee  M25.661 719.56   3. Muscle weakness of lower extremity  M62.81 728.87   4. 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 SARAH LIRIANO   • Eczema 2014   • Endothelial corneal dystrophy 2020   • Genital prolapse 10/2017   • GERD (gastroesophageal reflux disease)    • Goiter, nontoxic, multinodular 2017    THYROID POLYP SEES    • Hemorrhoids    • Herpes zoster 2018   • Hyperlipidemia    • Impaired fasting glucose    • Migraine    • BALDEMAR on CPAP     FOLLOWED BY DR. TERRY CHEUNG   • Osteoarthritis    • Osteopenia    • Postmenopausal atrophic vaginitis    • Rectal nodule 2020    REFERRED TO DR. PAMELA NAPOLES   • Rectocele 2017   • Restless leg syndrome    • Right knee pain    • RLS (restless legs syndrome)    • Seborrheic keratosis    • Skin cancer 2015    LOWER LEG, FOLLOWED BY DR. EDI SANCHEZ   • Vaginal vault prolapse 2017   • Vertigo         Past Surgical History:   Procedure Laterality Date   • CATARACT EXTRACTION,  BILATERAL Bilateral 2015   • COLONOSCOPY N/A 02/27/2015    1 TUBULAR ADENOMA POLYP IN DISTAL [facility]ENDING[facility]   • COLONOSCOPY N/A 03/04/2020    10 MM SESSILE SERRATED ADENOMA POLYP IN ASCENDING, 3 MM HYPERPLASTIC POLYP IN RECTUM, 7 MM ANAL NODULE, DR. SARAH LIRIANO AT [facility]   • HYSTERECTOMY N/A 01/19/2004    KAYLEE WITH BSO, DR. RAFIA MORTENSEN AT [facility]   • KNEE ARTHROSCOPY Left 2013   • KNEE ARTHROSCOPY Right 2010   • MELISSA CULDOPLASTY N/A 01/19/2004    DR. RAFIA MORTENSEN AT [facility]   • SACROCOLPOPEXY N/A    • TOTAL KNEE ARTHROPLASTY Right 4/11/2023    Procedure: RIGHT TOTAL KNEE ARTHROPLASTY WITH SHERI NAVIGATION;  Surgeon: Atul Liriano MD;  Location: [facility] OR [facility] [place];  Service: Orthopedics;  Laterality: Right;        PT Ortho     Row Name 05/08/23 1300       Right Lower Ext    Rt Knee Extension/Flexion AROM 5-105  supine  -CC          User Key  (r) = Recorded By, (t) = Taken By, (c) = Cosigned By    Initials Name Provider Type    CC Karen Bose, PT Physical Therapist                             PT Assessment/Plan     Row Name 05/08/23 1300          PT Assessment    Assessment Comments Pt is s/p R TKA x 3w6d. She presents today ambulating with FWW, pain well managed as took pain pill about an hour ago. Limited time today secondary to [employer] arriving late. Pt demos improving R knee extension AROM since initial evaluation. Remains appropriate for skilled PT.  -CC        PT Plan    PT Plan Comments Work on gait mechiancs, may add RCB rocks.  -CC           User Key  (r) = Recorded By, (t) = Taken By, (c) = Cosigned By    Initials Name Provider Type    Karen Lindsay, PT Physical Therapist                   OP Exercises     Row Name 05/08/23 1300             Subjective Comments    Subjective Comments Took a pain pill about an hour ago  -CC         Subjective Pain    Able to rate subjective pain? yes  -CC      Pre-Treatment Pain Level 3  -CC      Subjective Pain Comment Arrives at  13:12 for 13:00 appt due to [employer] being late  -CC         Total Minutes    92108 - PT Therapeutic Exercise Minutes 30  -CC         Exercise 1    Exercise Name 1 Nustep UE/LE  -CC      Cueing 1 Verbal  -CC      Time 1 5 min, seat 8;  -CC         Exercise 2    Exercise Name 2 Stair HS str  -CC      Cueing 2 Verbal  -CC      Reps 2 3 R  -CC      Time 2 20 sec  -CC         Exercise 3    Exercise Name 3 LAQ  -CC      Cueing 3 Verbal  -CC      Sets 3 2  -CC      Reps 3 15  -CC      Time 3 2#  -CC         Exercise 4    Exercise Name 4 standing HR  -CC      Sets 4 2  -CC      Reps 4 10  -CC      Time 4 cued to keep weight evenly distributed R/L  -CC         Exercise 5    Exercise Name 5 R knee flex step  -CC      Cueing 5 Verbal  -CC      Reps 5 3  -CC      Time 5 20 sec  -CC         Exercise 6    Exercise Name 6 standing ham curls  -CC      Cueing 6 Verbal  -CC      Sets 6 2  -CC      Reps 6 10  -CC         Exercise 7    Exercise Name 7 STS  -CC      Cueing 7 Verbal  -CC      Sets 7 2  -CC      Reps 7 8  -CC      Time 7 from lowest setting of mat next to steps  -CC         Exercise 10    Exercise Name 10 SAQ  -CC      Cueing 10 Verbal;Tactile  -CC      Sets 10 2R  -CC      Reps 10 10  -CC      Time 10 2#  -CC            User Key  (r) = Recorded By, (t) = Taken By, (c) = Cosigned By    Initials Name Provider Type    CC Karen Bose, PT Physical Therapist                                                Time Calculation:   Start Time: 1315  Stop Time: 1345  Time Calculation (min): 30 min  Total Timed Code Minutes- PT: 30 minute(s)  Timed Charges  43268 - PT Therapeutic Exercise Minutes: 30  Total Minutes  Timed Charges Total Minutes: 30   Total Minutes: 30  Therapy Charges for Today     Code Description Service Date Service Provider Modifiers Qty    15182345572 HC PT THER PROC EA 15 MIN 5/8/2023 Karen Bose, PT GP 2                    Karen Bose PT  5/8/2023

## 2023-05-10 ENCOUNTER — HOSPITAL ENCOUNTER (OUTPATIENT)
Dept: PHYSICAL THERAPY | Facility: HOSPITAL | Age: 83
Setting detail: THERAPIES SERIES
Discharge: HOME | End: 2023-05-10
Payer: MEDICARE

## 2023-05-10 DIAGNOSIS — M62.81 MUSCLE WEAKNESS OF LOWER EXTREMITY: ICD-10-CM

## 2023-05-10 DIAGNOSIS — M25.661 DECREASED RANGE OF MOTION (ROM) OF RIGHT KNEE: ICD-10-CM

## 2023-05-10 DIAGNOSIS — Z96.651 STATUS POST RIGHT KNEE REPLACEMENT: Primary | ICD-10-CM

## 2023-05-10 DIAGNOSIS — R26.9 GAIT DIFFICULTY: ICD-10-CM

## 2023-05-10 PROCEDURE — 97110 THERAPEUTIC EXERCISES: CPT

## 2023-05-10 NOTE — THERAPY TREATMENT NOTE
Outpatient Physical Therapy Ortho Treatment Note  [facility]     Patient Name: Ashley Motta  : 1940  MRN: [id number]  Today's Date: 5/10/2023      Visit Date: 05/10/2023    Visit Dx:    ICD-10-CM ICD-9-CM   1. Status post right knee replacement  Z96.651 V43.65   2. Decreased range of motion (ROM) of right knee  M25.661 719.56   3. Muscle weakness of lower extremity  M62.81 728.87   4. 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 SARAH LIRIANO   • Eczema 2014   • Endothelial corneal dystrophy 2020   • Genital prolapse 10/2017   • GERD (gastroesophageal reflux disease)    • Goiter, nontoxic, multinodular 2017    THYROID POLYP SEES    • Hemorrhoids    • Herpes zoster 2018   • Hyperlipidemia    • Impaired fasting glucose    • Migraine    • BALDEMAR on CPAP     FOLLOWED BY DR. TERRY CHEUNG   • Osteoarthritis    • Osteopenia    • Postmenopausal atrophic vaginitis    • Rectal nodule 2020    REFERRED TO DR. PAMELA NAPOLES   • Rectocele 2017   • Restless leg syndrome    • Right knee pain    • RLS (restless legs syndrome)    • Seborrheic keratosis    • Skin cancer 2015    LOWER LEG, FOLLOWED BY DR. EDI SANCHEZ   • Vaginal vault prolapse 2017   • Vertigo         Past Surgical History:   Procedure Laterality Date   • CATARACT EXTRACTION,  BILATERAL Bilateral 2015   • COLONOSCOPY N/A 02/27/2015    1 TUBULAR ADENOMA POLYP [facility] DISTAL [facility]END[facility]   • COLONOSCOPY N/A 03/04/2020    10 MM SESSILE SERRATED ADENOMA POLYP IN ASCENDING, 3 MM HYPERPLASTIC POLYP IN RECTUM, 7 MM ANAL NODULE, DR. SARAH LIRIANO AT [facility]   • HYSTERECTOMY N/A 01/19/2004    KAYLEE WITH BSO, DR. RAFIA MORTENSEN AT [facility]   • KNEE ARTHROSCOPY Left 2013   • KNEE ARTHROSCOPY Right 2010   • MELISSA CULDOPLASTY N/A 01/19/2004    DR. RAFIA MORTENSEN AT [facility]   • SACROCOLPOPEXY N/A    • TOTAL KNEE ARTHROPLASTY Right 4/11/2023    Procedure: RIGHT TOTAL KNEE ARTHROPLASTY WITH SHERI NAVIGATION;  Surgeon: Atul Liriano MD;  [facility] [place] [facility]  Service: Orthopedics;  Laterality: Right;                        PT Assessment/Plan     Row Name 05/10/23 1038          PT Assessment    Assessment Comments Pt reporting pain rated 4/10 and compliant with current HEP. Pt able to tolerate progressed exercsies including step ups and RCB with good tolerance. Pt continues with slight antalgia and dec TKE with gait, however progressing well toward goals. Pt continues to be a good candidate for skilled PT.  -CN        PT Plan    PT Plan Comments RCB warm up, work on gait mechanics, resisted sidestepping, TKE.  -CN           User Key  (r) = Recorded By, (t) = Taken By, (c) = Cosigned By    Initials Name Provider Type    Darcie Martinez, PT Physical Therapist                   OP Exercises     Row Name 05/10/23 1000             Subjective Comments    Subjective Comments I am doing ok, doing my exercises at home.  -CN         Subjective Pain    Able to rate subjective pain? yes  -CN      Pre-Treatment Pain Level 4  -CN         Total Minutes    79085 - PT Therapeutic Exercise Minutes 40  -CN         Exercise 1    Exercise Name 1 Nustep UE/LE  -CN      Cueing 1 Verbal  -CN      Time 1 5 min, seat 8  -CN         Exercise 2    Exercise Name 2 Stair HS str  -CN      Cueing 2 Verbal   -CN      Reps 2 3 R  -CN      Time 2 20 sec  -CN         Exercise 3    Exercise Name 3 LAQ  -CN      Cueing 3 Verbal  -CN      Sets 3 2  -CN      Reps 3 15  -CN      Time 3 2#  -CN         Exercise 4    Exercise Name 4 standing HR  -CN      Sets 4 2  -CN      Reps 4 10  -CN      Time 4 cued to keep weight evenly distributed R/L  -CN         Exercise 5    Exercise Name 5 R knee flex step  -CN      Cueing 5 Verbal  -CN      Reps 5 5  -CN      Time 5 20 sec  -CN         Exercise 6    Exercise Name 6 standing ham curls  -CN      Cueing 6 Verbal  -CN      Sets 6 2  -CN      Reps 6 10  -CN      Time 6 2#  -CN         Exercise 7    Exercise Name 7 STS  -CN      Cueing 7 Verbal  -CN      Sets 7 2  -CN      Reps 7 10  -CN         Exercise 8    Exercise Name 8 Supine SLR with QS  -CN      Cueing 8 Verbal;Demo  -CN      Sets 8 2  -CN      Reps 8 10  -CN         Exercise 10    Exercise Name 10 SAQ  -CN      Cueing 10 Verbal;Tactile  -CN      Sets 10 2R  -CN      Reps 10 10  -CN      Time 10 2#  -CN         Exercise 11    Exercise Name 11 Step up onto blue foam  -CN      Cueing 11 Verbal;Demo  -CN      Sets 11 2  -CN      Reps 11 10  -CN         Exercise 13    Exercise Name 13 RCB, seat 6  -CN      Cueing 13 Verbal;Demo  -CN      Time 13 4 min  -CN      Additional Comments at end of session  -CN            User Key  (r) = Recorded By, (t) = Taken By, (c) = Cosigned By    Initials Name Provider Type    Darcie Martinez, PT Physical Therapist                              PT OP Goals     Row Name 05/10/23 1000          PT Short Term Goals    STG Date to Achieve 06/02/23  -CN     STG 1 Pt will be independent with initial HEP.  -CN     STG 1 Progress Progressing  -CN     STG 2 Pt will demonstrate gait with SPC x150'  with normalized heel strike to toe off.  -CN     STG 2 Progress Ongoing  -CN     STG 3 Pt will demonstrate increased knee AROM 5-110.  -CN     STG 3 Progress Ongoing  -CN        Long Term Goals    LTG Date to  Achieve 07/02/23  -CN     LTG 1 Pt will be independent with advanced HEP for core and LE strengthening to facilitate return to PLOF.  -CN     LTG 1 Progress Ongoing  -CN     LTG 2 Pt will demonstrate normalized gait with least resistrictive or no assistive device.  -CN     LTG 2 Progress Ongoing  -CN     LTG 3 Pt will improve KOS score by 30% or greater indicating decrease in perceived functional disability.  -CN     LTG 3 Progress Ongoing  -CN     LTG 4 Pt will demonstrate increased strength to 4/5 or better throughout R LE.  -CN     LTG 4 Progress Ongoing  -CN           User Key  (r) = Recorded By, (t) = Taken By, (c) = Cosigned By    Initials Name Provider Type    Darcie Martinez, PT Physical Therapist                Therapy Education  Given: HEP, Mobility training  Program: Reinforced  How Provided: Verbal, Demonstration  Provided to: Patient  Level of Understanding: Verbalized, Demonstrated              Time Calculation:   Start Time: 1002  Stop Time: 1042  Time Calculation (min): 40 min  Timed Charges  35297 - PT Therapeutic Exercise Minutes: 40  Total Minutes  Timed Charges Total Minutes: 40   Total Minutes: 40  Therapy Charges for Today     Code Description Service Date Service Provider Modifiers Qty    96610372782 HC PT THER PROC EA 15 MIN 5/10/2023 Darcie Westbrook, PT GP 3                    Darcie Westbrook PT  5/10/2023

## 2023-05-15 ENCOUNTER — HOSPITAL ENCOUNTER (OUTPATIENT)
Dept: PHYSICAL THERAPY | Facility: HOSPITAL | Age: 83
Setting detail: THERAPIES SERIES
Discharge: HOME | End: 2023-05-15
Payer: MEDICARE

## 2023-05-15 DIAGNOSIS — R26.9 GAIT DIFFICULTY: ICD-10-CM

## 2023-05-15 DIAGNOSIS — M62.81 MUSCLE WEAKNESS OF LOWER EXTREMITY: ICD-10-CM

## 2023-05-15 DIAGNOSIS — M25.661 DECREASED RANGE OF MOTION (ROM) OF RIGHT KNEE: ICD-10-CM

## 2023-05-15 DIAGNOSIS — Z96.651 STATUS POST RIGHT KNEE REPLACEMENT: Primary | ICD-10-CM

## 2023-05-15 PROCEDURE — 97110 THERAPEUTIC EXERCISES: CPT

## 2023-05-15 NOTE — THERAPY TREATMENT NOTE
Outpatient Physical Therapy Ortho Treatment Note  [facility]     Patient Name: Ashley Motta  : 1940  MRN: [id number]  Today's Date: 5/15/2023      Visit Date: 05/15/2023    Visit Dx:    ICD-10-CM ICD-9-CM   1. Status post right knee replacement  Z96.651 V43.65   2. Decreased range of motion (ROM) of right knee  M25.661 719.56   3. Muscle weakness of lower extremity  M62.81 728.87   4. 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 SARAH LIRIANO   • Eczema 2014   • Endothelial corneal dystrophy 2020   • Genital prolapse 10/2017   • GERD (gastroesophageal reflux disease)    • Goiter, nontoxic, multinodular 2017    THYROID POLYP SEES    • Hemorrhoids    • Herpes zoster 2018   • Hyperlipidemia    • Impaired fasting glucose    • Migraine    • BALDEMAR on CPAP     FOLLOWED BY DR. TERRY CHEUNG   • Osteoarthritis    • Osteopenia    • Postmenopausal atrophic vaginitis    • Rectal nodule 2020    REFERRED TO DR. PAMELA NAPOLES   • Rectocele 2017   • Restless leg syndrome    • Right knee pain    • RLS (restless legs syndrome)    • Seborrheic keratosis    • Skin cancer 2015    LOWER LEG, FOLLOWED BY DR. EDI SANCHEZ   • Vaginal vault prolapse 2017   • Vertigo         Past Surgical History:   Procedure Laterality Date   • CATARACT EXTRACTION,  BILATERAL Bilateral 2015   • COLONOSCOPY N/A 02/27/2015    1 TUBULAR [facility]EN[facility]   • COLONOSCOPY N/A 03/04/2020    10 MM SESSILE SERRATED ADENOMA POLYP IN ASCENDING, 3 MM HYPERPLASTIC POLYP IN RECTUM, 7 MM ANAL NODULE, DR. SARAH LIRIANO AT [facility]   • HYSTERECTOMY N/A 01/19/2004    KAYLEE WITH BSO, DR. RAFIA MORTENSEN AT [facility]   • KNEE ARTHROSCOPY Left 2013   • KNEE ARTHROSCOPY Right 2010   • MELISSA CULDOPLASTY N/A 01/19/2004    DR. RAFIA OMRTENSEN AT [facility]   • SACROCOLPOPEXY N/A    • TOTAL KNEE ARTHROPLASTY Right 4/11/2023    Procedure: RIGHT TOTAL KNEE ARTHROPLASTY WITH SHERI NAVIGATION;  Surgeon: Atul Liriano MD;  [facility]: [facility] OR [facility] [place];  Service: Orthopedics;  Laterality: Right;                        PT Assessment/Plan     Row Name 05/15/23 1400          PT Assessment    Assessment Comments Pt. presents to clinic ambulating with rwx, no new complaints. She is compliant with HEP and gait mechanics improving. Increased resistance on LAQ and standing HS curls with good tolerance. Worked on gait mechanics including heel strike and weight shift, introduced SPC though pt. unsteady and difficulty with sequencing and advised pt. to conitnue with rwx until confidence and balance improve with SPC.  -        PT Plan    PT Plan Comments gait, side steps, TKE  -           User Key  (r) = Recorded By, (t) = Taken By, (c) = Cosigned By    Initials Name Provider Type     Bebe Carpenter, PT Physical Therapist                   OP Exercises     Row Name 05/15/23 1400             Subjective Comments    Subjective Comments I am doing well, no complaints  -         Total Minutes    23788 - Gait Training Minutes  4  -      35658 - PT Therapeutic Exercise Minutes 38  -MH         Exercise 1    Exercise Name 1 Nustep UE/LE  -      Cueing 1 Verbal  -      Time 1 5 min, seat 8  -         Exercise 2    Exercise Name 2 Stair HS str  -      Cueing 2 Verbal  -      Reps  2 5R  -MH      Time 2 20 sec  -MH         Exercise 3    Exercise Name 3 LAQ  -MH      Cueing 3 Verbal  -MH      Sets 3 2  -MH      Reps 3 15  -MH      Time 3 3#  -MH         Exercise 5    Exercise Name 5 R knee flex step  -MH      Cueing 5 Verbal  -MH      Reps 5 5R  -MH      Time 5 20 sec  -MH         Exercise 6    Exercise Name 6 standing ham curls  -MH      Cueing 6 Verbal  -MH      Sets 6 2  -MH      Reps 6 10  -MH      Time 6 3#  -MH         Exercise 7    Exercise Name 7 STS  -MH      Cueing 7 Verbal  -MH      Sets 7 2  -MH      Reps 7 10  -MH      Time 7 low blue mat  -MH         Exercise 8    Exercise Name 8 Supine SLR with QS  -MH      Cueing 8 Verbal;Demo  -MH      Sets 8 2  -MH      Reps 8 10ea  -MH         Exercise 12    Exercise Name 12 nicki nicki  -MH      Cueing 12 Verbal;Demo  -MH      Time 12 2 min  -MH         Exercise 13    Exercise Name 13 RCB, seat 6  -MH      Cueing 13 Verbal;Demo  -MH      Time 13 4 min  -MH      Additional Comments at end  -MH         Exercise 14    Exercise Name 14 gait with SPC  -MH      Cueing 14 Verbal;Demo  -MH      Time 14 2 min  -MH            User Key  (r) = Recorded By, (t) = Taken By, (c) = Cosigned By    Initials Name Provider Type    Bebe Pedersen, PT Physical Therapist                              PT OP Goals     Row Name 05/15/23 1400          PT Short Term Goals    STG Date to Achieve 06/02/23  -MH     STG 1 Pt will be independent with initial HEP.  -MH     STG 1 Progress Progressing  -MH     STG 2 Pt will demonstrate gait with SPC x150'  with normalized heel strike to toe off.  -MH     STG 2 Progress Ongoing  -MH     STG 3 Pt will demonstrate increased knee AROM 5-110.  -MH     STG 3 Progress Ongoing  -MH        Long Term Goals    LTG Date to Achieve 07/02/23  -MH     LTG 1 Pt will be independent with advanced HEP for core and LE strengthening to facilitate return to PLOF.  -     LTG 1 Progress Ongoing  -MH     LTG 2 Pt will demonstrate normalized gait with  least resistrictive or no assistive device.  -     LTG 2 Progress Ongoing  -     LTG 3 Pt will improve KOS score by 30% or greater indicating decrease in perceived functional disability.  -     LTG 3 Progress Ongoing  -     LTG 4 Pt will demonstrate increased strength to 4/5 or better throughout R LE.  -     LTG 4 Progress Ongoing  -           User Key  (r) = Recorded By, (t) = Taken By, (c) = Cosigned By    Initials Name Provider Type     Bebe Carpenter, PT Physical Therapist                Therapy Education  Given: Symptoms/condition management, Posture/body mechanics  Program: Reinforced  How Provided: Verbal, Demonstration  Provided to: Patient  Level of Understanding: Verbalized, Demonstrated              Time Calculation:   Start Time: 1445  Stop Time: 1528  Time Calculation (min): 43 min  Total Timed Code Minutes- PT: 42 minute(s)  Timed Charges  93225 - PT Therapeutic Exercise Minutes: 38  85558 - Gait Training Minutes : 4  Total Minutes  Timed Charges Total Minutes: 42   Total Minutes: 42  Therapy Charges for Today     Code Description Service Date Service Provider Modifiers Qty    17617322234  PT THER PROC EA 15 MIN 5/15/2023 Bebe Carpenter, PT GP 3                    Bebe Carpenter PT  5/15/2023

## 2023-05-17 ENCOUNTER — HOSPITAL ENCOUNTER (OUTPATIENT)
Dept: PHYSICAL THERAPY | Facility: HOSPITAL | Age: 83
Setting detail: THERAPIES SERIES
Discharge: HOME | End: 2023-05-17
Payer: MEDICARE

## 2023-05-17 DIAGNOSIS — R26.9 GAIT DIFFICULTY: ICD-10-CM

## 2023-05-17 DIAGNOSIS — M25.661 DECREASED RANGE OF MOTION (ROM) OF RIGHT KNEE: ICD-10-CM

## 2023-05-17 DIAGNOSIS — M62.81 MUSCLE WEAKNESS OF LOWER EXTREMITY: ICD-10-CM

## 2023-05-17 DIAGNOSIS — Z96.651 STATUS POST RIGHT KNEE REPLACEMENT: Primary | ICD-10-CM

## 2023-05-17 PROCEDURE — 97116 GAIT TRAINING THERAPY: CPT | Performed by: PHYSICAL THERAPIST

## 2023-05-17 PROCEDURE — 97110 THERAPEUTIC EXERCISES: CPT | Performed by: PHYSICAL THERAPIST

## 2023-05-17 NOTE — THERAPY TREATMENT NOTE
Outpatient Physical Therapy Ortho Treatment Note  [facility]     Patient Name: Ashley Motta  : 1940  MRN: [id number]  Today's Date: 2023      Visit Date: 2023    Visit Dx:    ICD-10-CM ICD-9-CM   1. Status post right knee replacement  Z96.651 V43.65   2. Decreased range of motion (ROM) of right knee  M25.661 719.56   3. Muscle weakness of lower extremity  M62.81 728.87   4. 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 SARAH LIRIANO   • Eczema 2014   • Endothelial corneal dystrophy 2020   • Genital prolapse 10/2017   • GERD (gastroesophageal reflux disease)    • Goiter, nontoxic, multinodular 2017    THYROID POLYP SEES    • Hemorrhoids    • Herpes zoster 2018   • Hyperlipidemia    • Impaired fasting glucose    • Migraine    • BALDEMAR on CPAP     FOLLOWED BY DR. TERRY CHEUNG   • Osteoarthritis    • Osteopenia    • Postmenopausal atrophic vaginitis    • Rectal nodule 2020    REFERRED TO DR. PAMELA NAPOLES   • Rectocele 2017   • Restless leg syndrome    • Right knee pain    • RLS (restless legs syndrome)    • Seborrheic keratosis    • Skin cancer 2015    LOWER LEG, FOLLOWED BY DR. EDI SANCHEZ   • Vaginal vault prolapse 2017   • Vertigo         Past Surgical History:   Procedure Laterality Date   • CATARACT EXTRACTION,  BILATERAL Bilateral 2015   • COLONOSCOPY N/A 02/27/2015    1 TUBULAR ADENOMA [facility] DISTAL [facility]   • COLONOSCOPY N/A 03/04/2020    10 MM SESSILE SERRATED ADENOMA POLYP IN ASCENDING, 3 MM HYPERPLASTIC POLYP IN RECTUM, 7 MM ANAL NODULE, DR. SAARH LIRIANO AT [facility]   • HYSTERECTOMY N/A 01/19/2004    KAYLEE WITH BSO, DR. RAFIA MORTENSEN AT [facility]   • KNEE ARTHROSCOPY Left 2013   • KNEE ARTHROSCOPY Right 2010   • MELISSA CULDOPLASTY N/A 01/19/2004    DR. RAFIA MORTENSEN AT [facility]   • SACROCOLPOPEXY N/A    • TOTAL KNEE ARTHROPLASTY Right 4/11/2023    Procedure: RIGHT TOTAL KNEE ARTHROPLASTY WITH SHERI NAVIGATION;  Surgeon: Atul Liriano MD;  Location: [facility] OR [facility] [place];  Service: Orthopedics;  Laterality: Right;        PT Ortho     Row Name 05/17/23 1000       Right Lower Ext    Rt Knee Extension/Flexion AROM R 0-4-110  -JS    RT Lower Extremity Comments supine  -JS    Row Name 05/17/23 0909       Subjective Comments    Subjective Comments Getting better.  -JS       Precautions and Contraindications    Precautions/Limitations fall precautions  -JS       Subjective Pain    Able to rate subjective pain? yes  -JS    Pre-Treatment Pain Level 3  -JS    Subjective Pain Comment Only taking Tylenol now, no other pain medication per pt.  -JS          User Key  (r) = Recorded By, (t) = Taken By, (c) = Cosigned By    Initials Name Provider Type    Lindsay Souza, PT Physical Therapist                             PT Assessment/Plan     Row Name 05/17/23 0955          PT Assessment    Assessment Comments Pt is 5 weeks, 1 day s/p R TKA. Presents ambulating with rolling walker noting improving subjective report of pain.  Further gait training provided with single tip cane, as pt demonstrates widened BRENDA, decreased R weight shifting & stance phase & shortned L step length. Pt responding with improved technique & symmetry of step length following initial instruction/cues.  Continued close supervision for balance  with cane, therefore continues to be appropriate for use of rolling walker for ambulation with further gait training warranted prior to fully transitioning to cane.  Progression of exercises to include further knee & hip strengthening without exacerbation of symptoms. Progressing with goals, with 2/3 STGs now met.  -JS        PT Plan    PT Plan Comments Continue PT for further gait training, LE/hip strengthening, knee ROM.  -JS           User Key  (r) = Recorded By, (t) = Taken By, (c) = Cosigned By    Initials Name Provider Type    Lindsay Souza PT Physical Therapist                   OP Exercises     Row Name 05/17/23 0920             Subjective Comments    Subjective Comments Getting better.  -JS         Subjective Pain    Able to rate subjective pain? yes  -JS      Pre-Treatment Pain Level 3  -JS      Subjective Pain Comment Only taking Tylenol now, no other pain medication per pt.  -JS         Total Minutes    08675 - Gait Training Minutes  10  -JS      86672 - PT Therapeutic Exercise Minutes 40  -JS         Exercise 1    Exercise Name 1 Nustep UE/LE  -JS      Cueing 1 Verbal  -JS      Time 1 5 min, seat 8  -JS         Exercise 2    Exercise Name 2 Stair HS str  -JS      Cueing 2 Verbal  -JS      Reps 2 5R  -JS      Time 2 20 sec  -JS         Exercise 3    Exercise Name 3 LAQ  -JS      Cueing 3 Verbal  -JS      Sets 3 2  -JS      Reps 3 15  -JS      Time 3 3#  -JS         Exercise 5    Exercise Name 5 R knee flex step  -JS      Cueing 5 Verbal  -JS      Reps 5 5R  -JS      Time 5 20 sec  -JS         Exercise 6    Exercise Name 6 standing ham curls  -JS      Cueing 6 Verbal  -JS      Sets 6 2  -JS      Reps 6 10  -JS      Time 6 3#, B  -JS         Exercise 7    Exercise Name 7 STS  -JS      Cueing 7 Verbal  -JS      Sets 7 2  -JS      Reps 7 10  -JS      Time 7 low mat, without use of UEs  -JS         Exercise 8    Exercise Name 8 Supine SLR with QS  -JS      Cueing 8 Verbal;Demo  -JS      Sets 8 2  -JS       "Reps 8 10ea  -JS         Exercise 9    Exercise Name 9 Amb fwd/bwd in // bars  -JS      Cueing 9 Verbal;Demo  -JS      Reps 9 3 laps  -JS      Time 9 cues for upright posture, symmetrical weight shifting  -JS         Exercise 10    Exercise Name 10 Step tap to 4\" step0  -JS      Cueing 10 Verbal;Demo  -JS      Reps 10 10 alternating  -JS         Exercise 11    Exercise Name 11 Sidestepping  -JS      Cueing 11 Verbal;Demo  -JS      Reps 11 3 laps  -JS      Time 11 no resistance  -JS         Exercise 12    Exercise Name 12 nicki nicki  -JS      Cueing 12 Verbal;Demo  -JS      Time 12 2 min  -JS         Exercise 13    Exercise Name 13 RCB, seat 6  -JS      Cueing 13 Verbal;Demo  -JS      Time 13 4 min  -JS      Additional Comments at end  -JS         Exercise 14    Exercise Name 14 gait with SPC  -JS      Cueing 14 Verbal;Demo  -JS      Time 14 2 min x2  -JS      Additional Comments cues for sequencing, close supervision for balance  -JS         Exercise 15    Exercise Name 15 Sidelying clam  -JS      Cueing 15 Verbal;Demo  -JS      Sets 15 2  -JS      Reps 15 10  -JS         Exercise 16    Exercise Name 16 Standing TKE  -JS      Cueing 16 Verbal;Demo  -JS      Reps 16 10  -JS      Time 16 RTB  -JS            User Key  (r) = Recorded By, (t) = Taken By, (c) = Cosigned By    Initials Name Provider Type    Lindsay Souza, PT Physical Therapist                              PT OP Goals     Row Name 05/17/23 0955          PT Short Term Goals    STG Date to Achieve 06/02/23  -JS     STG 1 Pt will be independent with initial HEP.  -JS     STG 1 Progress Met  -JS     STG 2 Pt will demonstrate gait with SPC x150'  with normalized heel strike to toe off.  -JS     STG 2 Progress Ongoing;Progressing  -JS     STG 3 Pt will demonstrate increased knee AROM 5-110.  -JS     STG 3 Progress Met  -JS        Long Term Goals    LTG Date to Achieve 07/02/23  -JS     LTG 1 Pt will be independent with advanced HEP for core and LE strengthening to " facilitate return to PLOF.  -JS     LTG 1 Progress Ongoing  -JS     LTG 2 Pt will demonstrate normalized gait with least resistrictive or no assistive device.  -JS     LTG 2 Progress Ongoing  -JS     LTG 3 Pt will improve KOS score by 30% or greater indicating decrease in perceived functional disability.  -JS     LTG 3 Progress Ongoing  -JS     LTG 4 Pt will demonstrate increased strength to 4/5 or better throughout R LE.  -JS     LTG 4 Progress Ongoing  -JS           User Key  (r) = Recorded By, (t) = Taken By, (c) = Cosigned By    Initials Name Provider Type    Lindsay Souza, PT Physical Therapist                               Time Calculation:   Start Time: 0955  Stop Time: 1045  Time Calculation (min): 50 min  Timed Charges  30994 - PT Therapeutic Exercise Minutes: 40  98907 - Gait Training Minutes : 10  Total Minutes  Timed Charges Total Minutes: 50   Total Minutes: 50  Therapy Charges for Today     Code Description Service Date Service Provider Modifiers Qty    81202489467 HC PT THER PROC EA 15 MIN 5/17/2023 Lindsay Del Angel, PT GP 2    88760873606 HC GAIT TRAINING EA 15 MIN 5/17/2023 Lindsay Del Angel, PT GP 1                    Lindsay Del Angel PT  5/17/2023

## 2023-05-19 ENCOUNTER — TELEPHONE (OUTPATIENT)
Dept: ORTHOPEDIC SURGERY | Facility: CLINIC | Age: 83
End: 2023-05-19

## 2023-05-19 NOTE — TELEPHONE ENCOUNTER
Relationship to patient: SELF    Best call back number: [phone]    Chief complaint: LEFT KNEE PAIN     Type of visit: CORTISONE INJECTION IN RIGHT KNEE     Requested date: 05/26/23        Additional notes: PT. HAS POST OP APPT. FOR HER RIGHT KNEE ON 05/26/23.   SHE IS ASKING IF SHE CAN ALSO GET INJECTION IN HER LEFT KNEE WHILE SHE IS THERE.   PLEASE CALL TO ADVISE.

## 2023-05-22 ENCOUNTER — HOSPITAL ENCOUNTER (OUTPATIENT)
Dept: PHYSICAL THERAPY | Facility: HOSPITAL | Age: 83
Setting detail: THERAPIES SERIES
Discharge: HOME | End: 2023-05-22
Payer: MEDICARE

## 2023-05-22 DIAGNOSIS — R26.9 GAIT DIFFICULTY: ICD-10-CM

## 2023-05-22 DIAGNOSIS — M25.562 CHRONIC PAIN OF BOTH KNEES: ICD-10-CM

## 2023-05-22 DIAGNOSIS — G89.29 CHRONIC PAIN OF BOTH KNEES: ICD-10-CM

## 2023-05-22 DIAGNOSIS — M25.661 DECREASED RANGE OF MOTION (ROM) OF RIGHT KNEE: ICD-10-CM

## 2023-05-22 DIAGNOSIS — Z96.651 STATUS POST RIGHT KNEE REPLACEMENT: Primary | ICD-10-CM

## 2023-05-22 DIAGNOSIS — R26.2 DIFFICULTY WALKING: ICD-10-CM

## 2023-05-22 DIAGNOSIS — M62.81 MUSCLE WEAKNESS OF LOWER EXTREMITY: ICD-10-CM

## 2023-05-22 DIAGNOSIS — M25.561 CHRONIC PAIN OF BOTH KNEES: ICD-10-CM

## 2023-05-22 PROCEDURE — 97110 THERAPEUTIC EXERCISES: CPT

## 2023-05-22 PROCEDURE — 97116 GAIT TRAINING THERAPY: CPT

## 2023-05-22 NOTE — TELEPHONE ENCOUNTER
Dr. Rico likes for patients to wait until about 3 months out from surgery. Even though the injection is one the opposite knee. She can discuss further with him at her appointment. Thank you!

## 2023-05-22 NOTE — TELEPHONE ENCOUNTER
Called patient and left patient detailed message to call the office if she had farther questions. OK  per [facility] to discuss at the [facility]

## 2023-05-22 NOTE — THERAPY TREATMENT NOTE
Outpatient Physical Therapy Ortho Treatment Note  [facility]     Patient Name: Ashley Motta  : 1940  MRN: [id number]  Today's Date: 2023      Visit Date: 2023    Visit Dx:    ICD-10-CM ICD-9-CM   1. Status post right knee replacement  Z96.651 V43.65   2. Decreased range of motion (ROM) of right knee  M25.661 719.56   3. Muscle weakness of lower extremity  M62.81 728.87   4. Gait difficulty  R26.9 781.2   5. Chronic pain of both knees  M25.561 719.46    M25.562 338.29    G89.29    6. Difficulty walking  R26.2 719.7       Patient Active Problem List   Diagnosis   • Osteopenia   • Hyperlipidemia   • Pre-diabetes   • Osteoarthritis   • BALDEMAR on CPAP   • Pelvic relaxation due to vaginal prolapse   • Chronic pain of both knees   • Arthritis of right knee   • Arthritis of left knee   • Arthritis of both knees   • Restless leg syndrome   • Dizziness   • Headache, migraine   • Heartburn   • Internal hemorrhoids with complication   • Impacted cerumen of left ear   • Vitamin E deficiency   • Environmental and seasonal allergies   • Chronic cough        Past Medical History:   Diagnosis Date   • Anxiety    • 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 SARAH LIRIANO   • Eczema 2014   • Endothelial corneal dystrophy 2020   • Genital prolapse 10/2017   • GERD (gastroesophageal reflux disease)    • Goiter, nontoxic, multinodular 2017    THYROID POLYP SEES    • Hemorrhoids    • Herpes zoster 2018   • Hyperlipidemia    • Impaired fasting glucose    • Migraine    • BALDEMAR on CPAP     FOLLOWED BY DR. TERRY CHEUNG   • Osteoarthritis    • Osteopenia    • Postmenopausal atrophic vaginitis    • Rectal nodule 2020    REFERRED TO DR. PAMELA NAPOLES   • Rectocele 2017   • Restless leg syndrome    • Right knee pain    • RLS (restless legs syndrome)    • Seborrheic keratosis    • Skin cancer 2015    LOWER LEG, FOLLOWED BY DR. 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   • COLONOSCOPY N/A 03/04/2020    10 MM SESSILE SERRATED ADENOMA POLYP IN ASCENDING, 3 MM HYPERPLASTIC POLYP IN RECTUM, 7 MM ANAL NODULE, DR. SARAH LIRIANO AT [facility]   • HYSTERECTOMY N/A 01/19/2004    KAYLEE WITH BSO, DR. RAFIA MORTENSEN AT [facility]   • KNEE ARTHROSCOPY Left 2013   • KNEE ARTHROSCOPY Right 2010   • MELISSA CULDOPLASTY N/A 01/19/2004    DR. RAFIA MORTENSEN AT [facility]   • SACROCOLPOPEXY N/A    • TOTAL KNEE ARTHROPLASTY Right 4/11/2023    Procedure: RIGHT TOTAL KNEE ARTHROPLASTY WITH SHERI NAVIGATION;  Surgeon: Atul Liriano MD;  [place] [facility] [place] [facility] [place];  Service: Orthopedics;  Laterality: Right;                        PT Assessment/Plan     Row Name 05/22/23 0900          PT Assessment    Assessment Comments Ashley arrives to clinic ambulating with rwx, contineued to work on gait training with SPC with much improved stability, continues with (+) trendelenburg adn wide BRENDA and cueing for appropriate distancing from SPC to improve step-through gait pattern vs. tendency to ambulate with step-to pattern with decreased stance time on R. Encouraged pt. to continue working with SPC in home and over level ground. Progressed balance and strength challenges with added 3/4 tandem stance, step ups and added resistance to side steps with cueing for neutral LE alignment.  -        PT Plan    PT Plan Comments start on rec bike; gait training without AD?  -           User Key  (r) = Recorded By, (t) = Taken By, (c) = Cosigned By    Initials Name Provider Type     Bebe Carpenter, PT Physical Therapist                   OP Exercises     Row Name 05/22/23 0900             Subjective Comments    Subjective Comments its doing okay  -         Total Minutes    65259 - Gait Training  "Minutes  11  -MH      26341 - PT Therapeutic Exercise Minutes 28  -MH         Exercise 1    Exercise Name 1 Nustep UE/LE  -MH      Cueing 1 Verbal  -MH      Time 1 5 min, seat 8  -MH         Exercise 2    Exercise Name 2 Stair HS str  -MH      Cueing 2 Verbal  -MH      Reps 2 5R  -MH      Time 2 20 sec  -MH         Exercise 5    Exercise Name 5 R knee flex step  -MH      Cueing 5 Verbal  -MH      Reps 5 5R  -MH      Time 5 20 sec  -MH         Exercise 9    Exercise Name 9 Amb fwd/bwd in // bars  -MH      Cueing 9 Verbal;Demo  -MH      Reps 9 4 laps  -MH      Time 9 L UE light support only  -MH         Exercise 10    Exercise Name 10 Step tap to 4\" step  -MH      Cueing 10 Verbal;Demo  -MH      Reps 10 15ea  -MH         Exercise 11    Exercise Name 11 Sidestepping  -MH      Cueing 11 Verbal;Demo  -MH      Reps 11 3 laps  -MH      Time 11 RTB  -MH         Exercise 12    Exercise Name 12 nicki nicki  -MH      Cueing 12 Verbal;Demo  -MH      Time 12 2 min  -MH         Exercise 13    Exercise Name 13 RCB, seat 6  -MH      Cueing 13 Verbal;Demo  -MH      Time 13 5 min  -MH      Additional Comments at end  -MH         Exercise 14    Exercise Name 14 gait with SPC  -MH      Cueing 14 Verbal;Demo  -MH      Time 14 2 min  -MH      Additional Comments cueing for sequencing  -MH         Exercise 15    Exercise Name 15 retro step + ipsilateral reach  -MH      Cueing 15 Verbal;Demo  -MH      Reps 15 15R  -MH         Exercise 17    Exercise Name 17 step up 4\"  -MH      Cueing 17 Verbal;Demo  -MH      Reps 17 10ea  -MH         Exercise 18    Exercise Name 18 heel raises  -MH      Cueing 18 Verbal;Demo  -MH      Reps 18 20  -MH         Exercise 19    Exercise Name 19 3/4 tandem stance  -MH      Cueing 19 Verbal;Demo  -MH      Reps 19 3e  -MH      Time 19 30sec  -MH            User Key  (r) = Recorded By, (t) = Taken By, (c) = Cosigned By    Initials Name Provider Type    Bebe Pedersen, PT Physical Therapist                       "        PT OP Goals     Row Name 05/22/23 0900          PT Short Term Goals    STG Date to Achieve 06/02/23  -     STG 1 Pt will be independent with initial HEP.  -     STG 1 Progress Met  -     STG 2 Pt will demonstrate gait with SPC x150'  with normalized heel strike to toe off.  -     STG 2 Progress Ongoing;Progressing  -     STG 3 Pt will demonstrate increased knee AROM 5-110.  -     STG 3 Progress Met  -        Long Term Goals    LTG Date to Achieve 07/02/23  -     LTG 1 Pt will be independent with advanced HEP for core and LE strengthening to facilitate return to PLOF.  -     LTG 1 Progress Ongoing  -     LTG 2 Pt will demonstrate normalized gait with least resistrictive or no assistive device.  -     LTG 2 Progress Ongoing  -     LTG 3 Pt will improve KOS score by 30% or greater indicating decrease in perceived functional disability.  -     LTG 3 Progress Ongoing  -     LTG 4 Pt will demonstrate increased strength to 4/5 or better throughout R LE.  -     LTG 4 Progress Ongoing  -           User Key  (r) = Recorded By, (t) = Taken By, (c) = Cosigned By    Initials Name Provider Type     Bebe Carpenter PT Physical Therapist                Therapy Education  Education Details: gait training; SPC in safe environment  Given: Symptoms/condition management, Posture/body mechanics  Program: Reinforced  How Provided: Verbal, Demonstration  Provided to: Patient  Level of Understanding: Verbalized              Time Calculation:   Start Time: 0959  Stop Time: 1038  Time Calculation (min): 39 min  Total Timed Code Minutes- PT: 39 minute(s)  Timed Charges  01428 - PT Therapeutic Exercise Minutes: 28  95537 - Gait Training Minutes : 11  Total Minutes  Timed Charges Total Minutes: 39   Total Minutes: 39  Therapy Charges for Today     Code Description Service Date Service Provider Modifiers Qty    49320923884 HC GAIT TRAINING EA 15 MIN 5/22/2023 Bebe Carpenter, PT GP 1    01179970830 HC PT  THER PROC EA 15 MIN 5/22/2023 Bebe Carpenter, PT GP 2                    Bebe Carpenter, PT  5/22/2023

## 2023-05-25 PROBLEM — G25.81 RESTLESS LEGS SYNDROME: Status: ACTIVE | Noted: 2023-04-13

## 2023-05-25 PROBLEM — J30.9 ALLERGIC RHINITIS, UNSPECIFIED: Status: ACTIVE | Noted: 2023-04-13

## 2023-05-25 PROBLEM — E78.5 HYPERLIPIDEMIA, UNSPECIFIED: Status: ACTIVE | Noted: 2023-04-13

## 2023-05-25 PROBLEM — M85.9 DISORDER OF BONE DENSITY AND STRUCTURE, UNSPECIFIED: Status: ACTIVE | Noted: 2023-04-13

## 2023-05-25 PROBLEM — Z47.1 AFTERCARE FOLLOWING JOINT REPLACEMENT SURGERY: Status: ACTIVE | Noted: 2023-04-13

## 2023-05-25 PROBLEM — F41.9 ANXIETY DISORDER, UNSPECIFIED: Status: ACTIVE | Noted: 2023-04-13

## 2023-05-25 PROBLEM — M62.81 GENERALIZED MUSCLE WEAKNESS: Status: ACTIVE | Noted: 2023-04-13

## 2023-05-25 PROBLEM — D50.0 IRON DEFICIENCY ANEMIA DUE TO CHRONIC BLOOD LOSS: Status: ACTIVE | Noted: 2023-04-13

## 2023-05-25 PROBLEM — Z85.828 PERSONAL HISTORY OF OTHER MALIGNANT NEOPLASM OF SKIN: Status: ACTIVE | Noted: 2023-04-13

## 2023-05-25 PROBLEM — G47.33 OBSTRUCTIVE SLEEP APNEA: Status: ACTIVE | Noted: 2023-04-13

## 2023-05-25 PROBLEM — L20.9 ATOPIC DERMATITIS, UNSPECIFIED: Status: ACTIVE | Noted: 2023-04-13

## 2023-05-25 PROBLEM — K21.9 GASTRO-ESOPHAGEAL REFLUX DISEASE WITHOUT ESOPHAGITIS: Status: ACTIVE | Noted: 2023-04-13

## 2023-05-25 PROBLEM — G43.909 MIGRAINE, UNSPECIFIED, NOT INTRACTABLE, WITHOUT STATUS MIGRAINOSUS: Status: ACTIVE | Noted: 2023-04-13

## 2023-05-25 PROBLEM — K59.00 CONSTIPATION, UNSPECIFIED: Status: ACTIVE | Noted: 2023-04-13

## 2023-05-25 PROBLEM — Z96.651 PRESENCE OF RIGHT ARTIFICIAL KNEE JOINT: Status: ACTIVE | Noted: 2023-04-13

## 2023-05-26 ENCOUNTER — OFFICE VISIT (OUTPATIENT)
Dept: ORTHOPEDIC SURGERY | Facility: CLINIC | Age: 83
End: 2023-05-26
Payer: MEDICARE

## 2023-05-26 VITALS — HEIGHT: 63 IN | BODY MASS INDEX: 29.54 KG/M2 | WEIGHT: 166.7 LBS | TEMPERATURE: 98 F

## 2023-05-26 DIAGNOSIS — Z96.651 STATUS POST TOTAL RIGHT KNEE REPLACEMENT: ICD-10-CM

## 2023-05-26 DIAGNOSIS — R52 PAIN: Primary | ICD-10-CM

## 2023-05-26 NOTE — PROGRESS NOTES
"Patient is about 6 weeks out a right total knee arthroplasty and she is doing \"great\".  Her scar almost looks mature at this point and she is healing well in that regard she goes from about 0-1 15 no unusual pain or swelling.  X-ray AP lateral 40 degree PA x-ray right knee taken the office today postop follow-up with comparison view shows a well aligned total knee.  Postop total knee recommendations and advice given including antibiotic prophylaxis.  Plan is to see her back here in 2 months or sooner if she needs to see us with x-rays  "

## 2023-05-31 ENCOUNTER — HOSPITAL ENCOUNTER (OUTPATIENT)
Dept: PHYSICAL THERAPY | Facility: HOSPITAL | Age: 83
Setting detail: THERAPIES SERIES
Discharge: HOME | End: 2023-05-31

## 2023-05-31 DIAGNOSIS — M62.81 MUSCLE WEAKNESS OF LOWER EXTREMITY: ICD-10-CM

## 2023-05-31 DIAGNOSIS — M25.661 DECREASED RANGE OF MOTION (ROM) OF RIGHT KNEE: ICD-10-CM

## 2023-05-31 DIAGNOSIS — R26.9 GAIT DIFFICULTY: ICD-10-CM

## 2023-05-31 DIAGNOSIS — Z96.651 STATUS POST RIGHT KNEE REPLACEMENT: Primary | ICD-10-CM

## 2023-05-31 PROCEDURE — 97116 GAIT TRAINING THERAPY: CPT

## 2023-05-31 PROCEDURE — 97110 THERAPEUTIC EXERCISES: CPT

## 2023-05-31 NOTE — THERAPY TREATMENT NOTE
Outpatient Physical Therapy Ortho Treatment Note  [facility]     Patient Name: Ashley Motta  : 1940  MRN: [id number]  Today's Date: 2023      Visit Date: 2023    Visit Dx:    ICD-10-CM ICD-9-CM   1. Status post right knee replacement  Z96.651 V43.65   2. Decreased range of motion (ROM) of right knee  M25.661 719.56   3. Muscle weakness of lower extremity  M62.81 728.87   4. Gait difficulty  R26.9 781.2       Patient Active Problem List   Diagnosis   • Osteopenia   • Hyperlipidemia   • Pre-diabetes   • Osteoarthritis   • BALDEMAR on CPAP   • Pelvic relaxation due to vaginal prolapse   • Chronic pain of both knees   • Arthritis of right knee   • Arthritis of left knee   • Arthritis of both knees   • Restless leg syndrome   • Dizziness   • Headache, migraine   • Heartburn   • Internal hemorrhoids with complication   • Impacted cerumen of left ear   • Vitamin E deficiency   • Environmental and seasonal allergies   • Chronic cough   • Aftercare following joint replacement surgery   • Allergic rhinitis, unspecified   • Anxiety disorder, unspecified   • Atopic dermatitis, unspecified   • Constipation, unspecified   • Disorder of bone density and structure, unspecified   • Gastro-esophageal reflux disease without esophagitis   • Generalized muscle weakness   • Iron deficiency anemia due to chronic blood loss   • Personal history of other malignant neoplasm of skin   • Presence of right artificial knee joint   • Migraine, unspecified, not intractable, without status migrainosus   • Hyperlipidemia, unspecified   • Obstructive sleep apnea   • Restless legs syndrome        Past Medical History:   Diagnosis Date   • Anxiety    • 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 SARAH LIRIANO   • Eczema 2014   • Endothelial corneal dystrophy 2020   • Genital prolapse 10/2017   • GERD (gastroesophageal reflux disease)    • Goiter, nontoxic, multinodular 2017    THYROID POLYP  SEES    • Hemorrhoids    • Herpes zoster 05/2018   • Hyperlipidemia    • Impaired fasting glucose    • Migraine    • BALDEMAR on CPAP     FOLLOWED BY DR. STEWART SAYIED   • Osteoarthritis    • Osteopenia    • Postmenopausal atrophic vaginitis    • Rectal nodule 03/2020    REFERRED TO DR. PAMELA NAPOLES   • Rectocele 07/2017   • Restless leg syndrome    • Right knee pain    • RLS (restless legs syndrome)    • Seborrheic keratosis    • Skin cancer 04/2015    LOWER LEG, FOLLOWED BY DR. ATUL SANCHEZ   • Vaginal vault prolapse 09/2017   • Vertigo         Past Surgical History:   Procedure Laterality Date   • CATARACT EXTRACTION, BILATERAL Bilateral 2015   • COLONOSCOPY N/A 02/27/2015    1 TUBULAR ADENOMA POL[facility] DIST[facility]   • COLONOSCOPY N/A 03/04/2020    10 MM SESSILE SERRATED ADENOMA POLYP IN ASCENDING, 3 MM HYPERPLASTIC POLYP IN RECTUM, 7 MM ANAL NODULE, DR. SARAH LIRIANO AT [facility]   • HYSTERECTOMY N/A 01/19/2004    KAYLEE WITH BSO, DR. RAFIA MORTENSEN AT [facility]   • KNEE ARTHROSCOPY Left 2013   • KNEE ARTHROSCOPY Right 2010   • MELISSA CULDOPLASTY N/A 01/19/2004    DR. RAFIA MORTENSEN AT [facility]   • SACROCOLPOPEXY N/A    • TOTAL KNEE ARTHROPLASTY Right 4/11/2023    Procedure: RIGHT TOTAL KNEE ARTHROPLASTY WITH SHERI NAVIGATION;  Surgeon: Atul Liriano MD;  [place] [facility] [place] [facility] [place]  Service: [place]opedics;  Laterality: Right;                        PT Assessment/Plan     Row Name 05/31/23 1100          PT Assessment    Assessment Comments Ms Motta reports feeling her energy level is low, educated re: fatigue after surgery. She demonstrates improved gait after working on gait mechanics and was able to amb 200' with consistent heel strike to toe off and 0 moments of instability. Corrected compensatory R leg external rotation. Recommended she bring her SPC next visit, no longer needs to use RWX. She will benefit from continuing skilled therapy services with decreased frequency after next  "week to 1x/wk or every other week to progress functional strength and mobility.  -JA        PT Plan    PT Plan Comments may need 1/2 rev if starting on Rec bike, cont ROM, gait/balance, functional strengthening; PLEASE WORK ON STEP DOWN with SPC for community amb stepping off curbs  -JA           User Key  (r) = Recorded By, (t) = Taken By, (c) = Cosigned By    Initials Name Provider Type    Ambreen Mccallum, PT Physical Therapist                   OP Exercises     Row Name 05/31/23 1100             Subjective Comments    Subjective Comments Doiing okay but just don't have as much energy.  -JA         Total Minutes    03503 - Gait Training Minutes  10  -JA      46231 - PT Therapeutic Exercise Minutes 30  -JA         Exercise 1    Exercise Name 1 Nustep UE/LE  -JA      Cueing 1 Verbal  -JA      Time 1 2 min then RCB  -JA         Exercise 2    Exercise Name 2 RCB, seat 6  -JA      Cueing 2 Verbal  -JA      Time 2 5min  -JA      Additional Comments seat 6 x 2.5 min, seat 5 x 2.5 min  -JA         Exercise 3    Exercise Name 3 Stair HS str  -JA      Cueing 3 Verbal;Tactile;Demo  -JA      Reps 3 3 R  -JA      Time 3 20  -JA         Exercise 5    Exercise Name 5 R knee flex step  -JA      Cueing 5 Verbal  -JA      Reps 5 5R  -JA      Time 5 20 sec  -JA         Exercise 9    Exercise Name 9 Amb fwd/bwd in // bars  -JA      Cueing 9 Verbal;Demo  -JA      Reps 9 4 laps  -JA      Time 9 L UE backward only  -JA         Exercise 10    Exercise Name 10 Step tap to 6\" step  -JA      Cueing 10 Verbal;Demo  -JA      Reps 10 15ea  -JA         Exercise 11    Exercise Name 11 Sidestepping  -JA      Cueing 11 Verbal;Demo  -JA      Reps 11 3 laps  -JA      Time 11 RTB  -JA         Exercise 12    Exercise Name 12 nicki nicki  -JA      Cueing 12 Verbal;Demo  -JA      Time 12 2 min  -JA         Exercise 14    Exercise Name 14 gait around dept  -JA      Cueing 14 Verbal;Demo  -JA      Time 14 2 min  -JA      Additional Comments corrected " "external rotation of R hip, cued heel strike  -JA         Exercise 15    Exercise Name 15 retro step + ipsilateral reach  -JA      Cueing 15 Verbal;Demo  -JA      Reps 15 15R+ 10 L  -JA         Exercise 17    Exercise Name 17 step up 6\"  -JA      Cueing 17 Verbal;Demo  -JA      Reps 17 10ea  -JA      Time 17 charlene handrails  -JA         Exercise 18    Exercise Name 18 heel raises  -JA      Cueing 18 Verbal;Demo  -JA      Reps 18 20  -JA         Exercise 19    Exercise Name 19 3/4 tandem stance  -JA      Cueing 19 Verbal;Demo  -JA      Reps 19 3e  -JA      Time 19 30sec  -JA      Additional Comments on foam  -JA            User Key  (r) = Recorded By, (t) = Taken By, (c) = Cosigned By    Initials Name Provider Type    Ambreen Mccallum, PT Physical Therapist                              PT OP Goals     Row Name 05/31/23 1300          PT Short Term Goals    STG Date to Achieve 06/02/23  -JA     STG 1 Pt will be independent with initial HEP.  -JA     STG 1 Progress Met  -JA     STG 2 Pt will demonstrate gait with SPC x150'  with normalized heel strike to toe off.  -JA     STG 2 Progress Met  -JA     STG 3 Pt will demonstrate increased knee AROM 5-110.  -JA     STG 3 Progress Met  -JA        Long Term Goals    LTG Date to Achieve 07/02/23  -JA     LTG 1 Pt will be independent with advanced HEP for core and LE strengthening to facilitate return to PLOF.  -JA     LTG 1 Progress Progressing  -JA     LTG 2 Pt will demonstrate normalized gait with least resistrictive or no assistive device.  -JA     LTG 2 Progress Met  -JA     LTG 3 Pt will improve KOS score by 30% or greater indicating decrease in perceived functional disability.  -JA     LTG 3 Progress Ongoing  -JA     LTG 4 Pt will demonstrate increased strength to 4/5 or better throughout R LE.  -JA     LTG 4 Progress Ongoing  -JA           User Key  (r) = Recorded By, (t) = Taken By, (c) = Cosigned By    Initials Name Provider Type    mAbreen Mccallum, PT " Physical Therapist                Therapy Education  Education Details: Discussed that fatigue comes with post-op healing and she will slowly decrease in fatigue.              Time Calculation:   Start Time: 1100  Stop Time: 1145  Time Calculation (min): 45 min  Timed Charges  12038 - PT Therapeutic Exercise Minutes: 30  29200 - Gait Training Minutes : 10  Total Minutes  Timed Charges Total Minutes: 40   Total Minutes: 40  Therapy Charges for Today     Code Description Service Date Service Provider Modifiers Qty    67684898310 HC PT THER PROC EA 15 MIN 5/31/2023 Ambreen Miller, PT GP 2    61328226047 HC GAIT TRAINING EA 15 MIN 5/31/2023 Ambreen Miller, PT GP 1                    Ambreen Miller PT  5/31/2023

## 2023-06-01 DIAGNOSIS — G25.81 RESTLESS LEG SYNDROME: ICD-10-CM

## 2023-06-02 ENCOUNTER — HOSPITAL ENCOUNTER (OUTPATIENT)
Dept: PHYSICAL THERAPY | Facility: HOSPITAL | Age: 83
Setting detail: THERAPIES SERIES
Discharge: HOME | End: 2023-06-02
Payer: MEDICARE

## 2023-06-02 DIAGNOSIS — Z96.651 STATUS POST RIGHT KNEE REPLACEMENT: Primary | ICD-10-CM

## 2023-06-02 DIAGNOSIS — M25.562 CHRONIC PAIN OF BOTH KNEES: ICD-10-CM

## 2023-06-02 DIAGNOSIS — M62.81 MUSCLE WEAKNESS OF LOWER EXTREMITY: ICD-10-CM

## 2023-06-02 DIAGNOSIS — G25.81 RESTLESS LEG SYNDROME: ICD-10-CM

## 2023-06-02 DIAGNOSIS — R26.9 GAIT DIFFICULTY: ICD-10-CM

## 2023-06-02 DIAGNOSIS — R26.2 DIFFICULTY WALKING: ICD-10-CM

## 2023-06-02 DIAGNOSIS — M25.661 DECREASED RANGE OF MOTION (ROM) OF RIGHT KNEE: ICD-10-CM

## 2023-06-02 DIAGNOSIS — G89.29 CHRONIC PAIN OF BOTH KNEES: ICD-10-CM

## 2023-06-02 DIAGNOSIS — M25.561 CHRONIC PAIN OF BOTH KNEES: ICD-10-CM

## 2023-06-02 PROCEDURE — 97116 GAIT TRAINING THERAPY: CPT

## 2023-06-02 PROCEDURE — 97110 THERAPEUTIC EXERCISES: CPT

## 2023-06-02 RX ORDER — SIMVASTATIN 20 MG
TABLET ORAL
Qty: 90 TABLET | Refills: 0 | OUTPATIENT
Start: 2023-06-02

## 2023-06-02 RX ORDER — ROPINIROLE 1 MG/1
TABLET, FILM COATED ORAL
Qty: 90 TABLET | Refills: 0 | OUTPATIENT
Start: 2023-06-02

## 2023-06-02 NOTE — THERAPY PROGRESS REPORT/RE-CERT
Outpatient Physical Therapy Ortho Progress Note  [facility]     Patient Name: Ashley Motta  : 1940  MRN: [id number]  Today's Date: 2023      Visit Date: 2023    Visit Dx:    ICD-10-CM ICD-9-CM   1. Status post right knee replacement  Z96.651 V43.65   2. Decreased range of motion (ROM) of right knee  M25.661 719.56   3. Muscle weakness of lower extremity  M62.81 728.87   4. Gait difficulty  R26.9 781.2   5. Chronic pain of both knees  M25.561 719.46    M25.562 338.29    G89.29    6. Difficulty walking  R26.2 719.7       Patient Active Problem List   Diagnosis   • Osteopenia   • Hyperlipidemia   • Pre-diabetes   • Osteoarthritis   • BALDEMAR on CPAP   • Pelvic relaxation due to vaginal prolapse   • Chronic pain of both knees   • Arthritis of right knee   • Arthritis of left knee   • Arthritis of both knees   • Restless leg syndrome   • Dizziness   • Headache, migraine   • Heartburn   • Internal hemorrhoids with complication   • Impacted cerumen of left ear   • Vitamin E deficiency   • Environmental and seasonal allergies   • Chronic cough   • Aftercare following joint replacement surgery   • Allergic rhinitis, unspecified   • Anxiety disorder, unspecified   • Atopic dermatitis, unspecified   • Constipation, unspecified   • Disorder of bone density and structure, unspecified   • Gastro-esophageal reflux disease without esophagitis   • Generalized muscle weakness   • Iron deficiency anemia due to chronic blood loss   • Personal history of other malignant neoplasm of skin   • Presence of right artificial knee joint   • Migraine, unspecified, not intractable, without status migrainosus   • Hyperlipidemia, unspecified   • Obstructive sleep apnea   • Restless legs syndrome        Past Medical History:   Diagnosis Date   • Anxiety    • 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 TERRY CHEUNG   • Osteoarthritis    • Osteopenia    • Postmenopausal atrophic vaginitis    • Rectal nodule 03/2020    REFERRED TO DR. PAMELA NAPOLES   • Rectocele 07/2017   • Restless leg syndrome    • Right knee pain    • RLS (restless legs syndrome)    • Seborrheic keratosis    • Skin cancer 04/2015    LOWER LEG, FOLLOWED BY DR. ATUL SANCHEZ   • Vaginal vault prolapse 09/2017   • Vertigo         Past Surgical History:   Procedure Laterality Date   • CATARACT EXTRACTION, BILATERAL Bilateral 2015   • COLONOSCOPY N/A 02/27/2015    1 TUBULAR ADENOMA POLYP IN DISTAL ASCENDING, [facility]   • COLONOSCOPY N/A 03/04/2020    10 MM SESSILE SERRATED ADENOMA POLYP IN ASCENDING, 3 MM HYPERPLASTIC POLYP IN RECTUM, 7 MM ANAL NODULE, DR. SARAH LIRIANO AT [facility]   • HYSTERECTOMY N/A 01/19/2004    KAYLEE WITH BSO, DR. RAFIA MORTENSEN AT [facility]   • KNEE ARTHROSCOPY Left 2013   • KNEE ARTHROSCOPY Right 2010   • MELISSA CULDOPLASTY N/A 01/19/2004    DR. RAFIA MORTENSEN AT [facility]   • SACROCOLPOPEXY N/A    • TOTAL KNEE ARTHROPLASTY Right 4/11/2023    Procedure: RIGHT TOTAL KNEE ARTHROPLASTY WITH SHERI NAVIGATION;  Surgeon: Atul Liriano MD;  Location: [facility] OR [facility];  Service: Orthopedics;  Laterality: Right;        PT Ortho     Row Name 06/02/23 1200       Myotomal Screen- Lower Quarter Clearing    Hip flexion (L2) Bilateral:;4 (Good)  -MH (r) CS (t) MH (c)    Knee extension (L3) Right:;4- (Good -);Left:;4+ (Good +)  -MH (r) CS (t) MH (c)    Ankle DF (L4) Right:;4+ (Good +);Left:;5 (Normal)  -MH (r) CS (t) MH (c)    Ankle PF (S1) Bilateral:;5 (Normal)  -MH (r) CS (t) MH (c)    Knee flexion (S2) Right:;4 (Good);Left:;4+ (Good +)  -HUSEYIN (r) MILAGRO (t) HUSEYIN (c)           User Key  (r) = Recorded By, (t) = Taken By, (c) = Cosigned By    Initials Name Provider Type     Bebe Carpenter, PT Physical Therapist    Altaf Morgan, PT Student PT Student                             PT Assessment/Plan     Row Name 06/02/23 1200          PT Assessment    Functional Limitations Impaired gait;Limitation in home management;Limitations in community activities;Limitations in functional capacity and performance;Performance in leisure activities;Performance in self-care ADL  -MH (r) CS (t)  (c)     Impairments Range of motion;Pain;Muscle strength;Gait;Endurance;Balance;Posture;Poor body mechanics  -MH (r) CS (t) MH (c)     Assessment Comments Ashley Motta has been seen for 9 physical therapy sessions (including initial eval and today's session) for s/p R TKA. Treatment has included therapeutic exercise, gait training and patient education with home exercise program . Progress to physical therapy goals is good as pt. Has met 3/3 STGs, and 2/4 LTGs. Pt arrives to clinic ambulating with RWx, but states she only uses because she feels safer. Pt brought her SPC for today's session to begin ambulating curbs and steps with decreased AD assistance.  She reports improved ability to ambulate without need for RWx. Pt has significantly improved KOS score from 0% to 55.71% (where 100% equal no disability). Pt has not met her strength goal at this visit, but pt has improved overall LE strength since initial eval (see ortho). Pt still has difficulty with descending curbs and stairs. Pt performed step downs this visit leading with R LE and SPC assistance. Pt was able to perform from 2in foam pad and able to progress to 4in step. Pt reported not being able to move their leg in order to achieve L SLS during descent of 6in step.  She will benefit from continued skilled physical therapy to address remaining impairments and functional limitations.  - (r)  (t)  (c)     Please refer to paper survey for  additional self-reported information Yes  -MH (r) CS (t) MH (c)     Rehab Potential Good  -MH (r) CS (t) MH (c)     Patient/caregiver participated in establishment of treatment plan and goals Yes  -MH (r) CS (t) MH (c)     Patient would benefit from skilled therapy intervention Yes  -MH (r) CS (t) MH (c)        PT Plan    PT Frequency 1x/week  -MH (r) CS (t) MH (c)     Predicted Duration of Therapy Intervention (PT) 2-6 visits  -MH (r) CS (t) MH (c)     Planned CPT's? PT RE-EVAL: 55965;PT THER PROC EA 15 MIN: 35340;PT THER ACT EA 15 MIN: 33405;PT MANUAL THERAPY EA 15 MIN: 88763;PT NEUROMUSC RE-EDUCATION EA 15 MIN: 32318;PT GAIT TRAINING EA 15 MIN: 17686;PT EVAL AQUA: 85620;PT AQUATIC THERAPY EA 15 MIN: 05329;PT SELF CARE/HOME MGMT/TRAIN EA 15: 03632;PT HOT OR COLD PACK TREAT MCARE;PT ELECTRICAL STIM UNATTEND:   -MH (r) CS (t) MH (c)     PT Plan Comments Continue with curb ambulation, progress to 6in if tolerated, return to previous exercises  -MH (r) CS (t) MH (c)           User Key  (r) = Recorded By, (t) = Taken By, (c) = Cosigned By    Initials Name Provider Type     Bebe Carpenter, PT Physical Therapist    CS Altaf Izquierdo, PT Student PT Student                   OP Exercises     Row Name 06/02/23 1200             Subjective Comments    Subjective Comments I'm doing well still having some difficulty with going down curbs  -MH (r) CS (t) MH (c)         Total Minutes    29791 - Gait Training Minutes  20  -MH (r) CS (t) MH (c)      17734 - PT Therapeutic Exercise Minutes 23  -MH (r) CS (t) MH (c)         Exercise 1    Exercise Name 1 Nustep UE/LE  -MH (r) CS (t) MH (c)      Cueing 1 Verbal  -MH (r) CS (t) MH (c)      Time 1 2 min then RCB  -MH (r) CS (t) MH (c)         Exercise 2    Exercise Name 2 RCB, seat 6  -MH (r) CS (t) MH (c)      Cueing 2 Verbal  -MH (r) CS (t) MH (c)      Time 2 5min  -MH (r) CS (t) MH (c)      Additional Comments seat 6 2.5min, seat 5 2.5min  -MH (r) CS (t) MH (c)         Exercise  "5    Exercise Name 5 R knee flex step  -MH (r) CS (t) MH (c)      Cueing 5 Verbal  -MH (r) CS (t) MH (c)      Reps 5 5R  -MH (r) CS (t) MH (c)      Time 5 20 sec  -MH (r) CS (t) MH (c)         Exercise 6    Exercise Name 6 Curb ambulation  -MH (r) CS (t) MH (c)      Reps 6 cueing for sequencing with cane, use of gait belt, pt. very apprehensive  -MH      Time 6 20min  -MH (r) CS (t) MH (c)      Additional Comments From foam then 4in step  -MH (r) CS (t) MH (c)         Exercise 10    Exercise Name 10 Step tap to 6\" step  -MH (r) CS (t) MH (c)      Cueing 10 Verbal;Demo  -MH (r) CS (t) MH (c)      Reps 10 15ea  -MH (r) CS (t) MH (c)         Exercise 12    Exercise Name 12 nicki nicki  -MH (r) CS (t) MH (c)      Cueing 12 Verbal;Demo  -MH (r) CS (t) MH (c)      Time 12 2 min  -MH (r) CS (t) MH (c)         Exercise 17    Exercise Name 17 step up 6\"  -MH (r) CS (t) MH (c)      Cueing 17 Verbal;Demo  -MH (r) CS (t) MH (c)      Reps 17 10ea  -MH (r) CS (t) MH (c)      Time 17 charlene handrails  -MH (r) CS (t) MH (c)         Exercise 18    Exercise Name 18 heel raises  -MH (r) CS (t) MH (c)      Cueing 18 Verbal;Demo  -MH (r) CS (t) MH (c)      Reps 18 20  -MH (r) CS (t) MH (c)            User Key  (r) = Recorded By, (t) = Taken By, (c) = Cosigned By    Initials Name Provider Type    Bebe Pedersen, PT Physical Therapist    Altaf Morgan, PT Student PT Student                              PT OP Goals     Row Name 06/02/23 1200          PT Short Term Goals    STG Date to Achieve 06/02/23  -MH (r) CS (t) MH (c)     STG 1 Pt will be independent with initial HEP.  -MH (r) CS (t) MH (c)     STG 1 Progress Met  -MH (r) CS (t) MH (c)     STG 2 Pt will demonstrate gait with SPC x150'  with normalized heel strike to toe off.  -MH (r) CS (t) MH (c)     STG 2 Progress Met  -MH (r) CS (t) MH (c)     STG 3 Pt will demonstrate increased knee AROM 5-110.  -MH (r) CS (t) MH (c)     STG 3 Progress Met  -MH (r) CS (t) MH (c)        Long Term " Goals    LTG Date to Achieve 07/02/23  -MH (r) CS (t) MH (c)     LTG 1 Pt will be independent with advanced HEP for core and LE strengthening to facilitate return to PLOF.  -MH (r) CS (t) MH (c)     LTG 1 Progress Progressing  -MH (r) CS (t) MH (c)     LTG 1 Progress Comments Adhering to program to this point, progressing towards finalizing HEP  -MH (r) CS (t) MH (c)     LTG 2 Pt will demonstrate normalized gait with least resistrictive or no assistive device.  -MH (r) CS (t) MH (c)     LTG 2 Progress Met  -MH (r) CS (t) MH (c)     LTG 3 Pt will improve KOS score by 30% or greater indicating decrease in perceived functional disability.  -MH (r) CS (t) MH (c)     LTG 3 Progress Met  -MH (r) CS (t) MH (c)     LTG 3 Progress Comments Pt score increased from 0% during initial eval to 55.71%  -MH (r) CS (t) MH (c)     LTG 4 Pt will demonstrate increased strength to 4/5 or better throughout R LE.  -MH (r) CS (t) MH (c)     LTG 4 Progress Ongoing  -MH (r) CS (t) MH (c)     LTG 4 Progress Comments see ortho  -MH (r) CS (t) MH (c)           User Key  (r) = Recorded By, (t) = Taken By, (c) = Cosigned By    Initials Name Provider Type    Bebe Pedersen, PT Physical Therapist    Altaf Morgan PT Student PT Student                Therapy Education  Education Details: Educated pt re: curb navigation with SPC for safe community ambulation  Given: Symptoms/condition management, Posture/body mechanics  Program: Reinforced  How Provided: Verbal, Demonstration  Provided to: Patient  Level of Understanding: Verbalized    Outcome Measure Options: Knee Outcome Score- ADL  Knee Outcome Survey Activities of Daily Living Scale  Symptoms: Pain: The symptom affects my activity slightly  Symptoms: Stiffness: The symptom affects my activity slightly  Symptoms: Swelling: The symptom affects my activity slightly  Symptoms: Giving way, buckling, or shifting of the knee: I do not have the symptom  Symptoms: Weakness: The symptom affects my  activity slightly  Symptoms: Limping: I have the symptom, but it does not affect my activity  Functional Limitations with ADL's: Walk: Activity is somewhat difficult  Functional Limitations with ADL's: Go up stairs: Activity is somewhat difficult  Functional Limitations with ADL's: Go down stairs: Activity is somewhat difficult  Functional Limitations with ADL's: Stand: Activity is somewhat difficult  Functional Limitations with ADL's: Kneel on front of your knee: I am unable to  Functional Limitations with ADL's: Squat: I am unable to  Functional Limitations with ADL's: Sit with your knee bent: Activity is somewhat difficult  Functional Limitations with ADL's: Rise from a chair: Activity is somewhat difficult  Knee Outcome Summary ADL's Score: 55.71 %      Time Calculation:   Start Time: 1211  Stop Time: 1254  Time Calculation (min): 43 min  Total Timed Code Minutes- PT: 43 minute(s)  Timed Charges  93258 - PT Therapeutic Exercise Minutes: 23  93727 - Gait Training Minutes : 20  Total Minutes  Timed Charges Total Minutes: 43   Total Minutes: 43  Therapy Charges for Today     Code Description Service Date Service Provider Modifiers Qty    76409603396 HC PT THER PROC EA 15 MIN 6/2/2023 Altaf Izquierdo, PT Student GP 2    83025644479 HC GAIT TRAINING EA 15 MIN 6/2/2023 Altaf Izquierdo, PT Student GP 1          PT G-Codes  Outcome Measure Options: Knee Outcome Score- ADL         Altaf Izquierdo PT Student  6/2/2023

## 2023-06-02 NOTE — TELEPHONE ENCOUNTER
Caller: Kalia Ashley S    Relationship: Self    Best call back number: [phone]    Requested Prescriptions:   Requested Prescriptions     Pending Prescriptions Disp Refills   • simvastatin (ZOCOR) 20 MG tablet 90 tablet 0     Sig: Take 1 tablet by mouth Every Night.   • rOPINIRole (REQUIP) 1 MG tablet 90 tablet 1     Sig: TAKE 1 TABLET BY MOUTH ONE HOUR BEFORE BEDTIME        Pharmacy where request should be sent: [facility] [address] [place] [address] [facility] - [phone]  - [phone] FX     Last office visit with prescribing clinician: 2/17/2023   Last telemedicine visit with prescribing clinician: Visit date not found   Next office visit with prescribing clinician: 6/13/2023       Does the patient have less than a 3 day supply:  [] Yes  [x] No    Would you like a call back once the refill request has been completed: [] Yes [x] No    If the office needs to give you a call back, can they leave a voicemail: [] Yes [x] No    Cindy Bustos Rep   06/02/23 15:57 EDT

## 2023-06-04 RX ORDER — SIMVASTATIN 20 MG
20 TABLET ORAL NIGHTLY
Qty: 90 TABLET | Refills: 3 | Status: SHIPPED | OUTPATIENT
Start: 2023-06-04

## 2023-06-04 RX ORDER — ROPINIROLE 1 MG/1
TABLET, FILM COATED ORAL
Qty: 90 TABLET | Refills: 1 | Status: SHIPPED | OUTPATIENT
Start: 2023-06-04

## 2023-06-06 ENCOUNTER — HOSPITAL ENCOUNTER (OUTPATIENT)
Dept: PHYSICAL THERAPY | Facility: HOSPITAL | Age: 83
Setting detail: THERAPIES SERIES
Discharge: HOME | End: 2023-06-06
Payer: MEDICARE

## 2023-06-06 DIAGNOSIS — M25.661 DECREASED RANGE OF MOTION (ROM) OF RIGHT KNEE: ICD-10-CM

## 2023-06-06 DIAGNOSIS — Z96.651 STATUS POST RIGHT KNEE REPLACEMENT: Primary | ICD-10-CM

## 2023-06-06 DIAGNOSIS — R26.9 GAIT DIFFICULTY: ICD-10-CM

## 2023-06-06 DIAGNOSIS — M62.81 MUSCLE WEAKNESS OF LOWER EXTREMITY: ICD-10-CM

## 2023-06-06 PROCEDURE — 97110 THERAPEUTIC EXERCISES: CPT

## 2023-06-06 NOTE — THERAPY TREATMENT NOTE
Outpatient Physical Therapy Ortho Treatment Note  [facility]     Patient Name: Ashley Motta  : 1940  MRN: [id number]  Today's Date: 2023      Visit Date: 2023    Visit Dx:    ICD-10-CM ICD-9-CM   1. Status post right knee replacement  Z96.651 V43.65   2. Decreased range of motion (ROM) of right knee  M25.661 719.56   3. Muscle weakness of lower extremity  M62.81 728.87   4. Gait difficulty  R26.9 781.2       Patient Active Problem List   Diagnosis    Osteopenia    Hyperlipidemia    Pre-diabetes    Osteoarthritis    BALDEMAR on CPAP    Pelvic relaxation due to vaginal prolapse    Chronic pain of both knees    Arthritis of right knee    Arthritis of left knee    Arthritis of both knees    Restless leg syndrome    Dizziness    Headache, migraine    Heartburn    Internal hemorrhoids with complication    Impacted cerumen of left ear    Vitamin E deficiency    Environmental and seasonal allergies    Chronic cough    Aftercare following joint replacement surgery    Allergic rhinitis, unspecified    Anxiety disorder, unspecified    Atopic dermatitis, unspecified    Constipation, unspecified    Disorder of bone density and structure, unspecified    Gastro-esophageal reflux disease without esophagitis    Generalized muscle weakness    Iron deficiency anemia due to chronic blood loss    Personal history of other malignant neoplasm of skin    Presence of right artificial knee joint    Migraine, unspecified, not intractable, without status migrainosus    Hyperlipidemia, unspecified    Obstructive sleep apnea    Restless legs syndrome        Past Medical History:   Diagnosis Date    Anxiety     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 SARAH LIRIANO    Eczema 2014    Endothelial corneal dystrophy 2020    Genital prolapse 10/2017    GERD (gastroesophageal reflux disease)     Goiter, nontoxic, multinodular 2017    THYROID POLYP SEES     Hemorrhoids     Herpes  zoster 05/2018    Hyperlipidemia     Impaired fasting glucose     Migraine     BALDEMAR on CPAP     FOLLOWED BY DR. TERRY CHEUNG    Osteoarthritis     Osteopenia     Postmenopausal atrophic vaginitis     Rectal nodule 03/2020    REFERRED TO DR. PAMELA NAPOLES    Rectocele 07/2017    Restless leg syndrome     Right knee pain     RLS (restless legs syndrome)     Seborrheic keratosis     Skin cancer 04/2015    LOWER LEG, FOLLOWED BY DR. ATUL SANCHEZ    Vaginal vault prolapse 09/2017    Vertigo         Past Surgical History:   Procedure Laterality Date    CATARACT EXTRACTION, BILATERAL Bilateral 2015    COLONOSCOPY N/A 02/27/2015    1 TUBULAR ADENOMA POLYP IN DISTAL [facility]END[facility]    COLONOSCOPY N/A 03/04/2020    10 MM SESSILE SERRATED ADENOMA POLYP IN ASCENDING, 3 MM HYPERPLASTIC POLYP IN RECTUM, 7 MM ANAL NODULE, DR. SARAH LIRIANO AT [facility]    HYSTERECTOMY N/A 01/19/2004    KAYLEE WITH BSO, DR. RAFIA MORTENSEN AT [facility]    KNEE ARTHROSCOPY Left 2013    KNEE ARTHROSCOPY Right 2010    MELISSA CULDOPLASTY N/A 01/19/2004    DR. RAFIA MORTENSEN AT [facility]    SACROCOLPOPEXY N/A     TOTAL KNEE ARTHROPLASTY Right 4/11/2023    Procedure: RIGHT TOTAL KNEE ARTHROPLASTY WITH SHERI NAVIGATION;  Surgeon: Atul Liriano MD;  Location: [facility];  Service: Orthopedics;  Laterality: Right;                        PT Assessment/Plan       Row Name 06/06/23 1200          PT Assessment    Assessment Comments Ms. Motta arrives to PT ambulating with SPC demonstating altered gait mechanics while reaching to walls/furniture. She is aware of her unsteadiness and continues to report difficulty when navigating curb steps. Reviewed step ups and step up and overs to simulate curb step and following visual demo and verbal cues, patient demo improve form/stability with use of SPC. Also reviewed STS and lateral stepping with good tolerance. Added walking marches with emphasis on RLE SLS time with intermittent cues of single UE  "assist. Ms. Motta remains a candidate for skilled PT.  -SHASTA        PT Plan    PT Plan Comments update HEP, work on gait training, progress toward SLS challenges. consider adding HS curls and mini squats  -SHASTA               User Key  (r) = Recorded By, (t) = Taken By, (c) = Cosigned By      Initials Name Provider Type    Elenita Alvarado, PT Physical Therapist                       OP Exercises       Row Name 06/06/23 1200             Subjective Comments    Subjective Comments I have been working with the cane but I still feel like my balance needs to get better and I have trouble with the curb step  -SHASTA         Subjective Pain    Subjective Pain Comment arrives 13 minutes late due to LYFT  -SHASTA         Total Minutes    13948 - PT Therapeutic Exercise Minutes 30  -SHASTA         Exercise 2    Exercise Name 2 RCB, seat 6  -SHASTA      Cueing 2 Verbal  -SHASTA      Time 2 5min  -SHASTA      Additional Comments 5 mins  -SHASTA         Exercise 4    Exercise Name 4 walking marches  -SHASTA      Cueing 4 Verbal;Demo  -SHASTA      Reps 4 3 laps  -SHASTA      Time 4 emphasis on SLS time with pause hold  -SHASTA      Additional Comments single UE support  -SHASTA         Exercise 5    Exercise Name 5 R knee flex step  -SHASTA      Cueing 5 Verbal  -SHASTA      Reps 5 3R  -SHASTA      Time 5 20 sec  -SHASTA      Additional Comments dec reps due to time  -SHASTA         Exercise 6    Exercise Name 6 fwd step up and overs  -SHASTA      Cueing 6 Verbal;Demo  -SHASTA      Sets 6 1  -SHASTA      Reps 6 10  -SHASTA      Time 6 mimic curb stepping  -SHASTA      Additional Comments 6in with SPC in // bars  -SHASTA         Exercise 7    Exercise Name 7 STS  -SHASTA      Cueing 7 Verbal  -SHASTA      Sets 7 2  -SHASTA      Reps 7 10  -SHASTA      Time 7 clinic chair + foam  -SHASTA         Exercise 10    Exercise Name 10 Step tap to 6\" step  -SHASTA      Cueing 10 Verbal;Demo  -SHASTA      Reps 10 15ea  -SHASTA         Exercise 11    Exercise Name 11 Sidestepping  -SHASTA      Cueing 11 Verbal;Demo  -SHASTA      Reps 11 3 laps  -SHASTA      Time 11 RTB  " "-SHASTA         Exercise 17    Exercise Name 17 step up 6\"  -SHASTA      Cueing 17 Verbal;Demo  -SHASTA      Reps 17 15ea  -SHASTA      Time 17 single HR assist  -SHASTA      Additional Comments (LUE HR )  -         Exercise 18    Exercise Name 18 heel raises  -SHASTA      Cueing 18 Verbal;Demo  -SHASTA      Reps 18 20  -SHASTA                User Key  (r) = Recorded By, (t) = Taken By, (c) = Cosigned By      Initials Name Provider Type    Elenita Alvarado, PT Physical Therapist                                  PT OP Goals       Row Name 06/06/23 1300          PT Short Term Goals    STG Date to Achieve 06/02/23  -SHASTA     STG 1 Pt will be independent with initial HEP.  -     STG 1 Progress Met  -     STG 2 Pt will demonstrate gait with SPC x150'  with normalized heel strike to toe off.  -     STG 2 Progress Met  -     STG 3 Pt will demonstrate increased knee AROM 5-110.  -     STG 3 Progress Met  -        Long Term Goals    LTG Date to Achieve 07/02/23  -     LTG 1 Pt will be independent with advanced HEP for core and LE strengthening to facilitate return to PLOF.  -     LTG 1 Progress Progressing  -SHASTA     LTG 2 Pt will demonstrate normalized gait with least resistrictive or no assistive device.  -     LTG 2 Progress Met  -SHASTA     LTG 3 Pt will improve KOS score by 30% or greater indicating decrease in perceived functional disability.  -     LTG 3 Progress Met  -     LTG 4 Pt will demonstrate increased strength to 4/5 or better throughout R LE.  -     LTG 4 Progress Ongoing  -               User Key  (r) = Recorded By, (t) = Taken By, (c) = Cosigned By      Initials Name Provider Type    Elenita Alvarado, PT Physical Therapist                                   Time Calculation:   Start Time: 1230  Stop Time: 1300  Time Calculation (min): 30 min  Timed Charges  64090 - PT Therapeutic Exercise Minutes: 30  Total Minutes  Timed Charges Total Minutes: 30   Total Minutes: 30  Therapy Charges for Today       " Code Description Service Date Service Provider Modifiers Qty    49029104587  PT THER PROC EA 15 MIN 6/6/2023 Elenita Hernandez, PT GP 2                      Elenita Hernandez, PT  6/6/2023

## 2023-06-12 ENCOUNTER — TELEPHONE (OUTPATIENT)
Dept: FAMILY MEDICINE CLINIC | Facility: CLINIC | Age: 83
End: 2023-06-12
Payer: MEDICARE

## 2023-06-12 NOTE — TELEPHONE ENCOUNTER
Caller: Ashley Motta    Relationship: Self    Best call back number: [phone] (Mobile)     What medication are you requesting:  ANTIBIOTIC       What are your current symptoms: SHINGLES FOR  BACK OF ARM AND ON NECK     How long have you been experiencing symptoms: COUPLE OF DAYS    Have you had these symptoms before:    [x] Yes  [] No    Have you been treated for these symptoms before:   [x] Yes  [] No    If a prescription is needed, what is your preferred pharmacy and phone number: [facility] [address] [place], [place] [address] [facility] [address] [facility] - [phone] [facility] [phone] FX     Additional notes:

## 2023-06-14 ENCOUNTER — HOSPITAL ENCOUNTER (OUTPATIENT)
Dept: PHYSICAL THERAPY | Facility: HOSPITAL | Age: 83
Setting detail: THERAPIES SERIES
Discharge: HOME | End: 2023-06-14
Payer: MEDICARE

## 2023-06-14 DIAGNOSIS — M62.81 MUSCLE WEAKNESS OF LOWER EXTREMITY: ICD-10-CM

## 2023-06-14 DIAGNOSIS — Z96.651 STATUS POST RIGHT KNEE REPLACEMENT: Primary | ICD-10-CM

## 2023-06-14 DIAGNOSIS — M25.661 DECREASED RANGE OF MOTION (ROM) OF RIGHT KNEE: ICD-10-CM

## 2023-06-14 DIAGNOSIS — R26.9 GAIT DIFFICULTY: ICD-10-CM

## 2023-06-14 PROCEDURE — 97110 THERAPEUTIC EXERCISES: CPT

## 2023-06-14 PROCEDURE — 97112 NEUROMUSCULAR REEDUCATION: CPT

## 2023-06-14 NOTE — THERAPY TREATMENT NOTE
Outpatient Physical Therapy Ortho Treatment Note  [facility]     Patient Name: Ashley Motta  : 1940  MRN: [id number]  Today's Date: 2023      Visit Date: 2023    Visit Dx:    ICD-10-CM ICD-9-CM   1. Status post right knee replacement  Z96.651 V43.65   2. Decreased range of motion (ROM) of right knee  M25.661 719.56   3. Muscle weakness of lower extremity  M62.81 728.87   4. Gait difficulty  R26.9 781.2       Patient Active Problem List   Diagnosis    Osteopenia    Hyperlipidemia    Pre-diabetes    Osteoarthritis    BALDEMAR on CPAP    Pelvic relaxation due to vaginal prolapse    Chronic pain of both knees    Arthritis of right knee    Arthritis of left knee    Arthritis of both knees    Restless leg syndrome    Dizziness    Headache, migraine    Heartburn    Internal hemorrhoids with complication    Impacted cerumen of left ear    Vitamin E deficiency    Environmental and seasonal allergies    Chronic cough    Aftercare following joint replacement surgery    Allergic rhinitis, unspecified    Anxiety disorder, unspecified    Atopic dermatitis, unspecified    Constipation, unspecified    Disorder of bone density and structure, unspecified    Gastro-esophageal reflux disease without esophagitis    Generalized muscle weakness    Iron deficiency anemia due to chronic blood loss    Personal history of other malignant neoplasm of skin    Presence of right artificial knee joint    Migraine, unspecified, not intractable, without status migrainosus    Hyperlipidemia, unspecified    Obstructive sleep apnea    Restless legs syndrome        Past Medical History:   Diagnosis Date    Anxiety     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 SARAH LIRIANO    Eczema 2014    Endothelial corneal dystrophy 2020    Genital prolapse 10/2017    GERD (gastroesophageal reflux disease)     Goiter, nontoxic, multinodular 2017    THYROID POLYP SEES     Hemorrhoids     Herpes  "zoster 05/2018    Hyperlipidemia     Impaired fasting glucose     Migraine     BALDEMAR on CPAP     FOLLOWED BY DR. STEWART SAYIED    Osteoarthritis     Osteopenia     Postmenopausal atrophic vaginitis     Rectal nodule 03/2020    REFERRED TO DR. PAMELA NAPOLES    Rectocele 07/2017    Restless leg syndrome     Right knee pain     RLS (restless legs syndrome)     Seborrheic keratosis     Skin cancer 04/2015    LOWER LEG, FOLLOWED BY DR. ATUL SANCHEZ    Vaginal vault prolapse 09/2017    Vertigo         Past Surgical History:   Procedure Laterality Date    CATARACT EXTRACTION, BILATERAL Bilateral 2015    COLONOSCOPY N/A 02/27/2015    1 [facility]UB[facility]    COLONOSCOPY N/A 03/04/2020    10 MM SESSILE SERRATED ADENOMA POLYP IN ASCENDING, 3 MM HYPERPLASTIC POLYP IN RECTUM, 7 MM ANAL NODULE, DR. SARAH LIRIANO AT [facility]    HYSTERECTOMY N/A 01/19/2004    KAYLEE WITH BSO, DR. RAFIA MORTENSEN AT [facility]    KNEE ARTHROSCOPY Left 2013    KNEE ARTHROSCOPY Right 2010    MELISSA CULDOPLASTY N/A 01/19/2004    DR. RAFIA MORTENSEN AT [facility]    SACROCOLPOPEXY N/A     TOTAL KNEE ARTHROPLASTY Right 4/11/2023    Procedure: RIGHT TOTAL KNEE ARTHROPLASTY WITH SHERI NAVIGATION;  Surgeon: Atul Liriano MD;  [place] [facility] [place] [facility] [place];  Service: Orthopedics;  Laterality: Right;                        PT Assessment/Plan       Row Name 06/14/23 1100          PT Assessment    Assessment Comments Ms Motta reports her knee has stopped hurting at night allowing her better quality of sleep. Able to progress ther ex wth resisted side stepping and added monster walk FW/BW but not yet for home.  Observed her on 4 stairs and she is currently step over step with ascending but backwards step-to with descending. Worked on step down eccentric lowering with involved R and progressed through 2\", 4\" and 6\"; she was able to descend stairs step over step with 1 handrail simulating home environment. We discussed safety first " and to only try at home if she feels comfortable, we will practice again.  Updated HEP with advanced ther ex but reminded her to continue knee stretching into flex and extension. She remains good candidate for skilled therapy services.  -JA        PT Plan    PT Plan Comments assess response to last visit (began descending step with noninvolved to lower with involved knee), reinforce monster walk and issue for HEP if appropriate, cont to work on functional mobility for home and community setting.  -JA               User Key  (r) = Recorded By, (t) = Taken By, (c) = Cosigned By      Initials Name Provider Type    Ambreen Mccallum, PT Physical Therapist                       OP Exercises       Row Name 06/14/23 1100             Subjective Comments    Subjective Comments It's not waking me up at night now, able to sleep better.  -ANDREY         Subjective Pain    Able to rate subjective pain? yes  -JA      Pre-Treatment Pain Level 2  -JA         Total Minutes    52369 - PT Therapeutic Exercise Minutes 35  -JA      69982 -  PT Neuromuscular Reeducation Minutes 10  -JA         Exercise 2    Exercise Name 2 RCB, seat 6  -JA      Cueing 2 Verbal  -JA      Time 2 5min  -JA      Additional Comments moved to seat 5 for last 2 min  -JA         Exercise 3    Exercise Name 3 Stair HS str  -JA      Reps 3 3  -JA      Time 3 20sec  -JA         Exercise 4    Exercise Name 4 walking marches  -JA      Cueing 4 Verbal;Demo  -JA      Reps 4 3 laps  -JA      Time 4 emphasis on SLS time with pause hold  -JA      Additional Comments fingertips on barre  -JA         Exercise 5    Exercise Name 5 R knee flex step  -JA      Cueing 5 Verbal  -JA      Reps 5 3R  -JA      Time 5 20 sec  -JA      Additional Comments required review of form/technique  -JA         Exercise 6    Exercise Name 6 fwd step up and overs  -JA      Cueing 6 Verbal;Demo  -JA      Sets 6 2  -JA      Reps 6 10  -JA      Time 6 mimic curb stepping  -JA      Additional  "Comments lead with R and then with L  -JA         Exercise 7    Exercise Name 7 STS  -JA      Cueing 7 Verbal  -JA      Sets 7 --  -JA      Reps 7 discussed only  -JA      Time 7 --  -JA         Exercise 9    Exercise Name 9 step down 2\" foam, 4\" step  -JA      Reps 9 10 ea leading with L for eccentric work on R quad  -JA         Exercise 10    Exercise Name 10 Step tap to 6\" step  -JA      Cueing 10 Verbal;Demo  -JA      Reps 10 15ea  -JA         Exercise 11    Exercise Name 11 Sidestepping  -JA      Cueing 11 Verbal;Demo  -JA      Reps 11 3 laps  -JA      Time 11 GTB  -JA         Exercise 12    Exercise Name 12 monster walk  -JA      Reps 12 3  -JA      Time 12 GTB  -JA      Additional Comments 1 UE on barre  -JA         Exercise 13    Exercise Name 13 up/down 4 stairs  -JA      Sets 13 with charlene rails  then 1 rail only  -JA      Reps 13 5  -JA      Time 13 worked on forward step down (she has been going down backward)  -JA      Additional Comments discussed that safety comes first and to only change to forward if she is comfortable and feels safe doing it at home.  -JA         Exercise 17    Exercise Name 17 step up 6\"  -JA      Cueing 17 Verbal;Demo  -JA      Reps 17 15ea  -JA      Time 17 single HR assist  -JA         Exercise 18    Exercise Name 18 heel raises  -JA      Cueing 18 Verbal;Demo  -JA      Reps 18 20  -JA                User Key  (r) = Recorded By, (t) = Taken By, (c) = Cosigned By      Initials Name Provider Type    Ambreen Mccallum, PT Physical Therapist                                                    Time Calculation:   Start Time: 1100  Stop Time: 1145  Time Calculation (min): 45 min  Timed Charges  31134 - PT Therapeutic Exercise Minutes: 35  88565 -  PT Neuromuscular Reeducation Minutes: 10  Total Minutes  Timed Charges Total Minutes: 45   Total Minutes: 45  Therapy Charges for Today       Code Description Service Date Service Provider Modifiers Qty    [id number]  PT NEUROMUSC RE " EDUCATION EA 15 MIN 6/14/2023 Ambreen Miller, PT GP 1    [id number]  PT THER PROC EA 15 MIN 6/14/2023 Ambreen Miller, PT GP 2                      Ambreen Miller, PT  6/14/2023

## 2023-07-26 ENCOUNTER — OFFICE VISIT (OUTPATIENT)
Dept: ORTHOPEDIC SURGERY | Facility: CLINIC | Age: 83
End: 2023-07-26
Payer: MEDICARE

## 2023-07-26 VITALS — HEIGHT: 63 IN | WEIGHT: 165 LBS | TEMPERATURE: 97.7 F | BODY MASS INDEX: 29.23 KG/M2

## 2023-07-26 DIAGNOSIS — R52 PAIN: Primary | ICD-10-CM

## 2023-07-26 DIAGNOSIS — M17.12 ARTHRITIS OF LEFT KNEE: ICD-10-CM

## 2023-07-26 RX ORDER — CHLORHEXIDINE GLUCONATE 500 MG/1
CLOTH TOPICAL TAKE AS DIRECTED
OUTPATIENT
Start: 2023-07-26

## 2023-07-26 RX ORDER — LIDOCAINE HYDROCHLORIDE 10 MG/ML
2 INJECTION, SOLUTION EPIDURAL; INFILTRATION; INTRACAUDAL; PERINEURAL
Status: COMPLETED | OUTPATIENT
Start: 2023-07-26 | End: 2023-07-26

## 2023-07-26 RX ORDER — MELOXICAM 7.5 MG/1
15 TABLET ORAL ONCE
OUTPATIENT
Start: 2023-07-26 | End: 2023-07-26

## 2023-07-26 RX ORDER — ACETAMINOPHEN 325 MG/1
1000 TABLET ORAL ONCE
OUTPATIENT
Start: 2023-07-26 | End: 2023-07-26

## 2023-07-26 RX ORDER — PREGABALIN 75 MG/1
75 CAPSULE ORAL ONCE
OUTPATIENT
Start: 2023-07-26 | End: 2023-07-26

## 2023-07-26 RX ORDER — METHYLPREDNISOLONE ACETATE 80 MG/ML
80 INJECTION, SUSPENSION INTRA-ARTICULAR; INTRALESIONAL; INTRAMUSCULAR; SOFT TISSUE
Status: COMPLETED | OUTPATIENT
Start: 2023-07-26 | End: 2023-07-26

## 2023-07-26 RX ADMIN — METHYLPREDNISOLONE ACETATE 80 MG: 80 INJECTION, SUSPENSION INTRA-ARTICULAR; INTRALESIONAL; INTRAMUSCULAR; SOFT TISSUE at 09:53

## 2023-07-26 RX ADMIN — LIDOCAINE HYDROCHLORIDE 2 ML: 10 INJECTION, SOLUTION EPIDURAL; INFILTRATION; INTRACAUDAL; PERINEURAL at 09:53

## 2023-07-26 NOTE — PROGRESS NOTES
Patient Name: Ashley Motta   YOB: 1940  Referring Primary Care Physician: Angela Carbajal MD  BMI: Body mass index is 29.23 kg/m².    Chief Complaint:    Chief Complaint   Patient presents with    Left Knee - Follow-up, Pain    Right Knee - Follow-up, Pain        HPI:     Ashley Motta is a 83 y.o. female who presents today for evaluation of   Chief Complaint   Patient presents with    Left Knee - Follow-up, Pain    Right Knee - Follow-up, Pain   .  Patient follows up today status post right total knee in April and she is doing very well with that.  She says it is her good knee.  She is essentially from 0 to about 125 degrees and she has good stability no unusual swelling etc. occasionally she gets some pains in it but not very severe her left knee is bothering her she wants to talk about doing a total knee replacement      Subjective   Medications:   Home Medications:  Current Outpatient Medications on File Prior to Visit   Medication Sig    acetaminophen (TYLENOL) 500 MG tablet Take 1 tablet by mouth Every 6 (Six) Hours As Needed for Mild Pain or Moderate Pain.    ASPIRIN 81 PO Take 81 mg by mouth Daily.    Multiple Vitamins-Minerals (Daily Multivitamin) capsule Take 1 tablet by mouth Daily.    pantoprazole (Protonix) 20 MG EC tablet Take 1 tablet by mouth Daily. Before dinner    rOPINIRole (REQUIP) 1 MG tablet TAKE 1 TABLET BY MOUTH ONE HOUR BEFORE BEDTIME    simvastatin (ZOCOR) 20 MG tablet Take 1 tablet by mouth Every Night.     No current facility-administered medications on file prior to visit.     Current Medications:  Scheduled Meds:  Continuous Infusions:No current facility-administered medications for this visit.    PRN Meds:.    I have reviewed the patient's medical history in detail and updated the computerized patient record.  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 SARAH LIRIANO    Eczema 07/2014    Endothelial corneal dystrophy 02/2020    Genital prolapse 10/2017    GERD (gastroesophageal reflux disease)     Goiter, nontoxic, multinodular 05/2017    THYROID POLYP SEES     Hemorrhoids     Herpes zoster 05/2018    Hyperlipidemia     Impaired fasting glucose     Migraine     BALDEMAR on CPAP     FOLLOWED BY DR. TERRY CHEUNG    Osteoarthritis     Osteopenia     Postmenopausal atrophic vaginitis     Rectal nodule 03/2020    REFERRED TO DR. PAMELA NAPOLES    Rectocele 07/2017    Restless leg syndrome     Right knee pain     RLS (restless legs syndrome)     Seborrheic keratosis     Skin cancer 04/2015    LOWER LEG, FOLLOWED BY DR. ATUL SANCHEZ    Vaginal vault prolapse 09/2017    Vertigo         Past Surgical History:   Procedure Laterality Date    CATARACT EXTRACTION, BILATERAL Bilateral 2015    COLONOSCOPY N/A 02/27/2015    1 [facility]UBULAR [facility]ENOMA [facility]    COLONOSCOPY N/A 03/04/2020    10 MM SESSILE SERRATED ADENOMA POLYP IN ASCENDING, 3 MM HYPERPLASTIC POLYP IN RECTUM, 7 MM ANAL NODULE, DR. SARAH LIRIANO AT [facility]    HYSTERECTOMY N/A 01/19/2004    KAYLEE WITH BSO, DR. RAFIA MORTENSEN AT [facility]    KNEE ARTHROSCOPY Left 2013    KNEE ARTHROSCOPY Right 2010    MELISSA CULDOPLASTY N/A 01/19/2004    DR. RAFIA MORTENSEN AT [facility]    SACROCOLPOPEXY N/A     TOTAL KNEE ARTHROPLASTY Right 4/11/2023    Procedure: RIGHT TOTAL KNEE ARTHROPLASTY WITH SHERI NAVIGATION;  Surgeon: Atul Liriano MD;  [place] [facility] [place] [facility] [place]  Service: Orthopedics;  Laterality: Right;        Social History     Occupational History    Not on file   Tobacco Use    Smoking status: Never    Smokeless tobacco: Never   Vaping Use    Vaping Use: Never used   Substance and Sexual Activity    Alcohol use: No    Drug use: Never    Sexual activity: Not Currently     Birth control/protection: Post-menopausal, Surgical      Social History     Social History  "Narrative    Not on file        Family History   Problem Relation Age of Onset    Heart disease Mother     Lung cancer Father     Hypertension Father     Heart disease Father     Cancer Father     Heart attack Brother     Emphysema Brother     COPD Brother     Depression Brother     Heart attack Brother         Had stents placed 2009    Breast cancer Neg Hx     Malig Hyperthermia Neg Hx        ROS: 14 point review of systems was performed and all other systems were reviewed and are negative except for documented findings in HPI and today's encounter.     Allergies: No Known Allergies  Constitutional:  Denies fever, shaking or chills   Eyes:  Denies change in visual acuity   HENT:  Denies nasal congestion or sore throat   Respiratory:  Denies cough or shortness of breath   Cardiovascular:  Denies chest pain or severe LE edema   GI:  Denies abdominal pain, nausea, vomiting, bloody stools or diarrhea   Musculoskeletal:  Numbness, tingling, pain, or loss of motor function only as noted above in history of present illness.  : Denies painful urination or hematuria  Integument:  Denies rash, lesion or ulceration   Neurologic:  Denies headache or focal weakness  Endocrine:  Denies lymphadenopathy  Psych:  Denies confusion or change in mental status   Hem:  Denies active bleeding    OBJECTIVE:  Physical Exam: 83 y.o. female  Wt Readings from Last 3 Encounters:   07/26/23 74.8 kg (165 lb)   06/26/23 74.8 kg (165 lb)   05/26/23 75.6 kg (166 lb 11.2 oz)     Ht Readings from Last 1 Encounters:   07/26/23 160 cm (63\")     Body mass index is 29.23 kg/m².  Vitals:    07/26/23 0945   Temp: 97.7 °F (36.5 °C)     Vital signs reviewed.     General Appearance:    Alert, cooperative, in no acute distress                  Eyes: conjunctiva clear  ENT: external ears and nose atraumatic  CV: no peripheral edema  Resp: normal respiratory effort  Skin: no rashes or wounds; normal turgor  Psych: mood and affect appropriate  Lymph: no nodes " appreciated  Neuro: gross sensation intact  Vascular:  Palpable peripheral pulse in noted extremity  Musculoskeletal Extremities: Exam the right knee goes 0 to about 125 good stability no unusual swelling with a mature scar left knee has varus crepitation synovitis swelling some stiffness and medial joint line tenderness    Radiology:   X-ray AP lateral 40 degree PA and sunrise view of the knees taken the office today for knee pain and postop follow-up respectively show well aligned total knee replacement on the right and end-stage tricompartmental arthritis in the left with bone-on-bone and osteophytes and subchondral sclerosis        Assessment:     ICD-10-CM ICD-9-CM   1. Pain  R52 780.96   2. Arthritis of left knee  M17.12 716.96        MDM/Plan:   The diagnosis(es), natural history, pathophysiology and treatment for diagnosis(es) were discussed. Opportunity given and questions answered.  Biomechanics of pertinent body areas discussed.  When appropriate, the use of ambulatory aids discussed.    EXERCISES:  Advice on benefits of, and types of regular/moderate exercise pertaining to orthopedic diagnosis(es).  MEDICATIONS:  The risks, benefits, warnings,side effects and alternatives of medications discussed.  Cortisone Injection. See procedure note.  HOME EXERCISE/PT program encouraged  MEDICAL RECORDS reviewed from other provider(s) for past and current medical history pertinent to this complaint.  Total Knee Replacement : Continuation of conservative management vs. TKA: I reviewed anatomy of a total knee arthroplasty in laymen's terms, as well as typical postoperative recovery and possibly 6-12 months for maximal recovery, and possible need for rehabilitation stay after hospitalization. We also discussed risks, benefits, alternatives, and limitations of procedure with risks including but not limited to neurovascular damage, bleeding, infection, malalignment, chronic pain, failure of implants, osteolysis,  loosening of implants, loss of motion, weakness, stiffness, instability, DVT, pulmonary embolus, death, stroke, complex regional pain syndrome, myocardial infarction, and need for additional procedures. Concept of substitution vs. replacement discussed.  No guarantees were given regarding results of surgery.  Patient verbalized understanding, and was given the opportunity to ask and have all questions answered today.       7/26/2023    Dictated utilizing Dragon dictation        Large Joint Arthrocentesis: L knee  Date/Time: 7/26/2023 9:53 AM  Consent given by: patient  Site marked: site marked  Timeout: Immediately prior to procedure a time out was called to verify the correct patient, procedure, equipment, support staff and site/side marked as required   Supporting Documentation  Indications: pain   Procedure Details  Location: knee - L knee  Preparation: Patient was prepped and draped in the usual sterile fashion  Needle gauge: 21G.  Medications administered: 80 mg methylPREDNISolone acetate 80 MG/ML; 2 mL lidocaine PF 1% 1 %  Patient tolerance: patient tolerated the procedure well with no immediate complications

## 2023-08-03 DIAGNOSIS — N39.0 ACUTE UTI: ICD-10-CM

## 2023-08-03 RX ORDER — NITROFURANTOIN 25; 75 MG/1; MG/1
100 CAPSULE ORAL 2 TIMES DAILY
Qty: 6 CAPSULE | Refills: 0 | Status: SHIPPED | OUTPATIENT
Start: 2023-08-03 | End: 2023-08-06

## 2023-08-25 ENCOUNTER — TELEPHONE (OUTPATIENT)
Dept: ORTHOPEDIC SURGERY | Facility: CLINIC | Age: 83
End: 2023-08-25

## 2023-08-25 NOTE — TELEPHONE ENCOUNTER
UNABLE TO WT    Caller: JACK STALLWORTH     Relationship to patient: PATIENT     Best call back number: [phone]    Patient is needing: REQUEST SX   WOULD LIKE TO CHANGE RAMIRO INJECTION FOR LEFT KNEE 10/27/23 TO PRE OP IF POSSIBLE - WANTS TO GO FORWARD WITH LEFT KNEE SX

## 2023-10-24 ENCOUNTER — PRE-ADMISSION TESTING (OUTPATIENT)
Dept: PREADMISSION TESTING | Facility: HOSPITAL | Age: 83
End: 2023-10-24
Payer: MEDICARE

## 2023-10-24 VITALS
DIASTOLIC BLOOD PRESSURE: 76 MMHG | TEMPERATURE: 98.3 F | BODY MASS INDEX: 29.13 KG/M2 | WEIGHT: 164.4 LBS | RESPIRATION RATE: 18 BRPM | OXYGEN SATURATION: 98 % | HEART RATE: 92 BPM | HEIGHT: 63 IN | SYSTOLIC BLOOD PRESSURE: 134 MMHG

## 2023-10-24 LAB
ANION GAP SERPL CALCULATED.3IONS-SCNC: 12 MMOL/L (ref 5–15)
BUN SERPL-MCNC: 15 MG/DL (ref 8–23)
BUN/CREAT SERPL: 21.7 (ref 7–25)
CALCIUM SPEC-SCNC: 9.7 MG/DL (ref 8.6–10.5)
CHLORIDE SERPL-SCNC: 104 MMOL/L (ref 98–107)
CO2 SERPL-SCNC: 23 MMOL/L (ref 22–29)
CREAT SERPL-MCNC: 0.69 MG/DL (ref 0.57–1)
DEPRECATED RDW RBC AUTO: 46.1 FL (ref 37–54)
EGFRCR SERPLBLD CKD-EPI 2021: 87.3 ML/MIN/1.73
ERYTHROCYTE [DISTWIDTH] IN BLOOD BY AUTOMATED COUNT: 13.9 % (ref 12.3–15.4)
GLUCOSE SERPL-MCNC: 138 MG/DL (ref 65–99)
HCT VFR BLD AUTO: 42.7 % (ref 34–46.6)
HGB BLD-MCNC: 14 G/DL (ref 12–15.9)
MCH RBC QN AUTO: 29.5 PG (ref 26.6–33)
MCHC RBC AUTO-ENTMCNC: 32.8 G/DL (ref 31.5–35.7)
MCV RBC AUTO: 90.1 FL (ref 79–97)
PLATELET # BLD AUTO: 298 10*3/MM3 (ref 140–450)
PMV BLD AUTO: 11.2 FL (ref 6–12)
POTASSIUM SERPL-SCNC: 4 MMOL/L (ref 3.5–5.2)
QT INTERVAL: 379 MS
QTC INTERVAL: 440 MS
RBC # BLD AUTO: 4.74 10*6/MM3 (ref 3.77–5.28)
SODIUM SERPL-SCNC: 139 MMOL/L (ref 136–145)
WBC NRBC COR # BLD: 8.02 10*3/MM3 (ref 3.4–10.8)

## 2023-10-24 PROCEDURE — 36415 COLL VENOUS BLD VENIPUNCTURE: CPT

## 2023-10-24 PROCEDURE — 93005 ELECTROCARDIOGRAM TRACING: CPT

## 2023-10-24 PROCEDURE — 85027 COMPLETE CBC AUTOMATED: CPT

## 2023-10-24 PROCEDURE — 80048 BASIC METABOLIC PNL TOTAL CA: CPT

## 2023-10-24 RX ORDER — SILICONE ADHESIVE 1.5" X 3"
1 SHEET (EA) TOPICAL 2 TIMES DAILY
COMMUNITY
Start: 2023-08-11

## 2023-10-24 NOTE — DISCHARGE INSTRUCTIONS
Take the following medications the morning of surgery:  NONE    YOU WILL BE NOTIFIED OF ARRIVAL TIME    If you are on prescription narcotic pain medication to control your pain you may also take that medication the morning of surgery.    General Instructions:  Do not eat solid food after midnight the night before surgery.  You may drink clear liquids day of surgery but must stop at least one hour before your hospital arrival time.  It is beneficial for you to have a clear drink that contains carbohydrates the day of surgery.  We suggest a 12 to 20 ounce bottle of Gatorade or Powerade for non-diabetic patients or a 12 to 20 ounce bottle of G2 or Powerade Zero for diabetic patients. (Pediatric patients, are not advised to drink a 12 to 20 ounce carbohydrate drink)    Clear liquids are liquids you can see through.  Nothing red in color.     Plain water                               Sports drinks  Sodas                                   Gelatin (Jell-O)  Fruit juices without pulp such as white grape juice and apple juice  Popsicles that contain no fruit or yogurt  Tea or coffee (no cream or milk added)  Gatorade / Powerade  G2 / Powerade Zero    Infants may have breast milk up to four hours before surgery.  Infants drinking formula may drink formula up to six hours before surgery.   Patients who avoid smoking, chewing tobacco and alcohol for 4 weeks prior to surgery have a reduced risk of post-operative complications.  Quit smoking as many days before surgery as you can.  Do not smoke, use chewing tobacco or drink alcohol the day of surgery.   If applicable bring your C-PAP/ BI-PAP machine in with you to preop day of surgery.  Bring any papers given to you in the doctor’s office.  Wear clean comfortable clothes.  Do not wear contact lenses, false eyelashes or make-up.  Bring a case for your glasses.   Bring crutches or walker if applicable.  Remove all piercings.  Leave jewelry and any other valuables at home.  Hair  extensions with metal clips must be removed prior to surgery.  The Pre-Admission Testing nurse will instruct you to bring medications if unable to obtain an accurate list in Pre-Admission Testing.        If you were given a blood bank ID arm band remember to bring it with you the day of surgery.    Preventing a Surgical Site Infection:  For 2 to 3 days before surgery, avoid shaving with a razor because the razor can irritate skin and make it easier to develop an infection.    Any areas of open skin can increase the risk of a post-operative wound infection by allowing bacteria to enter and travel throughout the body.  Notify your surgeon if you have any skin wounds / rashes even if it is not near the expected surgical site.  The area will need assessed to determine if surgery should be delayed until it is healed.  The night prior to surgery shower using a fresh bar of anti-bacterial soap (such as Dial) and clean washcloth.  Sleep in a clean bed with clean clothing.  Do not allow pets to sleep with you.  Shower on the morning of surgery using a fresh bar of anti-bacterial soap (such as Dial) and clean washcloth.  Dry with a clean towel and dress in clean clothing.  Ask your surgeon if you will be receiving antibiotics prior to surgery.  Make sure you, your family, and all healthcare providers clean their hands with soap and water or an alcohol based hand  before caring for you or your wound.    Day of surgery: 11/14/2023  Your arrival time is approximately two hours before your scheduled surgery time.  Upon arrival, a Pre-op nurse and Anesthesiologist will review your health history, obtain vital signs, and answer questions you may have.  The only belongings needed at this time will be a list of your home medications and if applicable your C-PAP/BI-PAP machine.  A Pre-op nurse will start an IV and you may receive medication in preparation for surgery, including something to help you relax.     Please be aware  that surgery does come with discomfort.  We want to make every effort to control your discomfort so please discuss any uncontrolled symptoms with your nurse.   Your doctor will most likely have prescribed pain medications.      If you are going home after surgery you will receive individualized written care instructions before being discharged.  A responsible adult must drive you to and from the hospital on the day of your surgery and stay with you for 24 hours.  Discharge prescriptions can be filled by the hospital pharmacy during regular pharmacy hours.  If you are having surgery late in the day/evening your prescription may be e-prescribed to your pharmacy.  Please verify your pharmacy hours or chose a 24 hour pharmacy to avoid not having access to your prescription because your pharmacy has closed for the day.    If you are staying overnight following surgery, you will be transported to your hospital room following the recovery period.  [facility] has all private rooms.    If you have any questions please call Pre-Admission Testing at [phone].  Deductibles and co-payments are collected on the day of service. Please be prepared to pay the required co-pay, deductible or deposit on the day of service as defined by your plan.    Call your surgeon immediately if you experience any of the following symptoms:  Sore Throat  Shortness of Breath or difficulty breathing  Cough  Chills  Body soreness or muscle pain  Headache  Fever  New loss of taste or smell  Do not arrive for your surgery ill.  Your procedure will need to be rescheduled to another time.  You will need to call your physician before the day of surgery to avoid any unnecessary exposure to hospital staff as well as other patients.      CHLORHEXIDINE CLOTH INSTRUCTIONS  The morning of surgery follow these instructions using the Chlorhexidine cloths you've been given.  These steps reduce bacteria on the body.  Do not use the cloths near  your eyes, ears mouth, genitalia or on open wounds.  Throw the cloths away after use but do not try to flush them down a toilet.      Open and remove one cloth at a time from the package.    Leave the cloth unfolded and begin the bathing.  Massage the skin with the cloths using gentle pressure to remove bacteria.  Do not scrub harshly.   Follow the steps below with one 2% CHG cloth per area (6 total cloths).  One cloth for neck, shoulders and chest.  One cloth for both arms, hands, fingers and underarms (do underarms last).  One cloth for the abdomen followed by groin.  One cloth for right leg and foot including between the toes.  One cloth for left leg and foot including between the toes.  The last cloth is to be used for the back of the neck, back and buttocks.    Allow the CHG to air dry 3 minutes on the skin which will give it time to work and decrease the chance of irritation.  The skin may feel sticky until it is dry.  Do not rinse with water or any other liquid or you will lose the beneficial effects of the CHG.  If mild skin irritation occurs, do rinse the skin to remove the CHG.  Report this to the nurse at time of admission.  Do not apply lotions, creams, ointments, deodorants or perfumes after using the clothes. Dress in clean clothes before coming to the hospital.

## 2023-10-27 ENCOUNTER — OFFICE VISIT (OUTPATIENT)
Dept: INTERNAL MEDICINE | Facility: CLINIC | Age: 83
End: 2023-10-27
Payer: MEDICARE

## 2023-10-27 VITALS
SYSTOLIC BLOOD PRESSURE: 130 MMHG | WEIGHT: 158 LBS | HEART RATE: 78 BPM | DIASTOLIC BLOOD PRESSURE: 78 MMHG | HEIGHT: 63 IN | RESPIRATION RATE: 18 BRPM | BODY MASS INDEX: 28 KG/M2

## 2023-10-27 DIAGNOSIS — K21.9 GASTRO-ESOPHAGEAL REFLUX DISEASE WITHOUT ESOPHAGITIS: Chronic | ICD-10-CM

## 2023-10-27 DIAGNOSIS — Z76.89 ESTABLISHING CARE WITH NEW DOCTOR, ENCOUNTER FOR: Primary | ICD-10-CM

## 2023-10-27 DIAGNOSIS — G25.81 RESTLESS LEG SYNDROME: ICD-10-CM

## 2023-10-27 DIAGNOSIS — E78.5 HYPERLIPIDEMIA, UNSPECIFIED HYPERLIPIDEMIA TYPE: Chronic | ICD-10-CM

## 2023-10-27 DIAGNOSIS — M81.6 LOCALIZED OSTEOPOROSIS WITHOUT CURRENT PATHOLOGICAL FRACTURE: ICD-10-CM

## 2023-10-27 PROBLEM — G47.33 OBSTRUCTIVE SLEEP APNEA: Status: RESOLVED | Noted: 2023-04-13 | Resolved: 2023-10-27

## 2023-10-27 RX ORDER — PANTOPRAZOLE SODIUM 20 MG/1
20 TABLET, DELAYED RELEASE ORAL DAILY
Qty: 90 TABLET | Refills: 4 | Status: SHIPPED | OUTPATIENT
Start: 2023-10-27

## 2023-10-27 RX ORDER — SIMVASTATIN 20 MG
20 TABLET ORAL NIGHTLY
Qty: 90 TABLET | Refills: 3 | Status: SHIPPED | OUTPATIENT
Start: 2023-10-27

## 2023-10-27 RX ORDER — ROPINIROLE 1 MG/1
TABLET, FILM COATED ORAL
Qty: 90 TABLET | Refills: 4 | Status: SHIPPED | OUTPATIENT
Start: 2023-10-27

## 2023-10-27 NOTE — PROGRESS NOTES
"Chief Complaint  Establish Care and Knee Pain (Getting knee replacement )    Subjective        Ashley Motta presents to [facility]  History of Present Illness    She is establishing care with me today.  Former PCP Dr. Carbajal.    She is about to get her left knee replaced next month.  She has a history of RLS, GERD, and hyperlipidemia.  All of these conditions are controlled on the medication listed below.  Taking as prescribed but only takes the PPI as needed.    Osteoporosis left hip but due to knee surgeries planning to hold off on Fosamax until she is recovered from the upcoming surgery in November.        Current Outpatient Medications:     acetaminophen (TYLENOL) 500 MG tablet, Take 1 tablet by mouth Every 6 (Six) Hours As Needed for Mild Pain or Moderate Pain., Disp: , Rfl:     ASPIRIN 81 PO, Take 81 mg by mouth Daily. INSTRUCTED PT TO FOLLOW MD INSTRUCTIONS REGARDING HOLDING FOR SURGERY, Disp: , Rfl:     Multiple Vitamins-Minerals (Daily Multivitamin) capsule, Take 1 tablet by mouth Daily. HOLD PRIOR TO SURG, Disp: , Rfl:     pantoprazole (Protonix) 20 MG EC tablet, Take 1 tablet by mouth Daily. Before dinner, Disp: 90 tablet, Rfl: 4    rOPINIRole (REQUIP) 1 MG tablet, TAKE 1 TABLET BY MOUTH ONE HOUR BEFORE BEDTIME, Disp: 90 tablet, Rfl: 4    simvastatin (ZOCOR) 20 MG tablet, Take 1 tablet by mouth Every Night., Disp: 90 tablet, Rfl: 3    sodium chloride (MICHEL 128) 5 % ophthalmic solution, Administer 1 drop to both eyes 2 (Two) Times a Day., Disp: , Rfl:      Objective   Vital Signs:  /78 (BP Location: Left arm, Patient Position: Sitting, Cuff Size: Small Adult)   Pulse 78   Resp 18   Ht 160 cm (63\")   Wt 71.7 kg (158 lb)   BMI 27.99 kg/m²   Estimated body mass index is 27.99 kg/m² as calculated from the following:    Height as of this encounter: 160 cm (63\").    Weight as of this encounter: 71.7 kg (158 lb).               Physical Exam  Vitals and nursing note " reviewed.   Constitutional:       General: She is not in acute distress.     Appearance: Normal appearance.   Cardiovascular:      Rate and Rhythm: Normal rate and regular rhythm.      Heart sounds: Normal heart sounds. No murmur heard.  Pulmonary:      Effort: Pulmonary effort is normal.      Breath sounds: Normal breath sounds.   Neurological:      Mental Status: She is alert.        Result Review :  The following data was reviewed by: Tomás Martines MD on 10/27/2023:  Common labs          4/13/2023    05:08 6/26/2023    09:22 10/24/2023    13:55   Common Labs   Glucose  127  138    BUN  10  15    Creatinine  0.71  0.69    Sodium  139  139    Potassium  4.6  4.0    Chloride  102  104    Calcium  10.0  9.7    Total Protein  7.0     Albumin  4.6     Total Bilirubin  0.3     Alkaline Phosphatase  99     AST (SGOT)  30     ALT (SGPT)  34     WBC   8.02    Hemoglobin 12.4   14.0    Hematocrit 37.4   42.7    Platelets   298    Total Cholesterol  132     Triglycerides  131     HDL Cholesterol  53     LDL Cholesterol   56     Hemoglobin A1C  6.3           I also reviewed her ECG from PAT 10/24/2023           Assessment and Plan   Diagnoses and all orders for this visit:    1. Establishing care with new doctor, encounter for (Primary)    2. Restless leg syndrome  -     rOPINIRole (REQUIP) 1 MG tablet; TAKE 1 TABLET BY MOUTH ONE HOUR BEFORE BEDTIME  Dispense: 90 tablet; Refill: 4    3. Hyperlipidemia, unspecified hyperlipidemia type  -     simvastatin (ZOCOR) 20 MG tablet; Take 1 tablet by mouth Every Night.  Dispense: 90 tablet; Refill: 3  -     CBC (No Diff); Future  -     Comprehensive Metabolic Panel; Future  -     Lipid Panel With LDL / HDL Ratio; Future    4. Gastro-esophageal reflux disease without esophagitis  -     pantoprazole (Protonix) 20 MG EC tablet; Take 1 tablet by mouth Daily. Before dinner  Dispense: 90 tablet; Refill: 4    5. Localized osteoporosis without current pathological fracture  -     CBC (No  Diff); Future  -     Comprehensive Metabolic Panel; Future  -     Phosphorus; Future      Clinically stable chronic conditions as above.  Continue all medications as above.  Labs reviewed/ordered as above.  I have no concerns about her having the upcoming surgery.  Plan to revisit starting Fosamax at next visit after surgery complete.           Follow Up   Return in about 4 months (around 2/27/2024) for Medicare Wellness.  Patient was given instructions and counseling regarding her condition or for health maintenance advice. Please see specific information pulled into the AVS if appropriate.

## 2023-10-27 NOTE — PATIENT INSTRUCTIONS
"MyChart Tips:    MyChart is a useful part of our patient care program and is a great way for us to communicate lab results and for you to request refills.  Not all medical questions are appropriate for MyChart such as new medical issues that require taking a history, performing an exam, getting labs/studies or researching medical questions needed to be addressed in the office.    Examples of medical issues that are APPROPRIATE for MyChart:  -Follow-up on problems we have already addressed in a visit such as home testing results, blood pressure readings, glucose readings  -Questions that can be answered with a simple \"yes\" or \"no\"    Communication that is NOT APPROPRIATE for MyChart:  -MyChart is not for new problems, serious problems or urgent problems.  Urgent matters should be addressed by phone, in the office, urgent care or the ER.  -[employer] messages are not email.  Staff will check messages each weekday.  We strive for a 48-hour turnaround on messaging.    -[employer] is not for private issues.  Messages are received first by our office staff.    Please allow up to 7 business days for lab results to be sent through [employer] to you before contacting your provider.  Sometimes we are waiting for results to get back from the lab and also your provider needs time to analyze them thoroughly before making recommendations.  While the internet has great resources, it is not a substitute for interpreting lab results.    We also ask that you not send [employer] messages and telephone calls regarding the same issue simultaneously.  This slows down the process of returning your call/message and confuses staff.    Be mindful that [email or web address] messages and telephone calls become part of your permanent medical record.    Lastly, your provider cannot access your [email or web address].  It is a service which communicates with the EHR (Electronic Health Record).  Sometimes there are errors in [employer] that staff and providers cannot see and these errors " are not part of your EHR.  Vaccines and screening reminders have been incorrect in MyChart.    We appreciate your respect for the limitations and boundaries of [employer].

## 2023-10-31 ENCOUNTER — OFFICE VISIT (OUTPATIENT)
Dept: ORTHOPEDIC SURGERY | Facility: CLINIC | Age: 83
End: 2023-10-31
Payer: MEDICARE

## 2023-10-31 VITALS — HEIGHT: 63 IN | TEMPERATURE: 96.9 F | WEIGHT: 165.9 LBS | BODY MASS INDEX: 29.39 KG/M2

## 2023-10-31 DIAGNOSIS — M17.12 ARTHRITIS OF KNEE, LEFT: Primary | ICD-10-CM

## 2023-10-31 PROCEDURE — 1159F MED LIST DOCD IN RCRD: CPT | Performed by: NURSE PRACTITIONER

## 2023-10-31 PROCEDURE — 1160F RVW MEDS BY RX/DR IN RCRD: CPT | Performed by: NURSE PRACTITIONER

## 2023-10-31 PROCEDURE — S0260 H&P FOR SURGERY: HCPCS | Performed by: NURSE PRACTITIONER

## 2023-10-31 NOTE — PROGRESS NOTES
"   History & Physical       Patient: Ashley Motta  YOB: 1940  Medical Record Number: [id number]  Wt Readings from Last 3 Encounters:   10/31/23 75.3 kg (165 lb 14.4 oz)   10/27/23 71.7 kg (158 lb)   10/24/23 74.6 kg (164 lb 6.4 oz)     Ht Readings from Last 3 Encounters:   10/31/23 160 cm (63\")   10/27/23 160 cm (63\")   10/24/23 160 cm (63\")     Body mass index is 29.39 kg/m².  Facility age limit for growth %connie is 20 years.    Surgeon:  Dr. Atul Rico    Chief Complaints:   Chief Complaint   Patient presents with    Left Knee - Pre-op Exam     Surgery: Left Total Knee Arthroplasty with [place] Navigation    Subjective:    History of Present Illness: 83 y.o. female presents with   Chief Complaint   Patient presents with    Left Knee - Pre-op Exam   . Chronic symptoms have been progressively worsening despite more conservative treatment measures including medications OTC and prescription, cortisone injections and physical therapy.  Symptoms are associated with ability to move, exercise, and perform activities of daily living.  Symptoms are aggravated by weight bearing and ROM necessary for activities of daily living.   Symptoms improve with rest, ice and elevation only minimally.      Allergies: No Known Allergies    Medications:   Home Medications:  Current Outpatient Medications on File Prior to Visit   Medication Sig    acetaminophen (TYLENOL) 500 MG tablet Take 1 tablet by mouth Every 6 (Six) Hours As Needed for Mild Pain or Moderate Pain.    ASPIRIN 81 PO Take 81 mg by mouth Daily. INSTRUCTED PT TO FOLLOW MD INSTRUCTIONS REGARDING HOLDING FOR SURGERY    Multiple Vitamins-Minerals (Daily Multivitamin) capsule Take 1 tablet by mouth Daily. HOLD PRIOR TO SURG    pantoprazole (Protonix) 20 MG EC tablet Take 1 tablet by mouth Daily. Before dinner    rOPINIRole (REQUIP) 1 MG tablet TAKE 1 TABLET BY MOUTH ONE HOUR BEFORE BEDTIME    simvastatin (ZOCOR) 20 MG tablet Take 1 tablet by mouth Every " Night.    sodium chloride (MICHEL 128) 5 % ophthalmic solution Administer 1 drop to both eyes 2 (Two) Times a Day.     No current facility-administered medications on file prior to visit.     Current Medications:  Scheduled Meds:  Continuous Infusions:No current facility-administered medications for this visit.    PRN Meds:.    I have reviewed the patient's medical history in detail and updated the computerized patient record.  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 STEWART SAYIED    Osteoarthritis     Osteopenia     Rectal nodule 03/2020    REFERRED TO DR. PAMELA NAPOLES    Rectocele 07/2017    RLS (restless legs syndrome)     Seborrheic keratosis     Skin cancer 04/2015    LOWER LEG, FOLLOWED BY DR. EDI SANCHEZ    Vertigo         Past Surgical History:   Procedure Laterality Date    CATARACT EXTRACTION, BILATERAL Bilateral 2015    COLONOSCOPY N/A 02/27/2015    1 [facility]UB[facility]    COLONOSCOPY N/A 03/04/2020    10 MM SESSILE SERRATED ADENOMA POLYP IN ASCENDING, 3 MM HYPERPLASTIC POLYP IN RECTUM, 7 MM ANAL NODULE, DR. SARAH LIRIANO AT [facility]    HYSTERECTOMY N/A 01/19/2004    KAYLEE WITH BSO, DR. RAFIA MORTENSEN AT [facility]    KNEE ARTHROSCOPY Left 2013    KNEE ARTHROSCOPY Right 2010    MELISSA CULDOPLASTY N/A 01/19/2004    DR. RAFIA MORTENSEN AT [facility]    SACROCOLPOPEXY N/A     TOTAL KNEE ARTHROPLASTY Right 4/11/2023    Procedure: RIGHT TOTAL KNEE ARTHROPLASTY WITH SHERI NAVIGATION;  Surgeon:  "Atul Rico MD;  Location: [facility] OR [facility];  Service: Orthopedics;  Laterality: Right;        Social History     Occupational History    Not on file   Tobacco Use    Smoking status: Never    Smokeless tobacco: Never   Vaping Use    Vaping Use: Never used   Substance and Sexual Activity    Alcohol use: No    Drug use: Never    Sexual activity: Not Currently     Birth control/protection: Post-menopausal, Surgical      Social History     Social History Narrative    Not on file        Family History   Problem Relation Age of Onset    Heart disease Mother     Lung cancer Father     Hypertension Father     Heart disease Father     Cancer Father     Heart attack Brother     Emphysema Brother     COPD Brother     Depression Brother     Heart attack Brother         Had stents placed 2009    Breast cancer Neg Hx     Malig Hyperthermia Neg Hx        ROS: 14 point review of systems was performed and was negative except for documented findings in HPI and today's encounter.       Constitutional:  Denies fever, shaking or chills   Eyes:  Denies change in visual acuity   HENT:  Denies nasal congestion or sore throat   Respiratory:  Denies cough or shortness of breath   Cardiovascular:  Denies chest pain or severe LE edema   GI:  Denies abdominal pain, nausea, vomiting, bloody stools or diarrhea   Musculoskeletal:  Denies numbness tingling or loss of motor function except as outlined above in history of present illness.  : Denies painful urination or hematuria  Integument:  Denies rash, lesion or ulceration   Neurologic:  Denies headache or focal weakness  Endocrine:  Denies lymphadenopathy  Psych:  Denies confusion or change in mental status   Hem:  Denies active bleeding    Physical Exam: 83 y.o. female  Wt Readings from Last 3 Encounters:   10/31/23 75.3 kg (165 lb 14.4 oz)   10/27/23 71.7 kg (158 lb)   10/24/23 74.6 kg (164 lb 6.4 oz)     Ht Readings from Last 3 Encounters:   10/31/23 160 cm (63\")   10/27/23 160 cm (63\") " "  10/24/23 160 cm (63\")     Body mass index is 29.39 kg/m².  Facility age limit for growth %connie is 20 years.  Vitals:    10/31/23 0903   Temp: 96.9 °F (36.1 °C)       Vital signs reviewed.   General Appearance:    Alert, cooperative, in no acute distress                  Eyes: conjunctiva clear  ENT: external ears and nose atraumatic  CV: no peripheral edema  Resp: normal respiratory effort  Skin: no rashes or wounds; normal turgor  Psych: mood and affect appropriate  Lymph: no nodes appreciated  Neuro: gross sensation intact  Vascular:  Palpable peripheral pulse in noted extremity  Musculoskeletal Extremities: KNEE Exam: medial joint line tenderness with crepitation, synovitis, swelling, and joint effusion left knee.        Diagnostic Tests:  Lab Results   Component Value Date    WBC 8.02 10/24/2023    HGB 14.0 10/24/2023    HCT 42.7 10/24/2023    MCV 90.1 10/24/2023     10/24/2023     Lab Results   Component Value Date    GLUCOSE 138 (H) 10/24/2023    CALCIUM 9.7 10/24/2023     10/24/2023    K 4.0 10/24/2023    CO2 23.0 10/24/2023     10/24/2023    BUN 15 10/24/2023    CREATININE 0.69 10/24/2023    EGFRRESULT 85 06/26/2023    EGFR 87.3 10/24/2023    BCR 21.7 10/24/2023    ANIONGAP 12.0 10/24/2023       EKG:      Progress Note    HEART RATE= 81  bpm  RR Interval= 741  ms  WI Interval= 115  ms  P Horizontal Axis= 14  deg  P Front Axis= 25  deg  QRSD Interval= 90  ms  QT Interval= 379  ms  QTcB= 440  ms  QRS Axis= 14  deg  T Wave Axis= 25  deg  - OTHERWISE NORMAL ECG -  Sinus rhythm  Borderline short WI interval  Low voltage, precordial leads  No change from previous tracing  Electronically Signed By: Bridgette Scott ([facility]) 24-Oct-2023 16:01:48  Date and Time of Study: 2023-10-24 14:18:39       I have reviewed all the lab & EKG results.     Radiology:  Imaging was done previously in the office, viewed images and discussed with the patient:        Assessment:  Patient Active Problem List    " Diagnosis Date Noted    Aftercare following joint replacement surgery 04/13/2023    Allergic rhinitis, unspecified 04/13/2023    Anxiety disorder, unspecified 04/13/2023    Atopic dermatitis, unspecified 04/13/2023    Constipation, unspecified 04/13/2023    Localized osteoporosis without current pathological fracture 04/13/2023    Gastro-esophageal reflux disease without esophagitis 04/13/2023    Generalized muscle weakness 04/13/2023    Iron deficiency anemia due to chronic blood loss 04/13/2023    Personal history of other malignant neoplasm of skin 04/13/2023    Presence of right artificial knee joint 04/13/2023    Migraine, unspecified, not intractable, without status migrainosus 04/13/2023    Chronic cough 11/20/2022    Environmental and seasonal allergies 10/17/2022    Vitamin E deficiency 08/16/2022    Impacted cerumen of left ear 01/11/2022    Internal hemorrhoids with complication 06/26/2020     Note Last Updated: 6/26/2020     Added automatically from request for surgery [id number]      Heartburn 12/13/2019    Headache, migraine 10/19/2018    Dizziness 10/18/2018    Restless leg syndrome 08/14/2018    Arthritis of both knees 05/07/2018    Chronic pain of both knees 09/05/2017    Arthritis of right knee 09/05/2017    Arthritis of left knee 09/05/2017    Pelvic relaxation due to vaginal prolapse 08/22/2017    BALDEMAR on CPAP     Osteoarthritis     Osteopenia     Hyperlipidemia     Pre-diabetes        Plan:  Dr. Atul Rico reviewed anatomy of a total joint arthroplasty in laymen's terms, as well as typical postoperative recovery and possibly 6-12 months for maximal recovery, and possible need for rehabilitation stay after hospitalization. We also discussed risks, benefits, alternatives, and limitations of procedure with risks including but not limited to neurovascular damage, bleeding, infection, malalignment, chronic pian, failure of implants, osteolysis, loosening of implants, loss of motion, weakness,  stiffness, instability, DVT, pulmonary embolus, death, stroke, complex regional pain syndrome, myocardial infarction, and need for additional procedures. Concept of substitution vs. replacement discussed.  No guarantees were given regarding results of surgery.      Ashley Motta was given the opportunity to ask and have all questions answered today.  The patient voiced understanding of the risks, benefits, and alternative forms of treatment that were discussed and the patient consents to proceed with surgery.         Skin breakdown? WNL  Metal allergy? No  DVT Risk Factors: personal history of skin cancer    DVT Prophylaxis:  Eliquis 2.5mg BID x2 weeks, then aspirin 81mg daily x4 weeks  Walker: has one at home   Consults: none  Additional orders: none  Pharmacy: Rehab    Discharge Plan: POD #2/3 to [facility] if bed available and accepted    To be updated    Date: 10/31/2023  BERNICE Leblanc    Dictated Utilizing Dragon Dictation

## 2023-10-31 NOTE — H&P (VIEW-ONLY)
"   History & Physical       Patient: Ashley Motta  YOB: 1940  Medical Record Number: [id number]  Wt Readings from Last 3 Encounters:   10/31/23 75.3 kg (165 lb 14.4 oz)   10/27/23 71.7 kg (158 lb)   10/24/23 74.6 kg (164 lb 6.4 oz)     Ht Readings from Last 3 Encounters:   10/31/23 160 cm (63\")   10/27/23 160 cm (63\")   10/24/23 160 cm (63\")     Body mass index is 29.39 kg/m².  Facility age limit for growth %connie is 20 years.    Surgeon:  Dr. Atul Rico    Chief Complaints:   Chief Complaint   Patient presents with    Left Knee - Pre-op Exam     Surgery: Left Total Knee Arthroplasty with [place] Navigation    Subjective:    History of Present Illness: 83 y.o. female presents with   Chief Complaint   Patient presents with    Left Knee - Pre-op Exam   . Chronic symptoms have been progressively worsening despite more conservative treatment measures including medications OTC and prescription, cortisone injections and physical therapy.  Symptoms are associated with ability to move, exercise, and perform activities of daily living.  Symptoms are aggravated by weight bearing and ROM necessary for activities of daily living.   Symptoms improve with rest, ice and elevation only minimally.      Allergies: No Known Allergies    Medications:   Home Medications:  Current Outpatient Medications on File Prior to Visit   Medication Sig    acetaminophen (TYLENOL) 500 MG tablet Take 1 tablet by mouth Every 6 (Six) Hours As Needed for Mild Pain or Moderate Pain.    ASPIRIN 81 PO Take 81 mg by mouth Daily. INSTRUCTED PT TO FOLLOW MD INSTRUCTIONS REGARDING HOLDING FOR SURGERY    Multiple Vitamins-Minerals (Daily Multivitamin) capsule Take 1 tablet by mouth Daily. HOLD PRIOR TO SURG    pantoprazole (Protonix) 20 MG EC tablet Take 1 tablet by mouth Daily. Before dinner    rOPINIRole (REQUIP) 1 MG tablet TAKE 1 TABLET BY MOUTH ONE HOUR BEFORE BEDTIME    simvastatin (ZOCOR) 20 MG tablet Take 1 tablet by mouth Every " Night.    sodium chloride (MICHEL 128) 5 % ophthalmic solution Administer 1 drop to both eyes 2 (Two) Times a Day.     No current facility-administered medications on file prior to visit.     Current Medications:  Scheduled Meds:  Continuous Infusions:No current facility-administered medications for this visit.    PRN Meds:.    I have reviewed the patient's medical history in detail and updated the computerized patient record.  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 STEWART SAYIED    Osteoarthritis     Osteopenia     Rectal nodule 03/2020    REFERRED TO DR. PAMELA NAPOLES    Rectocele 07/2017    RLS (restless legs syndrome)     Seborrheic keratosis     Skin cancer 04/2015    LOWER LEG, FOLLOWED BY DR. EDI SANCHEZ    Vertigo         Past Surgical History:   Procedure Laterality Date    CATARACT EXTRACTION, BILATERAL Bilateral 2015    COLONOSCOPY N/A 02/27/2015    1 TUBULAR ADENOMA POL[facility] DIST[facility]    COLONOSCOPY N/A 03/04/2020    10 MM SESSILE SERRATED ADENOMA POLYP IN ASCENDING, 3 MM HYPERPLASTIC POLYP IN RECTUM, 7 MM ANAL NODULE, DR. SARAH LIRIANO AT [facility]    HYSTERECTOMY N/A 01/19/2004    KAYLEE WITH BSO, DR. RAFIA MORTENSEN AT [facility]    KNEE ARTHROSCOPY Left 2013    KNEE ARTHROSCOPY Right 2010    MELISSA CULDOPLASTY N/A 01/19/2004    DR. RAFIA MORTENSEN AT [facility]    SACROCOLPOPEXY N/A     TOTAL KNEE ARTHROPLASTY Right 4/11/2023    Procedure: RIGHT TOTAL KNEE ARTHROPLASTY WITH SHERI NAVIGATION;  Surgeon:  "Atul Rico MD;  Location: [facility];  Service: Orthopedics;  Laterality: Right;        Social History     Occupational History    Not on file   Tobacco Use    Smoking status: Never    Smokeless tobacco: Never   Vaping Use    Vaping Use: Never used   Substance and Sexual Activity    Alcohol use: No    Drug use: Never    Sexual activity: Not Currently     Birth control/protection: Post-menopausal, Surgical      Social History     Social History Narrative    Not on file        Family History   Problem Relation Age of Onset    Heart disease Mother     Lung cancer Father     Hypertension Father     Heart disease Father     Cancer Father     Heart attack Brother     Emphysema Brother     COPD Brother     Depression Brother     Heart attack Brother         Had stents placed 2009    Breast cancer Neg Hx     Malig Hyperthermia Neg Hx        ROS: 14 point review of systems was performed and was negative except for documented findings in HPI and today's encounter.       Constitutional:  Denies fever, shaking or chills   Eyes:  Denies change in visual acuity   HENT:  Denies nasal congestion or sore throat   Respiratory:  Denies cough or shortness of breath   Cardiovascular:  Denies chest pain or severe LE edema   GI:  Denies abdominal pain, nausea, vomiting, bloody stools or diarrhea   Musculoskeletal:  Denies numbness tingling or loss of motor function except as outlined above in history of present illness.  : Denies painful urination or hematuria  Integument:  Denies rash, lesion or ulceration   Neurologic:  Denies headache or focal weakness  Endocrine:  Denies lymphadenopathy  Psych:  Denies confusion or change in mental status   Hem:  Denies active bleeding    Physical Exam: 83 y.o. female  Wt Readings from Last 3 Encounters:   10/31/23 75.3 kg (165 lb 14.4 oz)   10/27/23 71.7 kg (158 lb)   10/24/23 74.6 kg (164 lb 6.4 oz)     Ht Readings from Last 3 Encounters:   10/31/23 160 cm (63\")   10/27/23 160 cm (63\") " "  10/24/23 160 cm (63\")     Body mass index is 29.39 kg/m².  Facility age limit for growth %connie is 20 years.  Vitals:    10/31/23 0903   Temp: 96.9 °F (36.1 °C)       Vital signs reviewed.   General Appearance:    Alert, cooperative, in no acute distress                  Eyes: conjunctiva clear  ENT: external ears and nose atraumatic  CV: no peripheral edema  Resp: normal respiratory effort  Skin: no rashes or wounds; normal turgor  Psych: mood and affect appropriate  Lymph: no nodes appreciated  Neuro: gross sensation intact  Vascular:  Palpable peripheral pulse in noted extremity  Musculoskeletal Extremities: KNEE Exam: medial joint line tenderness with crepitation, synovitis, swelling, and joint effusion left knee.        Diagnostic Tests:  Lab Results   Component Value Date    WBC 8.02 10/24/2023    HGB 14.0 10/24/2023    HCT 42.7 10/24/2023    MCV 90.1 10/24/2023     10/24/2023     Lab Results   Component Value Date    GLUCOSE 138 (H) 10/24/2023    CALCIUM 9.7 10/24/2023     10/24/2023    K 4.0 10/24/2023    CO2 23.0 10/24/2023     10/24/2023    BUN 15 10/24/2023    CREATININE 0.69 10/24/2023    EGFRRESULT 85 06/26/2023    EGFR 87.3 10/24/2023    BCR 21.7 10/24/2023    ANIONGAP 12.0 10/24/2023       EKG:      Progress Note    HEART RATE= 81  bpm  RR Interval= 741  ms  GA Interval= 115  ms  P Horizontal Axis= 14  deg  P Front Axis= 25  deg  QRSD Interval= 90  ms  QT Interval= 379  ms  QTcB= 440  ms  QRS Axis= 14  deg  T Wave Axis= 25  deg  - OTHERWISE NORMAL ECG -  Sinus rhythm  Borderline short GA interval  Low voltage, precordial leads  No change from previous tracing  Electronically Signed By: Bridgette Scott ([facility]) 24-Oct-2023 16:01:48  Date and Time of Study: 2023-10-24 14:18:39       I have reviewed all the lab & EKG results.     Radiology:  Imaging was done previously in the office, viewed images and discussed with the patient:        Assessment:  Patient Active Problem List    " Diagnosis Date Noted    Aftercare following joint replacement surgery 04/13/2023    Allergic rhinitis, unspecified 04/13/2023    Anxiety disorder, unspecified 04/13/2023    Atopic dermatitis, unspecified 04/13/2023    Constipation, unspecified 04/13/2023    Localized osteoporosis without current pathological fracture 04/13/2023    Gastro-esophageal reflux disease without esophagitis 04/13/2023    Generalized muscle weakness 04/13/2023    Iron deficiency anemia due to chronic blood loss 04/13/2023    Personal history of other malignant neoplasm of skin 04/13/2023    Presence of right artificial knee joint 04/13/2023    Migraine, unspecified, not intractable, without status migrainosus 04/13/2023    Chronic cough 11/20/2022    Environmental and seasonal allergies 10/17/2022    Vitamin E deficiency 08/16/2022    Impacted cerumen of left ear 01/11/2022    Internal hemorrhoids with complication 06/26/2020     Note Last Updated: 6/26/2020     Added automatically from request for surgery [id number]      Heartburn 12/13/2019    Headache, migraine 10/19/2018    Dizziness 10/18/2018    Restless leg syndrome 08/14/2018    Arthritis of both knees 05/07/2018    Chronic pain of both knees 09/05/2017    Arthritis of right knee 09/05/2017    Arthritis of left knee 09/05/2017    Pelvic relaxation due to vaginal prolapse 08/22/2017    BALDEMAR on CPAP     Osteoarthritis     Osteopenia     Hyperlipidemia     Pre-diabetes        Plan:  Dr. Atul Rico reviewed anatomy of a total joint arthroplasty in laymen's terms, as well as typical postoperative recovery and possibly 6-12 months for maximal recovery, and possible need for rehabilitation stay after hospitalization. We also discussed risks, benefits, alternatives, and limitations of procedure with risks including but not limited to neurovascular damage, bleeding, infection, malalignment, chronic pian, failure of implants, osteolysis, loosening of implants, loss of motion, weakness,  stiffness, instability, DVT, pulmonary embolus, death, stroke, complex regional pain syndrome, myocardial infarction, and need for additional procedures. Concept of substitution vs. replacement discussed.  No guarantees were given regarding results of surgery.      Ashley Motta was given the opportunity to ask and have all questions answered today.  The patient voiced understanding of the risks, benefits, and alternative forms of treatment that were discussed and the patient consents to proceed with surgery.         Skin breakdown? WNL  Metal allergy? No  DVT Risk Factors: personal history of skin cancer    DVT Prophylaxis:  Eliquis 2.5mg BID x2 weeks, then aspirin 81mg daily x4 weeks  Walker: has one at home   Consults: none  Additional orders: none  Pharmacy: Rehab    Discharge Plan: POD #2/3 to [facility] if bed available and accepted    To be updated    Date: 10/31/2023  BERNICE Leblanc    Dictated Utilizing Dragon Dictation

## 2023-11-10 ENCOUNTER — TELEPHONE (OUTPATIENT)
Dept: ORTHOPEDIC SURGERY | Facility: CLINIC | Age: 83
End: 2023-11-10

## 2023-11-10 NOTE — TELEPHONE ENCOUNTER
Caller: Ashley Motta    Relationship to patient: Self    Best call back number: [phone] (home)     Patient is needing: PATIENT STATED SHE IS WAITING FOR A CALL TO GO OVER HER ARRIVAL TIME FOR HER SX THAT IS SCHEDULED FOR 11/1/423 WITH DR LIRIANO.   PLEASE ADVISE.

## 2023-11-14 ENCOUNTER — ANESTHESIA (OUTPATIENT)
Dept: PERIOP | Facility: HOSPITAL | Age: 83
End: 2023-11-14
Payer: MEDICARE

## 2023-11-14 ENCOUNTER — APPOINTMENT (OUTPATIENT)
Dept: GENERAL RADIOLOGY | Facility: HOSPITAL | Age: 83
End: 2023-11-14
Payer: MEDICARE

## 2023-11-14 ENCOUNTER — ANESTHESIA EVENT (OUTPATIENT)
Dept: PERIOP | Facility: HOSPITAL | Age: 83
End: 2023-11-14
Payer: MEDICARE

## 2023-11-14 ENCOUNTER — HOSPITAL ENCOUNTER (INPATIENT)
Facility: HOSPITAL | Age: 83
LOS: 3 days | Discharge: NURSING, REHAB OR LONG-TERM CARE | End: 2023-11-17
Attending: ORTHOPAEDIC SURGERY | Admitting: ORTHOPAEDIC SURGERY
Payer: MEDICARE

## 2023-11-14 DIAGNOSIS — Z96.652 S/P TOTAL KNEE ARTHROPLASTY, LEFT: Primary | ICD-10-CM

## 2023-11-14 DIAGNOSIS — M17.12 ARTHRITIS OF LEFT KNEE: ICD-10-CM

## 2023-11-14 PROCEDURE — 73560 X-RAY EXAM OF KNEE 1 OR 2: CPT

## 2023-11-14 PROCEDURE — C1776 JOINT DEVICE (IMPLANTABLE): HCPCS | Performed by: ORTHOPAEDIC SURGERY

## 2023-11-14 PROCEDURE — C1713 ANCHOR/SCREW BN/BN,TIS/BN: HCPCS | Performed by: ORTHOPAEDIC SURGERY

## 2023-11-14 PROCEDURE — 25010000002 ROPIVACAINE PER 1 MG: Performed by: ORTHOPAEDIC SURGERY

## 2023-11-14 PROCEDURE — 25010000002 MAGNESIUM SULFATE PER 500 MG OF MAGNESIUM: Performed by: NURSE ANESTHETIST, CERTIFIED REGISTERED

## 2023-11-14 PROCEDURE — 27447 TOTAL KNEE ARTHROPLASTY: CPT | Performed by: NURSE PRACTITIONER

## 2023-11-14 PROCEDURE — 25010000002 SUGAMMADEX 200 MG/2ML SOLUTION: Performed by: NURSE ANESTHETIST, CERTIFIED REGISTERED

## 2023-11-14 PROCEDURE — 0SRD0J9 REPLACEMENT OF LEFT KNEE JOINT WITH SYNTHETIC SUBSTITUTE, CEMENTED, OPEN APPROACH: ICD-10-PCS | Performed by: ORTHOPAEDIC SURGERY

## 2023-11-14 PROCEDURE — 25010000002 CEFAZOLIN IN DEXTROSE 2000 MG/ 100 ML SOLUTION: Performed by: NURSE PRACTITIONER

## 2023-11-14 PROCEDURE — 20985 CPTR-ASST DIR MS PX: CPT | Performed by: ORTHOPAEDIC SURGERY

## 2023-11-14 PROCEDURE — 25010000002 CLONIDINE PER 1 MG: Performed by: ORTHOPAEDIC SURGERY

## 2023-11-14 PROCEDURE — 25010000002 ONDANSETRON PER 1 MG: Performed by: NURSE ANESTHETIST, CERTIFIED REGISTERED

## 2023-11-14 PROCEDURE — 25010000002 EPINEPHRINE 1 MG/ML SOLUTION 30 ML VIAL: Performed by: ORTHOPAEDIC SURGERY

## 2023-11-14 PROCEDURE — 25010000002 PROPOFOL 200 MG/20ML EMULSION: Performed by: NURSE ANESTHETIST, CERTIFIED REGISTERED

## 2023-11-14 PROCEDURE — 25010000002 KETOROLAC TROMETHAMINE PER 15 MG: Performed by: ORTHOPAEDIC SURGERY

## 2023-11-14 PROCEDURE — 25010000002 LABETALOL 5 MG/ML SOLUTION: Performed by: NURSE ANESTHETIST, CERTIFIED REGISTERED

## 2023-11-14 PROCEDURE — 25010000002 DEXAMETHASONE SODIUM PHOSPHATE 20 MG/5ML SOLUTION: Performed by: NURSE ANESTHETIST, CERTIFIED REGISTERED

## 2023-11-14 PROCEDURE — 25010000002 FENTANYL CITRATE (PF) 50 MCG/ML SOLUTION: Performed by: NURSE ANESTHETIST, CERTIFIED REGISTERED

## 2023-11-14 PROCEDURE — 25010000002 CEFAZOLIN IN DEXTROSE 2-4 GM/100ML-% SOLUTION: Performed by: ORTHOPAEDIC SURGERY

## 2023-11-14 PROCEDURE — 25010000002 CEFAZOLIN IN DEXTROSE 2-4 GM/100ML-% SOLUTION: Performed by: NURSE PRACTITIONER

## 2023-11-14 PROCEDURE — 25810000003 LACTATED RINGERS PER 1000 ML: Performed by: ANESTHESIOLOGY

## 2023-11-14 PROCEDURE — 27447 TOTAL KNEE ARTHROPLASTY: CPT | Performed by: ORTHOPAEDIC SURGERY

## 2023-11-14 DEVICE — DEV CONTRL TISS STRATAFIX SYMM PDS PLUS VIL CT-1 60CM: Type: IMPLANTABLE DEVICE | Site: KNEE | Status: FUNCTIONAL

## 2023-11-14 DEVICE — CAP KN ATTUNE FB CMT: Type: IMPLANTABLE DEVICE | Status: FUNCTIONAL

## 2023-11-14 DEVICE — DEV CONTRL TISS STRATAFIXSPIRALMNCRYL PLSPS2 REV3/0 45CM: Type: IMPLANTABLE DEVICE | Site: KNEE | Status: FUNCTIONAL

## 2023-11-14 DEVICE — ATTUNE KNEE SYSTEM TIBIAL BASE FIXED BEARING SIZE 4 CEMENTED
Type: IMPLANTABLE DEVICE | Site: KNEE | Status: FUNCTIONAL
Brand: ATTUNE

## 2023-11-14 DEVICE — ATTUNE PATELLA MEDIALIZED DOME 35MM CEMENTED AOX
Type: IMPLANTABLE DEVICE | Site: KNEE | Status: FUNCTIONAL
Brand: ATTUNE

## 2023-11-14 DEVICE — ATTUNE KNEE SYSTEM TIBIAL INSERT FIXED BEARING CRUCIATE RETAINING 4 6MM AOX
Type: IMPLANTABLE DEVICE | Site: KNEE | Status: FUNCTIONAL
Brand: ATTUNE

## 2023-11-14 DEVICE — ATTUNE KNEE SYSTEM FEMORAL CRUCIATE RETAINING SIZE 4 LEFT CEMENTED
Type: IMPLANTABLE DEVICE | Site: KNEE | Status: FUNCTIONAL
Brand: ATTUNE

## 2023-11-14 DEVICE — P.F.C. DRILL BIT AND STEINMAN PIN PACKET (1 UNIT) .125IN DIA 5IN LGTH
Type: IMPLANTABLE DEVICE | Site: KNEE | Status: FUNCTIONAL
Brand: P.F.C.

## 2023-11-14 DEVICE — SMARTSET HIGH PERFORMANCE MV MEDIUM VISCOSITY BONE CEMENT 40G
Type: IMPLANTABLE DEVICE | Site: KNEE | Status: FUNCTIONAL
Brand: SMARTSET

## 2023-11-14 RX ORDER — ACETAMINOPHEN 325 MG/1
325 TABLET ORAL EVERY 4 HOURS PRN
Status: DISCONTINUED | OUTPATIENT
Start: 2023-11-14 | End: 2023-11-17 | Stop reason: HOSPADM

## 2023-11-14 RX ORDER — FENTANYL CITRATE 50 UG/ML
INJECTION, SOLUTION INTRAMUSCULAR; INTRAVENOUS AS NEEDED
Status: DISCONTINUED | OUTPATIENT
Start: 2023-11-14 | End: 2023-11-14 | Stop reason: SURG

## 2023-11-14 RX ORDER — PROPOFOL 10 MG/ML
INJECTION, EMULSION INTRAVENOUS AS NEEDED
Status: DISCONTINUED | OUTPATIENT
Start: 2023-11-14 | End: 2023-11-14 | Stop reason: SURG

## 2023-11-14 RX ORDER — NALOXONE HCL 0.4 MG/ML
0.2 VIAL (ML) INJECTION AS NEEDED
Status: DISCONTINUED | OUTPATIENT
Start: 2023-11-14 | End: 2023-11-14 | Stop reason: HOSPADM

## 2023-11-14 RX ORDER — CEFAZOLIN SODIUM 2 G/100ML
2 INJECTION, SOLUTION INTRAVENOUS ONCE
Status: COMPLETED | OUTPATIENT
Start: 2023-11-14 | End: 2023-11-14

## 2023-11-14 RX ORDER — IPRATROPIUM BROMIDE AND ALBUTEROL SULFATE 2.5; .5 MG/3ML; MG/3ML
3 SOLUTION RESPIRATORY (INHALATION) ONCE AS NEEDED
Status: DISCONTINUED | OUTPATIENT
Start: 2023-11-14 | End: 2023-11-14 | Stop reason: HOSPADM

## 2023-11-14 RX ORDER — HYDROCODONE BITARTRATE AND ACETAMINOPHEN 7.5; 325 MG/1; MG/1
2 TABLET ORAL EVERY 4 HOURS PRN
Status: DISCONTINUED | OUTPATIENT
Start: 2023-11-14 | End: 2023-11-17 | Stop reason: HOSPADM

## 2023-11-14 RX ORDER — DOCUSATE SODIUM 100 MG/1
100 CAPSULE, LIQUID FILLED ORAL 2 TIMES DAILY
Status: DISCONTINUED | OUTPATIENT
Start: 2023-11-14 | End: 2023-11-17 | Stop reason: HOSPADM

## 2023-11-14 RX ORDER — NALOXONE HCL 0.4 MG/ML
0.1 VIAL (ML) INJECTION
Status: DISCONTINUED | OUTPATIENT
Start: 2023-11-14 | End: 2023-11-17 | Stop reason: HOSPADM

## 2023-11-14 RX ORDER — LABETALOL HYDROCHLORIDE 5 MG/ML
INJECTION, SOLUTION INTRAVENOUS AS NEEDED
Status: DISCONTINUED | OUTPATIENT
Start: 2023-11-14 | End: 2023-11-14 | Stop reason: SURG

## 2023-11-14 RX ORDER — SODIUM CHLORIDE 0.9 % (FLUSH) 0.9 %
3 SYRINGE (ML) INJECTION EVERY 12 HOURS SCHEDULED
Status: DISCONTINUED | OUTPATIENT
Start: 2023-11-14 | End: 2023-11-14 | Stop reason: HOSPADM

## 2023-11-14 RX ORDER — LABETALOL HYDROCHLORIDE 5 MG/ML
5 INJECTION, SOLUTION INTRAVENOUS
Status: DISCONTINUED | OUTPATIENT
Start: 2023-11-14 | End: 2023-11-14 | Stop reason: HOSPADM

## 2023-11-14 RX ORDER — BISACODYL 5 MG/1
10 TABLET, DELAYED RELEASE ORAL DAILY PRN
Status: DISCONTINUED | OUTPATIENT
Start: 2023-11-14 | End: 2023-11-17 | Stop reason: HOSPADM

## 2023-11-14 RX ORDER — ONDANSETRON 4 MG/1
4 TABLET, FILM COATED ORAL EVERY 6 HOURS PRN
Status: DISCONTINUED | OUTPATIENT
Start: 2023-11-14 | End: 2023-11-17 | Stop reason: HOSPADM

## 2023-11-14 RX ORDER — ONDANSETRON 2 MG/ML
INJECTION INTRAMUSCULAR; INTRAVENOUS AS NEEDED
Status: DISCONTINUED | OUTPATIENT
Start: 2023-11-14 | End: 2023-11-14 | Stop reason: SURG

## 2023-11-14 RX ORDER — HYDRALAZINE HYDROCHLORIDE 20 MG/ML
5 INJECTION INTRAMUSCULAR; INTRAVENOUS
Status: DISCONTINUED | OUTPATIENT
Start: 2023-11-14 | End: 2023-11-14 | Stop reason: HOSPADM

## 2023-11-14 RX ORDER — MAGNESIUM SULFATE HEPTAHYDRATE 500 MG/ML
INJECTION, SOLUTION INTRAMUSCULAR; INTRAVENOUS AS NEEDED
Status: DISCONTINUED | OUTPATIENT
Start: 2023-11-14 | End: 2023-11-14 | Stop reason: SURG

## 2023-11-14 RX ORDER — PROMETHAZINE HYDROCHLORIDE 25 MG/1
25 TABLET ORAL ONCE AS NEEDED
Status: DISCONTINUED | OUTPATIENT
Start: 2023-11-14 | End: 2023-11-14 | Stop reason: HOSPADM

## 2023-11-14 RX ORDER — CEFAZOLIN SODIUM 2 G/100ML
2 INJECTION, SOLUTION INTRAVENOUS EVERY 8 HOURS
Status: COMPLETED | OUTPATIENT
Start: 2023-11-14 | End: 2023-11-14

## 2023-11-14 RX ORDER — LIDOCAINE HYDROCHLORIDE 20 MG/ML
INJECTION, SOLUTION EPIDURAL; INFILTRATION; INTRACAUDAL; PERINEURAL AS NEEDED
Status: DISCONTINUED | OUTPATIENT
Start: 2023-11-14 | End: 2023-11-14 | Stop reason: SURG

## 2023-11-14 RX ORDER — PREGABALIN 75 MG/1
75 CAPSULE ORAL ONCE
Status: COMPLETED | OUTPATIENT
Start: 2023-11-14 | End: 2023-11-14

## 2023-11-14 RX ORDER — TRANEXAMIC ACID 100 MG/ML
INJECTION, SOLUTION INTRAVENOUS AS NEEDED
Status: DISCONTINUED | OUTPATIENT
Start: 2023-11-14 | End: 2023-11-14 | Stop reason: SURG

## 2023-11-14 RX ORDER — HYDROMORPHONE HYDROCHLORIDE 1 MG/ML
0.25 INJECTION, SOLUTION INTRAMUSCULAR; INTRAVENOUS; SUBCUTANEOUS
Status: DISCONTINUED | OUTPATIENT
Start: 2023-11-14 | End: 2023-11-14 | Stop reason: HOSPADM

## 2023-11-14 RX ORDER — BISACODYL 10 MG
10 SUPPOSITORY, RECTAL RECTAL DAILY PRN
Status: DISCONTINUED | OUTPATIENT
Start: 2023-11-14 | End: 2023-11-17 | Stop reason: HOSPADM

## 2023-11-14 RX ORDER — MELOXICAM 15 MG/1
15 TABLET ORAL ONCE
Status: COMPLETED | OUTPATIENT
Start: 2023-11-14 | End: 2023-11-14

## 2023-11-14 RX ORDER — SODIUM CHLORIDE, SODIUM LACTATE, POTASSIUM CHLORIDE, CALCIUM CHLORIDE 600; 310; 30; 20 MG/100ML; MG/100ML; MG/100ML; MG/100ML
9 INJECTION, SOLUTION INTRAVENOUS CONTINUOUS
Status: DISCONTINUED | OUTPATIENT
Start: 2023-11-14 | End: 2023-11-14

## 2023-11-14 RX ORDER — DROPERIDOL 2.5 MG/ML
0.62 INJECTION, SOLUTION INTRAMUSCULAR; INTRAVENOUS
Status: DISCONTINUED | OUTPATIENT
Start: 2023-11-14 | End: 2023-11-14 | Stop reason: HOSPADM

## 2023-11-14 RX ORDER — POLYETHYLENE GLYCOL 3350 17 G/17G
17 POWDER, FOR SOLUTION ORAL DAILY
Status: DISCONTINUED | OUTPATIENT
Start: 2023-11-14 | End: 2023-11-17 | Stop reason: HOSPADM

## 2023-11-14 RX ORDER — FAMOTIDINE 10 MG/ML
20 INJECTION, SOLUTION INTRAVENOUS ONCE
Status: COMPLETED | OUTPATIENT
Start: 2023-11-14 | End: 2023-11-14

## 2023-11-14 RX ORDER — HYDROCODONE BITARTRATE AND ACETAMINOPHEN 7.5; 325 MG/1; MG/1
1 TABLET ORAL EVERY 4 HOURS PRN
Status: DISCONTINUED | OUTPATIENT
Start: 2023-11-14 | End: 2023-11-14 | Stop reason: HOSPADM

## 2023-11-14 RX ORDER — ONDANSETRON 2 MG/ML
4 INJECTION INTRAMUSCULAR; INTRAVENOUS EVERY 6 HOURS PRN
Status: DISCONTINUED | OUTPATIENT
Start: 2023-11-14 | End: 2023-11-17 | Stop reason: HOSPADM

## 2023-11-14 RX ORDER — SODIUM CHLORIDE 0.9 % (FLUSH) 0.9 %
3-10 SYRINGE (ML) INJECTION AS NEEDED
Status: DISCONTINUED | OUTPATIENT
Start: 2023-11-14 | End: 2023-11-14 | Stop reason: HOSPADM

## 2023-11-14 RX ORDER — SODIUM CHLORIDE, SODIUM LACTATE, POTASSIUM CHLORIDE, CALCIUM CHLORIDE 600; 310; 30; 20 MG/100ML; MG/100ML; MG/100ML; MG/100ML
100 INJECTION, SOLUTION INTRAVENOUS CONTINUOUS
Status: DISCONTINUED | OUTPATIENT
Start: 2023-11-14 | End: 2023-11-17 | Stop reason: HOSPADM

## 2023-11-14 RX ORDER — PANTOPRAZOLE SODIUM 20 MG/1
20 TABLET, DELAYED RELEASE ORAL DAILY
Status: DISCONTINUED | OUTPATIENT
Start: 2023-11-14 | End: 2023-11-14 | Stop reason: DRUGHIGH

## 2023-11-14 RX ORDER — ATORVASTATIN CALCIUM 20 MG/1
10 TABLET, FILM COATED ORAL DAILY
Status: DISCONTINUED | OUTPATIENT
Start: 2023-11-14 | End: 2023-11-17 | Stop reason: HOSPADM

## 2023-11-14 RX ORDER — ROCURONIUM BROMIDE 10 MG/ML
INJECTION, SOLUTION INTRAVENOUS AS NEEDED
Status: DISCONTINUED | OUTPATIENT
Start: 2023-11-14 | End: 2023-11-14 | Stop reason: SURG

## 2023-11-14 RX ORDER — ACETAMINOPHEN 500 MG
1000 TABLET ORAL ONCE
Status: COMPLETED | OUTPATIENT
Start: 2023-11-14 | End: 2023-11-14

## 2023-11-14 RX ORDER — ROPINIROLE 1 MG/1
1 TABLET, FILM COATED ORAL NIGHTLY
Status: DISCONTINUED | OUTPATIENT
Start: 2023-11-14 | End: 2023-11-17 | Stop reason: HOSPADM

## 2023-11-14 RX ORDER — FENTANYL CITRATE 50 UG/ML
25 INJECTION, SOLUTION INTRAMUSCULAR; INTRAVENOUS
Status: DISCONTINUED | OUTPATIENT
Start: 2023-11-14 | End: 2023-11-14 | Stop reason: HOSPADM

## 2023-11-14 RX ORDER — HYDROCODONE BITARTRATE AND ACETAMINOPHEN 7.5; 325 MG/1; MG/1
1 TABLET ORAL EVERY 4 HOURS PRN
Status: DISCONTINUED | OUTPATIENT
Start: 2023-11-14 | End: 2023-11-17 | Stop reason: HOSPADM

## 2023-11-14 RX ORDER — DEXAMETHASONE SODIUM PHOSPHATE 4 MG/ML
INJECTION, SOLUTION INTRA-ARTICULAR; INTRALESIONAL; INTRAMUSCULAR; INTRAVENOUS; SOFT TISSUE AS NEEDED
Status: DISCONTINUED | OUTPATIENT
Start: 2023-11-14 | End: 2023-11-14 | Stop reason: SURG

## 2023-11-14 RX ORDER — SILICONE ADHESIVE 1.5" X 3"
1 SHEET (EA) TOPICAL 2 TIMES DAILY
Status: DISCONTINUED | OUTPATIENT
Start: 2023-11-14 | End: 2023-11-17 | Stop reason: HOSPADM

## 2023-11-14 RX ORDER — SODIUM CHLORIDE, SODIUM LACTATE, POTASSIUM CHLORIDE, CALCIUM CHLORIDE 600; 310; 30; 20 MG/100ML; MG/100ML; MG/100ML; MG/100ML
100 INJECTION, SOLUTION INTRAVENOUS CONTINUOUS
Status: ACTIVE | OUTPATIENT
Start: 2023-11-14 | End: 2023-11-15

## 2023-11-14 RX ORDER — FLUMAZENIL 0.1 MG/ML
0.2 INJECTION INTRAVENOUS AS NEEDED
Status: DISCONTINUED | OUTPATIENT
Start: 2023-11-14 | End: 2023-11-14 | Stop reason: HOSPADM

## 2023-11-14 RX ORDER — MAGNESIUM HYDROXIDE 1200 MG/15ML
LIQUID ORAL AS NEEDED
Status: DISCONTINUED | OUTPATIENT
Start: 2023-11-14 | End: 2023-11-14 | Stop reason: HOSPADM

## 2023-11-14 RX ORDER — ONDANSETRON 2 MG/ML
4 INJECTION INTRAMUSCULAR; INTRAVENOUS ONCE AS NEEDED
Status: DISCONTINUED | OUTPATIENT
Start: 2023-11-14 | End: 2023-11-14 | Stop reason: HOSPADM

## 2023-11-14 RX ORDER — HYDROCODONE BITARTRATE AND ACETAMINOPHEN 5; 325 MG/1; MG/1
1 TABLET ORAL ONCE AS NEEDED
Status: COMPLETED | OUTPATIENT
Start: 2023-11-14 | End: 2023-11-14

## 2023-11-14 RX ORDER — PROMETHAZINE HYDROCHLORIDE 25 MG/1
25 SUPPOSITORY RECTAL ONCE AS NEEDED
Status: DISCONTINUED | OUTPATIENT
Start: 2023-11-14 | End: 2023-11-14 | Stop reason: HOSPADM

## 2023-11-14 RX ORDER — LIDOCAINE HYDROCHLORIDE 10 MG/ML
0.5 INJECTION, SOLUTION INFILTRATION; PERINEURAL ONCE AS NEEDED
Status: DISCONTINUED | OUTPATIENT
Start: 2023-11-14 | End: 2023-11-14 | Stop reason: HOSPADM

## 2023-11-14 RX ORDER — DIPHENHYDRAMINE HYDROCHLORIDE 50 MG/ML
12.5 INJECTION INTRAMUSCULAR; INTRAVENOUS
Status: DISCONTINUED | OUTPATIENT
Start: 2023-11-14 | End: 2023-11-14 | Stop reason: HOSPADM

## 2023-11-14 RX ORDER — EPHEDRINE SULFATE 50 MG/ML
5 INJECTION, SOLUTION INTRAVENOUS ONCE AS NEEDED
Status: DISCONTINUED | OUTPATIENT
Start: 2023-11-14 | End: 2023-11-14 | Stop reason: HOSPADM

## 2023-11-14 RX ORDER — PANTOPRAZOLE SODIUM 40 MG/1
40 TABLET, DELAYED RELEASE ORAL DAILY
Status: DISCONTINUED | OUTPATIENT
Start: 2023-11-14 | End: 2023-11-17 | Stop reason: HOSPADM

## 2023-11-14 RX ORDER — HYDROMORPHONE HYDROCHLORIDE 1 MG/ML
0.5 INJECTION, SOLUTION INTRAMUSCULAR; INTRAVENOUS; SUBCUTANEOUS
Status: DISCONTINUED | OUTPATIENT
Start: 2023-11-14 | End: 2023-11-17 | Stop reason: HOSPADM

## 2023-11-14 RX ADMIN — PROPOFOL 50 MG: 10 INJECTION, EMULSION INTRAVENOUS at 13:15

## 2023-11-14 RX ADMIN — CEFAZOLIN SODIUM 2 G: 2 INJECTION, SOLUTION INTRAVENOUS at 22:30

## 2023-11-14 RX ADMIN — DEXAMETHASONE SODIUM PHOSPHATE 8 MG: 4 INJECTION, SOLUTION INTRAMUSCULAR; INTRAVENOUS at 13:17

## 2023-11-14 RX ADMIN — HYDROCODONE BITARTRATE AND ACETAMINOPHEN 1 TABLET: 5; 325 TABLET ORAL at 15:39

## 2023-11-14 RX ADMIN — CEFAZOLIN SODIUM 2 G: 2 INJECTION, SOLUTION INTRAVENOUS at 12:55

## 2023-11-14 RX ADMIN — PANTOPRAZOLE SODIUM 40 MG: 40 TABLET, DELAYED RELEASE ORAL at 20:47

## 2023-11-14 RX ADMIN — CEFAZOLIN SODIUM 2 G: 2 INJECTION, SOLUTION INTRAVENOUS at 14:59

## 2023-11-14 RX ADMIN — SODIUM CHLORIDE, POTASSIUM CHLORIDE, SODIUM LACTATE AND CALCIUM CHLORIDE 9 ML/HR: 600; 310; 30; 20 INJECTION, SOLUTION INTRAVENOUS at 10:30

## 2023-11-14 RX ADMIN — MAGNESIUM SULFATE HEPTAHYDRATE 2 G: 500 INJECTION, SOLUTION INTRAMUSCULAR; INTRAVENOUS at 13:28

## 2023-11-14 RX ADMIN — FAMOTIDINE 20 MG: 10 INJECTION INTRAVENOUS at 10:32

## 2023-11-14 RX ADMIN — DOCUSATE SODIUM 100 MG: 100 CAPSULE, LIQUID FILLED ORAL at 20:47

## 2023-11-14 RX ADMIN — HYDROCODONE BITARTRATE AND ACETAMINOPHEN 1 TABLET: 7.5; 325 TABLET ORAL at 20:47

## 2023-11-14 RX ADMIN — PREGABALIN 75 MG: 75 CAPSULE ORAL at 10:10

## 2023-11-14 RX ADMIN — SUGAMMADEX 150 MG: 100 INJECTION, SOLUTION INTRAVENOUS at 14:49

## 2023-11-14 RX ADMIN — POLYETHYLENE GLYCOL 3350 17 G: 17 POWDER, FOR SOLUTION ORAL at 20:48

## 2023-11-14 RX ADMIN — MELOXICAM 15 MG: 15 TABLET ORAL at 10:10

## 2023-11-14 RX ADMIN — LABETALOL HYDROCHLORIDE 10 MG: 5 INJECTION, SOLUTION INTRAVENOUS at 13:42

## 2023-11-14 RX ADMIN — ROCURONIUM BROMIDE 50 MG: 10 INJECTION, SOLUTION INTRAVENOUS at 13:07

## 2023-11-14 RX ADMIN — PROPOFOL 150 MG: 10 INJECTION, EMULSION INTRAVENOUS at 13:07

## 2023-11-14 RX ADMIN — FENTANYL CITRATE 50 MCG: 50 INJECTION, SOLUTION INTRAMUSCULAR; INTRAVENOUS at 14:48

## 2023-11-14 RX ADMIN — FENTANYL CITRATE 50 MCG: 50 INJECTION, SOLUTION INTRAMUSCULAR; INTRAVENOUS at 13:20

## 2023-11-14 RX ADMIN — ATORVASTATIN CALCIUM 10 MG: 20 TABLET, FILM COATED ORAL at 20:47

## 2023-11-14 RX ADMIN — ACETAMINOPHEN 1000 MG: 500 TABLET ORAL at 10:10

## 2023-11-14 RX ADMIN — ONDANSETRON 4 MG: 2 INJECTION INTRAMUSCULAR; INTRAVENOUS at 14:36

## 2023-11-14 RX ADMIN — SODIUM CHLORIDE 1 DROP: 50 SOLUTION OPHTHALMIC at 20:48

## 2023-11-14 RX ADMIN — ROPINIROLE 1 MG: 1 TABLET, FILM COATED ORAL at 20:47

## 2023-11-14 RX ADMIN — LIDOCAINE HYDROCHLORIDE 60 MG: 20 INJECTION, SOLUTION EPIDURAL; INFILTRATION; INTRACAUDAL; PERINEURAL at 13:07

## 2023-11-14 RX ADMIN — TRANEXAMIC ACID 1000 MG: 100 INJECTION INTRAVENOUS at 13:19

## 2023-11-14 NOTE — ANESTHESIA POSTPROCEDURE EVALUATION
Patient: Ashley Motta    Procedure Summary       Date: 11/14/23 Room / Location: [facility] OR [address] [place] [address] [facility]    Anesthesia Start: 1300 Anesthesia Stop: 1454    Procedure: LEFT TOTAL KNEE ARTHROPLASTY WITH SHERI NAVIGATION (Left: Knee) Diagnosis:       Arthritis of left knee      (Arthritis of left knee [M17.12])    Surgeons: Atul Rico MD Provider: Isai Brady DO    Anesthesia Type: general ASA Status: 3            Anesthesia Type: general    Vitals  Vitals Value Taken Time   /64 11/14/23 1545   Temp 36.4 °C (97.5 °F) 11/14/23 1545   Pulse 68 11/14/23 1548   Resp 20 11/14/23 1545   SpO2 96 % 11/14/23 1548   Vitals shown include unfiled device data.        Post Anesthesia Care and Evaluation    Patient location during evaluation: bedside  Patient participation: complete - patient participated  Level of consciousness: awake and alert  Pain management: adequate    Airway patency: patent  Anesthetic complications: No anesthetic complications  PONV Status: controlled  Cardiovascular status: acceptable  Respiratory status: acceptable  Hydration status: acceptable

## 2023-11-14 NOTE — ANESTHESIA PREPROCEDURE EVALUATION
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  Dental    (+) partials    Comment: Upper and lower partials for several anterior teeth      Pulmonary - normal exam   (+) ,sleep apnea on CPAP  (-) COPD, asthma, not a smoker    PE comment: nonlabored  Cardiovascular - normal exam  Exercise tolerance: good (4-7 METS)    ECG reviewed  Rhythm: regular  Rate: normal    (+) hyperlipidemia  (-) hypertension, valvular problems/murmurs, past MI, CAD, dysrhythmias, angina      Neuro/Psych  (+) headaches, dizziness/light headedness, psychiatric history Anxiety  (-) seizures, TIA, CVA  GI/Hepatic/Renal/Endo    (+) GERD, thyroid problem (multinodular goiter) thyroid nodules  (-) liver disease, no renal diseaseDiabetes: preDM.    Musculoskeletal     (+) arthralgias  Abdominal    Substance History      OB/GYN          Other   arthritis,   history of cancer (skin cancer)                  Anesthesia Plan    ASA 3     general     (I have reviewed the patient's history and chart with the patient, including all pertinent laboratory results and imaging. I have explained the risks of anesthesia including but not limited to dental damage, corneal abrasion, nerve injury, MI, stroke, aspiration, and death. Patient has agreed to proceed.      /71 (BP Location: Right arm, Patient Position: Sitting)   Pulse 78   Temp 36.5 °C (97.7 °F) (Oral)   Resp 16   Wt 75.3 kg (166 lb 0.1 oz)   SpO2 98%   BMI 29.41 kg/m²   )  intravenous induction     Anesthetic plan, risks, benefits, and alternatives have been provided, discussed and informed consent has been obtained with: patient.      CODE STATUS:

## 2023-11-14 NOTE — PLAN OF CARE
Goal Outcome Evaluation:  Plan of Care Reviewed With: patient        Progress: improving  Outcome Evaluation: POD0 L total knee. VSS. NVI with numbness to that operative leg. dressing is an ace wrap CDI. foot pumps on. x1 assist BRP. pain managed with ice and PO medication. plan to D/C to rehab when medically stable.

## 2023-11-14 NOTE — OP NOTE
Operative Note    Name: Ashley Motta  YOB: 1940  MRN: [id number]  BMI: Body mass index is 29.41 kg/m².    DATE OF SURGERY: 11/14/2023    PREOPERATIVE DIAGNOSIS: left knee end-stage osteoarthritis    POSTOPERATIVE DIAGNOSIS: left knee end-stage osteoarthritis    PROCEDURE PERFORMED: left total knee replacement with Natalie navigation    SURGICAL APPROACH: Knee Mid-Vastus     SURGEON: Dr. Atul Rico    ASSISTANT: Adilia Rico    IMPLANTS: DepuyAttune    Estimated Blood Loss: 100 mL  Specimens : none  Complications: none  22 Modifier:  none    DESCRIPTION OF PROCEDURE: The patient was taken to the operating room and placed in the supine position. Preoperative antibiotics were administered. Surgical time out was performed. After adequate induction of anesthesia, the leg was prepped and draped in the usual sterile fashion. The leg was exsanguinated with an Esmarch bandage and the tourniquet inflated to 250 mmHg. A midline incision was performed followed by a medial parapatellar arthrotomy. The patella was subluxed laterally.  A portion of the fat pad, ACL, and anterior horns of the meniscus were excised.  The [employer] navigation device was affixed and navigated. The distal cut was made. The femur was then sized with a sizing guide. The femoral cutting block was placed and all femoral cuts were performed. The proximal tibia was exposed. Albia navigation protocol was accomplished.  10 millimeters were removed from the distal femur.  We used the extramedullary tibial cutting guide set for removal of 3 mm of bone off the low side. The tibial cut was performed. The posterior horns of the menisci were excised. The posterior osteophytes were removed. Flexion extension blocks were then used to balance the knee. The tibial cut surface was then sized with the sizing templates and the tibial and femoral trial were then placed. The tray was aligned with the middle third of the tubercle.    Attention was  then placed to the patella. The patella was balanced to track centrally through range of motion.  It measured 24 and patella resurfacing was performed.   At this point all trial components were removed, the knee was copiously irrigated with pulsed lavage, and the knee was injected with anesthetic cocktail solution. The cut surfaces were then dried with clean lap sponges, and the components were cemented, first the tibia, then insertion of the polyethylene spacer, followed by femur. The patella was placed unless patelloplasty was chosen. The knee was held in full extension and all excess cement was removed. The cement was allowed to harden.   The knee was then copiously irrigated in standard fashion. The tourniquet was then released. There was excellent hemostasis. We closed the knee in multiple layers in standard fashion.  A sterile dressing was applied. At the end of the case, the sponge and needle counts were reported as being correct. There were no known complications. The patient was then transported to the recovery room.    The surgical assistant performed retraction, suction, hemostasis, and specific limb positioning and manipulation required for accuracy of joint placement, and assistance in wound closure and dressing application.       Atul Rico M.D.

## 2023-11-14 NOTE — ANESTHESIA PROCEDURE NOTES
Airway  Urgency: elective    Date/Time: 11/14/2023 1:10 PM  Airway not difficult    General Information and Staff    Patient location during procedure: OR  Anesthesiologist: Isai Brady DO  CRNA/CAA: Kristel Lew CRNA    Indications and Patient Condition  Indications for airway management: airway protection    Preoxygenated: yes  MILS maintained throughout  Mask difficulty assessment: 1 - vent by mask    Final Airway Details  Final airway type: endotracheal airway      Successful airway: ETT  Cuffed: yes   Successful intubation technique: direct laryngoscopy  Endotracheal tube insertion site: oral  Blade: Woodward  Blade size: 2  ETT size (mm): 7.0  Cormack-Lehane Classification: grade I - full view of glottis  Placement verified by: chest auscultation and capnometry   Cuff volume (mL): 8  Measured from: lips  ETT/EBT  to lips (cm): 20  Number of attempts at approach: 1  Assessment: lips, teeth, and gum same as pre-op and atraumatic intubation    Additional Comments  Pt preoxygenated, SIVI, bag mask vent, ATETI, dentition as before

## 2023-11-15 ENCOUNTER — TELEPHONE (OUTPATIENT)
Dept: ORTHOPEDIC SURGERY | Facility: CLINIC | Age: 83
End: 2023-11-15

## 2023-11-15 LAB
HCT VFR BLD AUTO: 38.2 % (ref 34–46.6)
HGB BLD-MCNC: 12.8 G/DL (ref 12–15.9)

## 2023-11-15 PROCEDURE — 97161 PT EVAL LOW COMPLEX 20 MIN: CPT

## 2023-11-15 PROCEDURE — 85018 HEMOGLOBIN: CPT | Performed by: NURSE PRACTITIONER

## 2023-11-15 PROCEDURE — 97110 THERAPEUTIC EXERCISES: CPT

## 2023-11-15 PROCEDURE — 99024 POSTOP FOLLOW-UP VISIT: CPT | Performed by: NURSE PRACTITIONER

## 2023-11-15 PROCEDURE — 85014 HEMATOCRIT: CPT | Performed by: NURSE PRACTITIONER

## 2023-11-15 RX ADMIN — HYDROCODONE BITARTRATE AND ACETAMINOPHEN 2 TABLET: 7.5; 325 TABLET ORAL at 16:22

## 2023-11-15 RX ADMIN — POLYETHYLENE GLYCOL 3350 17 G: 17 POWDER, FOR SOLUTION ORAL at 09:33

## 2023-11-15 RX ADMIN — PANTOPRAZOLE SODIUM 40 MG: 40 TABLET, DELAYED RELEASE ORAL at 09:32

## 2023-11-15 RX ADMIN — HYDROCODONE BITARTRATE AND ACETAMINOPHEN 1 TABLET: 7.5; 325 TABLET ORAL at 09:32

## 2023-11-15 RX ADMIN — DOCUSATE SODIUM 100 MG: 100 CAPSULE, LIQUID FILLED ORAL at 21:09

## 2023-11-15 RX ADMIN — DOCUSATE SODIUM 100 MG: 100 CAPSULE, LIQUID FILLED ORAL at 09:33

## 2023-11-15 RX ADMIN — SODIUM CHLORIDE 1 DROP: 50 SOLUTION OPHTHALMIC at 21:09

## 2023-11-15 RX ADMIN — SODIUM CHLORIDE 1 DROP: 50 SOLUTION OPHTHALMIC at 09:33

## 2023-11-15 RX ADMIN — APIXABAN 2.5 MG: 2.5 TABLET, FILM COATED ORAL at 09:33

## 2023-11-15 RX ADMIN — ATORVASTATIN CALCIUM 10 MG: 20 TABLET, FILM COATED ORAL at 09:32

## 2023-11-15 RX ADMIN — APIXABAN 2.5 MG: 2.5 TABLET, FILM COATED ORAL at 21:09

## 2023-11-15 RX ADMIN — HYDROCODONE BITARTRATE AND ACETAMINOPHEN 2 TABLET: 7.5; 325 TABLET ORAL at 05:15

## 2023-11-15 RX ADMIN — HYDROCODONE BITARTRATE AND ACETAMINOPHEN 2 TABLET: 7.5; 325 TABLET ORAL at 21:09

## 2023-11-15 RX ADMIN — ROPINIROLE 1 MG: 1 TABLET, FILM COATED ORAL at 21:09

## 2023-11-15 NOTE — PLAN OF CARE
Goal Outcome Evaluation:  Plan of Care Reviewed With: patient        Progress: no change  Outcome Evaluation: Patient A & O x 4. Makes needs known. Sitting up in chair. Dressing intact to L knee. PRN pain meds given as ordered. No s/s of distress noted.

## 2023-11-15 NOTE — THERAPY EVALUATION
Acute Care - Physical Therapy Initial Evaluation  [facility]     Patient Name: Ashley Motta  : 1940  MRN: [id number]  Today's Date: 11/15/2023   Onset of Illness/Injury or Date of Surgery: 23  Visit Dx:     ICD-10-CM ICD-9-CM   1. Arthritis of left knee  M17.12 716.96     Patient Active Problem List   Diagnosis    Osteopenia    Hyperlipidemia    Pre-diabetes    Osteoarthritis    BALDEMAR on CPAP    Pelvic relaxation due to vaginal prolapse    Chronic pain of both knees    Arthritis of right knee    Arthritis of left knee    Arthritis of both knees    Restless leg syndrome    Dizziness    Headache, migraine    Heartburn    Internal hemorrhoids with complication    Impacted cerumen of left ear    Vitamin E deficiency    Environmental and seasonal allergies    Chronic cough    Aftercare following joint replacement surgery    Allergic rhinitis, unspecified    Anxiety disorder, unspecified    Atopic dermatitis, unspecified    Constipation, unspecified    Localized osteoporosis without current pathological fracture    Gastro-esophageal reflux disease without esophagitis    Generalized muscle weakness    Iron deficiency anemia due to chronic blood loss    Personal history of other malignant neoplasm of skin    Presence of right artificial knee joint    Migraine, unspecified, not intractable, without status migrainosus    S/P total knee arthroplasty, left     Past Medical History:   Diagnosis Date    Anxiety     SITUATIONAL R/T DEATH OF HER SPOUSE    Balance problem     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 SARAH LIRIANO    Endothelial corneal dystrophy 2020    GERD (gastroesophageal reflux disease)     Goiter, nontoxic, multinodular 2017    THYROID POLYP SEES     Hyperlipidemia     Impaired fasting glucose     Left knee pain     Migraine     BALDEMAR on CPAP     FOLLOWED BY DR. TERRY CHEUNG    Osteoarthritis     Osteopenia     Rectal nodule 2020    REFERRED TO  DR. PAMELA NAPOLES    Rectocele 07/2017    RLS (restless legs syndrome)     Seborrheic keratosis     Skin cancer 04/2015    LOWER LEG, FOLLOWED BY DR. ATUL SANCHEZ    Vertigo      Past Surgical History:   Procedure Laterality Date    CATARACT EXTRACTION, BILATERAL Bilateral 2015    COLONOSCOPY N/A 02/27/2015    1 TUBULAR ADENOMA POLYP IN [facility]END[facility]    COLONOSCOPY N/A 03/04/2020    10 MM SESSILE SERRATED ADENOMA POLYP IN ASCENDING, 3 MM HYPERPLASTIC POLYP IN RECTUM, 7 MM ANAL NODULE, DR. SARAH LIRIANO AT [facility]    HYSTERECTOMY N/A 01/19/2004    KAYLEE WITH BSO, DR. RAFIA MORTENSEN AT [facility]    KNEE ARTHROSCOPY Left 2013    KNEE ARTHROSCOPY Right 2010    MELISSA CULDOPLASTY N/A 01/19/2004    DR. RAFIA MORTENSEN AT [facility]    SACROCOLPOPEXY N/A     TOTAL KNEE ARTHROPLASTY Right 4/11/2023    Procedure: RIGHT TOTAL KNEE ARTHROPLASTY WITH SHERI NAVIGATION;  Surgeon: Atul Liriano MD;  [place] [facility] [place] [facility]  Service: [place]opedics;  Laterality: Right;     PT Assessment (last 12 hours)       PT Evaluation and Treatment       Row Name 11/15/23 0900          Physical Therapy Time and Intention    Subjective Information no complaints  -     Document Type evaluation  -     Mode of Treatment individual therapy;physical therapy  -     Patient Effort good  -     Symptoms Noted During/After Treatment none  -       Row Name 11/15/23 0900          General Information    Patient Profile Reviewed yes  -LH     Onset of Illness/Injury or Date of Surgery 11/14/23  -     Patient Observations alert;cooperative;agree to therapy  -     General Observations of Patient pt reclined in chair no acute distress  -     Prior Level of Function independent:  -     Equipment Currently Used at Home none  -LH     Pertinent History of Current Functional Problem POD 0 L TKR  -     Existing Precautions/Restrictions fall  -     Risks Reviewed patient:  -     Benefits Reviewed patient:  -       Sid  Name 11/15/23 0900          Living Environment    Current Living Arrangements home  -[facility] Name 11/15/23 0900          Pain    Pretreatment Pain Rating 0/10 - no pain  -     Posttreatment Pain Rating 0/10 - no pain  -[facility] Name 11/15/23 0900          Cognition    Affect/Mental Status (Cognition) WFL  -[facility] Name 11/15/23 0900          Range of Motion Comprehensive    Comment, General Range of Motion LEs WFL except L knee  -[facility] Name 11/15/23 0900          Strength Comprehensive (MMT)    Comment, General Manual Muscle Testing (MMT) Assessment LEs grossly at least 3/5  -[facility] Name 11/15/23 0900          Bed Mobility    Comment, (Bed Mobility) NT  -[facility] Name 11/15/23 0900          Transfers    Transfers sit-stand transfer;stand-sit transfer  -[facility] Name 11/15/23 0900          Sit-Stand Transfer    Sit-Stand Crawford (Transfers) contact guard  -     Assistive Device (Sit-Stand Transfers) walker, front-wheeled  -[facility] Name 11/15/23 0900          Stand-Sit Transfer    Stand-Sit Crawford (Transfers) contact guard  -     Assistive Device (Stand-Sit Transfers) walker, front-wheeled  -[facility] Name 11/15/23 0900          Gait/Stairs (Locomotion)    Crawford Level (Gait) contact guard;set up;verbal cues  -     Assistive Device (Gait) walker, front-wheeled  -     Distance in Feet (Gait) 50  -LH     Pattern (Gait) step-to;step-through  progressed to step through w VCs'  -     Deviations/Abnormal Patterns (Gait) haily decreased  -     Bilateral Gait Deviations forward flexed posture;heel strike decreased  -[facility] Name 11/15/23 0900          Motor Skills    Therapeutic Exercise --  TKR therex x 10  -       Row Name             Wound 11/14/23 1339 Left anterior knee    Wound - Properties Group Placement Date: 11/14/23 -TH Placement Time: 1339  -TH Present on Original Admission: N  -TH Side: Left  -TH Orientation: anterior  -TH  Location: knee  -TH    Retired Wound - Properties Group Placement Date: 11/14/23 -TH Placement Time: 1339 -TH Present on Original Admission: N  -TH Side: Left  -TH Orientation: anterior  -TH Location: knee  -TH    Retired Wound - Properties Group Date first assessed: 11/14/23 -TH Time first assessed: 1339 -TH Present on Original Admission: N  -TH Side: Left  -TH Location: knee  -TH      Row Name 11/15/23 0900          Plan of Care Review    Plan of Care Reviewed With patient  -     Outcome Evaluation Pt 84 yo female POD 1 L TKR, pt safely ambulated 50ft CGA rwx step to progressed to step through, Pt completed TKR therex x 10 good tolerance. Pt lives alone and hopeful to DC to [facility].. Pt may benefit from skilled PT acutely for increased independence w functional mobility.  -[facility] Name 11/15/23 0900          Positioning and Restraints    Pre-Treatment Position sitting in chair/recliner  -     Post Treatment Position chair  -     In Chair reclined;call light within reach;encouraged to call for assist;exit alarm on  -[facility] Name 11/15/23 0900          Therapy Assessment/Plan (PT)    Rehab Potential (PT) good, to achieve stated therapy goals  -     Criteria for Skilled Interventions Met (PT) yes  -     Therapy Frequency (PT) daily  -[facility] Name 11/15/23 0900          PT Evaluation Complexity    History, PT Evaluation Complexity 1-2 personal factors and/or comorbidities  -     Examination of Body Systems (PT Eval Complexity) 1-2 elements  -     Clinical Presentation (PT Evaluation Complexity) stable  -     Clinical Decision Making (PT Evaluation Complexity) low complexity  -     Overall Complexity (PT Evaluation Complexity) low complexity  -[facility] Name 11/15/23 0900          Physical Therapy Goals    Gait Training Goal Selection (PT) gait training, PT goal 1  -     ROM Goal Selection (PT) ROM, PT goal 1  -[facility] Name 11/15/23 0900          Gait Training Goal 1 (PT)     Activity/Assistive Device (Gait Training Goal 1, PT) assistive device use;walker, rolling  -     Coal Level (Gait Training Goal 1, PT) supervision required  -     Distance (Gait Training Goal 1, PT) 100  -     Time Frame (Gait Training Goal 1, PT) 3 days  -       Row Name 11/15/23 0900          ROM Goal 1 (PT)    ROM Goal 1 (PT) L knee 0-90  -     Time Frame (ROM Goal 1, PT) 3 days  -               User Key  (r) = Recorded By, (t) = Taken By, (c) = Cosigned By      Initials Name Provider Type     Irma Motta, PT Physical Therapist    Cadence Jett RN Registered Nurse                    Physical Therapy Education       Title: PT OT SLP Therapies (Done)       Topic: Physical Therapy (Done)       Point: Mobility training (Done)       Learning Progress Summary             Patient Acceptance, E, DU,NR by  at 11/15/2023 0937                         Point: Home exercise program (Done)       Learning Progress Summary             Patient Acceptance, E, DU,NR by  at 11/15/2023 0937                         Point: Body mechanics (Done)       Learning Progress Summary             Patient Acceptance, E, DU,NR by  at 11/15/2023 0937                         Point: Precautions (Done)       Learning Progress Summary             Patient Acceptance, E, DU,NR by  at 11/15/2023 0937                                         User Key       Initials Effective Dates Name Provider Type Discipline     06/16/21 -  Irma Motta, PT Physical Therapist PT                  PT Recommendation and Plan  Anticipated Discharge Disposition (PT): skilled nursing facility  Therapy Frequency (PT): daily  Plan of Care Reviewed With: patient  Outcome Evaluation: Pt 84 yo female POD 1 L TKR, pt safely ambulated 50ft CGA rwx step to progressed to step through, Pt completed TKR therex x 10 good tolerance. Pt lives alone and hopeful to DC to SNU.. Pt may benefit from skilled PT acutely for increased independence w  functional mobility.   Outcome Measures       Row Name 11/15/23 0900             How much help from another person do you currently need...    Turning from your back to your side while in flat bed without using bedrails? 3  -LH      Moving from lying on back to sitting on the side of a flat bed without bedrails? 3  -LH      Moving to and from a bed to a chair (including a wheelchair)? 3  -LH      Standing up from a chair using your arms (e.g., wheelchair, bedside chair)? 3  -LH      Climbing 3-5 steps with a railing? 3  -LH      To walk in hospital room? 3  -      AM-PAC 6 Clicks Score (PT) 18  -      Highest Level of Mobility Goal 6 --> Walk 10 steps or more  -         Functional Assessment    Outcome Measure Options AM-PAC 6 Clicks Basic Mobility (PT)  -                User Key  (r) = Recorded By, (t) = Taken By, (c) = Cosigned By      Initials Name Provider Type     Irma Motta, PT Physical Therapist                     Time Calculation:    PT Charges       Row Name 11/15/23 0938             Time Calculation    Start Time 0855  -      Stop Time 0908  -      Time Calculation (min) 13 min  -      PT Received On 11/15/23  -      PT - Next Appointment 11/16/23  -      PT Goal Re-Cert Due Date 11/18/23  -         Time Calculation- PT    Total Timed Code Minutes- PT 8 minute(s)  -                User Key  (r) = Recorded By, (t) = Taken By, (c) = Cosigned By      Initials Name Provider Type     Irma Motta, PT Physical Therapist                  Therapy Charges for Today       Code Description Service Date Service Provider Modifiers Qty    20116474407 HC PT EVAL LOW COMPLEXITY 2 11/15/2023 Irma Motta, PT GP 1    33312041249 HC PT THER PROC EA 15 MIN 11/15/2023 Irma Motta, PT GP 1            PT G-Codes  Outcome Measure Options: AM-PAC 6 Clicks Basic Mobility (PT)  AM-PAC 6 Clicks Score (PT): 18    Irma Motta PT  11/15/2023

## 2023-11-15 NOTE — CASE MANAGEMENT/SOCIAL WORK
Discharge Planning Assessment  [facility]     Patient Name: Ashley Motta  MRN: [id number]  Today's Date: 11/15/2023    Admit Date: 11/14/2023    Plan: [facility] -- Referral Pending.   Discharge Needs Assessment       Row Name 11/15/23 1819       Living Environment    People in Home alone    Current Living Arrangements home    Primary Care Provided by self    Provides Primary Care For no one    Family Caregiver if Needed none    Quality of Family Relationships involved    Able to Return to Prior Arrangements other (see comments)  Plans SNF.       Transition Planning    Patient/Family Anticipates Transition to inpatient rehabilitation facility    Patient/Family Anticipated Services at Transition     Transportation Anticipated health plan transportation       Discharge Needs Assessment    Readmission Within the Last 30 Days no previous admission in last 30 days    Equipment Currently Used at Home cpap;cane, straight;walker, rolling  Patient said her cPAP works well.    Discharge Facility/Level of Care Needs nursing facility, skilled                   Discharge Plan       Row Name 11/15/23 1817       Plan    Plan [facility] -- Referral Pending.    Patient/Family in Agreement with Plan yes    Provided Post Acute Provider List? N/A    N/A Provider List Comment [facility] offered the CMS Compare SNF List, CMS Compare HH List and Personal In-Home Caregiver Agency Lists, and the patient declined.    Plan Comments Spoke with the patient, verified current information and explained the role of the CCP. Patient said she lives alone and has very limited family support. She's IADL and owns a cane, walker & cPAP. Patient said her cPAP works well. She's been to [facility] and cannot remember the name of the HH agency she's worked with in the past. Patient plans to d/c to [facility]. Case was discussed with Adilia LYNN RN/CCP Manager. Both Adilia and CCP also met with the  patient at bedside to discuss discharge planning. She requests a referral to [facility]. Referral sent in Epic - called and left a message for [facility]. Patient will need transportation arranged at d/c. CCP will follow.                  Continued Care and Services - Admitted Since 11/14/2023       Destination       Service Provider Request Status Selected Services Address Phone Fax Patient Preferred    [facility] Pending - Request Sent N/A [address] [place] [facility] [address] [phone] --                     Demographic Summary       Row Name 11/15/23 1818       General Information    Admission Type inpatient    Reason for Consult discharge planning    Preferred Language English       Contact Information    Permission Granted to Share Info With ;family/designee                   Functional Status       Row Name 11/15/23 1818       Functional Status    Usual Activity Tolerance good       Functional Status, IADL    Medications independent    Meal Preparation independent    Housekeeping independent    Laundry independent    Shopping independent       Mental Status    General Appearance WDL WDL       Mental Status Summary    Recent Changes in Mental Status/Cognitive Functioning no changes                   Psychosocial       Row Name 11/15/23 1819       Intellectual Performance WDL    Level of Consciousness Alert       Coping/Stress    Patient Personal Strengths able to adapt    Sources of Support other family members    Reaction to Health Status accepting    Understanding of Condition and Treatment adequate understanding of medical condition;adequate understanding of treatment       Developmental Stage (Eriksson's)    Developmental Stage Stage 8 (67 years-death/Late Adulthood) Integrity vs. Despair                    Halie TEJADA RN

## 2023-11-15 NOTE — TELEPHONE ENCOUNTER
Caller: Ashley Motta    Relationship to patient: Self    Best call back number: [phone]    Patient is needing: PATIENT REQUESTING A CALL BACK FROM ERNESTO TALLEY. PATIENT WAS INFORMED AT THE HOSPITAL THAT SHE IS NOT GOING TO [facility] FRIDAY. PATIENT HAD THIS CONVERSATION WITH ERNESTO DURING HER VISIT THIS MORNING THEREFORE TO TALK DIRECTLY TO HER REGARDING THIS.

## 2023-11-15 NOTE — DISCHARGE PLACEMENT REQUEST
"Ashley Motta (83 y.o. Female)       Date of Birth   1940    Social Security Number       Address   [address] [address]    Home Phone   [phone]    MRN   [id number]       Christianity   Episcopalian    Marital Status                               Admission Date   11/14/23    Admission Type   Elective    Admitting Provider   Atul Rico MD    Attending Provider   Atul Rico MD    Department, Room/Bed   [address], P883/1       Discharge Date       Discharge Disposition       Discharge Destination                                 Attending Provider: Atul Rico MD    Allergies: No Known Allergies    Isolation: None   Infection: None   Code Status: CPR    Ht: 160 cm (62.99\")   Wt: 75.3 kg (166 lb 0.1 oz)    Admission Cmt: None   Principal Problem: Arthritis of left knee [M17.12]                   Active Insurance as of 11/14/2023       Primary Coverage       Payor Plan Insurance Group Employer/Plan Group    MEDICARE MEDICARE A & B        Payor Plan Address Payor Plan Phone Number Payor Plan Fax Number Effective Dates    [address] [phone]  2/1/2007 - None Entered    [facility] [id number]         Subscriber Name Subscriber Birth Date Member ID       ASHLEY MOTTA 1940 [id number]               Secondary Coverage       Payor Plan Insurance Group Employer/Plan Group    [facility] [id number]       Payor Plan Address Payor Plan Phone Number Payor Plan Fax Number Effective Dates    [address]   2/1/2014 - None Entered    [facility] [id number]         Subscriber Name Subscriber Birth Date Member ID       ASHLEY MOTTA 1940 [id number]                     Emergency Contacts        (Rel.) Home Phone Work Phone Mobile Phone    Wendy Izquierdo (Relative) [phone] -- --    ELA IZQUIERDO -- -- [phone]    Wong Motta (Son) -- -- [phone]              "

## 2023-11-15 NOTE — TELEPHONE ENCOUNTER
Called patient went directly to [facility]. I was informed by the discharge planners at the hospital the patient is not going to qualify for rehab facility per Medicare, his would be the criteria we talked about previously. They instruct me she can chose to pay out of pocket for rehab, hire home nurse or see if family can stay with her. But I cannot make Medicare cover the rehab stay. If they will not approve it

## 2023-11-15 NOTE — PROGRESS NOTES
Orthopedic Total Joint Progress Note        Patient: Ashley Motta    Date of Admission: 11/14/2023  9:14 AM    YOB: 1940    Medical Record Number: [id number]    Attending Physician: Atul Rico MD      POD # 1 Day Post-Op Procedure(s) (LRB):  LEFT TOTAL KNEE ARTHROPLASTY WITH SHERI NAVIGATION (Left)       Systemic or Specific Complaints: The patient has had a relatively normal postoperative course.  The patient has had no current complaints. The patient has had improving normal postoperative pain.  The patient has had no issues with the wound..  Patient up in chair eating breakfast upon arrival. She states she is doing okay today.  She has been keeping the leg pretty straight as the staff instructed her to do. However she would like to bend and move it, she gets restless legs and feels very uncomfortable sitting in one position. She lives alone and has vertigo issues, she has no one to provide her support through her recovery. She would be unsafe to send home due to her mobility post-surgically combined with balance issues related to vertigo. She would like to go to [facility] upon discharge, she went here for her previous knee replacement and has already spoke with their admissions people. She has no other complaints at this time.      Allergies: No Known Allergies    Medications:   Current Medications:  Scheduled Meds:apixaban, 2.5 mg, Oral, Q12H  atorvastatin, 10 mg, Oral, Daily  docusate sodium, 100 mg, Oral, BID  pantoprazole, 40 mg, Oral, Daily  polyethylene glycol, 17 g, Oral, Daily  rOPINIRole, 1 mg, Oral, Nightly  sodium chloride, 1 drop, Both Eyes, BID      Continuous Infusions:lactated ringers, 100 mL/hr  lactated ringers, 100 mL/hr      PRN Meds:.  acetaminophen    bisacodyl    bisacodyl    HYDROcodone-acetaminophen    HYDROcodone-acetaminophen    HYDROmorphone **AND** naloxone    ondansetron **OR** ondansetron      Physical Exam: 83 y.o. female   Wt Readings from Last 3  "Encounters:   11/14/23 75.3 kg (166 lb 0.1 oz)   10/31/23 75.3 kg (165 lb 14.4 oz)   10/27/23 71.7 kg (158 lb)     Ht Readings from Last 3 Encounters:   11/14/23 160 cm (62.99\")   10/31/23 160 cm (63\")   10/27/23 160 cm (63\")     Body mass index is 29.41 kg/m².    Vitals:    11/14/23 2156 11/15/23 0245 11/15/23 0642 11/15/23 1518   BP: 111/61 102/55 111/60 119/57   BP Location: Left arm Left arm Left arm Right arm   Patient Position: Sitting Lying Lying Lying   Pulse: 73 67 67 64   Resp: 16 16 16 17   Temp: 97.2 °F (36.2 °C) 98.6 °F (37 °C) 97.4 °F (36.3 °C) 97.9 °F (36.6 °C)   TempSrc: Oral Oral Oral Oral   SpO2: 95% 95% 97% 99%   Weight:       Height:            General Appearance:    General: alert and oriented         Abdomen/:     soft non-tender, non-distended, voiding without difficulty       Extremities:   Operative extremity neurovascular status intact. ROM appropriate.  Incision intact w/out signs or symptoms of infection.  No cyanosis, calf is soft and nontender.  Ace bandage and cast padding removed.  Optifoam dressing clean dry and intact no signs of drainage or active bleeding.     Activity: Mobilizing Per P.T.   Weight Bearing: As Tolerated    Diagnostic Tests:   Admission on 11/14/2023   Component Date Value Ref Range Status    Hemoglobin 11/15/2023 12.8  12.0 - 15.9 g/dL Final    Hematocrit 11/15/2023 38.2  34.0 - 46.6 % Final       Imaging Results (Last 72 Hours)       Procedure Component Value Units Date/Time    XR Knee 1 or 2 View Left [[id number]] Collected: 11/14/23 1524     Updated: 11/14/23 1528    Narrative:      XR KNEE 1 OR 2 VW LEFT-11/14/2023     HISTORY: Left knee arthroplasty.     Distal femoral and proximal tibia components of the left knee prosthesis  are well seated with no abnormal surrounding bony lucencies. Soft tissue  air is seen. No unexpected findings are noted.       Impression:      1. Satisfactory postoperative appearance of the left knee.        This report was finalized " on 11/14/2023 3:25 PM by Dr. Itz Valladares M.D on Workstation: [id number]               Personally viewed ortho images and report     Assessment:    Arthritis of left knee    S/P total knee arthroplasty, left  - Doing well 1 Day Post-Op following total joint replacement  - Acute Blood Loss Anemia, postoperative hemoglobin 12.8, preoperative hemoglobin 14 - stable  - Post-operative Pain  - Limited mobility, requires use of walker and assistance when OOB.    Plan:    - Consults: none  - Continue to monitor labs and/or v/s, for tolerance to post op blood loss.  - Continue efforts to increase mobilization - with caution for dizziness  - Continue Pain Control Measures.  - Continue incisional Care.  - DVT prophylaxis - Eliquis  - Follow up in office with Atul Rico M.D. In 2 weeks.    Discharge Plan:POD 2-3 to [facility] if accepted and bed available    Date: 11/15/2023  BERNICE Leblanc

## 2023-11-15 NOTE — PLAN OF CARE
Goal Outcome Evaluation:  Plan of Care Reviewed With: patient           Outcome Evaluation: Pt 84 yo female POD 1 L TKR, pt safely ambulated 50ft CGA rwx step to progressed to step through, Pt completed TKR therex x 10 good tolerance. Pt lives alone and hopeful to DC to SNU.. Pt may benefit from skilled PT acutely for increased independence w functional mobility.      Anticipated Discharge Disposition (PT): skilled nursing facility

## 2023-11-16 ENCOUNTER — APPOINTMENT (OUTPATIENT)
Dept: CARDIOLOGY | Facility: HOSPITAL | Age: 83
End: 2023-11-16
Payer: MEDICARE

## 2023-11-16 LAB
ALBUMIN SERPL-MCNC: 4.1 G/DL (ref 3.5–5.2)
ALBUMIN/GLOB SERPL: 1.7 G/DL
ALP SERPL-CCNC: 78 U/L (ref 39–117)
ALT SERPL W P-5'-P-CCNC: 17 U/L (ref 1–33)
ANION GAP SERPL CALCULATED.3IONS-SCNC: 6.7 MMOL/L (ref 5–15)
AST SERPL-CCNC: 24 U/L (ref 1–32)
BASOPHILS # BLD AUTO: 0.04 10*3/MM3 (ref 0–0.2)
BASOPHILS NFR BLD AUTO: 0.4 % (ref 0–1.5)
BH CV LOWER VASCULAR LEFT COMMON FEMORAL AUGMENT: NORMAL
BH CV LOWER VASCULAR LEFT COMMON FEMORAL COMPETENT: NORMAL
BH CV LOWER VASCULAR LEFT COMMON FEMORAL COMPRESS: NORMAL
BH CV LOWER VASCULAR LEFT COMMON FEMORAL PHASIC: NORMAL
BH CV LOWER VASCULAR LEFT COMMON FEMORAL SPONT: NORMAL
BH CV LOWER VASCULAR LEFT DISTAL FEMORAL COMPRESS: NORMAL
BH CV LOWER VASCULAR LEFT GASTRONEMIUS COMPRESS: NORMAL
BH CV LOWER VASCULAR LEFT GREATER SAPH AK COMPRESS: NORMAL
BH CV LOWER VASCULAR LEFT GREATER SAPH BK COMPRESS: NORMAL
BH CV LOWER VASCULAR LEFT LESSER SAPH COMPRESS: NORMAL
BH CV LOWER VASCULAR LEFT MID FEMORAL AUGMENT: NORMAL
BH CV LOWER VASCULAR LEFT MID FEMORAL COMPETENT: NORMAL
BH CV LOWER VASCULAR LEFT MID FEMORAL COMPRESS: NORMAL
BH CV LOWER VASCULAR LEFT MID FEMORAL PHASIC: NORMAL
BH CV LOWER VASCULAR LEFT MID FEMORAL SPONT: NORMAL
BH CV LOWER VASCULAR LEFT PERONEAL COMPRESS: NORMAL
BH CV LOWER VASCULAR LEFT POPLITEAL AUGMENT: NORMAL
BH CV LOWER VASCULAR LEFT POPLITEAL COMPETENT: NORMAL
BH CV LOWER VASCULAR LEFT POPLITEAL COMPRESS: NORMAL
BH CV LOWER VASCULAR LEFT POPLITEAL PHASIC: NORMAL
BH CV LOWER VASCULAR LEFT POPLITEAL SPONT: NORMAL
BH CV LOWER VASCULAR LEFT POSTERIOR TIBIAL COMPRESS: NORMAL
BH CV LOWER VASCULAR LEFT PROFUNDA FEMORAL COMPRESS: NORMAL
BH CV LOWER VASCULAR LEFT PROXIMAL FEMORAL COMPRESS: NORMAL
BH CV LOWER VASCULAR LEFT SAPHENOFEMORAL JUNCTION COMPRESS: NORMAL
BH CV LOWER VASCULAR LEFT SOLEAL COMPRESS: NORMAL
BH CV LOWER VASCULAR RIGHT COMMON FEMORAL AUGMENT: NORMAL
BH CV LOWER VASCULAR RIGHT COMMON FEMORAL COMPETENT: NORMAL
BH CV LOWER VASCULAR RIGHT COMMON FEMORAL COMPRESS: NORMAL
BH CV LOWER VASCULAR RIGHT COMMON FEMORAL PHASIC: NORMAL
BH CV LOWER VASCULAR RIGHT COMMON FEMORAL SPONT: NORMAL
BILIRUB SERPL-MCNC: 0.3 MG/DL (ref 0–1.2)
BUN SERPL-MCNC: 18 MG/DL (ref 8–23)
BUN/CREAT SERPL: 21.7 (ref 7–25)
CALCIUM SPEC-SCNC: 8.9 MG/DL (ref 8.6–10.5)
CHLORIDE SERPL-SCNC: 105 MMOL/L (ref 98–107)
CO2 SERPL-SCNC: 26.3 MMOL/L (ref 22–29)
CREAT SERPL-MCNC: 0.83 MG/DL (ref 0.57–1)
DEPRECATED RDW RBC AUTO: 48.4 FL (ref 37–54)
EGFRCR SERPLBLD CKD-EPI 2021: 70.9 ML/MIN/1.73
EOSINOPHIL # BLD AUTO: 0.13 10*3/MM3 (ref 0–0.4)
EOSINOPHIL NFR BLD AUTO: 1.2 % (ref 0.3–6.2)
ERYTHROCYTE [DISTWIDTH] IN BLOOD BY AUTOMATED COUNT: 14.3 % (ref 12.3–15.4)
GLOBULIN UR ELPH-MCNC: 2.4 GM/DL
GLUCOSE SERPL-MCNC: 106 MG/DL (ref 65–99)
HCT VFR BLD AUTO: 37.8 % (ref 34–46.6)
HCT VFR BLD AUTO: 40.2 % (ref 34–46.6)
HGB BLD-MCNC: 12.5 G/DL (ref 12–15.9)
HGB BLD-MCNC: 13.2 G/DL (ref 12–15.9)
IMM GRANULOCYTES # BLD AUTO: 0.04 10*3/MM3 (ref 0–0.05)
IMM GRANULOCYTES NFR BLD AUTO: 0.4 % (ref 0–0.5)
LYMPHOCYTES # BLD AUTO: 3.05 10*3/MM3 (ref 0.7–3.1)
LYMPHOCYTES NFR BLD AUTO: 29.3 % (ref 19.6–45.3)
MCH RBC QN AUTO: 29.9 PG (ref 26.6–33)
MCHC RBC AUTO-ENTMCNC: 32.8 G/DL (ref 31.5–35.7)
MCV RBC AUTO: 91 FL (ref 79–97)
MONOCYTES # BLD AUTO: 0.98 10*3/MM3 (ref 0.1–0.9)
MONOCYTES NFR BLD AUTO: 9.4 % (ref 5–12)
NEUTROPHILS NFR BLD AUTO: 59.3 % (ref 42.7–76)
NEUTROPHILS NFR BLD AUTO: 6.17 10*3/MM3 (ref 1.7–7)
NRBC BLD AUTO-RTO: 0 /100 WBC (ref 0–0.2)
PLATELET # BLD AUTO: 290 10*3/MM3 (ref 140–450)
PMV BLD AUTO: 10.7 FL (ref 6–12)
POTASSIUM SERPL-SCNC: 4.7 MMOL/L (ref 3.5–5.2)
PROT SERPL-MCNC: 6.5 G/DL (ref 6–8.5)
RBC # BLD AUTO: 4.42 10*6/MM3 (ref 3.77–5.28)
SODIUM SERPL-SCNC: 138 MMOL/L (ref 136–145)
WBC NRBC COR # BLD AUTO: 10.41 10*3/MM3 (ref 3.4–10.8)

## 2023-11-16 PROCEDURE — 85025 COMPLETE CBC W/AUTO DIFF WBC: CPT | Performed by: NURSE PRACTITIONER

## 2023-11-16 PROCEDURE — 93971 EXTREMITY STUDY: CPT

## 2023-11-16 PROCEDURE — 85018 HEMOGLOBIN: CPT | Performed by: NURSE PRACTITIONER

## 2023-11-16 PROCEDURE — 25010000002 ONDANSETRON PER 1 MG: Performed by: NURSE PRACTITIONER

## 2023-11-16 PROCEDURE — 85014 HEMATOCRIT: CPT | Performed by: NURSE PRACTITIONER

## 2023-11-16 PROCEDURE — 80053 COMPREHEN METABOLIC PANEL: CPT | Performed by: NURSE PRACTITIONER

## 2023-11-16 PROCEDURE — 97530 THERAPEUTIC ACTIVITIES: CPT

## 2023-11-16 PROCEDURE — 99024 POSTOP FOLLOW-UP VISIT: CPT | Performed by: NURSE PRACTITIONER

## 2023-11-16 PROCEDURE — 97110 THERAPEUTIC EXERCISES: CPT

## 2023-11-16 PROCEDURE — 25010000002 HYDROMORPHONE PER 4 MG: Performed by: NURSE PRACTITIONER

## 2023-11-16 RX ORDER — PSEUDOEPHEDRINE HCL 30 MG
100 TABLET ORAL 2 TIMES DAILY
Qty: 60 CAPSULE | Refills: 0 | Status: SHIPPED | OUTPATIENT
Start: 2023-11-16

## 2023-11-16 RX ORDER — HYDROCODONE BITARTRATE AND ACETAMINOPHEN 7.5; 325 MG/1; MG/1
1 TABLET ORAL EVERY 6 HOURS PRN
Qty: 28 TABLET | Refills: 0 | Status: SHIPPED | OUTPATIENT
Start: 2023-11-16

## 2023-11-16 RX ORDER — POLYETHYLENE GLYCOL 3350 17 G/17G
17 POWDER, FOR SOLUTION ORAL DAILY
Qty: 10 PACKET | Refills: 0 | Status: SHIPPED | OUTPATIENT
Start: 2023-11-17 | End: 2023-11-27

## 2023-11-16 RX ORDER — ONDANSETRON 4 MG/1
4 TABLET, FILM COATED ORAL EVERY 6 HOURS PRN
Qty: 10 TABLET | Refills: 0 | Status: SHIPPED | OUTPATIENT
Start: 2023-11-16

## 2023-11-16 RX ADMIN — HYDROMORPHONE HYDROCHLORIDE 0.5 MG: 1 INJECTION, SOLUTION INTRAMUSCULAR; INTRAVENOUS; SUBCUTANEOUS at 16:21

## 2023-11-16 RX ADMIN — HYDROCODONE BITARTRATE AND ACETAMINOPHEN 2 TABLET: 7.5; 325 TABLET ORAL at 20:51

## 2023-11-16 RX ADMIN — HYDROCODONE BITARTRATE AND ACETAMINOPHEN 2 TABLET: 7.5; 325 TABLET ORAL at 06:10

## 2023-11-16 RX ADMIN — DOCUSATE SODIUM 100 MG: 100 CAPSULE, LIQUID FILLED ORAL at 20:51

## 2023-11-16 RX ADMIN — SODIUM CHLORIDE 1 DROP: 50 SOLUTION OPHTHALMIC at 20:52

## 2023-11-16 RX ADMIN — HYDROMORPHONE HYDROCHLORIDE 0.5 MG: 1 INJECTION, SOLUTION INTRAMUSCULAR; INTRAVENOUS; SUBCUTANEOUS at 09:14

## 2023-11-16 RX ADMIN — APIXABAN 2.5 MG: 2.5 TABLET, FILM COATED ORAL at 09:13

## 2023-11-16 RX ADMIN — HYDROCODONE BITARTRATE AND ACETAMINOPHEN 2 TABLET: 7.5; 325 TABLET ORAL at 14:36

## 2023-11-16 RX ADMIN — BISACODYL 10 MG: 5 TABLET, COATED ORAL at 09:13

## 2023-11-16 RX ADMIN — APIXABAN 2.5 MG: 2.5 TABLET, FILM COATED ORAL at 20:51

## 2023-11-16 RX ADMIN — POLYETHYLENE GLYCOL 3350 17 G: 17 POWDER, FOR SOLUTION ORAL at 09:13

## 2023-11-16 RX ADMIN — PANTOPRAZOLE SODIUM 40 MG: 40 TABLET, DELAYED RELEASE ORAL at 09:13

## 2023-11-16 RX ADMIN — SODIUM CHLORIDE 1 DROP: 50 SOLUTION OPHTHALMIC at 09:13

## 2023-11-16 RX ADMIN — ATORVASTATIN CALCIUM 10 MG: 20 TABLET, FILM COATED ORAL at 09:13

## 2023-11-16 RX ADMIN — ONDANSETRON 4 MG: 2 INJECTION INTRAMUSCULAR; INTRAVENOUS at 09:21

## 2023-11-16 RX ADMIN — DOCUSATE SODIUM 100 MG: 100 CAPSULE, LIQUID FILLED ORAL at 09:13

## 2023-11-16 RX ADMIN — ROPINIROLE 1 MG: 1 TABLET, FILM COATED ORAL at 20:52

## 2023-11-16 NOTE — CASE MANAGEMENT/SOCIAL WORK
Continued Stay Note  [facility]     Patient Name: Ashley Motta  MRN: [id number]  Today's Date: 11/16/2023    Admit Date: 11/14/2023    Plan: [facility]- bed available 11/17 ( Friday)   Discharge Plan       Row Name 11/16/23 1107       Plan    Plan [facility]- bed available 11/17 ( Friday)    Patient/Family in Agreement with Plan yes    Plan Comments [facility] has accepted and anticipates a bed available tomorrow. Transfer packet started and in cubbie. Pharmacy updated. [facility] booked for [facility]/17 ( Friday) @ 1200 ( reservation [id number]).  Patient updated at bedside and is agreeable.                   Discharge Codes    No documentation.                 Expected Discharge Date and Time       Expected Discharge Date Expected Discharge Time    Nov 17, 2023               Josefa Albright RN

## 2023-11-16 NOTE — PLAN OF CARE
Goal Outcome Evaluation:  Plan of Care Reviewed With: patient        Progress: no change  Outcome Evaluation: Patient sitting up in chair. Makes needs known. Up with assist x 1 and walker. Possible d/c to rehab tomorrow. Dopplar done on L calf earlier and no abnormailities found. PRN Zofran given for nausea earlier and was effective. PRN pain meds given as ordered. No s/s of distress noted.

## 2023-11-16 NOTE — PLAN OF CARE
Goal Outcome Evaluation:  Plan of Care Reviewed With: patient        Progress: improving  Outcome Evaluation: VSS. PT has been ambulating with a walker and assistx1 and been voiding per brp. PRN pain meds given as per order. Dressing is c/d/i. Educated on falls precaution, medication management and the importance of using CPAP when going to bed. Pt wants to d/c to [facility], referral done and pending. Will cont with current plan of care.

## 2023-11-16 NOTE — THERAPY TREATMENT NOTE
Acute Care - Physical Therapy Treatment Note  [facility]     Patient Name: Ashley Motta  : 1940  MRN: [id number]  Today's Date: 2023   Onset of Illness/Injury or Date of Surgery: 23  Visit Dx:     ICD-10-CM ICD-9-CM   1. Arthritis of left knee  M17.12 716.96     Patient Active Problem List   Diagnosis    Osteopenia    Hyperlipidemia    Pre-diabetes    Osteoarthritis    BALDEMAR on CPAP    Pelvic relaxation due to vaginal prolapse    Chronic pain of both knees    Arthritis of right knee    Arthritis of left knee    Arthritis of both knees    Restless leg syndrome    Dizziness    Headache, migraine    Heartburn    Internal hemorrhoids with complication    Impacted cerumen of left ear    Vitamin E deficiency    Environmental and seasonal allergies    Chronic cough    Aftercare following joint replacement surgery    Allergic rhinitis, unspecified    Anxiety disorder, unspecified    Atopic dermatitis, unspecified    Constipation, unspecified    Localized osteoporosis without current pathological fracture    Gastro-esophageal reflux disease without esophagitis    Generalized muscle weakness    Iron deficiency anemia due to chronic blood loss    Personal history of other malignant neoplasm of skin    Presence of right artificial knee joint    Migraine, unspecified, not intractable, without status migrainosus    S/P total knee arthroplasty, left     Past Medical History:   Diagnosis Date    Anxiety     SITUATIONAL R/T DEATH OF HER SPOUSE    Balance problem     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 SARAH LIRIANO    Endothelial corneal dystrophy 2020    GERD (gastroesophageal reflux disease)     Goiter, nontoxic, multinodular 2017    THYROID POLYP SEES     Hyperlipidemia     Impaired fasting glucose     Left knee pain     Migraine     BALDEMAR on CPAP     FOLLOWED BY DR. TERRY CHEUNG    Osteoarthritis     Osteopenia     Rectal nodule 2020    REFERRED TO   [facility]    Rectocele 07/2017    RLS (restless legs syndrome)     Seborrheic keratosis     Skin cancer 04/2015    LOWER LEG, FOLLOWED BY DR. ATUL SANCHEZ    Vertigo      Past Surgical History:   Procedure Laterality Date    CATARACT EXTRACTION, BILATERAL Bilateral 2015    COLONOSCOPY N/A 02/27/2015    1 TUBULAR [facility]ENOMA [facility]END[facility]    COLONOSCOPY N/A 03/04/2020    10 MM SESSILE SERRATED ADENOMA POLYP IN ASCENDING, 3 MM HYPERPLASTIC POLYP IN RECTUM, 7 MM ANAL NODULE, DR. SARAH LIRIANO AT [facility]    HYSTERECTOMY N/A 01/19/2004    KAYLEE WITH BSO, DR. RAFIA MORTENSEN AT [facility]    KNEE ARTHROSCOPY Left 2013    KNEE ARTHROSCOPY Right 2010    MELISSA CULDOPLASTY N/A 01/19/2004    DR. RAFIA MORTENSEN AT [facility]    SACROCOLPOPEXY N/A     TOTAL KNEE ARTHROPLASTY Right 4/11/2023    Procedure: RIGHT TOTAL KNEE ARTHROPLASTY WITH SHERI NAVIGATION;  Surgeon: Atul Liriano MD;  Location:  [facility];  Service: Orthopedics;  Laterality: Right;    TOTAL KNEE ARTHROPLASTY Left 11/14/2023    Procedure: LEFT TOTAL KNEE ARTHROPLASTY WITH SHERI NAVIGATION;  Surgeon: Atul Liriano MD;  Location:  [facility];  Service: Orthopedics;  Laterality: Left;     PT Assessment (last 12 hours)       PT Evaluation and Treatment       Row Name 11/16/23 0939          Physical Therapy Time and Intention    Subjective Information complains of;pain  -     Document Type therapy note (daily note)  -     Mode of Treatment individual therapy;physical therapy  -     Patient Effort good  -     Symptoms Noted During/After Treatment none  -       Row Name 11/16/23 0990          General Information    Patient Profile Reviewed yes  -     Patient Observations alert;cooperative;agree to therapy  -     General Observations of Patient pt reclined in chair no acute distress  -     Existing Precautions/Restrictions fall  -     Risks Reviewed patient:  -     Benefits Reviewed patient:  -       Row  "Name 11/16/23 0939          Pain    Pretreatment Pain Rating 6/10  -     Posttreatment Pain Rating 6/10  -     Pain Location - Side/Orientation Left  -LH     Pain Location incisional  -     Pain Location - knee  -LH     Pain Intervention(s) Repositioned;Cold applied  -       Row Name 11/16/23 0939          Cognition    Affect/Mental Status (Cognition) WFL  -       Row Name 11/16/23 0939          Bed Mobility    Comment, (Bed Mobility) NT  -       Row Name 11/16/23 0939          Sit-Stand Transfer    Sit-Stand Oxly (Transfers) contact guard  -     Assistive Device (Sit-Stand Transfers) walker, front-wheeled  -       Row Name 11/16/23 0939          Stand-Sit Transfer    Stand-Sit Oxly (Transfers) contact guard  -     Assistive Device (Stand-Sit Transfers) walker, front-wheeled  -       Row Name 11/16/23 0939          Gait/Stairs (Locomotion)    Oxly Level (Gait) contact guard;set up;verbal cues  -     Assistive Device (Gait) walker, front-wheeled  -     Distance in Feet (Gait) 5ft x 2  -     Pattern (Gait) step-to  -LH     Deviations/Abnormal Patterns (Gait) left sided deviations;haily decreased;weight shifting decreased;stride length decreased  -     Bilateral Gait Deviations forward flexed posture;heel strike decreased  -     Comment, (Gait/Stairs) pt reports it feels like its \"hard to lift the L leg\", inc pain and dizziness also limiting mobility.  -       Row Name 11/16/23 0939          Motor Skills    Therapeutic Exercise --  TKR therex x 15 w assist  -       Row Name             Wound 11/14/23 1339 Left anterior knee    Wound - Properties Group Placement Date: 11/14/23 -TH Placement Time: 1339 -TH Present on Original Admission: N  -TH Side: Left  -TH Orientation: anterior  -TH Location: knee  -TH    Retired Wound - Properties Group Placement Date: 11/14/23 -TH Placement Time: 1339 -TH Present on Original Admission: N  -TH Side: Left  -TH " Orientation: anterior  -TH Location: knee  -TH    Retired Wound - Properties Group Date first assessed: 11/14/23  -TH Time first assessed: 1339  -TH Present on Original Admission: N  -TH Side: Left  -TH Location: knee  -TH      Row Name 11/16/23 0939          Plan of Care Review    Plan of Care Reviewed With patient  -     Outcome Evaluation Pt reports increased L calf tenderness today, increased difficulty w ambulation 5ft x 2 CGA rwx Pt reports it feels difficult to lift LLE due to pain. Pt also reports dizziness today- vitals taken during session WNL. Pt required assist to complete TKR therex due to pain (educated pt to perform AAROM w gait belt assist as needed). PT recommending SNU at this time, RN notified.  -       Row Name 11/16/23 0939          Vital Signs    Intra Systolic BP Rehab --  130s/70s  -     Intratreatment Heart Rate (beats/min) 79  -     Intra SpO2 (%) 95  -     O2 Delivery Intra Treatment room air  -       Row Name 11/16/23 0939          Positioning and Restraints    Pre-Treatment Position sitting in chair/recliner  -     Post Treatment Position chair  -     In Chair reclined;encouraged to call for assist;call light within reach;exit alarm on;notified [facility]  -               User Key  (r) = Recorded By, (t) = Taken By, (c) = Cosigned By      Initials Name Provider Type     Irma Motta, PT Physical Therapist     Cadence Ansari, RN Registered Nurse                    Physical Therapy Education       Title: PT OT SLP Therapies (In Progress)       Topic: Physical Therapy (In Progress)       Point: Mobility training (In Progress)       Learning Progress Summary             Patient Acceptance, E, NR by  at 11/16/2023 0945    Acceptance, E, DU,NR by  at 11/15/2023 0937                         Point: Home exercise program (In Progress)       Learning Progress Summary             Patient Acceptance, E, NR by  at 11/16/2023 0945    Acceptance, E, DU,NR by  at 11/15/2023  0937                         Point: Body mechanics (In Progress)       Learning Progress Summary             Patient Acceptance, E, NR by  at 11/16/2023 0945    Acceptance, E, DU,NR by  at 11/15/2023 0937                         Point: Precautions (In Progress)       Learning Progress Summary             Patient Acceptance, E, NR by  at 11/16/2023 0945    Acceptance, E, DU,NR by  at 11/15/2023 0937                                         User Key       Initials Effective Dates Name Provider Type Discipline     06/16/21 -  Irma Motta, PT Physical Therapist PT                  PT Recommendation and Plan  Anticipated Discharge Disposition (PT): skilled nursing facility  Therapy Frequency (PT): daily  Plan of Care Reviewed With: patient  Outcome Evaluation: Pt reports increased L calf tenderness today, increased difficulty w ambulation 5ft x 2 CGA rwx Pt reports it feels difficult to lift LLE due to pain. Pt also reports dizziness today- vitals taken during session WNL. Pt required assist to complete TKR therex due to pain (educated pt to perform AAROM w gait belt assist as needed). PT recommending SNU at this time, RN notified.   Outcome Measures       Row Name 11/16/23 0945 11/16/23 0914 11/15/23 0900       How much help from another person do you currently need...    Turning from your back to your side while in flat bed without using bedrails? 4  -LH -- 3  -LH    Moving from lying on back to sitting on the side of a flat bed without bedrails? 4  -LH -- 3  -LH    Moving to and from a bed to a chair (including a wheelchair)? 3  -LH -- 3  -LH    Standing up from a chair using your arms (e.g., wheelchair, bedside chair)? 3  -LH -- 3  -LH    Climbing 3-5 steps with a railing? 2  -LH -- 3  -LH    To walk in hospital room? 3  -LH -- 3  -LH    AM-PAC 6 Clicks Score (PT) 19  - -- 18  -    Highest Level of Mobility Goal 6 --> Walk 10 steps or more  - -- 6 --> Walk 10 steps or more  -       Functional  Assessment    Outcome Measure Options -- AM-PAC 6 Clicks Basic Mobility (PT)  - AM-PAC 6 Clicks Basic Mobility (PT)  -              User Key  (r) = Recorded By, (t) = Taken By, (c) = Cosigned By      Initials Name Provider Type     Irma Motta, PT Physical Therapist                     Time Calculation:    PT Charges       Row Name 11/16/23 0945             Time Calculation    Start Time 0841  -      Stop Time 0905  -      Time Calculation (min) 24 min  -      PT Received On 11/16/23  -      PT - Next Appointment 11/17/23  -                User Key  (r) = Recorded By, (t) = Taken By, (c) = Cosigned By      Initials Name Provider Type     Irma Motta PT Physical Therapist                  Therapy Charges for Today       Code Description Service Date Service Provider Modifiers Qty    62408773895 HC PT EVAL LOW COMPLEXITY 2 11/15/2023 Irma Motta, PT GP 1    94672875076 HC PT THER PROC EA 15 MIN 11/15/2023 Irma Motta, PT GP 1    70155407529 HC PT THER PROC EA 15 MIN 11/16/2023 Irma Motta, PT GP 1    62309431345 HC PT THERAPEUTIC ACT EA 15 MIN 11/16/2023 Irma Motta, PT GP 1            PT G-Codes  Outcome Measure Options: AM-PAC 6 Clicks Basic Mobility (PT)  AM-PAC 6 Clicks Score (PT): 19    Irma Motta, PT  11/16/2023

## 2023-11-16 NOTE — PLAN OF CARE
Goal Outcome Evaluation:  Plan of Care Reviewed With: patient           Signed         Goal Outcome Evaluation:  Plan of Care Reviewed With: patient  Outcome Evaluation: Pt reports increased L calf tenderness today, increased difficulty w ambulation 5ft x 2 CGA rwx Pt reports it feels difficult to lift LLE due to pain. Pt also reports dizziness today- vitals taken during session WNL. Pt required assist to complete TKR therex due to pain (educated pt to perform AAROM w gait belt assist as needed). PT recommending SNU at this time, RN notified.        Anticipated Discharge Disposition (PT): skilled nursing facility

## 2023-11-16 NOTE — PROGRESS NOTES
Orthopedic Total Joint Progress Note        Patient: Ashley Motta    Date of Admission: 11/14/2023  9:14 AM    YOB: 1940    Medical Record Number: [id number]    Attending Physician: Atul Rico MD      POD # 2 Days Post-Op Procedure(s) (LRB):  LEFT TOTAL KNEE ARTHROPLASTY WITH SHERI NAVIGATION (Left)       Systemic or Specific Complaints: The patient has had a relatively normal postoperative course.  The patient has had no issues with the wound..  Patient up in chair upon arrival.  She is tearful on my arrival.  She feels like she is struggling today more than she did with her previous knee replacement.  She feels like she is dragging the leg which is making her struggle to walk.  She does not remember this from her previous knee replacement surgery.  She states she was a little bit dizzy this morning but was able to get to the restroom with the assistance of an aide.  She is hopeful to get into the [facility] rehab, this is where she went for her previous knee replacement.  She lives alone and has no one to help support her during her recovery. Her balance issues related to vertigo combined with her decreased mobility postsurgically make it unsafe to send her home currently.  She has no other complaints at this time.      Allergies: No Known Allergies    Medications:   Current Medications:  Scheduled Meds:apixaban, 2.5 mg, Oral, Q12H  atorvastatin, 10 mg, Oral, Daily  docusate sodium, 100 mg, Oral, BID  pantoprazole, 40 mg, Oral, Daily  polyethylene glycol, 17 g, Oral, Daily  rOPINIRole, 1 mg, Oral, Nightly  sodium chloride, 1 drop, Both Eyes, BID      Continuous Infusions:lactated ringers, 100 mL/hr      PRN Meds:.  acetaminophen    bisacodyl    bisacodyl    HYDROcodone-acetaminophen    HYDROcodone-acetaminophen    HYDROmorphone **AND** naloxone    ondansetron **OR** ondansetron      Physical Exam: 83 y.o. female   Wt Readings from Last 3 Encounters:   11/14/23 75.3 kg (166 lb 0.1 oz)  "  10/31/23 75.3 kg (165 lb 14.4 oz)   10/27/23 71.7 kg (158 lb)     Ht Readings from Last 3 Encounters:   11/14/23 160 cm (62.99\")   10/31/23 160 cm (63\")   10/27/23 160 cm (63\")     Body mass index is 29.41 kg/m².    Vitals:    11/15/23 1518 11/15/23 2210 11/16/23 0214 11/16/23 0618   BP: 119/57 126/68 110/62 123/71   BP Location: Right arm Left arm Right arm Left arm   Patient Position: Lying Lying Lying Lying   Pulse: 64 64 70 75   Resp: 17 16 16 16   Temp: 97.9 °F (36.6 °C) 97.4 °F (36.3 °C) 98.2 °F (36.8 °C) 97.5 °F (36.4 °C)   TempSrc: Oral Oral Oral Oral   SpO2: 99% 99% 97% 97%   Weight:       Height:            General Appearance:    General: alert, oriented, and anxious         Abdomen/:     soft non-tender, non-distended, voiding without difficulty       Extremities:   Operative extremity neurovascular status intact. ROM appropriate.  Incision intact w/out signs or symptoms of infection.  No cyanosis, calf is soft and nontender.  Ace bandage and cast padding removed.  Optifoam dressing clean dry and intact no signs of drainage or active bleeding.     Activity: Mobilizing Per P.T.   Weight Bearing: As Tolerated    Diagnostic Tests:   Admission on 11/14/2023   Component Date Value Ref Range Status    Hemoglobin 11/15/2023 12.8  12.0 - 15.9 g/dL Final    Hematocrit 11/15/2023 38.2  34.0 - 46.6 % Final    Hemoglobin 11/16/2023 12.5  12.0 - 15.9 g/dL Final    Hematocrit 11/16/2023 37.8  34.0 - 46.6 % Final       Imaging Results (Last 72 Hours)       Procedure Component Value Units Date/Time    XR Knee 1 or 2 View Left [[id number]] Collected: 11/14/23 1524     Updated: 11/14/23 1528    Narrative:      XR KNEE 1 OR 2 VW LEFT-11/14/2023     HISTORY: Left knee arthroplasty.     Distal femoral and proximal tibia components of the left knee prosthesis  are well seated with no abnormal surrounding bony lucencies. Soft tissue  air is seen. No unexpected findings are noted.       Impression:      1. Satisfactory " postoperative appearance of the left knee.        This report was finalized on 11/14/2023 3:25 PM by Dr. Itz Valladares M.D on Workstation: [id number]               Personally viewed ortho images and report     Assessment:    Arthritis of left knee    S/P total knee arthroplasty, left  - 2 Days Post-Op following total joint replacement  - Acute Blood Loss Anemia, preoperative hemoglobin 14, POD #2 hemoglobin 12.5 - stable  - Post-operative Pain  - Limited mobility, requires use of walker and assistance when OOB.    Plan:    - Continue to monitor labs and/or v/s, for tolerance to post op blood loss.  - Continue efforts to increase mobilization - with caution due to vertigo  - Continue Pain Control Measures.  - Continue incisional Care  - DVT prophylaxis - Eliquis  - Follow up in office with Atul Rico M.D. In 2 weeks.    Discharge Plan:POD 2-3 to [place] if accepted and bed available    Date: 11/16/2023  BERNICE Leblanc

## 2023-11-16 NOTE — DISCHARGE SUMMARY
Orthopedic Discharge Summary      Patient: Ashley Motta  YOB: 1940  Medical Record Number: [id number]    Attending Physician: Atul Rico MD  Consulting Physician(s):   Consults       No orders found from 10/16/2023 to 11/15/2023.            Date of Admission: 11/14/2023  9:14 AM  Date of Discharge: 11/17/2023    Discharge Diagnosis: VT ARTHRP KNE CONDYLE&PLATU MEDIAL&LAT COMPARTMENTS [28171] (LEFT TOTAL KNEE ARTHROPLASTY WITH SHERI NAVIGATION),   Acute Blood Loss Anemia, stable  Post-operative Pain  Limited mobility, requires use of walker and assistance when OOB.    Presenting Problem/History of Present Illness: Arthritis of left knee [M17.12]  S/P total knee arthroplasty, left [Z96.652]    Allergies: No Known Allergies    Discharge Medications       Discharge Medications        New Medications        Instructions Start Date   apixaban 2.5 MG tablet tablet  Commonly known as: ELIQUIS   2.5 mg, Oral, Every 12 Hours Scheduled      docusate sodium 100 MG capsule   100 mg, Oral, 2 Times Daily      HYDROcodone-acetaminophen 7.5-325 MG per tablet  Commonly known as: NORCO   1 tablet, Oral, Every 6 Hours PRN      ondansetron 4 MG tablet  Commonly known as: ZOFRAN   4 mg, Oral, Every 6 Hours PRN      polyethylene glycol 17 g packet  Commonly known as: MIRALAX   17 g, Oral, Daily   Start Date: November 17, 2023            Continue These Medications        Instructions Start Date   pantoprazole 20 MG EC tablet  Commonly known as: Protonix   20 mg, Oral, Daily, Before dinner      rOPINIRole 1 MG tablet  Commonly known as: REQUIP   TAKE 1 TABLET BY MOUTH ONE HOUR BEFORE BEDTIME      simvastatin 20 MG tablet  Commonly known as: ZOCOR   20 mg, Oral, Nightly      sodium chloride 5 % ophthalmic solution  Commonly known as: MICHEL 128   1 drop, Both Eyes, 2 Times Daily             Stop These Medications      acetaminophen 500 MG tablet  Commonly known as: TYLENOL     ASPIRIN 81 PO     Daily Multivitamin  capsule                Past Medical History:   Diagnosis Date    Anxiety     SITUATIONAL R/T DEATH OF HER SPOUSE    Balance problem     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 TERRY CHEUNG    Osteoarthritis     Osteopenia     Rectal nodule 03/2020    REFERRED TO DR. PAMELA NAPOLES    Rectocele 07/2017    RLS (restless legs syndrome)     Seborrheic keratosis     Skin cancer 04/2015    LOWER LEG, FOLLOWED BY DR. ATUL SANCHEZ    Vertigo         Past Surgical History:   Procedure Laterality Date    CATARACT EXTRACTION, BILATERAL Bilateral 2015    COLONOSCOPY N/A 02/27/2015    1 TUBULAR ADENOMA [facility]ENDING[facility]    COLONOSCOPY N/A 03/04/2020    10 MM SESSILE SERRATED ADENOMA POLYP IN ASCENDING, 3 MM HYPERPLASTIC POLYP IN RECTUM, 7 MM ANAL NODULE, DR. SARAH LIRIANO AT [facility]    HYSTERECTOMY N/A 01/19/2004    KAYLEE WITH BSO, DR. RAFIA MORTENSEN AT [facility]    KNEE ARTHROSCOPY Left 2013    KNEE ARTHROSCOPY Right 2010    MELISSA CULDOPLASTY N/A 01/19/2004    DR. RAFIA MORTENSEN AT [facility]    SACROCOLPOPEXY N/A     TOTAL KNEE ARTHROPLASTY Right 4/11/2023    Procedure: RIGHT TOTAL KNEE ARTHROPLASTY WITH SHERI NAVIGATION;  Surgeon: Atul Liriano MD;  [place] [facility] [place] [facility]  Service: Orthopedics;  Laterality: Right;    TOTAL KNEE ARTHROPLASTY Left 11/14/2023    Procedure: LEFT TOTAL KNEE ARTHROPLASTY WITH SHERI NAVIGATION;  Surgeon: Atul Liriano MD;  [place] [facility]  Service: Orthopedics;  Laterality: Left;        Social History     Occupational History    Not on file   Tobacco Use    Smoking status: Never    Smokeless tobacco: Never   Vaping Use    Vaping Use:  Never used   Substance and Sexual Activity    Alcohol use: No    Drug use: Never    Sexual activity: Not Currently     Birth control/protection: Post-menopausal, Surgical      Social History     Social History Narrative    Not on file        Family History   Problem Relation Age of Onset    Heart disease Mother     Lung cancer Father     Hypertension Father     Heart disease Father     Cancer Father     Heart attack Brother     Emphysema Brother     COPD Brother     Depression Brother     Heart attack Brother         Had stents placed 2009    Breast cancer Neg Hx     Malig Hyperthermia Neg Hx          Physical Exam: 83 y.o. female   Body mass index is 29.41 kg/m².  Facility age limit for growth %connie is 20 years.  Vitals:    11/16/23 1042   BP: 112/68   Pulse:    Resp:    Temp:    SpO2:          General Appearance:    Alert, cooperative, in no acute distress                      Vitals:    11/16/23 0214 11/16/23 0618 11/16/23 0945 11/16/23 1042   BP: 110/62 123/71 135/56 112/68   BP Location: Right arm Left arm Right arm Left arm   Patient Position: Lying Lying Sitting    Pulse: 70 75 77    Resp: 16 16 18    Temp: 98.2 °F (36.8 °C) 97.5 °F (36.4 °C) 97.6 °F (36.4 °C)    TempSrc: Oral Oral Oral    SpO2: 97% 97% 96%    Weight:       Height:            DIAGNOSTIC TESTS:   Admission on 11/14/2023   Component Date Value Ref Range Status    Hemoglobin 11/15/2023 12.8  12.0 - 15.9 g/dL Final    Hematocrit 11/15/2023 38.2  34.0 - 46.6 % Final    Hemoglobin 11/16/2023 12.5  12.0 - 15.9 g/dL Final    Hematocrit 11/16/2023 37.8  34.0 - 46.6 % Final    Glucose 11/16/2023 106 (H)  65 - 99 mg/dL Final    BUN 11/16/2023 18  8 - 23 mg/dL Final    Creatinine 11/16/2023 0.83  0.57 - 1.00 mg/dL Final    Sodium 11/16/2023 138  136 - 145 mmol/L Final    Potassium 11/16/2023 4.7  3.5 - 5.2 mmol/L Final    Chloride 11/16/2023 105  98 - 107 mmol/L Final    CO2 11/16/2023 26.3  22.0 - 29.0 mmol/L Final    Calcium 11/16/2023 8.9  8.6 - 10.5  mg/dL Final    Total Protein 11/16/2023 6.5  6.0 - 8.5 g/dL Final    Albumin 11/16/2023 4.1  3.5 - 5.2 g/dL Final    ALT (SGPT) 11/16/2023 17  1 - 33 U/L Final    AST (SGOT) 11/16/2023 24  1 - 32 U/L Final    Alkaline Phosphatase 11/16/2023 78  39 - 117 U/L Final    Total Bilirubin 11/16/2023 0.3  0.0 - 1.2 mg/dL Final    Globulin 11/16/2023 2.4  gm/dL Final    A/G Ratio 11/16/2023 1.7  g/dL Final    BUN/Creatinine Ratio 11/16/2023 21.7  7.0 - 25.0 Final    Anion Gap 11/16/2023 6.7  5.0 - 15.0 mmol/L Final    eGFR 11/16/2023 70.9  >60.0 mL/min/1.73 Final    WBC 11/16/2023 10.41  3.40 - 10.80 10*3/mm3 Final    RBC 11/16/2023 4.42  3.77 - 5.28 10*6/mm3 Final    Hemoglobin 11/16/2023 13.2  12.0 - 15.9 g/dL Final    Hematocrit 11/16/2023 40.2  34.0 - 46.6 % Final    MCV 11/16/2023 91.0  79.0 - 97.0 fL Final    MCH 11/16/2023 29.9  26.6 - 33.0 pg Final    MCHC 11/16/2023 32.8  31.5 - 35.7 g/dL Final    RDW 11/16/2023 14.3  12.3 - 15.4 % Final    RDW-SD 11/16/2023 48.4  37.0 - 54.0 fl Final    MPV 11/16/2023 10.7  6.0 - 12.0 fL Final    Platelets 11/16/2023 290  140 - 450 10*3/mm3 Final    Neutrophil % 11/16/2023 59.3  42.7 - 76.0 % Final    Lymphocyte % 11/16/2023 29.3  19.6 - 45.3 % Final    Monocyte % 11/16/2023 9.4  5.0 - 12.0 % Final    Eosinophil % 11/16/2023 1.2  0.3 - 6.2 % Final    Basophil % 11/16/2023 0.4  0.0 - 1.5 % Final    Immature Grans % 11/16/2023 0.4  0.0 - 0.5 % Final    Neutrophils, Absolute 11/16/2023 6.17  1.70 - 7.00 10*3/mm3 Final    Lymphocytes, Absolute 11/16/2023 3.05  0.70 - 3.10 10*3/mm3 Final    Monocytes, Absolute 11/16/2023 0.98 (H)  0.10 - 0.90 10*3/mm3 Final    Eosinophils, Absolute 11/16/2023 0.13  0.00 - 0.40 10*3/mm3 Final    Basophils, Absolute 11/16/2023 0.04  0.00 - 0.20 10*3/mm3 Final    Immature Grans, Absolute 11/16/2023 0.04  0.00 - 0.05 10*3/mm3 Final    nRBC 11/16/2023 0.0  0.0 - 0.2 /100 WBC Final    Right Common Femoral Spont 11/16/2023 Y   Final    Right Common Femoral  Competent 11/16/2023 Y   Final    Right Common Femoral Phasic 11/16/2023 Y   Final    Right Common Femoral Compress 11/16/2023 C   Final    Right Common Femoral Augment 11/16/2023 Y   Final    Left Common Femoral Spont 11/16/2023 Y   Final    Left Common Femoral Competent 11/16/2023 Y   Final    Left Common Femoral Phasic 11/16/2023 Y   Final    Left Common Femoral Compress 11/16/2023 C   Final    Left Common Femoral Augment 11/16/2023 Y   Final    Left Saphenofemoral Junction Compr* 11/16/2023 C   Final    Left Profunda Femoral Compress 11/16/2023 C   Final    Left Proximal Femoral Compress 11/16/2023 C   Final    Left Mid Femoral Spont 11/16/2023 Y   Final    Left Mid Femoral Competent 11/16/2023 Y   Final    Left Mid Femoral Phasic 11/16/2023 Y   Final    Left Mid Femoral Compress 11/16/2023 C   Final    Left Mid Femoral Augment 11/16/2023 Y   Final    Left Distal Femoral Compress 11/16/2023 C   Final    Left Popliteal Spont 11/16/2023 Y   Final    Left Popliteal Competent 11/16/2023 Y   Final    Left Popliteal Phasic 11/16/2023 Y   Final    Left Popliteal Compress 11/16/2023 C   Final    Left Popliteal Augment 11/16/2023 Y   Final    Left Posterior Tibial Compress 11/16/2023 C   Final    Left Peroneal Compress 11/16/2023 C   Final    Left Gastronemius Compress 11/16/2023 C   Final    Lt soleal compress 11/16/2023 C   Final    Left Greater Saph AK Compress 11/16/2023 C   Final    Left Greater Saph BK Compress 11/16/2023 C   Final    Left Lesser Saph Compress 11/16/2023 C   Final       Imaging Results (Last 72 Hours)       Procedure Component Value Units Date/Time    XR Knee 1 or 2 View Left [[id number]] Collected: 11/14/23 1524     Updated: 11/14/23 1528    Narrative:      XR KNEE 1 OR 2 VW LEFT-11/14/2023     HISTORY: Left knee arthroplasty.     Distal femoral and proximal tibia components of the left knee prosthesis  are well seated with no abnormal surrounding bony lucencies. Soft tissue  air is seen. No  unexpected findings are noted.       Impression:      1. Satisfactory postoperative appearance of the left knee.        This report was finalized on 11/14/2023 3:25 PM by Dr. Itz Valladares M.D on Workstation: [id number]               Hospital Course:  83 y.o. female admitted to [facility] to services of Atul Rico MD with Arthritis of left knee [M17.12]  S/P total knee arthroplasty, left [Z96.652] on 11/14/2023 and underwent MO ARTHRP KNE CONDYLE&PLATU MEDIAL&LAT COMPARTMENTS [16228] (LEFT TOTAL KNEE ARTHROPLASTY WITH SHERI NAVIGATION)  Per Atul Rico MD. Antibiotic and VTE prophylaxis were per SCIP protocols. Post-operatively the patient transferred to the post-operative floor where the patient underwent mobilization therapy that included active as well as passive ROM exercises. Opioids were titrated to achieve appropriate pain management to allow for participation in mobilization exercises. Vital signs are now stable. On the day of discharge the wound was clean, dry and intact and calf was soft and nontender and Homans sign was negative. Operative extremity neurovascular status remains intact.   Appropriate education re: incision care, activity levels, medications, and follow up visits was completed and all questions were answered. The patient is now deemed stable for discharge.    Condition on Discharge:  Stable    Total Joint Replacement Discharge Instructions: Patient is to continue with physical therapy exercises twice daily and continue working with the physical therapist as ordered  and should be up walking every 2 hours during the day in addition to the physical therapy exercises. Patient may weight bear as tolerated unless otherwise specified. Continue to ice regularly. Do frequent ankle pumping exercises while you are sitting with legs elevated.  Patient also instructed on deep breathing, coughing, and using  incentive spirometer during hospitalization and encouraged to continue to  use at home regularly.        VI. FOLLOW-UP VISITS:  Follow up in the office with Dr Atul Liriano in 2 weeks - If already scheduled (see Future Appointments) for date and time, if not yet scheduled, patient to call the office at [phone] to schedule. Prescriptions were given for pain medication, nausea, constipation, and blood thinner therapy.    If you have any concerns or suspected complications prior to your follow up visit, please call your surgeon's office. Do not wait until your appointment time if you suspect complications. These will need to be addressed in the office promptly.      Future Appointments   Date Time Provider Department Center   11/27/2023 11:00 AM Doris Johnson APRN [facility] [address] [facility]   2/22/2024 10:00 AM Christiano Allan MD [facility]   3/1/2024 12:45 PM Tomás Martines MD [facility]     Additional Instructions for the Follow-ups that You Need to Schedule       Ambulatory Referral to Home Health   As directed      Face to Face Visit Date: 11/16/2023   Follow-up provider for Plan of Care?: I will be treating the patient on an ongoing basis.  Please send me the Plan of Care for signature.   Follow-up provider: ATUL LIRIANO [[id number]]   Reason/Clinical Findings: post surgical   Describe mobility limitations that make leaving home difficult: Requires the assistance of another to leave home   Nursing/Therapeutic Services Requested: Physical Therapy   PT orders: Total joint pathway   Frequency: 1 Week 1        Ambulatory Referral to Physical Therapy Evaluate and treat, POST OP   As directed      Left TKA, please schedule about 2 weeks post-op    Order Comments: Left TKA, please schedule about 2 weeks post-op    Specialty needed: Evaluate and treat POST OP   Follow-up needed: Yes        Discharge Follow-up with Specialty: Orthopedics; 2 Weeks   As directed      Specialty: Orthopedics   Follow Up: 2 Weeks   Follow Up Details: Return to the office to see Dr. Atul Liriano                 Discharge Disposition Plan:tomorrow to [place] when bed available and accepted     Date: 11/16/2023    BERNICE Leblanc    Dictated Utilizing Dragon Dictation

## 2023-11-17 VITALS
HEART RATE: 96 BPM | HEIGHT: 63 IN | SYSTOLIC BLOOD PRESSURE: 127 MMHG | DIASTOLIC BLOOD PRESSURE: 75 MMHG | WEIGHT: 166.01 LBS | RESPIRATION RATE: 18 BRPM | OXYGEN SATURATION: 98 % | BODY MASS INDEX: 29.41 KG/M2 | TEMPERATURE: 98.3 F

## 2023-11-17 RX ADMIN — POLYETHYLENE GLYCOL 3350 17 G: 17 POWDER, FOR SOLUTION ORAL at 09:46

## 2023-11-17 RX ADMIN — ATORVASTATIN CALCIUM 10 MG: 20 TABLET, FILM COATED ORAL at 09:45

## 2023-11-17 RX ADMIN — PANTOPRAZOLE SODIUM 40 MG: 40 TABLET, DELAYED RELEASE ORAL at 09:46

## 2023-11-17 RX ADMIN — APIXABAN 2.5 MG: 2.5 TABLET, FILM COATED ORAL at 09:45

## 2023-11-17 RX ADMIN — HYDROCODONE BITARTRATE AND ACETAMINOPHEN 2 TABLET: 7.5; 325 TABLET ORAL at 02:24

## 2023-11-17 RX ADMIN — SODIUM CHLORIDE 1 DROP: 50 SOLUTION OPHTHALMIC at 09:47

## 2023-11-17 RX ADMIN — DOCUSATE SODIUM 100 MG: 100 CAPSULE, LIQUID FILLED ORAL at 09:45

## 2023-11-17 NOTE — CASE MANAGEMENT/SOCIAL WORK
Continued Stay Note  [facility]     Patient Name: Ashley Motta  MRN: [id number]  Today's Date: 11/17/2023    Admit Date: 11/14/2023    Plan: [facility]- bed available 11/17 ( Friday)   Discharge Plan       Row Name 11/17/23 0933       Plan    Plan Comments DC orders in EPIC. Spoke with [facility] and she confirms they can accept today. Transfer packet updated and dc summary faxed. Patient updated at bedside. CCP ofered to call family, but patient declined and stated that she would notify them. [facility] w/[facility] waters bboked for 1200. Patient will dc to skilled bed at [facility] via [facility] w/c jt.                   Discharge Codes    No documentation.                 Expected Discharge Date and Time       Expected Discharge Date Expected Discharge Time    Nov 17, 2023               Josefa Albright RN

## 2023-11-17 NOTE — PLAN OF CARE
[facility] - Orthopedic  Gastroenterology  Progress Note    Patient Name: Dony Macedo Jr.  MRN: [id number]  Admission Date: 5/16/2022  Hospital Length of Stay: 2 days  Code Status: Full Code   Attending Provider: Brandan Granda MD  Consulting Provider: Dominik Daniels MD  Primary Care Physician: Gloria Ma DO  Principal Problem: HHNC (hyperglycemic hyperosmolar nonketotic coma)     Patient seen and examined. Agree with findings of note as below. Symptoms stable. Feels a little better subjectively. No gross GI bleeding noted. Hgb stable. Scheduled for left heart cath tomorrow. Holding on EGD for now.      Subjective:     Interval History: 5/18/2022-patient is seen, resting in bed, awake and alert, with family at bedside.  Patient states that he is feeling little bit better today but primarily still complaining of a sore throat with swallowing.  There have been no further reports of GI bleeding.  His labs are reviewed and his H&H remained stable at 10/31.  He has been tolerating a little bit of his diet as well.  His creatinine is trended down some to 3.2.  Glucose is also improved significantly now down around 150 on average.  Note that cardiology did evaluate the patient and there are tentative plans for possible left heart catheterization tomorrow.  We will go ahead and add in some Carafate for his complaints of painful swallowing.  Abdomen is soft, nontender    Review of Systems   Constitutional:  Negative for activity change, appetite change, fatigue, fever and unexpected weight change.   HENT:  Positive for sore throat. Negative for dental problem, drooling, nosebleeds and trouble swallowing.    Eyes:  Negative for visual disturbance.   Respiratory:  Negative for cough and shortness of breath.    Cardiovascular:  Negative for chest pain.   Gastrointestinal:  Positive for nausea and vomiting. Negative for abdominal distention, abdominal pain, anal bleeding, blood in stool, constipation, diarrhea and rectal  Goal Outcome Evaluation:  Plan of Care Reviewed With: patient        Progress: improving  Outcome Evaluation: VSS. Pt has been ambulating with a walker and assistx1 and been voiding per brp. PRN pain meds given with noted effect. Dressing is c/d/i. Educated on medication management and falls precaution. Plan to d/c to a SNF today. Will take note of any changes.          pain.   Endocrine: Negative for cold intolerance and heat intolerance.   Genitourinary:  Negative for difficulty urinating, dysuria, frequency and urgency.   Musculoskeletal:  Negative for arthralgias, back pain and myalgias.   Skin:  Negative for color change.   Neurological:  Negative for dizziness, weakness and light-headedness.   Objective:     Vital Signs (Most Recent):  Temp: 98.2 °F (36.8 °C) (05/18/22 1200)  Pulse: 103 (05/18/22 1200)  Resp: 18 (05/18/22 1200)  BP: 129/67 (05/18/22 1200)  SpO2: 95 % (05/18/22 1200) Vital Signs (24h Range):  Temp:  [98.1 °F (36.7 °C)-99 °F (37.2 °C)] 98.2 °F (36.8 °C)  Pulse:  [] 103  Resp:  [18-22] 18  SpO2:  [92 %-99 %] 95 %  BP: (129-186)/(61-80) 129/67     Weight: 59 kg (130 lb) (05/17/22 1940)  Body mass index is 18.13 kg/m².      Intake/Output Summary (Last 24 hours) at 5/18/2022 1354  Last data filed at 5/17/2022 1645  Gross per 24 hour   Intake --   Output 300 ml   Net -300 ml       Lines/Drains/Airways       None                   Physical Exam  Vitals and nursing note reviewed. Exam conducted with a chaperone present.   Constitutional:       General: He is not in acute distress.     Appearance: Normal appearance. He is not ill-appearing.   HENT:      Head: Normocephalic.      Nose: Nose normal. No congestion.      Mouth/Throat:      Mouth: Mucous membranes are moist.      Pharynx: Oropharynx is clear. No posterior oropharyngeal erythema.   Eyes:      General: No scleral icterus.     Pupils: Pupils are equal, round, and reactive to light.   Cardiovascular:      Rate and Rhythm: Normal rate and regular rhythm.      Pulses: Normal pulses.   Pulmonary:      Effort: Pulmonary effort is normal.      Breath sounds: Normal breath sounds. No wheezing, rhonchi or rales.   Abdominal:      General: Abdomen is flat. Bowel sounds are normal. There is no distension.      Tenderness: There is no abdominal tenderness.   Musculoskeletal:         General: Normal range of motion.       Cervical back: Normal range of motion. No tenderness.   Skin:     General: Skin is warm and dry.   Neurological:      General: No focal deficit present.      Mental Status: He is alert and oriented to person, place, and time. Mental status is at baseline.      Motor: No weakness.   Psychiatric:         Mood and Affect: Mood normal.         Behavior: Behavior normal.         Thought Content: Thought content normal.         Judgment: Judgment normal.       Significant Labs:  All pertinent lab results from the last 24 hours have been reviewed.      Significant Imaging:  Imaging results within the past 24 hours have been reviewed.    Assessment/Plan:     Hematemesis  5/18/2022-no further reports of GI bleeding.  H&H remained stable.  Blood glucose is improved.  Note tentative plans for heart catheterization on tomorrow.  Will await cardiology's recommendations with patient tentatively benefiting from EGD following cardiology's evaluation.  We will going to adding Carafate 4 times daily.  We will continue to follow monitor response as well continue to monitor H&H.  Will review case further with Dr. Daniels with plan an addendum to follow.    5/17/2022-patient admitted with altered mental status as well as upper GI bleed as described with findings of elevated serum glucose.  Witnessed maroon-colored emesis in the ER last evening with none further reported at this time.  H&H is stable as well.  Will check stools for occult blood.  Continue with PPI treatment. Patient reportedly not on treatment.  May begin a clear liquid diet.  Consideration for upper endoscopy once patient has stabilized however will review case further with Dr. Daniels with plan addendum to follow.        Thank you for your consult. I will follow-up with patient. Please contact us if you have any additional questions.    TANMAY Brennan  Gastroenterology  [facility] - Orthopedic

## 2023-11-17 NOTE — CASE MANAGEMENT/SOCIAL WORK
Continued Stay Note  [facility]     Patient Name: Ashley Motta  MRN: [id number]  Today's Date: 11/17/2023    Admit Date: 11/14/2023    Plan: [facility]- bed available 11/17 ( Friday)   Discharge Plan       Row Name 11/17/23 1247       Plan    Plan Comments [facility] unable to take patient's suitcase and backpack. Spoke with Magdalena Nichols to use [facility]. Called [facility] [phone] and spoke with Sol. They will  the patients belongings from the  nursing station this afternoon. Patient updated by telephone.                   Discharge Codes    No documentation.                 Expected Discharge Date and Time       Expected Discharge Date Expected Discharge Time    Nov 17, 2023               Josefa Albright RN

## 2023-11-17 NOTE — PROGRESS NOTES
Orthopedic Progress Note      Patient: Ashley Motta    YOB: 1940    Medical Record Number: [id number]    Attending Physician: Atul Liriano MD    Date of Admission: 11/14/2023  9:14 AM    Admitting Dx:  Arthritis of left knee [M17.12]  S/P total knee arthroplasty, left [Z96.652]    Status Post: [place] ARTHRP KNE CONDYLE&PLATU MEDIAL&LAT COMPARTMENTS [50328] (LEFT TOTAL KNEE ARTHROPLASTY WITH SHERI NAVIGATION)    Post Operative Day Number: 3    Current Problem List:   Arthritis of left knee    S/P total knee arthroplasty, left      Past Medical History:   Diagnosis Date    Anxiety     SITUATIONAL R/T DEATH OF HER SPOUSE    Balance problem     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 TERRY CHEUNG    Osteoarthritis     Osteopenia     Rectal nodule 03/2020    REFERRED TO DR. PAMELA NAPOLES    Rectocele 07/2017    RLS (restless legs syndrome)     Seborrheic keratosis     Skin cancer 04/2015    LOWER LEG, FOLLOWED BY DR. ATUL SANCHEZ    Vertigo        Current Medications:  Scheduled Meds:apixaban, 2.5 mg, Oral, Q12H  atorvastatin, 10 mg, Oral, Daily  docusate sodium, 100 mg, Oral, BID  pantoprazole, 40 mg, Oral, Daily  polyethylene glycol, 17 g, Oral, Daily  rOPINIRole, 1 mg, Oral, Nightly  sodium chloride, 1 drop, Both Eyes, BID      PRN Meds:.  acetaminophen    bisacodyl    bisacodyl    HYDROcodone-acetaminophen    HYDROcodone-acetaminophen    HYDROmorphone **AND** naloxone    ondansetron **OR** ondansetron    SUBJECTIVE: 83 y.o.  female.  Awake and alert.  No complaints    OBJECTIVE:   Vitals:    11/16/23 1820 11/16/23 2144 11/17/23 0612 11/17/23 1035   BP: 130/74 (!) 182/75 126/69  127/75   BP Location: Right arm Left arm Left arm Left arm   Patient Position: Sitting Sitting Sitting Sitting   Pulse: 83 90 94 96   Resp: 18 16 16 18   Temp: 98.4 °F (36.9 °C) 97.3 °F (36.3 °C) 97.7 °F (36.5 °C) 98.3 °F (36.8 °C)   TempSrc: Oral Oral Oral Infrared   SpO2: 98% 97% 96% 98%   Weight:       Height:         I/O last 3 completed shifts:  In: 2040 [P.O.:2040]  Out: 350 [Urine:350]    Diagnostic Tests:  Lab Results (last 72 hours)       Procedure Component Value Units Date/Time    Comprehensive Metabolic Panel [[id number]]  (Abnormal) Collected: 11/16/23 1126    Specimen: Blood Updated: 11/16/23 1157     Glucose 106 mg/dL      BUN 18 mg/dL      Creatinine 0.83 mg/dL      Sodium 138 mmol/L      Potassium 4.7 mmol/L      Chloride 105 mmol/L      CO2 26.3 mmol/L      Calcium 8.9 mg/dL      Total Protein 6.5 g/dL      Albumin 4.1 g/dL      ALT (SGPT) 17 U/L      AST (SGOT) 24 U/L      Alkaline Phosphatase 78 U/L      Total Bilirubin 0.3 mg/dL      Globulin 2.4 gm/dL      A/G Ratio 1.7 g/dL      BUN/Creatinine Ratio 21.7     Anion Gap 6.7 mmol/L      eGFR 70.9 mL/min/1.73     Narrative:      GFR Normal >60  Chronic Kidney Disease <60  Kidney Failure <15    The GFR formula is only valid for adults with stable renal function between ages 18 and 70.    CBC & Differential [[id number]]  (Abnormal) Collected: 11/16/23 1126    Specimen: Blood Updated: 11/16/23 1139    Narrative:      The following orders were created for panel order CBC & Differential.  Procedure                               Abnormality         Status                     ---------                               -----------         ------                     CBC Auto Differential[[id number]]        Abnormal            Final result                 Please view results for these tests on the individual orders.    CBC Auto Differential [[id number]]  (Abnormal) Collected: 11/16/23 1126    Specimen: Blood Updated: 11/16/23 1139     WBC 10.41 10*3/mm3      RBC  4.42 10*6/mm3      Hemoglobin 13.2 g/dL      Hematocrit 40.2 %      MCV 91.0 fL      MCH 29.9 pg      MCHC 32.8 g/dL      RDW 14.3 %      RDW-SD 48.4 fl      MPV 10.7 fL      Platelets 290 10*3/mm3      Neutrophil % 59.3 %      Lymphocyte % 29.3 %      Monocyte % 9.4 %      Eosinophil % 1.2 %      Basophil % 0.4 %      Immature Grans % 0.4 %      Neutrophils, Absolute 6.17 10*3/mm3      Lymphocytes, Absolute 3.05 10*3/mm3      Monocytes, Absolute 0.98 10*3/mm3      Eosinophils, Absolute 0.13 10*3/mm3      Basophils, Absolute 0.04 10*3/mm3      Immature Grans, Absolute 0.04 10*3/mm3      nRBC 0.0 /100 WBC     Hemoglobin & Hematocrit, Blood [[id number]]  (Normal) Collected: 11/16/23 0444    Specimen: Blood Updated: 11/16/23 0519     Hemoglobin 12.5 g/dL      Hematocrit 37.8 %     Hemoglobin & Hematocrit, Blood [[id number]]  (Normal) Collected: 11/15/23 0323    Specimen: Blood Updated: 11/15/23 0348     Hemoglobin 12.8 g/dL      Hematocrit 38.2 %              PHYSICAL EXAM: Lt. Knee dressing remains dry and intact.  Calf is soft and nontender.  She has good motion sensation to her foot and ankle.  Pain is well controlled.  She is ambulating well with PT although she tends to want to keep her operative extremity in full extension and lift the leg at the hip.  Have given her instructions on how to bend the knee when she tries to walk.  Voiding well.  Vital signs remained stable.     ASSESSMENT & PLAN:    Plan transfer to [facility] later today.  Eliquis for DVT prophylaxis.  Return to the office to see Dr. Rico in 2 weeks for follow-up      Date: 11/17/2023    Vivian Dobbs RN

## 2023-11-17 NOTE — CASE MANAGEMENT/SOCIAL WORK
Case Management Discharge Note      Final Note: dc to SNF    Provided Post Acute Provider List?: N/A  N/A Provider List Comment: [facility] offered the CMS Compare SNF List, CMS Compare HH List and Personal In-Home Caregiver Agency Lists, and the patient declined.    Selected Continued Care - Discharged on 11/17/2023 Admission date: 11/14/2023 - Discharge disposition: Skilled Nursing Facility (DC - External)      Destination Coordination complete.      Service Provider Selected Services Address Phone Fax Patient Preferred    [facility] [address] [place] [address] [facility] [address] [phone] --              Durable Medical Equipment    No services have been selected for the patient.                Dialysis/Infusion    No services have been selected for the patient.                Home Medical Care    No services have been selected for the patient.                Therapy    No services have been selected for the patient.                Community Resources    No services have been selected for the patient.                Community & DME    No services have been selected for the patient.                         Final Discharge Disposition Code: 03 - skilled nursing facility (SNF)

## 2023-11-20 ENCOUNTER — TELEPHONE (OUTPATIENT)
Dept: ORTHOPEDIC SURGERY | Facility: HOSPITAL | Age: 83
End: 2023-11-20
Payer: MEDICARE

## 2023-11-20 NOTE — TELEPHONE ENCOUNTER
Attempted to speak with Ms. Motta at this time to see how she is doing as se is POD 6 LTK, Someone picked up the phone but there was no response. Unable to speak with her at this time.

## 2023-11-27 ENCOUNTER — OFFICE VISIT (OUTPATIENT)
Dept: ORTHOPEDIC SURGERY | Facility: CLINIC | Age: 83
End: 2023-11-27
Payer: MEDICARE

## 2023-11-27 VITALS — TEMPERATURE: 98.7 F | WEIGHT: 166 LBS | HEIGHT: 63 IN | BODY MASS INDEX: 29.41 KG/M2

## 2023-11-27 DIAGNOSIS — R52 PAIN: Primary | ICD-10-CM

## 2023-11-27 DIAGNOSIS — Z96.652 S/P TOTAL KNEE ARTHROPLASTY, LEFT: ICD-10-CM

## 2023-11-27 PROCEDURE — 73562 X-RAY EXAM OF KNEE 3: CPT | Performed by: NURSE PRACTITIONER

## 2023-11-27 PROCEDURE — 1160F RVW MEDS BY RX/DR IN RCRD: CPT | Performed by: NURSE PRACTITIONER

## 2023-11-27 PROCEDURE — 1159F MED LIST DOCD IN RCRD: CPT | Performed by: NURSE PRACTITIONER

## 2023-11-27 PROCEDURE — 99024 POSTOP FOLLOW-UP VISIT: CPT | Performed by: NURSE PRACTITIONER

## 2023-11-27 NOTE — PROGRESS NOTES
Ashley Motta : 1940 MRN: [id number] DATE: 2023  Body mass index is 29.41 kg/m².  Vitals:    23 1050   Temp: 98.7 °F (37.1 °C)       DIAGNOSIS: 2 week follow up left total knee arthroplasty    SUBJECTIVE:Patient returns today for 2 week follow up of left total knee replacement.  Patient brought to appointment in wheelchair from rehab facility.  She uses a walker to ambulate while there.  She states she has about 1 more week and they think to get her discharged from there.  She has no other complaints at this time.    OBJECTIVE:   Exam:. The incision is healing appropriately. No sign of infection. Range of motion is progressing as expected, ROM approximately 15 degrees short of full extension and approximately 95 degrees of flexion. The calf is soft and nontender with a negative Homans sign.    DIAGNOSTIC STUDIES  2V AP&Lat of the left knee were done in the office today  Indication, findings and comparison: images were reviewed for evaluation of recent knee replacement. They demonstrate a well positioned, well aligned knee replacement without complicating factors noted. In comparison with previous films there has been interval implant placement.    ASSESSMENT: 2 week status post left knee replacement expected healing.  Incision appears well-healing, edges well-approximated, no erythema, no open wounds, no signs of active drainage or bleeding noted.  Patient does have some swelling and bruising of various stages of healing down into the foot and ankle, but calf is soft and nontender.  Instructed the patient she may shower as normal, just do not scrub over the incision.  She may lotion the leg just nothing on top of the incision yet.  She verbalized understanding.    PLAN: 1) dressing and exofin fusion removed   2) Order given for PT and pain medicine (as needed)   3) Continue ice PRN   4) Start aspirin 81mg daily x4 weeks   5) Follow up in 4 weeks with repeat Xrays of left knee (2 views)   6) Wait  for 3 months after surgery for routine teeth cleaning and continue with taking a dose of antibiotics before dental procedures for 2 yrs post total joint replacement.    Doris Johnson, BERNICE  11/27/2023      Dictated Utilizing Dragon Dictation

## 2023-11-30 ENCOUNTER — HOME HEALTH ADMISSION (OUTPATIENT)
Dept: HOME HEALTH SERVICES | Facility: HOME HEALTHCARE | Age: 83
End: 2023-11-30
Payer: MEDICARE

## 2023-12-05 ENCOUNTER — HOSPITAL ENCOUNTER (OUTPATIENT)
Dept: PHYSICAL THERAPY | Facility: HOSPITAL | Age: 83
Setting detail: THERAPIES SERIES
Discharge: HOME | End: 2023-12-05
Payer: MEDICARE

## 2023-12-05 DIAGNOSIS — Z47.89 ORTHOPEDIC AFTERCARE: ICD-10-CM

## 2023-12-05 DIAGNOSIS — Z96.652 STATUS POST LEFT KNEE REPLACEMENT: Primary | ICD-10-CM

## 2023-12-05 DIAGNOSIS — M25.662 DECREASED RANGE OF MOTION (ROM) OF LEFT KNEE: ICD-10-CM

## 2023-12-05 DIAGNOSIS — R26.2 DIFFICULTY WALKING: ICD-10-CM

## 2023-12-05 DIAGNOSIS — M62.81 MUSCLE WEAKNESS OF LOWER EXTREMITY: ICD-10-CM

## 2023-12-05 PROCEDURE — 97161 PT EVAL LOW COMPLEX 20 MIN: CPT

## 2023-12-05 PROCEDURE — 97110 THERAPEUTIC EXERCISES: CPT

## 2023-12-05 NOTE — THERAPY EVALUATION
Outpatient Physical Therapy Ortho Initial Evaluation  [facility]     Patient Name: Ashley Motta  : 1940  MRN: [id number]  Today's Date: 2023      Visit Date: 2023    Patient Active Problem List   Diagnosis    Osteopenia    Hyperlipidemia    Pre-diabetes    Osteoarthritis    BALDEMAR on CPAP    Pelvic relaxation due to vaginal prolapse    Chronic pain of both knees    Arthritis of right knee    Arthritis of left knee    Arthritis of both knees    Restless leg syndrome    Dizziness    Headache, migraine    Heartburn    Internal hemorrhoids with complication    Impacted cerumen of left ear    Vitamin E deficiency    Environmental and seasonal allergies    Chronic cough    Aftercare following joint replacement surgery    Allergic rhinitis, unspecified    Anxiety disorder, unspecified    Atopic dermatitis, unspecified    Constipation, unspecified    Localized osteoporosis without current pathological fracture    Gastro-esophageal reflux disease without esophagitis    Generalized muscle weakness    Iron deficiency anemia due to chronic blood loss    Personal history of other malignant neoplasm of skin    Presence of right artificial knee joint    Migraine, unspecified, not intractable, without status migrainosus    S/P total knee arthroplasty, left        Past Medical History:   Diagnosis Date    Anxiety     SITUATIONAL R/T DEATH OF HER SPOUSE    Balance problem     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 SARAH LIRIANO    Endothelial corneal dystrophy 2020    GERD (gastroesophageal reflux disease)     Goiter, nontoxic, multinodular 2017    THYROID POLYP SEES     Hyperlipidemia     Impaired fasting glucose     Left knee pain     Migraine     BALDEMAR on CPAP     FOLLOWED BY DR. TERRY CHEUNG    Osteoarthritis     Osteopenia     Rectal nodule 2020    REFERRED TO DR. PAMELA NAPOLES    Rectocele 2017    RLS (restless legs syndrome)     Seborrheic keratosis      Skin cancer 04/2015    LOWER LEG, FOLLOWED BY DR. ATUL Santizo         Past Surgical History:   Procedure Laterality Date    CATARACT EXTRACTION, BILATERAL Bilateral 2015    COLONOSCOPY N/A 02/27/2015    1 TUBULAR ADENOMA POLYP IN DISTAL ASCENDING[facility]    COLONOSCOPY N/A 03/04/2020    10 MM SESSILE SERRATED ADENOMA POLYP IN ASCENDING, 3 MM HYPERPLASTIC POLYP IN RECTUM, 7 MM ANAL NODULE, DR. SARAH LIRIANO AT [facility]    HYSTERECTOMY N/A 01/19/2004    KAYLEE WITH BSO, DR. RAFIA MORTENSEN AT [facility]    KNEE ARTHROSCOPY Left 2013    KNEE ARTHROSCOPY Right 2010    MELISSA CULDOPLASTY N/A 01/19/2004    DR. RAFIA MORTENSEN AT [facility]    SACROCOLPOPEXY N/A     TOTAL KNEE ARTHROPLASTY Right 4/11/2023    Procedure: RIGHT TOTAL KNEE ARTHROPLASTY WITH SHERI NAVIGATION;  Surgeon: Atul Liriano MD;  Location: [facility];  Service: Orthopedics;  Laterality: Right;    TOTAL KNEE ARTHROPLASTY Left 11/14/2023    Procedure: LEFT TOTAL KNEE ARTHROPLASTY WITH SHERI NAVIGATION;  Surgeon: Atul Liriano MD;  Location: [facility] [place];  Service: Orthopedics;  Laterality: Left;       Visit Dx:     ICD-10-CM ICD-9-CM   1. Status post left knee replacement  Z96.652 V43.65   2. Orthopedic aftercare  Z47.89 V54.9   3. Decreased range of motion (ROM) of left knee  M25.662 719.56   4. Muscle weakness of lower extremity  M62.81 728.87   5. Difficulty walking  R26.2 719.7          Patient History       Row Name 12/05/23 1000             History    Chief Complaint Difficulty Walking;Muscle weakness;Pain;Balance Problems;Joint stiffness;Difficulty with daily activities  -SHASTA      Type of Pain Knee pain  -SHASTA      Brief Description of Current Complaint Ashley Motta is a 83 y.o. female who presents to physical therapy today s/p L TKA on 11/14/23 (3 weeks post op). [facility] and [facility] visited her during her stay. She was discharged from rehab on 12/2/23. Patient resides alone in single  story [place] with 2 MILDRED. She continues to ambulate with Rwx and sleeping in recliner. She has recently weaned from pain medication and only taking at night. Ashley Motta reports difficulty/increased pain with rising from chair, navigating stairs (step to step pattern), walking longer distances, standing for a period of time, sleeping in bed, and squatting. Pain relieving factors include medication, ice, elevation, and gentle movement. PMH includes R TKA (April 2023) and multiple joint OA. Ashley Motta primary goal for attending skilled physical therapy is to decrease need for AD and improve her functional ability with ADLs.  -SHASTA      Previous treatment for THIS PROBLEM Injections;Rehabilitation;Medication;Surgery  -SHASTA      Surgery Date: 11/14/23  -SHASTA      Hand Dominance right-handed  -SHASTA      What clinical tests have you had for this problem? X-ray  -SHASTA      Results of Clinical Tests see epic  -SHASTA      Are you or can you be pregnant No  -SHASTA         Pain     Pain Location Knee  -SHASTA      Pain at Present 7  -SHASTA      Pain at Best 4  -SHASTA      Pain at Worst 9  -SHASTA      Pain Frequency Constant/continuous  -SHASTA      Pain Description Tightness;Throbbing;Sharp  -SHASTA      Is your sleep disturbed? Yes  -SHASTA         Fall Risk Assessment    Any falls in the past year: No  -SHASTA         Services    Prior Rehab/Home Health Experiences Yes  -SHASTA      Are you currently receiving Home Health services No  -SHASTA      Do you plan to receive Home Health services in the near future No  -SHASTA         Daily Activities    Primary Language English  -SHASTA      How does patient learn best? Reading;Demonstration;Pictures/Video  -SHASTA      Barriers to learning None  -SHASTA      Pt Participated in POC and Goals Yes  -SHASTA         Safety    Are you being hurt, hit, or frightened by anyone at home or in your life? No  -SHASTA      Are you being neglected by a caregiver No  -SHASTA      Have you had any of the following issues with N/A  -SHASTA                User Key  (r) =  Recorded By, (t) = Taken By, (c) = Cosigned By      Initials Name Provider Type    SHASTA Elenita Hernandez, PT Physical Therapist                     PT Ortho       Row Name 12/05/23 1000       Posture/Observations    Observations Ecchymosis/bruising;Edema;Incision healing;Muscle atrophy  -SHASTA       Special Tests/Palpation    Special Tests/Palpation Knee  -SHASTA       Knee Palpation    Knee Palpation? Yes  -SHASTA    Patella Left:;Tender  -SHASTA    Medial Joint Line Left:;Tender  -SHASTA    Lateral Joint Line Left:;Tender  -SHASTA       Patellar Accessory Motions    Patellar Accessory Motions Tested? Yes  -SHASTA    Superior glide Left:;Hypomobile;Left pain  -SHASTA    Inferior glide Left:;Hypomobile;Left pain  -SHASTA    Medial glide Left:;WNL;Left pain  -SHASTA    Lateral glide Left:;WNL;Left pain  -SHASTA       General ROM    RT Lower Ext Rt Knee Extension/Flexion  -SHASTA    LT Lower Ext Lt Knee Extension/Flexion  -SHASTA       Right Lower Ext    Rt Knee Extension/Flexion AROM 0-120  -SHASTA       Left Lower Ext    Lt Knee Extension/Flexion AROM lacking 6-104  -SHASTA       MMT (Manual Muscle Testing)    Rt Lower Ext Rt Hip Flexion;Rt Knee Extension;Rt Knee Flexion;Rt Ankle Dorsiflexion  -SHASTA    Lt Lower Ext Lt Hip Flexion;Lt Knee Extension;Lt Knee Flexion;Lt Ankle Dorsiflexion  -SHASTA       MMT Right Lower Ext    Rt Hip Flexion MMT, Gross Movement (4/5) good  -SHASTA    Rt Knee Extension MMT, Gross Movement (4+/5) good plus  -SHASTA    Rt Knee Flexion MMT, Gross Movement (4+/5) good plus  -SHASTA    Rt Ankle Dorsiflexion MMT, Gross Movement (4+/5) good plus  -SHASTA       MMT Left Lower Ext    Lt Hip Flexion MMT, Gross Movement (4-/5) good minus  -SHASTA    Lt Knee Extension MMT, Gross Movement (3+/5) fair plus  -SHASTA    Lt Knee Flexion MMT, Gross Movement (3+/5) fair plus  -SHASTA    Lt Ankle Dorsiflexion MMT, Gross Movement (4/5) good  -SHASTA       Flexibility    Flexibility Tested? Lower Extremity  -SHASTA       Lower Extremity Flexibility    Hamstrings Bilateral:;Moderately limited  -SHASTA     Gastrocnemius Bilateral:;Moderately limited  -SHASTA       Gait/Stairs (Locomotion)    Comment, (Gait/Stairs) ambulates with AD (Rwx), with heel flat at IC, minimal knee flexion during swing phase, FF posture and dec gait speed  -              User Key  (r) = Recorded By, (t) = Taken By, (c) = Cosigned By      Initials Name Provider Type    Elenita Alvarado, PT Physical Therapist                                Therapy Education  Education Details: Educated on anatomy/pathology, evaluation findings, therapy expectations, healing time frames, post op expectations, POC and HEP Access Code [email or web address]  Given: HEP, Symptoms/condition management, Pain management, Posture/body mechanics  Program: New  How Provided: Verbal, Demonstration, Written  Provided to: Patient  Level of Understanding: Verbalized, Demonstrated  59028 - PT Self Care/Mgmt Minutes: 5      PT OP Goals       Row Name 12/05/23 0900          PT Short Term Goals    STG Date to Achieve 01/04/24  -     STG 1 Pt will be independent with initial HEP to improve strength/ROM and decrease pain.  -     STG 1 Progress New  -     STG 2 Pt will improve L knee AROM from lacking 6-104 degrees to lacking 2-115 degrees to improve ability to navigate stairs.  -     STG 2 Progress New  -        Long Term Goals    LTG Date to Achieve 02/03/24  -     LTG 1 Pt will be independent with advance HEP to improve strength/ROM and decrease pain.  -     LTG 1 Progress New  -     LTG 2 Pt will improve L knee AROM from lacking 6-104 to at least 0-120 deg to improve ability to navigate stairs.  -     LTG 2 Progress New  -     LTG 3 Pt will improve L knee flexion/extension strength to at least 4/5.  -     LTG 3 Progress New  -     LTG 4 Pt will be able to ascend/descend stairs in a reciprocal pattern with no increase pain.  -     LTG 4 Progress New  -     LTG 5 Pt will improve KOS score to 65% to show improved quality of life.  -     LTG 5 Progress New   -SHASTA        Time Calculation    PT Goal Re-Cert Due Date 03/04/24  -SHASTA               User Key  (r) = Recorded By, (t) = Taken By, (c) = Cosigned By      Initials Name Provider Type    Elenita Alvarado, PT Physical Therapist                     PT Assessment/Plan       Row Name 12/05/23 0900          PT Assessment    Functional Limitations Impaired gait;Limitation in home management;Limitations in community activities;Limitations in functional capacity and performance;Performance in leisure activities;Performance in self-care ADL;Decreased safety during functional activities  -SHASTA     Impairments Range of motion;Pain;Muscle strength;Gait;Endurance;Balance;Posture;Poor body mechanics;Impaired flexibility;Joint mobility;Sensation;Impaired muscle length;Impaired muscle power  -SHASTA     Assessment Comments Ashley Motta is a 83 y.o. female referred to physical therapy for s/p L TK on 11/14/23 (3 weeks post op). She presents with an evolving clinical presentation, along with comorbidities of  R TKA (April 2023) and multiple joint OA and no remarkable personal factors that may impact her progress in the plan of care. Pt presents today with impaired gait with Rwx, limited SLS time, expected post op edema, healing incision and LLE weakness and L knee AROM of lacking 6-104 degrees of motion. Her signs and symptoms are consistent with referring diagnosis. The previous impairments limit her ability to participate in all ADLs/functional tasks. Patients KOS outcome measure score of 18% ability, where 100% indicates no disability. Pt will benefit from skilled PT to address the previous impairments and return to PLOF.  -SHASTA     Please refer to paper survey for additional self-reported information Yes  -SHASTA     Rehab Potential Good  -SHASTA     Patient/caregiver participated in establishment of treatment plan and goals Yes  -SHASTA     Patient would benefit from skilled therapy intervention Yes  -SHASTA        PT Plan    PT Frequency  2x/week  -SHASTA     Predicted Duration of Therapy Intervention (PT) 10-12 visits  -SHASTA     Planned CPT's? PT EVAL LOW COMPLEXITY: 44395;PT RE-EVAL: 87678;PT THER PROC EA 15 MIN: 75336;PT THER ACT EA 15 MIN: 89656;PT MANUAL THERAPY EA 15 MIN: 52542;PT NEUROMUSC RE-EDUCATION EA 15 MIN: 27161;PT GAIT TRAINING EA 15 MIN: 62992;PT SELF CARE/HOME MGMT/TRAIN EA 15: 62780;PT HOT OR COLD PACK TREAT MCARE;PT ELECTRICAL STIM UNATTEND:   -SHASTA     PT Plan Comments response to eval, warm up on nustep, consider seated knee flexion with scooter, SLR, bridge, Clam shell, standing HR, HS curls, gait training (progress towards SPC), STS  -SHASTA               User Key  (r) = Recorded By, (t) = Taken By, (c) = Cosigned By      Initials Name Provider Type    Elenita Alvarado, PT Physical Therapist                       OP Exercises       Row Name 12/05/23 1000             Subjective    Subjective Comments eval  -SHASTA         Total Minutes    76353 - PT Therapeutic Exercise Minutes 25  -SHASTA         Exercise 1    Exercise Name 1 Nustep  -SHASTA      Additional Comments next visit  -SHASTA         Exercise 2    Exercise Name 2 supine QS  -SHASTA      Cueing 2 Verbal;Tactile  -SHASTA      Sets 2 1  -SHASTA      Reps 2 15  -SHASTA      Time 2 5s  -SHASTA         Exercise 3    Exercise Name 3 supine knee flexion stretch  -SHASTA      Cueing 3 Verbal;Tactile  -SHASTA      Sets 3 1  -SHASTA      Reps 3 10  -SHASTA      Time 3 10s  -SHASTA         Exercise 4    Exercise Name 4 SAQ  -SHASTA      Cueing 4 Verbal;Tactile  -SHASTA      Sets 4 1  -SHASTA      Reps 4 10  -SHASTA         Exercise 5    Exercise Name 5 seated HS stretch  -SHASTA      Cueing 5 Verbal;Demo  -SHASTA      Reps 5 3  -SHASTA      Time 5 20s  -SHASTA         Exercise 6    Exercise Name 6 seated gastroc stretch  -SHASTA      Cueing 6 Verbal;Demo  -SHASTA      Reps 6 3  -SHASTA      Time 6 20s  -SHASTA         Exercise 7    Exercise Name 7 LAQ  -SHASTA      Cueing 7 Verbal;Demo  -SHASTA      Sets 7 2  -SHASTA      Reps 7 10  -SHASTA                User Key  (r) = Recorded By, (t) =  Taken By, (c) = Cosigned By      Initials Name Provider Type    Elenita Alvarado, PT Physical Therapist                                  Outcome Measure Options: Knee Outcome Score- ADL  Knee Outcome Survey Activities of Daily Living Scale  Symptoms: Pain: The symptom affects my activity severely  Symptoms: Stiffness: The symptom affects my activity severely  Symptoms: Swelling: The symptom affects my activity severely  Symptoms: Giving way, buckling, or shifting of the knee: The symptom affects my activity moderately  Symptoms: Weakness: The symptom affects my activity severely  Symptoms: Limping: The symptom affects my activity severely  Functional Limitations with ADL's: Walk: Activity is very difficult  Functional Limitations with ADL's: Go up stairs: Activity is very difficult  Functional Limitations with ADL's: Go down stairs: Activity is very difficult  Functional Limitations with ADL's: Stand: Activity is very difficult  Functional Limitations with ADL's: Kneel on front of your knee: I am unable to  Functional Limitations with ADL's: Squat: I am unable to  Functional Limitations with ADL's: Sit with your knee bent: Activity is very difficult  Functional Limitations with ADL's: Rise from a chair: Activity is very difficult  Knee Outcome Summary ADL's Score: 18.57 %      Time Calculation:     Start Time: 1000  Stop Time: 1045  Time Calculation (min): 45 min  Timed Charges  03906 - PT Therapeutic Exercise Minutes: 25  29102 - PT Self Care/Mgmt Minutes: 5  Untimed Charges  PT Eval/Re-eval Minutes: 15  Total Minutes  Timed Charges Total Minutes: 25  Untimed Charges Total Minutes: 15   Total Minutes: 25     Therapy Charges for Today       Code Description Service Date Service Provider Modifiers Qty    43661039945 HC PT THER PROC EA 15 MIN 12/5/2023 Elenita Hernandez, PT GP 2    68234075653 HC PT EVAL LOW COMPLEXITY 1 12/5/2023 Elenita Hernandez, PT GP 1            PT G-Codes  Outcome Measure  Options: Knee Outcome Score- ADL         Elenita Hernandez, PT  12/5/2023

## 2023-12-11 ENCOUNTER — HOSPITAL ENCOUNTER (OUTPATIENT)
Dept: PHYSICAL THERAPY | Facility: HOSPITAL | Age: 83
Setting detail: THERAPIES SERIES
Discharge: HOME | End: 2023-12-11
Payer: MEDICARE

## 2023-12-11 DIAGNOSIS — Z96.652 STATUS POST LEFT KNEE REPLACEMENT: Primary | ICD-10-CM

## 2023-12-11 DIAGNOSIS — Z47.89 ORTHOPEDIC AFTERCARE: ICD-10-CM

## 2023-12-11 DIAGNOSIS — M25.662 DECREASED RANGE OF MOTION (ROM) OF LEFT KNEE: ICD-10-CM

## 2023-12-11 DIAGNOSIS — R26.2 DIFFICULTY WALKING: ICD-10-CM

## 2023-12-11 DIAGNOSIS — M62.81 MUSCLE WEAKNESS OF LOWER EXTREMITY: ICD-10-CM

## 2023-12-11 PROCEDURE — 97110 THERAPEUTIC EXERCISES: CPT

## 2023-12-11 NOTE — THERAPY TREATMENT NOTE
Outpatient Physical Therapy Ortho Treatment Note  [facility]     Patient Name: Ashley Motta  : 1940  MRN: [id number]  Today's Date: 2023      Visit Date: 2023    Visit Dx:    ICD-10-CM ICD-9-CM   1. Status post left knee replacement  Z96.652 V43.65   2. Orthopedic aftercare  Z47.89 V54.9   3. Decreased range of motion (ROM) of left knee  M25.662 719.56   4. Muscle weakness of lower extremity  M62.81 728.87   5. Difficulty walking  R26.2 719.7       Patient Active Problem List   Diagnosis    Osteopenia    Hyperlipidemia    Pre-diabetes    Osteoarthritis    BALDEMAR on CPAP    Pelvic relaxation due to vaginal prolapse    Chronic pain of both knees    Arthritis of right knee    Arthritis of left knee    Arthritis of both knees    Restless leg syndrome    Dizziness    Headache, migraine    Heartburn    Internal hemorrhoids with complication    Impacted cerumen of left ear    Vitamin E deficiency    Environmental and seasonal allergies    Chronic cough    Aftercare following joint replacement surgery    Allergic rhinitis, unspecified    Anxiety disorder, unspecified    Atopic dermatitis, unspecified    Constipation, unspecified    Localized osteoporosis without current pathological fracture    Gastro-esophageal reflux disease without esophagitis    Generalized muscle weakness    Iron deficiency anemia due to chronic blood loss    Personal history of other malignant neoplasm of skin    Presence of right artificial knee joint    Migraine, unspecified, not intractable, without status migrainosus    S/P total knee arthroplasty, left        Past Medical History:   Diagnosis Date    Anxiety     SITUATIONAL R/T DEATH OF HER SPOUSE    Balance problem     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 SARAH LIRIANO    Endothelial corneal dystrophy 2020    GERD (gastroesophageal reflux disease)     Goiter, nontoxic, multinodular 2017    THYROID POLYP SEES      Hyperlipidemia     Impaired fasting glucose     Left knee pain     Migraine     BALDEMAR on CPAP     FOLLOWED BY DR. TERRY CHEUNG    Osteoarthritis     Osteopenia     Rectal nodule 03/2020    REFERRED TO DR. PAMELA NAPOLES    Rectocele 07/2017    RLS (restless legs syndrome)     Seborrheic keratosis     Skin cancer 04/2015    LOWER LEG, FOLLOWED BY DR. ATUL SANCHEZ    Vertigo         Past Surgical History:   Procedure Laterality Date    CATARACT EXTRACTION, BILATERAL Bilateral 2015    COLONOSCOPY N/A 02/27/2015    1 TUBULAR ADENOMA [facility]    COLONOSCOPY N/A 03/04/2020    10 MM SESSILE SERRATED ADENOMA POLYP IN ASCENDING, 3 MM HYPERPLASTIC POLYP IN RECTUM, 7 MM ANAL NODULE, DR. SARAH LIRIANO AT [facility]    HYSTERECTOMY N/A 01/19/2004    KAYLEE WITH BSO, DR. RAFIA MORTENSEN AT [facility]    KNEE ARTHROSCOPY Left 2013    KNEE ARTHROSCOPY Right 2010    MELISSA CULDOPLASTY N/A 01/19/2004    DR. RAFIA MORTENSEN AT [facility]    SACROCOLPOPEXY N/A     TOTAL KNEE ARTHROPLASTY Right 4/11/2023    Procedure: RIGHT TOTAL KNEE ARTHROPLASTY WITH SHERI NAVIGATION;  Surgeon: Atul Liriano MD;  [place] [facility] [place] [facility] [place]  Service: [place]opedics;  Laterality: Right;    TOTAL KNEE ARTHROPLASTY Left 11/14/2023    Procedure: LEFT TOTAL KNEE ARTHROPLASTY WITH SHERI NAVIGATION;  Surgeon: Atul Liriano MD;  [place] [facility] [place] [facility] [place]  Service: Orthopedics;  Laterality: Left;                        PT Assessment/Plan       Row Name 12/11/23 0900          PT Assessment    Assessment Comments Ashley returns for first follow up from evaluation, will be 4 weeks post-op tomorrow s/p L TKA 11/14/2023. Continued to ambulate with rwx, mildly antalgic gait with decreased knee flexion during swing phase and compensatory trunk lean. Focused on progressing knee flexion/extension ROM, able to complete full reverse revolutions on rec bike at end of session. Pt. remains good candidate for skilled PT.  -        PT Plan     PT Plan Comments gait training, review QS, clamshell, bridges, standing HS curls?  -MH               User Key  (r) = Recorded By, (t) = Taken By, (c) = Cosigned By      Initials Name Provider Type     Bebe Carpenter, PT Physical Therapist                       OP Exercises       Row Name 12/11/23 0900             Subjective    Subjective Comments It's getting a little better  -         Subjective Pain    Able to rate subjective pain? yes  -MH      Pre-Treatment Pain Level 7  -MH         Total Minutes    79680 - PT Therapeutic Exercise Minutes 40  -MH         Exercise 1    Exercise Name 1 Nustep  -MH      Cueing 1 Verbal  -MH      Time 1 5  min  -MH         Exercise 4    Exercise Name 4 STS - elevated mat  -MH      Cueing 4 Verbal;Demo  -MH      Sets 4 2  -MH      Reps 4 10  -MH      Time 4 cueing for equal weight bearing  -MH         Exercise 5    Exercise Name 5 seated HS stretch  -MH      Cueing 5 Verbal;Demo  -MH      Reps 5 4L  -MH      Time 5 20s  -MH         Exercise 6    Exercise Name 6 seated gastroc stretch  -MH      Cueing 6 Verbal;Demo  -MH      Reps 6 4L  -MH      Time 6 20s  -MH         Exercise 7    Exercise Name 7 LAQ  -MH      Cueing 7 Verbal;Demo  -MH      Sets 7 2B  -MH      Reps 7 10ea  -MH         Exercise 8    Exercise Name 8 standing HS stretch at step  -MH      Cueing 8 Verbal;Demo  -MH      Reps 8 4L  -MH      Time 8 20s  -MH         Exercise 9    Exercise Name 9 standing gastroc stretch  -MH      Cueing 9 Verbal;Demo  -MH      Reps 9 4L  -MH      Time 9 20s  -MH         Exercise 10    Exercise Name 10 standing knee flexion stretch  -MH      Cueing 10 Verbal  -MH      Reps 10 4L  -MH      Time 10 20s  -MH         Exercise 11    Exercise Name 11 rec bike  -MH      Cueing 11 Verbal;Demo  -MH      Time 11 5 min  -MH      Additional Comments seat 7, 1/2 rev forward, full rev backward  -MH                User Key  (r) = Recorded By, (t) = Taken By, (c) = Cosigned By      Initials Name  Provider Type    Bebe Pedersen PT Physical Therapist                                  PT OP Goals       Row Name 12/11/23 0900          PT Short Term Goals    STG Date to Achieve 01/04/24  -     STG 1 Pt will be independent with initial HEP to improve strength/ROM and decrease pain.  -     STG 1 Progress Ongoing  -     STG 2 Pt will improve L knee AROM from lacking 6-104 degrees to lacking 2-115 degrees to improve ability to navigate stairs.  -     STG 2 Progress Ongoing  -        Long Term Goals    LTG Date to Achieve 02/03/24  -     LTG 1 Pt will be independent with advance HEP to improve strength/ROM and decrease pain.  -     LTG 1 Progress Ongoing  -     LTG 2 Pt will improve L knee AROM from lacking 6-104 to at least 0-120 deg to improve ability to navigate stairs.  -     LTG 2 Progress Ongoing  -     LTG 3 Pt will improve L knee flexion/extension strength to at least 4/5.  -     LTG 3 Progress Ongoing  -     LTG 4 Pt will be able to ascend/descend stairs in a reciprocal pattern with no increase pain.  -     LTG 4 Progress Ongoing  -     LTG 5 Pt will improve KOS score to 65% to show improved quality of life.  -     LTG 5 Progress Ongoing  -               User Key  (r) = Recorded By, (t) = Taken By, (c) = Cosigned By      Initials Name Provider Type    Bebe Pedersen PT Physical Therapist                    Therapy Education  Given: Symptoms/condition management  Program: Reinforced  How Provided: Verbal  Provided to: Patient  Level of Understanding: Verbalized              Time Calculation:   Start Time: 0945  Stop Time: 1025  Time Calculation (min): 40 min  Total Timed Code Minutes- PT: 40 minute(s)  Timed Charges  41130 - PT Therapeutic Exercise Minutes: 40  Total Minutes  Timed Charges Total Minutes: 40   Total Minutes: 40  Therapy Charges for Today       Code Description Service Date Service Provider Modifiers Qty    22476467570 HC PT THER PROC EA 15 MIN  12/11/2023 Bebe Carpenter, PT GP 3                      Bebe Carpenter, PT  12/11/2023

## 2023-12-13 ENCOUNTER — HOSPITAL ENCOUNTER (OUTPATIENT)
Dept: PHYSICAL THERAPY | Facility: HOSPITAL | Age: 83
Setting detail: THERAPIES SERIES
Discharge: HOME | End: 2023-12-13
Payer: MEDICARE

## 2023-12-13 DIAGNOSIS — Z96.652 STATUS POST LEFT KNEE REPLACEMENT: Primary | ICD-10-CM

## 2023-12-13 DIAGNOSIS — Z47.89 ORTHOPEDIC AFTERCARE: ICD-10-CM

## 2023-12-13 DIAGNOSIS — M25.662 DECREASED RANGE OF MOTION (ROM) OF LEFT KNEE: ICD-10-CM

## 2023-12-13 DIAGNOSIS — R26.2 DIFFICULTY WALKING: ICD-10-CM

## 2023-12-13 DIAGNOSIS — M62.81 MUSCLE WEAKNESS OF LOWER EXTREMITY: ICD-10-CM

## 2023-12-13 PROCEDURE — 97116 GAIT TRAINING THERAPY: CPT

## 2023-12-13 PROCEDURE — 97110 THERAPEUTIC EXERCISES: CPT

## 2023-12-13 NOTE — THERAPY TREATMENT NOTE
Outpatient Physical Therapy Ortho Treatment Note  [facility]     Patient Name: Ashley Motta  : 1940  MRN: [id number]  Today's Date: 2023      Visit Date: 2023    Visit Dx:    ICD-10-CM ICD-9-CM   1. Status post left knee replacement  Z96.652 V43.65   2. Orthopedic aftercare  Z47.89 V54.9   3. Decreased range of motion (ROM) of left knee  M25.662 719.56   4. Muscle weakness of lower extremity  M62.81 728.87   5. Difficulty walking  R26.2 719.7       Patient Active Problem List   Diagnosis    Osteopenia    Hyperlipidemia    Pre-diabetes    Osteoarthritis    BALDEMAR on CPAP    Pelvic relaxation due to vaginal prolapse    Chronic pain of both knees    Arthritis of right knee    Arthritis of left knee    Arthritis of both knees    Restless leg syndrome    Dizziness    Headache, migraine    Heartburn    Internal hemorrhoids with complication    Impacted cerumen of left ear    Vitamin E deficiency    Environmental and seasonal allergies    Chronic cough    Aftercare following joint replacement surgery    Allergic rhinitis, unspecified    Anxiety disorder, unspecified    Atopic dermatitis, unspecified    Constipation, unspecified    Localized osteoporosis without current pathological fracture    Gastro-esophageal reflux disease without esophagitis    Generalized muscle weakness    Iron deficiency anemia due to chronic blood loss    Personal history of other malignant neoplasm of skin    Presence of right artificial knee joint    Migraine, unspecified, not intractable, without status migrainosus    S/P total knee arthroplasty, left        Past Medical History:   Diagnosis Date    Anxiety     SITUATIONAL R/T DEATH OF HER SPOUSE    Balance problem     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 SARAH LIRIANO    Endothelial corneal dystrophy 2020    GERD (gastroesophageal reflux disease)     Goiter, nontoxic, multinodular 2017    THYROID POLYP SEES      Hyperlipidemia     Impaired fasting glucose     Left knee pain     Migraine     BALDEMAR on CPAP     FOLLOWED BY DR. TERRY CHEUNG    Osteoarthritis     Osteopenia     Rectal nodule 03/2020    REFERRED TO DR. PAMELA NAPOLES    Rectocele 07/2017    RLS (restless legs syndrome)     Seborrheic keratosis     Skin cancer 04/2015    LOWER LEG, FOLLOWED BY DR. ATUL SANCHEZ    Vertigo         Past Surgical History:   Procedure Laterality Date    CATARACT EXTRACTION, BILATERAL Bilateral 2015    COLONOSCOPY N/A 02/27/2015    1 TUBULAR ADENOMA POLYP IN DISTAL [facility]ENDING[facility]    COLONOSCOPY N/A 03/04/2020    10 MM SESSILE SERRATED ADENOMA POLYP IN ASCENDING, 3 MM HYPERPLASTIC POLYP IN RECTUM, 7 MM ANAL NODULE, DR. SARAH LIRIANO AT [facility]    HYSTERECTOMY N/A 01/19/2004    KAYLEE WITH BSO, DR. RAFIA MORTENSEN AT [facility]    KNEE ARTHROSCOPY Left 2013    KNEE ARTHROSCOPY Right 2010    MELISSA CULDOPLASTY N/A 01/19/2004    DR. RAFIA MORTENSEN AT [facility]    SACROCOLPOPEXY N/A     TOTAL KNEE ARTHROPLASTY Right 4/11/2023    Procedure: RIGHT TOTAL KNEE ARTHROPLASTY WITH SHERI NAVIGATION;  Surgeon: Atul Liriano MD;  [place]: [facility] [place] [facility] [place];  Service: Orthopedics;  Laterality: Right;    TOTAL KNEE ARTHROPLASTY Left 11/14/2023    Procedure: LEFT TOTAL KNEE ARTHROPLASTY WITH SHERI NAVIGATION;  Surgeon: Atul Liriano MD;  Location: [facility] [place];  Service: Orthopedics;  Laterality: Left;                        PT Assessment/Plan       Row Name 12/13/23 1100          PT Assessment    Assessment Comments Ms. Motta returns to therapy this date reporting daily adherence to stretching, every other day completion of strength exercises, and denies any additional changes since last session. Added several new exercises this date, QS+ SLR, bridges, seated heel slides, nicki nicki, and spent time on gait training. Trialed gait with SPC however pt has great difficutly with sequencing, much improved gait pattern without AD. She requires  frequent cueing for hip/knee flexion with LLE swing through however is very stable without LOB amb around clinic without AD. She continues to be a good candidate for skilled PT and will progress as appropriate.  -MO        PT Plan    PT Plan Comments standing HS curl, standing HR, side stepping  -MO               User Key  (r) = Recorded By, (t) = Taken By, (c) = Cosigned By      Initials Name Provider Type    Erica Evangelista, PT Physical Therapist                       OP Exercises       Row Name 12/13/23 1100 12/13/23 1000          Subjective    Subjective Comments -- Feeling good  -MO        Total Minutes    45560 - Gait Training Minutes  10  -MO --     03376 - PT Therapeutic Exercise Minutes 35  -MO --        Exercise 1    Exercise Name 1 -- Nustep  -MO     Cueing 1 -- Verbal  -MO     Reps 1 -- lvl 4  -MO     Time 1 -- 5  min  -MO        Exercise 2    Exercise Name 2 -- supine QS  -MO     Cueing 2 -- Verbal  -MO     Sets 2 -- 1  -MO     Reps 2 -- 5  -MO     Time 2 -- 5s  -MO     Additional Comments -- review  -MO        Exercise 3    Exercise Name 3 -- knee flex at steps  -MO     Cueing 3 -- Verbal  -MO     Sets 3 -- 4L  -MO     Time 3 -- 20s  -MO        Exercise 5    Exercise Name 5 -- HS stretch at steps  -MO     Cueing 5 -- Verbal  -MO     Sets 5 -- 4L  -MO     Reps 5 -- 20s  -MO        Exercise 6    Exercise Name 6 -- standing calf stretch at steps  -MO     Cueing 6 -- Verbal  -MO     Reps 6 -- 4  -MO     Time 6 -- 20s  -MO        Exercise 7    Exercise Name 7 -- LAQ  -MO     Cueing 7 -- Verbal  -MO     Sets 7 -- 2B  -MO     Reps 7 -- 10ea  -MO     Time 7 -- 1# AW  -MO        Exercise 8    Exercise Name 8 -- QS + SLR  -MO     Cueing 8 -- Verbal  -MO     Sets 8 -- 2L  -MO     Reps 8 -- 10  -MO     Time 8 -- --  -MO        Exercise 9    Exercise Name 9 -- bridges  -MO     Cueing 9 -- Verbal  -MO     Sets 9 -- 2  -MO     Reps 9 -- 10  -MO     Time 9 -- --  -MO     Additional Comments -- mild stagger of feet to  emphasize LLE  -MO        Exercise 10    Exercise Name 10 -- Seated heel slides with scooter board  -MO     Cueing 10 -- Verbal;Demo  -MO     Sets 10 -- 1  -MO     Reps 10 -- 10  -MO     Time 10 -- 10s  -MO        Exercise 12    Exercise Name 12 -- nicki nicki  -MO     Cueing 12 -- Verbal;Demo  -MO     Reps 12 -- 1 BL x 10  -MO        Exercise 13    Exercise Name 13 -- gait training  -MO     Time 13 -- 8 mins  -MO     Additional Comments -- with and without SPC, exaggerated marchcing with emphasis on heel strike  -MO               User Key  (r) = Recorded By, (t) = Taken By, (c) = Cosigned By      Initials Name Provider Type    Erica Evangelista, PT Physical Therapist                                  PT OP Goals       Row Name 12/13/23 1000          PT Short Term Goals    STG Date to Achieve 01/04/24  -MO     STG 1 Pt will be independent with initial HEP to improve strength/ROM and decrease pain.  -MO     STG 1 Progress Ongoing  -MO     STG 2 Pt will improve L knee AROM from lacking 6-104 degrees to lacking 2-115 degrees to improve ability to navigate stairs.  -MO     STG 2 Progress Ongoing  -MO        Long Term Goals    LTG Date to Achieve 02/03/24  -MO     LTG 1 Pt will be independent with advance HEP to improve strength/ROM and decrease pain.  -MO     LTG 1 Progress Ongoing  -MO     LTG 2 Pt will improve L knee AROM from lacking 6-104 to at least 0-120 deg to improve ability to navigate stairs.  -MO     LTG 2 Progress Ongoing  -MO     LTG 3 Pt will improve L knee flexion/extension strength to at least 4/5.  -MO     LTG 3 Progress Ongoing  -MO     LTG 4 Pt will be able to ascend/descend stairs in a reciprocal pattern with no increase pain.  -MO     LTG 4 Progress Ongoing  -MO     LTG 5 Pt will improve KOS score to 65% to show improved quality of life.  -MO     LTG 5 Progress Ongoing  -MO               User Key  (r) = Recorded By, (t) = Taken By, (c) = Cosigned By      Initials Name Provider Type    Erica Evangelista, PT  Physical Therapist                                   Time Calculation:   Start Time: 1015  Stop Time: 1100  Time Calculation (min): 45 min  Timed Charges  48514 - PT Therapeutic Exercise Minutes: 35  29970 - Gait Training Minutes : 10  Total Minutes  Timed Charges Total Minutes: 45   Total Minutes: 45  Therapy Charges for Today       Code Description Service Date Service Provider Modifiers Qty    17157350213 HC PT THER PROC EA 15 MIN 12/13/2023 Erica Wynn, PT GP 2    [id number]  GAIT TRAINING EA 15 MIN 12/13/2023 Erica Wynn PT GP 1                      Erica Wynn, PT  12/13/2023

## 2023-12-18 ENCOUNTER — HOSPITAL ENCOUNTER (OUTPATIENT)
Dept: PHYSICAL THERAPY | Facility: HOSPITAL | Age: 83
Setting detail: THERAPIES SERIES
Discharge: HOME | End: 2023-12-18
Payer: MEDICARE

## 2023-12-18 DIAGNOSIS — Z47.89 ORTHOPEDIC AFTERCARE: ICD-10-CM

## 2023-12-18 DIAGNOSIS — M25.662 DECREASED RANGE OF MOTION (ROM) OF LEFT KNEE: ICD-10-CM

## 2023-12-18 DIAGNOSIS — Z96.652 STATUS POST LEFT KNEE REPLACEMENT: Primary | ICD-10-CM

## 2023-12-18 DIAGNOSIS — R26.2 DIFFICULTY WALKING: ICD-10-CM

## 2023-12-18 DIAGNOSIS — M62.81 MUSCLE WEAKNESS OF LOWER EXTREMITY: ICD-10-CM

## 2023-12-18 PROCEDURE — 97110 THERAPEUTIC EXERCISES: CPT

## 2023-12-18 PROCEDURE — 97116 GAIT TRAINING THERAPY: CPT

## 2023-12-18 PROCEDURE — 97530 THERAPEUTIC ACTIVITIES: CPT

## 2023-12-18 NOTE — THERAPY TREATMENT NOTE
Outpatient Physical Therapy Ortho Treatment Note  [facility]     Patient Name: Ashley Motta  : 1940  MRN: [id number]  Today's Date: 2023      Visit Date: 2023    Visit Dx:    ICD-10-CM ICD-9-CM   1. Status post left knee replacement  Z96.652 V43.65   2. Orthopedic aftercare  Z47.89 V54.9   3. Decreased range of motion (ROM) of left knee  M25.662 719.56   4. Muscle weakness of lower extremity  M62.81 728.87   5. Difficulty walking  R26.2 719.7       Patient Active Problem List   Diagnosis    Osteopenia    Hyperlipidemia    Pre-diabetes    Osteoarthritis    BALDEMAR on CPAP    Pelvic relaxation due to vaginal prolapse    Chronic pain of both knees    Arthritis of right knee    Arthritis of left knee    Arthritis of both knees    Restless leg syndrome    Dizziness    Headache, migraine    Heartburn    Internal hemorrhoids with complication    Impacted cerumen of left ear    Vitamin E deficiency    Environmental and seasonal allergies    Chronic cough    Aftercare following joint replacement surgery    Allergic rhinitis, unspecified    Anxiety disorder, unspecified    Atopic dermatitis, unspecified    Constipation, unspecified    Localized osteoporosis without current pathological fracture    Gastro-esophageal reflux disease without esophagitis    Generalized muscle weakness    Iron deficiency anemia due to chronic blood loss    Personal history of other malignant neoplasm of skin    Presence of right artificial knee joint    Migraine, unspecified, not intractable, without status migrainosus    S/P total knee arthroplasty, left        Past Medical History:   Diagnosis Date    Anxiety     SITUATIONAL R/T DEATH OF HER SPOUSE    Balance problem     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 SARAH LIRIANO    Endothelial corneal dystrophy 2020    GERD (gastroesophageal reflux disease)     Goiter, nontoxic, multinodular 2017    THYROID POLYP SEES      Hyperlipidemia     Impaired fasting glucose     Left knee pain     Migraine     BALDEMAR on CPAP     FOLLOWED BY DR. TERRY CHEUNG    Osteoarthritis     Osteopenia     Rectal nodule 03/2020    REFERRED TO DR. PAMELA NAPOLES    Rectocele 07/2017    RLS (restless legs syndrome)     Seborrheic keratosis     Skin cancer 04/2015    LOWER LEG, FOLLOWED BY DR. ATUL SANCHEZ    Vertigo         Past Surgical History:   Procedure Laterality Date    CATARACT EXTRACTION, BILATERAL Bilateral 2015    COLONOSCOPY N/A 02/27/2015    1 [facility]UB[facility]    COLONOSCOPY N/A 03/04/2020    10 MM SESSILE SERRATED ADENOMA POLYP IN ASCENDING, 3 MM HYPERPLASTIC POLYP IN RECTUM, 7 MM ANAL NODULE, DR. SARAH LIRIANO AT [facility]    HYSTERECTOMY N/A 01/19/2004    KAYLEE WITH BSO, DR. RAFIA MORTENSEN AT [facility]    KNEE ARTHROSCOPY Left 2013    KNEE ARTHROSCOPY Right 2010    MELISSA CULDOPLASTY N/A 01/19/2004    DR. RAFIA MORTENSEN AT [facility]    SACROCOLPOPEXY N/A     TOTAL KNEE ARTHROPLASTY Right 4/11/2023    Procedure: RIGHT TOTAL KNEE ARTHROPLASTY WITH SHERI NAVIGATION;  Surgeon: Atul Liriano MD;  Location:  [facility];  Service: Orthopedics;  Laterality: Right;    TOTAL KNEE ARTHROPLASTY Left 11/14/2023    Procedure: LEFT TOTAL KNEE ARTHROPLASTY WITH SHERI NAVIGATION;  Surgeon: Atul Liriano MD;  Location:  [facility];  Service: Orthopedics;  Laterality: Left;                        PT Assessment/Plan       Row Name 12/18/23 1000          PT Assessment    Assessment Comments Ms. Motta presents to clinic reporting mild increased pain this date, possibly related to weather. Continues to ambulate with rwx in community but report no AD at home much of time. Worked on gait mechanics with cueing for L knee flex in swing phase, smaller BRENDA, equal step length. Able to complete full revolutions on rec bike at end of session from same seat position and relative ease compared to previous sessions. Pt.  remains good candidate for skilled PT.  -MH        PT Plan    PT Plan Comments side stepping, small step up  -               User Key  (r) = Recorded By, (t) = Taken By, (c) = Cosigned By      Initials Name Provider Type     Bebe Carpenter, PT Physical Therapist                       OP Exercises       Row Name 12/18/23 1000             Subjective    Subjective Comments It's hurting a little today  -         Subjective Pain    Able to rate subjective pain? yes  -MH      Pre-Treatment Pain Level 6  -MH         Total Minutes    73961 - Gait Training Minutes  9  -MH      60289 - PT Therapeutic Exercise Minutes 20  -MH      43620 - PT Therapeutic Activity Minutes 11  -MH         Exercise 1    Exercise Name 1 Nustep  -MH      Cueing 1 Verbal  -MH      Reps 1 lvl 4  -MH      Time 1 5  min  -MH         Exercise 2    Exercise Name 2 seated heel slides on scooter  -MH      Cueing 2 Verbal;Demo  -MH      Reps 2 10  -MH      Time 2 10s  -MH         Exercise 3    Exercise Name 3 knee flex at steps  -MH      Cueing 3 Verbal  -MH      Reps 3 4L  -MH      Time 3 20s  -MH         Exercise 5    Exercise Name 5 HS stretch at steps  -MH      Cueing 5 Verbal  -MH      Reps 5 4L  -MH      Time 5 20s  -MH         Exercise 6    Exercise Name 6 standing calf stretch at steps  -MH      Cueing 6 Verbal  -MH      Reps 6 4  -MH      Time 6 20s  -MH         Exercise 7    Exercise Name 7 LAQ  -MH      Cueing 7 Verbal  -MH      Sets 7 2B  -MH      Reps 7 10ea  -MH      Time 7 2#  -MH         Exercise 10    Exercise Name 10 STS low blue mat  -MH      Cueing 10 Verbal  -MH      Sets 10 2  -MH      Reps 10 10  -MH         Exercise 11    Exercise Name 11 standing HS curls  -MH      Cueing 11 Verbal;Demo  -MH      Sets 11 2  -MH      Reps 11 10ea  -MH      Time 11 2#  -MH         Exercise 12    Exercise Name 12 nicki nicki  -MH      Cueing 12 Verbal;Demo  -MH      Time 12 2 min  -MH         Exercise 13    Exercise Name 13 gait training  -MH       Cueing 13 Verbal  -      Reps 13 1 lap in clinic  -      Time 13 cueing for L knee flex in swing phase, smaller BRENDA, equal step length  -         Exercise 14    Exercise Name 14 rec bike  -      Cueing 14 Verbal  -      Time 14 5 min  -      Additional Comments seat 7, full revolutions  -                User Key  (r) = Recorded By, (t) = Taken By, (c) = Cosigned By      Initials Name Provider Type     Bebe Carpenter, PT Physical Therapist                                  PT OP Goals       Row Name 12/18/23 1000          PT Short Term Goals    STG Date to Achieve 01/04/24  -     STG 1 Pt will be independent with initial HEP to improve strength/ROM and decrease pain.  -     STG 1 Progress Ongoing  -     STG 2 Pt will improve L knee AROM from lacking 6-104 degrees to lacking 2-115 degrees to improve ability to navigate stairs.  -     STG 2 Progress Ongoing  -        Long Term Goals    LTG Date to Achieve 02/03/24  -     LTG 1 Pt will be independent with advance HEP to improve strength/ROM and decrease pain.  -     LTG 1 Progress Ongoing  -     LTG 2 Pt will improve L knee AROM from lacking 6-104 to at least 0-120 deg to improve ability to navigate stairs.  -     LTG 2 Progress Ongoing  [facility]     LTG 3 Pt will improve L knee flexion/extension strength to at least 4/5.  -     LTG 3 Progress Ongoing  [facility]     LTG 4 Pt will be able to ascend/descend stairs in a reciprocal pattern with no increase pain.  -     LTG 4 Progress Ongoing  [facility]     LTG 5 Pt will improve KOS score to 65% to show improved quality of life.  -     LT 5 Progress Ongoing  -               User Key  (r) = Recorded By, (t) = Taken By, (c) = Cosigned By      Initials Name Provider Type     Bebe Carpenter, PT Physical Therapist                    Therapy Education  Given: Symptoms/condition management  Program: Reinforced  How Provided: Verbal  Provided to: Patient  Level of Understanding: Verbalized               Time Calculation:   Start Time: 1030  Stop Time: 1111  Time Calculation (min): 41 min  Total Timed Code Minutes- PT: 40 minute(s)  Timed Charges  82520 - PT Therapeutic Exercise Minutes: 20  89206 - Gait Training Minutes : 9  95965 - PT Therapeutic Activity Minutes: 11  Total Minutes  Timed Charges Total Minutes: 40   Total Minutes: 40  Therapy Charges for Today       Code Description Service Date Service Provider Modifiers Qty    68828451727 HC PT THERAPEUTIC ACT EA 15 MIN 12/18/2023 Bebe Carpenter, PT GP 1    03060439910 HC GAIT TRAINING EA 15 MIN 12/18/2023 Bebe Carpenter, PT GP 1    08064450121 HC PT THER PROC EA 15 MIN 12/18/2023 Bebe Carpenter, PT GP 1                      Bebe Carpenter PT  12/18/2023

## 2023-12-20 ENCOUNTER — HOSPITAL ENCOUNTER (OUTPATIENT)
Dept: PHYSICAL THERAPY | Facility: HOSPITAL | Age: 83
Setting detail: THERAPIES SERIES
Discharge: HOME | End: 2023-12-20
Payer: MEDICARE

## 2023-12-20 DIAGNOSIS — M25.662 DECREASED RANGE OF MOTION (ROM) OF LEFT KNEE: ICD-10-CM

## 2023-12-20 DIAGNOSIS — R26.2 DIFFICULTY WALKING: ICD-10-CM

## 2023-12-20 DIAGNOSIS — Z96.652 STATUS POST LEFT KNEE REPLACEMENT: Primary | ICD-10-CM

## 2023-12-20 DIAGNOSIS — Z47.89 ORTHOPEDIC AFTERCARE: ICD-10-CM

## 2023-12-20 DIAGNOSIS — M62.81 MUSCLE WEAKNESS OF LOWER EXTREMITY: ICD-10-CM

## 2023-12-20 PROCEDURE — 97110 THERAPEUTIC EXERCISES: CPT

## 2023-12-20 NOTE — THERAPY TREATMENT NOTE
Outpatient Physical Therapy Ortho Treatment Note  [facility]     Patient Name: Ashley Motta  : 1940  MRN: [id number]  Today's Date: 2023      Visit Date: 2023    Visit Dx:    ICD-10-CM ICD-9-CM   1. Status post left knee replacement  Z96.652 V43.65   2. Orthopedic aftercare  Z47.89 V54.9   3. Decreased range of motion (ROM) of left knee  M25.662 719.56   4. Muscle weakness of lower extremity  M62.81 728.87   5. Difficulty walking  R26.2 719.7       Patient Active Problem List   Diagnosis    Osteopenia    Hyperlipidemia    Pre-diabetes    Osteoarthritis    BALDEMAR on CPAP    Pelvic relaxation due to vaginal prolapse    Chronic pain of both knees    Arthritis of right knee    Arthritis of left knee    Arthritis of both knees    Restless leg syndrome    Dizziness    Headache, migraine    Heartburn    Internal hemorrhoids with complication    Impacted cerumen of left ear    Vitamin E deficiency    Environmental and seasonal allergies    Chronic cough    Aftercare following joint replacement surgery    Allergic rhinitis, unspecified    Anxiety disorder, unspecified    Atopic dermatitis, unspecified    Constipation, unspecified    Localized osteoporosis without current pathological fracture    Gastro-esophageal reflux disease without esophagitis    Generalized muscle weakness    Iron deficiency anemia due to chronic blood loss    Personal history of other malignant neoplasm of skin    Presence of right artificial knee joint    Migraine, unspecified, not intractable, without status migrainosus    S/P total knee arthroplasty, left        Past Medical History:   Diagnosis Date    Anxiety     SITUATIONAL R/T DEATH OF HER SPOUSE    Balance problem     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 SARAH LIRIANO    Endothelial corneal dystrophy 2020    GERD (gastroesophageal reflux disease)     Goiter, nontoxic, multinodular 2017    THYROID POLYP SEES      Hyperlipidemia     Impaired fasting glucose     Left knee pain     Migraine     BALDEMAR on CPAP     FOLLOWED BY DR. TERRY CHEUNG    Osteoarthritis     Osteopenia     Rectal nodule 03/2020    REFERRED TO DR. PAMELA NAPOLES    Rectocele 07/2017    RLS (restless legs syndrome)     Seborrheic keratosis     Skin cancer 04/2015    LOWER LEG, FOLLOWED BY DR. ATUL SANCHEZ    Vertigo         Past Surgical History:   Procedure Laterality Date    CATARACT EXTRACTION, BILATERAL Bilateral 2015    COLONOSCOPY N/A 02/27/2015    1 TUBULAR ADENOMA POLYP IN DISTAL [facility]END[facility]    COLONOSCOPY N/A 03/04/2020    10 MM SESSILE SERRATED ADENOMA POLYP IN ASCENDING, 3 MM HYPERPLASTIC POLYP IN RECTUM, 7 MM ANAL NODULE, DR. SARAH LIRIANO AT [facility]    HYSTERECTOMY N/A 01/19/2004    KAYLEE WITH BSO, DR. RAFIA MORTENSEN AT [facility]    KNEE ARTHROSCOPY Left 2013    KNEE ARTHROSCOPY Right 2010    MELISSA CULDOPLASTY N/A 01/19/2004    DR. RAFIA MORTENSEN AT [facility]    SACROCOLPOPEXY N/A     TOTAL KNEE ARTHROPLASTY Right 4/11/2023    Procedure: RIGHT TOTAL KNEE ARTHROPLASTY WITH SHERI NAVIGATION;  Surgeon: Atul Liriano MD;  Location:  [facility];  Service: Orthopedics;  Laterality: Right;    TOTAL KNEE ARTHROPLASTY Left 11/14/2023    Procedure: LEFT TOTAL KNEE ARTHROPLASTY WITH SHERI NAVIGATION;  Surgeon: Atul Liriano MD;  Location:  [facility];  Service: Orthopedics;  Laterality: Left;                        PT Assessment/Plan       Row Name 12/20/23 1217 12/20/23 1044       PT Assessment    Assessment Comments -- Ms. Motta presents to the clinic with reports of increased swelling that she has continued to effectively managed with ice and elevation. Continues to ambulate in the community with rwx. Discussed STS mechanics this date, pt able to complete STS with no UE support from low mat table. Incorporated step-ups from small step as well as side stepping this session. Pt remains good candidate for skilled PT.  -AMANDO        PT Plan    PT Plan Comments Consider continuing with gait training as pt continues to demo poor mechanics. Consider adding band to side stepping, STS w/ arms crossed, lateral step ups ?  -ZB --              User Key  (r) = Recorded By, (t) = Taken By, (c) = Cosigned By      Initials Name Provider Type    ZB Anton Hawley, PT Physical Therapist                       OP Exercises       Row Name 12/20/23 1000             Subjective    Subjective Comments I'm feeling good, noticed a little more swelling recently but icing it and elevating it has helped  -ZB         Total Minutes    63919 - PT Therapeutic Exercise Minutes 40  -ZB         Exercise 1    Exercise Name 1 Nustep  -ZB      Cueing 1 Verbal  -ZB      Reps 1 lvl 4  -ZB      Time 1 5  min  -ZB         Exercise 2    Exercise Name 2 seated heel slides on scooter  -ZB      Cueing 2 Verbal;Demo  -ZB      Reps 2 10  -ZB      Time 2 10s  -ZB         Exercise 3    Exercise Name 3 knee flex at steps  -ZB      Cueing 3 Verbal  -ZB      Reps 3 4L  -ZB      Time 3 20s  -ZB         Exercise 5    Exercise Name 5 HS stretch at steps  -ZB      Cueing 5 Verbal  -ZB      Reps 5 4L  -ZB      Time 5 20s  -ZB         Exercise 6    Exercise Name 6 standing calf stretch at steps  -ZB      Cueing 6 Verbal  -ZB      Reps 6 4  -ZB      Time 6 20s  -ZB         Exercise 7    Exercise Name 7 LAQ  -ZB      Cueing 7 Verbal  -ZB      Sets 7 2B  -ZB      Reps 7 10ea  -ZB      Time 7 2#  -ZB         Exercise 10    Exercise Name 10 STS low blue mat  -ZB      Cueing 10 Verbal  -ZB      Sets 10 2  -ZB      Reps 10 10  -ZB      Additional Comments performed without UE assist this session  -ZB         Exercise 11    Exercise Name 11 standing HS curls  -ZB      Cueing 11 Verbal;Demo  -ZB      Sets 11 2  -ZB      Reps 11 10ea  -ZB      Time 11 2#  -ZB         Exercise 12    Exercise Name 12 nicki nicki  -ZB      Cueing 12 Verbal;Demo  -ZB      Time 12 2 min  -ZB         Exercise 14    Exercise Name 14 rec  "bike  -ZB      Cueing 14 Verbal  -ZB      Time 14 5 min  -ZB      Additional Comments seat 7, full revolutions  -ZB         Exercise 15    Exercise Name 15 Fwd step ups  -ZB      Cueing 15 Verbal;Demo  -ZB      Sets 15 2L  -ZB      Reps 15 10  -ZB      Time 15 4\" step  -ZB         Exercise 16    Exercise Name 16 Side stepping  -ZB      Cueing 16 Verbal;Demo  -ZB      Sets 16 3 laps ~10'  -ZB      Additional Comments with HHA  -ZB                User Key  (r) = Recorded By, (t) = Taken By, (c) = Cosigned By      Initials Name Provider Type    Anton Orantes, PT Physical Therapist                                     Therapy Education  Education Details: Encouraged continued use of ice and elevation as pt reports swelling.  Given: Symptoms/condition management, Pain management  Program: Reinforced  How Provided: Verbal  Provided to: Patient  Level of Understanding: Verbalized              Time Calculation:   Start Time: 1015  Stop Time: 1055  Time Calculation (min): 40 min  Total Timed Code Minutes- PT: 40 minute(s)  Timed Charges  66720 - PT Therapeutic Exercise Minutes: 40  Total Minutes  Timed Charges Total Minutes: 40   Total Minutes: 40  Therapy Charges for Today       Code Description Service Date Service Provider Modifiers Qty    46915180018 HC PT THER PROC EA 15 MIN 12/20/2023 Anton Hawley, PT GP 3                      Blaine Hawley PT  12/20/2023     "

## 2023-12-27 ENCOUNTER — HOSPITAL ENCOUNTER (OUTPATIENT)
Dept: PHYSICAL THERAPY | Facility: HOSPITAL | Age: 83
Setting detail: THERAPIES SERIES
Discharge: HOME | End: 2023-12-27
Payer: MEDICARE

## 2023-12-27 DIAGNOSIS — M62.81 MUSCLE WEAKNESS OF LOWER EXTREMITY: ICD-10-CM

## 2023-12-27 DIAGNOSIS — M25.662 DECREASED RANGE OF MOTION (ROM) OF LEFT KNEE: ICD-10-CM

## 2023-12-27 DIAGNOSIS — R26.2 DIFFICULTY WALKING: ICD-10-CM

## 2023-12-27 DIAGNOSIS — Z47.89 ORTHOPEDIC AFTERCARE: ICD-10-CM

## 2023-12-27 DIAGNOSIS — Z96.652 STATUS POST LEFT KNEE REPLACEMENT: Primary | ICD-10-CM

## 2023-12-27 PROCEDURE — 97530 THERAPEUTIC ACTIVITIES: CPT

## 2023-12-27 PROCEDURE — 97110 THERAPEUTIC EXERCISES: CPT

## 2023-12-27 NOTE — THERAPY TREATMENT NOTE
Outpatient Physical Therapy Ortho Treatment Note  [facility]     Patient Name: Ashley Motta  : 1940  MRN: [id number]  Today's Date: 2023      Visit Date: 2023    Visit Dx:    ICD-10-CM ICD-9-CM   1. Status post left knee replacement  Z96.652 V43.65   2. Orthopedic aftercare  Z47.89 V54.9   3. Decreased range of motion (ROM) of left knee  M25.662 719.56   4. Muscle weakness of lower extremity  M62.81 728.87   5. Difficulty walking  R26.2 719.7       Patient Active Problem List   Diagnosis    Osteopenia    Hyperlipidemia    Pre-diabetes    Osteoarthritis    BALDEMAR on CPAP    Pelvic relaxation due to vaginal prolapse    Chronic pain of both knees    Arthritis of right knee    Arthritis of left knee    Arthritis of both knees    Restless leg syndrome    Dizziness    Headache, migraine    Heartburn    Internal hemorrhoids with complication    Impacted cerumen of left ear    Vitamin E deficiency    Environmental and seasonal allergies    Chronic cough    Aftercare following joint replacement surgery    Allergic rhinitis, unspecified    Anxiety disorder, unspecified    Atopic dermatitis, unspecified    Constipation, unspecified    Localized osteoporosis without current pathological fracture    Gastro-esophageal reflux disease without esophagitis    Generalized muscle weakness    Iron deficiency anemia due to chronic blood loss    Personal history of other malignant neoplasm of skin    Presence of right artificial knee joint    Migraine, unspecified, not intractable, without status migrainosus    S/P total knee arthroplasty, left        Past Medical History:   Diagnosis Date    Anxiety     SITUATIONAL R/T DEATH OF HER SPOUSE    Balance problem     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 SARAH LIRIANO    Endothelial corneal dystrophy 2020    GERD (gastroesophageal reflux disease)     Goiter, nontoxic, multinodular 2017    THYROID POLYP SEES      Hyperlipidemia     Impaired fasting glucose     Left knee pain     Migraine     BALDEMAR on CPAP     FOLLOWED BY DR. TERRY CHEUNG    Osteoarthritis     Osteopenia     Rectal nodule 03/2020    REFERRED TO DR. PAMELA NAPOLES    Rectocele 07/2017    RLS (restless legs syndrome)     Seborrheic keratosis     Skin cancer 04/2015    LOWER LEG, FOLLOWED BY DR. ATUL SANCHEZ    Vertigo         Past Surgical History:   Procedure Laterality Date    CATARACT EXTRACTION, BILATERAL Bilateral 2015    COLONOSCOPY N/A 02/27/2015    1 [facility]    COLONOSCOPY N/A 03/04/2020    10 MM SESSILE SERRATED ADENOMA POLYP IN ASCENDING, 3 MM HYPERPLASTIC POLYP IN RECTUM, 7 MM ANAL NODULE, DR. SARAH LIRIANO AT [facility]    HYSTERECTOMY N/A 01/19/2004    KAYLEE WITH BSO, DR. RAFIA MORTENSEN AT [facility]    KNEE ARTHROSCOPY Left 2013    KNEE ARTHROSCOPY Right 2010    MELISSA CULDOPLASTY N/A 01/19/2004    DR. RAFIA MORTENSEN AT [facility]    SACROCOLPOPEXY N/A     TOTAL KNEE ARTHROPLASTY Right 4/11/2023    Procedure: RIGHT TOTAL KNEE ARTHROPLASTY WITH SHERI NAVIGATION;  Surgeon: Atul Liriano MD;  Location:  [facility];  Service: Orthopedics;  Laterality: Right;    TOTAL KNEE ARTHROPLASTY Left 11/14/2023    Procedure: LEFT TOTAL KNEE ARTHROPLASTY WITH SHERI NAVIGATION;  Surgeon: Atul Liriano MD;  Location:  [facility];  Service: Orthopedics;  Laterality: Left;                        PT Assessment/Plan       Row Name 12/27/23 1000          PT Assessment    Assessment Comments Ms. Motta is no 6wks 1d s/p L TKA. Flex AROM on step to 115 this date. Added lateral step ups, progressed to resisted side steps with RTB, and continued to work on gait training. Trialed 6in step up however unable to keep proper form, continued with 4in step but without UE use with good tolerance. Worked on exaggerated gait following nicki nicki, during exercises she has adequate L SLS time and heel strike however reverts  quickly when exercise is over. Encouraged pt to practice several times throughout the day to improve roll over. She continues to be a good candidate for skilled PT and will progress as appropriate.  -MO        PT Plan    PT Plan Comments 6 in step? march to retro step? leg press- continue gait training  -MO               User Key  (r) = Recorded By, (t) = Taken By, (c) = Cosigned By      Initials Name Provider Type    Erica Evangelista, PT Physical Therapist                       OP Exercises       Row Name 12/27/23 1000             Subjective    Subjective Comments Feeling really good  -MO         Total Minutes    84197 - PT Therapeutic Exercise Minutes 30  -MO      75918 - PT Therapeutic Activity Minutes 10  -MO         Exercise 1    Exercise Name 1 RCB  -MO      Reps 1 seat 7  -MO      Time 1 5 mins  -MO         Exercise 2    Exercise Name 2 seated heel slides on scooter  -MO      Cueing 2 Verbal  -MO      Reps 2 10  -MO      Time 2 10s  -MO         Exercise 3    Exercise Name 3 knee flex at steps  -MO      Cueing 3 Verbal  -MO      Reps 3 3L  -MO      Time 3 20s  -MO         Exercise 6    Exercise Name 6 standing calf stretch at steps  -MO      Cueing 6 Verbal  -MO      Reps 6 3  -MO      Time 6 20s  -MO         Exercise 7    Exercise Name 7 LAQ  -MO      Cueing 7 Verbal  -MO      Sets 7 2L  -MO      Reps 7 10  -MO      Time 7 2#  -MO         Exercise 10    Exercise Name 10 STS low blue mat  -MO      Cueing 10 Verbal  -MO      Sets 10 2  -MO      Reps 10 10  -MO      Time 10 w/o UE- ball between knees to prevent collapse  -MO         Exercise 12    Exercise Name 12 nicki nicki  -MO      Cueing 12 Verbal  -MO      Time 12 2 min  -MO         Exercise 13    Exercise Name 13 exaggerated gait  -MO      Time 13 high knees with emphasis on heel strike  -MO         Exercise 14    Exercise Name 14 lateral step ups  -MO      Cueing 14 Verbal  -MO      Sets 14 2 L  -MO      Reps 14 10  -MO         Exercise 15    Exercise Name 15  "Fwd step ups  -MO      Cueing 15 Verbal  -MO      Sets 15 2L  -MO      Reps 15 10  -MO      Time 15 4\" step  -MO      Additional Comments without UE  -MO         Exercise 16    Exercise Name 16 Side stepping  -MO      Cueing 16 Verbal  -MO      Sets 16 3 laps ~10'  -MO      Reps 16 RTB above knees  -MO      Time 16 BUE on barre  -MO                User Key  (r) = Recorded By, (t) = Taken By, (c) = Cosigned By      Initials Name Provider Type    Erica Evangelista PT Physical Therapist                                  PT OP Goals       Row Name 12/27/23 1000          PT Short Term Goals    STG Date to Achieve 01/04/24  -MO     STG 1 Pt will be independent with initial HEP to improve strength/ROM and decrease pain.  -MO     STG 1 Progress Ongoing  -MO     STG 2 Pt will improve L knee AROM from lacking 6-104 degrees to lacking 2-115 degrees to improve ability to navigate stairs.  -MO     STG 2 Progress Ongoing  -MO        Long Term Goals    LTG Date to Achieve 02/03/24  -MO     LTG 1 Pt will be independent with advance HEP to improve strength/ROM and decrease pain.  -MO     LTG 1 Progress Ongoing  -MO     LTG 2 Pt will improve L knee AROM from lacking 6-104 to at least 0-120 deg to improve ability to navigate stairs.  -MO     LTG 2 Progress Ongoing  -MO     LTG 3 Pt will improve L knee flexion/extension strength to at least 4/5.  -MO     LTG 3 Progress Ongoing  -MO     LTG 4 Pt will be able to ascend/descend stairs in a reciprocal pattern with no increase pain.  -MO     LTG 4 Progress Ongoing  -MO     LTG 5 Pt will improve KOS score to 65% to show improved quality of life.  -MO     LTG 5 Progress Ongoing  -MO               User Key  (r) = Recorded By, (t) = Taken By, (c) = Cosigned By      Initials Name Provider Type    Erica Evangelista PT Physical Therapist                                   Time Calculation:   Start Time: 1010  Stop Time: 1050  Time Calculation (min): 40 min  Timed Charges  96198 - PT Therapeutic " Exercise Minutes: 30  86338 - PT Therapeutic Activity Minutes: 10  Total Minutes  Timed Charges Total Minutes: 40   Total Minutes: 40  Therapy Charges for Today       Code Description Service Date Service Provider Modifiers Qty    61136660001  PT THER PROC EA 15 MIN 12/27/2023 Erica Wynn, PT GP 2    [id number]  PT THERAPEUTIC ACT EA 15 MIN 12/27/2023 Erica Wynn PT GP 1                      Erica Wynn PT  12/27/2023

## 2023-12-29 ENCOUNTER — HOSPITAL ENCOUNTER (OUTPATIENT)
Dept: PHYSICAL THERAPY | Facility: HOSPITAL | Age: 83
Setting detail: THERAPIES SERIES
Discharge: HOME | End: 2023-12-29
Payer: MEDICARE

## 2023-12-29 DIAGNOSIS — Z47.89 ORTHOPEDIC AFTERCARE: ICD-10-CM

## 2023-12-29 DIAGNOSIS — R26.2 DIFFICULTY WALKING: ICD-10-CM

## 2023-12-29 DIAGNOSIS — M25.662 DECREASED RANGE OF MOTION (ROM) OF LEFT KNEE: ICD-10-CM

## 2023-12-29 DIAGNOSIS — M62.81 MUSCLE WEAKNESS OF LOWER EXTREMITY: ICD-10-CM

## 2023-12-29 DIAGNOSIS — Z96.652 STATUS POST LEFT KNEE REPLACEMENT: Primary | ICD-10-CM

## 2023-12-29 PROCEDURE — 97530 THERAPEUTIC ACTIVITIES: CPT

## 2023-12-29 PROCEDURE — 97110 THERAPEUTIC EXERCISES: CPT

## 2024-01-02 ENCOUNTER — HOSPITAL ENCOUNTER (OUTPATIENT)
Dept: PHYSICAL THERAPY | Facility: HOSPITAL | Age: 84
Setting detail: THERAPIES SERIES
Discharge: HOME | End: 2024-01-02
Payer: MEDICARE

## 2024-01-02 DIAGNOSIS — R26.2 DIFFICULTY WALKING: ICD-10-CM

## 2024-01-02 DIAGNOSIS — Z96.652 STATUS POST LEFT KNEE REPLACEMENT: Primary | ICD-10-CM

## 2024-01-02 DIAGNOSIS — M25.662 DECREASED RANGE OF MOTION (ROM) OF LEFT KNEE: ICD-10-CM

## 2024-01-02 DIAGNOSIS — Z47.89 ORTHOPEDIC AFTERCARE: ICD-10-CM

## 2024-01-02 DIAGNOSIS — M62.81 MUSCLE WEAKNESS OF LOWER EXTREMITY: ICD-10-CM

## 2024-01-02 PROCEDURE — 97530 THERAPEUTIC ACTIVITIES: CPT

## 2024-01-02 PROCEDURE — 97110 THERAPEUTIC EXERCISES: CPT

## 2024-01-02 NOTE — THERAPY TREATMENT NOTE
Outpatient Physical Therapy Ortho Treatment Note  [facility]     Patient Name: Ashley Motta  : 1940  MRN: [id number]  Today's Date: 2024      Visit Date: 2024    Visit Dx:    ICD-10-CM ICD-9-CM   1. Status post left knee replacement  Z96.652 V43.65   2. Orthopedic aftercare  Z47.89 V54.9   3. Decreased range of motion (ROM) of left knee  M25.662 719.56   4. Muscle weakness of lower extremity  M62.81 728.87   5. Difficulty walking  R26.2 719.7       Patient Active Problem List   Diagnosis    Osteopenia    Hyperlipidemia    Pre-diabetes    Osteoarthritis    BALDEMAR on CPAP    Pelvic relaxation due to vaginal prolapse    Chronic pain of both knees    Arthritis of right knee    Arthritis of left knee    Arthritis of both knees    Restless leg syndrome    Dizziness    Headache, migraine    Heartburn    Internal hemorrhoids with complication    Impacted cerumen of left ear    Vitamin E deficiency    Environmental and seasonal allergies    Chronic cough    Aftercare following joint replacement surgery    Allergic rhinitis, unspecified    Anxiety disorder, unspecified    Atopic dermatitis, unspecified    Constipation, unspecified    Localized osteoporosis without current pathological fracture    Gastro-esophageal reflux disease without esophagitis    Generalized muscle weakness    Iron deficiency anemia due to chronic blood loss    Personal history of other malignant neoplasm of skin    Presence of right artificial knee joint    Migraine, unspecified, not intractable, without status migrainosus    S/P total knee arthroplasty, left        Past Medical History:   Diagnosis Date    Anxiety     SITUATIONAL R/T DEATH OF HER SPOUSE    Balance problem     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 SARAH LIRIANO    Endothelial corneal dystrophy 2020    GERD (gastroesophageal reflux disease)     Goiter, nontoxic, multinodular 2017    THYROID POLYP SEES      Hyperlipidemia     Impaired fasting glucose     Left knee pain     Migraine     BALDEMAR on CPAP     FOLLOWED BY DR. TERRY CHEUNG    Osteoarthritis     Osteopenia     Rectal nodule 03/2020    REFERRED TO DR. PAMELA NAPOLES    Rectocele 07/2017    RLS (restless legs syndrome)     Seborrheic keratosis     Skin cancer 04/2015    LOWER LEG, FOLLOWED BY DR. ATUL SANCHEZ    Vertigo         Past Surgical History:   Procedure Laterality Date    CATARACT EXTRACTION, BILATERAL Bilateral 2015    COLONOSCOPY N/A 02/27/2015    1 TUBULAR [facility]    COLONOSCOPY N/A 03/04/2020    10 MM SESSILE SERRATED ADENOMA POLYP IN ASCENDING, 3 MM HYPERPLASTIC POLYP IN RECTUM, 7 MM ANAL NODULE, DR. SARAH LIRIANO AT [facility]    HYSTERECTOMY N/A 01/19/2004    KAYLEE WITH BSO, DR. RAFIA MORTENSEN AT [facility]    KNEE ARTHROSCOPY Left 2013    KNEE ARTHROSCOPY Right 2010    MELISSA CULDOPLASTY N/A 01/19/2004    DR. RAFIA MORTENSEN AT [facility]    SACROCOLPOPEXY N/A     TOTAL KNEE ARTHROPLASTY Right 4/11/2023    Procedure: RIGHT TOTAL KNEE ARTHROPLASTY WITH SHERI NAVIGATION;  Surgeon: Atul Liriano MD;  Location:  [facility];  Service: Orthopedics;  Laterality: Right;    TOTAL KNEE ARTHROPLASTY Left 11/14/2023    Procedure: LEFT TOTAL KNEE ARTHROPLASTY WITH SHERI NAVIGATION;  Surgeon: Atul Liriano MD;  Location:  [facility];  Service: Orthopedics;  Laterality: Left;                        PT Assessment/Plan       Row Name 01/02/24 1500          PT Assessment    Assessment Comments Continued to address gait deficits as Ms. Motta continues to intermittently amb with wide BRENDA and increased lateral sway. Spent additional time working on cueing for large, even step lengths at [place] with 1 UE support for balance with good tolerance. She has great improvement following cueing however will revert back intermittently likely secondary to mild balance impairment and hesitancy without AD. Added monster walk  without any residual discomfort. Continues to require cueing throughout leg press to prevent knee collapse however great improvement this date from last session. She continues to be a good candidate for skilled PT and will progress as appropriate.  -MO        PT Plan    PT Plan Comments update HEP- may try higher step down height if form is correct. 3D hip on airex?  -MO               User Key  (r) = Recorded By, (t) = Taken By, (c) = Cosigned By      Initials Name Provider Type    Erica Evangelista, PT Physical Therapist                       OP Exercises       Row Name 01/02/24 1000             Subjective    Subjective Comments Doing good  -MO         Total Minutes    48865 - Gait Training Minutes  5  -MO      56762 - PT Therapeutic Exercise Minutes 30  -MO      49171 - PT Therapeutic Activity Minutes 8  -MO         Exercise 1    Exercise Name 1 RCB  -MO      Reps 1 seat 6  -MO      Time 1 5 mins  -MO         Exercise 3    Exercise Name 3 knee flex at steps  -MO      Cueing 3 Verbal  -MO      Reps 3 3L  -MO      Time 3 20s  -MO         Exercise 4    Exercise Name 4 gait training / exaggerated walking  -MO      Additional Comments cueing for larger, even step lengths  -MO         Exercise 5    Exercise Name 5 step down from airex  -MO      Cueing 5 Verbal  -MO      Sets 5 2L  -MO      Reps 5 10  -MO         Exercise 7    Exercise Name 7 standing HS curls  -MO      Cueing 7 Verbal  -MO      Sets 7 2L  -MO      Reps 7 10  -MO      Time 7 1# AW  -MO         Exercise 8    Exercise Name 8 monster walk  -MO      Cueing 8 Verbal;Demo  -MO      Reps 8 4 laps  -MO      Time 8 RTB above knees  -MO         Exercise 10    Exercise Name 10 STS clinic chair  -MO      Cueing 10 Verbal  -MO      Sets 10 2  -MO      Reps 10 10  -MO      Time 10 w/o UE- ball between knees to prevent collapse  -MO         Exercise 12    Exercise Name 12 nicki nicki  -MO      Cueing 12 Verbal  -MO      Reps 12 1BL x 10  -MO         Exercise 13    Exercise  Name 13 high march foward to high knee with retro drive step  -MO      Cueing 13 Verbal  -MO      Reps 13 3 laps  -MO      Time 13 1 UEsupport  -MO         Exercise 15    Exercise Name 15 Fwd step ups / lateral step up  -MO      Cueing 15 Verbal  -MO      Sets 15 2L e  -MO      Reps 15 10  -MO      Time 15 6' BUE  -MO         Exercise 16    Exercise Name 16 Side stepping  -MO      Cueing 16 Verbal  -MO      Sets 16 4 laps  -MO      Reps 16 RTB  -MO         Exercise 17    Exercise Name 17 BL leg press  -MO      Cueing 17 Verbal  -MO      Sets 17 2  -MO      Reps 17 10  -MO      Time 17 130#  -MO                User Key  (r) = Recorded By, (t) = Taken By, (c) = Cosigned By      Initials Name Provider Type    Erica Evangelista PT Physical Therapist                                                    Time Calculation:   Start Time: 1047  Stop Time: 1130  Time Calculation (min): 43 min  Timed Charges  27698 - PT Therapeutic Exercise Minutes: 30  31870 - Gait Training Minutes : 5  81891 - PT Therapeutic Activity Minutes: 8  Total Minutes  Timed Charges Total Minutes: 43   Total Minutes: 43  Therapy Charges for Today       Code Description Service Date Service Provider Modifiers Qty    59442620906 HC PT THER PROC EA 15 MIN 1/2/2024 Erica Wynn, PT GP 2    85602421197 HC PT THERAPEUTIC ACT EA 15 MIN 1/2/2024 Erica Wynn PT GP 1                      Erica Wynn PT  1/2/2024

## 2024-01-03 ENCOUNTER — OFFICE VISIT (OUTPATIENT)
Dept: ORTHOPEDIC SURGERY | Facility: CLINIC | Age: 84
End: 2024-01-03
Payer: MEDICARE

## 2024-01-03 VITALS — BODY MASS INDEX: 28.56 KG/M2 | TEMPERATURE: 98 F | WEIGHT: 161.2 LBS | HEIGHT: 63 IN

## 2024-01-03 DIAGNOSIS — Z96.652 S/P TOTAL KNEE ARTHROPLASTY, LEFT: ICD-10-CM

## 2024-01-03 DIAGNOSIS — R52 PAIN: Primary | ICD-10-CM

## 2024-01-03 RX ORDER — AMOXICILLIN 500 MG/1
CAPSULE ORAL
Qty: 20 CAPSULE | Refills: 1 | Status: SHIPPED | OUTPATIENT
Start: 2024-01-03

## 2024-01-03 NOTE — PROGRESS NOTES
Patient is seen today about 7 weeks status post left total knee replacement.  She had a right total knee replacement before.  She is doing very well she taken some Tylenol for pain.  Actively she goes from essentially full extension back to about 130 degrees she can transfer and walk well and has mild swelling her calf is soft x-rays AP lateral sunrise view left knee taken the office today for postop follow-up with comparison view shows well aligned total knee replacement.  Went over postop total knee recommendations including antibiotic prophylaxis.  I wrote her prescription for amoxicillin with instructions.  Like to see her back in about 2 months with x-rays of both knees.

## 2024-01-05 ENCOUNTER — HOSPITAL ENCOUNTER (OUTPATIENT)
Dept: PHYSICAL THERAPY | Facility: HOSPITAL | Age: 84
Setting detail: THERAPIES SERIES
Discharge: HOME | End: 2024-01-05
Payer: MEDICARE

## 2024-01-05 DIAGNOSIS — M62.81 MUSCLE WEAKNESS OF LOWER EXTREMITY: ICD-10-CM

## 2024-01-05 DIAGNOSIS — Z47.89 ORTHOPEDIC AFTERCARE: ICD-10-CM

## 2024-01-05 DIAGNOSIS — R26.2 DIFFICULTY WALKING: ICD-10-CM

## 2024-01-05 DIAGNOSIS — M25.662 DECREASED RANGE OF MOTION (ROM) OF LEFT KNEE: ICD-10-CM

## 2024-01-05 DIAGNOSIS — Z96.652 STATUS POST LEFT KNEE REPLACEMENT: Primary | ICD-10-CM

## 2024-01-05 PROCEDURE — 97110 THERAPEUTIC EXERCISES: CPT

## 2024-01-05 PROCEDURE — 97530 THERAPEUTIC ACTIVITIES: CPT

## 2024-01-09 ENCOUNTER — HOSPITAL ENCOUNTER (OUTPATIENT)
Dept: PHYSICAL THERAPY | Facility: HOSPITAL | Age: 84
Setting detail: THERAPIES SERIES
Discharge: HOME | End: 2024-01-09
Payer: MEDICARE

## 2024-01-09 DIAGNOSIS — R26.2 DIFFICULTY WALKING: ICD-10-CM

## 2024-01-09 DIAGNOSIS — Z96.652 STATUS POST LEFT KNEE REPLACEMENT: Primary | ICD-10-CM

## 2024-01-09 DIAGNOSIS — Z47.89 ORTHOPEDIC AFTERCARE: ICD-10-CM

## 2024-01-09 DIAGNOSIS — M62.81 MUSCLE WEAKNESS OF LOWER EXTREMITY: ICD-10-CM

## 2024-01-09 DIAGNOSIS — M25.662 DECREASED RANGE OF MOTION (ROM) OF LEFT KNEE: ICD-10-CM

## 2024-01-09 PROCEDURE — 97530 THERAPEUTIC ACTIVITIES: CPT

## 2024-01-09 PROCEDURE — 97112 NEUROMUSCULAR REEDUCATION: CPT

## 2024-01-09 PROCEDURE — 97110 THERAPEUTIC EXERCISES: CPT

## 2024-01-09 NOTE — THERAPY TREATMENT NOTE
Outpatient Physical Therapy Ortho Treatment Note  [facility]     Patient Name: Ashley Motta  : 1940  MRN: [id number]  Today's Date: 2024      Visit Date: 2024    Visit Dx:    ICD-10-CM ICD-9-CM   1. Status post left knee replacement  Z96.652 V43.65   2. Orthopedic aftercare  Z47.89 V54.9   3. Decreased range of motion (ROM) of left knee  M25.662 719.56   4. Muscle weakness of lower extremity  M62.81 728.87   5. Difficulty walking  R26.2 719.7       Patient Active Problem List   Diagnosis    Osteopenia    Hyperlipidemia    Pre-diabetes    Osteoarthritis    BALDEMAR on CPAP    Pelvic relaxation due to vaginal prolapse    Chronic pain of both knees    Arthritis of right knee    Arthritis of left knee    Arthritis of both knees    Restless leg syndrome    Dizziness    Headache, migraine    Heartburn    Internal hemorrhoids with complication    Impacted cerumen of left ear    Vitamin E deficiency    Environmental and seasonal allergies    Chronic cough    Aftercare following joint replacement surgery    Allergic rhinitis, unspecified    Anxiety disorder, unspecified    Atopic dermatitis, unspecified    Constipation, unspecified    Localized osteoporosis without current pathological fracture    Gastro-esophageal reflux disease without esophagitis    Generalized muscle weakness    Iron deficiency anemia due to chronic blood loss    Personal history of other malignant neoplasm of skin    Presence of right artificial knee joint    Migraine, unspecified, not intractable, without status migrainosus    S/P total knee arthroplasty, left        Past Medical History:   Diagnosis Date    Anxiety     SITUATIONAL R/T DEATH OF HER SPOUSE    Balance problem     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 SARAH LIRIANO    Endothelial corneal dystrophy 2020    GERD (gastroesophageal reflux disease)     Goiter, nontoxic, multinodular 2017    THYROID POLYP SEES      Hyperlipidemia     Impaired fasting glucose     Left knee pain     Migraine     BALDEMAR on CPAP     FOLLOWED BY DR. TERRY CHEUNG    Osteoarthritis     Osteopenia     Rectal nodule 03/2020    REFERRED TO DR. PAMELA NAPOLES    Rectocele 07/2017    RLS (restless legs syndrome)     Seborrheic keratosis     Skin cancer 04/2015    LOWER LEG, FOLLOWED BY DR. ATUL SANCHEZ    Vertigo         Past Surgical History:   Procedure Laterality Date    CATARACT EXTRACTION, BILATERAL Bilateral 2015    COLONOSCOPY N/A 02/27/2015    1 [facility]UB[facility]    COLONOSCOPY N/A 03/04/2020    10 MM SESSILE SERRATED ADENOMA POLYP IN ASCENDING, 3 MM HYPERPLASTIC POLYP IN RECTUM, 7 MM ANAL NODULE, DR. SARAH LIRIANO AT [facility]    HYSTERECTOMY N/A 01/19/2004    KAYLEE WITH BSO, DR. RAFIA MORTENSEN AT [facility]    KNEE ARTHROSCOPY Left 2013    KNEE ARTHROSCOPY Right 2010    MELISSA CULDOPLASTY N/A 01/19/2004    DR. RAFIA MORTENSEN AT [facility]    SACROCOLPOPEXY N/A     TOTAL KNEE ARTHROPLASTY Right 4/11/2023    Procedure: RIGHT TOTAL KNEE ARTHROPLASTY WITH SHERI NAVIGATION;  Surgeon: Atul Liriano MD;  Location:  [facility];  Service: Orthopedics;  Laterality: Right;    TOTAL KNEE ARTHROPLASTY Left 11/14/2023    Procedure: LEFT TOTAL KNEE ARTHROPLASTY WITH SHERI NAVIGATION;  Surgeon: Atul Liriano MD;  Location:  [facility];  Service: Orthopedics;  Laterality: Left;                        PT Assessment/Plan       Row Name 01/09/24 0900          PT Assessment    Assessment Comments Ashley presents to clinic with no new complaints, continues to ambulate with wide BRENDA into clinic. Working and gait mechanics, BRENDA and balance. Added tandem walking with cueing to reduce UE support, SLS with ability to maintain SLS without UE on R LE, fingertips needed with L LE. Added  to step ups, when fatigued cueing for greater hip/knee flexion. Pt. remains good candidate for skilled PT.  -        PT Plan    PT  "Plan Comments step downs?  -               User Key  (r) = Recorded By, (t) = Taken By, (c) = Cosigned By      Initials Name Provider Type     Bebe Carpenter PT Physical Therapist                       OP Exercises       Row Name 01/09/24 0900             Subjective    Subjective Comments No new complaints  -         Subjective Pain    Able to rate subjective pain? yes  -      Pre-Treatment Pain Level 3  -MH      Post-Treatment Pain Level 3  -MH         Total Minutes    44351 - PT Therapeutic Exercise Minutes 20  -MH      39052 -  PT Neuromuscular Reeducation Minutes 10  -MH      66039 - PT Therapeutic Activity Minutes 10  -MH         Exercise 1    Exercise Name 1 RCB  -MH      Cueing 1 Verbal  -MH      Reps 1 seat 6  -MH      Time 1 5 mins  -MH         Exercise 3    Exercise Name 3 knee flex at steps  -MH      Cueing 3 Verbal  -MH      Reps 3 5L  -MH      Time 3 20s  -MH         Exercise 6    Exercise Name 6 heel raises  -MH      Cueing 6 Verbal;Demo  -MH      Sets 6 2  -MH      Reps 6 10  -MH      Time 6 2# AW  -MH         Exercise 7    Exercise Name 7 standing HS curls  -MH      Cueing 7 Verbal  -MH      Sets 7 2L  -MH      Reps 7 10  -MH      Time 7 2# AW  -MH         Exercise 12    Exercise Name 12 nicki nicki  -MH      Cueing 12 Verbal  -MH      Time 12 2 min  -MH         Exercise 13    Exercise Name 13 tandem walking  -MH      Cueing 13 Verbal;Demo  -MH      Reps 13 3 laps // bars  -MH         Exercise 14    Exercise Name 14 SLS  -MH      Cueing 14 Verbal;Demo  -MH      Reps 14 4e  -MH      Time 14 15s  -MH         Exercise 15    Exercise Name 15 Fwd step ups / lateral step up  -MH      Cueing 15 Verbal  -MH      Sets 15 2B  -MH      Reps 15 10  -MH      Time 15 6\" 1 UE FWD, B UE lateral  -MH      Additional Comments +  with forward  -MH                User Key  (r) = Recorded By, (t) = Taken By, (c) = Cosigned By      Initials Name Provider Type     Bebe Carpenter, PT Physical Therapist      "                             PT OP Goals       Row Name 01/09/24 0900          PT Short Term Goals    STG Date to Achieve 01/04/24  -     STG 1 Pt will be independent with initial HEP to improve strength/ROM and decrease pain.  -     STG 1 Progress Met  -     STG 2 Pt will improve L knee AROM from lacking 6-104 degrees to lacking 2-115 degrees to improve ability to navigate stairs.  -     STG 2 Progress Met  -        Long Term Goals    LTG Date to Achieve 02/03/24  -     LTG 1 Pt will be independent with advance HEP to improve strength/ROM and decrease pain.  -     LTG 1 Progress Ongoing  -     LTG 2 Pt will improve L knee AROM from lacking 6-104 to at least 0-120 deg to improve ability to navigate stairs.  -     LTG 2 Progress Met  -     LTG 3 Pt will improve L knee flexion/extension strength to at least 4/5.  -     LTG 3 Progress Ongoing  -     LTG 4 Pt will be able to ascend/descend stairs in a reciprocal pattern with no increase pain.  -     LTG 4 Progress Partially Met  [facility]     LTG 5 Pt will improve KOS score to 65% to show improved quality of life.  -     LTG 5 Progress Ongoing;Progressing  -               User Key  (r) = Recorded By, (t) = Taken By, (c) = Cosigned By      Initials Name Provider Type    Bebe Pedersen PT Physical Therapist                    Therapy Education  Given: Symptoms/condition management, Posture/body mechanics  Program: Reinforced, Modified  How Provided: Verbal, Demonstration  Provided to: Patient  Level of Understanding: Verbalized, Demonstrated              Time Calculation:   Start Time: 0940  Stop Time: 1020  Time Calculation (min): 40 min  Total Timed Code Minutes- PT: 40 minute(s)  Timed Charges  29066 - PT Therapeutic Exercise Minutes: 20  22805 -  PT Neuromuscular Reeducation Minutes: 10  68774 - PT Therapeutic Activity Minutes: 10  Total Minutes  Timed Charges Total Minutes: 40   Total Minutes: 40  Therapy Charges for Today       Code  Description Service Date Service Provider Modifiers Qty    85725854318 HC PT THERAPEUTIC ACT EA 15 MIN 1/9/2024 Bebe Carpenter, PT GP 1    52951759925 HC PT THER PROC EA 15 MIN 1/9/2024 Bebe Carpenter, PT GP 1    [id number]  PT NEUROMUSC RE EDUCATION EA 15 MIN 1/9/2024 Bebe Carpenter, PT GP 1                      Bebe Carpenter, PT  1/9/2024

## 2024-01-16 ENCOUNTER — HOSPITAL ENCOUNTER (OUTPATIENT)
Dept: PHYSICAL THERAPY | Facility: HOSPITAL | Age: 84
Setting detail: THERAPIES SERIES
Discharge: HOME | End: 2024-01-16
Payer: MEDICARE

## 2024-01-16 DIAGNOSIS — R26.2 DIFFICULTY WALKING: ICD-10-CM

## 2024-01-16 DIAGNOSIS — M25.662 DECREASED RANGE OF MOTION (ROM) OF LEFT KNEE: ICD-10-CM

## 2024-01-16 DIAGNOSIS — Z47.89 ORTHOPEDIC AFTERCARE: ICD-10-CM

## 2024-01-16 DIAGNOSIS — Z96.652 STATUS POST LEFT KNEE REPLACEMENT: Primary | ICD-10-CM

## 2024-01-16 DIAGNOSIS — M62.81 MUSCLE WEAKNESS OF LOWER EXTREMITY: ICD-10-CM

## 2024-01-16 PROCEDURE — 97110 THERAPEUTIC EXERCISES: CPT

## 2024-01-16 PROCEDURE — 97112 NEUROMUSCULAR REEDUCATION: CPT

## 2024-01-16 NOTE — THERAPY TREATMENT NOTE
Outpatient Physical Therapy Ortho Treatment Note  [facility]     Patient Name: Ashley Motta  : 1940  MRN: [id number]  Today's Date: 2024      Visit Date: 2024    Visit Dx:    ICD-10-CM ICD-9-CM   1. Status post left knee replacement  Z96.652 V43.65   2. Orthopedic aftercare  Z47.89 V54.9   3. Decreased range of motion (ROM) of left knee  M25.662 719.56   4. Muscle weakness of lower extremity  M62.81 728.87   5. Difficulty walking  R26.2 719.7       Patient Active Problem List   Diagnosis    Osteopenia    Hyperlipidemia    Pre-diabetes    Osteoarthritis    BALDEMAR on CPAP    Pelvic relaxation due to vaginal prolapse    Chronic pain of both knees    Arthritis of right knee    Arthritis of left knee    Arthritis of both knees    Restless leg syndrome    Dizziness    Headache, migraine    Heartburn    Internal hemorrhoids with complication    Impacted cerumen of left ear    Vitamin E deficiency    Environmental and seasonal allergies    Chronic cough    Aftercare following joint replacement surgery    Allergic rhinitis, unspecified    Anxiety disorder, unspecified    Atopic dermatitis, unspecified    Constipation, unspecified    Localized osteoporosis without current pathological fracture    Gastro-esophageal reflux disease without esophagitis    Generalized muscle weakness    Iron deficiency anemia due to chronic blood loss    Personal history of other malignant neoplasm of skin    Presence of right artificial knee joint    Migraine, unspecified, not intractable, without status migrainosus    S/P total knee arthroplasty, left        Past Medical History:   Diagnosis Date    Anxiety     SITUATIONAL R/T DEATH OF HER SPOUSE    Balance problem     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 SARAH LIRIANO    Endothelial corneal dystrophy 2020    GERD (gastroesophageal reflux disease)     Goiter, nontoxic, multinodular 2017    THYROID POLYP SEES      Hyperlipidemia     Impaired fasting glucose     Left knee pain     Migraine     BALDEMAR on CPAP     FOLLOWED BY DR. TERRY CHEUNG    Osteoarthritis     Osteopenia     Rectal nodule 03/2020    REFERRED TO DR. PAMELA NAPOLES    Rectocele 07/2017    RLS (restless legs syndrome)     Seborrheic keratosis     Skin cancer 04/2015    LOWER LEG, FOLLOWED BY DR. ATUL SANCHEZ    Vertigo         Past Surgical History:   Procedure Laterality Date    CATARACT EXTRACTION, BILATERAL Bilateral 2015    COLONOSCOPY N/A 02/27/2015    1 TUBULAR [facility]ENOMA [facility] DIST[facility]    COLONOSCOPY N/A 03/04/2020    10 MM SESSILE SERRATED ADENOMA POLYP IN ASCENDING, 3 MM HYPERPLASTIC POLYP IN RECTUM, 7 MM ANAL NODULE, DR. SARAH LIRIANO AT [facility]    HYSTERECTOMY N/A 01/19/2004    KAYLEE WITH BSO, DR. RAFIA MORTENSEN AT [facility]    KNEE ARTHROSCOPY Left 2013    KNEE ARTHROSCOPY Right 2010    MELISSA CULDOPLASTY N/A 01/19/2004    DR. RAFIA MORTENSEN AT [facility]    SACROCOLPOPEXY N/A     TOTAL KNEE ARTHROPLASTY Right 4/11/2023    Procedure: RIGHT TOTAL KNEE ARTHROPLASTY WITH SHERI NAVIGATION;  Surgeon: Atul Liriano MD;  Location:  [facility];  Service: Orthopedics;  Laterality: Right;    TOTAL KNEE ARTHROPLASTY Left 11/14/2023    Procedure: LEFT TOTAL KNEE ARTHROPLASTY WITH SHERI NAVIGATION;  Surgeon: Atul Liriano MD;  Location:  [facility];  Service: Orthopedics;  Laterality: Left;                        PT Assessment/Plan       Row Name 01/16/24 0900          PT Assessment    Assessment Comments Ashley returns to clinic reporting she has been riding the rec bike at gym without difficulty. Gait continues to improve with practice in clinic though does benefit from intermittent cueing to reduce wide BRENDA/increased lateral sway. She has tolerated reduction in frequency to 1x/week well, plan to continue with remaining scheduled visits to solidify HEP and progress strength/functional mobility.  -        PT Plan    PT  "Plan Comments step downs?  -               User Key  (r) = Recorded By, (t) = Taken By, (c) = Cosigned By      Initials Name Provider Type     Bebe Carpenter, PT Physical Therapist                       OP Exercises       Row Name 01/16/24 0900             Subjective    Subjective Comments I have been riding the bike at [employer]  -         Subjective Pain    Able to rate subjective pain? yes  -      Pre-Treatment Pain Level 4  -MH      Post-Treatment Pain Level 4  -MH         Total Minutes    96649 - PT Therapeutic Exercise Minutes 25  -MH      28382 -  PT Neuromuscular Reeducation Minutes 13  -MH         Exercise 1    Exercise Name 1 RCB  -      Cueing 1 Verbal  -MH      Reps 1 seat 6  -MH      Time 1 5 mins  -MH         Exercise 3    Exercise Name 3 knee flex at steps  -      Cueing 3 Verbal  -MH      Reps 3 5L  -MH      Time 3 20s  -MH         Exercise 6    Exercise Name 6 heel raises  -      Cueing 6 Verbal;Demo  -      Sets 6 2  -MH      Reps 6 10  -MH      Time 6 2# AW  -MH         Exercise 7    Exercise Name 7 standing HS curls  -      Cueing 7 Verbal  -      Sets 7 2L  -MH      Reps 7 10  -MH      Time 7 2# AW  -MH         Exercise 13    Exercise Name 13 tandem walking  -      Cueing 13 Verbal;Demo  -MH      Reps 13 4 laps ballet bar  -         Exercise 14    Exercise Name 14 SLS  -MH      Cueing 14 Verbal;Demo  -MH      Reps 14 4e  -MH      Time 14 15s  -MH      Additional Comments 2 finger L LE, 1 finger R LE  -         Exercise 15    Exercise Name 15 Fwd step ups / lateral step up  -      Cueing 15 Verbal  -MH      Sets 15 2B  -MH      Reps 15 10  -MH      Time 15 6\" 1 UE FWD, B UE lateral  -      Additional Comments +  with forward  -                User Key  (r) = Recorded By, (t) = Taken By, (c) = Cosigned By      Initials Name Provider Type     Bebe Carpenter, PT Physical Therapist                                  PT OP Goals       Row Name 01/16/24 0900       "    PT Short Term Goals    STG Date to Achieve 01/04/24  -     STG 1 Pt will be independent with initial HEP to improve strength/ROM and decrease pain.  -     STG 1 Progress Met  -     STG 2 Pt will improve L knee AROM from lacking 6-104 degrees to lacking 2-115 degrees to improve ability to navigate stairs.  -     STG 2 Progress Met  [facility]        Long Term Goals    LTG Date to Achieve 02/03/24  -     LTG 1 Pt will be independent with advance HEP to improve strength/ROM and decrease pain.  -     LTG 1 Progress Ongoing  -     LTG 2 Pt will improve L knee AROM from lacking 6-104 to at least 0-120 deg to improve ability to navigate stairs.  -     LTG 2 Progress Met  -     LTG 3 Pt will improve L knee flexion/extension strength to at least 4/5.  -     LTG 3 Progress Ongoing  -     LTG 4 Pt will be able to ascend/descend stairs in a reciprocal pattern with no increase pain.  -     LTG 4 Progress Partially Met  -     LTG 5 Pt will improve KOS score to 65% to show improved quality of life.  -     LTG 5 Progress Ongoing;Progressing  -               User Key  (r) = Recorded By, (t) = Taken By, (c) = Cosigned By      Initials Name Provider Type     Bebe Carpenter PT Physical Therapist                    Therapy Education  Given: Symptoms/condition management  Program: Reinforced  How Provided: Verbal  Provided to: Patient  Level of Understanding: Verbalized              Time Calculation:   Start Time: 0948  Stop Time: 1026  Time Calculation (min): 38 min  Total Timed Code Minutes- PT: 38 minute(s)  Timed Charges  18915 - PT Therapeutic Exercise Minutes: 25  98264 -  PT Neuromuscular Reeducation Minutes: 13  Total Minutes  Timed Charges Total Minutes: 38   Total Minutes: 38  Therapy Charges for Today       Code Description Service Date Service Provider Modifiers Qty    13477229937 HC PT THER PROC EA 15 MIN 1/16/2024 Bebe Carpenter, PT GP 2    60827545830 HC PT NEUROMUSC RE EDUCATION EA 15 MIN  1/16/2024 Bebe Carpenter, PT GP 1                      Bebe Carpenter, PT  1/16/2024

## 2024-01-24 ENCOUNTER — HOSPITAL ENCOUNTER (OUTPATIENT)
Dept: PHYSICAL THERAPY | Facility: HOSPITAL | Age: 84
Setting detail: THERAPIES SERIES
Discharge: HOME | End: 2024-01-24
Payer: MEDICARE

## 2024-01-24 DIAGNOSIS — Z47.89 ORTHOPEDIC AFTERCARE: ICD-10-CM

## 2024-01-24 DIAGNOSIS — R26.2 DIFFICULTY WALKING: ICD-10-CM

## 2024-01-24 DIAGNOSIS — M25.662 DECREASED RANGE OF MOTION (ROM) OF LEFT KNEE: ICD-10-CM

## 2024-01-24 DIAGNOSIS — Z96.652 STATUS POST LEFT KNEE REPLACEMENT: Primary | ICD-10-CM

## 2024-01-24 DIAGNOSIS — M62.81 MUSCLE WEAKNESS OF LOWER EXTREMITY: ICD-10-CM

## 2024-01-24 PROCEDURE — 97530 THERAPEUTIC ACTIVITIES: CPT

## 2024-01-24 PROCEDURE — 97110 THERAPEUTIC EXERCISES: CPT

## 2024-01-24 PROCEDURE — 97112 NEUROMUSCULAR REEDUCATION: CPT

## 2024-01-24 NOTE — THERAPY TREATMENT NOTE
Outpatient Physical Therapy Ortho Treatment Note  [facility]     Patient Name: Ashley Motta  : 1940  MRN: [id number]  Today's Date: 2024      Visit Date: 2024    Visit Dx:    ICD-10-CM ICD-9-CM   1. Status post left knee replacement  Z96.652 V43.65   2. Orthopedic aftercare  Z47.89 V54.9   3. Decreased range of motion (ROM) of left knee  M25.662 719.56   4. Muscle weakness of lower extremity  M62.81 728.87   5. Difficulty walking  R26.2 719.7       Patient Active Problem List   Diagnosis    Osteopenia    Hyperlipidemia    Pre-diabetes    Osteoarthritis    BALDEMAR on CPAP    Pelvic relaxation due to vaginal prolapse    Chronic pain of both knees    Arthritis of right knee    Arthritis of left knee    Arthritis of both knees    Restless leg syndrome    Dizziness    Headache, migraine    Heartburn    Internal hemorrhoids with complication    Impacted cerumen of left ear    Vitamin E deficiency    Environmental and seasonal allergies    Chronic cough    Aftercare following joint replacement surgery    Allergic rhinitis, unspecified    Anxiety disorder, unspecified    Atopic dermatitis, unspecified    Constipation, unspecified    Localized osteoporosis without current pathological fracture    Gastro-esophageal reflux disease without esophagitis    Generalized muscle weakness    Iron deficiency anemia due to chronic blood loss    Personal history of other malignant neoplasm of skin    Presence of right artificial knee joint    Migraine, unspecified, not intractable, without status migrainosus    S/P total knee arthroplasty, left        Past Medical History:   Diagnosis Date    Anxiety     SITUATIONAL R/T DEATH OF HER SPOUSE    Balance problem     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 SARAH LIRIANO    Endothelial corneal dystrophy 2020    GERD (gastroesophageal reflux disease)     Goiter, nontoxic, multinodular 2017    THYROID POLYP SEES      Hyperlipidemia     Impaired fasting glucose     Left knee pain     Migraine     BALDEMAR on CPAP     FOLLOWED BY DR. TERRY CHEUNG    Osteoarthritis     Osteopenia     Rectal nodule 03/2020    REFERRED TO DR. PAMELA NAPOLES    Rectocele 07/2017    RLS (restless legs syndrome)     Seborrheic keratosis     Skin cancer 04/2015    LOWER LEG, FOLLOWED BY DR. ATUL SANCHEZ    Vertigo         Past Surgical History:   Procedure Laterality Date    CATARACT EXTRACTION, BILATERAL Bilateral 2015    COLONOSCOPY N/A 02/27/2015    1 TUBULAR [facility]ENOMA [facility]    COLONOSCOPY N/A 03/04/2020    10 MM SESSILE SERRATED ADENOMA POLYP IN ASCENDING, 3 MM HYPERPLASTIC POLYP IN RECTUM, 7 MM ANAL NODULE, DR. SARAH LIRIANO AT [facility]    HYSTERECTOMY N/A 01/19/2004    KAYLEE WITH BSO, DR. RAFIA MORTENSEN AT [facility]    KNEE ARTHROSCOPY Left 2013    KNEE ARTHROSCOPY Right 2010    MELISSA CULDOPLASTY N/A 01/19/2004    DR. RAFIA MORTENSEN AT [facility]    SACROCOLPOPEXY N/A     TOTAL KNEE ARTHROPLASTY Right 4/11/2023    Procedure: RIGHT TOTAL KNEE ARTHROPLASTY WITH SHERI NAVIGATION;  Surgeon: Atul Liriano MD;  Location: [facility] OR [facility] [place];  Service: Orthopedics;  Laterality: Right;    TOTAL KNEE ARTHROPLASTY Left 11/14/2023    Procedure: LEFT TOTAL KNEE ARTHROPLASTY WITH SHERI NAVIGATION;  Surgeon: Atul Liriano MD;  Location: [facility] OR [facility];  Service: Orthopedics;  Laterality: Left;                        PT Assessment/Plan       Row Name 01/24/24 1000          PT Assessment    Assessment Comments Ashley presents to clinic with no new complaints, progressed balance and proprioception challenges with use of compliant surface. Encouraged reduced UE support for increased difficulty. Frequent cueing and demo for heel taps to challenge eccentric loading. Pt. has 1 remaining skilled PT session with plans to D/C to independent management following.  -        PT Plan    PT Plan Comments D/C  -               User Key  (r)  "= Recorded By, (t) = Taken By, (c) = Cosigned By      Initials Name Provider Type     Bebe Carpenter, PT Physical Therapist                       OP Exercises       Row Name 01/24/24 1000             Subjective    Subjective Comments No new complaints  -         Subjective Pain    Able to rate subjective pain? yes  -      Pre-Treatment Pain Level 3  -MH      Post-Treatment Pain Level 3  -MH         Total Minutes    40734 - PT Therapeutic Exercise Minutes 14  -MH      70796 -  PT Neuromuscular Reeducation Minutes 15  -MH      75390 - PT Therapeutic Activity Minutes 10  -MH         Exercise 1    Exercise Name 1 RCB  -      Cueing 1 Verbal  -      Reps 1 seat 6  -MH      Time 1 5 mins  -         Exercise 3    Exercise Name 3 knee flex at steps  -      Cueing 3 Verbal  -MH      Reps 3 5L  -MH      Time 3 20s  -         Exercise 5    Exercise Name 5 heel taps  -      Cueing 5 Verbal;Demo  -MH      Reps 5 8L  -MH      Time 5 off airex  -      Additional Comments frequent cueing, demo  -         Exercise 6    Exercise Name 6 heel raises  -      Cueing 6 Verbal;Demo  -      Sets 6 2  -MH      Reps 6 10  -MH      Time 6 on airex  -         Exercise 14    Exercise Name 14 SLS  -MH      Cueing 14 Verbal;Demo  -MH      Reps 14 4e  -MH      Time 14 15s  -MH      Additional Comments 2 finger L LE, 1 finger R LE  -         Exercise 15    Exercise Name 15 Fwd step ups / lateral step up  -      Cueing 15 Verbal  -MH      Sets 15 2B  -MH      Reps 15 10  -MH      Time 15 6\" 1 UE FWD, B UE lateral  -      Additional Comments +  with forward  -         Exercise 16    Exercise Name 16 rhomberg - EC on airex  -      Cueing 16 Verbal;Demo  -MH      Reps 16 4  -MH      Time 16 30s  -MH      Additional Comments 1 fingetip support  -                User Key  (r) = Recorded By, (t) = Taken By, (c) = Cosigned By      Initials Name Provider Type     Bebe Carpenter, PT Physical Therapist         "                          PT OP Goals       Row Name 01/24/24 1000          PT Short Term Goals    STG Date to Achieve 01/04/24  -     STG 1 Pt will be independent with initial HEP to improve strength/ROM and decrease pain.  -     STG 1 Progress Met  -     STG 2 Pt will improve L knee AROM from lacking 6-104 degrees to lacking 2-115 degrees to improve ability to navigate stairs.  -     STG 2 Progress Met  -        Long Term Goals    LTG Date to Achieve 02/03/24  -     LTG 1 Pt will be independent with advance HEP to improve strength/ROM and decrease pain.  -     LTG 1 Progress Ongoing  -     LTG 2 Pt will improve L knee AROM from lacking 6-104 to at least 0-120 deg to improve ability to navigate stairs.  -     LTG 2 Progress Met  -     LTG 3 Pt will improve L knee flexion/extension strength to at least 4/5.  -     LTG 3 Progress Ongoing  -     LTG 4 Pt will be able to ascend/descend stairs in a reciprocal pattern with no increase pain.  -     LTG 4 Progress Partially Met  [facility]     LTG 5 Pt will improve KOS score to 65% to show improved quality of life.  -     LTG 5 Progress Ongoing;Progressing  -               User Key  (r) = Recorded By, (t) = Taken By, (c) = Cosigned By      Initials Name Provider Type    Bebe Pedersen PT Physical Therapist                    Therapy Education  Given: Symptoms/condition management  Program: Reinforced  How Provided: Verbal  Provided to: Patient  Level of Understanding: Verbalized              Time Calculation:   Start Time: 1030  Stop Time: 1109  Time Calculation (min): 39 min  Total Timed Code Minutes- PT: 39 minute(s)  Timed Charges  53834 - PT Therapeutic Exercise Minutes: 14  94768 -  PT Neuromuscular Reeducation Minutes: 15  28509 - PT Therapeutic Activity Minutes: 10  Total Minutes  Timed Charges Total Minutes: 39   Total Minutes: 39  Therapy Charges for Today       Code Description Service Date Service Provider Modifiers Qty    79637012212  HC PT THERAPEUTIC ACT EA 15 MIN 1/24/2024 Bebe Carpenter, PT GP 1    26328583309 HC PT THER PROC EA 15 MIN 1/24/2024 Bebe Carpenter, PT GP 1    66014513587 HC PT NEUROMUSC RE EDUCATION EA 15 MIN 1/24/2024 Bebe Carpenter, PT GP 1                      Bebe Carpenter, PT  1/24/2024

## 2024-01-30 ENCOUNTER — HOSPITAL ENCOUNTER (OUTPATIENT)
Dept: PHYSICAL THERAPY | Facility: HOSPITAL | Age: 84
Setting detail: THERAPIES SERIES
Discharge: HOME | End: 2024-01-30
Payer: MEDICARE

## 2024-01-30 DIAGNOSIS — M62.81 MUSCLE WEAKNESS OF LOWER EXTREMITY: ICD-10-CM

## 2024-01-30 DIAGNOSIS — M25.662 DECREASED RANGE OF MOTION (ROM) OF LEFT KNEE: ICD-10-CM

## 2024-01-30 DIAGNOSIS — Z47.89 ORTHOPEDIC AFTERCARE: ICD-10-CM

## 2024-01-30 DIAGNOSIS — Z96.652 STATUS POST LEFT KNEE REPLACEMENT: Primary | ICD-10-CM

## 2024-01-30 DIAGNOSIS — R26.2 DIFFICULTY WALKING: ICD-10-CM

## 2024-01-30 PROCEDURE — 97110 THERAPEUTIC EXERCISES: CPT

## 2024-01-30 PROCEDURE — 97112 NEUROMUSCULAR REEDUCATION: CPT

## 2024-01-30 NOTE — THERAPY DISCHARGE NOTE
Outpatient Physical Therapy Ortho Treatment Note/Discharge Summary  [facility]     Patient Name: Ashley Motta  : 1940  MRN: [id number]  Today's Date: 2024      Visit Date: 2024    Visit Dx:    ICD-10-CM ICD-9-CM   1. Status post left knee replacement  Z96.652 V43.65   2. Orthopedic aftercare  Z47.89 V54.9   3. Decreased range of motion (ROM) of left knee  M25.662 719.56   4. Muscle weakness of lower extremity  M62.81 728.87   5. Difficulty walking  R26.2 719.7       Patient Active Problem List   Diagnosis    Osteopenia    Hyperlipidemia    Pre-diabetes    Osteoarthritis    BALDEMAR on CPAP    Pelvic relaxation due to vaginal prolapse    Chronic pain of both knees    Arthritis of right knee    Arthritis of left knee    Arthritis of both knees    Restless leg syndrome    Dizziness    Headache, migraine    Heartburn    Internal hemorrhoids with complication    Impacted cerumen of left ear    Vitamin E deficiency    Environmental and seasonal allergies    Chronic cough    Aftercare following joint replacement surgery    Allergic rhinitis, unspecified    Anxiety disorder, unspecified    Atopic dermatitis, unspecified    Constipation, unspecified    Localized osteoporosis without current pathological fracture    Gastro-esophageal reflux disease without esophagitis    Generalized muscle weakness    Iron deficiency anemia due to chronic blood loss    Personal history of other malignant neoplasm of skin    Presence of right artificial knee joint    Migraine, unspecified, not intractable, without status migrainosus    S/P total knee arthroplasty, left        Past Medical History:   Diagnosis Date    Anxiety     SITUATIONAL R/T DEATH OF HER SPOUSE    Balance problem     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 SARAH LIRIANO    Endothelial corneal dystrophy 2020    GERD (gastroesophageal reflux disease)     Goiter, nontoxic, multinodular 2017    THYROID POLYP SEES      Hyperlipidemia     Impaired fasting glucose     Left knee pain     Migraine     BALDEMAR on CPAP     FOLLOWED BY DR. TERRY CHEUNG    Osteoarthritis     Osteopenia     Rectal nodule 03/2020    REFERRED TO DR. PAMELA NAPOLES    Rectocele 07/2017    RLS (restless legs syndrome)     Seborrheic keratosis     Skin cancer 04/2015    LOWER LEG, FOLLOWED BY DR. ATUL SANCHEZ    Vertigo         Past Surgical History:   Procedure Laterality Date    CATARACT EXTRACTION, BILATERAL Bilateral 2015    COLONOSCOPY N/A 02/27/2015    1 TUBULAR ADENOMA [facility] DIST[facility]    COLONOSCOPY N/A 03/04/2020    10 MM SESSILE SERRATED ADENOMA POLYP IN ASCENDING, 3 MM HYPERPLASTIC POLYP IN RECTUM, 7 MM ANAL NODULE, DR. SARAH LIRIANO AT [facility]    HYSTERECTOMY N/A 01/19/2004    KAYLEE WITH BSO, DR. RAFIA MORTENSEN AT [facility]    KNEE ARTHROSCOPY Left 2013    KNEE ARTHROSCOPY Right 2010    MELISSA CULDOPLASTY N/A 01/19/2004    DR. RAFIA MORTENSEN AT [facility]    SACROCOLPOPEXY N/A     TOTAL KNEE ARTHROPLASTY Right 4/11/2023    Procedure: RIGHT TOTAL KNEE ARTHROPLASTY WITH SHERI NAVIGATION;  Surgeon: Atul Liriano MD;  Location: [facility] OR [facility] [place];  Service: Orthopedics;  Laterality: Right;    TOTAL KNEE ARTHROPLASTY Left 11/14/2023    Procedure: LEFT TOTAL KNEE ARTHROPLASTY WITH SHERI NAVIGATION;  Surgeon: Atul Liriano MD;  Location:  [facility];  Service: Orthopedics;  Laterality: Left;        PT Ortho       Row Name 01/30/24 1000       MMT Left Lower Ext    Lt Knee Extension MMT, Gross Movement (4+/5) good plus  -MH    Lt Knee Flexion MMT, Gross Movement (4/5) good  -MH              User Key  (r) = Recorded By, (t) = Taken By, (c) = Cosigned By      Initials Name Provider Type    Bebe Pedersen, PT Physical Therapist                                 PT Assessment/Plan       Row Name 01/30/24 1000          PT Assessment    Assessment Comments Ashley Motta was seen for 13 physical therapy sessions s/p L  TKA 11/14/2023. Treatment included therapeutic exercise, gait training, therapeutic activity, neuro-muscular retraining , and patient education with home exercise program . Progress to physical therapy goals was good as pt. Met 2/2 STGs and 4.5/5 LTGs. She has transitioned from ambulation with rwx to no AD, improving BRENDA with ambulation as well as increased gait speed over recent weeks. She is compliant with HEP and riding recumbent bike at gym. She demonstrates improved knee AROM 0-120 at last assessment, MMT 4+/5 knee extension, 4/5 knee flexion, and navigating stairs reciprocally. She denies any difficulty with performance of HEP and has tolerated reduction in frequency to 1x/week over last  weeks without regression. She was discharged to an independent HEP and provided patient education to self-manage condition  -        PT Plan    PT Plan Comments D/C  -               User Key  (r) = Recorded By, (t) = Taken By, (c) = Cosigned By      Initials Name Provider Type     Bebe Carpenter, PT Physical Therapist                         OP Exercises       Row Name 01/30/24 0900             Subjective    Subjective Comments no new complaints  -         Subjective Pain    Able to rate subjective pain? yes  -      Pre-Treatment Pain Level 3  -      Post-Treatment Pain Level 3  -         Total Minutes    93069 - PT Therapeutic Exercise Minutes 30  -      85342 -  PT Neuromuscular Reeducation Minutes 10  -MH         Exercise 1    Exercise Name 1 RCB  -      Cueing 1 Verbal  -      Reps 1 seat 6  -      Time 1 5 mins  -         Exercise 3    Exercise Name 3 knee flex at steps  -      Cueing 3 Verbal  -      Reps 3 5L  -      Time 3 20s  -         Exercise 4    Exercise Name 4 goals, outcome measure, objective measure update  -         Exercise 6    Exercise Name 6 heel raises  -      Cueing 6 Verbal;Demo  -      Sets 6 2  -MH      Reps 6 10  -MH      Time 6 on airex  -         Exercise  "14    Exercise Name 14 SLS  -      Cueing 14 Verbal;Demo  -      Sets 14 2 L  -      Reps 14 4e  -      Time 14 15s  -         Exercise 15    Exercise Name 15 Fwd step ups / lateral step up  -      Cueing 15 Verbal  -      Sets 15 2B  -      Reps 15 10  -      Time 15 6\" 1 UE FWD, B UE lateral  -      Additional Comments +  with forward  -                User Key  (r) = Recorded By, (t) = Taken By, (c) = Cosigned By      Initials Name Provider Type     Bebe Carpenter, PT Physical Therapist                                    PT OP Goals       Row Name 01/30/24 0900          PT Short Term Goals    STG Date to Achieve 01/04/24  -     STG 1 Pt will be independent with initial HEP to improve strength/ROM and decrease pain.  -     STG 1 Progress Met  [facility]     STG 2 Pt will improve L knee AROM from lacking 6-104 degrees to lacking 2-115 degrees to improve ability to navigate stairs.  -     STG 2 Progress Met  [facility]        Long Term Goals    LTG Date to Achieve 02/03/24  -     LTG 1 Pt will be independent with advance HEP to improve strength/ROM and decrease pain.  -     LTG 1 Progress Met  [facility]     LTG 1 Progress Comments reports compliance  -     LTG 2 Pt will improve L knee AROM from lacking 6-104 to at least 0-120 deg to improve ability to navigate stairs.  -     LTG 2 Progress Met  -     LTG 3 Pt will improve L knee flexion/extension strength to at least 4/5.  -     LTG 3 Progress Met  [facility]     LTG 3 Progress Comments see ortho  -     LTG 4 Pt will be able to ascend/descend stairs in a reciprocal pattern with no increase pain.  -     LTG 4 Progress Partially Met  [facility]     LTG 4 Progress Comments mild pain with descending  -     LTG 5 Pt will improve KOS score to 65% to show improved quality of life.  -     LTG 5 Progress Met  [facility]     LTG 5 Progress Comments 72.86  -               User Key  (r) = Recorded By, (t) = Taken By, (c) = Cosigned By      Initials Name " Provider Type     Bebe Carpenter PT Physical Therapist                    Therapy Education  Education Details: Updated HEP [id number]  Given: HEP  Program: Reinforced, Progressed  How Provided: Verbal, Demonstration, Written  Provided to: Patient  Level of Understanding: Verbalized, Demonstrated    Outcome Measure Options: Knee Outcome Score- ADL  Knee Outcome Survey Activities of Daily Living Scale  Symptoms: Pain: The symptom affects my activity slightly  Symptoms: Stiffness: The symptom affects my activity slightly  Symptoms: Swelling: I have the symptom, but it does not affect my activity  Symptoms: Giving way, buckling, or shifting of the knee: I have the symptom, but it does not affect my activity  Symptoms: Weakness: I have the symptom, but it does not affect my activity  Symptoms: Limping: I have the symptom, but it does not affect my activity  Functional Limitations with ADL's: Walk: Activity is minimally difficult  Functional Limitations with ADL's: Go up stairs: Activity is minimally difficult  Functional Limitations with ADL's: Go down stairs: Activity is minimally difficult  Functional Limitations with ADL's: Stand: Activity is minimally difficult  Functional Limitations with ADL's: Kneel on front of your knee: Activity is somewhat difficult  Functional Limitations with ADL's: Squat: Activity is somewhat difficult  Functional Limitations with ADL's: Sit with your knee bent: Activity is somewhat difficult  Functional Limitations with ADL's: Rise from a chair: Activity is minimally difficult  Knee Outcome Summary ADL's Score: 72.86 %      Time Calculation:   Start Time: 0938  Stop Time: 1018  Time Calculation (min): 40 min  Total Timed Code Minutes- PT: 40 minute(s)  Timed Charges  10608 - PT Therapeutic Exercise Minutes: 30  09281 -  PT Neuromuscular Reeducation Minutes: 10  Total Minutes  Timed Charges Total Minutes: 40   Total Minutes: 40  Therapy Charges for Today       Code Description Service  Date Service Provider Modifiers Qty    72993419333 HC PT THER PROC EA 15 MIN 1/30/2024 Bebe Carpenter, PT GP 2    61701675415 HC PT NEUROMUSC RE EDUCATION EA 15 MIN 1/30/2024 Bebe Carpenter, PT GP 1            PT G-Codes  Outcome Measure Options: Knee Outcome Score- ADL            Bebe Carpenter PT  1/30/2024

## 2024-02-19 ENCOUNTER — TELEPHONE (OUTPATIENT)
Dept: INTERNAL MEDICINE | Facility: CLINIC | Age: 84
End: 2024-02-19
Payer: MEDICARE

## 2024-02-19 DIAGNOSIS — M85.80 OSTEOPENIA, UNSPECIFIED LOCATION: ICD-10-CM

## 2024-02-19 DIAGNOSIS — E78.5 HYPERLIPIDEMIA, UNSPECIFIED HYPERLIPIDEMIA TYPE: Primary | ICD-10-CM

## 2024-02-19 DIAGNOSIS — R73.03 PRE-DIABETES: ICD-10-CM

## 2024-02-19 NOTE — TELEPHONE ENCOUNTER
Caller: Ashley Motta    Relationship: Self    Best call back number: [phone]    What orders are you requesting (i.e. lab or imaging): LABS FOR WELLNESS AND MAMMOGRAM    In what timeframe would the patient need to come in: BEFORE 3/1/2024    Where will you receive your lab/imaging services: PLEASE CALL PATIENT TO ADVISE WHERE THESE CAN BE COMPLETED

## 2024-02-19 NOTE — TELEPHONE ENCOUNTER
I put in the lab orders.  We will have to talk about the mammogram at her upcoming appointment since she is beyond the age of typical screening.

## 2024-02-22 ENCOUNTER — OFFICE VISIT (OUTPATIENT)
Dept: NEUROLOGY | Facility: CLINIC | Age: 84
End: 2024-02-22
Payer: MEDICARE

## 2024-02-22 VITALS
BODY MASS INDEX: 28.7 KG/M2 | DIASTOLIC BLOOD PRESSURE: 72 MMHG | WEIGHT: 162 LBS | HEART RATE: 88 BPM | SYSTOLIC BLOOD PRESSURE: 126 MMHG | HEIGHT: 63 IN | OXYGEN SATURATION: 98 %

## 2024-02-22 DIAGNOSIS — G62.9 NEUROPATHY: Primary | ICD-10-CM

## 2024-02-22 DIAGNOSIS — R42 DIZZINESS: ICD-10-CM

## 2024-02-22 DIAGNOSIS — R51.9 NONINTRACTABLE HEADACHE, UNSPECIFIED CHRONICITY PATTERN, UNSPECIFIED HEADACHE TYPE: ICD-10-CM

## 2024-02-22 PROCEDURE — 99213 OFFICE O/P EST LOW 20 MIN: CPT | Performed by: STUDENT IN AN ORGANIZED HEALTH CARE EDUCATION/TRAINING PROGRAM

## 2024-02-22 NOTE — LETTER
February 22, 2024       No Recipients    Patient: Ashley Motta   YOB: 1940   Date of Visit: 2/22/2024     Dear Tomás Martines MD:       Thank you for referring Ashley Motta to me for evaluation. Below are the relevant portions of my assessment and plan of care.    If you have questions, please do not hesitate to call me. I look forward to following Ashley along with you.         Sincerely,        Christiano Allan MD        CC:   No Recipients    Christiano Allan MD  02/22/24 1052  Sign when Signing Visit  Chief Complaint   Patient presents with   • Dizziness   • Headache       Patient ID: Ashley Motta is a 84 y.o. female.    HPI:    The following portions of the patient's history were reviewed and updated as appropriate: allergies, current medications, past family history, past medical history, past social history, past surgical history and problem list.    Interval history:  The patient is an 84-year-old female who presents to neurology clinic for follow-up of dizziness and headache. She was last seen in 02/2023. She was doing well at her last appointment. Her symptoms are consistent more with not migraine, but tension headaches or chronic daily headaches. I found that she had decreased vibration sense from the knees down, but they were not bothersome to her at her last appointment. She did not have any episodes of vertigo in the past.    Today, the patient reports she has been doing well over the last year. She had bilateral knee replacements in 04/2023 and 11/2023 and has recovered well. She has finished the therapy, but she is still working on the***(left?) knee. Her mobility has improved following the surgeries. She denies any falls since her last visit. She takes her 's walker when she goes to the park. She notes Dr. Rico advised the worst thing she could do is fall, so she ensures that she will not fall. She will see Dr. Rico in approximately 3 weeks for another checkup. She previously received  "injections in her knees for many years, but they eventually stopped working, so she underwent surgery.    She notes she has some numbness in her feet. Her hands do not feel numb, but if she \"does not watch herself,\" she will drop something. She is right-hand dominant, and she holds things primarily in her right hand. When she was getting out of her truck this morning, she dropped her keys. The patient completed lab work for neuropathy in the past, and overall, her labs were good. She is borderline diabetic but is not taking medication for it. The patient denies alcohol intake. She will see her primary care provider, Dr. Martines, on 03/01/2024.    She reports her headaches are doing \"okay.\" She still declines any prescription medicine and is tolerating them. She has \"low-grade\" headaches, but they are tolerable and do not occur every day.    She denies any episodes of vertigo in the last year. Occasionally when she wakes up in the morning and rolls over, she will be dizzy, but she stays in bed until it resolves. She notes she has learned to cope with it. She denies having any severe episodes or having to go to the hospital for this.    The patient states her neck is tense. She has nodules on her thyroid and follows with Dr. Welsh for this. She has a yearly ultrasound. She denies pain shooting down her arms or radiating. It has been 6 months since she had her last ultrasound, and there was no change.    She takes ropinirole before she goes to bed at night along with her cholesterol medicines. She has taken ropinirole since she was diagnosed with sleep apnea. She confirms she has restless legs syndrome. She follows with Dr. Harrison for her sleep apnea. She previously weaned off gabapentin.    She stopped taking Zofran. She was prescribed Eliquis following her knee surgery to prevent blood clots, but she is no longer taking this. She is not taking Norco.    Review of Systems   Musculoskeletal:  Negative for gait problem " (no falls).   Neurological:  Positive for dizziness. Negative for tremors, seizures, syncope, facial asymmetry, speech difficulty, weakness, light-headedness, numbness and headaches.          Vitals:    24 0946   BP: 126/72   Pulse: 88   SpO2: 98%       Neurologic Exam     Mental Status   Attention: normal. Concentration: normal.   Speech: speech is normal   Level of consciousness: alert    Cranial Nerves     CN II   Visual fields full to confrontation.     CN III, IV, VI   Pupils are equal, round, and reactive to light.  Extraocular motions are normal.     CN V   Facial sensation intact.     CN VII   Facial expression full, symmetric.     CN VIII   Hearing: intact    CN IX, X   Palate: symmetric    CN XI   Right trapezius strength: normal  Left trapezius strength: normal    CN XII   Tongue: not atrophic  Fasciculations: absent  Tongue deviation: none  Left pupil not perfectly round     Motor Exam     Strength   Right deltoid: 5/5  Left deltoid: 5/5  Right biceps: 5/5  Left biceps: 5/5  Right triceps: 5/5  Left triceps: 5/5  Right wrist flexion: 5/5  Left wrist flexion: 5/5  Right wrist extension: 5/5  Left wrist extension: 5/5  Right interossei: 5/5  Left interossei: 5/5  Right iliopsoas: 5/5  Left iliopsoas: 5/5  Right quadriceps: 5/5  Right hamstrin/5  Right anterior tibial: 5/5  Left anterior tibial: 5/5  Right gastroc: 5/5  Left gastroc: 5/5Grip 5 out of 5 bilaterally.. avoided left knee due to recent surgery     Sensory Exam   Right arm light touch: normal  Left arm light touch: normal  Right leg light touch: normal  Left leg light touch: normal  Right arm vibration: normal  Left arm vibration: normal  Right leg vibration: decreased from knee  Left leg vibration: decreased from knee  Decreased from knees down, worse around right knee scar     Gait, Coordination, and Reflexes     Coordination   Finger to nose coordination: normal      Physical Exam  Eyes:      Extraocular Movements: EOM normal.       "Pupils: Pupils are equal, round, and reactive to light.   Neurological:      Coordination: Finger-Nose-Finger Test normal.   Psychiatric:         Speech: Speech normal.       Procedures    Assessment/Plan:         There are no diagnoses linked to this encounter.       Christiano Allan MD      We discussed that in case her dizziness is due to blow flow, I would recommend she have a glass of water or a bottle of water at her bedside so if she feels this way in the morning, she can try and drink as much as she can.    The patient is still tolerating her headaches and declines prescription medication.    She will call the clinic to notify me if she notices worsening of her  strength prior to her next appointment.     We discussed her increased blood glucose is likely what is causing the nerve damage. I recommended she discuss this with her primary care provider, Dr. Martines. Her examination is stable overall, and her neuropathy is not causing her a lot of day-to-day issues.    In regard to her neck, I recommended she discuss this with Dr. Martines. We discussed she may have some wear and tear of the neck versus muscle tightness. She is not showing any signs of large numbness or weakness on her exam that would make me concerned that it is compressing the spine in a major way. If she notices increasing weakness in her hands or arms, we will image the neck with an MRI. I also recommended that she monitors for worsening neck pain, especially pain that shoots down her arms. She was advised to take Tylenol.     She was recommended to ask Dr. Martines if she can cut her ropinirole dose in half, and if her symptoms do not come back, to keep going down until she gets off the medicine.     I spent 20 minutes in the care of this patient today. She will follow up in 1 year.    Transcribed from ambient dictation for Christiano Allan MD by Naila Perez.  02/22/24   12:26 EST    {ANAT Provider Statement:54051::\"Patient or patient representative " "verbalized consent to the visit recording.\",\"I have personally performed the services described in this document as transcribed by the above individual, and it is both accurate and complete.\"}     "

## 2024-02-22 NOTE — PROGRESS NOTES
"Chief Complaint   Patient presents with    Dizziness    Headache       Patient ID: Ashley Motta is a 84 y.o. female.    HPI:    The following portions of the patient's history were reviewed and updated as appropriate: allergies, current medications, past family history, past medical history, past social history, past surgical history and problem list.    Interval history:  The patient is an 84-year-old female who presents to neurology clinic for follow-up of dizziness and headache. She was last seen in 02/2023. She was doing well at her last appointment. Her symptoms are consistent more with not migraine, but tension headaches or chronic daily headaches. I found that she had decreased vibration sense from the knees down, but they were not bothersome to her at her last appointment. She did not have any episodes of vertigo in the past.    Today, the patient reports she has been doing well over the last year. She had bilateral knee replacements in 04/2023 and 11/2023 and has recovered well. She has finished the therapy, but she is still working on her knee. Her mobility has improved following the surgeries. She denies any falls since her last visit. She takes her 's walker when she goes to the park. She notes Dr. Rico advised the worst thing she could do is fall, so she ensures that she will not fall. She will see Dr. Rico in approximately 3 weeks for another checkup. She previously received injections in her knees for many years, but they eventually stopped working, so she underwent surgery.    She notes she has some numbness in her feet. Her hands do not feel numb, but if she \"does not watch herself,\" she will drop something. She is right-hand dominant, and she holds things primarily in her right hand. When she was getting out of her truck this morning, she dropped her keys. The patient completed lab work for neuropathy in the past, and overall, her labs were good. She is borderline diabetic but is not taking " "medication for it. The patient denies alcohol intake. She will see her primary care provider, Dr. Martines, on 03/01/2024.    She reports her headaches are doing \"okay.\" She still declines any prescription medicine and is tolerating them. She has \"low-grade\" headaches, but they are tolerable and do not occur every day.    She denies any episodes of vertigo in the last year. Occasionally when she wakes up in the morning and rolls over, she will be dizzy, but she stays in bed until it resolves. She notes she has learned to cope with it. She denies having any severe episodes or having to go to the hospital for this.    The patient states her neck is tense. She has nodules on her thyroid and follows with Dr. Welsh for this. She has a yearly ultrasound. She denies pain shooting down her arms or radiating. It has been 6 months since she had her last ultrasound, and there was no change.    She takes ropinirole before she goes to bed at night along with her cholesterol medicines. She has taken ropinirole since she was diagnosed with sleep apnea. She confirms she has restless legs syndrome. She follows with Dr. Harrison for her sleep apnea. She previously weaned off gabapentin.    She stopped taking Zofran. She was prescribed Eliquis following her knee surgery to prevent blood clots, but she is no longer taking this. She is not taking Norco.    Review of Systems   Musculoskeletal:  Negative for gait problem (no falls).   Neurological:  Positive for dizziness. Negative for tremors, seizures, syncope, facial asymmetry, speech difficulty, weakness, light-headedness, numbness and headaches.          Vitals:    02/22/24 0946   BP: 126/72   Pulse: 88   SpO2: 98%       Neurologic Exam     Mental Status   Attention: normal. Concentration: normal.   Speech: speech is normal   Level of consciousness: alert    Cranial Nerves     CN II   Visual fields full to confrontation.     CN III, IV, VI   Pupils are equal, round, and reactive to " light.  Extraocular motions are normal.     CN V   Facial sensation intact.     CN VII   Facial expression full, symmetric.     CN VIII   Hearing: intact    CN IX, X   Palate: symmetric    CN XI   Right trapezius strength: normal  Left trapezius strength: normal    CN XII   Tongue: not atrophic  Fasciculations: absent  Tongue deviation: none  Left pupil not perfectly round     Motor Exam     Strength   Right deltoid: 5/5  Left deltoid: 5/5  Right biceps: 5/5  Left biceps: 5/5  Right triceps: 5/5  Left triceps: 5/5  Right wrist flexion: 5/5  Left wrist flexion: 5/5  Right wrist extension: 5/5  Left wrist extension: 5/5  Right interossei: 5/5  Left interossei: 5/5  Right iliopsoas: 5/5  Left iliopsoas: 5/5  Right quadriceps: 5/5  Right hamstrin/5  Right anterior tibial: 5/5  Left anterior tibial: 5/5  Right gastroc: 5/5  Left gastroc: 5/5Grip 5 out of 5 bilaterally.. avoided left knee due to recent surgery     Sensory Exam   Right arm light touch: normal  Left arm light touch: normal  Right leg light touch: normal  Left leg light touch: normal  Right arm vibration: normal  Left arm vibration: normal  Right leg vibration: decreased from knee  Left leg vibration: decreased from knee  Decreased from knees down, worse around right knee scar     Gait, Coordination, and Reflexes     Coordination   Finger to nose coordination: normal      Physical Exam  Eyes:      Extraocular Movements: EOM normal.      Pupils: Pupils are equal, round, and reactive to light.   Neurological:      Coordination: Finger-Nose-Finger Test normal.   Psychiatric:         Speech: Speech normal.       Procedures    Assessment/Plan:         There are no diagnoses linked to this encounter.       Christiano Allan MD      We discussed that in case her dizziness is due to low flow and orthostasis, I would recommend she have a glass of water or a bottle of water at her bedside so if she feels this way in the morning, she can try and drink as much as she  can.    The patient is still tolerating her headaches and declines prescription medication.    She will call the clinic to notify me if she notices worsening of her  strength prior to her next appointment.     We discussed her increased blood glucose is likely what is causing the nerve damage. I recommended she discuss this with her primary care provider, Dr. Martines. Her examination is stable overall, and her neuropathy is not causing her a lot of day-to-day issues.  We discussed she may have some wear and tear of the neck versus muscle tightness. She is not showing any signs of large numbness or weakness on her exam that would make me concerned that it is compressing the spine in a major way. If she notices increasing weakness in her hands or arms, we will image the neck with an MRI. I also recommended that she monitors for worsening neck pain, especially pain that shoots down her arms. She was advised to take Tylenol.     She was recommended to ask Dr. Martines if her medication regiment can be simplified.    I spent 20 minutes in the care of this patient today. She will follow up in 1 year.    Transcribed from ambient dictation for Christiano Allan MD by Naila Perez.  02/22/24   12:26 EST    Patient or patient representative verbalized consent to the visit recording.  I have personally performed the services described in this document as transcribed by the above individual, and it is both accurate and complete.  Christiano Allan MD  3/7/2024  00:50 EST   I spent 20 minutes caring for this patient on this date of service. This time includes time spent by me in the following activities as necessary: preparing for the visit, reviewing tests, medical records and previous visits, obtaining and/or reviewing a separately obtained history, performing a medically appropriate exam and/or evaluation, counseling and educating the patient, and/or communicating with other healthcare professionals, documenting information in the  medical record, independently interpreting results and communicating that information with the patient, and developing a medically appropriate treatment plan with consideration of other conditions, medications, and treatments.

## 2024-03-01 ENCOUNTER — OFFICE VISIT (OUTPATIENT)
Dept: INTERNAL MEDICINE | Facility: CLINIC | Age: 84
End: 2024-03-01
Payer: MEDICARE

## 2024-03-01 VITALS
OXYGEN SATURATION: 98 % | DIASTOLIC BLOOD PRESSURE: 80 MMHG | WEIGHT: 160 LBS | BODY MASS INDEX: 28.35 KG/M2 | HEIGHT: 63 IN | SYSTOLIC BLOOD PRESSURE: 120 MMHG | RESPIRATION RATE: 18 BRPM | HEART RATE: 76 BPM

## 2024-03-01 DIAGNOSIS — E78.2 MIXED HYPERLIPIDEMIA: Chronic | ICD-10-CM

## 2024-03-01 DIAGNOSIS — Z00.00 MEDICARE ANNUAL WELLNESS VISIT, SUBSEQUENT: Primary | ICD-10-CM

## 2024-03-01 DIAGNOSIS — Z86.010 PERSONAL HISTORY OF COLONIC POLYPS: ICD-10-CM

## 2024-03-01 DIAGNOSIS — G25.81 RESTLESS LEG SYNDROME: Chronic | ICD-10-CM

## 2024-03-01 DIAGNOSIS — R73.03 PREDIABETES: Chronic | ICD-10-CM

## 2024-03-01 RX ORDER — ROPINIROLE 0.5 MG/1
1.5 TABLET, FILM COATED ORAL NIGHTLY
Qty: 270 TABLET | Refills: 1 | Status: SHIPPED | OUTPATIENT
Start: 2024-03-01

## 2024-03-01 NOTE — PROGRESS NOTES
The ABCs of the Annual Wellness Visit  Subsequent Medicare Wellness Visit    Subjective    Ashley Motta is a 84 y.o. female who presents for a Subsequent Medicare Wellness Visit.    The following portions of the patient's history were reviewed and   updated as appropriate: allergies, current medications, past family history, past medical history, past social history, past surgical history, and problem list.    Compared to one year ago, the patient feels her physical   health is the same.    Compared to one year ago, the patient feels her mental   health is the same.    Recent Hospitalizations:  This patient has had a [facility] admission record on file within the last 365 days.    Current Medical Providers:  Patient Care Team:  Tomás Martines MD as PCP - General (Family Medicine)    Outpatient Medications Prior to Visit   Medication Sig Dispense Refill    amoxicillin (AMOXIL) 500 MG capsule Four pills an hour before dental work 20 capsule 1    pantoprazole (Protonix) 20 MG EC tablet Take 1 tablet by mouth Daily. Before dinner 90 tablet 4    simvastatin (ZOCOR) 20 MG tablet Take 1 tablet by mouth Every Night. 90 tablet 3    sodium chloride (MICHEL 128) 5 % ophthalmic solution Administer 1 drop to both eyes 2 (Two) Times a Day.      rOPINIRole (REQUIP) 1 MG tablet TAKE 1 TABLET BY MOUTH ONE HOUR BEFORE BEDTIME 90 tablet 4     No facility-administered medications prior to visit.       No opioid medication identified on active medication list. I have reviewed chart for other potential  high risk medication/s and harmful drug interactions in the elderly.        Aspirin is not on active medication list.  Aspirin use is not indicated based on review of current medical condition/s. Risk of harm outweighs potential benefits.  .    Patient Active Problem List   Diagnosis    Osteopenia    Hyperlipidemia    Prediabetes    Osteoarthritis    BALDEMAR on CPAP    Pelvic relaxation due to vaginal prolapse    Chronic pain of both  "knees    Arthritis of right knee    Arthritis of left knee    Arthritis of both knees    Restless leg syndrome    Dizziness    Headache, migraine    Heartburn    Internal hemorrhoids with complication    Impacted cerumen of left ear    Vitamin E deficiency    Environmental and seasonal allergies    Chronic cough    Aftercare following joint replacement surgery    Allergic rhinitis, unspecified    Anxiety disorder, unspecified    Atopic dermatitis, unspecified    Constipation, unspecified    Localized osteoporosis without current pathological fracture    Gastro-esophageal reflux disease without esophagitis    Generalized muscle weakness    Iron deficiency anemia due to chronic blood loss    Personal history of other malignant neoplasm of skin    Presence of right artificial knee joint    Migraine, unspecified, not intractable, without status migrainosus    S/P total knee arthroplasty, left     Advance Care Planning   Advance Care Planning     Advance Directive is on file.  ACP discussion was held with the patient during this visit. Patient has an advance directive in EMR which is still valid.      Objective    Vitals:    03/01/24 1238   BP: 120/80   BP Location: Left arm   Patient Position: Sitting   Cuff Size: Small Adult   Pulse: 76   Resp: 18   SpO2: 98%   Weight: 72.6 kg (160 lb)   Height: 160 cm (63\")     Estimated body mass index is 28.34 kg/m² as calculated from the following:    Height as of this encounter: 160 cm (63\").    Weight as of this encounter: 72.6 kg (160 lb).           Does the patient have evidence of cognitive impairment? No    Lab Results   Component Value Date    CHLPL 115 02/26/2024    TRIG 98 02/26/2024    HDL 50 02/26/2024    LDL 47 02/26/2024    VLDL 18 02/26/2024    HGBA1C 6.30 (H) 02/26/2024        HEALTH RISK ASSESSMENT    Smoking Status:  Social History     Tobacco Use   Smoking Status Never    Passive exposure: Never   Smokeless Tobacco Never     Alcohol Consumption:  Social History "     Substance and Sexual Activity   Alcohol Use No     Fall Risk Screen:    MILDREDADI Fall Risk Assessment was completed, and patient is at LOW risk for falls.Assessment completed on:3/1/2024    Depression Screening:      3/1/2024    12:40 PM   PHQ-2/PHQ-9 Depression Screening   Little Interest or Pleasure in Doing Things 0-->not at all   Feeling Down, Depressed or Hopeless 0-->not at all   PHQ-9: Brief Depression Severity Measure Score 0       Health Habits and Functional and Cognitive Screening:      3/1/2024    12:39 PM   Functional & Cognitive Status   Do you have difficulty preparing food and eating? No   Do you have difficulty bathing yourself, getting dressed or grooming yourself? No   Do you have difficulty using the toilet? No   Do you have difficulty moving around from place to place? No   Do you have trouble with steps or getting out of a bed or a chair? No   Current Diet Well Balanced Diet   Dental Exam Up to date   Eye Exam Up to date   Exercise (times per week) 2 times per week   Current Exercises Include Home Exercise Program (TV, Computer, Etc.);Walking   Do you need help using the phone?  No   Are you deaf or do you have serious difficulty hearing?  No   Do you need help to go to places out of walking distance? No   Do you need help shopping? No   Do you need help preparing meals?  No   Do you need help with housework?  No   Do you need help with laundry? No   Do you need help taking your medications? No   Do you need help managing money? No   Do you ever drive or ride in a car without wearing a seat belt? No   Have you felt unusual stress, anger or loneliness in the last month? No   Who do you live with? Alone   If you need help, do you have trouble finding someone available to you? No   Have you been bothered in the last four weeks by sexual problems? No   Do you have difficulty concentrating, remembering or making decisions? No       Age-appropriate Screening Schedule:  Refer to the list below for  future screening recommendations based on patient's age, sex and/or medical conditions. Orders for these recommended tests are listed in the plan section. The patient has been provided with a written plan.    Health Maintenance   Topic Date Due    ANNUAL WELLNESS VISIT  08/16/2023    BMI FOLLOWUP  11/16/2024    LIPID PANEL  02/26/2025    DXA SCAN  02/28/2025    TDAP/TD VACCINES (3 - Td or Tdap) 06/01/2032    COVID-19 Vaccine  Completed    RSV Vaccine - Adults  Completed    INFLUENZA VACCINE  Completed    Pneumococcal Vaccine 65+  Completed    ZOSTER VACCINE  Completed    COLORECTAL CANCER SCREENING  Discontinued                  CMS Preventative Services Quick Reference  Risk Factors Identified During Encounter  Immunizations Discussed/Encouraged: Influenza and COVID19  The above risks/problems have been discussed with the patient.  Pertinent information has been shared with the patient in the After Visit Summary.  An After Visit Summary and PPPS were made available to the patient.    Follow Up:   Next Medicare Wellness visit to be scheduled in 1 year.       Additional E&M Note during same encounter follows:  Patient has multiple medical problems which are significant and separately identifiable that require additional work above and beyond the Medicare Wellness Visit.      Chief Complaint  Medicare Wellness-subsequent    Subjective        HPI  Ashley Motta is also being seen today for prediabetes, hyperlipidemia and RLS.  She is taking her meds as prescribed below.  Completed labs 2/24/2024 and we went over the results today as below.  Her restless leg has not been as controlled as hoped.  She is on the 1 mg of ropinirole and has not ever increase the dose since then.      Current Outpatient Medications:     amoxicillin (AMOXIL) 500 MG capsule, Four pills an hour before dental work, Disp: 20 capsule, Rfl: 1    pantoprazole (Protonix) 20 MG EC tablet, Take 1 tablet by mouth Daily. Before dinner, Disp: 90 tablet,  "Rfl: 4    rOPINIRole (REQUIP) 0.5 MG tablet, Take 3 tablets by mouth Every Night. Take 2 hours before bedtime., Disp: 270 tablet, Rfl: 1    simvastatin (ZOCOR) 20 MG tablet, Take 1 tablet by mouth Every Night., Disp: 90 tablet, Rfl: 3    sodium chloride (MICHEL 128) 5 % ophthalmic solution, Administer 1 drop to both eyes 2 (Two) Times a Day., Disp: , Rfl:            Objective   Vital Signs:  /80 (BP Location: Left arm, Patient Position: Sitting, Cuff Size: Small Adult)   Pulse 76   Resp 18   Ht 160 cm (63\")   Wt 72.6 kg (160 lb)   SpO2 98%   BMI 28.34 kg/m²     Physical Exam  Vitals and nursing note reviewed.   Constitutional:       General: She is not in acute distress.     Appearance: Normal appearance.   Cardiovascular:      Rate and Rhythm: Normal rate and regular rhythm.      Heart sounds: Normal heart sounds. No murmur heard.  Pulmonary:      Effort: Pulmonary effort is normal.      Breath sounds: Normal breath sounds.   Neurological:      Mental Status: She is alert.          The following data was reviewed by: Tomás Martines MD on 03/01/2024:  Common labs          11/15/2023    03:23 11/16/2023    04:44 11/16/2023    11:26 2/26/2024    08:40   Common Labs   Glucose   106  114    BUN   18  15    Creatinine   0.83  0.75    Sodium   138  140    Potassium   4.7  4.6    Chloride   105  104    Calcium   8.9  9.5    Total Protein    6.7    Albumin   4.1  4.4    Total Bilirubin   0.3  0.3    Alkaline Phosphatase   78  92    AST (SGOT)   24  30    ALT (SGPT)   17  30    WBC   10.41  7.48    Hemoglobin 12.8  12.5  13.2  14.4    Hematocrit 38.2  37.8  40.2  42.5    Platelets   290  303    Total Cholesterol    115    Triglycerides    98    HDL Cholesterol    50    LDL Cholesterol     47    Hemoglobin A1C    6.30                 Assessment and Plan   Diagnoses and all orders for this visit:    1. Medicare annual wellness visit, subsequent (Primary)    2. Prediabetes  -     Comprehensive Metabolic Panel; " Future  -     Hemoglobin A1c; Future    3. Mixed hyperlipidemia  -     Comprehensive Metabolic Panel; Future  -     Lipid Panel With LDL / HDL Ratio; Future    4. Restless leg syndrome  -     rOPINIRole (REQUIP) 0.5 MG tablet; Take 3 tablets by mouth Every Night. Take 2 hours before bedtime.  Dispense: 270 tablet; Refill: 1    5. Personal history of colonic polyps  -     Ambulatory Referral For Screening Colonoscopy        Clinically stable chronic conditions as above except for the restless leg syndrome which sounds like it is not very well-controlled.  Continue all medications as above with the exception of increasing the ropinirole to 1.5 mg tablet per day.  If she is not getting some relief within a few weeks, she can contact me and I will increase her to 2 mg.  Labs reviewed/ordered as above.           Follow Up   Return in about 6 months (around 9/9/2024) for Next scheduled follow up.  Patient was given instructions and counseling regarding her condition or for health maintenance advice. Please see specific information pulled into the AVS if appropriate.

## 2024-03-04 ENCOUNTER — OFFICE VISIT (OUTPATIENT)
Dept: ORTHOPEDIC SURGERY | Facility: CLINIC | Age: 84
End: 2024-03-04
Payer: MEDICARE

## 2024-03-04 VITALS — TEMPERATURE: 97.5 F | HEIGHT: 63 IN | BODY MASS INDEX: 28.21 KG/M2 | WEIGHT: 159.2 LBS

## 2024-03-04 DIAGNOSIS — R52 PAIN: Primary | ICD-10-CM

## 2024-03-04 DIAGNOSIS — Z96.653 STATUS POST TOTAL BILATERAL KNEE REPLACEMENT: ICD-10-CM

## 2024-03-04 PROCEDURE — 73562 X-RAY EXAM OF KNEE 3: CPT | Performed by: ORTHOPAEDIC SURGERY

## 2024-03-04 PROCEDURE — 99213 OFFICE O/P EST LOW 20 MIN: CPT | Performed by: ORTHOPAEDIC SURGERY

## 2024-03-04 RX ORDER — MULTIPLE VITAMINS W/ MINERALS TAB 9MG-400MCG
1 TAB ORAL DAILY
COMMUNITY

## 2024-03-04 NOTE — PROGRESS NOTES
Patient Name: Ashley Motta   YOB: 1940  Referring Primary Care Physician: Tomás Martines MD  BMI: Body mass index is 28.2 kg/m².    Chief Complaint:    Chief Complaint   Patient presents with    Left Knee - Follow-up, Pain        HPI:     Ashley Motta is a 84 y.o. female who presents today for evaluation of   Chief Complaint   Patient presents with    Left Knee - Follow-up, Pain   .  Patient follows up today status post left total knee on November 14.  She had a right total knee in April 2023.  She is doing very well with both of them.  Exam today shows 0 to about 125 degrees of range of motion healed scars with good stability she can transfer and walk well.      Subjective   Medications:   Home Medications:  Current Outpatient Medications on File Prior to Visit   Medication Sig    amoxicillin (AMOXIL) 500 MG capsule Four pills an hour before dental work    multivitamin with minerals tablet tablet Take 1 tablet by mouth Daily.    pantoprazole (Protonix) 20 MG EC tablet Take 1 tablet by mouth Daily. Before dinner    rOPINIRole (REQUIP) 0.5 MG tablet Take 3 tablets by mouth Every Night. Take 2 hours before bedtime.    simvastatin (ZOCOR) 20 MG tablet Take 1 tablet by mouth Every Night.    sodium chloride (MICHEL 128) 5 % ophthalmic solution Administer 1 drop to both eyes 2 (Two) Times a Day.     No current facility-administered medications on file prior to visit.     Current Medications:  Scheduled Meds:  Continuous Infusions:No current facility-administered medications for this visit.    PRN Meds:.    I have reviewed the patient's medical history in detail and updated the computerized patient record.  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 TERRY CHEUNG    Osteoarthritis     Osteopenia     Rectal nodule 03/2020    REFERRED TO DR. PAMELA NAPOLES    Rectocele 07/2017    RLS (restless legs syndrome)     Seborrheic keratosis     Skin cancer 04/2015    LOWER LEG, FOLLOWED BY DR. ATUL SANCHEZ    Vertigo         Past Surgical History:   Procedure Laterality Date    CATARACT EXTRACTION, BILATERAL Bilateral 2015    COLONOSCOPY N/A 02/27/2015    1 TUBULAR ADENOMA POLYP IN DISTAL [facility]ENDING[facility]    COLONOSCOPY N/A 03/04/2020    10 MM SESSILE SERRATED ADENOMA POLYP IN ASCENDING, 3 MM HYPERPLASTIC POLYP IN RECTUM, 7 MM ANAL NODULE, DR. SARAH LIRIANO AT [facility]    HYSTERECTOMY N/A 01/19/2004    KAYLEE WITH BSO, DR. RAFIA MORTENSEN AT [facility]    KNEE ARTHROSCOPY Left 2013    KNEE ARTHROSCOPY Right 2010    MELISSA CULDOPLASTY N/A 01/19/2004    DR. RAFIA MORTENSEN AT [facility]    SACROCOLPOPEXY N/A     TOTAL KNEE ARTHROPLASTY Right 4/11/2023    Procedure: RIGHT TOTAL KNEE ARTHROPLASTY WITH SHERI NAVIGATION;  Surgeon: Atul Liriano MD;  Location: [facility] OR [facility];  Service: Orthopedics;  Laterality: Right;    TOTAL KNEE ARTHROPLASTY Left 11/14/2023    Procedure: LEFT TOTAL KNEE ARTHROPLASTY WITH SHERI NAVIGATION;  Surgeon: Atul Liriano MD;  Location: [facility] OR [facility];  Service: Orthopedics;  Laterality: Left;        Social History     Occupational History    Not on file   Tobacco Use    Smoking status: Never     Passive exposure: Never    Smokeless tobacco: Never   Vaping Use    Vaping status: Never Used   Substance and Sexual Activity    Alcohol use: No    Drug use: Never    Sexual activity: Not Currently     Birth control/protection: Post-menopausal, Surgical      Social History     Social History Narrative    Not on file       "  Family History   Problem Relation Age of Onset    Heart disease Mother     Lung cancer Father     Hypertension Father     Heart disease Father     Cancer Father     Heart attack Brother     Emphysema Brother     COPD Brother     Depression Brother     Heart attack Brother         Had stents placed 2009    Breast cancer Neg Hx     Malig Hyperthermia Neg Hx        ROS: 14 point review of systems was performed and all other systems were reviewed and are negative except for documented findings in HPI and today's encounter.     Allergies: No Known Allergies  Constitutional:  Denies fever, shaking or chills   Eyes:  Denies change in visual acuity   HENT:  Denies nasal congestion or sore throat   Respiratory:  Denies cough or shortness of breath   Cardiovascular:  Denies chest pain or severe LE edema   GI:  Denies abdominal pain, nausea, vomiting, bloody stools or diarrhea   Musculoskeletal:  Numbness, tingling, pain, or loss of motor function only as noted above in history of present illness.  : Denies painful urination or hematuria  Integument:  Denies rash, lesion or ulceration   Neurologic:  Denies headache or focal weakness  Endocrine:  Denies lymphadenopathy  Psych:  Denies confusion or change in mental status   Hem:  Denies active bleeding    OBJECTIVE:  Physical Exam: 84 y.o. female  Wt Readings from Last 3 Encounters:   03/04/24 72.2 kg (159 lb 3.2 oz)   03/01/24 72.6 kg (160 lb)   02/22/24 73.5 kg (162 lb)     Ht Readings from Last 1 Encounters:   03/04/24 160 cm (63\")     Body mass index is 28.2 kg/m².  Vitals:    03/04/24 1033   Temp: 97.5 °F (36.4 °C)     Vital signs reviewed.     General Appearance:    Alert, cooperative, in no acute distress                  Eyes: conjunctiva clear  ENT: external ears and nose atraumatic  CV: no peripheral edema  Resp: normal respiratory effort  Skin: no rashes or wounds; normal turgor  Psych: mood and affect appropriate  Lymph: no nodes appreciated  Neuro: gross " sensation intact  Vascular:  Palpable peripheral pulse in noted extremity  Musculoskeletal Extremities: See above    Radiology:   AP lateral sunrise view left knee taken the office today for postop follow-up with comparison view shows well aligned total knee replacements bilaterally        Assessment:     ICD-10-CM ICD-9-CM   1. Pain  R52 780.96   2. Status post total bilateral knee replacement  Z96.653 V43.65        MDM/Plan:   The diagnosis(es), natural history, pathophysiology and treatment for diagnosis(es) were discussed. Opportunity given and questions answered.  Biomechanics of pertinent body areas discussed.  When appropriate, the use of ambulatory aids discussed.    Biomechanics of pertinent body areas discussed.  When appropriate, the use of ambulatory aids discussed.  HOME EXERCISE/PT program encouraged  TOTAL JOINT recommendations and precautions, including antibiotic prophylaxis discussed. Advice given and questions answered.   MEDICAL RECORDS reviewed from other provider(s) for past and current medical history pertinent to this complaint.      3/4/2024    Dictated utilizing Dragon dictation

## 2024-03-06 ENCOUNTER — TELEPHONE (OUTPATIENT)
Dept: INTERNAL MEDICINE | Facility: CLINIC | Age: 84
End: 2024-03-06
Payer: MEDICARE

## 2024-03-06 NOTE — TELEPHONE ENCOUNTER
Caller: Ashley Motta    Relationship: Self    Best call back number: [phone]    Which medication are you concerned about: rOPINIRole (REQUIP) 0.5 MG tablet     Who prescribed you this medication: DR HAY     What are your concerns: PATIENT WOULD LIKE TO WEAN OFF OF MEDICATION DUE TO SIDE EFFECTS   PLEASE ADVISE

## 2024-03-06 NOTE — TELEPHONE ENCOUNTER
I would make her a follow-up so we can discuss what symptoms she is having and we can come up with a tapering schedule for the drug.

## 2024-03-08 ENCOUNTER — OFFICE VISIT (OUTPATIENT)
Dept: INTERNAL MEDICINE | Facility: CLINIC | Age: 84
End: 2024-03-08
Payer: MEDICARE

## 2024-03-08 VITALS
SYSTOLIC BLOOD PRESSURE: 122 MMHG | WEIGHT: 159 LBS | HEART RATE: 87 BPM | HEIGHT: 63 IN | OXYGEN SATURATION: 98 % | DIASTOLIC BLOOD PRESSURE: 78 MMHG | RESPIRATION RATE: 18 BRPM | BODY MASS INDEX: 28.17 KG/M2

## 2024-03-08 DIAGNOSIS — M62.838 TRAPEZIUS MUSCLE SPASM: ICD-10-CM

## 2024-03-08 DIAGNOSIS — G25.81 RESTLESS LEG SYNDROME: Primary | Chronic | ICD-10-CM

## 2024-03-08 RX ORDER — ROPINIROLE 0.25 MG/1
0.25 TABLET, FILM COATED ORAL NIGHTLY
Qty: 7 TABLET | Refills: 0 | Status: SHIPPED | OUTPATIENT
Start: 2024-03-08 | End: 2024-03-15

## 2024-03-08 NOTE — PROGRESS NOTES
"Chief Complaint  Follow-up and Neck Pain (Right side )    Subjective        Ashley Motta presents to [facility]  History of Present Illness    She is here for her restless leg syndrome.  She says that years ago her previous primary care started her on ropinirole after complaining of symptoms which sounded like restless leg.  She has been taking the medication for a long time and feels like her restless leg is not a problem anymore.  It was more of an issue before she was put on CPAP for her sleep apnea and now would like to get off of the medication.    She is also concerned about some right-sided neck pain that she has been having.  This neck pain will sometimes trigger headaches.      Current Outpatient Medications:     amoxicillin (AMOXIL) 500 MG capsule, Four pills an hour before dental work, Disp: 20 capsule, Rfl: 1    multivitamin with minerals tablet tablet, Take 1 tablet by mouth Daily., Disp: , Rfl:     pantoprazole (Protonix) 20 MG EC tablet, Take 1 tablet by mouth Daily. Before dinner, Disp: 90 tablet, Rfl: 4    rOPINIRole (REQUIP) 0.25 MG tablet, Take 1 tablet by mouth Every Night for 7 days. Take 2 hours before bedtime., Disp: 7 tablet, Rfl: 0    simvastatin (ZOCOR) 20 MG tablet, Take 1 tablet by mouth Every Night., Disp: 90 tablet, Rfl: 3    sodium chloride (MICHEL 128) 5 % ophthalmic solution, Administer 1 drop to both eyes 2 (Two) Times a Day., Disp: , Rfl:       Objective   Vital Signs:  /78 (BP Location: Left arm, Patient Position: Sitting, Cuff Size: Small Adult)   Pulse 87   Resp 18   Ht 160 cm (63\")   Wt 72.1 kg (159 lb)   SpO2 98%   BMI 28.17 kg/m²   Estimated body mass index is 28.17 kg/m² as calculated from the following:    Height as of this encounter: 160 cm (63\").    Weight as of this encounter: 72.1 kg (159 lb).               Physical Exam  Vitals and nursing note reviewed.   Constitutional:       Appearance: Normal appearance. She is not " ill-appearing.   Musculoskeletal:      Cervical back: Spasms and tenderness present. No swelling, deformity or bony tenderness. No pain with movement. Decreased range of motion.        Result Review :                     Assessment and Plan     Diagnoses and all orders for this visit:    1. Restless leg syndrome (Primary)  -     rOPINIRole (REQUIP) 0.25 MG tablet; Take 1 tablet by mouth Every Night for 7 days. Take 2 hours before bedtime.  Dispense: 7 tablet; Refill: 0    2. Trapezius muscle spasm  -     Ambulatory Referral to Physical Therapy Evaluate and treat    I am fine with trying to get off of the ropinirole.  I wrote her a taper to take the 1 mg dose nightly for 3 days.  Then take 0.5 mg nightly for 5 days.  Then take 0.25 mg for 5 to 7 days and then stop.    It looks like she has a trapezius muscle spasm and possibly also has some spasm around her scalene muscles.  I will write her for some physical therapy to see if they can work through those spasms and tight muscles.  This should also help with her headache.         Follow Up     Return in about 6 months (around 9/9/2024).  Patient was given instructions and counseling regarding her condition or for health maintenance advice. Please see specific information pulled into the AVS if appropriate.

## 2024-04-15 ENCOUNTER — TREATMENT (OUTPATIENT)
Age: 84
End: 2024-04-15
Payer: MEDICARE

## 2024-04-15 DIAGNOSIS — M62.838 TRAPEZIUS MUSCLE SPASM: Primary | ICD-10-CM

## 2024-04-15 DIAGNOSIS — R29.898 DECREASED ROM OF NECK: ICD-10-CM

## 2024-04-15 PROCEDURE — 97140 MANUAL THERAPY 1/> REGIONS: CPT | Performed by: PHYSICAL THERAPIST

## 2024-04-15 PROCEDURE — 97110 THERAPEUTIC EXERCISES: CPT | Performed by: PHYSICAL THERAPIST

## 2024-04-15 PROCEDURE — 97162 PT EVAL MOD COMPLEX 30 MIN: CPT | Performed by: PHYSICAL THERAPIST

## 2024-04-15 NOTE — PATIENT INSTRUCTIONS
Access Code: [email or web address]  URL: [email or web address]  Date: 04/15/2024  Prepared by: Mary Lou Davis    Exercises  - Supine Deep Neck Flexor Nods  - 1 x daily - 7 x weekly - 1 sets - 10 reps - 3 sec. hold  - Seated Gentle Upper Trapezius Stretch  - 1 x daily - 7 x weekly - 1 sets - 3 reps - 10 sec. hold  - Gentle Levator Scapulae Stretch  - 1 x daily - 7 x weekly - 1 sets - 3 reps - 10 sec. hold  - Standing Backward Shoulder Rolls  - 1 x daily - 7 x weekly - 1 sets - 10 reps  - Standing Scapular Retraction  - 1 x daily - 7 x weekly - 1 sets - 10 reps - 3 sec. hold

## 2024-04-15 NOTE — PROGRESS NOTES
Physical Therapy Initial Evaluation and Plan of Care  [facility] [place]   [address] [place] [address]  P: [phone]       F: [phone]       Patient: Ashley Motta   : 1940  Visit Diagnoses:     ICD-10-CM ICD-9-CM   1. Trapezius muscle spasm  M62.838 728.85   2. Decreased ROM of neck  R29.898 723.8     Referring practitioner: Tomás Martines MD  Date of Initial Visit: 4/15/2024  Today's Date: 4/15/2024  Patient seen for 1 sessions           Subjective Questionnaire: NDI:      Subjective Evaluation    History of Present Illness  Date of onset: 3/8/2024  Mechanism of injury: Patient reports started having pain in her neck-mostly on the right about 2 months ago.  Thinks it was triggered by sleeping in the recliner.      PMH: Bilat TKA, sleep apnea-cpap,     Copied from EMR:  Allergies and Adverse Reactions    Environmental and seasonal allergies    Allergic rhinitis, unspecified      Cardiac and Vasculature    Hyperlipidemia      ENT    Impacted cerumen of left ear      Endocrine and Metabolic    Prediabetes    Vitamin E deficiency      Gastrointestinal Abdominal    Gastro-esophageal reflux disease without esophagitis    Heartburn    Internal hemorrhoids with complication    Constipation, unspecified      Genitourinary and Reproductive    Pelvic relaxation due to vaginal prolapse      Hematology and Neoplasia    Iron deficiency anemia due to chronic blood loss    Personal history of other malignant neoplasm of skin      Mental Health    Anxiety disorder, unspecified      Musculoskeletal and Injuries    Localized osteoporosis without current pathological fracture    Osteopenia    Osteoarthritis    Chronic pain of both knees    Arthritis of right knee    Arthritis of left knee    Arthritis of both knees    Aftercare following joint replacement surgery    Generalized muscle weakness    Presence of right artificial knee joint    S/P total knee arthroplasty,  left      Neuro    Restless leg syndrome    Headache, migraine    Migraine, unspecified, not intractable, without status migrainosus      Pulmonary and Pneumonias    Chronic cough      Skin    Atopic dermatitis, unspecified      Sleep    BALDEMAR on CPAP      Symptoms and Signs    Dizziness      Subjective comment: Patient reports neck pain.  Patient Occupation: Retired Pain  At best pain ratin  At worst pain ratin  Location: neck, R>L  Quality: dull ache  Relieving factors: heat  Aggravating factors: lifting (carrying groceries, bending and reaching to do dishes and laundry, reaching over shoulder level into cabinets/upper body ADLs)    Social Support  Lives in: condominium (4 steps to enter w/HR)  Lives with: alone    Hand dominance: right    Diagnostic Tests  No diagnostic tests performed    Patient Goals  Patient goals for therapy: decreased pain             Objective          Postural Observations    Additional Postural Observation Details  Increased cervical lordosis with C/T junction step off, forward head and shoulders.    Palpation   Left   Hypertonic in the suboccipitals and upper trapezius.   Tenderness of the cervical paraspinals, scalenes, suboccipitals and upper trapezius.   Trigger point to upper trapezius.     Right   Hypertonic in the levator scapulae, rhomboids, suboccipitals and upper trapezius. Tenderness of the cervical paraspinals, levator scapulae, rhomboids, scalenes, suboccipitals and upper trapezius.   Trigger point to upper trapezius.     Tenderness   Cervical Spine   Tenderness in the spinous process, right scapula and right 1st rib.     Active Range of Motion   Cervical/Thoracic Spine   Cervical    Flexion: 55 degrees   Extension: 40 degrees with pain  Left lateral flexion: 20 degrees with pain  Right lateral flexion: 15 degrees with pain  Left rotation: 50 degrees with pain  Right rotation: 50 degrees with pain    Strength/Myotome Testing   Cervical Spine   Neck extension: 4+  Neck  flexion: 4+    Left   Normal strength    Right   Normal strength    Tests   Cervical   Positive repeated extension.    Left   Negative Spurling's sign, ULTT1, ULTT2, ULTT3 and ULTT4.     Right   Positive Spurling's sign.   Negative ULTT1, ULTT2, ULTT3 and ULTT4.     Additional Tests Details  Hypomobile right first rib.    Decreased cervical and C/T junction spinal mobility.          Assessment & Plan       Assessment  Impairments: abnormal muscle tone, abnormal or restricted ROM, activity intolerance, lacks appropriate home exercise program and pain with function   Functional limitations: carrying objects, uncomfortable because of pain and reaching overhead (lifting (carrying groceries, bending and reaching to do dishes and laundry, reaching over shoulder level into cabinets/upper body ADLs)  )  Assessment details: Ashley Motta is a pleasant 84 y.o. female that presents with decreased cervical spine ROM, decreased UQ muscle flexibility, hypertonic and TTP in cervical/UQ muscles, and pain limited functional activity tolerance. Pt will benefit from skilled PT services in order to address listed impairments, decrease pain and restore function.    Prognosis: good  Prognosis details: Patient demonstrates good rehab potential as evidenced by high motivation to participate with PT POC and to return to PLOF/ADLs/IADLs.    Goals  Plan Goals: Short Term Goals (3 wks):  1.  Patient will have increased cervical spine lateral flexion to WFL.  2.  Patient will have pain free cervical spine AROM.  3.  Patient will have improved right first rib mobility to WFL.  4.  Patient will have cervical and C/T junction spinal mobility WFL.      Long Term Goals (4-6 wks):  1.  Patient will be independent in performance of HEP.  2.  Patient will have improved NDI score of 5/50 or better.  3.  Patient will have normalized muscle tone in cervical and UQ muscles.  4.  Patient will report ability to perform ADLs/IADLs without pain limitations.       Plan  Therapy options: will be seen for skilled therapy services  Planned modality interventions: cryotherapy, thermotherapy (hydrocollator packs), ultrasound, TENS, dry needling and traction  Planned therapy interventions: manual therapy, soft tissue mobilization, spinal/joint mobilization, strengthening, stretching, flexibility, functional ROM exercises, joint mobilization, home exercise program, neuromuscular re-education, postural training, therapeutic activities and body mechanics training  Frequency: 2x week  Duration in weeks: 6  Treatment plan discussed with: patient  Plan details: Pt was educated on the importance of their HEP and their current need for continued skilled physical therapy. Patients goals and potential limitations were discussed and pt is in agreement with current plan of care and treatment emphasis.              History # of Personal Factors and/or Comorbidities: HIGH (3+)  Examination of Body System(s): # of elements: MODERATE (3)  Clinical Presentation: STABLE   Clinical Decision Making: MODERATE      Timed:         Manual Therapy:    10     mins  07705;     Therapeutic Exercise:    10     mins  05380;     Neuromuscular Bhavin:        mins  10825;    Therapeutic Activity:     8     mins  44465;     Gait Training:           mins  45623;     Ultrasound:          mins  49819;    Ionto                                  mins  44328  Self Care                            mins  50127  Canalith Repos         mins  60855  Orthotic MGMT/Train         mins  20052    Un-Timed:  Electrical Stimulation:         mins  21380 ( );  Dry Needling:          mins  49983 self-pay;  Dry Needling:          mins  29413 self-pay  Traction          mins  59738  Low Eval          mins  81667  Mod Eval     30     mins  57992  High Eval                            mins  54721    Timed Treatment:   28   mins   Total Treatment:     58   mins      PT SIGNATURE: Mary Lou Davis PT     License Number:  [id number]  Electronically signed by Mary Lou Davis, PT, 04/15/24, 1:33 PM EDT      DATE TREATMENT INITIATED: 4/15/2024    Initial Certification  Certification Period: 4/15/2024 thru 7/13/2024  I certify that the therapy services are furnished while this patient is under my care.  The services outlined above are required by this patient, and will be reviewed every 90 days.     PHYSICIAN: Tomás Martines MD      NPI: [id number]  DATE:         Please sign and return via fax to [phone]. Thank you, [facility].

## 2024-04-19 ENCOUNTER — TREATMENT (OUTPATIENT)
Age: 84
End: 2024-04-19
Payer: MEDICARE

## 2024-04-19 DIAGNOSIS — R29.898 DECREASED ROM OF NECK: ICD-10-CM

## 2024-04-19 DIAGNOSIS — M62.838 TRAPEZIUS MUSCLE SPASM: Primary | ICD-10-CM

## 2024-04-19 PROCEDURE — 97110 THERAPEUTIC EXERCISES: CPT | Performed by: PHYSICAL THERAPIST

## 2024-04-19 PROCEDURE — 97140 MANUAL THERAPY 1/> REGIONS: CPT | Performed by: PHYSICAL THERAPIST

## 2024-04-19 NOTE — PROGRESS NOTES
Physical Therapy Treatment Note  [facility] [place]   [address] [place] [address]  P: [phone]       F: [phone]      Patient: Ashley Motta   : 1940  Treatment Diagnosis:     ICD-10-CM ICD-9-CM   1. Trapezius muscle spasm  M62.838 728.85   2. Decreased ROM of neck  R29.898 723.8     Referring practitioner: Tomás Martines MD  Date of Initial Visit: Type: THERAPY  Noted: 4/15/2024  Today's Date: 2024  Patient seen for 2 sessions           Subjective   Patient reports she is still having the soreness in the right upper back.    Objective     See Exercise, Manual, and Modality Logs for complete treatment.       Assessment/Plan  Patient responded positively to manual therapy with improved mobility and decreased TTP in the right thoracic/scapular region.  She was tavon to perform additional exercises for mobility and muscle flexibility with no adverse symptoms. She did have some vertigo symptoms with raising from supine to sitting.    Progress per Plan of Care and Progress strengthening /stabilization /functional activity           Timed:         Manual Therapy:    23     mins  77089;     Therapeutic Exercise:    15     mins  27696;     Neuromuscular Bhavin:        mins  54388;    Therapeutic Activity:          mins  20719;     Gait Training:           mins  09425;     Ultrasound:          mins  83690;    Ionto                                  mins  13115  Self Care                            mins  18410  Canalith Repos         mins  67733  Orthotic MGMT/Train         mins  43640    Un-Timed:  Electrical Stimulation:         mins  77758 ( );  Dry Needling:          mins  77586 self-pay;  Dry Needling:          mins  24075 self-pay  Traction          mins  26857      Timed Treatment:   38   mins   Total Treatment:     38   mins        PT SIGNATURE: Mary Lou Davis, PT     License Number: [id number]  Electronically signed by Mary Lou Davis PT, 24,  10:52 AM EDT

## 2024-04-26 ENCOUNTER — TREATMENT (OUTPATIENT)
Age: 84
End: 2024-04-26
Payer: MEDICARE

## 2024-04-26 DIAGNOSIS — R29.898 DECREASED ROM OF NECK: ICD-10-CM

## 2024-04-26 DIAGNOSIS — M62.838 TRAPEZIUS MUSCLE SPASM: Primary | ICD-10-CM

## 2024-04-26 PROCEDURE — 97140 MANUAL THERAPY 1/> REGIONS: CPT | Performed by: PHYSICAL THERAPIST

## 2024-04-26 PROCEDURE — 97110 THERAPEUTIC EXERCISES: CPT | Performed by: PHYSICAL THERAPIST

## 2024-04-26 NOTE — PROGRESS NOTES
Physical Therapy Treatment Note  [facility] [place]   [address] [place] [address]  P: [phone]       F: [phone]      Patient: Ashley Motta   : 1940  Treatment Diagnosis:     ICD-10-CM ICD-9-CM   1. Trapezius muscle spasm  M62.838 728.85   2. Decreased ROM of neck  R29.898 723.8     Referring practitioner: Tomás Martines MD  Date of Initial Visit: Type: THERAPY  Noted: 4/15/2024  Today's Date: 2024  Patient seen for 3 sessions           Subjective   Patient reports her neck has been feeling okay, still having pain.     Objective     See Exercise, Manual, and Modality Logs for complete treatment.       Assessment/Plan  Patient responded positively to manual therapy with improved muscle TTP and improved CT junction mobility as well as improved suboccipital muscle tension.  She tolerated exercise progression well without symptom provocation.    Progress per Plan of Care           Timed:         Manual Therapy:    20     mins  10783;     Therapeutic Exercise:    15     mins  80917;     Neuromuscular Bhavin:        mins  76798;    Therapeutic Activity:          mins  69565;     Gait Training:           mins  22484;     Ultrasound:          mins  24163;    Ionto                                  mins  61115  Self Care                            mins  75248  Canalith Repos         mins  87877  Orthotic MGMT/Train         mins  82464    Un-Timed:  Electrical Stimulation:         mins  89764 ( );  Dry Needling:          mins  65363 self-pay;  Dry Needling:          mins  79788 self-pay  Traction          mins  64120      Timed Treatment:   35   mins   Total Treatment:     35   mins        PT SIGNATURE: Mary Lou Davis PT     License Number: [id number]  Electronically signed by Mary Lou Davis PT, 24, 11:19 AM EDT

## 2024-04-30 ENCOUNTER — TREATMENT (OUTPATIENT)
Age: 84
End: 2024-04-30
Payer: MEDICARE

## 2024-04-30 DIAGNOSIS — R29.898 DECREASED ROM OF NECK: ICD-10-CM

## 2024-04-30 DIAGNOSIS — M62.838 TRAPEZIUS MUSCLE SPASM: Primary | ICD-10-CM

## 2024-04-30 PROCEDURE — 97110 THERAPEUTIC EXERCISES: CPT | Performed by: PHYSICAL THERAPIST

## 2024-04-30 PROCEDURE — 97140 MANUAL THERAPY 1/> REGIONS: CPT | Performed by: PHYSICAL THERAPIST

## 2024-04-30 NOTE — PROGRESS NOTES
Physical Therapy Treatment Note  [facility] [place]   [address] [place] [address]  P: [phone]       F: [phone]      Patient: Ashley Motta   : 1940  Treatment Diagnosis:     ICD-10-CM ICD-9-CM   1. Trapezius muscle spasm  M62.838 728.85   2. Decreased ROM of neck  R29.898 723.8     Referring practitioner: Tomás Martines MD  Date of Initial Visit: Type: THERAPY  Noted: 4/15/2024  Today's Date: 2024  Patient seen for 4 sessions           Subjective   Patient reports her shoulder is still painful.    Objective     See Exercise, Manual, and Modality Logs for complete treatment.       Assessment/Plan  Patient continues to have restricted thoracic and cervical spinal mobility as well as hypertonic right UQ muscles.  She responded positively to manual therapy with improved spinal mobility and improved muscle tone.  TTP decreased although was still present.  She was able to perform progressed exercises to promote spinal mobility.  Provided written instructions of HEP for home use.  Discussed benefits of DN and may utilize if muscle hypertonicity persists.   Progress per Plan of Care           Timed:         Manual Therapy:    23     mins  31961;     Therapeutic Exercise:    15     mins  08860;     Neuromuscular Bhavin:        mins  93285;    Therapeutic Activity:          mins  03193;     Gait Training:           mins  77556;     Ultrasound:          mins  79304;    Ionto                                  mins  17078  Self Care                            mins  43948  Canalith Repos         mins  77791  Orthotic MGMT/Train         mins  92785    Un-Timed:  Electrical Stimulation:         mins  49080 ( );  Dry Needling:          mins  94414 self-pay;  Dry Needling:          mins  92487 self-pay  Traction          mins  78908      Timed Treatment:   38  mins   Total Treatment:     38   mins        PT SIGNATURE: Mary Lou Davis, PT     License Number:  [id number]  Electronically signed by Mary Lou Davis PT, 04/30/24, 11:27 AM EDT

## 2024-05-10 ENCOUNTER — TREATMENT (OUTPATIENT)
Age: 84
End: 2024-05-10
Payer: MEDICARE

## 2024-05-10 DIAGNOSIS — R29.898 DECREASED ROM OF NECK: ICD-10-CM

## 2024-05-10 DIAGNOSIS — M62.838 TRAPEZIUS MUSCLE SPASM: Primary | ICD-10-CM

## 2024-05-10 PROCEDURE — 97110 THERAPEUTIC EXERCISES: CPT | Performed by: PHYSICAL THERAPIST

## 2024-05-10 PROCEDURE — 97140 MANUAL THERAPY 1/> REGIONS: CPT | Performed by: PHYSICAL THERAPIST

## 2024-05-14 ENCOUNTER — TREATMENT (OUTPATIENT)
Age: 84
End: 2024-05-14
Payer: MEDICARE

## 2024-05-14 DIAGNOSIS — M62.838 TRAPEZIUS MUSCLE SPASM: Primary | ICD-10-CM

## 2024-05-14 DIAGNOSIS — R29.898 DECREASED ROM OF NECK: ICD-10-CM

## 2024-05-14 PROCEDURE — 97110 THERAPEUTIC EXERCISES: CPT | Performed by: PHYSICAL THERAPIST

## 2024-05-14 PROCEDURE — 97530 THERAPEUTIC ACTIVITIES: CPT | Performed by: PHYSICAL THERAPIST

## 2024-05-14 PROCEDURE — 20561 NDL INSJ W/O NJX 3+ MUSC: CPT | Performed by: PHYSICAL THERAPIST

## 2024-05-14 NOTE — PROGRESS NOTES
Physical Therapy Re-Assessment   [facility] [place]   [address] [place] [address]  P: [phone]       F: [phone]        Patient: Ashley Motta   : 1940  Visit Diagnoses:     ICD-10-CM ICD-9-CM   1. Trapezius muscle spasm  M62.838 728.85   2. Decreased ROM of neck  R29.898 723.8     Referring practitioner: Tomás Martines MD  Date of Initial Visit: Type: THERAPY  Noted: 4/15/2024  Today's Date: 2024  Patient seen for 6 sessions      Subjective:   Ashley Motta reports:   Subjective Questionnaire: NDI:10/50  Clinical Progress: improved  Home Program Compliance: Yes  Treatment has included: therapeutic exercise, neuromuscular re-education, manual therapy, and therapeutic activity    Subjective Evaluation    Pain  Current pain ratin  At worst pain rating: 3       Patient reports she is still having neck pain but it is less intense.  States she wants to try dry needling.    Objective     Postural Observations     Additional Postural Observation Details  Increased cervical lordosis with C/T junction step off, forward head and shoulders.     Palpation   Left   Hypertonic in the upper trapezius.   Tenderness of the cervical paraspinals and upper trapezius.   Trigger point to upper trapezius.      Right   Hypertonic in the pectoral, levator scapulae and upper trapezius. Tenderness of the cervical paraspinals, levator scapulae and upper trapezius.   Trigger point to upper trapezius.         Active Range of Motion   Cervical/Thoracic Spine   Cervical     Flexion: 55 degrees   Extension: 45 degrees   Left lateral flexion: 23 degrees with soreness  Right lateral flexion: 20 degrees with soreness  Left rotation: 60 degrees with tightness  Right rotation: 60 degrees with tightness     Strength/Myotome Testing   Cervical Spine   Neck extension: 5  Neck flexion: 5     Left   Normal strength     Right   Normal strength     Tests   Cervical   Positive repeated  extension.     Left   Negative Spurling's sign, ULTT1, ULTT2, ULTT3 and ULTT4.      Right   Positive Spurling's sign.   Negative ULTT1, ULTT2, ULTT3 and ULTT4.      Additional Tests Details  Right first rib mobility WNL     Decreased cervical and C/T junction spinal mobility-improving.        See Exercise, Manual, and Modality Logs for complete treatment.     Assessment/Plan  Soft tissue was assessed at cervical/UQ. PT noted point tenderness as well as palpable trigger points within the muslce tissue. On this date patient stated that they would like to undergo a dry needling procedure for the soft tissue dysfunction. Patient was educated on the procedure for dry needling and consent waver signed/on file. Patient was informed of the risks, possible adverse effects, along with the benefits of DN.   Patient presents with improved cervical spine ROM although she still has some pain at end range.  She has improved cervical spine strength and rib mobility.  She still has some light restrictions in mobility although improving.  Muscle imbalance, hypertonicity and TTP persists in the cervical/UQ regions.  She responded positively to DN with improved muscle tightness.   Progress toward previous goals: Partially Met    Goal Review  Short Term Goals (2 wks):  1.  Patient will have increased cervical spine lateral flexion to WFL.-progressing  2.  Patient will have pain free cervical spine AROM.-progressing  3.  Patient will have improved right first rib mobility to WFL.-met  4.  Patient will have cervical and C/T junction spinal mobility WFL.-progressing        Long Term Goals (4 wks):  1.  Patient will be independent in performance of HEP.-progressing  2.  Patient will have improved NDI score of 5/50 or better.-progressing  3.  Patient will have normalized muscle tone in cervical and UQ muscles.-progressing  4.  Patient will report ability to perform ADLs/IADLs without pain limitations.-progressing        Recommendations:  Continue as planned  Timeframe: 1 month  Prognosis to achieve goals: good    PT SIGNATURE: Mary Lou Davis PT     License Number: [id number]  Electronically signed by Mary Lou Davis PT, 05/14/24, 10:26 AM EDT        Timed:         Manual Therapy:         mins  09897;     Therapeutic Exercise:     8    mins  74318;     Neuromuscular Bhavin:        mins  55015;    Therapeutic Activity:     15     mins  52896;     Gait Training:           mins  16278;     Ultrasound:          mins  99341;    Ionto                                  mins  94888  Self Care                            mins  71037  Canalith Repos         mins  13190  Orthotic MGMT/Train         mins  81254    Un-Timed:  Electrical Stimulation:         mins  59475 ( );  Dry Needling:          mins  59630 self-pay;  Dry Needling:     15     mins  85339 self-pay  Traction          mins  20945  Low Eval          mins  26275  Mod Eval          mins  15354  High Eval                            mins  61837    Timed Treatment:   23   mins   Total Treatment:     38   mins

## 2024-05-17 ENCOUNTER — TREATMENT (OUTPATIENT)
Age: 84
End: 2024-05-17
Payer: MEDICARE

## 2024-05-17 DIAGNOSIS — R29.898 DECREASED ROM OF NECK: ICD-10-CM

## 2024-05-17 DIAGNOSIS — M62.838 TRAPEZIUS MUSCLE SPASM: Primary | ICD-10-CM

## 2024-05-17 PROCEDURE — 97110 THERAPEUTIC EXERCISES: CPT | Performed by: PHYSICAL THERAPIST

## 2024-05-17 PROCEDURE — 97140 MANUAL THERAPY 1/> REGIONS: CPT | Performed by: PHYSICAL THERAPIST

## 2024-05-17 NOTE — PROGRESS NOTES
Physical Therapy Treatment Note  [facility] [place]   [address] [place] [address]  P: [phone]       F: [phone]      Patient: Ashley Motta   : 1940  Treatment Diagnosis:     ICD-10-CM ICD-9-CM   1. Trapezius muscle spasm  M62.838 728.85   2. Decreased ROM of neck  R29.898 723.8     Referring practitioner: Tomás Martines MD  Date of Initial Visit: Type: THERAPY  Noted: 4/15/2024  Today's Date: 2024  Patient seen for 7 sessions           Subjective   Patient reports she is feeling okay.  States the dry needling helped.      Objective     See Exercise, Manual, and Modality Logs for complete treatment.       Assessment/Plan  Patient had improved cervical spine mobility and increased pectoral muscle flexibility following manual therapy.  She had improved UT muscle tone and no TTP in the cervical spine/UQ muscles.  She was able to perform progressed exercises for strengthening with no adverse symptoms.    Progress per Plan of Care and Progress strengthening /stabilization /functional activity           Timed:         Manual Therapy:    10     mins  35698;     Therapeutic Exercise:    20     mins  32035;     Neuromuscular Bhavin:        mins  33746;    Therapeutic Activity:          mins  23255;     Gait Training:           mins  97807;     Ultrasound:          mins  41973;    Ionto                                  mins  52498  Self Care                            mins  11292  Canalith Repos         mins  37009  Orthotic MGMT/Train         mins  15185    Un-Timed:  Electrical Stimulation:         mins  41083 (MC );  Dry Needling:          mins  39528 self-pay;  Dry Needling:          mins  11184 self-pay  Traction          mins  74887      Timed Treatment:   30   mins   Total Treatment:     30   mins        PT SIGNATURE: Mary Lou Davis PT     License Number: [id number]  Electronically signed by Mary Lou Davis PT, 24, 10:28 AM EDT

## 2024-05-20 ENCOUNTER — TREATMENT (OUTPATIENT)
Age: 84
End: 2024-05-20
Payer: MEDICARE

## 2024-05-20 DIAGNOSIS — M62.838 TRAPEZIUS MUSCLE SPASM: Primary | ICD-10-CM

## 2024-05-20 DIAGNOSIS — R29.898 DECREASED ROM OF NECK: ICD-10-CM

## 2024-05-20 PROCEDURE — 97112 NEUROMUSCULAR REEDUCATION: CPT | Performed by: PHYSICAL THERAPIST

## 2024-05-20 PROCEDURE — 97140 MANUAL THERAPY 1/> REGIONS: CPT | Performed by: PHYSICAL THERAPIST

## 2024-05-20 PROCEDURE — 97110 THERAPEUTIC EXERCISES: CPT | Performed by: PHYSICAL THERAPIST

## 2024-05-20 NOTE — PROGRESS NOTES
Physical Therapy Treatment Note  [facility] [place]   [address] [place] [address]  P: [phone]       F: [phone]      Patient: Ashley Motta   : 1940  Treatment Diagnosis:     ICD-10-CM ICD-9-CM   1. Trapezius muscle spasm  M62.838 728.85   2. Decreased ROM of neck  R29.898 723.8     Referring practitioner: Tomás Martines MD  Date of Initial Visit: Type: THERAPY  Noted: 4/15/2024  Today's Date: 2024  Patient seen for 8 sessions           Subjective   Patient reports she has not been having any pain.  Still has some tenderness to touch in the muscle.    Objective     See Exercise, Manual, and Modality Logs for complete treatment.       Assessment/Plan  Patient performed exercises with no adverse symptoms. Benefits from cueing for technique and to prevent compensatory patterns.  She has improved cervical/thoracic spine mobility and improved muscle flexibility following manual therapy.    Anticipate DC next Visit           Timed:         Manual Therapy:    10     mins  47835;     Therapeutic Exercise:    20     mins  35076;     Neuromuscular Bhavin:   8     mins  60564;    Therapeutic Activity:          mins  61526;     Gait Training:           mins  51218;     Ultrasound:          mins  30074;    Ionto                                  mins  77434  Self Care                            mins  05302  Canalith Repos         mins  61918  Orthotic MGMT/Train         mins  39236    Un-Timed:  Electrical Stimulation:         mins  59496 ( );  Dry Needling:          mins  23422 self-pay;  Dry Needling:          mins  71817 self-pay  Traction          mins  04507      Timed Treatment:   38   mins   Total Treatment:     38   mins        PT SIGNATURE: Mary Lou Davis PT     License Number: [id number]  Electronically signed by Mary Lou Davis PT, 24, 10:53 AM EDT

## 2024-05-23 ENCOUNTER — TREATMENT (OUTPATIENT)
Age: 84
End: 2024-05-23
Payer: MEDICARE

## 2024-05-23 DIAGNOSIS — M62.838 TRAPEZIUS MUSCLE SPASM: Primary | ICD-10-CM

## 2024-05-23 DIAGNOSIS — R29.898 DECREASED ROM OF NECK: ICD-10-CM

## 2024-05-23 PROCEDURE — 97530 THERAPEUTIC ACTIVITIES: CPT | Performed by: PHYSICAL THERAPIST

## 2024-05-23 PROCEDURE — 97110 THERAPEUTIC EXERCISES: CPT | Performed by: PHYSICAL THERAPIST

## 2024-05-23 NOTE — PROGRESS NOTES
Physical Therapy Treatment Note  [facility] [place]   [address] [place] [address]  P: [phone]       F: [phone]      Patient: Ashley Motta   : 1940  Treatment Diagnosis:     ICD-10-CM ICD-9-CM   1. Trapezius muscle spasm  M62.838 728.85   2. Decreased ROM of neck  R29.898 723.8     Referring practitioner: Tomás Martines MD  Date of Initial Visit: Type: THERAPY  Noted: 4/15/2024  Today's Date: 2024  Patient seen for 9 sessions           Subjective   Patient reports she had been feeling a lot better.  States she is going out of town for [employer] next week and would like to have one more session when she returns to make sure she does not have and set backs while on her trip.      Objective     See Exercise, Manual, and Modality Logs for complete treatment.       Assessment/Plan  Patient presents with improved mobility with just mild restriction at the C/T junction.  Mobility improved with exercises.  She demonstrated proper technique with exercises and understanding of symptom management.  Will reassess upon her return from her trip with planned DC if her status is maintained.  Progress per Plan of Care and Progress strengthening /stabilization /functional activity           Timed:         Manual Therapy:         mins  81877;     Therapeutic Exercise:    20     mins  14429;     Neuromuscular Bhavin:        mins  90780;    Therapeutic Activity:     8     mins  84887;     Gait Training:           mins  77469;     Ultrasound:          mins  63833;    Ionto                                  mins  40511  Self Care                            mins  88352  Canalith Repos         mins  12369  Orthotic MGMT/Train         mins  76847    Un-Timed:  Electrical Stimulation:         mins  27244 ( );  Dry Needling:          mins  69511 self-pay;  Dry Needling:          mins  14265 self-pay  Traction          mins  36465      Timed Treatment:   28   mins    Total Treatment:     28   mins        PT SIGNATURE: Mary Lou Davis, PT     License Number: [id number]  Electronically signed by Mary Lou Davis, PT, 05/23/24, 2:20 PM EDT

## 2024-06-10 ENCOUNTER — TREATMENT (OUTPATIENT)
Age: 84
End: 2024-06-10
Payer: MEDICARE

## 2024-06-10 DIAGNOSIS — M62.838 TRAPEZIUS MUSCLE SPASM: Primary | ICD-10-CM

## 2024-06-10 DIAGNOSIS — R29.898 DECREASED ROM OF NECK: ICD-10-CM

## 2024-06-10 PROCEDURE — 97110 THERAPEUTIC EXERCISES: CPT | Performed by: PHYSICAL THERAPIST

## 2024-06-10 PROCEDURE — 97530 THERAPEUTIC ACTIVITIES: CPT | Performed by: PHYSICAL THERAPIST

## 2024-06-10 NOTE — PROGRESS NOTES
PHYSICAL THERAPY DISCHARGE SUMMARY  [facility] [place]   [address] [place] [address]  P: [phone]       F: [phone]     Patient: Ashley Motta   : 1940  Diagnosis/ICD-10 Code:  Trapezius muscle spasm [M62.838]  Referring practitioner: Tomás Martines MD  Date of Initial Visit: Type: THERAPY  Noted: 4/15/2024  Today's Date: 6/10/2024  Patient seen for 10 sessions      Subjective:   Ashley Motta reports:   Subjective Questionnaire: NDI:3/50  Clinical Progress: improved  Home Program Compliance: Yes  Treatment has included: therapeutic exercise, neuromuscular re-education, manual therapy, therapeutic activity, and dry needling    Subjective  Patient reports she is able to do every thing she needs and wants to do without increased pain.  States she is sleeping well and staying active.        Objective     Postural Observations  Posture is WNL     Palpation   Normalized muscle tone, no TTP        Active Range of Motion   Cervical/Thoracic Spine   Cervical     Flexion: 55 degrees   Extension: 50 degrees   Left lateral flexion: 30 degrees with mild tightness  Right lateral flexion: 30 degrees   Left rotation: 60 degrees with mild tightness  Right rotation: 60 degrees with mild tightness     Strength/Myotome Testing   Cervical Spine   Neck extension: 5  Neck flexion: 5     Left   Normal strength     Right   Normal strength     Tests   Cervical   Negative repeated extension.     Left   Negative Spurling's sign, ULTT1, ULTT2, ULTT3 and ULTT4.      Right   Negative Spurling's sign.   Negative ULTT1, ULTT2, ULTT3 and ULTT4.      Additional Tests Details  Right first rib mobility WNL     Cervical and C/T junction spinal mobility-WFL.     Assessment/Plan  Patient presents with improved cervical spine ROM and strength.  Muscle tone and rib mobility is WNL.  Radicular symptoms have resolved.  Flexibility is WNL/WFL with mild right pectoral muscle tightness.  She is  independent in HEP.  Progressed HEP with advanced pectoral muscle stretches.    Progress toward previous goals: All Met    Goal Review  Short Term Goals:  1.  Patient will have increased cervical spine lateral flexion to WFL.-met  2.  Patient will have pain free cervical spine AROM.-met  3.  Patient will have improved right first rib mobility to WFL.-met  4.  Patient will have cervical and C/T junction spinal mobility WFL.-met        Long Term Goals:  1.  Patient will be independent in performance of HEP.-met  2.  Patient will have improved NDI score of 5/50 or better.-met  3.  Patient will have normalized muscle tone in cervical and UQ muscles.-met  4.  Patient will report ability to perform ADLs/IADLs without pain limitations.-met        Recommendations: Discharge to [facility]    PT SIGNATURE: Mary Lou Davis PT     License Number: [id number]  Electronically signed by Mary Lou Davis PT, 06/10/24, 9:44 AM EDT    Timed:         Manual Therapy:         mins  15031;     Therapeutic Exercise:    10     mins  39038;     Neuromuscular Bhavin:        mins  70882;    Therapeutic Activity:     13     mins  18898;     Gait Training:           mins  01725;     Ultrasound:          mins  71163;    Ionto                                  mins  30287  Self Care                            mins  55425  Canalith Repos         mins  03563  Orthotic MGMT/Train         mins  91024    Un-Timed:  Electrical Stimulation:         mins  28467 ( );  Dry Needling:          mins  65304 self-pay;  Dry Needling:          mins  53126 self-pay  Traction          mins  15023  Low Eval          mins  91265  Mod Eval          mins  16743  High Eval                            mins  04698    Timed Treatment:   23   mins   Total Treatment:     23   mins

## 2024-08-01 ENCOUNTER — LAB (OUTPATIENT)
Dept: LAB | Facility: HOSPITAL | Age: 84
End: 2024-08-01
Payer: MEDICARE

## 2024-08-01 ENCOUNTER — TELEPHONE (OUTPATIENT)
Dept: ORTHOPEDIC SURGERY | Facility: CLINIC | Age: 84
End: 2024-08-01

## 2024-08-01 ENCOUNTER — OFFICE VISIT (OUTPATIENT)
Dept: ORTHOPEDIC SURGERY | Facility: CLINIC | Age: 84
End: 2024-08-01
Payer: MEDICARE

## 2024-08-01 VITALS — BODY MASS INDEX: 29.77 KG/M2 | HEIGHT: 63 IN | TEMPERATURE: 98.4 F | WEIGHT: 168 LBS

## 2024-08-01 DIAGNOSIS — Z96.652 S/P TKR (TOTAL KNEE REPLACEMENT), LEFT: ICD-10-CM

## 2024-08-01 DIAGNOSIS — R52 PAIN: Primary | ICD-10-CM

## 2024-08-01 LAB
BASOPHILS # BLD AUTO: 0.06 10*3/MM3 (ref 0–0.2)
BASOPHILS NFR BLD AUTO: 0.7 % (ref 0–1.5)
CRP SERPL-MCNC: <0.3 MG/DL (ref 0–0.5)
DEPRECATED RDW RBC AUTO: 44.2 FL (ref 37–54)
EOSINOPHIL # BLD AUTO: 0.14 10*3/MM3 (ref 0–0.4)
EOSINOPHIL NFR BLD AUTO: 1.6 % (ref 0.3–6.2)
ERYTHROCYTE [DISTWIDTH] IN BLOOD BY AUTOMATED COUNT: 14 % (ref 12.3–15.4)
ERYTHROCYTE [SEDIMENTATION RATE] IN BLOOD: 16 MM/HR (ref 0–30)
HCT VFR BLD AUTO: 45.4 % (ref 34–46.6)
HGB BLD-MCNC: 14.9 G/DL (ref 12–15.9)
IMM GRANULOCYTES # BLD AUTO: 0.02 10*3/MM3 (ref 0–0.05)
IMM GRANULOCYTES NFR BLD AUTO: 0.2 % (ref 0–0.5)
LYMPHOCYTES # BLD AUTO: 2.82 10*3/MM3 (ref 0.7–3.1)
LYMPHOCYTES NFR BLD AUTO: 33.2 % (ref 19.6–45.3)
MCH RBC QN AUTO: 29.4 PG (ref 26.6–33)
MCHC RBC AUTO-ENTMCNC: 32.8 G/DL (ref 31.5–35.7)
MCV RBC AUTO: 89.5 FL (ref 79–97)
MONOCYTES # BLD AUTO: 0.8 10*3/MM3 (ref 0.1–0.9)
MONOCYTES NFR BLD AUTO: 9.4 % (ref 5–12)
NEUTROPHILS NFR BLD AUTO: 4.66 10*3/MM3 (ref 1.7–7)
NEUTROPHILS NFR BLD AUTO: 54.9 % (ref 42.7–76)
NRBC BLD AUTO-RTO: 0 /100 WBC (ref 0–0.2)
PLATELET # BLD AUTO: 299 10*3/MM3 (ref 140–450)
PMV BLD AUTO: 10.9 FL (ref 6–12)
RBC # BLD AUTO: 5.07 10*6/MM3 (ref 3.77–5.28)
WBC NRBC COR # BLD AUTO: 8.5 10*3/MM3 (ref 3.4–10.8)

## 2024-08-01 PROCEDURE — 85652 RBC SED RATE AUTOMATED: CPT

## 2024-08-01 PROCEDURE — 99214 OFFICE O/P EST MOD 30 MIN: CPT | Performed by: NURSE PRACTITIONER

## 2024-08-01 PROCEDURE — 36415 COLL VENOUS BLD VENIPUNCTURE: CPT

## 2024-08-01 PROCEDURE — 85025 COMPLETE CBC W/AUTO DIFF WBC: CPT

## 2024-08-01 PROCEDURE — 86140 C-REACTIVE PROTEIN: CPT

## 2024-08-01 NOTE — TELEPHONE ENCOUNTER
----- Message from Adilia Rico sent at 8/1/2024  4:52 PM EDT -----  Cbc is normal no signs of infection - can restart PT is want and follow up with TJS - leonor  ----- Message -----  From: Lab, Background User  Sent: 8/1/2024   4:44 PM EDT  To: BERNICE Lorenzo

## 2024-08-08 DIAGNOSIS — R73.03 PREDIABETES: Chronic | ICD-10-CM

## 2024-08-08 DIAGNOSIS — E78.2 MIXED HYPERLIPIDEMIA: Chronic | ICD-10-CM

## 2024-08-09 LAB
ALBUMIN SERPL-MCNC: 4.5 G/DL (ref 3.5–5.2)
ALBUMIN/GLOB SERPL: 2 G/DL
ALP SERPL-CCNC: 79 U/L (ref 39–117)
ALT SERPL-CCNC: 28 U/L (ref 1–33)
AST SERPL-CCNC: 28 U/L (ref 1–32)
BILIRUB SERPL-MCNC: 0.4 MG/DL (ref 0–1.2)
BUN SERPL-MCNC: 12 MG/DL (ref 8–23)
BUN/CREAT SERPL: 13.8 (ref 7–25)
CALCIUM SERPL-MCNC: 9.5 MG/DL (ref 8.6–10.5)
CHLORIDE SERPL-SCNC: 105 MMOL/L (ref 98–107)
CHOLEST SERPL-MCNC: 114 MG/DL (ref 0–200)
CO2 SERPL-SCNC: 23.8 MMOL/L (ref 22–29)
CREAT SERPL-MCNC: 0.87 MG/DL (ref 0.57–1)
EGFRCR SERPLBLD CKD-EPI 2021: 66.6 ML/MIN/1.73
GLOBULIN SER CALC-MCNC: 2.3 GM/DL
GLUCOSE SERPL-MCNC: 117 MG/DL (ref 65–99)
HBA1C MFR BLD: 6.3 % (ref 4.8–5.6)
HDLC SERPL-MCNC: 44 MG/DL (ref 40–60)
LDLC SERPL CALC-MCNC: 50 MG/DL (ref 0–100)
LDLC/HDLC SERPL: 1.09 {RATIO}
POTASSIUM SERPL-SCNC: 4.7 MMOL/L (ref 3.5–5.2)
PROT SERPL-MCNC: 6.8 G/DL (ref 6–8.5)
SODIUM SERPL-SCNC: 141 MMOL/L (ref 136–145)
TRIGL SERPL-MCNC: 110 MG/DL (ref 0–150)
VLDLC SERPL CALC-MCNC: 20 MG/DL (ref 5–40)

## 2024-08-16 ENCOUNTER — HOSPITAL ENCOUNTER (OUTPATIENT)
Facility: HOSPITAL | Age: 84
Discharge: HOME | End: 2024-08-16
Payer: MEDICARE

## 2024-08-16 ENCOUNTER — OFFICE VISIT (OUTPATIENT)
Dept: INTERNAL MEDICINE | Facility: CLINIC | Age: 84
End: 2024-08-16
Payer: MEDICARE

## 2024-08-16 VITALS
WEIGHT: 168 LBS | SYSTOLIC BLOOD PRESSURE: 124 MMHG | OXYGEN SATURATION: 98 % | HEIGHT: 63 IN | RESPIRATION RATE: 18 BRPM | BODY MASS INDEX: 29.77 KG/M2 | DIASTOLIC BLOOD PRESSURE: 70 MMHG | HEART RATE: 74 BPM

## 2024-08-16 DIAGNOSIS — R73.03 PREDIABETES: Primary | Chronic | ICD-10-CM

## 2024-08-16 DIAGNOSIS — M54.2 CHRONIC NECK PAIN: ICD-10-CM

## 2024-08-16 DIAGNOSIS — E78.2 MIXED HYPERLIPIDEMIA: Chronic | ICD-10-CM

## 2024-08-16 DIAGNOSIS — G89.29 CHRONIC NECK PAIN: ICD-10-CM

## 2024-08-16 PROCEDURE — 1159F MED LIST DOCD IN RCRD: CPT | Performed by: FAMILY MEDICINE

## 2024-08-16 PROCEDURE — 1160F RVW MEDS BY RX/DR IN RCRD: CPT | Performed by: FAMILY MEDICINE

## 2024-08-16 PROCEDURE — 99214 OFFICE O/P EST MOD 30 MIN: CPT | Performed by: FAMILY MEDICINE

## 2024-08-16 PROCEDURE — 72050 X-RAY EXAM NECK SPINE 4/5VWS: CPT

## 2024-08-16 PROCEDURE — G2211 COMPLEX E/M VISIT ADD ON: HCPCS | Performed by: FAMILY MEDICINE

## 2024-08-16 PROCEDURE — 1125F AMNT PAIN NOTED PAIN PRSNT: CPT | Performed by: FAMILY MEDICINE

## 2024-08-16 NOTE — PROGRESS NOTES
"Chief Complaint  Follow-up and Neck Pain (Therapy not working )    Subjective        Ashley Motta presents to [facility]  History of Present Illness    This patient is following up today for prediabetes and hyperlipidemia.  No new complaints regarding these conditions.  Taking simvastatin as prescribed below.  She had her labs completed August 9, 2024 and we went over the results today.  She is still prediabetic at 6.30 but no change from last time.  Cholesterol panel looks excellent.    She is also continuing to have neck pain despite going to physical therapy.  She saw me for this complaint back in March of this year.  She says the physical therapy helped some but she still seems to have a lot of pain and muscle spasms.  She is noticing some pain that will cause some tingling running down her right arm from the neck area.  She does not have any imaging on file.      Current Outpatient Medications:     multivitamin with minerals tablet tablet, Take 1 tablet by mouth Daily., Disp: , Rfl:     pantoprazole (Protonix) 20 MG EC tablet, Take 1 tablet by mouth Daily. Before dinner, Disp: 90 tablet, Rfl: 4    simvastatin (ZOCOR) 20 MG tablet, Take 1 tablet by mouth Every Night., Disp: 90 tablet, Rfl: 3    sodium chloride (MICHEL 128) 5 % ophthalmic solution, Administer 1 drop to both eyes 2 (Two) Times a Day., Disp: , Rfl:         Objective   Vital Signs:  /70 (BP Location: Left arm, Patient Position: Sitting, Cuff Size: Small Adult)   Pulse 74   Resp 18   Ht 160 cm (63\")   Wt 76.2 kg (168 lb)   SpO2 98%   BMI 29.76 kg/m²   Estimated body mass index is 29.76 kg/m² as calculated from the following:    Height as of this encounter: 160 cm (63\").    Weight as of this encounter: 76.2 kg (168 lb).            Physical Exam  Vitals and nursing note reviewed.   Constitutional:       General: She is not in acute distress.     Appearance: Normal appearance.   Cardiovascular:      Rate and " Rhythm: Normal rate and regular rhythm.      Heart sounds: Normal heart sounds. No murmur heard.  Pulmonary:      Effort: Pulmonary effort is normal.      Breath sounds: Normal breath sounds.   Musculoskeletal:      Cervical back: Spasms and tenderness present. No bony tenderness.   Neurological:      Mental Status: She is alert.        Result Review :  The following data was reviewed by: Tomás Martines MD on 08/16/2024:  Common labs          2/26/2024    08:40 8/1/2024    15:44 8/9/2024    08:32   Common Labs   Glucose 114   117    BUN 15   12    Creatinine 0.75   0.87    Sodium 140   141    Potassium 4.6   4.7    Chloride 104   105    Calcium 9.5   9.5    Total Protein 6.7   6.8    Albumin 4.4   4.5    Total Bilirubin 0.3   0.4    Alkaline Phosphatase 92   79    AST (SGOT) 30   28    ALT (SGPT) 30   28    WBC 7.48  8.50     Hemoglobin 14.4  14.9     Hematocrit 42.5  45.4     Platelets 303  299     Total Cholesterol 115   114    Triglycerides 98   110    HDL Cholesterol 50   44    LDL Cholesterol  47   50    Hemoglobin A1C 6.30   6.30                Assessment and Plan   Diagnoses and all orders for this visit:    1. Prediabetes (Primary)    2. Mixed hyperlipidemia    3. Chronic neck pain  -     XR Spine Cervical Complete 4 or 5 View; Future        Clinically stable chronic conditions as above.  Continue simvastatin as above.  Labs reviewed as above.  Will get XR cervical spine today.  I will have her follow-up with the nurse practitioner to likely get her set up for an MRI as she has already completed the PT component.  I will have her follow-up with a nurse practitioner mainly because I am leaving the office and will not be available to receive the results of an MRI.           Follow Up   Return in about 5 days (around 8/21/2024) for Recheck - neck pain .  Patient was given instructions and counseling regarding her condition or for health maintenance advice. Please see specific information pulled into the AVS  if appropriate.

## 2024-08-22 ENCOUNTER — OFFICE VISIT (OUTPATIENT)
Dept: ORTHOPEDIC SURGERY | Facility: CLINIC | Age: 84
End: 2024-08-22
Payer: MEDICARE

## 2024-08-22 VITALS — BODY MASS INDEX: 29.96 KG/M2 | HEIGHT: 63 IN | WEIGHT: 169.1 LBS | TEMPERATURE: 98.4 F

## 2024-08-22 DIAGNOSIS — Z96.652 S/P TKR (TOTAL KNEE REPLACEMENT), LEFT: Primary | ICD-10-CM

## 2024-08-23 ENCOUNTER — OFFICE VISIT (OUTPATIENT)
Dept: INTERNAL MEDICINE | Facility: CLINIC | Age: 84
End: 2024-08-23
Payer: MEDICARE

## 2024-08-23 VITALS
HEART RATE: 73 BPM | OXYGEN SATURATION: 98 % | SYSTOLIC BLOOD PRESSURE: 128 MMHG | DIASTOLIC BLOOD PRESSURE: 80 MMHG | WEIGHT: 168 LBS | BODY MASS INDEX: 29.77 KG/M2 | HEIGHT: 63 IN | TEMPERATURE: 97.1 F

## 2024-08-23 DIAGNOSIS — M50.90 CERVICAL NECK PAIN WITH EVIDENCE OF DISC DISEASE: Primary | Chronic | ICD-10-CM

## 2024-08-23 PROBLEM — D31.30 BENIGN NEOPLASM OF UNSPECIFIED CHOROID: Status: ACTIVE | Noted: 2023-04-13

## 2024-08-23 PROBLEM — Z79.01 LONG TERM (CURRENT) USE OF ANTICOAGULANTS: Status: ACTIVE | Noted: 2023-11-17

## 2024-08-23 PROBLEM — M85.9 DISORDER OF BONE DENSITY AND STRUCTURE, UNSPECIFIED: Status: ACTIVE | Noted: 2023-11-17

## 2024-08-23 PROBLEM — M81.6 LOCALIZED OSTEOPOROSIS (LEQUESNE): Status: ACTIVE | Noted: 2023-11-17

## 2024-08-23 PROCEDURE — 1125F AMNT PAIN NOTED PAIN PRSNT: CPT

## 2024-08-23 PROCEDURE — 99213 OFFICE O/P EST LOW 20 MIN: CPT

## 2024-08-23 NOTE — PROGRESS NOTES
General Exam    Patient: Ashley Motta    YOB: 1940    Medical Record Number: [id number]    Chief Complaints: Status post left total knee    History of Present Illness:     84 y.o. female patient who presents for evaluation status post left total knee.  Patient had a total knee done about 9 months ago.  Just before that she had the right done.  She states she Xsail to the right knee however the left knee seems to be delayed in its recovery compared to the right.  She reports that she is continue to stay active she goes to the gym does recumbent bike actually feels that that makes it better does have some general pain and stiffness at times.  But reports overall symptoms continue to improve but is not at the right as things went with the right.  She recently saw one of her nurse practitioners labs were taken and there was no increase in inflammatory markers.  Patient denies any illness sickness redness or issues right after surgery.    Denies any numbness or tingling.  Denies any fevers, cough or shortness of breath.    Allergies: No Known Allergies    Home Medications:      Current Outpatient Medications:     multivitamin with minerals tablet tablet, Take 1 tablet by mouth Daily., Disp: , Rfl:     pantoprazole (Protonix) 20 MG EC tablet, Take 1 tablet by mouth Daily. 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 TERRY CHEUNG    Osteoarthritis     Osteopenia     Rectal nodule 03/2020    REFERRED TO DR. PAMELA NAPOLES    Rectocele 07/2017    RLS (restless legs syndrome)     Seborrheic keratosis     Skin cancer 04/2015    LOWER LEG, FOLLOWED BY DR. ATUL SANCHEZ    Vertigo        Past Surgical History:   Procedure Laterality Date    CATARACT EXTRACTION, BILATERAL Bilateral 2015    COLONOSCOPY N/A 02/27/2015    1 TUBULAR ADENOMA [facility] DIST[facility]END[facility]    COLONOSCOPY N/A 03/04/2020    10 MM SESSILE SERRATED ADENOMA POLYP IN ASCENDING, 3 MM HYPERPLASTIC POLYP IN RECTUM, 7 MM ANAL NODULE, DR. SARAH LIRAINO AT [facility]    HYSTERECTOMY N/A 01/19/2004    KAYLEE WITH BSO, DR. RAFIA MORTENSEN AT [facility]    KNEE ARTHROSCOPY Left 2013    KNEE ARTHROSCOPY Right 2010    MELISSA CULDOPLASTY N/A 01/19/2004    DR. RAFIA MORTENSEN AT [facility]    SACROCOLPOPEXY N/A     TOTAL KNEE ARTHROPLASTY Right 4/11/2023    Procedure: RIGHT TOTAL KNEE ARTHROPLASTY WITH SHERI NAVIGATION;  Surgeon: Atul Liriano MD;  Location[facility];  Service: Orthopedics;  Laterality: Right;    TOTAL KNEE ARTHROPLASTY Left 11/14/2023    Procedure: LEFT TOTAL KNEE ARTHROPLASTY WITH SHERI NAVIGATION;  Surgeon: Atul Liriano MD;  Location: [facility];  Service: Orthopedics;  Laterality: Left;       Social History     Occupational History    Not on file   Tobacco Use    Smoking status: Never     Passive exposure: Never    Smokeless tobacco: Never   Vaping Use    Vaping status: Never Used   Substance and Sexual Activity    Alcohol use: No    Drug use: Never    Sexual activity: Not Currently     Birth control/protection: Post-menopausal, Surgical      Social History     Social History Narrative    Not on file       Family History   Problem Relation Age of Onset    Heart disease Mother     Lung cancer Father     Hypertension Father     Heart disease  "Father     Cancer Father     Heart attack Brother     Emphysema Brother     COPD Brother     Depression Brother     Heart attack Brother         Had stents placed 2009    Breast cancer Neg Hx     Malig Hyperthermia Neg Hx        Review of Systems:      Constitutional: Denies fever, shaking or chills         All other pertinent positives and negatives as noted above in HPI.    Physical Exam: 84 y.o. female    Vitals:    08/22/24 1411   Temp: 98.4 °F (36.9 °C)   TempSrc: Temporal   Weight: 76.7 kg (169 lb 1.6 oz)   Height: 160 cm (63\")       General:  Patient is awake and alert.  Appears in no acute distress or discomfort.      Musculoskeletal/Extremities:    Left knee was examined incisions well-healed there is no redness noted.  Minimal swelling.  Full range of motion.  Knee stable varus valgus stress.  No flexion instability noted.  Motor and sensory intact distally.         Radiology:       AP pelvis was taken and reviewed as well as previous knee films.  To evaluate the patient's complaint/s.    Pelvis film shows mild changes at best otherwise no significant findings.  Previous knee films were reviewed show status post total knee implants in satisfactory position no evidence of any kind of acute complications     no imaging for comparison.    Assessment:  Status post left total knee      Plan:      I discussed the findings with the patient.  She continues to make some progress although not the same as the right.  I told her sometimes 1 knee responds better than the other.  Range of motion is excellent and she is improving in strength and function pain has decreased.  At this time would recommend possibly modifying some the activity a little bit letting the soft tissues calm down some doing some anti-inflammatory medication if she can tolerate.  Icing and resting just doing gentle stretching over the next few weeks start reinitiating some of her home therapy and exercises and building back into it.  There is any " change in the interim she will let us know otherwise we will follow-up in 3 months.        We will plan for follow up as above.    All questions were answered.  Patient understands and agrees with the plan.    Jaspal Rice MD    08/22/2024    CC to Tomás Martines MD

## 2024-08-23 NOTE — PROGRESS NOTES
"Chief Complaint  Neck Pain (Pain level 5 would MRI )    Subjective        Ashley Motta presents to [facility]  History of Present Illness  Ashley Motta is a patient of Tomás Martines MD and presents today with complaints of pain level 5 in her neck and would like an MRI per Dr Martines recommendations at previous visit. She is not wanting referral she is not wanting pain management. Neck pain that radiates down her right arm. She has completed physical therapy two months ago. She is not doing her home stretches at this time. She is not taking Ibuprofen but she does take tylenol as needed and heat. She states the heat and Tylenol helps with the pan.     Objective   Vital Signs:  /80 (BP Location: Left arm, Patient Position: Sitting, Cuff Size: Adult)   Pulse 73   Temp 97.1 °F (36.2 °C) (Temporal)   Ht 160 cm (62.99\")   Wt 76.2 kg (168 lb)   SpO2 98%   BMI 29.77 kg/m²   Estimated body mass index is 29.77 kg/m² as calculated from the following:    Height as of this encounter: 160 cm (62.99\").    Weight as of this encounter: 76.2 kg (168 lb).            Physical Exam  Vitals reviewed.   Constitutional:       General: She is awake.      Appearance: Normal appearance.   HENT:      Head: Normocephalic.      Right Ear: Hearing normal.      Left Ear: Hearing normal.      Nose: Nose normal.      Mouth/Throat:      Lips: Pink.   Cardiovascular:      Rate and Rhythm: Normal rate and regular rhythm.      Pulses: Normal pulses.      Heart sounds: Normal heart sounds, S1 normal and S2 normal.   Pulmonary:      Effort: Pulmonary effort is normal.      Breath sounds: Normal breath sounds.   Abdominal:      General: Abdomen is flat. Bowel sounds are normal.      Palpations: Abdomen is soft.   Musculoskeletal:      Cervical back: Rigidity and torticollis present. No edema, erythema, signs of trauma or crepitus. Pain with movement present. Decreased range of motion.   Lymphadenopathy:      " Cervical: No cervical adenopathy.   Skin:     General: Skin is warm and dry.      Capillary Refill: Capillary refill takes less than 2 seconds.   Neurological:      General: No focal deficit present.      Mental Status: She is alert.   Psychiatric:         Mood and Affect: Mood normal.         Behavior: Behavior normal. Behavior is cooperative.        Result Review :  The following data was reviewed by: BERNICE Garcia on 08/23/2024:         XR Spine Cervical Complete 4 or 5 View (08/16/2024 9:57 AM)      Assessment and Plan   Diagnoses and all orders for this visit:    1. Cervical neck pain with evidence of disc disease (Primary)  -     MRI Cervical Spine Without Contrast; Future    Please continue with tylenol and heat for pain/ stiffness. Please make sure to follow up with Dr Bautista for follow up care.     **You should contact [phone] to schedule your appointment if you have not heard from them in the next two weeks.     Thank you for allowing us to care for you,  BERNICE Garcia           Follow Up   No follow-ups on file.  Patient was given instructions and counseling regarding her condition or for health maintenance advice. Please see specific information pulled into the AVS if appropriate.

## 2024-08-28 ENCOUNTER — OFFICE (OUTPATIENT)
Age: 84
End: 2024-08-28

## 2024-08-28 ENCOUNTER — OFFICE (OUTPATIENT)
Dept: URBAN - METROPOLITAN AREA CLINIC 76 | Facility: CLINIC | Age: 84
End: 2024-08-28

## 2024-08-28 VITALS
OXYGEN SATURATION: 97 % | HEIGHT: 63 IN | SYSTOLIC BLOOD PRESSURE: 126 MMHG | OXYGEN SATURATION: 97 % | HEIGHT: 63 IN | SYSTOLIC BLOOD PRESSURE: 126 MMHG | HEART RATE: 84 BPM | HEART RATE: 84 BPM | SYSTOLIC BLOOD PRESSURE: 126 MMHG | SYSTOLIC BLOOD PRESSURE: 126 MMHG | DIASTOLIC BLOOD PRESSURE: 80 MMHG | DIASTOLIC BLOOD PRESSURE: 80 MMHG | SYSTOLIC BLOOD PRESSURE: 126 MMHG | DIASTOLIC BLOOD PRESSURE: 80 MMHG | WEIGHT: 168 LBS | OXYGEN SATURATION: 97 % | WEIGHT: 168 LBS | DIASTOLIC BLOOD PRESSURE: 80 MMHG | OXYGEN SATURATION: 97 % | SYSTOLIC BLOOD PRESSURE: 126 MMHG | WEIGHT: 168 LBS | SYSTOLIC BLOOD PRESSURE: 126 MMHG | HEIGHT: 63 IN | OXYGEN SATURATION: 97 % | DIASTOLIC BLOOD PRESSURE: 80 MMHG | DIASTOLIC BLOOD PRESSURE: 80 MMHG | HEART RATE: 84 BPM | OXYGEN SATURATION: 97 % | HEIGHT: 63 IN | HEART RATE: 84 BPM | HEART RATE: 84 BPM | HEART RATE: 84 BPM | WEIGHT: 168 LBS | HEIGHT: 63 IN | OXYGEN SATURATION: 97 % | HEIGHT: 63 IN | WEIGHT: 168 LBS | HEART RATE: 84 BPM | DIASTOLIC BLOOD PRESSURE: 80 MMHG | HEIGHT: 63 IN | WEIGHT: 168 LBS | WEIGHT: 168 LBS

## 2024-08-28 DIAGNOSIS — K62.0 ANAL POLYP: ICD-10-CM

## 2024-08-28 DIAGNOSIS — K59.00 CONSTIPATION, UNSPECIFIED: ICD-10-CM

## 2024-08-28 DIAGNOSIS — K21.9 GASTRO-ESOPHAGEAL REFLUX DISEASE WITHOUT ESOPHAGITIS: ICD-10-CM

## 2024-08-28 PROCEDURE — 99204 OFFICE O/P NEW MOD 45 MIN: CPT | Performed by: NURSE PRACTITIONER

## 2024-09-14 NOTE — THERAPY PROGRESS REPORT/RE-CERT
Outpatient Physical Therapy Ortho Progress Note  [facility]     Patient Name: Ashley Motta  : 1940  MRN: [id number]  Today's Date: 2024      Visit Date: 2024    Visit Dx:    ICD-10-CM ICD-9-CM   1. Status post left knee replacement  Z96.652 V43.65   2. Orthopedic aftercare  Z47.89 V54.9   3. Decreased range of motion (ROM) of left knee  M25.662 719.56   4. Muscle weakness of lower extremity  M62.81 728.87   5. Difficulty walking  R26.2 719.7       Patient Active Problem List   Diagnosis    Osteopenia    Hyperlipidemia    Pre-diabetes    Osteoarthritis    BALDEMAR on CPAP    Pelvic relaxation due to vaginal prolapse    Chronic pain of both knees    Arthritis of right knee    Arthritis of left knee    Arthritis of both knees    Restless leg syndrome    Dizziness    Headache, migraine    Heartburn    Internal hemorrhoids with complication    Impacted cerumen of left ear    Vitamin E deficiency    Environmental and seasonal allergies    Chronic cough    Aftercare following joint replacement surgery    Allergic rhinitis, unspecified    Anxiety disorder, unspecified    Atopic dermatitis, unspecified    Constipation, unspecified    Localized osteoporosis without current pathological fracture    Gastro-esophageal reflux disease without esophagitis    Generalized muscle weakness    Iron deficiency anemia due to chronic blood loss    Personal history of other malignant neoplasm of skin    Presence of right artificial knee joint    Migraine, unspecified, not intractable, without status migrainosus    S/P total knee arthroplasty, left        Past Medical History:   Diagnosis Date    Anxiety     SITUATIONAL R/T DEATH OF HER SPOUSE    Balance problem     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 SARAH LIRIANO    Endothelial corneal dystrophy 2020    GERD (gastroesophageal reflux disease)     Goiter, nontoxic, multinodular 2017    THYROID POLYP SEES      No care due was identified.  [facility] Embedded Care Due Messages. Reference number: [id number].   9/14/2024 12:31:45 PM CDT   Hyperlipidemia     Impaired fasting glucose     Left knee pain     Migraine     BALDEMAR on CPAP     FOLLOWED BY DR. TERRY CHEUNG    Osteoarthritis     Osteopenia     Rectal nodule 03/2020    REFERRED TO DR. PAMELA NAPOLES    Rectocele 07/2017    RLS (restless legs syndrome)     Seborrheic keratosis     Skin cancer 04/2015    LOWER LEG, FOLLOWED BY DR. ATUL SANCHEZ    Vertigo         Past Surgical History:   Procedure Laterality Date    CATARACT EXTRACTION, BILATERAL Bilateral 2015    COLONOSCOPY N/A 02/27/2015    1 TUBULAR [facility]    COLONOSCOPY N/A 03/04/2020    10 MM SESSILE SERRATED ADENOMA POLYP IN ASCENDING, 3 MM HYPERPLASTIC POLYP IN RECTUM, 7 MM ANAL NODULE, DR. SARAH LIRIANO AT [facility]    HYSTERECTOMY N/A 01/19/2004    KAYLEE WITH BSO, DR. RAFIA MORTENSEN AT [facility]    KNEE ARTHROSCOPY Left 2013    KNEE ARTHROSCOPY Right 2010    MELISSA CULDOPLASTY N/A 01/19/2004    DR. RAFIA MORTENSEN AT [facility]    SACROCOLPOPEXY N/A     TOTAL KNEE ARTHROPLASTY Right 4/11/2023    Procedure: RIGHT TOTAL KNEE ARTHROPLASTY WITH SHERI NAVIGATION;  Surgeon: Atul Liriano MD;  Location: [facility] [place];  Service: Orthopedics;  Laterality: Right;    TOTAL KNEE ARTHROPLASTY Left 11/14/2023    Procedure: LEFT TOTAL KNEE ARTHROPLASTY WITH SHERI NAVIGATION;  Surgeon: Atul Liriano MD;  Location: [facility] [place];  Service: Orthopedics;  Laterality: Left;        PT Ortho       Row Name 01/05/24 0900       Left Lower Ext    Lt Knee Extension/Flexion AROM 0-120 supine  -              User Key  (r) = Recorded By, (t) = Taken By, (c) = Cosigned By      Initials Name Provider Type    Bebe Pedersen, PT Physical Therapist                                 PT Assessment/Plan       Row Name 01/05/24 1000          PT Assessment    Functional Limitations Impaired gait;Limitation in home management;Limitations in community activities;Limitations in functional capacity and performance;Performance  in leisure activities;Performance in self-care ADL;Decreased safety during functional activities  -     Impairments Range of motion;Pain;Muscle strength;Gait;Endurance;Balance;Posture;Poor body mechanics;Impaired flexibility;Joint mobility;Sensation;Impaired muscle length;Impaired muscle power  -     Assessment Comments Ashley Motta has been seen for 9 physical therapy sessions s/p L TKA 11/14/2023. Treatment has included therapeutic exercise, gait training, and patient education with home exercise program . Progress to physical therapy goals is good as pt. Has met 2/2 STGs, and 1/5 LTGs. She has transitioned from ambulation with rwx to no AD though does benefit from cueing to improve heel strike, longer step length and to reduce wide BRENDA. She demonstrates improved L knee AROM 0-120 in supine and navigates stairs in reciprocal pattern but does report increased pain with performance and requires increased time to complete and B use of handrails. Plan to reduce frequency to 1x/week for ~4 additional visits to progress confidence with gait, stairs and further increase strength/functional mobility. She will benefit from continued skilled physical therapy to address remaining impairments and functional limitations.  -     Please refer to paper survey for additional self-reported information No  -MH     Rehab Potential Good  -     Patient/caregiver participated in establishment of treatment plan and goals Yes  -     Patient would benefit from skilled therapy intervention Yes  -        PT Plan    PT Frequency 1x/week;2x/week  -     Predicted Duration of Therapy Intervention (PT) 6 visits  -     Planned CPT's? PT RE-EVAL: 00765;PT THER PROC EA 15 MIN: 99578;PT THER ACT EA 15 MIN: 90393;PT MANUAL THERAPY EA 15 MIN: 97267;PT NEUROMUSC RE-EDUCATION EA 15 MIN: 60961;PT GAIT TRAINING EA 15 MIN: 88149;PT SELF CARE/HOME MGMT/TRAIN EA 15: 38362;PT HOT OR COLD PACK TREAT MCARE  -     PT Plan Comments step down  "height?  -               User Key  (r) = Recorded By, (t) = Taken By, (c) = Cosigned By      Initials Name Provider Type     Bebe Carpenter, PT Physical Therapist                       OP Exercises       Row Name 01/05/24 0900             Subjective    Subjective Comments X-Rays looked good and I am trying without my walker today  -         Subjective Pain    Able to rate subjective pain? yes  -      Pre-Treatment Pain Level 4  -MH      Post-Treatment Pain Level 4  -MH         Total Minutes    54619 - PT Therapeutic Exercise Minutes 30  -MH      38993 - PT Therapeutic Activity Minutes 10  -MH         Exercise 1    Exercise Name 1 RCB  -MH      Cueing 1 Verbal  -MH      Reps 1 seat 6  -MH      Time 1 5 mins  -MH         Exercise 3    Exercise Name 3 knee flex at steps  -MH      Cueing 3 Verbal  -MH      Reps 3 5L  -MH      Time 3 20s  -MH         Exercise 4    Exercise Name 4 goals, outcome measure, objective measure update  -         Exercise 6    Exercise Name 6 heel raises  -MH      Cueing 6 Verbal;Demo  -MH      Sets 6 2  -MH      Reps 6 10  -MH      Time 6 2# AW  -MH         Exercise 7    Exercise Name 7 standing HS curls  -MH      Cueing 7 Verbal  -MH      Sets 7 2L  -MH      Reps 7 10  -MH      Time 7 2# AW  -MH         Exercise 8    Exercise Name 8 monster walk  -MH      Cueing 8 Verbal;Demo  -MH      Reps 8 4 laps  -MH      Time 8 RTB below knees  -         Exercise 12    Exercise Name 12 nicki nicki  -MH      Cueing 12 Verbal  -MH      Time 12 2 min  -MH         Exercise 15    Exercise Name 15 Fwd step ups / lateral step up  -MH      Cueing 15 Verbal  -MH      Sets 15 2B  -MH      Reps 15 10  -MH      Time 15 6\" 1 UE FWD, B UE lateral  -MH                User Key  (r) = Recorded By, (t) = Taken By, (c) = Cosigned By      Initials Name Provider Type     Bebe Carpenter, PT Physical Therapist                                  PT OP Goals       Row Name 01/05/24 0900          PT Short Term Goals    " STG Date to Achieve 01/04/24  -     STG 1 Pt will be independent with initial HEP to improve strength/ROM and decrease pain.  -     STG 1 Progress Met  -     STG 1 Progress Comments reports compliance  -     STG 2 Pt will improve L knee AROM from lacking 6-104 degrees to lacking 2-115 degrees to improve ability to navigate stairs.  -     STG 2 Progress Met  -     STG 2 Progress Comments see ortho  -        Long Term Goals    LTG Date to Achieve 02/03/24  -     LTG 1 Pt will be independent with advance HEP to improve strength/ROM and decrease pain.  -     LTG 1 Progress Ongoing  -     LTG 1 Progress Comments updating as appropriate  -     LTG 2 Pt will improve L knee AROM from lacking 6-104 to at least 0-120 deg to improve ability to navigate stairs.  -     LTG 2 Progress Met  -     LTG 2 Progress Comments see ortho  -     LTG 3 Pt will improve L knee flexion/extension strength to at least 4/5.  -     LTG 3 Progress Ongoing  -     LTG 4 Pt will be able to ascend/descend stairs in a reciprocal pattern with no increase pain.  -     LTG 4 Progress Partially Met  -     LTG 4 Progress Comments inreased pain when descending, slow but steady with B handrails  -     LTG 5 Pt will improve KOS score to 65% to show improved quality of life.  -     LTG 5 Progress Ongoing;Progressing  -     LTG 5 Progress Comments 51  -               User Key  (r) = Recorded By, (t) = Taken By, (c) = Cosigned By      Initials Name Provider Type     Bebe Carpenter, PT Physical Therapist                    Therapy Education  Education Details: Updated HEP Access Code: [email or web address]  Given: HEP, Symptoms/condition management  Program: Reinforced, Progressed  How Provided: Verbal, Demonstration, Written  Provided to: Patient  Level of Understanding: Verbalized, Demonstrated    Outcome Measure Options: Knee Outcome Score- ADL  Knee Outcome Survey Activities of Daily Living Scale  Symptoms: Pain: The symptom  affects my activity moderately  Symptoms: Stiffness: The symptom affects my activity moderately  Symptoms: Swelling: The symptom affects my activity moderately  Symptoms: Giving way, buckling, or shifting of the knee: I have the symptom, but it does not affect my activity  Symptoms: Weakness: The symptom affects my activity slightly  Symptoms: Limping: The symptom affects my activity slightly  Functional Limitations with ADL's: Walk: Activity is somewhat difficult  Functional Limitations with ADL's: Go up stairs: Activity is somewhat difficult  Functional Limitations with ADL's: Go down stairs: Activity is somewhat difficult  Functional Limitations with ADL's: Stand: Activity is somewhat difficult  Functional Limitations with ADL's: Kneel on front of your knee: Activity is fairly difficult  Functional Limitations with ADL's: Squat: Activity is fairly difficult  Functional Limitations with ADL's: Sit with your knee bent: Activity is fairly difficult  Functional Limitations with ADL's: Rise from a chair: Activity is fairly difficult  Knee Outcome Summary ADL's Score: 51.43 %      Time Calculation:   Start Time: 0944  Stop Time: 1024  Time Calculation (min): 40 min  Total Timed Code Minutes- PT: 40 minute(s)  Timed Charges  74256 - PT Therapeutic Exercise Minutes: 30  37242 - PT Therapeutic Activity Minutes: 10  Total Minutes  Timed Charges Total Minutes: 40   Total Minutes: 40  Therapy Charges for Today       Code Description Service Date Service Provider Modifiers Qty    49949021399 HC PT THERAPEUTIC ACT EA 15 MIN 1/5/2024 Bebe Carpenter, PT GP 1    [id number]  PT THER PROC EA 15 MIN 1/5/2024 Bebe Carpenter, PT GP 2            PT G-Codes  Outcome Measure Options: Knee Outcome Score- ADL         Bebe Carpenter PT  1/5/2024

## 2024-09-18 ENCOUNTER — OFFICE VISIT (OUTPATIENT)
Dept: INTERNAL MEDICINE | Facility: CLINIC | Age: 84
End: 2024-09-18
Payer: MEDICARE

## 2024-09-18 VITALS
BODY MASS INDEX: 29.8 KG/M2 | RESPIRATION RATE: 18 BRPM | DIASTOLIC BLOOD PRESSURE: 68 MMHG | OXYGEN SATURATION: 96 % | HEART RATE: 81 BPM | SYSTOLIC BLOOD PRESSURE: 128 MMHG | HEIGHT: 63 IN | WEIGHT: 168.2 LBS

## 2024-09-18 DIAGNOSIS — R73.03 PREDIABETES: Primary | Chronic | ICD-10-CM

## 2024-09-18 DIAGNOSIS — E78.2 MIXED HYPERLIPIDEMIA: Chronic | ICD-10-CM

## 2024-09-18 RX ORDER — NEOMYCIN SULFATE, POLYMYXIN B SULFATE, AND DEXAMETHASONE 3.5; 10000; 1 MG/G; [USP'U]/G; MG/G
0.1 OINTMENT OPHTHALMIC DAILY
COMMUNITY
Start: 2024-08-26

## 2024-09-24 ENCOUNTER — ON CAMPUS - OUTPATIENT (OUTPATIENT)
Age: 84
End: 2024-09-24

## 2024-09-24 ENCOUNTER — HOSPITAL ENCOUNTER (OUTPATIENT)
Facility: HOSPITAL | Age: 84
Setting detail: HOSPITAL OUTPATIENT SURGERY
Discharge: HOME | End: 2024-09-24
Attending: INTERNAL MEDICINE | Admitting: INTERNAL MEDICINE
Payer: MEDICARE

## 2024-09-24 ENCOUNTER — ANESTHESIA EVENT (OUTPATIENT)
Dept: GASTROENTEROLOGY | Facility: HOSPITAL | Age: 84
End: 2024-09-24
Payer: MEDICARE

## 2024-09-24 ENCOUNTER — ANESTHESIA (OUTPATIENT)
Dept: GASTROENTEROLOGY | Facility: HOSPITAL | Age: 84
End: 2024-09-24
Payer: MEDICARE

## 2024-09-24 ENCOUNTER — ON CAMPUS - OUTPATIENT (OUTPATIENT)
Dept: URBAN - METROPOLITAN AREA HOSPITAL 114 | Facility: HOSPITAL | Age: 84
End: 2024-09-24

## 2024-09-24 VITALS
OXYGEN SATURATION: 97 % | DIASTOLIC BLOOD PRESSURE: 76 MMHG | SYSTOLIC BLOOD PRESSURE: 137 MMHG | RESPIRATION RATE: 16 BRPM | HEART RATE: 78 BPM

## 2024-09-24 DIAGNOSIS — D12.3 BENIGN NEOPLASM OF TRANSVERSE COLON: ICD-10-CM

## 2024-09-24 DIAGNOSIS — K62.89 OTHER SPECIFIED DISEASES OF ANUS AND RECTUM: ICD-10-CM

## 2024-09-24 DIAGNOSIS — Z86.010 PERSONAL HISTORY OF COLON POLYPS: ICD-10-CM

## 2024-09-24 DIAGNOSIS — K57.30 DIVERTICULOSIS OF LARGE INTESTINE WITHOUT PERFORATION OR ABS: ICD-10-CM

## 2024-09-24 DIAGNOSIS — K64.1 SECOND DEGREE HEMORRHOIDS: ICD-10-CM

## 2024-09-24 DIAGNOSIS — Z09 ENCOUNTER FOR FOLLOW-UP EXAMINATION AFTER COMPLETED TREATMEN: ICD-10-CM

## 2024-09-24 DIAGNOSIS — Z86.010 HX OF COLONIC POLYP: ICD-10-CM

## 2024-09-24 PROCEDURE — 45381 COLONOSCOPY SUBMUCOUS NJX: CPT | Mod: PT | Performed by: INTERNAL MEDICINE

## 2024-09-24 PROCEDURE — 25010000002 PROPOFOL 200 MG/20ML EMULSION: Performed by: NURSE ANESTHETIST, CERTIFIED REGISTERED

## 2024-09-24 PROCEDURE — 45385 COLONOSCOPY W/LESION REMOVAL: CPT | Mod: PT | Performed by: INTERNAL MEDICINE

## 2024-09-24 PROCEDURE — 25810000003 LACTATED RINGERS PER 1000 ML: Performed by: INTERNAL MEDICINE

## 2024-09-24 PROCEDURE — 88305 TISSUE EXAM BY PATHOLOGIST: CPT | Performed by: INTERNAL MEDICINE

## 2024-09-24 PROCEDURE — 25010000002 GLYCOPYRROLATE 0.2 MG/ML SOLUTION: Performed by: NURSE ANESTHETIST, CERTIFIED REGISTERED

## 2024-09-24 PROCEDURE — 25010000002 PROPOFOL 1000 MG/100ML EMULSION: Performed by: NURSE ANESTHETIST, CERTIFIED REGISTERED

## 2024-09-24 DEVICE — DEV CLIP ENDO RESOLUTION360 CONTRL ROT 235CM BX/20: Type: IMPLANTABLE DEVICE | Site: TRANSVERSE COLON | Status: FUNCTIONAL

## 2024-09-24 RX ORDER — GLYCOPYRROLATE 0.2 MG/ML
INJECTION INTRAMUSCULAR; INTRAVENOUS AS NEEDED
Status: DISCONTINUED | OUTPATIENT
Start: 2024-09-24 | End: 2024-09-24 | Stop reason: SURG

## 2024-09-24 RX ORDER — PROPOFOL 10 MG/ML
INJECTION, EMULSION INTRAVENOUS AS NEEDED
Status: DISCONTINUED | OUTPATIENT
Start: 2024-09-24 | End: 2024-09-24 | Stop reason: SURG

## 2024-09-24 RX ORDER — PROPOFOL 10 MG/ML
INJECTION, EMULSION INTRAVENOUS CONTINUOUS PRN
Status: DISCONTINUED | OUTPATIENT
Start: 2024-09-24 | End: 2024-09-24 | Stop reason: SURG

## 2024-09-24 RX ORDER — LIDOCAINE HYDROCHLORIDE 20 MG/ML
INJECTION, SOLUTION INFILTRATION; PERINEURAL AS NEEDED
Status: DISCONTINUED | OUTPATIENT
Start: 2024-09-24 | End: 2024-09-24 | Stop reason: SURG

## 2024-09-24 RX ORDER — SODIUM CHLORIDE 0.9 % (FLUSH) 0.9 %
10 SYRINGE (ML) INJECTION AS NEEDED
Status: DISCONTINUED | OUTPATIENT
Start: 2024-09-24 | End: 2024-09-24 | Stop reason: HOSPADM

## 2024-09-24 RX ORDER — SODIUM CHLORIDE 9 MG/ML
40 INJECTION, SOLUTION INTRAVENOUS AS NEEDED
Status: DISCONTINUED | OUTPATIENT
Start: 2024-09-24 | End: 2024-09-24 | Stop reason: HOSPADM

## 2024-09-24 RX ORDER — SODIUM CHLORIDE 0.9 % (FLUSH) 0.9 %
10 SYRINGE (ML) INJECTION EVERY 12 HOURS SCHEDULED
Status: DISCONTINUED | OUTPATIENT
Start: 2024-09-24 | End: 2024-09-24 | Stop reason: HOSPADM

## 2024-09-24 RX ORDER — SODIUM CHLORIDE, SODIUM LACTATE, POTASSIUM CHLORIDE, CALCIUM CHLORIDE 600; 310; 30; 20 MG/100ML; MG/100ML; MG/100ML; MG/100ML
30 INJECTION, SOLUTION INTRAVENOUS CONTINUOUS PRN
Status: DISCONTINUED | OUTPATIENT
Start: 2024-09-24 | End: 2024-09-24 | Stop reason: HOSPADM

## 2024-09-24 RX ADMIN — PROPOFOL INJECTABLE EMULSION 40 MG: 10 INJECTION, EMULSION INTRAVENOUS at 11:32

## 2024-09-24 RX ADMIN — PROPOFOL INJECTABLE EMULSION 60 MG: 10 INJECTION, EMULSION INTRAVENOUS at 11:08

## 2024-09-24 RX ADMIN — PROPOFOL 100 MCG/KG/MIN: 10 INJECTION, EMULSION INTRAVENOUS at 11:08

## 2024-09-24 RX ADMIN — SODIUM CHLORIDE, POTASSIUM CHLORIDE, SODIUM LACTATE AND CALCIUM CHLORIDE 30 ML/HR: 600; 310; 30; 20 INJECTION, SOLUTION INTRAVENOUS at 10:46

## 2024-09-24 RX ADMIN — GLYCOPYRROLATE 0.2 MG: 0.2 INJECTION INTRAMUSCULAR; INTRAVENOUS at 11:10

## 2024-09-24 RX ADMIN — LIDOCAINE HYDROCHLORIDE 80 MG: 20 INJECTION, SOLUTION INFILTRATION; PERINEURAL at 11:08

## 2024-09-25 LAB
CYTO UR: NORMAL
LAB AP CASE REPORT: NORMAL
LAB AP CLINICAL INFORMATION: NORMAL
PATH REPORT.FINAL DX SPEC: NORMAL
PATH REPORT.GROSS SPEC: NORMAL

## 2024-10-04 ENCOUNTER — HOSPITAL ENCOUNTER (OUTPATIENT)
Facility: HOSPITAL | Age: 84
Discharge: HOME | End: 2024-10-04
Payer: MEDICARE

## 2024-10-04 DIAGNOSIS — M50.90 CERVICAL NECK PAIN WITH EVIDENCE OF DISC DISEASE: Chronic | ICD-10-CM

## 2024-10-04 PROCEDURE — 72141 MRI NECK SPINE W/O DYE: CPT

## 2024-10-08 ENCOUNTER — TELEPHONE (OUTPATIENT)
Dept: INTERNAL MEDICINE | Facility: CLINIC | Age: 84
End: 2024-10-08
Payer: MEDICARE

## 2024-10-08 NOTE — PROGRESS NOTES
Ms Kalia,   I have reviewed your MRI of your spine. There is multiple areas of narrowing ranging from moderate to severe with some stenosis. There is also degenerative disc changes. If you would like to discuss surgical options I can refer you to spine for further evaluation. Please let me know if this is how you would like to proceed with plan of care. You have an appointment with Dr Bautista to establish care and you can further discuss in detail at that time if you would like.  Please let me know what your thoughts are moving forward.   Thank you for allowing us to care for you,  BERNICE Garcia

## 2024-10-08 NOTE — TELEPHONE ENCOUNTER
----- Message from Loly Uribe sent at 10/7/2024  8:56 PM EDT -----  Ms Kalia,   I have reviewed your MRI of your spine. There is multiple areas of narrowing ranging from moderate to severe with some stenosis. There is also degenerative disc changes. If you would like to discuss surgical options I can refer you to spine for further evaluation. Please let me know if this is how you would like to proceed with plan of care. You have an appointment with Dr Bautista to establish care and you can further discuss in detail at that time if you would like.  Please let me know what your thoughts are moving forward.   Thank you for allowing us to care for you,  BERNICE Garcia

## 2024-10-08 NOTE — TELEPHONE ENCOUNTER
Hub staff attempted to follow warm transfer process and was unsuccessful     Caller: Ashley Motta    Relationship to patient: Self    Best call back number:     Patient is needing: I TRIED TO CALL THE OFFICE FOR THE PATIENT AS SHE WAS RETURNING A CALL.  SHE WANTS TO BE CALLED BACK TO DISCUSS TEST RESULTS. SHE WANTS TO BE CALLED TOMORROW MORNING

## 2024-10-08 NOTE — TELEPHONE ENCOUNTER
Called pt [phone] and lvm with MRI results in full and to call the office regarding next steps and to call the office back if she needs to have MA go over this is full.

## 2024-10-09 DIAGNOSIS — M54.2 CERVICAL PAIN (NECK): Primary | ICD-10-CM

## 2024-11-06 ENCOUNTER — OFFICE VISIT (OUTPATIENT)
Dept: INTERNAL MEDICINE | Facility: CLINIC | Age: 84
End: 2024-11-06
Payer: MEDICARE

## 2024-11-06 VITALS
WEIGHT: 170.2 LBS | SYSTOLIC BLOOD PRESSURE: 125 MMHG | BODY MASS INDEX: 31.32 KG/M2 | OXYGEN SATURATION: 96 % | HEART RATE: 81 BPM | HEIGHT: 62 IN | TEMPERATURE: 98.2 F | DIASTOLIC BLOOD PRESSURE: 68 MMHG

## 2024-11-06 DIAGNOSIS — B35.1 TOENAIL FUNGUS: ICD-10-CM

## 2024-11-06 DIAGNOSIS — L25.9 CONTACT DERMATITIS, UNSPECIFIED CONTACT DERMATITIS TYPE, UNSPECIFIED TRIGGER: Primary | ICD-10-CM

## 2024-11-06 PROCEDURE — 1125F AMNT PAIN NOTED PAIN PRSNT: CPT | Performed by: STUDENT IN AN ORGANIZED HEALTH CARE EDUCATION/TRAINING PROGRAM

## 2024-11-06 PROCEDURE — 99213 OFFICE O/P EST LOW 20 MIN: CPT | Performed by: STUDENT IN AN ORGANIZED HEALTH CARE EDUCATION/TRAINING PROGRAM

## 2024-11-06 RX ORDER — DIPHENHYDRAMINE HCL 25 MG
25 TABLET ORAL EVERY 6 HOURS PRN
Qty: 28 TABLET | Refills: 0 | Status: SHIPPED | OUTPATIENT
Start: 2024-11-06 | End: 2024-11-13

## 2024-11-06 NOTE — PROGRESS NOTES
"Chief Complaint  Abrasion and Mass (On the back and side of neck patient says they hurt and itch. )    Basilio Motta presents to [facility]  History of Present Illness  She is following up today regarding a couple spots she noticed on her neck that developed last night.  They are slightly painful and itchy.  They have not been draining.  She had a similar spot on her anterior neck after visiting her son last week that resolved on its own.  She does not remember any bites, no known bugs in the house.  No new exposures or creams.  She also would like referral to podiatry regarding a toenail fungus she has had off-and-on.    Objective   Vital Signs:  /68 (BP Location: Left arm, Patient Position: Sitting, Cuff Size: Adult)   Pulse 81   Temp 98.2 °F (36.8 °C) (Temporal)   Ht 157.5 cm (62\")   Wt 77.2 kg (170 lb 3.2 oz)   SpO2 96%   BMI 31.13 kg/m²   Estimated body mass index is 31.13 kg/m² as calculated from the following:    Height as of this encounter: 157.5 cm (62\").    Weight as of this encounter: 77.2 kg (170 lb 3.2 oz).            Physical Exam  Vitals and nursing note reviewed.   Constitutional:       General: She is not in acute distress.     Appearance: She is normal weight. She is not toxic-appearing.   Neck:      Comments: Two punctate lesions on anterior left neck and one similar lesion on posterior right neck without surrounding erythema warmth or drainage.  Pulmonary:      Effort: No respiratory distress.   Neurological:      Mental Status: She is alert and oriented to person, place, and time.   Psychiatric:         Mood and Affect: Mood normal.         Thought Content: Thought content normal.         Judgment: Judgment normal.        Result Review :                Assessment and Plan   Diagnoses and all orders for this visit:    1. Contact dermatitis, unspecified contact dermatitis type, unspecified trigger (Primary)    2. Toenail fungus  -     " Ambulatory Referral to Podiatry    Other orders  -     diphenhydrAMINE (Benadryl Allergy) 25 MG tablet; Take 1 tablet by mouth Every 6 (Six) Hours As Needed for Itching for up to 7 days.  Dispense: 28 tablet; Refill: 0    Advised patient to call with new or worsening symptoms such as fever trouble swallowing or new redness or warmth to these lesions.  She will advise if she identifies any bed bugs in the household, think this is less likely..         Follow Up   Return for Next scheduled follow up.  Patient was given instructions and counseling regarding her condition or for health maintenance advice. Please see specific information pulled into the AVS if appropriate.

## 2024-11-22 ENCOUNTER — OFFICE VISIT (OUTPATIENT)
Dept: ORTHOPEDIC SURGERY | Facility: CLINIC | Age: 84
End: 2024-11-22
Payer: MEDICARE

## 2024-11-22 VITALS — TEMPERATURE: 97.8 F | WEIGHT: 169.6 LBS | BODY MASS INDEX: 31.21 KG/M2 | HEIGHT: 62 IN

## 2024-11-22 DIAGNOSIS — Z96.652 S/P TKR (TOTAL KNEE REPLACEMENT), LEFT: Primary | ICD-10-CM

## 2024-11-24 NOTE — PROGRESS NOTES
"Patient: Ashley Motta  YOB: 1940 84 y.o. female  Medical Record Number: [id number]    Chief Complaints:   Chief Complaint   Patient presents with    Left Knee - Follow-up       History of Present Illness:Ashley Motta is a 84 y.o. female who presents for follow-up of left total knee.  She seen previously struggling little bit does not feel that the left is gotten as symptom-free as the right.  Again this was discussed she does feel that she is better today than she was when she was last seen.  She still able to function and perform all activities.    Allergies: No Known Allergies    Medications:   Current Outpatient Medications   Medication Sig Dispense Refill    multivitamin with minerals tablet tablet Take 1 tablet by mouth Daily.      neomycin-polymyxin-dexamethamethasone (POLYDEX) 3.5-25913-2.1 ointment ophthalmic ointment Administer 0.1 g to the right eye Daily.      pantoprazole (Protonix) 20 MG EC tablet Take 1 tablet by mouth Daily. Before dinner 90 tablet 4    simvastatin (ZOCOR) 20 MG tablet Take 1 tablet by mouth Every Night. 90 tablet 3    sodium chloride (MICHEL 128) 5 % ophthalmic solution Administer 1 drop to both eyes 2 (Two) Times a Day.       No current facility-administered medications for this visit.         The following portions of the patient's history were reviewed and updated as appropriate: allergies, current medications, past family history, past medical history, past social history, past surgical history and problem list.    Review of Systems:   A 14 point review of systems was performed. 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  I would give this more time she will try over-the-counter anti-inflammatory gel and icing and then build back into some of her therapy activities.  If she is struggling significantly regresses instructed to follow back up sooner otherwise follow-up in 1 year she expressed understanding.

## 2024-12-02 ENCOUNTER — OFFICE (OUTPATIENT)
Age: 84
End: 2024-12-02
Payer: MEDICARE

## 2024-12-02 ENCOUNTER — OFFICE (OUTPATIENT)
Dept: URBAN - METROPOLITAN AREA CLINIC 76 | Facility: CLINIC | Age: 84
End: 2024-12-02
Payer: MEDICARE

## 2024-12-02 VITALS
HEIGHT: 63 IN | HEART RATE: 75 BPM | WEIGHT: 172 LBS | DIASTOLIC BLOOD PRESSURE: 72 MMHG | OXYGEN SATURATION: 96 % | WEIGHT: 172 LBS | SYSTOLIC BLOOD PRESSURE: 120 MMHG | HEART RATE: 75 BPM | SYSTOLIC BLOOD PRESSURE: 120 MMHG | DIASTOLIC BLOOD PRESSURE: 72 MMHG | DIASTOLIC BLOOD PRESSURE: 72 MMHG | DIASTOLIC BLOOD PRESSURE: 72 MMHG | DIASTOLIC BLOOD PRESSURE: 72 MMHG | SYSTOLIC BLOOD PRESSURE: 120 MMHG | HEART RATE: 75 BPM | HEART RATE: 75 BPM | SYSTOLIC BLOOD PRESSURE: 120 MMHG | SYSTOLIC BLOOD PRESSURE: 120 MMHG | WEIGHT: 172 LBS | HEART RATE: 75 BPM | HEIGHT: 63 IN | HEART RATE: 75 BPM | DIASTOLIC BLOOD PRESSURE: 72 MMHG | DIASTOLIC BLOOD PRESSURE: 72 MMHG | OXYGEN SATURATION: 96 % | HEART RATE: 75 BPM | OXYGEN SATURATION: 96 % | OXYGEN SATURATION: 96 % | HEIGHT: 63 IN | SYSTOLIC BLOOD PRESSURE: 120 MMHG | OXYGEN SATURATION: 96 % | HEIGHT: 63 IN | HEIGHT: 63 IN | SYSTOLIC BLOOD PRESSURE: 120 MMHG | WEIGHT: 172 LBS | WEIGHT: 172 LBS | OXYGEN SATURATION: 96 % | HEIGHT: 63 IN | OXYGEN SATURATION: 96 % | WEIGHT: 172 LBS | WEIGHT: 172 LBS | HEIGHT: 63 IN

## 2024-12-02 DIAGNOSIS — K59.00 CONSTIPATION, UNSPECIFIED: ICD-10-CM

## 2024-12-02 DIAGNOSIS — K21.9 GASTRO-ESOPHAGEAL REFLUX DISEASE WITHOUT ESOPHAGITIS: ICD-10-CM

## 2024-12-02 DIAGNOSIS — K62.0 ANAL POLYP: ICD-10-CM

## 2024-12-02 PROCEDURE — 99213 OFFICE O/P EST LOW 20 MIN: CPT | Performed by: NURSE PRACTITIONER

## 2024-12-02 RX ORDER — PANTOPRAZOLE SODIUM 40 MG/1
40 TABLET, DELAYED RELEASE ORAL
Qty: 30 | Refills: 11 | Status: ACTIVE
Start: 2024-12-02

## 2024-12-13 ENCOUNTER — OFFICE VISIT (OUTPATIENT)
Dept: NEUROSURGERY | Facility: CLINIC | Age: 84
End: 2024-12-13
Payer: MEDICARE

## 2024-12-13 ENCOUNTER — PATIENT ROUNDING (BHMG ONLY) (OUTPATIENT)
Dept: NEUROSURGERY | Facility: CLINIC | Age: 84
End: 2024-12-13
Payer: MEDICARE

## 2024-12-13 VITALS
HEIGHT: 62 IN | RESPIRATION RATE: 18 BRPM | HEART RATE: 79 BPM | OXYGEN SATURATION: 96 % | WEIGHT: 171 LBS | BODY MASS INDEX: 31.47 KG/M2

## 2024-12-13 DIAGNOSIS — M50.30 DDD (DEGENERATIVE DISC DISEASE), CERVICAL: ICD-10-CM

## 2024-12-13 DIAGNOSIS — M54.12 CERVICAL RADICULOPATHY: Primary | ICD-10-CM

## 2024-12-13 RX ORDER — ASPIRIN 81 MG/1
81 TABLET ORAL DAILY
COMMUNITY

## 2024-12-13 NOTE — PROGRESS NOTES
"Subjective   History of Present Illness: Ashley Motta is a 84 y.o. female is being seen for consultation today at the request of BERNICE Garcia Today patient reports of neck pain.  Patient has about a year history of some neck pain as well as some tingling and numbness down her right upper extremity sometimes to her hand.  She has undergone massage therapy for this with some improvement.  She has not tried physical therapy or injections.  Patient denies any other weakness or pain.  She has had some worsening difficulty with dropping things.  Overall her symptoms are tolerable.          Previous treatment:     Previous neurosurgery:      Previous injections:     The following portions of the patient's history were reviewed and updated as appropriate: allergies, current medications, past family history, past medical history, past social history, past surgical history, and problem list.    Review of Systems   Constitutional:  Positive for activity change.   Musculoskeletal:  Positive for neck pain.   Neurological:  Positive for numbness.       Objective      Pulse 79   Resp 18   Ht 157.5 cm (62\")   Wt 77.6 kg (171 lb)   SpO2 96%   BMI 31.28 kg/m²    Body mass index is 31.28 kg/m².  Vitals:    12/13/24 0920   PainSc:   2   PainLoc: Neck         Neurological Exam  Mental Status  Awake, alert and oriented to person, place and time.    Motor   Strength is 5/5 throughout all four extremities.    Sensory  Sensation is intact to light touch, pinprick, vibration and proprioception in all four extremities.    Reflexes    Right pathological reflexes: Elizabeth's absent.  Left pathological reflexes: Elizabeth's absent.          Assessment & Plan   Independent Review of Radiographic Studies:      I personally reviewed and interpreted the images from the following studies.    MRI cervical spine: Multilevel degenerative changes.  There is some foraminal stenosis most severe at on the right at C4-5 and C6-7 where the " stenosis is moderate.  No high-grade central stenosis and no spinal cord compression or cord signal change    Medical Decision Making:      Ashley Motta is a 84 y.o. female with a year history of neck pain and some right upper extremity symptoms concerning for radiculopathy with degenerative changes throughout her cervical spine and some foraminal stenosis on the right at C4-5 and C6-7.  Her symptoms are tolerable at this point, and surgery is not indicated.  I discussed with her conservative measures and she would like to proceed with physical therapy.  If she would like to try epidural injections in the future she can follow-up with her primary care doctor for referral to pain management.  Otherwise I will see her back as needed      Diagnoses and all orders for this visit:    1. Cervical radiculopathy (Primary)    2. DDD (degenerative disc disease), cervical      No follow-ups on file.    This patient was examined wearing appropriate personal protective equipment.                      Dr. Wilfredo Hernandez IV    12/13/24  09:40 EST

## 2025-01-09 ENCOUNTER — PATIENT OUTREACH (OUTPATIENT)
Dept: CASE MANAGEMENT | Facility: OTHER | Age: 85
End: 2025-01-09
Payer: MEDICARE

## 2025-01-09 DIAGNOSIS — R73.03 PREDIABETES: Chronic | ICD-10-CM

## 2025-01-09 DIAGNOSIS — M19.09 OSTEOARTHRITIS OF OTHER SITE, UNSPECIFIED OSTEOARTHRITIS TYPE: Primary | ICD-10-CM

## 2025-01-09 NOTE — OUTREACH NOTE
AMBULATORY CASE MANAGEMENT NOTE    Names and Relationships of Patient/Support Persons: Contact: Ashley Motta; Relationship: Self -     Patient Outreach    Spoke with patient. Introduced self and explained role. Offered patient CM assistance with maintaining her health and wellness through the HRCM program. Discussed the purpose, goals, and expectations of the program. Patient agreed to enroll in the HRCM program.     Patient states she is having pain in her neck. She states she had massage therapy already and is scheduled to start physical therapy next week. She states she saw a neurosurgeon and the doctor did not recommend surgery.     Patient inquired if [facility] offered any diabetes classes. Patient is Prediabetic. Her last A1c was 6.3%. advised that [facility] does have diabetes classes. Offered to have PCP send a referral or ACM could send her some dietary handouts. Patient agreed with ACM mailing her some dietary handouts and information about diabetes management.     Patient provided with ACM's phone number. Next outreach scheduled.         Aleisha ARMSTRONG  Ambulatory Case Management    1/9/2025, 13:54 EST

## 2025-01-14 ENCOUNTER — HOSPITAL ENCOUNTER (OUTPATIENT)
Dept: PHYSICAL THERAPY | Facility: HOSPITAL | Age: 85
Setting detail: THERAPIES SERIES
Discharge: HOME | End: 2025-01-14
Payer: MEDICARE

## 2025-01-14 DIAGNOSIS — M54.2 NECK PAIN: Primary | ICD-10-CM

## 2025-01-14 DIAGNOSIS — M54.12 CERVICAL RADICULOPATHY: ICD-10-CM

## 2025-01-14 DIAGNOSIS — R20.0 NUMBNESS AND TINGLING OF RIGHT ARM: ICD-10-CM

## 2025-01-14 DIAGNOSIS — R20.2 NUMBNESS AND TINGLING OF RIGHT ARM: ICD-10-CM

## 2025-01-14 PROCEDURE — 97110 THERAPEUTIC EXERCISES: CPT

## 2025-01-14 PROCEDURE — 97161 PT EVAL LOW COMPLEX 20 MIN: CPT

## 2025-01-14 NOTE — THERAPY EVALUATION
Outpatient Physical Therapy Ortho Initial Evaluation  [facility]     Patient Name: Ashley Motta  : 1940  MRN: [id number]  Today's Date: 2025      Visit Date: 2025    Patient Active Problem List   Diagnosis    Osteopenia    Hyperlipidemia    Prediabetes    Osteoarthritis    BALDEMAR on CPAP    Pelvic relaxation due to vaginal prolapse    Chronic pain of both knees    Arthritis of right knee    Arthritis of left knee    Arthritis of both knees    Restless leg syndrome    Dizziness    Headache, migraine    Heartburn    Internal hemorrhoids with complication    Impacted cerumen of left ear    Vitamin E deficiency    Environmental and seasonal allergies    Chronic cough    Aftercare following joint replacement surgery    Allergic rhinitis, unspecified    Anxiety disorder, unspecified    Atopic dermatitis, unspecified    Constipation, unspecified    Localized osteoporosis without current pathological fracture    Gastro-esophageal reflux disease without esophagitis    Generalized muscle weakness    Iron deficiency anemia due to chronic blood loss    Personal history of other malignant neoplasm of skin    Presence of right artificial knee joint    Migraine, unspecified, not intractable, without status migrainosus    S/P total knee arthroplasty, left    Benign neoplasm of unspecified choroid    Disorder of bone density and structure, unspecified    Localized osteoporosis ([place])    Long term (current) use of anticoagulants        Past Medical History:   Diagnosis Date    Anxiety     SITUATIONAL R/T DEATH OF HER SPOUSE    Balance problem     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 SARAH LIRIANO    Endothelial corneal dystrophy 2020    GERD (gastroesophageal reflux disease)     Goiter, nontoxic, multinodular 2017    THYROID POLYP SEES     Hyperlipidemia     Impaired fasting glucose     Left knee pain     Migraine     BALDEMAR on CPAP     FOLLOWED BY DR. STEWART  SAYIED    Osteoarthritis     Osteopenia     Rectal nodule 03/2020    REFERRED TO DR. PAMELA NAPOLES    Rectocele 07/2017    RLS (restless legs syndrome)     Seborrheic keratosis     Skin cancer 04/2015    LOWER LEG, FOLLOWED BY DR. ATUL SANCHEZ    Vertigo         Past Surgical History:   Procedure Laterality Date    CATARACT EXTRACTION, BILATERAL Bilateral 2015    COLONOSCOPY N/A 02/27/2015    1 TUBULAR ADENOMA [facility]YP IN DIST[facility]END[facility]    COLONOSCOPY N/A 03/04/2020    10 MM SESSILE SERRATED ADENOMA POLYP IN ASCENDING, 3 MM HYPERPLASTIC POLYP IN RECTUM, 7 MM ANAL NODULE, DR. DARRIAN LIRIANO AT [facility]    COLONOSCOPY N/A 9/24/2024    Procedure: COLONOSCOPY into cecum and TI with saline lift injection and hot snare polypectomy with x3 clips placed;  Surgeon: Darrian Liriano MD;  Location: [facility] ENDOSCOPY;  Service: Gastroenterology;  Laterality: N/A;  pre: hx of colon polyps   post:: polyp, diverticulosis    HYSTERECTOMY N/A 01/19/2004    KAYLEE WITH BSO, DR. RAFIA MORTENSEN AT [facility]    KNEE ARTHROSCOPY Left 2013    KNEE ARTHROSCOPY Right 2010    MELISSA CULDOPLASTY N/A 01/19/2004    DR. RAFIA MORTENSEN AT [facility]    SACROCOLPOPEXY N/A     TOTAL KNEE ARTHROPLASTY Right 4/11/2023    Procedure: RIGHT TOTAL KNEE ARTHROPLASTY WITH SHERI NAVIGATION;  Surgeon: Atul Liriano MD;  Location: [facility] OR [facility] [place];  Service: Orthopedics;  Laterality: Right;    TOTAL KNEE ARTHROPLASTY Left 11/14/2023    Procedure: LEFT TOTAL KNEE ARTHROPLASTY WITH SHERI NAVIGATION;  Surgeon: Atul Liriano MD;  Location: [facility] OR [facility] [place];  Service: Orthopedics;  Laterality: Left;       Visit Dx:     ICD-10-CM ICD-9-CM   1. Neck pain  M54.2 723.1   2. Cervical radiculopathy  M54.12 723.4   3. Numbness and tingling of right arm  R20.0 782.0    R20.2           Patient History       Row Name 01/14/25 1400             History    Chief Complaint Pain;Numbness;Tinglings  -DR      Type of Pain Neck pain  -DR      Date Current  Problem(s) Began --  at least 6 months  -      Brief Description of Current Complaint Ashley Motta is a RHD 84 y.o. female who presents to physical therapy today with primary compliant of neck pain that began 1 year ago. C/o N/T down R UE extending down into digits 2-5, intermittent, worst when arm is at side. MRI completed showing multilevel degenerative changes, some foraminal stenosis C4-7. Negative for high-grade central stenosis and no spinal cord compression or cord signal change. Has not had PT or injections in the past, only massage therapy with Dr. Martines. She has had some worsening difficulty with dropping things. Ashley Motta reports difficulty/increased pain with keeping arm at side, rotating head to R, looking down to read. Pain relieving factors include propping arm on armrest. PMH includes HLD, pre-DM, OP, GERD, anemia, s/p L TKA 2023. Ashley Motta primary goal for attending skilled physical therapy is to relieve pain.  -      Previous treatment for THIS PROBLEM --  no PT or injections  -      Patient/Caregiver Goals Relieve pain;Return to prior level of function;Improve mobility;Improve strength;Know what to do to help the symptoms  -      Hand Dominance right-handed  -      Occupation/sports/leisure activities Retired, watches TV, walking  -      What clinical tests have you had for this problem? MRI  -DR      Results of Clinical Tests Multilevel degenerative changes.  There is some foraminal stenosis most severe at on the right at C4-5 and C6-7 where the stenosis is moderate.  No high-grade central stenosis and no spinal cord compression or cord signal change  -DR         Pain     Pain Location Neck  -DR      Pain at Present 4  -DR      Pain at Best 2  -DR      Pain at Worst 5  -DR      Pain Frequency Constant/continuous;Intermittent;Several days a week  -DR      Pain Description Aching  -DR      What Performance Factors Make the Current Problem(s) WORSE? keeping arm at side, rotating  head to R, looking down to read  -DR      What Performance Factors Make the Current Problem(s) BETTER? propping arm up  -DR      Is your sleep disturbed? No  -DR      Difficulties with ADL's? independent, able to do all  -DR         Fall Risk Assessment    Any falls in the past year: No  -DR         Services    Prior Rehab/Home Health Experiences No  -DR         Daily Activities    Primary Language English  -DR      How does patient learn best? Demonstration;Pictures/Video;Listening;Reading  -DR      Barriers to learning None  -DR      Pt Participated in POC and Goals Yes  -DR         Safety    Are you being hurt, hit, or frightened by anyone at home or in your life? No  -DR      Are you being neglected by a caregiver No  -DR      Have you had any of the following issues with N/A  -DR                User Key  (r) = Recorded By, (t) = Taken By, (c) = Cosigned By      Initials Name Provider Type    Jurgen Lopez, PT Physical Therapist                     PT Ortho       Row Name 01/14/25 1400       Posture/Observations    Alignment Options Cervical lordosis;Forward head;Rounded shoulders  -DR    Forward Head Mild  -DR    Cervical Lordosis Decreased  -DR    Rounded Shoulders Moderate  -DR       Quarter Clearing    Quarter Clearing Upper Quarter Clearing  -DR       Sensory Screen for Light Touch- Upper Quarter Clearing    C4 (posterior shoulder) Right:;Diminished  -DR    C5 (lateral upper arm) Right:;Diminished  -DR    C6 (tip of thumb) Right:;Diminished  -DR    C7 (tip of 3rd finger) Right:;Diminished  -DR    C8 (tip of 5th finger) Right:;Diminished  -DR    T1 (medial lower arm) Right:;Diminished  -DR       Myotomal Screen- Upper Quarter Clearing    Elbow flexion/wrist extension (C6) Bilateral:;4+ (Good +)  -DR    Elbow extension/wrist flexion (C7) Right:;4 (Good);Left:;4+ (Good +)  -DR    Finger flexion/ (C8) Bilateral:;5 (Normal)  -DR    Finger abduction (T1) Bilateral:;5 (Normal)  -DR        Cervical/Shoulder ROM Screen    Cervical flexion Normal  60 deg  -    Cervical extension Impaired  30 deg, worsened N/T into R forearm  -    Cervical lateral flexion Impaired  10 deg bilaterally, compensation with ipsilat rotation  -    Cervical rotation Impaired  30 deg bilaterally, pain with R rot  -DR       Special Tests/Palpation    Special Tests/Palpation Cervical/Thoracic  -DR       Cervical Palpation    Cervical Palpation- Location? Spinous process;Upper traps;Levator scapula;Suboccipital  -    Suboccipital Bilateral:;Tender;Guarded/taut  -    Spinous Process Bilateral:;Tender  C2-7 TTP with grade 1 pressure with reports of pain  -    Levator Scapula Bilateral:;Tender;Guarded/taut;Trigger point  -    Upper Traps Bilateral:;Tender;Guarded/taut;Trigger point  -       Cervical Accessory Motions    Cervical Accessory Motions Tested? Yes  central PA and sideglides- hypermobile and pain on R side with grade 1 mobilization  -       Thoracic Accessory Motions    Thoracic Accessory Motions Tested? Yes  -DR Dumont glide- Upper thoracic Center:;Right pain;Hypomobile  -       Cervical/Thoracic Special Tests    Cervical Distraction (Foraminal Compression vs. Facet Pain) Right:;Positive  -       General ROM    GENERAL ROM COMMENTS WFL AROM B shoulders-flex, abd, ER, IR. Pain in shoulder R>L with movements  -       MMT (Manual Muscle Testing)    Rt Upper Ext --  -DR    Lt Upper Ext --  -DR        Strength Right    Right  Test 1 5  -DR    Right  Test 2 8  -DR    Right  Test 3 6  -DR     Strength Average Right 6.33  -DR        Strength Left    Left  Test 1 20  -DR    Left  Test 2 15  -DR    Left  Test 3 15  -DR     Strength Average Left 16.67  -DR       Sensation    Light Touch Partial deficits in the RUE  -       Flexibility    Flexibility Tested? Upper Extremity  -       Upper Extremity Flexibility    Suboccipitals Bilateral:;Moderately limited  -    Upper  Trapezius Bilateral:;Moderately limited;Severely limited  -    Levator Scapula Bilateral:;Moderately limited;Severely limited  -    Pect Minor Bilateral:;Mildly limited  -    Pect Major Bilateral:;Mildly limited  -       Hand  Strength     Strength Affected Side Right;Left  -              User Key  (r) = Recorded By, (t) = Taken By, (c) = Cosigned By      Initials Name Provider Type    Jurgen Lopez, PT Physical Therapist                                Therapy Education  Education Details: Educated on PT role and POC; discussed expectations/timeframe for healing. Provided HEP, Access Code: [email or web address]  Given: HEP, Symptoms/condition management  Program: New  How Provided: Verbal, Demonstration, Written  Provided to: Patient  Level of Understanding: Teach back education performed, Verbalized, Demonstrated      PT OP Goals       Row Name 01/14/25 1400          PT Short Term Goals    STG Date to Achieve 02/13/25  -     STG 1 Pt will be independent with initial HEP and postural awareness to improve pain and symptoms.  -     STG 1 Progress New  -     STG 2 Pt to be educated in/verbalize understanding of the importance of posture/ergonomics in association with their condition to facilitate self management of their condition.  -     STG 2 Progress New  -     STG 3 Pt will improve cervical rotation AROM from 30 degrees to 45 degrees bilaterally to improve ability to drive safely.  -     STG 3 Progress New  -     STG 4 Pt will report 50% reduction in R UE N/T symptoms to demonstrate improved management of symptoms.  -     STG 4 Progress New  -        Long Term Goals    LTG Date to Achieve 03/15/25  -     LTG 1 Pt will be independent with advance HEP and postural awareness for continued management of pain and symptoms.  -     LTG 1 Progress New  -     LTG 2 Pt will improve NDI perceived disability from 10/50 to 3/50 to improve overall quality of life.  -     LTG 2 Progress New   -DR PEÑAG 3 Pt will decrease neck pain from 5/10 to </= 1/10 to improve participation in ADLs.  -DR PEÑAG 3 Progress New  -     LTG 4 Pt will demonstrate bilateral  strength to 25# in order to increase ability to perform fine motor tasks including gripping and writing while participating in work related duties.  -DR PEÑAG 4 Progress New  -        Time Calculation    PT Goal Re-Cert Due Date 04/14/25  -               User Key  (r) = Recorded By, (t) = Taken By, (c) = Cosigned By      Initials Name Provider Type    Jurgen Lopez, PT Physical Therapist                     PT Assessment/Plan       Row Name 01/14/25 1400          PT Assessment    Functional Limitations Decreased safety during functional activities;Limitation in home management;Limitations in community activities;Limitations in functional capacity and performance;Performance in leisure activities;Performance in self-care ADL;Impaired gait  -DR     Impairments Pain;Range of motion;Posture;Poor body mechanics;Muscle strength;Gait;Endurance;Joint mobility;Joint integrity;Impaired flexibility;Sensation;Peripheral nerve integrity  -     Assessment Comments Ashley Motta is a RHD 84 y.o. year-old female referred to physical therapy for neck pain. C/o N/T down R UE extending down into digits 2-5, intermittent, worst when arm is at side. She presents with a stable clinical presentation. MRI completed showing multilevel degenerative changes, some foraminal stenosis C4-7. Pertinent comorbidities and personal factors that may affect progress in the plan of care include, but are not limited to, HLD, pre-DM, OP, GERD, anemia, s/p L TKA 2023, chronicity of symptoms with learned compensatory habits, imaging. Self scored disability measure of NDI was a 10/50, where 0 is no perceived disability. She has had some worsening difficulty with dropping things. Ashley Motta reports difficulty/increased pain with keeping arm at side, rotating head to R,  looking down to read. She demonstrated impaired postural alignment, impaired UE flexibility, partial sensation deficits of light touch throughout full R UE, impaired  strength R>L, WFL shoulder AROM bilaterally with pain in R, hypomobile and painful central and R-sided cervical and upper thoracic accessory motions, TTP cervical region, impaired cervical AROM with pain to R side, and (+) cervical distraction test on R UE. Signs and symptoms are consistent with referring diagnosis. She is appropriate for skilled therapy services at this time to address deficits and improve ease with ADLs and household activities.  -     Please refer to paper survey for additional self-reported information No  -DR     Rehab Potential Good  -DR     Patient/caregiver participated in establishment of treatment plan and goals Yes  -DR     Patient would benefit from skilled therapy intervention Yes  -DR        PT Plan    PT Frequency 2x/week  -     Predicted Duration of Therapy Intervention (PT) 8-10 visits  -     Planned CPT's? PT EVAL LOW COMPLEXITY: 10507;PT RE-EVAL: 39266;PT THER PROC EA 15 MIN: 47137;PT THER ACT EA 15 MIN: 13197;PT MANUAL THERAPY EA 15 MIN: 98931;PT NEUROMUSC RE-EDUCATION EA 15 MIN: 83960;PT GAIT TRAINING EA 15 MIN: 34082;PT SELF CARE/HOME MGMT/TRAIN EA 15: 94698;PT HOT OR COLD PACK TREAT CORINNA  -     PT Plan Comments assess pt tolerance to eval and compliance with HEP; begin UBE if tolerable, review HEP. manual therapy-cervical distraction, suboccipital release, gentle PA mobs c/s. consider introducing supine chin tucks, LS stretch, rev shoulder rolls  for relaxation technique, nerve glides?  -               User Key  (r) = Recorded By, (t) = Taken By, (c) = Cosigned By      Initials Name Provider Type    Jurgen Lopez, PT Physical Therapist                       OP Exercises       Row Name 01/14/25 1400             Subjective    Subjective Comments eval  -DR         Total Minutes    42868 - PT  Therapeutic Exercise Minutes 8  -DR         Exercise 1    Exercise Name 1 UBE  -DR      Additional Comments next visit  -DR         Exercise 2    Exercise Name 2 60 deg doorway str  -DR      Cueing 2 Demo  -DR      Sets 2 3  -DR      Reps 2 20s  -DR         Exercise 3    Exercise Name 3 UT stretch  -DR      Cueing 3 Verbal  -DR      Reps 3 3  -DR      Time 3 20s  -DR         Exercise 4    Exercise Name 4 scap retract  -DR      Cueing 4 Verbal;Demo  -DR      Sets 4 2  -DR      Reps 4 10  -DR      Time 4 5s  -DR                User Key  (r) = Recorded By, (t) = Taken By, (c) = Cosigned By      Initials Name Provider Type    Jurgen Lopez, PT Physical Therapist                                  Outcome Measure Options: Neck Disability Index (NDI)  Neck Disability Index  Section 1 - Pain Intensity: The pain comes and goes and is moderate.  Section 2 - Personal Care: I can look after myself normally, but it causes extra pain.  Section 3 - Lifting: I can lift heavy weights, but it causes extra pain.  Section 4 - Reading: I can read as much as I want with slight neck pain.  Section 5 - Headaches: I have slight headaches that come infrequently.  Section 6 - Concentration: I can concentrate fully when I want to with slight difficulty.  Section 7 - Work: I can do as much work as I want.  Section 8 - Driving: I can drive as long as I want with slight neck pain.  Section 9 - Sleeping: My sleep is slightly disturbed (less than 1 hour sleepless).  Section 10 - Recreation: I am able to engage in all recreational activities with some pain in my neck.  Neck Disability Index Score: 10      Time Calculation:     Start Time: 1445  Stop Time: 1525  Time Calculation (min): 40 min  Timed Charges  46684 - PT Therapeutic Exercise Minutes: 8  Total Minutes  Timed Charges Total Minutes: 8   Total Minutes: 8     Therapy Charges for Today       Code Description Service Date Service Provider Modifiers Qty    [id number]  PT THER PROC EA  15 MIN 1/14/2025 Jurgen Alcantara, PT GP 1    [id number] HC PT EVAL LOW COMPLEXITY 2 1/14/2025 Jurgen Alcantara, PT GP 1            PT G-Codes  Outcome Measure Options: Neck Disability Index (NDI)  Neck Disability Index Score: 10         Jurgen Alcantara, PT  1/14/2025

## 2025-01-21 ENCOUNTER — HOSPITAL ENCOUNTER (OUTPATIENT)
Dept: PHYSICAL THERAPY | Facility: HOSPITAL | Age: 85
Setting detail: THERAPIES SERIES
Discharge: HOME | End: 2025-01-21
Payer: MEDICARE

## 2025-01-21 DIAGNOSIS — M62.838 TRAPEZIUS MUSCLE SPASM: ICD-10-CM

## 2025-01-21 DIAGNOSIS — M54.2 NECK PAIN: Primary | ICD-10-CM

## 2025-01-21 DIAGNOSIS — R20.2 NUMBNESS AND TINGLING OF RIGHT ARM: ICD-10-CM

## 2025-01-21 DIAGNOSIS — M54.12 CERVICAL RADICULOPATHY: ICD-10-CM

## 2025-01-21 DIAGNOSIS — R20.0 NUMBNESS AND TINGLING OF RIGHT ARM: ICD-10-CM

## 2025-01-21 PROCEDURE — 97140 MANUAL THERAPY 1/> REGIONS: CPT

## 2025-01-21 PROCEDURE — 97110 THERAPEUTIC EXERCISES: CPT

## 2025-01-21 NOTE — THERAPY TREATMENT NOTE
Outpatient Physical Therapy Ortho Treatment Note  [facility]     Patient Name: Ashley Motta  : 1940  MRN: [id number]  Today's Date: 2025      Visit Date: 2025    Visit Dx:    ICD-10-CM ICD-9-CM   1. Neck pain  M54.2 723.1   2. Cervical radiculopathy  M54.12 723.4   3. Numbness and tingling of right arm  R20.0 782.0    R20.2    4. Trapezius muscle spasm  M62.838 728.85       Patient Active Problem List   Diagnosis    Osteopenia    Hyperlipidemia    Prediabetes    Osteoarthritis    BALDEMAR on CPAP    Pelvic relaxation due to vaginal prolapse    Chronic pain of both knees    Arthritis of right knee    Arthritis of left knee    Arthritis of both knees    Restless leg syndrome    Dizziness    Headache, migraine    Heartburn    Internal hemorrhoids with complication    Impacted cerumen of left ear    Vitamin E deficiency    Environmental and seasonal allergies    Chronic cough    Aftercare following joint replacement surgery    Allergic rhinitis, unspecified    Anxiety disorder, unspecified    Atopic dermatitis, unspecified    Constipation, unspecified    Localized osteoporosis without current pathological fracture    Gastro-esophageal reflux disease without esophagitis    Generalized muscle weakness    Iron deficiency anemia due to chronic blood loss    Personal history of other malignant neoplasm of skin    Presence of right artificial knee joint    Migraine, unspecified, not intractable, without status migrainosus    S/P total knee arthroplasty, left    Benign neoplasm of unspecified choroid    Disorder of bone density and structure, unspecified    Localized osteoporosis ([place])    Long term (current) use of anticoagulants        Past Medical History:   Diagnosis Date    Anxiety     SITUATIONAL R/T DEATH OF HER SPOUSE    Balance problem     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 TERRY CHEUNG    Osteoarthritis     Osteopenia     Rectal nodule 03/2020    REFERRED TO DR. PAMELA NAPOLES    Rectocele 07/2017    RLS (restless legs syndrome)     Seborrheic keratosis     Skin cancer 04/2015    LOWER LEG, FOLLOWED BY DR. ATUL SANCHEZ    Vertigo         Past Surgical History:   Procedure Laterality Date    CATARACT EXTRACTION, BILATERAL Bilateral 2015    COLONOSCOPY N/A 02/27/2015    1 TUBULAR ADENOMA POLYP [facility] DIST[facility]    COLONOSCOPY N/A 03/04/2020    10 MM SESSILE SERRATED ADENOMA POLYP IN ASCENDING, 3 MM HYPERPLASTIC POLYP IN RECTUM, 7 MM ANAL NODULE, DR. DARRIAN LIRIANO AT [facility]    COLONOSCOPY N/A 9/24/2024    Procedure: COLONOSCOPY into cecum and TI with saline lift injection and hot snare polypectomy with x3 clips placed;  Surgeon: Darrian Liriano MD;  Location: [facility] ENDOSCOPY;  Service: Gastroenterology;  Laterality: N/A;  pre: hx of colon polyps   post:: polyp, diverticulosis    HYSTERECTOMY N/A 01/19/2004    KAYLEE WITH BSO, DR. RAFIA MORTENSEN AT [facility]    KNEE ARTHROSCOPY Left 2013    KNEE ARTHROSCOPY Right 2010    MELISSA CULDOPLASTY N/A 01/19/2004    DR. RAFIA MORTENSEN AT [facility]    SACROCOLPOPEXY N/A     TOTAL KNEE ARTHROPLASTY Right 4/11/2023    Procedure: RIGHT TOTAL KNEE ARTHROPLASTY WITH SHERI NAVIGATION;  Surgeon: Atul Liriano MD;  Location:  [facility];  Service: Orthopedics;  Laterality: Right;    TOTAL KNEE ARTHROPLASTY Left 11/14/2023    Procedure: LEFT TOTAL KNEE ARTHROPLASTY WITH SHERI NAVIGATION;  Surgeon: Atul Liriano MD;  Location:  [facility];  Service: Orthopedics;  Laterality: Left;                        PT Assessment/Plan       Row Name 01/21/25 1200          PT Assessment    Assessment Comments Ms. Motta returns to PT  for her first f/u since initial evaluation for cervical pain reporting good compliance to initial HEP and some already initial pain relief. Initiated full session with dynamic warmup on UBE as well as added supine chin tuck, supine HA, and supine SNAG following review of initial program. Did spend additional time on manual this date for cspine mobility and analgesic effect, she does report loosening up following. No significant adverse effects following all new exercises, she does have some mild increase in R shoulder discomfort. Spent time educating on appropiate posture, discussing limiting excessive forward head position when on phone, reading, or eating, discussed strategies to improve posture. She verbally agrees. Did not update HEP at this time, will assess tolerance at next session and progress as appropriate.  -MO        PT Plan    PT Plan Comments tolerance to first session- how was posture? neural glides, post shoulder rolls, low row, shoulder ext  -MO               User Key  (r) = Recorded By, (t) = Taken By, (c) = Cosigned By      Initials Name Provider Type    Erica Evangelista, PT Physical Therapist                       OP Exercises       Row Name 01/21/25 0900             Subjective    Subjective Comments Felt good after eval  -MO         Total Minutes    73023 - PT Therapeutic Exercise Minutes 23  -MO      12740 - PT Manual Therapy Minutes 15  -MO         Exercise 1    Exercise Name 1 UBE  -MO      Time 1 4 mins  -MO         Exercise 2    Exercise Name 2 45 deg doorway str  -MO      Cueing 2 Verbal  -MO      Sets 2 4  -MO      Reps 2 20s  -MO         Exercise 3    Exercise Name 3 UT stretch  -MO      Cueing 3 Verbal  -MO      Reps 3 4  -MO      Time 3 15s  -MO         Exercise 4    Exercise Name 4 scap retract  -MO      Cueing 4 Verbal  -MO      Sets 4 1  -MO      Reps 4 15  -MO      Time 4 5s  -MO         Exercise 5    Exercise Name 5 supine chin tuck  -MO      Cueing 5 Verbal  -MO      Sets 5 1  -MO       Reps 5 15  -MO      Time 5 5s  -MO         Exercise 6    Exercise Name 6 supine HA  -MO      Cueing 6 Verbal;Demo  -MO      Sets 6 2  -MO      Reps 6 10  -MO      Time 6 RTB  -MO         Exercise 7    Exercise Name 7 supine SNAG  -MO      Cueing 7 Verbal;Demo;Tactile  -MO      Sets 7 6B  -MO      Reps 7 10s  -MO                User Key  (r) = Recorded By, (t) = Taken By, (c) = Cosigned By      Initials Name Provider Type    Erica Evangelista PT Physical Therapist                             Manual Rx (Last 36 Hours)       Manual Treatments       Row Name 01/21/25 0900             Total Minutes    71718 - PT Manual Therapy Minutes 15  -MO         Manual Rx 1    Manual Rx 1 Location cspine  -MO      Manual Rx 1 Type suboccipital release, gentle distraction, STM BL UT/ LS with sustained hold, BL upglides  -MO                User Key  (r) = Recorded By, (t) = Taken By, (c) = Cosigned By      Initials Name Provider Type    Erica Evangelista PT Physical Therapist                     PT OP Goals       Row Name 01/21/25 0900          PT Short Term Goals    STG Date to Achieve 02/13/25  -MO     STG 1 Pt will be independent with initial HEP and postural awareness to improve pain and symptoms.  -MO     STG 1 Progress Ongoing  -MO     STG 2 Pt to be educated in/verbalize understanding of the importance of posture/ergonomics in association with their condition to facilitate self management of their condition.  -MO     STG 2 Progress Ongoing  -MO     STG 3 Pt will improve cervical rotation AROM from 30 degrees to 45 degrees bilaterally to improve ability to drive safely.  -MO     STG 3 Progress Ongoing  -MO     STG 4 Pt will report 50% reduction in R UE N/T symptoms to demonstrate improved management of symptoms.  -MO     STG 4 Progress Ongoing  -MO        Long Term Goals    LTG Date to Achieve 03/15/25  -MO     LTG 1 Pt will be independent with advance HEP and postural awareness for continued management of pain and symptoms.   -MO     LTG 1 Progress Ongoing  -MO     LTG 2 Pt will improve NDI perceived disability from 10/50 to 3/50 to improve overall quality of life.  -MO     LTG 2 Progress Ongoing  -MO     LTG 3 Pt will decrease neck pain from 5/10 to </= 1/10 to improve participation in ADLs.  -MO     LTG 3 Progress Ongoing  -MO     LTG 4 Pt will demonstrate bilateral  strength to 25# in order to increase ability to perform fine motor tasks including gripping and writing while participating in work related duties.  -MO     LTG 4 Progress Ongoing  -MO               User Key  (r) = Recorded By, (t) = Taken By, (c) = Cosigned By      Initials Name Provider Type    Erica Evangelista PT Physical Therapist                                   Time Calculation:   Start Time: 0920  Stop Time: 0958  Time Calculation (min): 38 min  Timed Charges  46742 - PT Therapeutic Exercise Minutes: 23  01822 - PT Manual Therapy Minutes: 15  Total Minutes  Timed Charges Total Minutes: 38   Total Minutes: 38  Therapy Charges for Today       Code Description Service Date Service Provider Modifiers Qty    11134306682  PT THER PROC EA 15 MIN 1/21/2025 Erica Wynn PT GP 2    [id number]  PT MANUAL THERAPY EA 15 MIN 1/21/2025 Erica Wynn PT GP 1                      Erica Wynn PT  1/21/2025

## 2025-01-27 ENCOUNTER — HOSPITAL ENCOUNTER (OUTPATIENT)
Dept: PHYSICAL THERAPY | Facility: HOSPITAL | Age: 85
Setting detail: THERAPIES SERIES
Discharge: HOME | End: 2025-01-27
Payer: MEDICARE

## 2025-01-27 DIAGNOSIS — M54.12 CERVICAL RADICULOPATHY: ICD-10-CM

## 2025-01-27 DIAGNOSIS — R20.0 NUMBNESS AND TINGLING OF RIGHT ARM: ICD-10-CM

## 2025-01-27 DIAGNOSIS — M62.838 TRAPEZIUS MUSCLE SPASM: ICD-10-CM

## 2025-01-27 DIAGNOSIS — M54.2 NECK PAIN: Primary | ICD-10-CM

## 2025-01-27 DIAGNOSIS — R29.898 DECREASED ROM OF NECK: ICD-10-CM

## 2025-01-27 DIAGNOSIS — R20.2 NUMBNESS AND TINGLING OF RIGHT ARM: ICD-10-CM

## 2025-01-27 PROCEDURE — 97110 THERAPEUTIC EXERCISES: CPT

## 2025-01-27 PROCEDURE — 97140 MANUAL THERAPY 1/> REGIONS: CPT

## 2025-01-27 NOTE — THERAPY TREATMENT NOTE
Outpatient Physical Therapy Ortho Treatment Note  [facility]     Patient Name: Ashley Motta  : 1940  MRN: [id number]  Today's Date: 2025      Visit Date: 2025    Visit Dx:    ICD-10-CM ICD-9-CM   1. Neck pain  M54.2 723.1   2. Cervical radiculopathy  M54.12 723.4   3. Numbness and tingling of right arm  R20.0 782.0    R20.2    4. Trapezius muscle spasm  M62.838 728.85   5. Decreased ROM of neck  R29.898 723.8       Patient Active Problem List   Diagnosis    Osteopenia    Hyperlipidemia    Prediabetes    Osteoarthritis    BALDEMAR on CPAP    Pelvic relaxation due to vaginal prolapse    Chronic pain of both knees    Arthritis of right knee    Arthritis of left knee    Arthritis of both knees    Restless leg syndrome    Dizziness    Headache, migraine    Heartburn    Internal hemorrhoids with complication    Impacted cerumen of left ear    Vitamin E deficiency    Environmental and seasonal allergies    Chronic cough    Aftercare following joint replacement surgery    Allergic rhinitis, unspecified    Anxiety disorder, unspecified    Atopic dermatitis, unspecified    Constipation, unspecified    Localized osteoporosis without current pathological fracture    Gastro-esophageal reflux disease without esophagitis    Generalized muscle weakness    Iron deficiency anemia due to chronic blood loss    Personal history of other malignant neoplasm of skin    Presence of right artificial knee joint    Migraine, unspecified, not intractable, without status migrainosus    S/P total knee arthroplasty, left    Benign neoplasm of unspecified choroid    Disorder of bone density and structure, unspecified    Localized osteoporosis ([place])    Long term (current) use of anticoagulants        Past Medical History:   Diagnosis Date    Anxiety     SITUATIONAL R/T DEATH OF HER SPOUSE    Balance problem     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 TERRY CHEUNG    Osteoarthritis     Osteopenia     Rectal nodule 03/2020    REFERRED TO DR. PAMELA NAPOLES    Rectocele 07/2017    RLS (restless legs syndrome)     Seborrheic keratosis     Skin cancer 04/2015    LOWER LEG, FOLLOWED BY DR. ATUL SANCHEZ    Vertigo         Past Surgical History:   Procedure Laterality Date    CATARACT EXTRACTION, BILATERAL Bilateral 2015    COLONOSCOPY N/A 02/27/2015    1 TUBULAR ADENOMA POLYP [facility] DIST[facility]END[facility]    COLONOSCOPY N/A 03/04/2020    10 MM SESSILE SERRATED ADENOMA POLYP IN ASCENDING, 3 MM HYPERPLASTIC POLYP IN RECTUM, 7 MM ANAL NODULE, DR. DARRIAN LIRIANO AT [facility]    COLONOSCOPY N/A 9/24/2024    Procedure: COLONOSCOPY into cecum and TI with saline lift injection and hot snare polypectomy with x3 clips placed;  Surgeon: Darrian Liriano MD;  [facility];  Service: Gastroenterology;  Laterality: N/A;  pre: hx of colon polyps   post:: polyp, diverticulosis    HYSTERECTOMY N/A 01/19/2004    KAYLEE WITH BSO, DR. RAFIA MORTENSEN AT [facility]    KNEE ARTHROSCOPY Left 2013    KNEE ARTHROSCOPY Right 2010    MELISSA CULDOPLASTY N/A 01/19/2004    DR. RAFIA MORTENSEN AT [facility]    SACROCOLPOPEXY N/A     TOTAL KNEE ARTHROPLASTY Right 4/11/2023    Procedure: RIGHT TOTAL KNEE ARTHROPLASTY WITH SHERI NAVIGATION;  Surgeon: Atul Liriano MD;  Location:  [facility];  Service: Orthopedics;  Laterality: Right;    TOTAL KNEE ARTHROPLASTY Left 11/14/2023    Procedure: LEFT TOTAL KNEE ARTHROPLASTY WITH SHERI NAVIGATION;  Surgeon: Atul Liriano MD;  Location:  [facility];  Service: Orthopedics;  Laterality: Left;                        PT Assessment/Plan       Row Name 01/27/25 1000          PT Assessment     "Assessment Comments Ms. Motta returns to clinic reporting continued c-spine pain, at times feels numbness into R UE is more frequent but no adverse response to last session. Continued with manual, noted no change in \"tingling\" sensation into R hand. Discussed role of posture, purpose of manual interventions and ther ex with added chin tuck + turn, rows, and increased repetitions on supine HA. Pt. tolerated session well, remains appropriate for skilled PT.  -MP        PT Plan    PT Plan Comments neural glides?  -MP               User Key  (r) = Recorded By, (t) = Taken By, (c) = Cosigned By      Initials Name Provider Type    Bebe Wolfe, PT Physical Therapist                       OP Exercises       Row Name 01/27/25 1000             Subjective    Subjective Comments It still hruts, feels like it goes more numb sometimes  -MP         Subjective Pain    Able to rate subjective pain? yes  -MP      Pre-Treatment Pain Level 4  -MP      Post-Treatment Pain Level 4  -MP         Total Minutes    56648 - PT Therapeutic Exercise Minutes 23  -MP      54470 - PT Manual Therapy Minutes 15  -MP         Exercise 1    Exercise Name 1 UBE  -MP      Cueing 1 Verbal  -MP      Time 1 4 mins  -MP         Exercise 3    Exercise Name 3 UT stretch  -MP      Cueing 3 Verbal  -MP      Reps 3 4  -MP      Time 3 15s  -MP         Exercise 5    Exercise Name 5 supine chin tuck  -MP      Cueing 5 Verbal  -MP      Sets 5 1  -MP      Reps 5 15  -MP      Time 5 5s  -MP         Exercise 6    Exercise Name 6 supine HA  -MP      Cueing 6 Verbal;Demo  -MP      Sets 6 2  -MP      Reps 6 15  -MP      Time 6 RTB  -MP         Exercise 7    Exercise Name 7 supine SNAG  -MP      Cueing 7 Verbal;Demo;Tactile  -MP      Reps 7 10b  -MP      Time 7 5s  -MP         Exercise 8    Exercise Name 8 supine chin tuck + turn  -MP      Cueing 8 Verbal;Demo  -MP      Reps 8 15ea  -MP      Time 8 reset between reps  -MP         Exercise 9    Exercise Name 9 rows  " -MP      Cueing 9 Verbal;Demo  -MP      Reps 9 15  -MP      Time 9 RTB  -MP                User Key  (r) = Recorded By, (t) = Taken By, (c) = Cosigned By      Initials Name Provider Type    Bebe Wolfe, PT Physical Therapist                             Manual Rx (Last 36 Hours)       Manual Treatments       Row Name 01/27/25 1000             Total Minutes    12803 - PT Manual Therapy Minutes 15  -MP         Manual Rx 1    Manual Rx 1 Location cspine  -MP      Manual Rx 1 Type suboccipital release, gentle distraction, STM BL UT/ LS with sustained hold, BL upglides  -MP                User Key  (r) = Recorded By, (t) = Taken By, (c) = Cosigned By      Initials Name Provider Type    Bebe Wolfe, PT Physical Therapist                     PT OP Goals       Row Name 01/27/25 1000          PT Short Term Goals    STG Date to Achieve 02/13/25  -MP     STG 1 Pt will be independent with initial HEP and postural awareness to improve pain and symptoms.  -MP     STG 1 Progress Ongoing  -MP     STG 2 Pt to be educated in/verbalize understanding of the importance of posture/ergonomics in association with their condition to facilitate self management of their condition.  -MP     STG 2 Progress Ongoing  -MP     STG 3 Pt will improve cervical rotation AROM from 30 degrees to 45 degrees bilaterally to improve ability to drive safely.  -MP     STG 3 Progress Ongoing  -MP     STG 4 Pt will report 50% reduction in R UE N/T symptoms to demonstrate improved management of symptoms.  -MP     STG 4 Progress Ongoing  -MP        Long Term Goals    LTG Date to Achieve 03/15/25  -MP     LTG 1 Pt will be independent with advance HEP and postural awareness for continued management of pain and symptoms.  -MP     LTG 1 Progress Ongoing  -MP     LTG 2 Pt will improve NDI perceived disability from 10/50 to 3/50 to improve overall quality of life.  -MP     LTG 2 Progress Ongoing  -MP     LTG 3 Pt will decrease neck pain from 5/10 to </=  1/10 to improve participation in ADLs.  -MP     LTG 3 Progress Ongoing  -MP     LTG 4 Pt will demonstrate bilateral  strength to 25# in order to increase ability to perform fine motor tasks including gripping and writing while participating in work related duties.  -MP     LTG 4 Progress Ongoing  -MP               User Key  (r) = Recorded By, (t) = Taken By, (c) = Cosigned By      Initials Name Provider Type    MP Bebe Jordan, PT Physical Therapist                    Therapy Education  Given: Symptoms/condition management  Program: Reinforced  How Provided: Verbal, Demonstration  Provided to: Patient  Level of Understanding: Verbalized, Demonstrated              Time Calculation:   Start Time: 1046  Stop Time: 1124  Time Calculation (min): 38 min  Total Timed Code Minutes- PT: 38 minute(s)  Timed Charges  76883 - PT Therapeutic Exercise Minutes: 23  14789 - PT Manual Therapy Minutes: 15  Total Minutes  Timed Charges Total Minutes: 38   Total Minutes: 38  Therapy Charges for Today       Code Description Service Date Service Provider Modifiers Qty    [id number]  PT MANUAL THERAPY EA 15 MIN 1/27/2025 Bebe Jordan, PT GP 1    [id number]  PT THER PROC EA 15 MIN 1/27/2025 Bebe Jordan PT GP 2                      Bebe Jordan PT  1/27/2025

## 2025-01-29 ENCOUNTER — APPOINTMENT (OUTPATIENT)
Dept: PHYSICAL THERAPY | Facility: HOSPITAL | Age: 85
End: 2025-01-29
Payer: MEDICARE

## 2025-01-31 ENCOUNTER — HOSPITAL ENCOUNTER (OUTPATIENT)
Dept: PHYSICAL THERAPY | Facility: HOSPITAL | Age: 85
Setting detail: THERAPIES SERIES
Discharge: HOME | End: 2025-01-31
Payer: MEDICARE

## 2025-01-31 DIAGNOSIS — M54.2 NECK PAIN: Primary | ICD-10-CM

## 2025-01-31 DIAGNOSIS — M54.12 CERVICAL RADICULOPATHY: ICD-10-CM

## 2025-01-31 DIAGNOSIS — R20.2 NUMBNESS AND TINGLING OF RIGHT ARM: ICD-10-CM

## 2025-01-31 DIAGNOSIS — R20.0 NUMBNESS AND TINGLING OF RIGHT ARM: ICD-10-CM

## 2025-01-31 DIAGNOSIS — R29.898 DECREASED ROM OF NECK: ICD-10-CM

## 2025-01-31 DIAGNOSIS — M62.838 TRAPEZIUS MUSCLE SPASM: ICD-10-CM

## 2025-01-31 PROCEDURE — 97110 THERAPEUTIC EXERCISES: CPT

## 2025-01-31 PROCEDURE — 97140 MANUAL THERAPY 1/> REGIONS: CPT

## 2025-01-31 NOTE — THERAPY TREATMENT NOTE
Outpatient Physical Therapy Ortho Treatment Note  [facility]     Patient Name: Ashley Motta  : 1940  MRN: [id number]  Today's Date: 2025      Visit Date: 2025    Visit Dx:    ICD-10-CM ICD-9-CM   1. Neck pain  M54.2 723.1   2. Cervical radiculopathy  M54.12 723.4   3. Numbness and tingling of right arm  R20.0 782.0    R20.2    4. Trapezius muscle spasm  M62.838 728.85   5. Decreased ROM of neck  R29.898 723.8       Patient Active Problem List   Diagnosis    Osteopenia    Hyperlipidemia    Prediabetes    Osteoarthritis    BALDEMAR on CPAP    Pelvic relaxation due to vaginal prolapse    Chronic pain of both knees    Arthritis of right knee    Arthritis of left knee    Arthritis of both knees    Restless leg syndrome    Dizziness    Headache, migraine    Heartburn    Internal hemorrhoids with complication    Impacted cerumen of left ear    Vitamin E deficiency    Environmental and seasonal allergies    Chronic cough    Aftercare following joint replacement surgery    Allergic rhinitis, unspecified    Anxiety disorder, unspecified    Atopic dermatitis, unspecified    Constipation, unspecified    Localized osteoporosis without current pathological fracture    Gastro-esophageal reflux disease without esophagitis    Generalized muscle weakness    Iron deficiency anemia due to chronic blood loss    Personal history of other malignant neoplasm of skin    Presence of right artificial knee joint    Migraine, unspecified, not intractable, without status migrainosus    S/P total knee arthroplasty, left    Benign neoplasm of unspecified choroid    Disorder of bone density and structure, unspecified    Localized osteoporosis ([place])    Long term (current) use of anticoagulants        Past Medical History:   Diagnosis Date    Anxiety     SITUATIONAL R/T DEATH OF HER SPOUSE    Balance problem     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 TERRY CHEUNG    Osteoarthritis     Osteopenia     Rectal nodule 03/2020    REFERRED TO DR. PAMELA NAPOLES    Rectocele 07/2017    RLS (restless legs syndrome)     Seborrheic keratosis     Skin cancer 04/2015    LOWER LEG, FOLLOWED BY DR. ATUL SANCHEZ    Vertigo         Past Surgical History:   Procedure Laterality Date    CATARACT EXTRACTION, BILATERAL Bilateral 2015    COLONOSCOPY N/A 02/27/2015    1 TUBULAR ADENOMA POLYP IN DISTAL [facility]END[facility]    COLONOSCOPY N/A 03/04/2020    10 MM SESSILE SERRATED ADENOMA POLYP IN ASCENDING, 3 MM HYPERPLASTIC POLYP IN RECTUM, 7 MM ANAL NODULE, DR. DARRIAN LIRIANO AT [facility]    COLONOSCOPY N/A 9/24/2024    Procedure: COLONOSCOPY into cecum and TI with saline lift injection and hot snare polypectomy with x3 clips placed;  Surgeon: Darrian Liriano MD;  [facility];  Service: Gastroenterology;  Laterality: N/A;  pre: hx of colon polyps   post:: polyp, diverticulosis    HYSTERECTOMY N/A 01/19/2004    KAYLEE WITH BSO, DR. RAFIA MORTENSEN AT [facility]    KNEE ARTHROSCOPY Left 2013    KNEE ARTHROSCOPY Right 2010    MELISSA CULDOPLASTY N/A 01/19/2004    DR. RAFIA MORTENSEN AT [facility]    SACROCOLPOPEXY N/A     TOTAL KNEE ARTHROPLASTY Right 4/11/2023    Procedure: RIGHT TOTAL KNEE ARTHROPLASTY WITH SHERI NAVIGATION;  Surgeon: Atul Liriano MD;  Location:  [facility];  Service: Orthopedics;  Laterality: Right;    TOTAL KNEE ARTHROPLASTY Left 11/14/2023    Procedure: LEFT TOTAL KNEE ARTHROPLASTY WITH SHERI NAVIGATION;  Surgeon: Atul Liriano MD;  Location:  [facility];  Service: Orthopedics;  Laterality: Left;                        PT Assessment/Plan       Row Name 01/31/25 0900          PT Assessment     Assessment Comments Ms. Motta returns to therapy w/ reports of normal levels of neck pain, she states it feels like it is just there. Pt requires cueing during her chin tuck exercise due to difficulty w/ form as she perform cervical extension vs. Chin tuck. Continued w/ scapular strengthening exercises, introduced a median and ulnar nerve glide this visit providing education throughout for rationale as well as when to stop due to symptom provocation due to irritability of nerves. Pt tolerates well, feels some tension but does not reproduce any N/T. Ended session w/ manual techniques to improve cervical mobility and decrease muscle tension.  -CS        PT Plan    PT Plan Comments theraloop flexion, pec doorway stretch vs beach chair stretch?  -CS               User Key  (r) = Recorded By, (t) = Taken By, (c) = Cosigned By      Initials Name Provider Type    CS Altaf Izquierdo, PT Physical Therapist                       OP Exercises       Row Name 01/31/25 0900             Subjective    Subjective Comments It feels okay, not to bad but still there  -CS         Subjective Pain    Able to rate subjective pain? yes  -CS      Pre-Treatment Pain Level 4  -CS      Post-Treatment Pain Level 4  -CS         Total Minutes    38368 - PT Therapeutic Exercise Minutes 23  -CS      77822 - PT Manual Therapy Minutes 17  -CS         Exercise 1    Exercise Name 1 UBE  -CS      Cueing 1 Verbal  -CS      Time 1 4 mins  -CS         Exercise 3    Exercise Name 3 UT stretch  -CS      Cueing 3 Verbal  -CS      Reps 3 3  -CS      Time 3 20s  -CS         Exercise 5    Exercise Name 5 supine chin tuck  -CS      Cueing 5 Verbal  -CS      Sets 5 1  -CS      Reps 5 15  -CS      Time 5 5s  -CS         Exercise 6    Exercise Name 6 supine HA  -CS      Cueing 6 Verbal;Demo  -CS      Sets 6 2  -CS      Reps 6 15  -CS      Time 6 RTB  -CS         Exercise 7    Exercise Name 7 supine SNAG  -CS      Cueing 7 Verbal;Demo;Tactile  -CS      Reps 7 10b  -CS       Time 7 5s  -CS         Exercise 8    Exercise Name 8 supine chin tuck + turn  -CS      Cueing 8 Verbal;Demo  -CS      Reps 8 15ea  -CS      Time 8 reset between reps  -CS         Exercise 9    Exercise Name 9 rows  -CS      Cueing 9 Verbal;Demo  -CS      Reps 9 15  -CS      Time 9 RTB  -CS         Exercise 10    Exercise Name 10 Seated median nerve glide  -CS      Cueing 10 Verbal;Demo  -CS      Sets 10 1  -CS      Reps 10 15  -CS      Additional Comments Only on R  -CS         Exercise 11    Exercise Name 11 Seated Ulnar nerve glide  -CS      Cueing 11 Verbal;Demo  -CS      Sets 11 1  -CS      Reps 11 15  -CS      Additional Comments Only on R  -CS                User Key  (r) = Recorded By, (t) = Taken By, (c) = Cosigned By      Initials Name Provider Type    CS Altaf Izquierdo, PT Physical Therapist                             Manual Rx (Last 36 Hours)       Manual Treatments       Row Name 01/31/25 0900             Total Minutes    83960 - PT Manual Therapy Minutes 17  -CS         Manual Rx 1    Manual Rx 1 Location cspine  -CS      Manual Rx 1 Type suboccipital release, gentle distraction, STM BL UT/ LS with sustained hold, BL upglides  -CS                User Key  (r) = Recorded By, (t) = Taken By, (c) = Cosigned By      Initials Name Provider Type    CS Altaf Izquierdo, PT Physical Therapist                     PT OP Goals       Row Name 01/31/25 0900          PT Short Term Goals    STG Date to Achieve 02/13/25  -CS     STG 1 Pt will be independent with initial HEP and postural awareness to improve pain and symptoms.  -CS     STG 1 Progress Ongoing  -CS     STG 2 Pt to be educated in/verbalize understanding of the importance of posture/ergonomics in association with their condition to facilitate self management of their condition.  -CS     STG 2 Progress Ongoing  -CS     STG 3 Pt will improve cervical rotation AROM from 30 degrees to 45 degrees bilaterally to improve ability to drive safely.  -CS     STG 3  Progress Ongoing  -CS     STG 4 Pt will report 50% reduction in R UE N/T symptoms to demonstrate improved management of symptoms.  -CS     STG 4 Progress Ongoing  -CS        Long Term Goals    LTG Date to Achieve 03/15/25  -CS     LTG 1 Pt will be independent with advance HEP and postural awareness for continued management of pain and symptoms.  -CS     LTG 1 Progress Ongoing  -CS     LTG 2 Pt will improve NDI perceived disability from 10/50 to 3/50 to improve overall quality of life.  -CS     LTG 2 Progress Ongoing  -CS     LTG 3 Pt will decrease neck pain from 5/10 to </= 1/10 to improve participation in ADLs.  -CS     LTG 3 Progress Ongoing  -CS     LTG 4 Pt will demonstrate bilateral  strength to 25# in order to increase ability to perform fine motor tasks including gripping and writing while participating in work related duties.  -CS     LTG 4 Progress Ongoing  -CS               User Key  (r) = Recorded By, (t) = Taken By, (c) = Cosigned By      Initials Name Provider Type    CS Altaf Izquierdo, PT Physical Therapist                    Therapy Education  Education Details: Rationale for nerve glides, knowing when to stop glides due to irritation  Given: Symptoms/condition management  Program: Reinforced  How Provided: Verbal, Demonstration  Provided to: Patient  Level of Understanding: Verbalized, Demonstrated              Time Calculation:   Start Time: 0929  Stop Time: 1009  Time Calculation (min): 40 min  Timed Charges  97559 - PT Therapeutic Exercise Minutes: 23  28540 - PT Manual Therapy Minutes: 17  Total Minutes  Timed Charges Total Minutes: 40   Total Minutes: 40  Therapy Charges for Today       Code Description Service Date Service Provider Modifiers Qty    33110022425  PT THER PROC EA 15 MIN 1/31/2025 Altaf Izquierdo, PT GP 2    88935335552 HC PT MANUAL THERAPY EA 15 MIN 1/31/2025 Altaf Izquierdo, PT GP 1                      Altaf Izquierdo PT  1/31/2025

## 2025-02-03 ENCOUNTER — HOSPITAL ENCOUNTER (OUTPATIENT)
Dept: PHYSICAL THERAPY | Facility: HOSPITAL | Age: 85
Setting detail: THERAPIES SERIES
Discharge: HOME | End: 2025-02-03
Payer: MEDICARE

## 2025-02-03 DIAGNOSIS — M54.2 NECK PAIN: Primary | ICD-10-CM

## 2025-02-03 DIAGNOSIS — R20.2 NUMBNESS AND TINGLING OF RIGHT ARM: ICD-10-CM

## 2025-02-03 DIAGNOSIS — R29.898 DECREASED ROM OF NECK: ICD-10-CM

## 2025-02-03 DIAGNOSIS — M62.838 TRAPEZIUS MUSCLE SPASM: ICD-10-CM

## 2025-02-03 DIAGNOSIS — M54.12 CERVICAL RADICULOPATHY: ICD-10-CM

## 2025-02-03 DIAGNOSIS — R20.0 NUMBNESS AND TINGLING OF RIGHT ARM: ICD-10-CM

## 2025-02-03 PROCEDURE — 97110 THERAPEUTIC EXERCISES: CPT

## 2025-02-03 PROCEDURE — 97140 MANUAL THERAPY 1/> REGIONS: CPT

## 2025-02-03 NOTE — THERAPY TREATMENT NOTE
Outpatient Physical Therapy Ortho Treatment Note  [facility]     Patient Name: Ashley Motta  : 1940  MRN: [id number]  Today's Date: 2/3/2025      Visit Date: 2025    Visit Dx:    ICD-10-CM ICD-9-CM   1. Neck pain  M54.2 723.1   2. Cervical radiculopathy  M54.12 723.4   3. Numbness and tingling of right arm  R20.0 782.0    R20.2    4. Trapezius muscle spasm  M62.838 728.85   5. Decreased ROM of neck  R29.898 723.8       Patient Active Problem List   Diagnosis    Osteopenia    Hyperlipidemia    Prediabetes    Osteoarthritis    BALDEMAR on CPAP    Pelvic relaxation due to vaginal prolapse    Chronic pain of both knees    Arthritis of right knee    Arthritis of left knee    Arthritis of both knees    Restless leg syndrome    Dizziness    Headache, migraine    Heartburn    Internal hemorrhoids with complication    Impacted cerumen of left ear    Vitamin E deficiency    Environmental and seasonal allergies    Chronic cough    Aftercare following joint replacement surgery    Allergic rhinitis, unspecified    Anxiety disorder, unspecified    Atopic dermatitis, unspecified    Constipation, unspecified    Localized osteoporosis without current pathological fracture    Gastro-esophageal reflux disease without esophagitis    Generalized muscle weakness    Iron deficiency anemia due to chronic blood loss    Personal history of other malignant neoplasm of skin    Presence of right artificial knee joint    Migraine, unspecified, not intractable, without status migrainosus    S/P total knee arthroplasty, left    Benign neoplasm of unspecified choroid    Disorder of bone density and structure, unspecified    Localized osteoporosis ([place])    Long term (current) use of anticoagulants        Past Medical History:   Diagnosis Date    Anxiety     SITUATIONAL R/T DEATH OF HER SPOUSE    Balance problem     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 TERRY CHEUNG    Osteoarthritis     Osteopenia     Rectal nodule 03/2020    REFERRED TO DR. PAMELA NAPOLES    Rectocele 07/2017    RLS (restless legs syndrome)     Seborrheic keratosis     Skin cancer 04/2015    LOWER LEG, FOLLOWED BY DR. ATUL SANCHEZ    Vertigo         Past Surgical History:   Procedure Laterality Date    CATARACT EXTRACTION, BILATERAL Bilateral 2015    COLONOSCOPY N/A 02/27/2015    1 TUBULAR ADENOMA POLYP IN DISTAL ASCENDING[facility]    COLONOSCOPY N/A 03/04/2020    10 MM SESSILE SERRATED ADENOMA POLYP IN ASCENDING, 3 MM HYPERPLASTIC POLYP IN RECTUM, 7 MM ANAL NODULE, DR. DARRIAN LIRIANO AT [facility]    COLONOSCOPY N/A 9/24/2024    Procedure: COLONOSCOPY into cecum and TI with saline lift injection and hot snare polypectomy with x3 clips placed;  Surgeon: Darrian Liriano MD;  [facility]  Service: Gastroenterology;  Laterality: N/A;  pre: hx of colon polyps   post:: polyp, diverticulosis    HYSTERECTOMY N/A 01/19/2004    KAYLEE WITH BSO, DR. RAFIA MORTENSEN AT [facility]    KNEE ARTHROSCOPY Left 2013    KNEE ARTHROSCOPY Right 2010    MELISSA CULDOPLASTY N/A 01/19/2004    DR. RAFIA MORTENSEN AT [facility]    SACROCOLPOPEXY N/A     TOTAL KNEE ARTHROPLASTY Right 4/11/2023    Procedure: RIGHT TOTAL KNEE ARTHROPLASTY WITH SHERI NAVIGATION;  Surgeon: Atul Liriano MD;  Location: [facility];  Service: Orthopedics;  Laterality: Right;    TOTAL KNEE ARTHROPLASTY Left 11/14/2023    Procedure: LEFT TOTAL KNEE ARTHROPLASTY WITH SHERI NAVIGATION;  Surgeon: Atul Liriano MD;  Location:  [facility];  Service: Orthopedics;  Laterality: Left;                        PT Assessment/Plan       Row Name 02/03/25 1200          PT Assessment     Assessment Comments Ms. Motta returns denying adverse effects following last session, no significant changes in condition. Added wall slides with emphasis on Tspine extension as well as added supine chin tuck + lift for deep cervical strengthening, supine diagonals pulls for dorsal scapular strength, and progressed HA and rows to GTB with great tolerance. Reviewed nerve glides, added to HEP with discussion on appropriate frequency of completion, she verbalizes understanding. Will plan for full HEP update at next session and continue to progress as appropriate.  -MO        PT Plan    PT Plan Comments theraloop flex, beach chair stretch, wall chin tuck  -MO               User Key  (r) = Recorded By, (t) = Taken By, (c) = Cosigned By      Initials Name Provider Type    Erica Evangelista, PT Physical Therapist                       OP Exercises       Row Name 02/03/25 1100             Total Minutes    90591 - PT Therapeutic Exercise Minutes 30  -MO      21330 - PT Manual Therapy Minutes 15  -MO         Exercise 1    Exercise Name 1 UBE  -MO      Cueing 1 Verbal  -MO      Time 1 4 mins  -MO         Exercise 2    Exercise Name 2 45 deg doorway str  -MO      Cueing 2 Verbal  -MO      Sets 2 3  -MO      Reps 2 20s  -MO         Exercise 4    Exercise Name 4 supine diagonal pulls  -MO      Cueing 4 Verbal;Demo  -MO      Time 4 copious tactile cueing for proper form  -MO         Exercise 5    Exercise Name 5 tuck + lift  -MO      Cueing 5 Verbal;Demo  -MO      Sets 5 2  -MO      Reps 5 10  -MO      Time 5 cueing to reset chin tuck each time  -MO         Exercise 6    Exercise Name 6 supine HA  -MO      Cueing 6 Verbal  -MO      Sets 6 2  -MO      Reps 6 10  -MO      Time 6 GTB  -MO         Exercise 9    Exercise Name 9 rows  -MO      Cueing 9 Verbal;Tactile  -MO      Reps 9 15  -MO      Time 9 GTB  -MO      Additional Comments tactile cueing to back for scap retraction  -MO         Exercise 10    Exercise Name 10 Seated median  nerve glide  -MO      Cueing 10 Verbal;Tactile;Demo  -MO      Sets 10 2  -MO      Reps 10 10  -MO      Time 10 R only  -MO         Exercise 11    Exercise Name 11 standing ulnar nerve  -MO      Cueing 11 Verbal  -MO      Sets 11 2  -MO      Reps 11 10  -MO      Time 11 R only  -MO         Exercise 12    Exercise Name 12 wall slides  -MO      Cueing 12 Verbal;Demo  -MO      Reps 12 1x15  -MO      Time 12 cueing for thoracic ext at top to tolerance  -MO                User Key  (r) = Recorded By, (t) = Taken By, (c) = Cosigned By      Initials Name Provider Type    Erica Evangelista PT Physical Therapist                             Manual Rx (Last 36 Hours)       Manual Treatments       Row Name 02/03/25 1100             Total Minutes    68080 - PT Manual Therapy Minutes 15  -MO         Manual Rx 1    Manual Rx 1 Location cspine  -MO      Manual Rx 1 Type suboccipital release, gentle distraction, STM BL UT/ LS with sustained hold, BL upglides  -MO                User Key  (r) = Recorded By, (t) = Taken By, (c) = Cosigned By      Initials Name Provider Type    Erica Evangelista PT Physical Therapist                                       Time Calculation:   Start Time: 1100  Stop Time: 1145  Time Calculation (min): 45 min  Timed Charges  16755 - PT Therapeutic Exercise Minutes: 30  18062 - PT Manual Therapy Minutes: 15  Total Minutes  Timed Charges Total Minutes: 45   Total Minutes: 45  Therapy Charges for Today       Code Description Service Date Service Provider Modifiers Qty    13092408629  PT THER PROC EA 15 MIN 2/3/2025 Erica Wynn PT GP 2    85594476993 HC PT MANUAL THERAPY EA 15 MIN 2/3/2025 Erica Wynn PT GP 1                      Erica Wynn PT  2/3/2025

## 2025-02-05 ENCOUNTER — HOSPITAL ENCOUNTER (OUTPATIENT)
Dept: PHYSICAL THERAPY | Facility: HOSPITAL | Age: 85
Setting detail: THERAPIES SERIES
Discharge: HOME | End: 2025-02-05
Payer: MEDICARE

## 2025-02-05 DIAGNOSIS — M54.12 CERVICAL RADICULOPATHY: ICD-10-CM

## 2025-02-05 DIAGNOSIS — R20.0 NUMBNESS AND TINGLING OF RIGHT ARM: ICD-10-CM

## 2025-02-05 DIAGNOSIS — M54.2 NECK PAIN: Primary | ICD-10-CM

## 2025-02-05 DIAGNOSIS — M62.838 TRAPEZIUS MUSCLE SPASM: ICD-10-CM

## 2025-02-05 DIAGNOSIS — R20.2 NUMBNESS AND TINGLING OF RIGHT ARM: ICD-10-CM

## 2025-02-05 DIAGNOSIS — R29.898 DECREASED ROM OF NECK: ICD-10-CM

## 2025-02-05 PROCEDURE — 97140 MANUAL THERAPY 1/> REGIONS: CPT

## 2025-02-05 PROCEDURE — 97110 THERAPEUTIC EXERCISES: CPT

## 2025-02-05 NOTE — THERAPY TREATMENT NOTE
Outpatient Physical Therapy Ortho Treatment Note  [facility]     Patient Name: Ashley Motta  : 1940  MRN: [id number]  Today's Date: 2025      Visit Date: 2025    Visit Dx:    ICD-10-CM ICD-9-CM   1. Neck pain  M54.2 723.1   2. Cervical radiculopathy  M54.12 723.4   3. Numbness and tingling of right arm  R20.0 782.0    R20.2    4. Trapezius muscle spasm  M62.838 728.85   5. Decreased ROM of neck  R29.898 723.8       Patient Active Problem List   Diagnosis    Osteopenia    Hyperlipidemia    Prediabetes    Osteoarthritis    BALDEMAR on CPAP    Pelvic relaxation due to vaginal prolapse    Chronic pain of both knees    Arthritis of right knee    Arthritis of left knee    Arthritis of both knees    Restless leg syndrome    Dizziness    Headache, migraine    Heartburn    Internal hemorrhoids with complication    Impacted cerumen of left ear    Vitamin E deficiency    Environmental and seasonal allergies    Chronic cough    Aftercare following joint replacement surgery    Allergic rhinitis, unspecified    Anxiety disorder, unspecified    Atopic dermatitis, unspecified    Constipation, unspecified    Localized osteoporosis without current pathological fracture    Gastro-esophageal reflux disease without esophagitis    Generalized muscle weakness    Iron deficiency anemia due to chronic blood loss    Personal history of other malignant neoplasm of skin    Presence of right artificial knee joint    Migraine, unspecified, not intractable, without status migrainosus    S/P total knee arthroplasty, left    Benign neoplasm of unspecified choroid    Disorder of bone density and structure, unspecified    Localized osteoporosis ([place])    Long term (current) use of anticoagulants        Past Medical History:   Diagnosis Date    Anxiety     SITUATIONAL R/T DEATH OF HER SPOUSE    Balance problem     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 TERRY CHEUNG    Osteoarthritis     Osteopenia     Rectal nodule 03/2020    REFERRED TO DR. PAMELA NAPOLES    Rectocele 07/2017    RLS (restless legs syndrome)     Seborrheic keratosis     Skin cancer 04/2015    LOWER LEG, FOLLOWED BY DR. ATUL SANCHEZ    Vertigo         Past Surgical History:   Procedure Laterality Date    CATARACT EXTRACTION, BILATERAL Bilateral 2015    COLONOSCOPY N/A 02/27/2015    1 TUBULAR ADENOMA POL[facility] IN DIST[facility]    COLONOSCOPY N/A 03/04/2020    10 MM SESSILE SERRATED ADENOMA POLYP IN ASCENDING, 3 MM HYPERPLASTIC POLYP IN RECTUM, 7 MM ANAL NODULE, DR. DARRIAN LIRIANO AT [facility]    COLONOSCOPY N/A 9/24/2024    Procedure: COLONOSCOPY into cecum and TI with saline lift injection and hot snare polypectomy with x3 clips placed;  Surgeon: Darrian Liriano MD;  [facility];  Service: Gastroenterology;  Laterality: N/A;  pre: hx of colon polyps   post:: polyp, diverticulosis    HYSTERECTOMY N/A 01/19/2004    KAYLEE WITH BSO, DR. RAFIA MORTENSEN AT [facility]    KNEE ARTHROSCOPY Left 2013    KNEE ARTHROSCOPY Right 2010    MELISSA CULDOPLASTY N/A 01/19/2004    DR. RAFIA MORTENSEN AT [facility]    SACROCOLPOPEXY N/A     TOTAL KNEE ARTHROPLASTY Right 4/11/2023    Procedure: RIGHT TOTAL KNEE ARTHROPLASTY WITH SHERI NAVIGATION;  Surgeon: Atul Liriano MD;  Location: [facility];  Service: Orthopedics;  Laterality: Right;    TOTAL KNEE ARTHROPLASTY Left 11/14/2023    Procedure: LEFT TOTAL KNEE ARTHROPLASTY WITH SHERI NAVIGATION;  Surgeon: Atul Liriano MD;  Location:  [facility];  Service: Orthopedics;  Laterality: Left;                        PT Assessment/Plan       Row Name 02/05/25 1200          PT Assessment     Assessment Comments Ms. Ramirez returns reporting some overall improvement in condition, reports good compliance to HEP. Modified manual to complete in sitting position to emphasize UT/LS STM. Additionally progressed all exercises to standing, functional position with addition of shoulder extension and transitioned to standing HA. Attempted diagonal pulls however unable to maintain appropriate positioning, will attempt at next session.  -MO        PT Plan    PT Plan Comments theraloop flex, chin tuck at wall, D2 flex standing, wall walk  -MO               User Key  (r) = Recorded By, (t) = Taken By, (c) = Cosigned By      Initials Name Provider Type    Erica Evangelista, PT Physical Therapist                       OP Exercises       Row Name 02/05/25 1100             Total Minutes    03205 - PT Therapeutic Exercise Minutes 28  -MO      42339 - PT Manual Therapy Minutes 10  -MO         Exercise 1    Exercise Name 1 UBE  -MO      Time 1 4 mins  -MO         Exercise 2    Exercise Name 2 45 deg doorway str  -MO      Cueing 2 Verbal  -MO      Sets 2 3  -MO      Reps 2 20s  -MO         Exercise 3    Exercise Name 3 --  -MO      Cueing 3 --  -MO      Sets 3 --  -MO      Reps 3 --  -MO      Time 3 --  -MO         Exercise 6    Exercise Name 6 standing HA  -MO      Cueing 6 Verbal;Demo  -MO      Sets 6 2  -MO      Reps 6 10  -MO      Time 6 GTB  -MO         Exercise 7    Exercise Name 7 shoulder ext  -MO      Cueing 7 Verbal;Demo  -MO      Sets 7 2  -MO      Reps 7 15  -MO      Time 7 RTB  -MO         Exercise 9    Exercise Name 9 rows  -MO      Cueing 9 Verbal  -MO      Sets 9 2  -MO      Reps 9 15  -MO      Time 9 GTB  -MO         Exercise 12    Exercise Name 12 wall slides  -MO      Cueing 12 Verbal  -MO      Reps 12 1x15  -MO      Time 12 cueing for thoracic ext at top to tolerance  -MO                User Key  (r) = Recorded By, (t) = Taken By, (c) = Cosigned By      Initials Name Provider Type    Erica Evangelista, PT  Physical Therapist                             Manual Rx (Last 36 Hours)       Manual Treatments       Row Name 02/05/25 1100             Total Minutes    37131 - PT Manual Therapy Minutes 10  -MO         Manual Rx 2    Manual Rx 2 Location BL UT/ LS STM w/ sustained hold  -MO      Manual Rx 2 Type seated  -MO                User Key  (r) = Recorded By, (t) = Taken By, (c) = Cosigned By      Initials Name Provider Type    Erica Evangelista, PT Physical Therapist                     PT OP Goals       Row Name 02/05/25 1100          PT Short Term Goals    STG Date to Achieve 02/13/25  -MO     STG 1 Pt will be independent with initial HEP and postural awareness to improve pain and symptoms.  -MO     STG 1 Progress Ongoing  -MO     STG 2 Pt to be educated in/verbalize understanding of the importance of posture/ergonomics in association with their condition to facilitate self management of their condition.  -MO     STG 2 Progress Ongoing  -MO     STG 3 Pt will improve cervical rotation AROM from 30 degrees to 45 degrees bilaterally to improve ability to drive safely.  -MO     STG 3 Progress Ongoing  -MO     STG 4 Pt will report 50% reduction in R UE N/T symptoms to demonstrate improved management of symptoms.  -MO     STG 4 Progress Ongoing  -MO        Long Term Goals    LTG Date to Achieve 03/15/25  -MO     LTG 1 Pt will be independent with advance HEP and postural awareness for continued management of pain and symptoms.  -MO     LTG 1 Progress Ongoing  -MO     LTG 2 Pt will improve NDI perceived disability from 10/50 to 3/50 to improve overall quality of life.  -MO     LTG 2 Progress Ongoing  -MO     LTG 3 Pt will decrease neck pain from 5/10 to </= 1/10 to improve participation in ADLs.  -MO     LTG 3 Progress Ongoing  -MO     LTG 4 Pt will demonstrate bilateral  strength to 25# in order to increase ability to perform fine motor tasks including gripping and writing while participating in work related duties.  -MO      LTG 4 Progress Ongoing  -MO               User Key  (r) = Recorded By, (t) = Taken By, (c) = Cosigned By      Initials Name Provider Type    Erica Evangelista PT Physical Therapist                                   Time Calculation:   Start Time: 1100  Stop Time: 1138  Time Calculation (min): 38 min  Timed Charges  10372 - PT Therapeutic Exercise Minutes: 28  55909 - PT Manual Therapy Minutes: 10  Total Minutes  Timed Charges Total Minutes: 38   Total Minutes: 38  Therapy Charges for Today       Code Description Service Date Service Provider Modifiers Qty    63162372216 HC PT THER PROC EA 15 MIN 2/5/2025 Erica Wynn PT GP 2    48468180566 HC PT MANUAL THERAPY EA 15 MIN 2/5/2025 Erica Wynn PT GP 1                      Erica Wynn PT  2/5/2025

## 2025-02-07 DIAGNOSIS — E78.5 HYPERLIPIDEMIA, UNSPECIFIED HYPERLIPIDEMIA TYPE: Chronic | ICD-10-CM

## 2025-02-07 RX ORDER — SIMVASTATIN 20 MG
20 TABLET ORAL NIGHTLY
Qty: 90 TABLET | Refills: 3 | Status: SHIPPED | OUTPATIENT
Start: 2025-02-07

## 2025-02-10 ENCOUNTER — HOSPITAL ENCOUNTER (OUTPATIENT)
Dept: PHYSICAL THERAPY | Facility: HOSPITAL | Age: 85
Setting detail: THERAPIES SERIES
Discharge: HOME | End: 2025-02-10
Payer: MEDICARE

## 2025-02-10 DIAGNOSIS — M54.12 CERVICAL RADICULOPATHY: ICD-10-CM

## 2025-02-10 DIAGNOSIS — M62.838 TRAPEZIUS MUSCLE SPASM: ICD-10-CM

## 2025-02-10 DIAGNOSIS — R29.898 DECREASED ROM OF NECK: ICD-10-CM

## 2025-02-10 DIAGNOSIS — R20.2 NUMBNESS AND TINGLING OF RIGHT ARM: ICD-10-CM

## 2025-02-10 DIAGNOSIS — M54.2 NECK PAIN: Primary | ICD-10-CM

## 2025-02-10 DIAGNOSIS — R20.0 NUMBNESS AND TINGLING OF RIGHT ARM: ICD-10-CM

## 2025-02-10 PROCEDURE — 97140 MANUAL THERAPY 1/> REGIONS: CPT

## 2025-02-10 PROCEDURE — 97110 THERAPEUTIC EXERCISES: CPT

## 2025-02-10 NOTE — THERAPY TREATMENT NOTE
Outpatient Physical Therapy Ortho Treatment Note  [facility]     Patient Name: Ashley Motta  : 1940  MRN: [id number]  Today's Date: 2/10/2025      Visit Date: 02/10/2025    Visit Dx:    ICD-10-CM ICD-9-CM   1. Neck pain  M54.2 723.1   2. Cervical radiculopathy  M54.12 723.4   3. Numbness and tingling of right arm  R20.0 782.0    R20.2    4. Trapezius muscle spasm  M62.838 728.85   5. Decreased ROM of neck  R29.898 723.8       Patient Active Problem List   Diagnosis    Osteopenia    Hyperlipidemia    Prediabetes    Osteoarthritis    BALDEMAR on CPAP    Pelvic relaxation due to vaginal prolapse    Chronic pain of both knees    Arthritis of right knee    Arthritis of left knee    Arthritis of both knees    Restless leg syndrome    Dizziness    Headache, migraine    Heartburn    Internal hemorrhoids with complication    Impacted cerumen of left ear    Vitamin E deficiency    Environmental and seasonal allergies    Chronic cough    Aftercare following joint replacement surgery    Allergic rhinitis, unspecified    Anxiety disorder, unspecified    Atopic dermatitis, unspecified    Constipation, unspecified    Localized osteoporosis without current pathological fracture    Gastro-esophageal reflux disease without esophagitis    Generalized muscle weakness    Iron deficiency anemia due to chronic blood loss    Personal history of other malignant neoplasm of skin    Presence of right artificial knee joint    Migraine, unspecified, not intractable, without status migrainosus    S/P total knee arthroplasty, left    Benign neoplasm of unspecified choroid    Disorder of bone density and structure, unspecified    Localized osteoporosis ([place])    Long term (current) use of anticoagulants        Past Medical History:   Diagnosis Date    Anxiety     SITUATIONAL R/T DEATH OF HER SPOUSE    Balance problem     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 TERRY CHEUNG    Osteoarthritis     Osteopenia     Rectal nodule 03/2020    REFERRED TO DR. PAMELA NAPOLES    Rectocele 07/2017    RLS (restless legs syndrome)     Seborrheic keratosis     Skin cancer 04/2015    LOWER LEG, FOLLOWED BY DR. ATUL SANCHEZ    Vertigo         Past Surgical History:   Procedure Laterality Date    CATARACT EXTRACTION, BILATERAL Bilateral 2015    COLONOSCOPY N/A 02/27/2015    1 TUBULAR ADENOMA POLYP IN DISTAL [facility]END[facility]    COLONOSCOPY N/A 03/04/2020    10 MM SESSILE SERRATED ADENOMA POLYP IN ASCENDING, 3 MM HYPERPLASTIC POLYP IN RECTUM, 7 MM ANAL NODULE, DR. DARRIAN LIRIANO AT [facility]    COLONOSCOPY N/A 9/24/2024    Procedure: COLONOSCOPY into cecum and TI with saline lift injection and hot snare polypectomy with x3 clips placed;  Surgeon: Darrian Liriano MD;  [facility];  Service: Gastroenterology;  Laterality: N/A;  pre: hx of colon polyps   post:: polyp, diverticulosis    HYSTERECTOMY N/A 01/19/2004    KAYLEE WITH BSO, DR. RAFIA MORTENSEN AT [facility]    KNEE ARTHROSCOPY Left 2013    KNEE ARTHROSCOPY Right 2010    MELISSA CULDOPLASTY N/A 01/19/2004    DR. RAFIA MORTENSEN AT [facility]    SACROCOLPOPEXY N/A     TOTAL KNEE ARTHROPLASTY Right 4/11/2023    Procedure: RIGHT TOTAL KNEE ARTHROPLASTY WITH SHERI NAVIGATION;  Surgeon: Atul Liriano MD;  Location: [facility];  Service: Orthopedics;  Laterality: Right;    TOTAL KNEE ARTHROPLASTY Left 11/14/2023    Procedure: LEFT TOTAL KNEE ARTHROPLASTY WITH SHERI NAVIGATION;  Surgeon: Atul Liriano MD;  Location:  [facility];  Service: Orthopedics;  Laterality: Left;                        PT Assessment/Plan       Row Name 02/10/25 1200          PT Assessment     Assessment Comments Ms. Motta returns to PT this date continuing to report slow, but steady improvement in condition. She reports good adherence to HEP with overall reduced frequency in radicular symptoms into the hands. Spent time discussing sleep positioning and impact of excessive elbow flexion, shoulder flexion, or side sleeping in relation to neural tension. Progressed all exercises to standing with addition of straight arm theraloop flexion and chin tucks into wall with great tolerance. Continued with heavy manual to B UT to address ongoing significant tightness. At this time, she continues to progress well, will plan to reduce frequency next week to 1x per week with more emphasis on independent management.  -MO        PT Plan    PT Plan Comments update HEP to include standing exercises and wall chin tucks. Could attempt rotation and/or SB into ball at wall for more isometrics. D2 standing flex  -MO               User Key  (r) = Recorded By, (t) = Taken By, (c) = Cosigned By      Initials Name Provider Type    Erica Evangelista, PT Physical Therapist                       OP Exercises       Row Name 02/10/25 1000             Subjective    Subjective Comments Felt really good after last session, I think the massage helps. Feel like there is slow but steady progress. Did exercises over the weekend, they went well. My numbness and tingling has improved, it is less frequent.  -MO         Subjective Pain    Able to rate subjective pain? yes  -MO      Pre-Treatment Pain Level 3  -MO         Total Minutes    37706 - PT Therapeutic Exercise Minutes 30  -MO      30030 - PT Manual Therapy Minutes 10  -MO         Exercise 1    Exercise Name 1 UBE  -MO      Time 1 4 mins  -MO         Exercise 2    Exercise Name 2 45 deg doorway str  -MO      Cueing 2 Verbal  -MO      Sets 2 3  -MO      Reps 2 30s  -MO         Exercise 5    Exercise Name 5 standing straight arm theraloop flex  -MO      Cueing 5 Verbal;Demo  -MO      Sets 5 2   -MO      Reps 5 10  -MO      Time 5 YTB  -MO      Additional Comments cueing to limit cspine protraction  -MO         Exercise 6    Exercise Name 6 standing HA  -MO      Cueing 6 Verbal  -MO      Sets 6 2  -MO      Reps 6 10  -MO      Time 6 GTB  -MO         Exercise 7    Exercise Name 7 shoulder ext  -MO      Cueing 7 Verbal  -MO      Sets 7 2  -MO      Reps 7 15  -MO      Time 7 GTB  -MO         Exercise 8    Exercise Name 8 standing chin tuck at wall into ball  -MO      Cueing 8 Verbal;Demo  -MO      Sets 8 1  -MO      Reps 8 12  -MO      Time 8 cueing to push in for resistance prior to tuck  -MO         Exercise 9    Exercise Name 9 rows  -MO      Cueing 9 Verbal  -MO      Sets 9 2  -MO      Reps 9 15  -MO      Time 9 GTB  -MO         Exercise 12    Exercise Name 12 wall slides  -MO      Cueing 12 Verbal  -MO      Reps 12 1x10  -MO      Time 12 10s  -MO                User Key  (r) = Recorded By, (t) = Taken By, (c) = Cosigned By      Initials Name Provider Type    Erica Evangelista, PT Physical Therapist                             Manual Rx (Last 36 Hours)       Manual Treatments       Row Name 02/10/25 1100 02/10/25 1000          Total Minutes    89988 - PT Manual Therapy Minutes -- 10  -MO        Manual Rx 2    Manual Rx 2 Location BL UT/ LS STM w/ sustained hold  -MO --     Manual Rx 2 Type seated  -MO --               User Key  (r) = Recorded By, (t) = Taken By, (c) = Cosigned By      Initials Name Provider Type    Erica Evangelista, PT Physical Therapist                     PT OP Goals       Row Name 02/10/25 1000          PT Short Term Goals    STG Date to Achieve 02/13/25  -MO     STG 1 Pt will be independent with initial HEP and postural awareness to improve pain and symptoms.  -MO     STG 1 Progress Ongoing  -MO     STG 2 Pt to be educated in/verbalize understanding of the importance of posture/ergonomics in association with their condition to facilitate self management of their condition.  -MO     STG 2  Progress Ongoing  -MO     STG 3 Pt will improve cervical rotation AROM from 30 degrees to 45 degrees bilaterally to improve ability to drive safely.  -MO     STG 3 Progress Ongoing  -MO     STG 4 Pt will report 50% reduction in R UE N/T symptoms to demonstrate improved management of symptoms.  -MO     STG 4 Progress Ongoing  -MO        Long Term Goals    LTG Date to Achieve 03/15/25  -MO     LTG 1 Pt will be independent with advance HEP and postural awareness for continued management of pain and symptoms.  -MO     LTG 1 Progress Ongoing  -MO     LTG 2 Pt will improve NDI perceived disability from 10/50 to 3/50 to improve overall quality of life.  -MO     LTG 2 Progress Ongoing  -MO     LTG 3 Pt will decrease neck pain from 5/10 to </= 1/10 to improve participation in ADLs.  -MO     LTG 3 Progress Ongoing  -MO     LTG 4 Pt will demonstrate bilateral  strength to 25# in order to increase ability to perform fine motor tasks including gripping and writing while participating in work related duties.  -MO     LTG 4 Progress Ongoing  -MO               User Key  (r) = Recorded By, (t) = Taken By, (c) = Cosigned By      Initials Name Provider Type    Erica Evangelista PT Physical Therapist                                   Time Calculation:   Start Time: 1015  Stop Time: 1055  Time Calculation (min): 40 min  Timed Charges  61274 - PT Therapeutic Exercise Minutes: 30  15626 - PT Manual Therapy Minutes: 10  Total Minutes  Timed Charges Total Minutes: 40   Total Minutes: 40  Therapy Charges for Today       Code Description Service Date Service Provider Modifiers Qty    71972952661 HC PT THER PROC EA 15 MIN 2/10/2025 Erica Wynn PT GP, KX 2    87673571499 HC PT MANUAL THERAPY EA 15 MIN 2/10/2025 Erica Wynn PT GP, KX 1                      Erica Wynn PT  2/10/2025

## 2025-02-12 ENCOUNTER — APPOINTMENT (OUTPATIENT)
Dept: PHYSICAL THERAPY | Facility: HOSPITAL | Age: 85
End: 2025-02-12
Payer: MEDICARE

## 2025-02-12 ENCOUNTER — PATIENT OUTREACH (OUTPATIENT)
Dept: CASE MANAGEMENT | Facility: OTHER | Age: 85
End: 2025-02-12
Payer: MEDICARE

## 2025-02-12 DIAGNOSIS — M19.09 OSTEOARTHRITIS OF OTHER SITE, UNSPECIFIED OSTEOARTHRITIS TYPE: Primary | ICD-10-CM

## 2025-02-12 DIAGNOSIS — R73.03 PREDIABETES: ICD-10-CM

## 2025-02-12 NOTE — PLAN OF CARE
Problem: General Plan of Care  Goal: Make and Keep All Appointments  Outcome: Progressing  Goal: Protect My Health  Outcome: Progressing  Goal: Manage My Emotions  Outcome: Progressing  Goal: Maintain My Quality of Life  Outcome: Progressing  Goal: Optimal Care Coordination  Outcome: Progressing

## 2025-02-12 NOTE — OUTREACH NOTE
AMBULATORY CASE MANAGEMENT NOTE    Names and Relationships of Patient/Support Persons: Contact: Ashley Motta; Relationship: Self -     Patient Outreach    Called patient for scheduled outreach. She reports she has started physical therapy on her neck and her pain has improved. In addition, she is doing the exercises taught to her at home. Next week her physical therapy sessions will decrease from two times a week to once a week. Patient reports she does not take any pain relievers. Encouraged to call PCP if her pain is unbearable. She voiced understanding.     Patient denies further needs. Next outreach scheduled.     Plan:  Neck pain still improving?        Aleisha ARMSTRONG  Ambulatory Case Management    2/12/2025, 10:20 EST

## 2025-02-14 ENCOUNTER — HOSPITAL ENCOUNTER (OUTPATIENT)
Dept: PHYSICAL THERAPY | Facility: HOSPITAL | Age: 85
Setting detail: THERAPIES SERIES
Discharge: HOME | End: 2025-02-14
Payer: MEDICARE

## 2025-02-14 DIAGNOSIS — M54.2 NECK PAIN: Primary | ICD-10-CM

## 2025-02-14 DIAGNOSIS — M62.838 TRAPEZIUS MUSCLE SPASM: ICD-10-CM

## 2025-02-14 DIAGNOSIS — M54.12 CERVICAL RADICULOPATHY: ICD-10-CM

## 2025-02-14 DIAGNOSIS — R29.898 DECREASED ROM OF NECK: ICD-10-CM

## 2025-02-14 DIAGNOSIS — R20.0 NUMBNESS AND TINGLING OF RIGHT ARM: ICD-10-CM

## 2025-02-14 DIAGNOSIS — R20.2 NUMBNESS AND TINGLING OF RIGHT ARM: ICD-10-CM

## 2025-02-14 PROCEDURE — 97530 THERAPEUTIC ACTIVITIES: CPT | Performed by: PHYSICAL THERAPIST

## 2025-02-14 PROCEDURE — 97140 MANUAL THERAPY 1/> REGIONS: CPT | Performed by: PHYSICAL THERAPIST

## 2025-02-14 NOTE — THERAPY PROGRESS REPORT/RE-CERT
Outpatient Physical Therapy Ortho Progress Note  [facility]     Patient Name: Ashley Motta  : 1940  MRN: [id number]  Today's Date: 2025      Visit Date: 2025    Visit Dx:    ICD-10-CM ICD-9-CM   1. Neck pain  M54.2 723.1   2. Cervical radiculopathy  M54.12 723.4   3. Numbness and tingling of right arm  R20.0 782.0    R20.2    4. Trapezius muscle spasm  M62.838 728.85   5. Decreased ROM of neck  R29.898 723.8       Patient Active Problem List   Diagnosis    Osteopenia    Hyperlipidemia    Prediabetes    Osteoarthritis    BALDEMAR on CPAP    Pelvic relaxation due to vaginal prolapse    Chronic pain of both knees    Arthritis of right knee    Arthritis of left knee    Arthritis of both knees    Restless leg syndrome    Dizziness    Headache, migraine    Heartburn    Internal hemorrhoids with complication    Impacted cerumen of left ear    Vitamin E deficiency    Environmental and seasonal allergies    Chronic cough    Aftercare following joint replacement surgery    Allergic rhinitis, unspecified    Anxiety disorder, unspecified    Atopic dermatitis, unspecified    Constipation, unspecified    Localized osteoporosis without current pathological fracture    Gastro-esophageal reflux disease without esophagitis    Generalized muscle weakness    Iron deficiency anemia due to chronic blood loss    Personal history of other malignant neoplasm of skin    Presence of right artificial knee joint    Migraine, unspecified, not intractable, without status migrainosus    S/P total knee arthroplasty, left    Benign neoplasm of unspecified choroid    Disorder of bone density and structure, unspecified    Localized osteoporosis ([place])    Long term (current) use of anticoagulants        Past Medical History:   Diagnosis Date    Anxiety     SITUATIONAL R/T DEATH OF HER SPOUSE    Balance problem     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 TERRY CHEUNG    Osteoarthritis     Osteopenia     Rectal nodule 03/2020    REFERRED TO DR. PAMELA NAPOLES    Rectocele 07/2017    RLS (restless legs syndrome)     Seborrheic keratosis     Skin cancer 04/2015    LOWER LEG, FOLLOWED BY DR. ATUL SANCHEZ    Vertigo         Past Surgical History:   Procedure Laterality Date    CATARACT EXTRACTION, BILATERAL Bilateral 2015    COLONOSCOPY N/A 02/27/2015    1 TUBULAR ADENOMA POLYP IN DIST[facility]    COLONOSCOPY N/A 03/04/2020    10 MM SESSILE SERRATED ADENOMA POLYP IN ASCENDING, 3 MM HYPERPLASTIC POLYP IN RECTUM, 7 MM ANAL NODULE, DR[facility]    COLONOSCOPY N/A 9/24/2024    Procedure: COLONOSCOPY into cecum and TI with saline lift injection and hot snare polypectomy with x3 clips placed;  Surgeon: Darrian Liriano MD;  Location: [facility];  Service: Gastroenterology;  Laterality: N/A;  pre: hx of colon polyps   post:: polyp, diverticulosis    HYSTERECTOMY N/A 01/19/2004    KAYLEE WITH BSO, DR[facility] RAF[facility]    KNEE ARTHROSCOPY Left 2013    KNEE ARTHROSCOPY Right 2010    MELISSA CULDOPLASTY N/A 01/19/2004    DR[facility] RAF[facility]    SACROCOLPOPEXY N/A     TOTAL KNEE ARTHROPLASTY Right 4/11/2023    Procedure: RIGHT TOTAL KNEE ARTHROPLASTY WITH SHERI NAVIGATION;  Surgeon: Atul Liriano MD;  Location: [facility];  Service: Orthopedics;  Laterality: Right;    TOTAL KNEE ARTHROPLASTY Left 11/14/2023    Procedure: LEFT TOTAL KNEE ARTHROPLASTY WITH SHERI NAVIGATION;  Surgeon: Atul Liriano MD;  Location:  [facility];  Service: Orthopedics;  Laterality: Left;        PT Ortho       Row Name 02/14/25 1000       Posture/Observations    Forward Head Mild   -GJ    Cervical Lordosis Decreased  -GJ    Rounded Shoulders Moderate;Bilateral:  -GJ       Cervical/Shoulder ROM Screen    Cervical rotation Normal  R 55, L 60, seated  -GJ        Strength Right    # Reps 2  -GJ    Right Rung 3  -GJ    Right  Test 1 20  -GJ    Right  Test 2 25  -GJ    Right  Test 3 20  -GJ     Strength Average Right 21.67  -GJ        Strength Left    # Reps 2  -GJ    Left Rung 3  -GJ    Left  Test 1 30  -GJ    Left  Test 2 35  -GJ    Left  Test 3 40  -GJ     Strength Average Left 35  -GJ              User Key  (r) = Recorded By, (t) = Taken By, (c) = Cosigned By      Initials Name Provider Type     Jose Singh, PT Physical Therapist                                 PT Assessment/Plan       Row Name 02/14/25 4330          PT Assessment    Functional Limitations Limitation in home management;Limitations in community activities;Performance in leisure activities  -     Impairments Impaired flexibility;Impaired muscle endurance;Impaired muscle length;Impaired muscle power;Impaired postural alignment;Joint mobility;Muscle strength;Pain;Poor body mechanics;Peripheral nerve integrity;Posture;Range of motion  -     Assessment Comments Ms. Motta is an 84 y/o R handed female. She has attended 8 sessions of physical therapy from  for C spine/RUE pain.  Treatment has included therapeutic exercise, manual therapy, therapeutic activity, and patient education with home exercise program . Progress towards goals is good having met 4 of 4 STG's and 0 of 4 LTG's, 1 LTG partially met. She reports about 50% improvement in her condition. Continues to have RUE pain with N/T. See ortho section for updated ROM/MMT. She demonstrates improving C spine AROM rotation bilaterally and improved  strength bilaterally.  She continues to demonstrate (+) trigger point R UT/R levator scap tissues. We focused primarily on manual techniques today, introduction CESAR Ferrera to her T and C  spine.  Continued to work on c spine distraction and soft tissue management to her R UT/R levator scap with active release like techniques. Ms. Motta continues to be a good candidate physical therapy.  -GJ     Please refer to paper survey for additional self-reported information Yes  -GJ     Rehab Potential Excellent  -GJ     Patient/caregiver participated in establishment of treatment plan and goals Yes  -GJ     Patient would benefit from skilled therapy intervention Yes  -GJ        PT Plan    PT Frequency 1x/week;2x/week  -GJ     Predicted Duration of Therapy Intervention (PT) 10 visits  -GJ     Planned CPT's? PT RE-EVAL: 92257;PT THER PROC EA 15 MIN: 36199;PT THER ACT EA 15 MIN: 07563;PT MANUAL THERAPY EA 15 MIN: 74606;PT NEUROMUSC RE-EDUCATION EA 15 MIN: 49947;PT HOT OR COLD PACK TREAT MCARE;PT ELECTRICAL STIM UNATTEND: ;PT TRACTION CERVICAL: 55497  -GJ     PT Plan Comments assess response to previous session and manual treatments, update HEP to include standing exercises and wall chin tucks. Could attempt rotation and/or SB into ball at wall for more isometrics. D2 standing fle  -GJ               User Key  (r) = Recorded By, (t) = Taken By, (c) = Cosigned By      Initials Name Provider Type    Jose Thomas, PT Physical Therapist                       OP Exercises       Row Name 02/14/25 1001 02/14/25 1000          Subjective    Subjective Comments -- i have pain in my R arm, and if i don't hold it up, it hurts.  I wake up in the morning and my R hand is numb.  -GJ        Total Minutes    03312 - PT Therapeutic Activity Minutes 10  -GJ --     91484 - PT Manual Therapy Minutes 30  -GJ --        Exercise 1    Exercise Name 1 -- UBE  -GJ     Time 1 -- 4 mins  -GJ               User Key  (r) = Recorded By, (t) = Taken By, (c) = Cosigned By      Initials Name Provider Type    Jose Thomas, PT Physical Therapist                             Manual Rx (Last 36 Hours)       Manual Treatments       Row  Name 02/14/25 1001 02/14/25 0900          Total Minutes    44279 - PT Manual Therapy Minutes 30  -GJ --        Manual Rx 3    Manual Rx 3 Location -- PA mobs to T spine, pt prone, grade I,II,III  -GJ     Manual Rx 3 Type -- PA mobs to c spine, pt supine, grade I-III  -GJ     Manual Rx 3 Grade -- CT junction mobs, R>L  -GJ     Manual Rx 3 Duration -- STM to R UT, levator scap with pin and stretch/active relese like techniques, (moving through L lateral flexion and rotation R/L  -GJ        Manual Rx 4    Manual Rx 4 Location -- gentle c spine distraction  -GJ               User Key  (r) = Recorded By, (t) = Taken By, (c) = Cosigned By      Initials Name Provider Type    Jose Thomas, PT Physical Therapist                     PT OP Goals       Row Name 02/14/25 1000          PT Short Term Goals    STG Date to Achieve 02/13/25  -GJ     STG 1 Pt will be independent with initial HEP and postural awareness to improve pain and symptoms.  -GJ     STG 1 Progress Met  -GJ     STG 2 Pt to be educated in/verbalize understanding of the importance of posture/ergonomics in association with their condition to facilitate self management of their condition.  -GJ     STG 2 Progress Met  -GJ     STG 3 Pt will improve cervical rotation AROM from 30 degrees to 45 degrees bilaterally to improve ability to drive safely.  -GJ     STG 3 Progress Met  -GJ     STG 4 Pt will report 50% reduction in R UE N/T symptoms to demonstrate improved management of symptoms.  -GJ     STG 4 Progress Met  -GJ        Long Term Goals    LTG Date to Achieve 03/15/25  -GJ     LTG 1 Pt will be independent with advance HEP and postural awareness for continued management of pain and symptoms.  -GJ     LTG 1 Progress Ongoing  -GJ     LTG 2 Pt will improve NDI perceived disability from 10/50 to 3/50 to improve overall quality of life.  -GJ     LTG 2 Progress Ongoing  -GJ     LTG 2 Progress Comments 10/50, 20%  -GJ     LTG 3 Pt will decrease neck pain from 5/10  to </= 1/10 to improve participation in ADLs.  -GJ     LTG 3 Progress Ongoing  -GJ     LTG 4 Pt will demonstrate bilateral  strength to 25# in order to increase ability to perform fine motor tasks including gripping and writing while participating in work related duties.  -GJ     LTG 4 Progress Ongoing;Partially Met  -GJ     LTG 4 Progress Comments R average 21 from 6, L average 35 from 16  -GJ               User Key  (r) = Recorded By, (t) = Taken By, (c) = Cosigned By      Initials Name Provider Type    Jose Thomas, PT Physical Therapist                    Therapy Education  Education Details: reviewed activity modifications, educated pt in use of towel  roll to support c spine lordosis, particularly at night  Given: HEP, Symptoms/condition management, Pain management, Posture/body mechanics, Fall prevention and home safety, Mobility training  Program: Reinforced  How Provided: Verbal, Demonstration  Provided to: Patient  Level of Understanding: Teach back education performed, Verbalized, Demonstrated    Outcome Measure Options: Neck Disability Index (NDI)  Neck Disability Index  Section 1 - Pain Intensity: The pain comes and goes and is moderate.  Section 2 - Personal Care: I can look after myself normally, but it causes extra pain.  Section 3 - Lifting: I can lift heavy weights, but it causes extra pain.  Section 4 - Reading: I can read as much as I want with slight neck pain.  Section 5 - Headaches: I have slight headaches that come infrequently.  Section 6 - Concentration: I can concentrate fully when I want to with slight difficulty.  Section 7 - Work: I can do as much work as I want.  Section 8 - Driving: I can drive as long as I want with slight neck pain.  Section 9 - Sleeping: My sleep is slightly disturbed (less than 1 hour sleepless).  Section 10 - Recreation: I am able to engage in all recreational activities with some pain in my neck.  Neck Disability Index Score: 10      Time Calculation:    Start Time: 1001  Stop Time: 1045  Time Calculation (min): 44 min  Timed Charges  54479 - PT Manual Therapy Minutes: 30  74122 - PT Therapeutic Activity Minutes: 10  Total Minutes  Timed Charges Total Minutes: 40   Total Minutes: 40  Therapy Charges for Today       Code Description Service Date Service Provider Modifiers Qty    24216685274 HC PT THERAPEUTIC ACT EA 15 MIN 2/14/2025 Jose Singh, PT GP 1    57897999258 HC PT MANUAL THERAPY EA 15 MIN 2/14/2025 Jose Singh, PT GP 2            PT G-Codes  Outcome Measure Options: Neck Disability Index (NDI)  Neck Disability Index Score: 10         Jose Singh, PT  2/14/2025

## 2025-02-17 ENCOUNTER — APPOINTMENT (OUTPATIENT)
Dept: PHYSICAL THERAPY | Facility: HOSPITAL | Age: 85
End: 2025-02-17
Payer: MEDICARE

## 2025-02-24 ENCOUNTER — HOSPITAL ENCOUNTER (OUTPATIENT)
Dept: PHYSICAL THERAPY | Facility: HOSPITAL | Age: 85
Setting detail: THERAPIES SERIES
Discharge: HOME | End: 2025-02-24
Payer: MEDICARE

## 2025-02-24 DIAGNOSIS — M54.12 CERVICAL RADICULOPATHY: ICD-10-CM

## 2025-02-24 DIAGNOSIS — R20.0 NUMBNESS AND TINGLING OF RIGHT ARM: ICD-10-CM

## 2025-02-24 DIAGNOSIS — R29.898 DECREASED ROM OF NECK: ICD-10-CM

## 2025-02-24 DIAGNOSIS — M62.838 TRAPEZIUS MUSCLE SPASM: ICD-10-CM

## 2025-02-24 DIAGNOSIS — R20.2 NUMBNESS AND TINGLING OF RIGHT ARM: ICD-10-CM

## 2025-02-24 DIAGNOSIS — M54.2 NECK PAIN: Primary | ICD-10-CM

## 2025-02-24 PROCEDURE — 97110 THERAPEUTIC EXERCISES: CPT

## 2025-02-24 PROCEDURE — 97140 MANUAL THERAPY 1/> REGIONS: CPT

## 2025-02-24 NOTE — THERAPY TREATMENT NOTE
Outpatient Physical Therapy Ortho Treatment Note  [facility]     Patient Name: Ashley Motta  : 1940  MRN: [id number]  Today's Date: 2025      Visit Date: 2025    Visit Dx:    ICD-10-CM ICD-9-CM   1. Neck pain  M54.2 723.1   2. Cervical radiculopathy  M54.12 723.4   3. Numbness and tingling of right arm  R20.0 782.0    R20.2    4. Decreased ROM of neck  R29.898 723.8   5. Trapezius muscle spasm  M62.838 728.85       Patient Active Problem List   Diagnosis    Osteopenia    Hyperlipidemia    Prediabetes    Osteoarthritis    BALDEMAR on CPAP    Pelvic relaxation due to vaginal prolapse    Chronic pain of both knees    Arthritis of right knee    Arthritis of left knee    Arthritis of both knees    Restless leg syndrome    Dizziness    Headache, migraine    Heartburn    Internal hemorrhoids with complication    Impacted cerumen of left ear    Vitamin E deficiency    Environmental and seasonal allergies    Chronic cough    Aftercare following joint replacement surgery    Allergic rhinitis, unspecified    Anxiety disorder, unspecified    Atopic dermatitis, unspecified    Constipation, unspecified    Localized osteoporosis without current pathological fracture    Gastro-esophageal reflux disease without esophagitis    Generalized muscle weakness    Iron deficiency anemia due to chronic blood loss    Personal history of other malignant neoplasm of skin    Presence of right artificial knee joint    Migraine, unspecified, not intractable, without status migrainosus    S/P total knee arthroplasty, left    Benign neoplasm of unspecified choroid    Disorder of bone density and structure, unspecified    Localized osteoporosis ([place])    Long term (current) use of anticoagulants        Past Medical History:   Diagnosis Date    Anxiety     SITUATIONAL R/T DEATH OF HER SPOUSE    Balance problem     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 TERRY CHEUNG    Osteoarthritis     Osteopenia     Rectal nodule 03/2020    REFERRED TO DR. PAMELA NAPOLES    Rectocele 07/2017    RLS (restless legs syndrome)     Seborrheic keratosis     Skin cancer 04/2015    LOWER LEG, FOLLOWED BY DR. ATUL SANCHEZ    Vertigo         Past Surgical History:   Procedure Laterality Date    CATARACT EXTRACTION, BILATERAL Bilateral 2015    COLONOSCOPY N/A 02/27/2015    1 TUBULAR ADENOMA POLYP IN DISTAL ASCENDING, [facility]    COLONOSCOPY N/A 03/04/2020    10 MM SESSILE SERRATED ADENOMA POLYP IN ASCENDING, 3 MM HYPERPLASTIC POLYP IN RECTUM, 7 MM ANAL NODULE, DR. DARRIAN LIRIANO AT [facility]    COLONOSCOPY N/A 9/24/2024    Procedure: COLONOSCOPY into cecum and TI with saline lift injection and hot snare polypectomy with x3 clips placed;  Surgeon: Darrian Liriano MD;  [facility];  Service: Gastroenterology;  Laterality: N/A;  pre: hx of colon polyps   post:: polyp, diverticulosis    HYSTERECTOMY N/A 01/19/2004    KAYLEE WITH BSO, DR. RAFIA MORTENSEN AT [facility]    KNEE ARTHROSCOPY Left 2013    KNEE ARTHROSCOPY Right 2010    MELISSA CULDOPLASTY N/A 01/19/2004    DR. RAFIA MORTENSEN AT [facility]    SACROCOLPOPEXY N/A     TOTAL KNEE ARTHROPLASTY Right 4/11/2023    Procedure: RIGHT TOTAL KNEE ARTHROPLASTY WITH SHERI NAVIGATION;  Surgeon: Atul Liriano MD;  Location: [facility];  Service: Orthopedics;  Laterality: Right;    TOTAL KNEE ARTHROPLASTY Left 11/14/2023    Procedure: LEFT TOTAL KNEE ARTHROPLASTY WITH SHERI NAVIGATION;  Surgeon: Atul Liriano MD;  Location:  [facility];  Service: Orthopedics;  Laterality: Left;                        PT Assessment/Plan       Row Name 02/24/25 1500          PT Assessment     Assessment Comments Ms. Motta returns to PT reporting increased soreness following last session however reports it was tolerable and she does feel notable relief overall. Resumed exercises as well as added standing cervical rotation isometric at the wall and continued with heavy emphasis on manual interventions with inclusion of tspine for mobility. No immediate adverse effects. Did update HEP, continued to emphasize appropriate posture, will continue to progress as appropriate and tolerable.  -MO        PT Plan    PT Plan Comments tolerance to last session? Add sidebending isometric at wall, standind D2 flexion. Review neural glides  -MO               User Key  (r) = Recorded By, (t) = Taken By, (c) = Cosigned By      Initials Name Provider Type    Erica Evangelista, PT Physical Therapist                       OP Exercises       Row Name 02/24/25 1300             Subjective    Subjective Comments R shoulder is a little sensitive today  -MO         Total Minutes    44938 - PT Therapeutic Exercise Minutes 25  -MO      63671 - PT Manual Therapy Minutes 15  -MO         Exercise 1    Exercise Name 1 UBE  -MO      Time 1 4 mins  -MO         Exercise 2    Exercise Name 2 45 deg doorway str  -MO      Cueing 2 Verbal  -MO      Sets 2 4  -MO      Reps 2 20s  -MO         Exercise 5    Exercise Name 5 standing straight arm theraloop flex  -MO      Cueing 5 Verbal  -MO      Sets 5 2  -MO      Reps 5 10  -MO      Time 5 YTB  -MO         Exercise 7    Exercise Name 7 shoulder ext  -MO      Cueing 7 Verbal  -MO      Sets 7 2  -MO      Reps 7 15  -MO      Time 7 GTB  -MO         Exercise 8    Exercise Name 8 standing cspine isometrics at wall into ball  -MO      Cueing 8 Verbal;Demo  -MO      Sets 8 2e: fwd chin tuck, BL ROT  -MO      Reps 8 10  -MO      Time 8 5s  -MO      Additional Comments cueing to push in for resistance prior to tuck  -MO                User Key  (r) = Recorded By, (t) = Taken By, (c) = Cosigned By      Initials  Name Provider Type    Erica Evangelista, PT Physical Therapist                             Manual Rx (Last 36 Hours)       Manual Treatments       Row Name 02/24/25 1500 02/24/25 1300          Total Minutes    22923 - PT Manual Therapy Minutes -- 15  -MO        Manual Rx 3    Manual Rx 3 Location PA mobs to T spine, pt prone, grade I,II,III  -MO --     Manual Rx 3 Duration STM to R UT, levator scap with pin and stretch/active relese like techniques, (moving through L lateral flexion and rotation R/L  -MO --               User Key  (r) = Recorded By, (t) = Taken By, (c) = Cosigned By      Initials Name Provider Type    Erica Evangelista, PT Physical Therapist                                       Time Calculation:   Start Time: 1320  Stop Time: 1400  Time Calculation (min): 40 min  Timed Charges  33340 - PT Therapeutic Exercise Minutes: 25  75628 - PT Manual Therapy Minutes: 15  Total Minutes  Timed Charges Total Minutes: 40   Total Minutes: 40  Therapy Charges for Today       Code Description Service Date Service Provider Modifiers Qty    79356153742  PT THER PROC EA 15 MIN 2/24/2025 Erica Wynn, PT GP 2    [id number]  PT MANUAL THERAPY EA 15 MIN 2/24/2025 Erica Wynn, PT GP 1                      Erica Wynn PT  2/24/2025

## 2025-02-25 ENCOUNTER — OFFICE VISIT (OUTPATIENT)
Age: 85
End: 2025-02-25
Payer: MEDICARE

## 2025-02-25 ENCOUNTER — OFFICE VISIT (OUTPATIENT)
Dept: NEUROLOGY | Facility: CLINIC | Age: 85
End: 2025-02-25
Payer: MEDICARE

## 2025-02-25 VITALS — OXYGEN SATURATION: 97 % | HEART RATE: 93 BPM | BODY MASS INDEX: 31.17 KG/M2 | WEIGHT: 169.4 LBS | HEIGHT: 62 IN

## 2025-02-25 VITALS
DIASTOLIC BLOOD PRESSURE: 76 MMHG | BODY MASS INDEX: 30.91 KG/M2 | OXYGEN SATURATION: 98 % | WEIGHT: 168 LBS | HEART RATE: 78 BPM | SYSTOLIC BLOOD PRESSURE: 124 MMHG | HEIGHT: 62 IN

## 2025-02-25 DIAGNOSIS — L85.3 XEROSIS OF SKIN: ICD-10-CM

## 2025-02-25 DIAGNOSIS — M54.12 CERVICAL RADICULOPATHY: ICD-10-CM

## 2025-02-25 DIAGNOSIS — E11.42 DIABETIC PERIPHERAL NEUROPATHY ASSOCIATED WITH TYPE 2 DIABETES MELLITUS: ICD-10-CM

## 2025-02-25 DIAGNOSIS — M79.674 PAIN IN TOES OF BOTH FEET: ICD-10-CM

## 2025-02-25 DIAGNOSIS — L60.3 ONYCHODYSTROPHY: ICD-10-CM

## 2025-02-25 DIAGNOSIS — R73.03 PREDIABETES: Primary | Chronic | ICD-10-CM

## 2025-02-25 DIAGNOSIS — G62.9 NEUROPATHY: Primary | ICD-10-CM

## 2025-02-25 DIAGNOSIS — R51.9 NONINTRACTABLE HEADACHE, UNSPECIFIED CHRONICITY PATTERN, UNSPECIFIED HEADACHE TYPE: ICD-10-CM

## 2025-02-25 DIAGNOSIS — R26.81 UNSTEADINESS ON FEET: ICD-10-CM

## 2025-02-25 DIAGNOSIS — R42 DIZZINESS: ICD-10-CM

## 2025-02-25 DIAGNOSIS — M79.675 PAIN IN TOES OF BOTH FEET: ICD-10-CM

## 2025-02-25 PROCEDURE — 99215 OFFICE O/P EST HI 40 MIN: CPT | Performed by: STUDENT IN AN ORGANIZED HEALTH CARE EDUCATION/TRAINING PROGRAM

## 2025-02-25 PROCEDURE — 1160F RVW MEDS BY RX/DR IN RCRD: CPT | Performed by: STUDENT IN AN ORGANIZED HEALTH CARE EDUCATION/TRAINING PROGRAM

## 2025-02-25 PROCEDURE — 1159F MED LIST DOCD IN RCRD: CPT | Performed by: STUDENT IN AN ORGANIZED HEALTH CARE EDUCATION/TRAINING PROGRAM

## 2025-02-25 RX ORDER — PANTOPRAZOLE SODIUM 40 MG/1
40 TABLET, DELAYED RELEASE ORAL EVERY MORNING
COMMUNITY
Start: 2025-01-13

## 2025-02-25 NOTE — PROGRESS NOTES
02/25/2025  Foot and Ankle Surgery - New Patient   Provider: BERNICE Kaba   Location: [facility]    Subjective:  Ashley Motta is a 85 y.o. female.     Chief Complaint   Patient presents with    Left Foot - Initial Evaluation, Nail Problem    Right Foot - Initial Evaluation, Nail Problem    Initial Evaluation     Yenni Bautista MD  11/6/24       History of Present Illness  The patient is an 85-year-old female who presents for a new patient exam regarding problems with her toenails.    She reports that her toenails have been thick and discolored for approximately one year, with a noted exacerbation in the past 2 to 3 months. Despite these changes, she has not experienced any difficulty in trimming them herself. She also mentions that her heels are particularly dry.    She has a history of neuropathy, which was evaluated by Dr. Alvarado this morning, who confirmed that there has been no progression of the condition. She is not diabetic but is prediabetic, although she is not currently on any medication for this. Her last A1c level is unknown to her. She has been advised by her primary care physician to increase her physical activity and lose weight. She is scheduled for a follow-up visit with her primary care physician in a few weeks.    Supplemental Information  She has undergone knee replacement surgery in 04/2022 and 11/2022. She has been advised against the use of ibuprofen and instead takes Tylenol for headache management.    ALLERGIES  The patient can not take IBUPROFEN.    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 TERRY CHEUNG    Osteoarthritis     Osteopenia     Rectal nodule 03/2020    REFERRED TO DR. PAMELA NAPOLES    Rectocele 07/2017    RLS (restless legs syndrome)     Seborrheic keratosis     Skin cancer 04/2015    LOWER LEG, FOLLOWED BY DR. ATUL SANCHEZ    Vertigo        Past Surgical History:   Procedure Laterality Date    CATARACT EXTRACTION, BILATERAL Bilateral 2015    COLONOSCOPY N/A 02/27/2015    1 TUBULAR ADENOMA [facility]YP [facility] DIST[facility]    COLONOSCOPY N/A 03/04/2020    10 MM SESSILE SERRATED ADENOMA POLYP IN ASCENDING, 3 MM HYPERPLASTIC POLYP IN RECTUM, 7 MM ANAL NODULE, DR. DARRIAN LIRIANO AT [facility]    COLONOSCOPY N/A 9/24/2024    Procedure: COLONOSCOPY into cecum and TI with saline lift injection and hot snare polypectomy with x3 clips placed;  Surgeon: Darrian Liriano MD;  Location: [facility];  Service: Gastroenterology;  Laterality: N/A;  pre: hx of colon polyps   post:: polyp, diverticulosis    HYSTERECTOMY N/A 01/19/2004    KAYLEE WITH BSO, DR. RAFIA MORTENSEN AT [facility]    KNEE ARTHROSCOPY Left 2013    KNEE ARTHROSCOPY Right 2010    MELISSA CULDOPLASTY N/A 01/19/2004    DR. RAFIA MORTENSEN AT [facility]    SACROCOLPOPEXY N/A     TOTAL KNEE ARTHROPLASTY Right 4/11/2023    Procedure: RIGHT TOTAL KNEE ARTHROPLASTY WITH SHERI NAVIGATION;  Surgeon: Atul Liriano MD;  [place] [facility] [place] [facility]  Service: Orthopedics;  Laterality: Right;    TOTAL KNEE ARTHROPLASTY Left 11/14/2023    Procedure: LEFT TOTAL KNEE ARTHROPLASTY WITH SHERI NAVIGATION;  Surgeon: Atul Liriano MD;  Location:  [facility];  Service: Orthopedics;  Laterality: Left;       Family History   Problem Relation Age of Onset    Heart disease Mother     Lung cancer Father     Hypertension Father     Heart disease Father     Cancer Father     Heart attack Brother     Emphysema Brother     COPD Brother     Depression  "Brother     Heart attack Brother         Had stents placed 2009    Breast cancer Neg Hx     Malig Hyperthermia Neg Hx        Social History     Socioeconomic History    Marital status:    Tobacco Use    Smoking status: Never     Passive exposure: Never    Smokeless tobacco: Never   Vaping Use    Vaping status: Never Used   Substance and Sexual Activity    Alcohol use: No    Drug use: Never    Sexual activity: Not Currently     Birth control/protection: Post-menopausal, Surgical        Current Outpatient Medications on File Prior to Visit   Medication Sig Dispense Refill    aspirin 81 MG EC tablet Take 1 tablet by mouth Daily.      multivitamin with minerals tablet tablet Take 1 tablet by mouth Daily.      neomycin-polymyxin-dexamethamethasone (POLYDEX) 3.5-07858-2.1 ointment ophthalmic ointment Administer 0.1 g to the right eye Daily.      pantoprazole (Protonix) 20 MG EC tablet Take 1 tablet by mouth Daily. Before dinner (Patient taking differently: Take 1 tablet by mouth Daily. Before dinner PRN) 90 tablet 4    pantoprazole (PROTONIX) 40 MG EC tablet Take 1 tablet by mouth Every Morning.      simvastatin (ZOCOR) 20 MG tablet Take 1 tablet by mouth Every Night. 90 tablet 3    sodium chloride (MICHEL 128) 5 % ophthalmic solution Administer 1 drop to both eyes 2 (Two) Times a Day.       No current facility-administered medications on file prior to visit.           Objective   Pulse 93   Ht 157.5 cm (62\")   Wt 76.8 kg (169 lb 6.4 oz)   SpO2 97%   BMI 30.98 kg/m²     Foot/Ankle Exam    GENERAL  Diabetic foot exam performed    Appearance:  appears stated age and elderly  Orientation:  AAOx3  Affect:  appropriate  Gait:  unimpaired  Assistance:  independent  Right shoe gear: casual shoe and sock  Left shoe gear: casual shoe and sock    VASCULAR     Right Foot Vascularity   Dorsalis pedis:  1+  Skin temperature:  warm  Edema grading:  Trace  CFT:  < 3 seconds  Pedal hair growth:  Absent  Varicosities:  mild " varicosities     Left Foot Vascularity   Dorsalis pedis:  1+  Skin temperature:  warm  Edema grading:  Trace  CFT:  < 3 seconds  Pedal hair growth:  Absent  Varicosities:  mild varicosities     NEUROLOGIC     Right Foot Neurologic   Light touch sensation: normal  Protective Sensation using [place]an Monofilament:   Sites intact: 10  Sites tested: 10     Left Foot Neurologic   Light touch sensation: normal  Protective Sensation using [place]hsan Monofilament:   Sites intact: 10  Sites tested: 10    MUSCULOSKELETAL     Right Foot Musculoskeletal   Ecchymosis:  none  Tenderness:  toenail problem       Left Foot Musculoskeletal   Ecchymosis:  none  Tenderness:  toenail problem    DERMATOLOGIC      Right Foot Dermatologic   Skin  Positive for dryness and skin changes.   Nails  1.  Positive for elongated, onychomycosis, abnormal thickness and dystrophic nail.  2.  Positive for elongated, abnormal thickness and dystrophic nail.  3.  Positive for elongated, abnormal thickness and dystrophic nail.  4.  Positive for elongated, abnormal thickness and dystrophic nail.  5.  Positive for elongated, abnormal thickness and dystrophic nail.     Left Foot Dermatologic   Skin  Positive for dryness and skin changes.   Nails  1.  Positive for elongated, onychomycosis, abnormal thickness and dystrophic nail.  2.  Positive for elongated, abnormal thickness and dystrophic nail.  3.  Positive for elongated, abnormal thickness and dystrophic nail.  4.  Positive for elongated, abnormally thick and dystrophic nail.  5.  Positive for elongated, abnormally thick and dystrophic nail.  Physical Exam       Results  Laboratory Studies  A1c is 6.3%.       Assessment & Plan   Diagnoses and all orders for this visit:    1. Prediabetes (Primary)    2. Diabetic peripheral neuropathy associated with type 2 diabetes mellitus    3. Xerosis of skin    4. Pain in toes of both feet    5. Onychodystrophy    6. Unsteadiness on feet      Assessment &  Plan    Her toenails have thickened and discolored over the past year, with worsening in the last 2-3 months. The changes are likely due to microtrauma rather than fungal infection. Conservative treatment is recommended. Toenails were trimmed in the office today. She is advised to keep her nails trimmed and moisturize her feet daily, especially after bathing, using Aquaphor and wearing socks to retain moisture.    Her most recent A1c was 6.3% but has been 6.4% in the past. Do feel pt is at mild risk for pedal complication related to diabetes. Explained importance of diabetic foot care, daily foot checks, and glycemic control. Patient should check both feet on a daily basis, monitor and control blood sugars, make sure that both feet and in between toes are towel dried after baths or showers. Avoid barefoot walking at all times.  I discussed wearing white socks and checking shoes before wearing them. Patient was given information on proper foot care. Call the office at the first signs of a wound or with signs of infection.     She reports mild neuropathy in her feet. A sensory test using a monofilament showed 10 out of 10 sensation in both feet. Despite this, she may still have some neuropathy, she reports she has been diagnosed with neuropathy by neurologist.       Nail debridement: Both feet x10    Consent and time out was performed before proceeding with the procedure. Nails were debrided with a nail nipper without complication.  No anesthesia was required.  Indications for procedure were thickened, dystrophic, and fungal appearing nails which are difficult to trim.  Proper self-care and technique was discussed with patient.  Patient was stable after procedure.     Aquaphor placed to bilat feet today in clinic.     Follow-up  The patient will follow up in 3 months.    PROCEDURE  The patient underwent knee replacement surgery in April 2022 and November 2022.      No orders of the defined types were placed in this  encounter.         Patient or patient representative verbalized consent for the use of Ambient Listening during the visit with  BERNICE Dunham for chart documentation. 2/26/2025  17:16 EST

## 2025-02-25 NOTE — PATIENT INSTRUCTIONS
If headaches get worse or if you have pain that goes from your neck down both or one of your arms, we could try venlafaxine 37.5 daily for nerve pain or headache prevention.

## 2025-02-25 NOTE — PROGRESS NOTES
Chief Complaint   Patient presents with    Numbness     Dexterity worsening-     Headache     Has had about 30 for 30days, of headaches wakes up with them on the right hand side of her face-   Currently in PT       Patient ID: Ashley Motta is a 85 y.o. female.    HPI:    The following portions of the patient's history were reviewed and updated as appropriate: allergies, current medications, past family history, past medical history, past social history, past surgical history and problem list.    Interval history:    Review of Systems   Neurological:  Positive for dizziness, light-headedness, numbness and headaches. Negative for tremors, seizures, syncope, facial asymmetry, speech difficulty and weakness.   Psychiatric/Behavioral:  Negative for agitation, behavioral problems, confusion, decreased concentration, dysphoric mood, hallucinations, self-injury, sleep disturbance and suicidal ideas. The patient is not nervous/anxious and is not hyperactive.       History of Present Illness  The patient is an 85-year-old female who presents to the neurology clinic for follow-up. She was initially seen for headaches and dizziness.    She has more of a numbness than pain, which is alleviated when her hands are elevated. She reports a decrease in dexterity in her arms and hands, often dropping objects such as the lid of her multivitamin bottle. She has been under the care of a spine surgeon, Dr. Hernandez, who recommended against surgical intervention and instead referred her to physical therapy with option of pain management if there are continued issues after PT. She has 2 remaining sessions of physical therapy. She has been taking Tylenol for her daily headaches, which she rates as 3 out of 10 in severity. These headaches typically occur upon waking and are managed with morning doses of Tylenol, although they may recur later in the day. She also experiences right-sided headaches. She has a history of sleep apnea, which is being  managed with a CPAP machine.  She did not have restless leg syndrome symptoms come back with cessation of her ropinirole.  She has a history of neuropathy in her feet, but it does not cause her significant discomfort. She has a history of knee surgery, with the right knee showing better healing than the left. She has a scheduled appointment with her podiatrist today at 3:00 PM. She has a history of restless leg syndrome, for which she was previously on ropinirole, but this medication was discontinued after the symptoms subsided.  She has a history of tension headaches and potential peripheral vertigo, but she did not have any episodes of vertigo from 2023 to 2024. She has a history of dizziness, which has continued to be improved over the past year.  She had negative effects on gabapentin.  She is currently on simvastatin for cholesterol management, multivitamins, baby aspirin, and fish oil tablets.      MEDICATIONS  Discontinued: Ropinirole  Current: Simvastatin, multivitamin, baby aspirin, fish oil tablet      Vitals:    02/25/25 0957   BP: 124/76   Pulse: 78   SpO2: 98%       Neurological Exam  Mental Status  Alert.    Cranial Nerves  CN II: Right normal visual field. Left normal visual field.  CN III, IV, VI: Extraocular movements intact bilaterally. Pupils equal round and reactive to light bilaterally.  CN V:  Right: Facial sensation is normal.  Left: Facial sensation is normal on the left.  CN VII:  Left: There is no facial weakness.  CN IX, X:  Right: Palate is normal.  Left: Palate is normal.  CN XI:  Right: Trapezius strength is normal.  Left: Trapezius strength is normal.  CN XII: Tongue midline without atrophy or fasciculations.  Normal hearing to finger rub bilaterally.    Motor                                               Right                     Left  Deltoid                                   5                          5   Biceps                                   5                          5    Triceps                                  5                          5   Iliopsoas                               5                          5   Quadriceps                           5                          5   Hamstring                             5                          5   Gastrocnemius                     5                           5   Anterior tibialis                      5                          5    Sensory  Light touch is normal in upper and lower extremities.   Decr vibration knees downward absent around right knee scar.    Reflexes                                            Right                      Left  Brachioradialis                    2+                         2+  Biceps                                 0                         0  Patellar                                0                         0  Achilles                                0                         0  Right Plantar: downgoing  Left Plantar: downgoing    Coordination  Right: Finger-to-nose normal. Heel-to-shin normal.Left: Finger-to-nose normal. Heel-to-shin normal.    Gait    Normal unstressed gait.      Physical Exam  Eyes:      Extraocular Movements: Extraocular movements intact.      Pupils: Pupils are equal, round, and reactive to light.   Neurological:      Mental Status: She is alert.      Deep Tendon Reflexes:      Reflex Scores:       Bicep reflexes are 0 on the right side and 0 on the left side.       Brachioradialis reflexes are 2+ on the right side and 2+ on the left side.       Patellar reflexes are 0 on the right side and 0 on the left side.       Achilles reflexes are 0 on the right side and 0 on the left side.        Procedures    Assessment/Plan:    Assessment & Plan  1. Cervical radiculopathy.  Her symptoms of pain radiating from the neck down the arms, along with numbness in the hands, are consistent with cervical radiculopathy. Physical therapy is the first line of treatment. She has been advised to  continue with her physical therapy regimen. If symptoms persist, she may consider consulting a pain management specialist for potential injections.  We discussed venlafaxine as a medicine that could treat radicular pain as well as potentially tension headaches but she declined initiation of this medicine at this time she has more of a numbness than the pain..    2. Tension headaches.  Her headaches are likely tension headaches, which are milder in nature. She has been managing these with Tylenol, which appears to be effective. The potential side effects of venlafaxine, including its impact on mood and dizziness, have been discussed. She has been informed that venlafaxine may not significantly alleviate arthritis pain, but it could potentially help with nerve pain associated with a pinched nerve as well as prevent tension headaches.  However she declined the initiation of venlafaxine.  She has been advised to continue using Tylenol as needed for headache relief, ensuring that she adheres to the recommended dosage on the bottle.    3. Neuropathy.  She has decreased vibration sense from her knees down, which has not bothered her in the past. This condition remains stable and does not require any new medication at this time.     Her sleep apnea is being treated with a CPAP machine. It is noted that adjusting the settings of the CPAP machine may help with her morning headaches.   4.  Dizziness  Continue to monitor.  May have orthostatic and peripheral components.  Could benefit from reinitiation of vestibular therapy if she has peripheral vertigo versus continued focus on hydration    Follow-up  The patient will follow up in 1 year.      I spent 30 minutes caring for this patient on this date of service. This time includes time spent by me in the following activities as necessary: preparing for the visit, reviewing tests, medical records and previous visits, obtaining and/or reviewing a separately obtained history,  performing a medically appropriate exam and/or evaluation, counseling and educating the patient, and/or communicating with other healthcare professionals, documenting information in the medical record, independently interpreting results and communicating that information with the patient, and developing a medically appropriate treatment plan with consideration of other conditions, medications, and treatments.    Patient or patient representative verbalized consent for the use of Ambient Listening during the visit with  Christiano Allan MD for chart documentation. 2/25/2025  10:38 EST     Diagnoses and all orders for this visit:    1. Neuropathy (Primary)    2. Nonintractable headache, unspecified chronicity pattern, unspecified headache type    3. Dizziness    4. Cervical radiculopathy    I spent 40 minutes caring for this patient on this date of service. This time includes time spent by me in the following activities as necessary: preparing for the visit, reviewing tests, medical records and previous visits, obtaining and/or reviewing a separately obtained history, performing a medically appropriate exam and/or evaluation, counseling and educating the patient, and/or communicating with other healthcare professionals, documenting information in the medical record, independently interpreting results and communicating that information with the patient, and developing a medically appropriate treatment plan with consideration of other conditions, medications, and treatments.         Christiano Allan MD

## 2025-02-28 ENCOUNTER — PATIENT ROUNDING (BHMG ONLY) (OUTPATIENT)
Age: 85
End: 2025-02-28
Payer: MEDICARE

## 2025-03-03 ENCOUNTER — HOSPITAL ENCOUNTER (OUTPATIENT)
Dept: PHYSICAL THERAPY | Facility: HOSPITAL | Age: 85
Setting detail: THERAPIES SERIES
Discharge: HOME | End: 2025-03-03
Payer: MEDICARE

## 2025-03-03 DIAGNOSIS — R20.0 NUMBNESS AND TINGLING OF RIGHT ARM: ICD-10-CM

## 2025-03-03 DIAGNOSIS — R29.898 DECREASED ROM OF NECK: ICD-10-CM

## 2025-03-03 DIAGNOSIS — M54.12 CERVICAL RADICULOPATHY: ICD-10-CM

## 2025-03-03 DIAGNOSIS — M54.2 NECK PAIN: Primary | ICD-10-CM

## 2025-03-03 DIAGNOSIS — M62.838 TRAPEZIUS MUSCLE SPASM: ICD-10-CM

## 2025-03-03 DIAGNOSIS — R20.2 NUMBNESS AND TINGLING OF RIGHT ARM: ICD-10-CM

## 2025-03-03 PROCEDURE — 97140 MANUAL THERAPY 1/> REGIONS: CPT

## 2025-03-03 PROCEDURE — 97110 THERAPEUTIC EXERCISES: CPT

## 2025-03-03 NOTE — THERAPY TREATMENT NOTE
Outpatient Physical Therapy Ortho Treatment Note  [facility]     Patient Name: Ashley Motta  : 1940  MRN: [id number]  Today's Date: 3/3/2025      Visit Date: 2025    Visit Dx:    ICD-10-CM ICD-9-CM   1. Neck pain  M54.2 723.1   2. Cervical radiculopathy  M54.12 723.4   3. Numbness and tingling of right arm  R20.0 782.0    R20.2    4. Decreased ROM of neck  R29.898 723.8   5. Trapezius muscle spasm  M62.838 728.85       Patient Active Problem List   Diagnosis    Osteopenia    Hyperlipidemia    Prediabetes    Osteoarthritis    BALDEMAR on CPAP    Pelvic relaxation due to vaginal prolapse    Chronic pain of both knees    Arthritis of right knee    Arthritis of left knee    Arthritis of both knees    Restless leg syndrome    Dizziness    Headache, migraine    Heartburn    Internal hemorrhoids with complication    Impacted cerumen of left ear    Vitamin E deficiency    Environmental and seasonal allergies    Chronic cough    Aftercare following joint replacement surgery    Allergic rhinitis, unspecified    Anxiety disorder, unspecified    Atopic dermatitis, unspecified    Constipation, unspecified    Localized osteoporosis without current pathological fracture    Gastro-esophageal reflux disease without esophagitis    Generalized muscle weakness    Iron deficiency anemia due to chronic blood loss    Personal history of other malignant neoplasm of skin    Presence of right artificial knee joint    Migraine, unspecified, not intractable, without status migrainosus    S/P total knee arthroplasty, left    Benign neoplasm of unspecified choroid    Disorder of bone density and structure, unspecified    Localized osteoporosis ([place])    Long term (current) use of anticoagulants        Past Medical History:   Diagnosis Date    Anxiety     SITUATIONAL R/T DEATH OF HER SPOUSE    Balance problem     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 TERRY CHEUNG    Osteoarthritis     Osteopenia     Rectal nodule 03/2020    REFERRED TO DR. PAMELA NAPOLES    Rectocele 07/2017    RLS (restless legs syndrome)     Seborrheic keratosis     Skin cancer 04/2015    LOWER LEG, FOLLOWED BY DR. ATUL SANCHEZ    Vertigo         Past Surgical History:   Procedure Laterality Date    CATARACT EXTRACTION, BILATERAL Bilateral 2015    COLONOSCOPY N/A 02/27/2015    1 TUB[facility]ENOMA [facility]    COLONOSCOPY N/A 03/04/2020    10 MM SESSILE SERRATED ADENOMA POLYP IN ASCENDING, 3 MM HYPERPLASTIC POLYP IN RECTUM, 7 MM ANAL NODULE, DR. DARRIAN LIRIANO AT [facility]    COLONOSCOPY N/A 9/24/2024    Procedure: COLONOSCOPY into cecum and TI with saline lift injection and hot snare polypectomy with x3 clips placed;  Surgeon: Darrian Liriano MD;  [facility];  Service: Gastroenterology;  Laterality: N/A;  pre: hx of colon polyps   post:: polyp, diverticulosis    HYSTERECTOMY N/A 01/19/2004    KAYLEE WITH BSO, DR. RAFIA MORTENSEN AT [facility]    KNEE ARTHROSCOPY Left 2013    KNEE ARTHROSCOPY Right 2010    MELISSA CULDOPLASTY N/A 01/19/2004    DR. RAFIA MORTENSEN AT [facility]    SACROCOLPOPEXY N/A     TOTAL KNEE ARTHROPLASTY Right 4/11/2023    Procedure: RIGHT TOTAL KNEE ARTHROPLASTY WITH SHERI NAVIGATION;  Surgeon: Atul Liriano MD;  Location:  [facility];  Service: Orthopedics;  Laterality: Right;    TOTAL KNEE ARTHROPLASTY Left 11/14/2023    Procedure: LEFT TOTAL KNEE ARTHROPLASTY WITH SHERI NAVIGATION;  Surgeon: Atul Liriano MD;  Location:  [facility];  Service: Orthopedics;  Laterality: Left;                        PT Assessment/Plan       Row Name 03/03/25 1100          PT Assessment     Assessment Comments Pt. with reports of continued improvement in overall condition. Held on addition of new ther ex as pt. requires fairly significant cueing on all exercises to improve form/technique. Resumed seated HA with good tolerance. Cueing with rows/shoulder extension for scap retraction. Pt. remains appropriate for skilled PT.  -MP        PT Plan    PT Plan Comments standing D2, review neural glides?  -MP               User Key  (r) = Recorded By, (t) = Taken By, (c) = Cosigned By      Initials Name Provider Type    Bebe Wolfe, PT Physical Therapist                       OP Exercises       Row Name 03/03/25 1100             Subjective    Subjective Comments Its doing better  -MP         Subjective Pain    Able to rate subjective pain? yes  -MP      Pre-Treatment Pain Level 3  -MP      Post-Treatment Pain Level 3  -MP         Total Minutes    60000 - PT Therapeutic Exercise Minutes 28  -MP      24104 - PT Manual Therapy Minutes 10  -MP         Exercise 1    Exercise Name 1 UBE  -MP      Cueing 1 Verbal  -MP      Time 1 4 mins  -MP         Exercise 5    Exercise Name 5 standing straight arm theraloop flex  -MP      Cueing 5 Verbal  -MP      Sets 5 2  -MP      Reps 5 10  -MP      Time 5 YTB  -MP         Exercise 6    Exercise Name 6 rows  -MP      Cueing 6 Verbal;Demo  -MP      Sets 6 2  -MP      Reps 6 15  -MP      Time 6 RTB  -MP         Exercise 7    Exercise Name 7 shoulder ext  -MP      Cueing 7 Verbal  -MP      Sets 7 2  -MP      Reps 7 15  -MP      Time 7 RTB  -MP         Exercise 8    Exercise Name 8 standing cspine isometrics at wall into ball  -MP      Cueing 8 Verbal;Demo  -MP      Sets 8 2e: fwd chin tuck, BL ROT  -MP      Reps 8 10  -MP      Time 8 5s  -MP         Exercise 9    Exercise Name 9 seated HA  -MP      Cueing 9 Verbal;Demo  -MP      Sets 9 2  -MP      Reps 9 10  -MP      Time 9 YTB  -MP                User Key  (r) = Recorded By, (t) = Taken By, (c) = Cosigned By      Initials  Name Provider Type    Bebe Wolfe, PT Physical Therapist                             Manual Rx (Last 36 Hours)       Manual Treatments       Row Name 03/03/25 1100             Total Minutes    31310 - PT Manual Therapy Minutes 10  -MP         Manual Rx 3    Manual Rx 3 Location PA mobs to T spine, pt prone, grade I,II,III  -MP      Manual Rx 3 Duration STM to R UT, levator scap with pin and stretch/active relese like techniques, (moving through L lateral flexion and rotation R/L  -MP                User Key  (r) = Recorded By, (t) = Taken By, (c) = Cosigned By      Initials Name Provider Type    Bebe Wolfe, PT Physical Therapist                     PT OP Goals       Row Name 03/03/25 1100          PT Short Term Goals    STG Date to Achieve 02/13/25  -MP     STG 1 Pt will be independent with initial HEP and postural awareness to improve pain and symptoms.  -MP     STG 1 Progress Met  -MP     STG 2 Pt to be educated in/verbalize understanding of the importance of posture/ergonomics in association with their condition to facilitate self management of their condition.  -MP     STG 2 Progress Met  -MP     STG 3 Pt will improve cervical rotation AROM from 30 degrees to 45 degrees bilaterally to improve ability to drive safely.  -MP     STG 3 Progress Met  -MP     STG 4 Pt will report 50% reduction in R UE N/T symptoms to demonstrate improved management of symptoms.  -MP     STG 4 Progress Met  -MP        Long Term Goals    LTG Date to Achieve 03/15/25  -MP     LTG 1 Pt will be independent with advance HEP and postural awareness for continued management of pain and symptoms.  -MP     LTG 1 Progress Ongoing  -MP     LTG 2 Pt will improve NDI perceived disability from 10/50 to 3/50 to improve overall quality of life.  -MP     LTG 2 Progress Ongoing  -MP     LTG 3 Pt will decrease neck pain from 5/10 to </= 1/10 to improve participation in ADLs.  -MP     LTG 3 Progress Ongoing  -MP     LTG 4 Pt will  demonstrate bilateral  strength to 25# in order to increase ability to perform fine motor tasks including gripping and writing while participating in work related duties.  -MP     LTG 4 Progress Ongoing;Partially Met  -MP               User Key  (r) = Recorded By, (t) = Taken By, (c) = Cosigned By      Initials Name Provider Type    Bebe Wolfe PT Physical Therapist                    Therapy Education  Given: Symptoms/condition management, Posture/body mechanics  Program: Reinforced  How Provided: Verbal, Demonstration  Provided to: Patient  Level of Understanding: Verbalized, Demonstrated              Time Calculation:   Start Time: 1112  Stop Time: 1150  Time Calculation (min): 38 min  Total Timed Code Minutes- PT: 38 minute(s)  Timed Charges  68398 - PT Therapeutic Exercise Minutes: 28  80205 - PT Manual Therapy Minutes: 10  Total Minutes  Timed Charges Total Minutes: 38   Total Minutes: 38  Therapy Charges for Today       Code Description Service Date Service Provider Modifiers Qty    79183574828  PT MANUAL THERAPY EA 15 MIN 3/3/2025 Bebe Jordan, PT GP 1    [id number]  PT THER PROC EA 15 MIN 3/3/2025 Bebe Jordan PT GP 2                      Bebe Jordan PT  3/3/2025

## 2025-03-04 ENCOUNTER — OFFICE VISIT (OUTPATIENT)
Dept: INTERNAL MEDICINE | Facility: CLINIC | Age: 85
End: 2025-03-04
Payer: MEDICARE

## 2025-03-04 VITALS
HEART RATE: 79 BPM | DIASTOLIC BLOOD PRESSURE: 82 MMHG | TEMPERATURE: 98.4 F | HEIGHT: 62 IN | SYSTOLIC BLOOD PRESSURE: 128 MMHG | WEIGHT: 166.3 LBS | BODY MASS INDEX: 30.6 KG/M2 | OXYGEN SATURATION: 96 % | RESPIRATION RATE: 20 BRPM

## 2025-03-04 DIAGNOSIS — J10.1 INFLUENZA A: ICD-10-CM

## 2025-03-04 DIAGNOSIS — R05.1 ACUTE COUGH: Primary | ICD-10-CM

## 2025-03-04 PROBLEM — E04.1 THYROID NODULE: Status: ACTIVE | Noted: 2025-03-04

## 2025-03-04 LAB
EXPIRATION DATE: ABNORMAL
FLUAV AG UPPER RESP QL IA.RAPID: DETECTED
FLUBV AG UPPER RESP QL IA.RAPID: NOT DETECTED
INTERNAL CONTROL: ABNORMAL
Lab: ABNORMAL
SARS-COV-2 AG UPPER RESP QL IA.RAPID: NOT DETECTED

## 2025-03-04 PROCEDURE — 1160F RVW MEDS BY RX/DR IN RCRD: CPT | Performed by: STUDENT IN AN ORGANIZED HEALTH CARE EDUCATION/TRAINING PROGRAM

## 2025-03-04 PROCEDURE — 87428 SARSCOV & INF VIR A&B AG IA: CPT | Performed by: STUDENT IN AN ORGANIZED HEALTH CARE EDUCATION/TRAINING PROGRAM

## 2025-03-04 PROCEDURE — 1159F MED LIST DOCD IN RCRD: CPT | Performed by: STUDENT IN AN ORGANIZED HEALTH CARE EDUCATION/TRAINING PROGRAM

## 2025-03-04 PROCEDURE — 1125F AMNT PAIN NOTED PAIN PRSNT: CPT | Performed by: STUDENT IN AN ORGANIZED HEALTH CARE EDUCATION/TRAINING PROGRAM

## 2025-03-04 PROCEDURE — 99213 OFFICE O/P EST LOW 20 MIN: CPT | Performed by: STUDENT IN AN ORGANIZED HEALTH CARE EDUCATION/TRAINING PROGRAM

## 2025-03-04 RX ORDER — BENZONATATE 100 MG/1
100 CAPSULE ORAL 3 TIMES DAILY PRN
Qty: 21 CAPSULE | Refills: 0 | Status: SHIPPED | OUTPATIENT
Start: 2025-03-04 | End: 2025-03-11

## 2025-03-04 RX ORDER — OSELTAMIVIR PHOSPHATE 75 MG/1
75 CAPSULE ORAL 2 TIMES DAILY
Qty: 10 CAPSULE | Refills: 0 | Status: SHIPPED | OUTPATIENT
Start: 2025-03-04 | End: 2025-03-09

## 2025-03-04 NOTE — PROGRESS NOTES
"Chief Complaint  Cough (Hoarse voice, earache, headache, sore throat/OTC tylenol/Testing for C&F)    Subjective        Ashley Motta presents to [facility]  History of Present Illness  This is a 85-year-old female who presents for headache earache sore throat began on Sunday and have persisted.  She has been taking over-the-counter Tylenol.  She is interested in flu and COVID testing.  Denies any sick contacts.  Was able to go to PT yesterday regarding neck pain.  No fever at home or difficulty breathing.    Objective   Vital Signs:  /82 (BP Location: Left arm, Patient Position: Sitting, Cuff Size: Adult)   Pulse 79   Temp 98.4 °F (36.9 °C) (Oral)   Resp 20   Ht 157.5 cm (62.01\")   Wt 75.4 kg (166 lb 4.8 oz)   SpO2 96%   BMI 30.41 kg/m²   Estimated body mass index is 30.41 kg/m² as calculated from the following:    Height as of this encounter: 157.5 cm (62.01\").    Weight as of this encounter: 75.4 kg (166 lb 4.8 oz).      Physical Exam  Vitals reviewed.   Constitutional:       Appearance: She is not toxic-appearing.   HENT:      Right Ear: Tympanic membrane normal. There is no impacted cerumen.      Left Ear: Tympanic membrane normal. There is no impacted cerumen.      Ears:      Comments: Cerumen in right ear without impaction     Nose: Congestion and rhinorrhea present.   Cardiovascular:      Rate and Rhythm: Normal rate.   Pulmonary:      Effort: No respiratory distress.      Breath sounds: No stridor. Wheezing (scant throughout) present. No rhonchi.      Comments: speaks full sentences  Dry cough on exam  Neurological:      Mental Status: She is alert.   Psychiatric:         Mood and Affect: Mood normal.         Thought Content: Thought content normal.        Result Review :                Assessment and Plan   Diagnoses and all orders for this visit:    1. Acute cough (Primary)  -     POCT SARS-CoV-2 + Flu Antigen VALE    2. Influenza A    Other orders  -     " oseltamivir (Tamiflu) 75 MG capsule; Take 1 capsule by mouth 2 (Two) Times a Day for 5 days.  Dispense: 10 capsule; Refill: 0  -     benzonatate (Tessalon Perles) 100 MG capsule; Take 1 capsule by mouth 3 (Three) Times a Day As Needed for Cough for up to 7 days.  Dispense: 21 capsule; Refill: 0      Discussed patient testing positive for flu in office today.  Let her know would be reasonable to be cautious about isolating for about 5 days until Thursday to wear a mask when she needs to get out.  Information provided regarding the flu as well as Tamiflu.       Follow Up   Return in about 13 days (around 3/17/2025) for Medicare Wellness.  Patient was given instructions and counseling regarding her condition or for health maintenance advice. Please see specific information pulled into the AVS if appropriate.

## 2025-03-10 ENCOUNTER — TELEPHONE (OUTPATIENT)
Dept: CASE MANAGEMENT | Facility: OTHER | Age: 85
End: 2025-03-10
Payer: COMMERCIAL

## 2025-03-10 ENCOUNTER — APPOINTMENT (OUTPATIENT)
Dept: PHYSICAL THERAPY | Facility: HOSPITAL | Age: 85
End: 2025-03-10
Payer: MEDICARE

## 2025-03-10 ENCOUNTER — HOSPITAL ENCOUNTER (OUTPATIENT)
Dept: PHYSICAL THERAPY | Facility: HOSPITAL | Age: 85
Setting detail: THERAPIES SERIES
Discharge: HOME | End: 2025-03-10
Payer: MEDICARE

## 2025-03-10 DIAGNOSIS — R29.898 DECREASED ROM OF NECK: ICD-10-CM

## 2025-03-10 DIAGNOSIS — M54.2 NECK PAIN: Primary | ICD-10-CM

## 2025-03-10 DIAGNOSIS — R20.2 NUMBNESS AND TINGLING OF RIGHT ARM: ICD-10-CM

## 2025-03-10 DIAGNOSIS — R20.0 NUMBNESS AND TINGLING OF RIGHT ARM: ICD-10-CM

## 2025-03-10 DIAGNOSIS — M54.12 CERVICAL RADICULOPATHY: ICD-10-CM

## 2025-03-10 DIAGNOSIS — M62.838 TRAPEZIUS MUSCLE SPASM: ICD-10-CM

## 2025-03-10 PROCEDURE — 97110 THERAPEUTIC EXERCISES: CPT

## 2025-03-10 PROCEDURE — 97140 MANUAL THERAPY 1/> REGIONS: CPT

## 2025-03-10 NOTE — THERAPY TREATMENT NOTE
Outpatient Physical Therapy Ortho Treatment Note  [facility]     Patient Name: Ashley Motta  : 1940  MRN: [id number]  Today's Date: 3/10/2025      Visit Date: 03/10/2025    Visit Dx:    ICD-10-CM ICD-9-CM   1. Neck pain  M54.2 723.1   2. Cervical radiculopathy  M54.12 723.4   3. Numbness and tingling of right arm  R20.0 782.0    R20.2    4. Decreased ROM of neck  R29.898 723.8   5. Trapezius muscle spasm  M62.838 728.85       Patient Active Problem List   Diagnosis    Osteopenia    Hyperlipidemia    Prediabetes    Osteoarthritis    BALDEMAR on CPAP    Pelvic relaxation due to vaginal prolapse    Chronic pain of both knees    Arthritis of right knee    Arthritis of left knee    Arthritis of both knees    Restless leg syndrome    Dizziness    Headache, migraine    Heartburn    Internal hemorrhoids with complication    Impacted cerumen of left ear    Vitamin E deficiency    Environmental and seasonal allergies    Chronic cough    Aftercare following joint replacement surgery    Allergic rhinitis, unspecified    Anxiety disorder, unspecified    Atopic dermatitis, unspecified    Constipation, unspecified    Localized osteoporosis without current pathological fracture    Gastro-esophageal reflux disease without esophagitis    Generalized muscle weakness    Iron deficiency anemia due to chronic blood loss    Personal history of other malignant neoplasm of skin    Presence of right artificial knee joint    Migraine, unspecified, not intractable, without status migrainosus    S/P total knee arthroplasty, left    Benign neoplasm of unspecified choroid    Disorder of bone density and structure, unspecified    Localized osteoporosis ([place])    Long term (current) use of anticoagulants    Thyroid nodule        Past Medical History:   Diagnosis Date    Anxiety     SITUATIONAL R/T DEATH OF HER SPOUSE    Balance problem     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 TERRY CHEUNG    Osteoarthritis     Osteopenia     Rectal nodule 03/2020    REFERRED TO DR. PAMELA NAPOLES    Rectocele 07/2017    RLS (restless legs syndrome)     Seborrheic keratosis     Skin cancer 04/2015    LOWER LEG, FOLLOWED BY DR. ATUL SANCHEZ    Vertigo         Past Surgical History:   Procedure Laterality Date    CATARACT EXTRACTION, BILATERAL Bilateral 2015    COLONOSCOPY N/A 02/27/2015    1 TUBULAR [facility]ENOMA [facility]    COLONOSCOPY N/A 03/04/2020    10 MM SESSILE SERRATED ADENOMA POLYP IN ASCENDING, 3 MM HYPERPLASTIC POLYP IN RECTUM, 7 MM ANAL NODULE, DR. DARRIAN LIRIANO AT [facility]    COLONOSCOPY N/A 9/24/2024    Procedure: COLONOSCOPY into cecum and TI with saline lift injection and hot snare polypectomy with x3 clips placed;  Surgeon: Darrian Liriano MD;  Location: [facility] ENDOSCOPY;  Service: Gastroenterology;  Laterality: N/A;  pre: hx of colon polyps   post:: polyp, diverticulosis    HYSTERECTOMY N/A 01/19/2004    KAYLEE WITH BSO, DR. RAFIA MORTENSEN AT [facility]    KNEE ARTHROSCOPY Left 2013    KNEE ARTHROSCOPY Right 2010    MELISSA CULDOPLASTY N/A 01/19/2004    DR. RAFIA MORTENSEN AT [facility]    SACROCOLPOPEXY N/A     TOTAL KNEE ARTHROPLASTY Right 4/11/2023    Procedure: RIGHT TOTAL KNEE ARTHROPLASTY WITH SHERI NAVIGATION;  Surgeon: Atul Liriano MD;  [facility];  Service: Orthopedics;  Laterality: Right;    TOTAL KNEE ARTHROPLASTY Left 11/14/2023    Procedure: LEFT TOTAL KNEE ARTHROPLASTY WITH SHERI NAVIGATION;  Surgeon: Atul Liriano MD;  Location:  [facility];  Service: Orthopedics;  Laterality: Left;                        PT Assessment/Plan       Row Name 03/10/25 1100           PT Assessment    Assessment Comments Ms. Motta returns, continuing to report improved overall symptoms. States she has reduced severity of numbness symptoms and is much more mindful of body posture throughout the day. Continued with emphasis on cpsine and dorsal scap strength with addition of D2 flex, does require copious cueing for form. Did additionally progress resistance on row and shoulder ext, progressed on HEP as well. Reviewed neural glides, she does require cueing, reinforced daily completion with emphasis on posture, and continue strength every other day. She continues to make great progress, will progress as appropriate.  -MO        PT Plan    PT Plan Comments resisted wall walks. isometric SB at wall. Update HEP  -MO               User Key  (r) = Recorded By, (t) = Taken By, (c) = Cosigned By      Initials Name Provider Type    Erica Evangelista, PT Physical Therapist                       OP Exercises       Row Name 03/10/25 1000             Subjective    Subjective Comments Doing okay. The numbness has gotten better, it is much less severe  -MO         Subjective Pain    Able to rate subjective pain? yes  -MO      Pre-Treatment Pain Level 0  -MO      Post-Treatment Pain Level 3  -MO         Total Minutes    44980 - PT Therapeutic Exercise Minutes 32  -MO      38926 - PT Manual Therapy Minutes 8  -MO         Exercise 1    Exercise Name 1 UBE  -MO      Cueing 1 Verbal  -MO      Time 1 4 mins  -MO         Exercise 3    Exercise Name 3 D2 flex standing  -MO      Sets 3 1  -MO      Reps 3 12  -MO      Time 3 RTB  -MO      Additional Comments copious verbal and tactile cueing for form  -MO         Exercise 6    Exercise Name 6 rows  -MO      Cueing 6 Verbal  -MO      Sets 6 2  -MO      Reps 6 15  -MO      Time 6 GTB  -MO         Exercise 7    Exercise Name 7 shoulder ext  -MO      Cueing 7 Verbal  -MO      Sets 7 2  -MO      Reps 7 15  -MO      Time 7 GTB  -MO         Exercise 8    Exercise Name 8 standing  cspine isometrics at wall into ball  -MO      Cueing 8 Verbal  -MO      Sets 8 1e: fwd chin tuck, BL ROT  -MO      Reps 8 15  -MO      Time 8 5s  -MO         Exercise 9    Exercise Name 9 standing HA  -MO      Cueing 9 Verbal  -MO      Sets 9 2  -MO      Reps 9 15  -MO      Time 9 RTB  -MO         Exercise 10    Exercise Name 10 standing radial and median nerve glide  -MO      Cueing 10 Verbal;Tactile  -MO      Sets 10 1B, each  -MO      Reps 10 15  -MO                User Key  (r) = Recorded By, (t) = Taken By, (c) = Cosigned By      Initials Name Provider Type    Erica Evangelista PT Physical Therapist                             Manual Rx (Last 36 Hours)       Manual Treatments       Row Name 03/10/25 1100 03/10/25 1000          Total Minutes    66752 - PT Manual Therapy Minutes -- 8  -MO        Manual Rx 3    Manual Rx 3 Duration STM to R UT, levator scap with pin and stretch/active relese like techniques, (moving through L lateral flexion and rotation R/L  -MO --               User Key  (r) = Recorded By, (t) = Taken By, (c) = Cosigned By      Initials Name Provider Type    Erica Evangelista PT Physical Therapist                                       Time Calculation:   Start Time: 1017  Stop Time: 1057  Time Calculation (min): 40 min  Timed Charges  60235 - PT Therapeutic Exercise Minutes: 32  26361 - PT Manual Therapy Minutes: 8  Total Minutes  Timed Charges Total Minutes: 40   Total Minutes: 40  Therapy Charges for Today       Code Description Service Date Service Provider Modifiers Qty    80817685475 HC PT THER PROC EA 15 MIN 3/10/2025 Erica Wynn PT GP, KX 2    49514269683 HC PT MANUAL THERAPY EA 15 MIN 3/10/2025 Erica Wynn PT GP, KX 1                      Erica Wynn PT  3/10/2025

## 2025-03-11 ENCOUNTER — PATIENT OUTREACH (OUTPATIENT)
Dept: CASE MANAGEMENT | Facility: OTHER | Age: 85
End: 2025-03-11
Payer: COMMERCIAL

## 2025-03-11 DIAGNOSIS — M19.09 OSTEOARTHRITIS OF OTHER SITE, UNSPECIFIED OSTEOARTHRITIS TYPE: Primary | ICD-10-CM

## 2025-03-11 DIAGNOSIS — R73.03 PREDIABETES: ICD-10-CM

## 2025-03-11 NOTE — OUTREACH NOTE
AMBULATORY CASE MANAGEMENT NOTE    Names and Relationships of Patient/Support Persons: Contact: Ashley Motta; Relationship: Self -     Patient Outreach    Called patient for scheduled outreach. She saw PCP last week and tested positive for the flu. She states she is feeling much better today. Patient has six month check up with PCP on Monday. She inquired about having lab work done prior to the visit, but PCP has not submitted any orders. Explained that PCP will probably order tests at her appointment.     Patient states she has three more physical therapy sessions for her neck. She reports the pain is improved and she is no longer having numbness in her hands.     Next outreach scheduled.       Aleisha ARMSTRONG  Ambulatory Case Management    3/11/2025, 15:19 EDT

## 2025-03-17 ENCOUNTER — OFFICE VISIT (OUTPATIENT)
Dept: INTERNAL MEDICINE | Facility: CLINIC | Age: 85
End: 2025-03-17
Payer: MEDICARE

## 2025-03-17 ENCOUNTER — HOSPITAL ENCOUNTER (OUTPATIENT)
Dept: PHYSICAL THERAPY | Facility: HOSPITAL | Age: 85
Setting detail: THERAPIES SERIES
Discharge: HOME | End: 2025-03-17
Payer: MEDICARE

## 2025-03-17 VITALS
HEIGHT: 62 IN | OXYGEN SATURATION: 98 % | RESPIRATION RATE: 20 BRPM | SYSTOLIC BLOOD PRESSURE: 116 MMHG | BODY MASS INDEX: 30.84 KG/M2 | WEIGHT: 167.6 LBS | DIASTOLIC BLOOD PRESSURE: 60 MMHG | HEART RATE: 74 BPM

## 2025-03-17 DIAGNOSIS — M54.2 NECK PAIN: Primary | ICD-10-CM

## 2025-03-17 DIAGNOSIS — R20.2 NUMBNESS AND TINGLING OF RIGHT ARM: ICD-10-CM

## 2025-03-17 DIAGNOSIS — R20.0 NUMBNESS AND TINGLING OF RIGHT ARM: ICD-10-CM

## 2025-03-17 DIAGNOSIS — M54.12 CERVICAL RADICULOPATHY: ICD-10-CM

## 2025-03-17 DIAGNOSIS — R73.03 PREDIABETES: Chronic | ICD-10-CM

## 2025-03-17 DIAGNOSIS — E04.1 THYROID NODULE: ICD-10-CM

## 2025-03-17 DIAGNOSIS — R29.898 DECREASED ROM OF NECK: ICD-10-CM

## 2025-03-17 DIAGNOSIS — Z00.00 MEDICARE ANNUAL WELLNESS VISIT, SUBSEQUENT: Primary | ICD-10-CM

## 2025-03-17 DIAGNOSIS — E78.2 MIXED HYPERLIPIDEMIA: Chronic | ICD-10-CM

## 2025-03-17 DIAGNOSIS — M62.838 TRAPEZIUS MUSCLE SPASM: ICD-10-CM

## 2025-03-17 PROCEDURE — 97140 MANUAL THERAPY 1/> REGIONS: CPT | Performed by: PHYSICAL THERAPIST

## 2025-03-17 PROCEDURE — 97530 THERAPEUTIC ACTIVITIES: CPT | Performed by: PHYSICAL THERAPIST

## 2025-03-17 NOTE — PROGRESS NOTES
Subjective   The ABCs of the Annual Wellness Visit  Medicare Wellness Visit      Ashley Motta is a 85 y.o. patient who presents for a Medicare Wellness Visit.    The following portions of the patient's history were reviewed and   updated as appropriate: allergies, current medications, past family history, past medical history, and problem list  Compared to one year ago, the patient's physical   health is the same.  Compared to one year ago, the patient's mental   health is the same.    Recent Hospitalizations:  She was not admitted to the hospital during the last year.     Current Medical Providers:  Patient Care Team:  Yenin Bautista MD as PCP - General (Internal Medicine)  Aleisha Alcala, RN as Ambulatory  (Population Health)  Christiano Allan MD as Consulting Physician (Neurology)  Jaspal Rice MD as Consulting Physician (Orthopedic Surgery)  Kan Garcia MD as Surgeon (Otolaryngology)  Saloni Dempsey APRN (Nurse Practitioner)  Saloni Dempsey APRN (Nurse Practitioner)  Moreno Hendricks DO as Consulting Physician (Ophthalmology)    Outpatient Medications Prior to Visit   Medication Sig Dispense Refill    aspirin 81 MG EC tablet Take 1 tablet by mouth Daily.      multivitamin with minerals tablet tablet Take 1 tablet by mouth Daily.      neomycin-polymyxin-dexamethamethasone (POLYDEX) 3.5-31890-4.1 ointment ophthalmic ointment Administer 0.1 g to the right eye Daily.      pantoprazole (Protonix) 20 MG EC tablet Take 1 tablet by mouth Daily. Before dinner (Patient taking differently: Take 1 tablet by mouth Daily. Before dinner PRN) 90 tablet 4    pantoprazole (PROTONIX) 40 MG EC tablet Take 1 tablet by mouth Every Morning.      simvastatin (ZOCOR) 20 MG tablet Take 1 tablet by mouth Every Night. 90 tablet 3    sodium chloride (MICHEL 128) 5 % ophthalmic solution Administer 1 drop to both eyes 2 (Two) Times a Day.       No facility-administered medications prior to visit.      No opioid medication identified on active medication list. I have reviewed chart for other potential  high risk medication/s and harmful drug interactions in the elderly.      Aspirin is on active medication list. Aspirin use discussed with patient. Pros and cons of this therapy have been discussed today.       Patient Active Problem List   Diagnosis    Osteopenia    Hyperlipidemia    Prediabetes    Osteoarthritis    BALDEMAR on CPAP    Pelvic relaxation due to vaginal prolapse    Chronic pain of both knees    Arthritis of right knee    Arthritis of left knee    Arthritis of both knees    Restless leg syndrome    Dizziness    Headache, migraine    Heartburn    Internal hemorrhoids with complication    Impacted cerumen of left ear    Vitamin E deficiency    Environmental and seasonal allergies    Chronic cough    Aftercare following joint replacement surgery    Allergic rhinitis, unspecified    Anxiety disorder, unspecified    Atopic dermatitis, unspecified    Constipation, unspecified    Localized osteoporosis without current pathological fracture    Gastro-esophageal reflux disease without esophagitis    Generalized muscle weakness    Iron deficiency anemia due to chronic blood loss    Personal history of other malignant neoplasm of skin    Presence of right artificial knee joint    Migraine, unspecified, not intractable, without status migrainosus    S/P total knee arthroplasty, left    Benign neoplasm of unspecified choroid    Disorder of bone density and structure, unspecified    Localized osteoporosis ([place])    Long term (current) use of anticoagulants    Thyroid nodule     Advance Care Planning Advance Directive is on file.  ACP discussion was held with the patient during this visit. Patient has an advance directive in EMR which is still valid.             Objective   Vitals:    03/17/25 1059   BP: 116/60   BP Location: Left arm   Patient Position: Sitting   Cuff Size: Adult   Pulse: 74   Resp: 20   SpO2:  "98%   Weight: 76 kg (167 lb 9.6 oz)   Height: 157.5 cm (62.01\")   PainSc: 0-No pain       Estimated body mass index is 30.65 kg/m² as calculated from the following:    Height as of this encounter: 157.5 cm (62.01\").    Weight as of this encounter: 76 kg (167 lb 9.6 oz).                Does the patient have evidence of cognitive impairment? No  Lab Results   Component Value Date    CHLPL 123 03/17/2025    TRIG 112 03/17/2025    HDL 45 03/17/2025    LDL 58 03/17/2025    VLDL 20 03/17/2025    HGBA1C 6.40 (H) 03/17/2025                                                                                                Health  Risk Assessment    Smoking Status:  Social History     Tobacco Use   Smoking Status Never    Passive exposure: Never   Smokeless Tobacco Never     Alcohol Consumption:  Social History     Substance and Sexual Activity   Alcohol Use No       Fall Risk Screen  STEADI Fall Risk Assessment was completed, and patient is at LOW risk for falls.Assessment completed on:3/17/2025    Depression Screening   Little interest or pleasure in doing things? Not at all   Feeling down, depressed, or hopeless? Not at all   PHQ-2 Total Score 0      Health Habits and Functional and Cognitive Screening:      3/17/2025    11:07 AM   Functional & Cognitive Status   Do you have difficulty preparing food and eating? No   Do you have difficulty bathing yourself, getting dressed or grooming yourself? No   Do you have difficulty using the toilet? No   Do you have difficulty moving around from place to place? No   Do you have trouble with steps or getting out of a bed or a chair? No   Current Diet Unhealthy Diet   Dental Exam Up to date   Eye Exam Up to date   Exercise (times per week) 2 times per week   Current Exercises Include Walking;Light Weights   Do you need help using the phone?  No   Are you deaf or do you have serious difficulty hearing?  No   Do you need help to go to places out of walking distance? No   Do you need help " shopping? No   Do you need help preparing meals?  No   Do you need help with housework?  No   Do you need help with laundry? No   Do you need help taking your medications? No   Do you need help managing money? No   Do you ever drive or ride in a car without wearing a seat belt? No   Have you felt unusual stress, anger or loneliness in the last month? No   Who do you live with? Alone   If you need help, do you have trouble finding someone available to you? No   Have you been bothered in the last four weeks by sexual problems? No   Do you have difficulty concentrating, remembering or making decisions? No           Age-appropriate Screening Schedule:  Refer to the list below for future screening recommendations based on patient's age, sex and/or medical conditions. Orders for these recommended tests are listed in the plan section. The patient has been provided with a written plan.    Health Maintenance List  Health Maintenance   Topic Date Due    URINE MICROALBUMIN-CREATININE RATIO (uACR)  Never done    DIABETIC EYE EXAM  08/11/2024    DXA SCAN  02/28/2025    COVID-19 Vaccine (7 - 2024-25 season) 05/25/2025    HEMOGLOBIN A1C  09/17/2025    BMI FOLLOWUP  12/13/2025    DIABETIC FOOT EXAM  02/25/2026    ANNUAL WELLNESS VISIT  03/17/2026    LIPID PANEL  03/17/2026    TDAP/TD VACCINES (3 - Td or Tdap) 06/01/2032    RSV Vaccine - Adults  Completed    INFLUENZA VACCINE  Completed    Pneumococcal Vaccine 50+  Completed    ZOSTER VACCINE  Completed    MAMMOGRAM  Discontinued    COLORECTAL CANCER SCREENING  Discontinued                                                                                                                                                CMS Preventative Services Quick Reference  Risk Factors Identified During Encounter  None Identified    The above risks/problems have been discussed with the patient.  Pertinent information has been shared with the patient in the After Visit Summary.  An After Visit  "Summary and PPPS were made available to the patient.    Follow Up:   Next Medicare Wellness visit to be scheduled in 1 year.         Additional E&M Note during same encounter follows:  Patient has additional, significant, and separately identifiable condition(s)/problem(s) that require work above and beyond the Medicare Wellness Visit     Chief Complaint  Medicare Wellness-subsequent    Subjective   HPI    Following up regarding hyperlipidemia, prediabetes.  Taking 20 mg simvastatin without side effect. Takes 20 mg Protonix as needed for reflux.  Reports PT has been helpful for neck pain. Doing better after recently having the flu.          Objective   Vital Signs:  /60 (BP Location: Left arm, Patient Position: Sitting, Cuff Size: Adult)   Pulse 74   Resp 20   Ht 157.5 cm (62.01\")   Wt 76 kg (167 lb 9.6 oz)   SpO2 98%   BMI 30.65 kg/m²   Physical Exam  Vitals reviewed.   Constitutional:       General: She is not in acute distress.     Appearance: Normal appearance.   Cardiovascular:      Rate and Rhythm: Normal rate.   Pulmonary:      Effort: Pulmonary effort is normal. No respiratory distress.   Skin:     General: Skin is warm and dry.   Neurological:      Mental Status: She is alert.   Psychiatric:         Mood and Affect: Mood normal.         Judgment: Judgment normal.                    Assessment and Plan      Mixed hyperlipidemia       Orders:    Comprehensive Metabolic Panel    Lipid Panel With / Chol / HDL Ratio    Prediabetes    Orders:    CBC (No Diff)    Hemoglobin A1c    Thyroid nodule  ENT following with [place] Medicare annual wellness visit, subsequent                 Follow Up   Return in about 6 months (around 9/17/2025).  Patient was given instructions and counseling regarding her condition or for health maintenance advice. Please see specific information pulled into the AVS if appropriate.    "

## 2025-03-17 NOTE — PROGRESS NOTES
"Chief Complaint  No chief complaint on file.    Subjective        Ashley Motta presents to [facility]  History of Present Illness    Objective   Vital Signs:  There were no vitals taken for this visit.  Estimated body mass index is 30.41 kg/m² as calculated from the following:    Height as of 3/4/25: 157.5 cm (62.01\").    Weight as of 3/4/25: 75.4 kg (166 lb 4.8 oz).            Physical Exam   Result Review :                Assessment and Plan   There are no diagnoses linked to this encounter.         Follow Up   No follow-ups on file.  Patient was given instructions and counseling regarding her condition or for health maintenance advice. Please see specific information pulled into the AVS if appropriate.             "

## 2025-03-17 NOTE — THERAPY PROGRESS REPORT/RE-CERT
Outpatient Physical Therapy Ortho Progress Note  [facility]     Patient Name: Ashley Motta  : 1940  MRN: [id number]  Today's Date: 3/17/2025      Visit Date: 2025    Visit Dx:    ICD-10-CM ICD-9-CM   1. Neck pain  M54.2 723.1   2. Cervical radiculopathy  M54.12 723.4   3. Numbness and tingling of right arm  R20.0 782.0    R20.2    4. Decreased ROM of neck  R29.898 723.8   5. Trapezius muscle spasm  M62.838 728.85       Patient Active Problem List   Diagnosis    Osteopenia    Hyperlipidemia    Prediabetes    Osteoarthritis    BALDEMAR on CPAP    Pelvic relaxation due to vaginal prolapse    Chronic pain of both knees    Arthritis of right knee    Arthritis of left knee    Arthritis of both knees    Restless leg syndrome    Dizziness    Headache, migraine    Heartburn    Internal hemorrhoids with complication    Impacted cerumen of left ear    Vitamin E deficiency    Environmental and seasonal allergies    Chronic cough    Aftercare following joint replacement surgery    Allergic rhinitis, unspecified    Anxiety disorder, unspecified    Atopic dermatitis, unspecified    Constipation, unspecified    Localized osteoporosis without current pathological fracture    Gastro-esophageal reflux disease without esophagitis    Generalized muscle weakness    Iron deficiency anemia due to chronic blood loss    Personal history of other malignant neoplasm of skin    Presence of right artificial knee joint    Migraine, unspecified, not intractable, without status migrainosus    S/P total knee arthroplasty, left    Benign neoplasm of unspecified choroid    Disorder of bone density and structure, unspecified    Localized osteoporosis ([place]equ[place]ne)    Long term (current) use of anticoagulants    Thyroid nodule        Past Medical History:   Diagnosis Date    Anxiety     SITUATIONAL R/T DEATH OF HER SPOUSE    Balance problem     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 TERRY CHEUNG    Osteoarthritis     Osteopenia     Rectal nodule 03/2020    REFERRED TO DR. PAMELA NAPOLES    Rectocele 07/2017    RLS (restless legs syndrome)     Seborrheic keratosis     Skin cancer 04/2015    LOWER LEG, FOLLOWED BY DR. ATUL SANCHEZ    Vertigo         Past Surgical History:   Procedure Laterality Date    CATARACT EXTRACTION, BILATERAL Bilateral 2015    COLONOSCOPY N/A 02/27/2015    1 TUBULAR ADENOMA POLYP IN [facility]    COLONOSCOPY N/A 03/04/2020    10 MM SESSILE SERRATED ADENOMA POLYP IN ASCENDING, 3 MM HYPERPLASTIC POLYP IN RECTUM, 7 MM ANAL NODULE, DR. DARRIAN LIRIANO AT [facility]    COLONOSCOPY N/A 9/24/2024    Procedure: COLONOSCOPY into cecum and TI with saline lift injection and hot snare polypectomy with x3 clips placed;  Surgeon: Darrian Liriano MD;  Location: [facility] ENDOSCOPY;  Service: Gastroenterology;  Laterality: N/A;  pre: hx of colon polyps   post:: polyp, diverticulosis    HYSTERECTOMY N/A 01/19/2004    KAYLEE WITH BSO, DR. RAFIA MORTENSEN AT [facility]    KNEE ARTHROSCOPY Left 2013    KNEE ARTHROSCOPY Right 2010    MELISSA CULDOPLASTY N/A 01/19/2004    DR. RAFIA MORTENSEN AT [facility]    SACROCOLPOPEXY N/A     TOTAL KNEE ARTHROPLASTY Right 4/11/2023    Procedure: RIGHT TOTAL KNEE ARTHROPLASTY WITH SHERI NAVIGATION;  Surgeon: Atul Liriano MD;  Location: [facility] OR [facility] [place];  Service: Orthopedics;  Laterality: Right;    TOTAL KNEE ARTHROPLASTY Left 11/14/2023    Procedure: LEFT TOTAL KNEE ARTHROPLASTY WITH SHERI NAVIGATION;  Surgeon: Atul Liriano MD;  Location:  [facility];  Service: Orthopedics;  Laterality: Left;        PT Ortho       Row Name 03/17/25 0800       Posture/Observations     Forward Head Mild;Moderate  -GJ    Cervical Lordosis Decreased  -GJ    Rounded Shoulders Moderate;Bilateral:  -GJ    Posture/Observations Comments (+) R shoulder abd test  -GJ       Myotomal Screen- Upper Quarter Clearing    Shoulder flexion (C5) Left:;4+ (Good +);Right:;4 (Good)  -GJ    Elbow flexion/wrist extension (C6) Bilateral:;4+ (Good +)  -GJ    Elbow extension/wrist flexion (C7) Right:;4 (Good);Left:;4+ (Good +)  -GJ    Finger flexion/ (C8) Bilateral:;5 (Normal)  -GJ    Finger abduction (T1) Bilateral:;5 (Normal)  -GJ       Special Tests/Palpation    Special Tests/Palpation Cervical/Thoracic  -GJ       Cervical Palpation    Levator Scapula Right:;Tender;Guarded/taut;Trigger point  -GJ    Upper Traps Right:;Tender;Guarded/taut;Trigger point  -GJ       Cervical/Thoracic Special Tests    Spurlings (Foraminal Compression) Right:;Positive  -GJ    Cervical Compression (Forarminal Compression vs. Facet Pain) Negative  -GJ    Sharp-Matthias (AA instability) Negative  -GJ       Shoulder Impingement/Rotator Cuff Special Tests    Full Can Test (RC Lesion) Right:;Negative  -GJ    Empty Can Test (RC Lesion) Right:;Negative  -GJ       General ROM    GENERAL ROM COMMENTS grossly noted bilateral shoulder AROM symetric, WFL, bilateral elbow/wrist AROM WFL  -GJ       MMT (Manual Muscle Testing)    Rt Upper Ext Rt Shoulder External Rotation;Rt Shoulder Internal Rotation  -GJ    Lt Upper Ext Lt Shoulder Internal Rotation;Lt Shoulder External Rotation  -GJ       MMT Right Upper Ext    Rt Shoulder Internal Rotation MMT, Gross Movement (4+/5) good plus  -GJ    Rt Shoulder External Rotation MMT, Gross Movement (4/5) good  -GJ       MMT Left Upper Ext    Lt Shoulder Internal Rotation MMT, Gross Movement (4+/5) good plus  -GJ    Lt Shoulder External Rotation MMT, Gross Movement (4+/5) good plus  -GJ        Strength Right    # Reps 3  -GJ    Right Rung 2  -GJ    Right  Test 1 20  -GJ    Right  Test 2 22  -GJ    Right   Test 3 20  -GJ     Strength Average Right 20.67  -GJ        Strength Left    # Reps 3  -GJ    Left Rung 2  -GJ    Left  Test 1 25  -GJ    Left  Test 2 28  -GJ    Left  Test 3 28  -GJ     Strength Average Left 27  -GJ              User Key  (r) = Recorded By, (t) = Taken By, (c) = Cosigned By      Initials Name Provider Type     Jose Singh, PT Physical Therapist                                 PT Assessment/Plan       Row Name 03/17/25 0839          PT Assessment    Functional Limitations Limitation in home management;Limitations in community activities;Limitations in functional capacity and performance;Performance in leisure activities;Performance in self-care ADL  -     Impairments Impaired flexibility;Impaired muscle endurance;Impaired muscle length;Impaired muscle power;Impaired postural alignment;Joint mobility;Motor function;Muscle strength;Pain;Peripheral nerve integrity;Poor body mechanics;Posture;Range of motion  -     Assessment Comments Ms. Motta is an 84 y/o female.  She has attended 12 sessions of physical therapy from  for her C spine/RUE pain.  Treatment has included therapeutic exercise, manual therapy, therapeutic activity, and patient education with home exercise program . Progress towards goals is good, having met 4 of 4 STG's and 0 of 4 LTG's. See ortho section for updated objective data. She demonstrates mild decrease in   strength. She demonstrates mild decrease in R elbow extension MMT.  Overall she reports that she feels better since initiating therapy. She demonstrates (+) R Spurlings, (+) R shoulder abduction test for neural tension. Trial of manual c spine distraction which she reports felt good.  We did discussed options of mechanical traction and possibilities of home C spine traction if she does well with mechanical traction. She has 2 remaining sessions of therapy established, after which we may consider once a week for several weeks to allow  for appropriate assessment to addition of c spine traction. Ms. Motta continues to be a good candidate for skilled physical therapy.  -GJ     Please refer to paper survey for additional self-reported information Yes  -GJ     Rehab Potential Good  -GJ     Patient/caregiver participated in establishment of treatment plan and goals Yes  -GJ     Patient would benefit from skilled therapy intervention Yes  -GJ        PT Plan    PT Frequency 1x/week;2x/week  -GJ     Predicted Duration of Therapy Intervention (PT) 10 visits  -GJ     Planned CPT's? PT RE-EVAL: 91172;PT THER PROC EA 15 MIN: 03605;PT THER ACT EA 15 MIN: 47322;PT MANUAL THERAPY EA 15 MIN: 82699;PT NEUROMUSC RE-EDUCATION EA 15 MIN: 98921;PT HOT OR COLD PACK TREAT MCARE;PT ELECTRICAL STIM UNATTEND: ;PT TRACTION CERVICAL: 41332;PT ULTRASOUND EA 15 MIN: 03730  -GJ     PT Plan Comments assess response to manual c spine traction, likely trial mechanical c spine traction, if does well consider home traction  -               User Key  (r) = Recorded By, (t) = Taken By, (c) = Cosigned By      Initials Name Provider Type    Jose Thomas, PT Physical Therapist                       OP Exercises       Row Name 03/17/25 0826 03/17/25 0800          Subjective    Subjective Comments -- if my R arm hangs down, it gets numb, if it is up it feels better  -        Subjective Pain    Pre-Treatment Pain Level -- 3  -GJ        Total Minutes    88898 - PT Therapeutic Activity Minutes 14  -GJ --     99205 - PT Manual Therapy Minutes 25  -GJ --        Exercise 1    Exercise Name 1 -- UBE  -GJ     Time 1 -- 4 mins  -               User Key  (r) = Recorded By, (t) = Taken By, (c) = Cosigned By      Initials Name Provider Type    Jose Thomas, PT Physical Therapist                             Manual Rx (Last 36 Hours)       Manual Treatments       Row Name 03/17/25 0900 03/17/25 0826          Total Minutes    29156 - PT Manual Therapy Minutes -- 25  -GJ         Manual Rx 3    Manual Rx 3 Type PA mobs to c spine, pt supine, grade I-III  -GJ --     Manual Rx 3 Duration STM to R UT, levator scap with pin and stretch/active relese like techniques, (moving through L lateral flexion and rotation R/L  -GJ --        Manual Rx 4    Manual Rx 4 Location manual c spine distractoin  -GJ --               User Key  (r) = Recorded By, (t) = Taken By, (c) = Cosigned By      Initials Name Provider Type     Jose Singh, PT Physical Therapist                     PT OP Goals       Row Name 03/17/25 0800          PT Short Term Goals    STG Date to Achieve 02/13/25  -GJ     STG 1 Pt will be independent with initial HEP and postural awareness to improve pain and symptoms.  -GJ     STG 1 Progress Met  -GJ     STG 2 Pt to be educated in/verbalize understanding of the importance of posture/ergonomics in association with their condition to facilitate self management of their condition.  -GJ     STG 2 Progress Met  -GJ     STG 3 Pt will improve cervical rotation AROM from 30 degrees to 45 degrees bilaterally to improve ability to drive safely.  -GJ     STG 3 Progress Met  -GJ     STG 4 Pt will report 50% reduction in R UE N/T symptoms to demonstrate improved management of symptoms.  -GJ     STG 4 Progress Met  -GJ        Long Term Goals    LTG Date to Achieve 03/15/25  -GJ     LTG 1 Pt will be independent with advance HEP and postural awareness for continued management of pain and symptoms.  -GJ     LTG 1 Progress Ongoing  -GJ     LTG 2 Pt will improve NDI perceived disability from 10/50 to 3/50 to improve overall quality of life.  -GJ     LTG 2 Progress Ongoing  -GJ     LTG 2 Progress Comments 10/50, no change since initiatl eval in 1/2024  -GJ     LTG 3 Pt will decrease neck pain from 5/10 to </= 1/10 to improve participation in ADLs.  -GJ     LTG 3 Progress Ongoing  -GJ     LTG 4 Pt will demonstrate bilateral  strength to 25# in order to increase ability to perform fine motor tasks  including gripping and writing while participating in work related duties.  -GJ     LTG 4 Progress Ongoing;Partially Met  -GJ     LTG 4 Progress Comments see ortho  -GJ               User Key  (r) = Recorded By, (t) = Taken By, (c) = Cosigned By      Initials Name Provider Type    Jose Thomas, PT Physical Therapist                    Therapy Education  Education Details: reviewed activity modifications, discussed progress to date. Discussed possible benefits of c spine traction, including options for home c spine traction  Given: HEP, Symptoms/condition management, Pain management, Posture/body mechanics, Fall prevention and home safety, Mobility training  Program: Reinforced  How Provided: Verbal, Demonstration  Provided to: Patient  Level of Understanding: Teach back education performed, Verbalized, Demonstrated    Outcome Measure Options: Neck Disability Index (NDI)  Neck Disability Index  Section 1 - Pain Intensity: The pain is mild at the moment.  Section 2 - Personal Care: I can look after myself normally, but it causes extra pain.  Section 3 - Lifting: I can lift heavy weights, but it causes extra pain.  Section 4 - Reading: I can read as much as I want with slight neck pain.  Section 5 - Headaches: I have slight headaches that come infrequently.  Section 6 - Concentration: I can concentrate fully when I want to with slight difficulty.  Section 7 - Work: I can only do my usual work, but no more  Section 8 - Driving: I can drive as long as I want with slight neck pain.  Section 9 - Sleeping: My sleep is slightly disturbed (less than 1 hour sleepless).  Section 10 - Recreation: I am able to engage in all recreational activities with some pain in my neck.  Neck Disability Index Score: 10  Neck Disability Index Comments: 20%      Time Calculation:   Start Time: 0830  Stop Time: 0915  Time Calculation (min): 45 min  Timed Charges  43961 - PT Manual Therapy Minutes: 25  88685 - PT Therapeutic Activity  Minutes: 14  Total Minutes  Timed Charges Total Minutes: 39   Total Minutes: 39  Therapy Charges for Today       Code Description Service Date Service Provider Modifiers Qty    28686695188 HC PT THERAPEUTIC ACT EA 15 MIN 3/17/2025 Jose Singh, PT GP 1    81252832702 HC PT MANUAL THERAPY EA 15 MIN 3/17/2025 Jose Singh, PT GP 2            PT G-Codes  Outcome Measure Options: Neck Disability Index (NDI)  Neck Disability Index Score: 10         Jose Singh, PT  3/17/2025

## 2025-03-18 LAB
ALBUMIN SERPL-MCNC: 4.5 G/DL (ref 3.5–5.2)
ALBUMIN/GLOB SERPL: 1.7 G/DL
ALP SERPL-CCNC: 86 U/L (ref 39–117)
ALT SERPL-CCNC: 27 U/L (ref 1–33)
AST SERPL-CCNC: 33 U/L (ref 1–32)
BILIRUB SERPL-MCNC: 0.3 MG/DL (ref 0–1.2)
BUN SERPL-MCNC: 9 MG/DL (ref 8–23)
BUN/CREAT SERPL: 12.2 (ref 7–25)
CALCIUM SERPL-MCNC: 9.7 MG/DL (ref 8.6–10.5)
CHLORIDE SERPL-SCNC: 104 MMOL/L (ref 98–107)
CHOLEST SERPL-MCNC: 123 MG/DL (ref 0–200)
CHOLEST/HDLC SERPL: 2.73 {RATIO}
CO2 SERPL-SCNC: 24.6 MMOL/L (ref 22–29)
CREAT SERPL-MCNC: 0.74 MG/DL (ref 0.57–1)
EGFRCR SERPLBLD CKD-EPI 2021: 80.4 ML/MIN/1.73
ERYTHROCYTE [DISTWIDTH] IN BLOOD BY AUTOMATED COUNT: 13.4 % (ref 12.3–15.4)
GLOBULIN SER CALC-MCNC: 2.7 GM/DL
GLUCOSE SERPL-MCNC: 103 MG/DL (ref 65–99)
HBA1C MFR BLD: 6.4 % (ref 4.8–5.6)
HCT VFR BLD AUTO: 45.8 % (ref 34–46.6)
HDLC SERPL-MCNC: 45 MG/DL (ref 40–60)
HGB BLD-MCNC: 14.7 G/DL (ref 12–15.9)
LDLC SERPL CALC-MCNC: 58 MG/DL (ref 0–100)
MCH RBC QN AUTO: 29 PG (ref 26.6–33)
MCHC RBC AUTO-ENTMCNC: 32.1 G/DL (ref 31.5–35.7)
MCV RBC AUTO: 90.3 FL (ref 79–97)
PLATELET # BLD AUTO: 325 10*3/MM3 (ref 140–450)
POTASSIUM SERPL-SCNC: 4.8 MMOL/L (ref 3.5–5.2)
PROT SERPL-MCNC: 7.2 G/DL (ref 6–8.5)
RBC # BLD AUTO: 5.07 10*6/MM3 (ref 3.77–5.28)
SODIUM SERPL-SCNC: 143 MMOL/L (ref 136–145)
TRIGL SERPL-MCNC: 112 MG/DL (ref 0–150)
VLDLC SERPL CALC-MCNC: 20 MG/DL (ref 5–40)
WBC # BLD AUTO: 7.72 10*3/MM3 (ref 3.4–10.8)

## 2025-03-24 ENCOUNTER — HOSPITAL ENCOUNTER (OUTPATIENT)
Dept: PHYSICAL THERAPY | Facility: HOSPITAL | Age: 85
Setting detail: THERAPIES SERIES
Discharge: HOME | End: 2025-03-24
Payer: MEDICARE

## 2025-03-24 DIAGNOSIS — M62.838 TRAPEZIUS MUSCLE SPASM: ICD-10-CM

## 2025-03-24 DIAGNOSIS — M54.12 CERVICAL RADICULOPATHY: ICD-10-CM

## 2025-03-24 DIAGNOSIS — R29.898 DECREASED ROM OF NECK: ICD-10-CM

## 2025-03-24 DIAGNOSIS — R20.2 NUMBNESS AND TINGLING OF RIGHT ARM: ICD-10-CM

## 2025-03-24 DIAGNOSIS — R20.0 NUMBNESS AND TINGLING OF RIGHT ARM: ICD-10-CM

## 2025-03-24 DIAGNOSIS — M54.2 NECK PAIN: Primary | ICD-10-CM

## 2025-03-24 PROCEDURE — 97012 MECHANICAL TRACTION THERAPY: CPT

## 2025-03-24 PROCEDURE — 97530 THERAPEUTIC ACTIVITIES: CPT

## 2025-03-24 NOTE — THERAPY TREATMENT NOTE
Outpatient Physical Therapy Ortho Treatment Note  [facility]     Patient Name: Ashley Motta  : 1940  MRN: [id number]  Today's Date: 3/24/2025      Visit Date: 2025    Visit Dx:    ICD-10-CM ICD-9-CM   1. Neck pain  M54.2 723.1   2. Cervical radiculopathy  M54.12 723.4   3. Numbness and tingling of right arm  R20.0 782.0    R20.2    4. Decreased ROM of neck  R29.898 723.8   5. Trapezius muscle spasm  M62.838 728.85       Patient Active Problem List   Diagnosis    Osteopenia    Hyperlipidemia    Prediabetes    Osteoarthritis    BALDEMAR on CPAP    Pelvic relaxation due to vaginal prolapse    Chronic pain of both knees    Arthritis of right knee    Arthritis of left knee    Arthritis of both knees    Restless leg syndrome    Dizziness    Headache, migraine    Heartburn    Internal hemorrhoids with complication    Impacted cerumen of left ear    Vitamin E deficiency    Environmental and seasonal allergies    Chronic cough    Aftercare following joint replacement surgery    Allergic rhinitis, unspecified    Anxiety disorder, unspecified    Atopic dermatitis, unspecified    Constipation, unspecified    Localized osteoporosis without current pathological fracture    Gastro-esophageal reflux disease without esophagitis    Generalized muscle weakness    Iron deficiency anemia due to chronic blood loss    Personal history of other malignant neoplasm of skin    Presence of right artificial knee joint    Migraine, unspecified, not intractable, without status migrainosus    S/P total knee arthroplasty, left    Benign neoplasm of unspecified choroid    Disorder of bone density and structure, unspecified    Localized osteoporosis ([place])    Long term (current) use of anticoagulants    Thyroid nodule        Past Medical History:   Diagnosis Date    Anxiety     SITUATIONAL R/T DEATH OF HER SPOUSE    Balance problem     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 TERRY CHEUNG    Osteoarthritis     Osteopenia     Rectal nodule 03/2020    REFERRED TO DR. PAMELA NAPOLES    Rectocele 07/2017    RLS (restless legs syndrome)     Seborrheic keratosis     Skin cancer 04/2015    LOWER LEG, FOLLOWED BY DR. ATUL SANCHEZ    Vertigo         Past Surgical History:   Procedure Laterality Date    CATARACT EXTRACTION, BILATERAL Bilateral 2015    Dr. Yost    COLONOSCOPY N/A 02/27/2015    1 TUBULAR ADENOMA POLYP IN DISTAL [facility]END[facility]    COLONOSCOPY N/A 03/04/2020    10 MM SESSILE SERRATED ADENOMA POLYP IN ASCENDING, 3 MM HYPERPLASTIC POLYP IN RECTUM, 7 MM ANAL NODULE, DR. DARRIAN LIRIANO AT [facility]    COLONOSCOPY N/A 09/24/2024    Procedure: COLONOSCOPY into cecum and TI with saline lift injection and hot snare polypectomy with x3 clips placed;  Surgeon: Darrian Liriano MD;  Location: [facility] ENDOSCOPY;  Service: Gastroenterology;  Laterality: N/A;  pre: hx of colon polyps   post:: polyp, diverticulosis    HYSTERECTOMY N/A 01/19/2004    KAYLEE WITH BSO, DR. RAFIA MORTENSEN AT [facility]    KNEE ARTHROSCOPY Left 2013    KNEE ARTHROSCOPY Right 2010    MELISSA CULDOPLASTY N/A 01/19/2004    DR. RAFIA MORTENSEN AT [facility]    SACROCOLPOPEXY N/A     TOTAL KNEE ARTHROPLASTY Right 04/11/2023    Procedure: RIGHT TOTAL KNEE ARTHROPLASTY WITH SHERI NAVIGATION;  Surgeon: Atul Liriano MD;  Location: [facility] OR [facility] [place];  Service: Orthopedics;  Laterality: Right;    TOTAL KNEE ARTHROPLASTY Left 11/14/2023    Procedure: LEFT TOTAL KNEE ARTHROPLASTY WITH SHERI NAVIGATION;  Surgeon: Atul Liriano MD;  Location:  [facility];  Service: Orthopedics;  Laterality: Left;                        PT Assessment/Plan       Row Name  03/24/25 1000          PT Assessment    Assessment Comments Ms. Motta returns reporting good relief with manual traction, no other significant changes in symptoms. She does report having newer onset of morning headaches, discussed any changes in routine, encouraged increased water intake to see if dehydration could be contributing. Continued with dorsal scap strengthening for posture management, did initiate mechanical traction this date for management of radicular systems. Started with 10lb intermittent traction in [place], no immediate adverse effects or change in symptoms. Will assess tolerance at next session and work to increase traction weight as appropriate.  -MO        PT Plan    PT Plan Comments continue mechanical traction, increase weight if tolerable. Continue with dorsal scap strength prior to-- could add resisted wall walks  -MO               User Key  (r) = Recorded By, (t) = Taken By, (c) = Cosigned By      Initials Name Provider Type    Erica Evangelista, PT Physical Therapist                     Modalities       Row Name 03/24/25 1000             Traction 56607    Traction Type Cervical  -MO      Duration Intermittent  -MO      Position Hook-lying  -MO      Weight 10  -MO      Hold 45  -MO      Relax 10  -MO                User Key  (r) = Recorded By, (t) = Taken By, (c) = Cosigned By      Initials Name Provider Type    Erica Evangelista, PT Physical Therapist                   OP Exercises       Row Name 03/24/25 1000             Subjective    Subjective Comments Doing about the same, felt good after last session. I do wake up every morning with a headache  -MO         Subjective Pain    Able to rate subjective pain? yes  -MO      Pre-Treatment Pain Level 4  -MO      Post-Treatment Pain Level 4  -MO         Total Minutes    19521 - PT Therapeutic Activity Minutes 26  -MO         Exercise 1    Exercise Name 1 UBE  -MO      Time 1 4 mins  -MO         Exercise 5    Exercise Name 5 standing straight arm  theraloop flex  -MO      Cueing 5 Verbal  -MO      Sets 5 2  -MO      Reps 5 10  -MO      Time 5 YTB  -MO         Exercise 6    Exercise Name 6 rows  -MO      Cueing 6 Verbal  -MO      Sets 6 2  -MO      Reps 6 15  -MO      Time 6 Blue TB  -MO         Exercise 7    Exercise Name 7 shoulder ext  -MO      Cueing 7 Verbal  -MO      Sets 7 2  -MO      Reps 7 15  -MO      Time 7 GTB  -MO         Exercise 9    Exercise Name 9 standing HA  -MO      Cueing 9 Verbal  -MO      Sets 9 2  -MO      Reps 9 15  -MO      Time 9 GTB  -MO                User Key  (r) = Recorded By, (t) = Taken By, (c) = Cosigned By      Initials Name Provider Type    Erica Evangelista, PT Physical Therapist                                  PT OP Goals       Row Name 03/24/25 1100          PT Short Term Goals    STG Date to Achieve 02/13/25  -MO     STG 1 Pt will be independent with initial HEP and postural awareness to improve pain and symptoms.  -MO     STG 1 Progress Met  -MO     STG 2 Pt to be educated in/verbalize understanding of the importance of posture/ergonomics in association with their condition to facilitate self management of their condition.  -MO     STG 2 Progress Met  -MO     STG 3 Pt will improve cervical rotation AROM from 30 degrees to 45 degrees bilaterally to improve ability to drive safely.  -MO     STG 3 Progress Met  -MO     STG 4 Pt will report 50% reduction in R UE N/T symptoms to demonstrate improved management of symptoms.  -MO     STG 4 Progress Met  -MO        Long Term Goals    LTG Date to Achieve 03/15/25  -MO     LTG 1 Pt will be independent with advance HEP and postural awareness for continued management of pain and symptoms.  -MO     LTG 1 Progress Ongoing  -MO     LTG 2 Pt will improve NDI perceived disability from 10/50 to 3/50 to improve overall quality of life.  -MO     LTG 2 Progress Ongoing  -MO     LTG 3 Pt will decrease neck pain from 5/10 to </= 1/10 to improve participation in ADLs.  -MO     LTG 3 Progress  Ongoing  -MO     LTG 4 Pt will demonstrate bilateral  strength to 25# in order to increase ability to perform fine motor tasks including gripping and writing while participating in work related duties.  -MO     LTG 4 Progress Ongoing;Partially Met  -MO               User Key  (r) = Recorded By, (t) = Taken By, (c) = Cosigned By      Initials Name Provider Type    Erica Evangelista, PT Physical Therapist                                   Time Calculation:   Start Time: 1015  Stop Time: 1053  Time Calculation (min): 38 min  Timed Charges  02929 - PT Therapeutic Activity Minutes: 26  Untimed Charges  PT Traction Rx Minutes: 12 (min 5lbs)  Total Minutes  Timed Charges Total Minutes: 26  Untimed Charges Total Minutes: 12   Total Minutes: 38  Therapy Charges for Today       Code Description Service Date Service Provider Modifiers Qty    18419693085  PT THERAPEUTIC ACT EA 15 MIN 3/24/2025 Erica Wynn, PT GP 2    12398108390 HC PT TRACTION CERVICAL 3/24/2025 Erica Wynn, PT GP 1                      Erica Wynn PT  3/24/2025

## 2025-03-26 ENCOUNTER — HOSPITAL ENCOUNTER (OUTPATIENT)
Dept: PHYSICAL THERAPY | Facility: HOSPITAL | Age: 85
Setting detail: THERAPIES SERIES
Discharge: HOME | End: 2025-03-26
Payer: MEDICARE

## 2025-03-26 DIAGNOSIS — R20.2 NUMBNESS AND TINGLING OF RIGHT ARM: ICD-10-CM

## 2025-03-26 DIAGNOSIS — M54.12 CERVICAL RADICULOPATHY: ICD-10-CM

## 2025-03-26 DIAGNOSIS — R29.898 DECREASED ROM OF NECK: ICD-10-CM

## 2025-03-26 DIAGNOSIS — M54.2 NECK PAIN: Primary | ICD-10-CM

## 2025-03-26 DIAGNOSIS — R20.0 NUMBNESS AND TINGLING OF RIGHT ARM: ICD-10-CM

## 2025-03-26 PROCEDURE — 97530 THERAPEUTIC ACTIVITIES: CPT

## 2025-03-26 PROCEDURE — 97012 MECHANICAL TRACTION THERAPY: CPT

## 2025-03-26 NOTE — THERAPY TREATMENT NOTE
Outpatient Physical Therapy Ortho Treatment Note  [facility]     Patient Name: Ashley Motta  : 1940  MRN: [id number]  Today's Date: 3/26/2025      Visit Date: 2025    Visit Dx:    ICD-10-CM ICD-9-CM   1. Neck pain  M54.2 723.1   2. Cervical radiculopathy  M54.12 723.4   3. Numbness and tingling of right arm  R20.0 782.0    R20.2    4. Decreased ROM of neck  R29.898 723.8       Patient Active Problem List   Diagnosis    Osteopenia    Hyperlipidemia    Prediabetes    Osteoarthritis    BALDEMAR on CPAP    Pelvic relaxation due to vaginal prolapse    Chronic pain of both knees    Arthritis of right knee    Arthritis of left knee    Arthritis of both knees    Restless leg syndrome    Dizziness    Headache, migraine    Heartburn    Internal hemorrhoids with complication    Impacted cerumen of left ear    Vitamin E deficiency    Environmental and seasonal allergies    Chronic cough    Aftercare following joint replacement surgery    Allergic rhinitis, unspecified    Anxiety disorder, unspecified    Atopic dermatitis, unspecified    Constipation, unspecified    Localized osteoporosis without current pathological fracture    Gastro-esophageal reflux disease without esophagitis    Generalized muscle weakness    Iron deficiency anemia due to chronic blood loss    Personal history of other malignant neoplasm of skin    Presence of right artificial knee joint    Migraine, unspecified, not intractable, without status migrainosus    S/P total knee arthroplasty, left    Benign neoplasm of unspecified choroid    Disorder of bone density and structure, unspecified    Localized osteoporosis ([place])    Long term (current) use of anticoagulants    Thyroid nodule        Past Medical History:   Diagnosis Date    Anxiety     SITUATIONAL R/T DEATH OF HER SPOUSE    Balance problem     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 TERRY CHEUNG    Osteoarthritis     Osteopenia     Rectal nodule 03/2020    REFERRED TO DR. PAMELA NAPOLES    Rectocele 07/2017    RLS (restless legs syndrome)     Seborrheic keratosis     Skin cancer 04/2015    LOWER LEG, FOLLOWED BY DR. ATUL SANCHEZ    Vertigo         Past Surgical History:   Procedure Laterality Date    CATARACT EXTRACTION, BILATERAL Bilateral 2015    Dr. Yost    COLONOSCOPY N/A 02/27/2015    1 TUBULAR [facility]ENOMA [facility]    COLONOSCOPY N/A 03/04/2020    10 MM SESSILE SERRATED ADENOMA POLYP IN ASCENDING, 3 MM HYPERPLASTIC POLYP IN RECTUM, 7 MM ANAL NODULE, DR. DARRIAN LIRIANO AT [facility]    COLONOSCOPY N/A 09/24/2024    Procedure: COLONOSCOPY into cecum and TI with saline lift injection and hot snare polypectomy with x3 clips placed;  Surgeon: Darrian Liriano MD;  [facility];  Service: Gastroenterology;  Laterality: N/A;  pre: hx of colon polyps   post:: polyp, diverticulosis    HYSTERECTOMY N/A 01/19/2004    KAYLEE WITH BSO, DR. RAFIA MORTENSEN AT [facility]    KNEE ARTHROSCOPY Left 2013    KNEE ARTHROSCOPY Right 2010    MELISSA CULDOPLASTY N/A 01/19/2004    DR. RAFIA MORTENSEN AT [facility]    SACROCOLPOPEXY N/A     TOTAL KNEE ARTHROPLASTY Right 04/11/2023    Procedure: RIGHT TOTAL KNEE ARTHROPLASTY WITH SHERI NAVIGATION;  Surgeon: Atul Liriano MD;  Location:  [facility];  Service: Orthopedics;  Laterality: Right;    TOTAL KNEE ARTHROPLASTY Left 11/14/2023    Procedure: LEFT TOTAL KNEE ARTHROPLASTY WITH SHERI NAVIGATION;  Surgeon: Atul Liriano MD;  Location:  [facility];  Service: Orthopedics;  Laterality: Left;                        PT Assessment/Plan       Row Name 03/26/25 0818          PT Assessment     Assessment Comments Pt returns reporting good response from mechanical traction. She was unable to quantify length of time of improvement and stated she did continue with AM HA but states this could be due to CPAP being without water when she wakes up. She states neck and RUE with dec symptoms for some amount of time following last session. She is interested in home Cspine traction unit at home and we discussed [facility] unit today. Continued POC and repeated mechanical traction with inc to 12# of pull and 15 minute time period.  -LB        PT Plan    PT Plan Comments continue mechanical traction, consider resisted wall walks  -LB               User Key  (r) = Recorded By, (t) = Taken By, (c) = Cosigned By      Initials Name Provider Type    Alexus Ward, PT Physical Therapist                     Modalities       Row Name 03/26/25 0700             Traction 07174    Traction Type Cervical  -LB      PT Traction Rx Minutes 15  -LB      Duration Intermittent  -LB      Position Hook-lying  -LB      Weight 12  min 7  -LB      Hold 45  -LB      Relax 10  -LB                User Key  (r) = Recorded By, (t) = Taken By, (c) = Cosigned By      Initials Name Provider Type    Alexus Ward, PT Physical Therapist                   OP Exercises       Row Name 03/26/25 0700             Subjective    Subjective Comments I felt good after last time.  -LB         Subjective Pain    Able to rate subjective pain? yes  -LB      Pre-Treatment Pain Level 3  -LB      Post-Treatment Pain Level 3  -LB         Total Minutes    08373 - PT Therapeutic Activity Minutes 25  -LB         Exercise 1    Exercise Name 1 UBE  -LB      Time 1 4 mins  -LB         Exercise 5    Exercise Name 5 standing straight arm theraloop flex  -LB      Cueing 5 Verbal  -LB      Sets 5 2  -LB      Reps 5 10  -LB      Time 5 RTB  -LB         Exercise 6    Exercise Name 6 rows  -LB      Cueing 6 Verbal  -LB      Sets 6 2  -LB      Reps 6 15  -LB      Time 6 Blue TB   -LB         Exercise 7    Exercise Name 7 shoulder ext  -LB      Cueing 7 Verbal  -LB      Sets 7 2  -LB      Reps 7 15  -LB      Time 7 GTB  -LB         Exercise 9    Exercise Name 9 standing HA  -LB      Cueing 9 Verbal  -LB      Sets 9 2  -LB      Reps 9 15  -LB      Time 9 RTB  -LB                User Key  (r) = Recorded By, (t) = Taken By, (c) = Cosigned By      Initials Name Provider Type    Alexus Ward, PT Physical Therapist                                                    Time Calculation:   Start Time: 0700  Stop Time: 0745  Time Calculation (min): 45 min  Total Timed Code Minutes- PT: 25 minute(s)  Timed Charges  04395 - PT Therapeutic Activity Minutes: 25  Untimed Charges  PT Traction Rx Minutes: 15  Total Minutes  Timed Charges Total Minutes: 25  Untimed Charges Total Minutes: 15   Total Minutes: 40  Therapy Charges for Today       Code Description Service Date Service Provider Modifiers Qty    42074248445 HC PT THERAPEUTIC ACT EA 15 MIN 3/26/2025 Alexus Rachel, PT GP 2    24928832994 HC PT TRACTION CERVICAL 3/26/2025 Alexus Rachel, PT GP 1                      Alexus Rachel PT  3/26/2025

## 2025-03-31 ENCOUNTER — HOSPITAL ENCOUNTER (OUTPATIENT)
Dept: PHYSICAL THERAPY | Facility: HOSPITAL | Age: 85
Setting detail: THERAPIES SERIES
Discharge: HOME | End: 2025-03-31
Payer: MEDICARE

## 2025-03-31 DIAGNOSIS — R20.0 NUMBNESS AND TINGLING OF RIGHT ARM: ICD-10-CM

## 2025-03-31 DIAGNOSIS — R20.2 NUMBNESS AND TINGLING OF RIGHT ARM: ICD-10-CM

## 2025-03-31 DIAGNOSIS — M54.2 NECK PAIN: Primary | ICD-10-CM

## 2025-03-31 DIAGNOSIS — R29.898 DECREASED ROM OF NECK: ICD-10-CM

## 2025-03-31 DIAGNOSIS — M62.838 TRAPEZIUS MUSCLE SPASM: ICD-10-CM

## 2025-03-31 DIAGNOSIS — M54.12 CERVICAL RADICULOPATHY: ICD-10-CM

## 2025-03-31 PROCEDURE — 97012 MECHANICAL TRACTION THERAPY: CPT | Performed by: PHYSICAL THERAPIST

## 2025-03-31 PROCEDURE — 97110 THERAPEUTIC EXERCISES: CPT | Performed by: PHYSICAL THERAPIST

## 2025-03-31 NOTE — THERAPY TREATMENT NOTE
Outpatient Physical Therapy Ortho Treatment Note  [facility]     Patient Name: Ashley Motta  : 1940  MRN: [id number]  Today's Date: 3/31/2025      Visit Date: 2025    Visit Dx:    ICD-10-CM ICD-9-CM   1. Neck pain  M54.2 723.1   2. Cervical radiculopathy  M54.12 723.4   3. Numbness and tingling of right arm  R20.0 782.0    R20.2    4. Decreased ROM of neck  R29.898 723.8   5. Trapezius muscle spasm  M62.838 728.85       Patient Active Problem List   Diagnosis    Osteopenia    Hyperlipidemia    Prediabetes    Osteoarthritis    BALDEMAR on CPAP    Pelvic relaxation due to vaginal prolapse    Chronic pain of both knees    Arthritis of right knee    Arthritis of left knee    Arthritis of both knees    Restless leg syndrome    Dizziness    Headache, migraine    Heartburn    Internal hemorrhoids with complication    Impacted cerumen of left ear    Vitamin E deficiency    Environmental and seasonal allergies    Chronic cough    Aftercare following joint replacement surgery    Allergic rhinitis, unspecified    Anxiety disorder, unspecified    Atopic dermatitis, unspecified    Constipation, unspecified    Localized osteoporosis without current pathological fracture    Gastro-esophageal reflux disease without esophagitis    Generalized muscle weakness    Iron deficiency anemia due to chronic blood loss    Personal history of other malignant neoplasm of skin    Presence of right artificial knee joint    Migraine, unspecified, not intractable, without status migrainosus    S/P total knee arthroplasty, left    Benign neoplasm of unspecified choroid    Disorder of bone density and structure, unspecified    Localized osteoporosis ([place])    Long term (current) use of anticoagulants    Thyroid nodule        Past Medical History:   Diagnosis Date    Anxiety     SITUATIONAL R/T DEATH OF HER SPOUSE    Balance problem     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 TERRY CHEUNG    Osteoarthritis     Osteopenia     Rectal nodule 03/2020    REFERRED TO DR. PAMELA NAPOLES    Rectocele 07/2017    RLS (restless legs syndrome)     Seborrheic keratosis     Skin cancer 04/2015    LOWER LEG, FOLLOWED BY DR. ATUL SANCHEZ    Vertigo         Past Surgical History:   Procedure Laterality Date    CATARACT EXTRACTION, BILATERAL Bilateral 2015    Dr. Yost    COLONOSCOPY N/A 02/27/2015    1 TUBULAR ADENOMA POLYP IN DISTAL [facility]ENDING[facility]    COLONOSCOPY N/A 03/04/2020    10 MM SESSILE SERRATED ADENOMA POLYP IN ASCENDING, 3 MM HYPERPLASTIC POLYP IN RECTUM, 7 MM ANAL NODULE, DR. DARRIAN LIRIANO AT [facility]    COLONOSCOPY N/A 09/24/2024    Procedure: COLONOSCOPY into cecum and TI with saline lift injection and hot snare polypectomy with x3 clips placed;  Surgeon: Darrian Liriano MD;  Location: [facility] ENDOSCOPY;  Service: Gastroenterology;  Laterality: N/A;  pre: hx of colon polyps   post:: polyp, diverticulosis    HYSTERECTOMY N/A 01/19/2004    KAYLEE WITH BSO, DR. RAFIA MORTENSEN AT [facility]    KNEE ARTHROSCOPY Left 2013    KNEE ARTHROSCOPY Right 2010    MELISSA CULDOPLASTY N/A 01/19/2004    DR. RAFIA MORTENSEN AT [facility]    SACROCOLPOPEXY N/A     TOTAL KNEE ARTHROPLASTY Right 04/11/2023    Procedure: RIGHT TOTAL KNEE ARTHROPLASTY WITH SHERI NAVIGATION;  Surgeon: Atul Liriano MD;  [facility];  Service: Orthopedics;  Laterality: Right;    TOTAL KNEE ARTHROPLASTY Left 11/14/2023    Procedure: LEFT TOTAL KNEE ARTHROPLASTY WITH SHERI NAVIGATION;  Surgeon: Atul Liriano MD;  Location: [facility];  Service: Orthopedics;  Laterality: Left;        PT Ortho       Row Name 03/31/25 0900         Strength Right    # Reps 3  -GJ    Right Rung 2  -GJ    Right  Test 1 20  -GJ    Right  Test 2 15  -GJ    Right  Test 3 15  -GJ     Strength Average Right 16.67  -GJ        Strength Left    # Reps 3  -GJ    Left Rung 2  -GJ    Left  Test 1 30  -GJ    Left  Test 2 27  -GJ    Left  Test 3 25  -GJ     Strength Average Left 27.33  -GJ              User Key  (r) = Recorded By, (t) = Taken By, (c) = Cosigned By      Initials Name Provider Type    Jose Thomas, PT Physical Therapist                                 PT Assessment/Plan       Row Name 03/31/25 1159          PT Assessment    Assessment Comments Ms. Motta returns to the clinic for her C spine/RUE pain.  She reports continued benefit from mechanical c spine traction. We did review options for at home c spine traction devices. I will check insurance benefits for an at home device.  Continued scapular girdle/postural strengthening, requiring interrmittent cuing required. Continued with mechanical c spine traction without immediate adverse results. She has 3 remaining sessions of PTprior to likely transition to an independent program.  She continues to be a candidate for skilled physical therpay.  -GJ        PT Plan    PT Plan Comments assess if insurance benefits are back for at home c spine traction device.  -GJ               User Key  (r) = Recorded By, (t) = Taken By, (c) = Cosigned By      Initials Name Provider Type    Jose Thomas, PT Physical Therapist                     Modalities       Row Name 03/31/25 0900             Traction 56853    Traction Type Cervical  -GJ      PT Traction Rx Minutes 15  -GJ      Duration Intermittent  -GJ      Position Hook-lying  -GJ      Weight 12  /7  -GJ      Hold 45  -GJ      Relax 10  -GJ                User Key  (r) = Recorded By, (t) = Taken By, (c) = Cosigned By      Initials Name Provider Type    Jose Thomas, PT Physical Therapist                   OP Exercises        Row Name 03/31/25 0921 03/31/25 0900          Subjective    Subjective Comments -- doing pretty good  -GJ        Subjective Pain    Pre-Treatment Pain Level -- 2  -GJ     Post-Treatment Pain Level -- 1  -GJ        Total Minutes    95856 - PT Therapeutic Exercise Minutes 25  -GJ --        Exercise 1    Exercise Name 1 -- UBE  -GJ     Time 1 -- 4 mins  -GJ        Exercise 5    Exercise Name 5 -- standing straight arm theraloop flex  -GJ     Cueing 5 -- Verbal  -GJ     Sets 5 -- 2  -GJ     Reps 5 -- 10  -GJ     Time 5 -- RTB  -GJ        Exercise 6    Exercise Name 6 -- rows  -GJ     Cueing 6 -- Verbal  -GJ     Sets 6 -- 2  -GJ     Reps 6 -- 15  -GJ     Time 6 -- Blue TB  -GJ        Exercise 7    Exercise Name 7 -- shoulder ext  -GJ     Cueing 7 -- Verbal  -GJ     Sets 7 -- 2  -GJ     Reps 7 -- 15  -GJ     Time 7 -- GTB  -GJ        Exercise 9    Exercise Name 9 -- standing HA  -GJ     Cueing 9 -- Verbal  -GJ     Sets 9 -- 2  -GJ     Reps 9 -- 15  -GJ     Time 9 -- RTB  -GJ        Exercise 11    Exercise Name 11 -- lateral wall walking  -GJ     Cueing 11 -- Verbal;Demo  -GJ     Reps 11 -- 3 laps  -GJ     Time 11 -- RTB  -GJ     Additional Comments -- regular cuing for technique  -GJ               User Key  (r) = Recorded By, (t) = Taken By, (c) = Cosigned By      Initials Name Provider Type    GJ Jose Singh, PT Physical Therapist                                  PT OP Goals       Row Name 03/31/25 0900          PT Short Term Goals    STG Date to Achieve 02/13/25  -GJ     STG 1 Pt will be independent with initial HEP and postural awareness to improve pain and symptoms.  -GJ     STG 1 Progress Met  -GJ     STG 2 Pt to be educated in/verbalize understanding of the importance of posture/ergonomics in association with their condition to facilitate self management of their condition.  -GJ     STG 2 Progress Met  -GJ     STG 3 Pt will improve cervical rotation AROM from 30 degrees to 45 degrees bilaterally to improve  ability to drive safely.  -GJ     STG 3 Progress Met  -GJ     STG 4 Pt will report 50% reduction in R UE N/T symptoms to demonstrate improved management of symptoms.  -GJ     STG 4 Progress Met  -GJ        Long Term Goals    LTG Date to Achieve 03/15/25  -GJ     LTG 1 Pt will be independent with advance HEP and postural awareness for continued management of pain and symptoms.  -GJ     LTG 1 Progress Ongoing  -GJ     LTG 2 Pt will improve NDI perceived disability from 10/50 to 3/50 to improve overall quality of life.  -GJ     LTG 2 Progress Ongoing  -GJ     LTG 3 Pt will decrease neck pain from 5/10 to </= 1/10 to improve participation in ADLs.  -GJ     LTG 3 Progress Ongoing  -GJ     LTG 4 Pt will demonstrate bilateral  strength to 25# in order to increase ability to perform fine motor tasks including gripping and writing while participating in work related duties.  -GJ     LTG 4 Progress Ongoing;Partially Met  -GJ     LTG 4 Progress Comments see ortho section  -GJ               User Key  (r) = Recorded By, (t) = Taken By, (c) = Cosigned By      Initials Name Provider Type    Jose Thomas, PT Physical Therapist                    Therapy Education  Education Details: reviewed activity modifications, reviewed/discussed options for at home c spine traction  Given: HEP, Symptoms/condition management, Pain management, Posture/body mechanics, Fall prevention and home safety, Mobility training  Program: Reinforced  How Provided: Verbal, Demonstration  Provided to: Patient  Level of Understanding: Teach back education performed, Verbalized, Demonstrated              Time Calculation:   Start Time: 0921  Stop Time: 1005  Time Calculation (min): 44 min  Timed Charges  47517 - PT Therapeutic Exercise Minutes: 25  Untimed Charges  PT Traction Rx Minutes: 15  Total Minutes  Timed Charges Total Minutes: 25  Untimed Charges Total Minutes: 15   Total Minutes: 25  Therapy Charges for Today       Code Description Service  Date Service Provider Modifiers Qty    19390267847 HC PT THER PROC EA 15 MIN 3/31/2025 Jose Singh, PT GP 2    25833111627 HC PT TRACTION CERVICAL 3/31/2025 Jose Singh, PT GP 1                      Jose Singh, PT  3/31/2025

## 2025-04-08 ENCOUNTER — HOSPITAL ENCOUNTER (OUTPATIENT)
Dept: PHYSICAL THERAPY | Facility: HOSPITAL | Age: 85
Setting detail: THERAPIES SERIES
Discharge: HOME | End: 2025-04-08
Payer: MEDICARE

## 2025-04-08 DIAGNOSIS — M54.2 NECK PAIN: Primary | ICD-10-CM

## 2025-04-08 DIAGNOSIS — R29.898 DECREASED ROM OF NECK: ICD-10-CM

## 2025-04-08 DIAGNOSIS — R20.0 NUMBNESS AND TINGLING OF RIGHT ARM: ICD-10-CM

## 2025-04-08 DIAGNOSIS — R20.2 NUMBNESS AND TINGLING OF RIGHT ARM: ICD-10-CM

## 2025-04-08 DIAGNOSIS — M62.838 TRAPEZIUS MUSCLE SPASM: ICD-10-CM

## 2025-04-08 DIAGNOSIS — M54.12 CERVICAL RADICULOPATHY: ICD-10-CM

## 2025-04-08 PROCEDURE — 97110 THERAPEUTIC EXERCISES: CPT | Performed by: PHYSICAL THERAPIST

## 2025-04-08 PROCEDURE — 97012 MECHANICAL TRACTION THERAPY: CPT | Performed by: PHYSICAL THERAPIST

## 2025-04-08 NOTE — THERAPY TREATMENT NOTE
Outpatient Physical Therapy Ortho Treatment Note  [facility]     Patient Name: Ashley Motta  : 1940  MRN: [id number]  Today's Date: 2025      Visit Date: 2025    Visit Dx:    ICD-10-CM ICD-9-CM   1. Neck pain  M54.2 723.1   2. Cervical radiculopathy  M54.12 723.4   3. Numbness and tingling of right arm  R20.0 782.0    R20.2    4. Decreased ROM of neck  R29.898 723.8   5. Trapezius muscle spasm  M62.838 728.85       Patient Active Problem List   Diagnosis    Osteopenia    Hyperlipidemia    Prediabetes    Osteoarthritis    BALDEMAR on CPAP    Pelvic relaxation due to vaginal prolapse    Chronic pain of both knees    Arthritis of right knee    Arthritis of left knee    Arthritis of both knees    Restless leg syndrome    Dizziness    Headache, migraine    Heartburn    Internal hemorrhoids with complication    Impacted cerumen of left ear    Vitamin E deficiency    Environmental and seasonal allergies    Chronic cough    Aftercare following joint replacement surgery    Allergic rhinitis, unspecified    Anxiety disorder, unspecified    Atopic dermatitis, unspecified    Constipation, unspecified    Localized osteoporosis without current pathological fracture    Gastro-esophageal reflux disease without esophagitis    Generalized muscle weakness    Iron deficiency anemia due to chronic blood loss    Personal history of other malignant neoplasm of skin    Presence of right artificial knee joint    Migraine, unspecified, not intractable, without status migrainosus    S/P total knee arthroplasty, left    Benign neoplasm of unspecified choroid    Disorder of bone density and structure, unspecified    Localized osteoporosis ([place])    Long term (current) use of anticoagulants    Thyroid nodule        Past Medical History:   Diagnosis Date    Anxiety     SITUATIONAL R/T DEATH OF HER SPOUSE    Balance problem     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 TERRY CHEUNG    Osteoarthritis     Osteopenia     Rectal nodule 03/2020    REFERRED TO DR. PAMELA NAPOLES    Rectocele 07/2017    RLS (restless legs syndrome)     Seborrheic keratosis     Skin cancer 04/2015    LOWER LEG, FOLLOWED BY DR. ATUL SANCHEZ    Vertigo         Past Surgical History:   Procedure Laterality Date    CATARACT EXTRACTION, BILATERAL Bilateral 2015    Dr. Yost    COLONOSCOPY N/A 02/27/2015    1 TUBULAR ADENOMA POLYP IN DIST[facility]    COLONOSCOPY N/A 03/04/2020    10 MM SESSILE SERRATED ADENOMA POLYP IN ASCENDING, 3 MM HYPERPLASTIC POLYP IN RECTUM, 7 MM ANAL NODULE, DR. DARRIAN LIRIANO AT [facility]    COLONOSCOPY N/A 09/24/2024    Procedure: COLONOSCOPY into cecum and TI with saline lift injection and hot snare polypectomy with x3 clips placed;  Surgeon: Darrian Liriano MD;  Location: [facility] ENDOSCOPY;  Service: Gastroenterology;  Laterality: N/A;  pre: hx of colon polyps   post:: polyp, diverticulosis    HYSTERECTOMY N/A 01/19/2004    KAYLEE WITH BSO, DR. RAFIA MORTENSEN AT [facility]    KNEE ARTHROSCOPY Left 2013    KNEE ARTHROSCOPY Right 2010    MELISSA CULDOPLASTY N/A 01/19/2004    DR. RAFIA MORTENSEN AT [facility]    SACROCOLPOPEXY N/A     TOTAL KNEE ARTHROPLASTY Right 04/11/2023    Procedure: RIGHT TOTAL KNEE ARTHROPLASTY WITH SHERI NAVIGATION;  Surgeon: Atul Liriano MD;  Location: [facility] OR [facility];  Service: Orthopedics;  Laterality: Right;    TOTAL KNEE ARTHROPLASTY Left 11/14/2023    Procedure: LEFT TOTAL KNEE ARTHROPLASTY WITH SHERI NAVIGATION;  Surgeon: Atul Liriano MD;  Location: [facility] OR [facility];  Service: Orthopedics;  Laterality: Left;        PT Ortho       Row Name 04/08/25 0600        Myotomal Screen- Upper Quarter Clearing    Elbow flexion/wrist extension (C6) Bilateral:;4+ (Good +)  -GJ    Elbow extension/wrist flexion (C7) Left:;4+ (Good +);Right:;4 (Good)  -GJ       Cervical/Thoracic Special Tests    Cervical/Thoracic Special Tests Comments + R shoulder abduction test  -GJ              User Key  (r) = Recorded By, (t) = Taken By, (c) = Cosigned By      Initials Name Provider Type    Jose Thomas, PT Physical Therapist                                 PT Assessment/Plan       Row Name 04/08/25 0715          PT Assessment    Assessment Comments Ms. Motta returns to the clinic for her C spine pain.  She overall improvement in her condition however continues to report numbness and tingling of her right upper extremity and hand with her right upper extremity in dependent position.  She demonstrates a positive right shoulder abduction test.  See Ortho section for updated myotomal testing the upper quarter.  She does demonstrate mild weakness of her right triceps versus the left.  Continue to work on dorsal scapular/postural strengthening activities today.  Continue with cervical spine mechanical traction.  We did progress pressure to 15 pounds versus 12 with no immediate adverse response.  Resubmitted demographic information to DME ([employer]) to have them check insurance benefits for home cervical traction unit.  Ms. Motta continues to be a candidate for skilled physical therapy.  -GJ        PT Plan    PT Plan Comments Assess benefits for home traction unit.  Continue work on dorsal scapular strength.  May consider PA mobs of T-spine/C-spine.  -GJ               User Key  (r) = Recorded By, (t) = Taken By, (c) = Cosigned By      Initials Name Provider Type    Jose Thomas, PT Physical Therapist                     Modalities       Row Name 04/08/25 0700             Traction 17008    Traction Type Cervical  -GJ      Duration Intermittent  -GJ      Position Hook-lying  -GJ      Weight 15  7  -GJ       Hold 45  -GJ      Relax 10  -GJ                User Key  (r) = Recorded By, (t) = Taken By, (c) = Cosigned By      Initials Name Provider Type    Jose Thomas, PT Physical Therapist                   OP Exercises       Row Name 04/08/25 0657 04/08/25 0600          Subjective    Subjective Comments -- doing better. I still have numbness/tingling in my R hand/fingers when my arm hangs down  -GJ        Subjective Pain    Pre-Treatment Pain Level -- 3  -GJ        Total Minutes    53008 - PT Therapeutic Exercise Minutes 25  -GJ --        Exercise 1    Exercise Name 1 -- UBE  -GJ     Time 1 -- 4 mins  -GJ        Exercise 5    Exercise Name 5 -- standing straight arm theraloop flex  -GJ     Cueing 5 -- Verbal  -GJ     Sets 5 -- 2  -GJ     Reps 5 -- 10  -GJ     Time 5 -- RTB  -GJ        Exercise 6    Exercise Name 6 -- rows  -GJ     Cueing 6 -- Verbal  -GJ     Sets 6 -- 2  -GJ     Reps 6 -- 15  -GJ     Time 6 -- Blue TB  -GJ        Exercise 7    Exercise Name 7 -- shoulder ext  -GJ     Cueing 7 -- Verbal  -GJ     Sets 7 -- 2  -GJ     Reps 7 -- 15  -GJ     Time 7 -- GTB  -GJ        Exercise 9    Exercise Name 9 -- standing HA  -GJ     Cueing 9 -- Verbal  -GJ     Sets 9 -- 2  -GJ     Reps 9 -- 15  -GJ     Time 9 -- RTB  -GJ        Exercise 11    Exercise Name 11 -- lateral wall walking  -GJ     Cueing 11 -- Verbal;Demo  -GJ     Reps 11 -- 3 laps  -GJ     Time 11 -- RTB  -GJ               User Key  (r) = Recorded By, (t) = Taken By, (c) = Cosigned By      Initials Name Provider Type    Jose Thomas, PT Physical Therapist                                  PT OP Goals       Row Name 04/08/25 0600          PT Short Term Goals    STG Date to Achieve 02/13/25  -GJ     STG 1 Pt will be independent with initial HEP and postural awareness to improve pain and symptoms.  -GJ     STG 1 Progress Met  -GJ     STG 2 Pt to be educated in/verbalize understanding of the importance of posture/ergonomics in association with their  condition to facilitate self management of their condition.  -GJ     STG 2 Progress Met  -GJ     STG 3 Pt will improve cervical rotation AROM from 30 degrees to 45 degrees bilaterally to improve ability to drive safely.  -GJ     STG 3 Progress Met  -GJ     STG 4 Pt will report 50% reduction in R UE N/T symptoms to demonstrate improved management of symptoms.  -GJ     STG 4 Progress Met  -GJ        Long Term Goals    LTG Date to Achieve 03/15/25  -GJ     LTG 1 Pt will be independent with advance HEP and postural awareness for continued management of pain and symptoms.  -GJ     LTG 1 Progress Ongoing  -GJ     LTG 2 Pt will improve NDI perceived disability from 10/50 to 3/50 to improve overall quality of life.  -GJ     LTG 2 Progress Ongoing  -GJ     LTG 3 Pt will decrease neck pain from 5/10 to </= 1/10 to improve participation in ADLs.  -GJ     LTG 3 Progress Ongoing  -GJ     LTG 4 Pt will demonstrate bilateral  strength to 25# in order to increase ability to perform fine motor tasks including gripping and writing while participating in work related duties.  -GJ     LTG 4 Progress Ongoing;Partially Met  -GJ               User Key  (r) = Recorded By, (t) = Taken By, (c) = Cosigned By      Initials Name Provider Type    Jose Thomas, PT Physical Therapist                    Therapy Education  Education Details: reviewed activity modifications. discussed prescrpition for use of home traction if she ends up getting it, perform daily for 2-3 weeks, then wean as able pulling back to 3 x's per week for several weeks and decreasing as necessary.  Given: HEP, Symptoms/condition management, Pain management, Posture/body mechanics, Fall prevention and home safety, Mobility training  Program: Reinforced  How Provided: Verbal, Demonstration  Provided to: Patient  Level of Understanding: Teach back education performed, Verbalized, Demonstrated              Time Calculation:   Start Time: 0700  Stop Time: 0745  Time  Calculation (min): 45 min  Timed Charges  65788 - PT Therapeutic Exercise Minutes: 25  Total Minutes  Timed Charges Total Minutes: 25   Total Minutes: 25  Therapy Charges for Today       Code Description Service Date Service Provider Modifiers Qty    30065031328  PT THER PROC EA 15 MIN 4/8/2025 Jose Singh, PT GP 2    [id number]  PT TRACTION CERVICAL 4/8/2025 Jose Singh, PT GP 1                      Jose Signh, PT  4/8/2025

## 2025-04-10 ENCOUNTER — TELEPHONE (OUTPATIENT)
Dept: CASE MANAGEMENT | Facility: OTHER | Age: 85
End: 2025-04-10
Payer: COMMERCIAL

## 2025-04-11 ENCOUNTER — PATIENT OUTREACH (OUTPATIENT)
Dept: CASE MANAGEMENT | Facility: OTHER | Age: 85
End: 2025-04-11
Payer: COMMERCIAL

## 2025-04-11 DIAGNOSIS — R73.03 PREDIABETES: ICD-10-CM

## 2025-04-11 DIAGNOSIS — M19.09 OSTEOARTHRITIS OF OTHER SITE, UNSPECIFIED OSTEOARTHRITIS TYPE: Primary | ICD-10-CM

## 2025-04-11 NOTE — OUTREACH NOTE
AMBULATORY CASE MANAGEMENT NOTE    Names and Relationships of Patient/Support Persons: Contact: Ashley Motta; Relationship: Self -     Patient Outreach    Called patient for scheduled outreach. She has her annual wellness visit last month. Lab work was done at that time and the results were stable per PCP. She states she has two more physical therapy sessions for her neck. She reports the physical therapist is helping her purchase a traction machine for home use. Advised to call PCP the office if she needs assistance with the process.     She reports having a trigger finger on her right hand. She states this has happened in the past. She states the physical therapist showed her some exercises to help straighten the finger and reports some improvement. She states she will contact PCP for a referral to a hand specialist if needed.         Aleisha ARMSTRONG  Ambulatory Case Management    4/11/2025, 09:53 EDT

## 2025-04-14 ENCOUNTER — PATIENT OUTREACH (OUTPATIENT)
Dept: CASE MANAGEMENT | Facility: OTHER | Age: 85
End: 2025-04-14
Payer: COMMERCIAL

## 2025-04-14 ENCOUNTER — TELEPHONE (OUTPATIENT)
Dept: CASE MANAGEMENT | Facility: OTHER | Age: 85
End: 2025-04-14
Payer: COMMERCIAL

## 2025-04-14 DIAGNOSIS — M19.09 OSTEOARTHRITIS OF OTHER SITE, UNSPECIFIED OSTEOARTHRITIS TYPE: Primary | ICD-10-CM

## 2025-04-14 DIAGNOSIS — R73.03 PREDIABETES: ICD-10-CM

## 2025-04-14 NOTE — OUTREACH NOTE
AMBULATORY CASE MANAGEMENT NOTE    Names and Relationships of Patient/Support Persons: Contact: Ashley Motta; Relationship: Self -     Patient Outreach    Patient called [facility]. She states the physical therapist needs a prescription from PCP in order for insurance to cover the home traction machine. She gave [facility] the phone number to contact [place] at  [employer] [phone].      Aleisha ARMSTRONG  Ambulatory Case Management    4/14/2025, 09:43 EDT      AMBULATORY CASE MANAGEMENT NOTE    Names and Relationships of Patient/Support Persons: Contact: Ashley Motta; Relationship: Self -     Care Coordination    Spoke with rep at [employer]. He states PCP office can fax or email the prescription. Provided with both.   Fax: [phone]  Email: [email or web address]    Aleisha ARMSTRONG  Ambulatory Case Management    4/14/2025, 11:24 EDT

## 2025-04-14 NOTE — TELEPHONE ENCOUNTER
Patient called me. PT is working on getting her a home traction machine for her neck. She said the company needs a prescription faxed or emailed to them.   [employer]  Phone: [phone]  Fax: [phone]

## 2025-04-15 ENCOUNTER — HOSPITAL ENCOUNTER (OUTPATIENT)
Dept: PHYSICAL THERAPY | Facility: HOSPITAL | Age: 85
Setting detail: THERAPIES SERIES
Discharge: HOME | End: 2025-04-15
Payer: MEDICARE

## 2025-04-15 DIAGNOSIS — R20.2 NUMBNESS AND TINGLING OF RIGHT ARM: ICD-10-CM

## 2025-04-15 DIAGNOSIS — M54.12 CERVICAL RADICULOPATHY: ICD-10-CM

## 2025-04-15 DIAGNOSIS — R29.898 DECREASED ROM OF NECK: ICD-10-CM

## 2025-04-15 DIAGNOSIS — M54.2 NECK PAIN: Primary | ICD-10-CM

## 2025-04-15 DIAGNOSIS — R20.0 NUMBNESS AND TINGLING OF RIGHT ARM: ICD-10-CM

## 2025-04-15 PROCEDURE — 97110 THERAPEUTIC EXERCISES: CPT

## 2025-04-15 PROCEDURE — 97012 MECHANICAL TRACTION THERAPY: CPT

## 2025-04-15 NOTE — THERAPY TREATMENT NOTE
Outpatient Physical Therapy Ortho Treatment Note  [facility]     Patient Name: Ashley Motta  : 1940  MRN: [id number]  Today's Date: 4/15/2025      Visit Date: 04/15/2025    Visit Dx:    ICD-10-CM ICD-9-CM   1. Neck pain  M54.2 723.1   2. Cervical radiculopathy  M54.12 723.4   3. Numbness and tingling of right arm  R20.0 782.0    R20.2    4. Decreased ROM of neck  R29.898 723.8       Patient Active Problem List   Diagnosis    Osteopenia    Hyperlipidemia    Prediabetes    Osteoarthritis    BALDEMAR on CPAP    Pelvic relaxation due to vaginal prolapse    Chronic pain of both knees    Arthritis of right knee    Arthritis of left knee    Arthritis of both knees    Restless leg syndrome    Dizziness    Headache, migraine    Heartburn    Internal hemorrhoids with complication    Impacted cerumen of left ear    Vitamin E deficiency    Environmental and seasonal allergies    Chronic cough    Aftercare following joint replacement surgery    Allergic rhinitis, unspecified    Anxiety disorder, unspecified    Atopic dermatitis, unspecified    Constipation, unspecified    Localized osteoporosis without current pathological fracture    Gastro-esophageal reflux disease without esophagitis    Generalized muscle weakness    Iron deficiency anemia due to chronic blood loss    Personal history of other malignant neoplasm of skin    Presence of right artificial knee joint    Migraine, unspecified, not intractable, without status migrainosus    S/P total knee arthroplasty, left    Benign neoplasm of unspecified choroid    Disorder of bone density and structure, unspecified    Localized osteoporosis ([place])    Long term (current) use of anticoagulants    Thyroid nodule        Past Medical History:   Diagnosis Date    Anxiety     SITUATIONAL R/T DEATH OF HER SPOUSE    Balance problem     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 TERRY CHEUNG    Osteoarthritis     Osteopenia     Rectal nodule 03/2020    REFERRED TO DR. PAMELA NAPOLES    Rectocele 07/2017    RLS (restless legs syndrome)     Seborrheic keratosis     Skin cancer 04/2015    LOWER LEG, FOLLOWED BY DR. ATUL SANCHEZ    Vertigo         Past Surgical History:   Procedure Laterality Date    CATARACT EXTRACTION, BILATERAL Bilateral 2015    Dr. Yost    COLONOSCOPY N/A 02/27/2015    1 TUBULAR ADENOMA POLYP [facility] DISTAL [facility]    COLONOSCOPY N/A 03/04/2020    10 MM SESSILE SERRATED ADENOMA POLYP IN ASCENDING, 3 MM HYPERPLASTIC POLYP IN RECTUM, 7 MM ANAL NODULE, DR. DARRIAN LIRIANO AT [facility]    COLONOSCOPY N/A 09/24/2024    Procedure: COLONOSCOPY into cecum and TI with saline lift injection and hot snare polypectomy with x3 clips placed;  Surgeon: Darrian Liriano MD;  Location: [facility] ENDOSCOPY;  Service: Gastroenterology;  Laterality: N/A;  pre: hx of colon polyps   post:: polyp, diverticulosis    HYSTERECTOMY N/A 01/19/2004    KAYLEE WITH BSO, DR. RAFIA MORTENSEN AT [facility]    KNEE ARTHROSCOPY Left 2013    KNEE ARTHROSCOPY Right 2010    MELISSA CULDOPLASTY N/A 01/19/2004    DR. RAFIA MORTENSEN AT [facility]    SACROCOLPOPEXY N/A     TOTAL KNEE ARTHROPLASTY Right 04/11/2023    Procedure: RIGHT TOTAL KNEE ARTHROPLASTY WITH SHERI NAVIGATION;  Surgeon: Atul Liriano MD;  Location:  [facility];  Service: Orthopedics;  Laterality: Right;    TOTAL KNEE ARTHROPLASTY Left 11/14/2023    Procedure: LEFT TOTAL KNEE ARTHROPLASTY WITH SHERI NAVIGATION;  Surgeon: Atul Liriano MD;  Location:  [facility];  Service: Orthopedics;  Laterality: Left;                        PT Assessment/Plan       Row Name 04/15/25 0900          PT Assessment     "Assessment Comments Ms. Motta returns to PT continuing to report overall good tolerance to all interventions. Awaiting insurance approval on home traction unit, she continued to be a good candidate for home unit as she continues to report overall relief in session. Continued with dorsal scapular strength with inclusion of seated lat pull down and standing \"Y\", as well as resumed seated Tspine extension for self mobilization with great tolerance. Will continue to monitor symptoms and progress as appropriate.  -MO        PT Plan    PT Plan Comments d/c to HEP, update-- traction demo  -MO               User Key  (r) = Recorded By, (t) = Taken By, (c) = Cosigned By      Initials Name Provider Type    Erica Evangelista, PT Physical Therapist                     Modalities       Row Name 04/15/25 0900             Traction 86721    Traction Type Cervical  -MO      PT Traction Rx Minutes 15  -MO      Duration Intermittent  -MO      Position Hook-lying  -MO      Weight 15  -MO      Hold 60  -MO      Relax 20  -MO                User Key  (r) = Recorded By, (t) = Taken By, (c) = Cosigned By      Initials Name Provider Type    Erica Evangelista, PT Physical Therapist                   OP Exercises       Row Name 04/15/25 0800             Subjective    Subjective Comments Still have some numbness but my shoulder and neck feel so much better  -MO         Total Minutes    16548 - PT Therapeutic Exercise Minutes 28  -MO         Exercise 1    Exercise Name 1 UBE  -MO      Time 1 2/2  -MO         Exercise 6    Exercise Name 6 rows  -MO      Cueing 6 Verbal  -MO      Sets 6 2  -MO      Reps 6 20  -MO         Exercise 11    Exercise Name 11 lateral wall walking  -MO      Cueing 11 Verbal  -MO      Reps 11 4 laps  -MO      Time 11 RTB  -MO         Exercise 12    Exercise Name 12 seated lat pull down  -MO      Cueing 12 Verbal;Demo  -MO      Reps 12 2x12 Blue TB  -MO         Exercise 13    Exercise Name 13 standing \"Y\"  -MO      Cueing 13 " Verbal;Demo  -MO      Sets 13 1  -MO      Reps 13 10  -MO      Time 13 YTB  -MO      Additional Comments cueing for TrA  -MO                User Key  (r) = Recorded By, (t) = Taken By, (c) = Cosigned By      Initials Name Provider Type    Erica Evangelista, PT Physical Therapist                                                    Time Calculation:   Start Time: 0830  Stop Time: 0913  Time Calculation (min): 43 min  Timed Charges  86075 - PT Therapeutic Exercise Minutes: 28  Untimed Charges  PT Traction Rx Minutes: 15  Total Minutes  Timed Charges Total Minutes: 28  Untimed Charges Total Minutes: 15   Total Minutes: 15  Therapy Charges for Today       Code Description Service Date Service Provider Modifiers Qty    81568096777 HC PT THER PROC EA 15 MIN 4/15/2025 Erica Wynn, PT GP 2    [id number] HC PT TRACTION CERVICAL 4/15/2025 Erica Wynn, PT GP 1                      Erica Wynn PT  4/15/2025

## 2025-04-21 ENCOUNTER — HOSPITAL ENCOUNTER (OUTPATIENT)
Dept: PHYSICAL THERAPY | Facility: HOSPITAL | Age: 85
Setting detail: THERAPIES SERIES
Discharge: HOME | End: 2025-04-21
Payer: MEDICARE

## 2025-04-21 DIAGNOSIS — R20.2 NUMBNESS AND TINGLING OF RIGHT ARM: ICD-10-CM

## 2025-04-21 DIAGNOSIS — M54.2 NECK PAIN: Primary | ICD-10-CM

## 2025-04-21 DIAGNOSIS — R29.898 DECREASED ROM OF NECK: ICD-10-CM

## 2025-04-21 DIAGNOSIS — M54.12 CERVICAL RADICULOPATHY: ICD-10-CM

## 2025-04-21 DIAGNOSIS — R20.0 NUMBNESS AND TINGLING OF RIGHT ARM: ICD-10-CM

## 2025-04-21 PROCEDURE — 97110 THERAPEUTIC EXERCISES: CPT

## 2025-04-21 PROCEDURE — 97530 THERAPEUTIC ACTIVITIES: CPT

## 2025-04-21 PROCEDURE — 97012 MECHANICAL TRACTION THERAPY: CPT

## 2025-04-21 NOTE — THERAPY DISCHARGE NOTE
Outpatient Physical Therapy Ortho Progress Note/Discharge Summary  [facility]     Patient Name: Ashley Motta  : 1940  MRN: [id number]  Today's Date: 2025      Visit Date: 2025    Visit Dx:    ICD-10-CM ICD-9-CM   1. Neck pain  M54.2 723.1   2. Cervical radiculopathy  M54.12 723.4   3. Numbness and tingling of right arm  R20.0 782.0    R20.2    4. Decreased ROM of neck  R29.898 723.8       Patient Active Problem List   Diagnosis    Osteopenia    Hyperlipidemia    Prediabetes    Osteoarthritis    BALDEMAR on CPAP    Pelvic relaxation due to vaginal prolapse    Chronic pain of both knees    Arthritis of right knee    Arthritis of left knee    Arthritis of both knees    Restless leg syndrome    Dizziness    Headache, migraine    Heartburn    Internal hemorrhoids with complication    Impacted cerumen of left ear    Vitamin E deficiency    Environmental and seasonal allergies    Chronic cough    Aftercare following joint replacement surgery    Allergic rhinitis, unspecified    Anxiety disorder, unspecified    Atopic dermatitis, unspecified    Constipation, unspecified    Localized osteoporosis without current pathological fracture    Gastro-esophageal reflux disease without esophagitis    Generalized muscle weakness    Iron deficiency anemia due to chronic blood loss    Personal history of other malignant neoplasm of skin    Presence of right artificial knee joint    Migraine, unspecified, not intractable, without status migrainosus    S/P total knee arthroplasty, left    Benign neoplasm of unspecified choroid    Disorder of bone density and structure, unspecified    Localized osteoporosis ([place])    Long term (current) use of anticoagulants    Thyroid nodule        Past Medical History:   Diagnosis Date    Anxiety     SITUATIONAL R/T DEATH OF HER SPOUSE    Balance problem     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 TERRY CHEUNG    Osteoarthritis     Osteopenia     Rectal nodule 03/2020    REFERRED TO DR. PAMELA NAPOLES    Rectocele 07/2017    RLS (restless legs syndrome)     Seborrheic keratosis     Skin cancer 04/2015    LOWER LEG, FOLLOWED BY DR. ATUL SANCHEZ    Vertigo         Past Surgical History:   Procedure Laterality Date    CATARACT EXTRACTION, BILATERAL Bilateral 2015    Dr. Yost    COLONOSCOPY N/A 02/27/2015    1 TUBULAR ADENOMA POLYP IN DISTAL [facility]END[facility]    COLONOSCOPY N/A 03/04/2020    10 MM SESSILE SERRATED ADENOMA POLYP IN ASCENDING, 3 MM HYPERPLASTIC POLYP IN RECTUM, 7 MM ANAL NODULE, DR. DARRIAN LIRIANO AT [facility]    COLONOSCOPY N/A 09/24/2024    Procedure: COLONOSCOPY into cecum and TI with saline lift injection and hot snare polypectomy with x3 clips placed;  Surgeon: Darrian Liriano MD;  Location: [facility] ENDOSCOPY;  Service: Gastroenterology;  Laterality: N/A;  pre: hx of colon polyps   post:: polyp, diverticulosis    HYSTERECTOMY N/A 01/19/2004    KAYLEE WITH BSO, DR. RAFIA MORTENSEN AT [facility]    KNEE ARTHROSCOPY Left 2013    KNEE ARTHROSCOPY Right 2010    MELISSA CULDOPLASTY N/A 01/19/2004    DR. RAFIA MORTENSEN AT [facility]    SACROCOLPOPEXY N/A     TOTAL KNEE ARTHROPLASTY Right 04/11/2023    Procedure: RIGHT TOTAL KNEE ARTHROPLASTY WITH SHERI NAVIGATION;  Surgeon: Atul Liriano MD;  [place] [facility] [place] [facility] [place]  Service: Orthopedics;  Laterality: Right;    TOTAL KNEE ARTHROPLASTY Left 11/14/2023    Procedure: LEFT TOTAL KNEE ARTHROPLASTY WITH SHERI NAVIGATION;  Surgeon: Atul Liriano MD;  [place] [facility] [place] [facility] [place]  Service: Orthopedics;  Laterality: Left;        PT Ortho       Row Name 04/21/25 1000        Strength Right    Right   Test 1 21  -MO    Right  Test 2 22  -MO    Right  Test 3 26  -MO     Strength Average Right 23  -MO        Strength Left    Left  Test 1 37  -MO    Left  Test 2 40  -MO    Left  Test 3 41  -MO     Strength Average Left 39.33  -MO              User Key  (r) = Recorded By, (t) = Taken By, (c) = Cosigned By      Initials Name Provider Type    Erica Evangelista, PT Physical Therapist                                 PT Assessment/Plan       Row Name 04/21/25 1000          PT Assessment    Assessment Comments Ashley Motta was seen for 18 physical therapy sessions for cpsine and RUE pain.  Treatment included therapeutic exercise, manual therapy, therapeutic activity, patient education with home exercise program , and cervical traction. Progress to physical therapy goals was good. Pt met 4/4 STG and 1/4 LTG however had been progressing well towards all other goals. She reports great resolution in cervical pain, stiffness, and ROM deficits at this time. She has made improvements in all cardinal ROM without pain. At this time, she does continue to have near constant N/T into the R hand which is her greatest irritation. She remains (+) with cervical neural compression testings. She continues to have a notable reduction in  strength bilaterally however R  has improved from 6lbs at evaluation to 23lbs this date. She has been tolerating mechanical cervical traction well and remains appropriate for home device. Will continue to f/u and plan for an additional session to instruct pt on home use. Time spent discussing advanced HEP and appropriate progressions/modifications to make based on response following discharge and optimal frequency/duration to complete HEP over next several weeks-months. Patient verbalized understanding. She was discharged to an independent HEP and provided patient education to self-manage condition. Encouraged pt to f/u with MD at this time to discussion ongoing  "symptoms despite conservative measures taken.  -MO        PT Plan    PT Plan Comments d/c  -MO               User Key  (r) = Recorded By, (t) = Taken By, (c) = Cosigned By      Initials Name Provider Type    Erica Evangelista, PT Physical Therapist                     Modalities       Row Name 04/21/25 0900             Traction 68189    Traction Type Cervical  -MO      PT Traction Rx Minutes 15  -MO      Duration Intermittent  -MO      Position Hook-lying  -MO      Weight 18  -MO      Hold 60  -MO      Relax 15  -MO                User Key  (r) = Recorded By, (t) = Taken By, (c) = Cosigned By      Initials Name Provider Type    Erica Evangelista, PT Physical Therapist                     OP Exercises       Row Name 04/21/25 0900             Total Minutes    23889 - PT Therapeutic Exercise Minutes 23  -MO      43062 - PT Therapeutic Activity Minutes 18  -MO         Exercise 1    Exercise Name 1 UBE  -MO      Time 1 2/2  -MO         Exercise 2    Exercise Name 2 wall slides + lift  -MO      Cueing 2 Verbal;Demo  -MO      Sets 2 1  -MO      Reps 2 15  -MO      Time 2 3s  -MO         Exercise 3    Exercise Name 3 progress note, d/c discussion  -MO         Exercise 5    Exercise Name 5 standing straight arm theraloop flex  -MO      Cueing 5 Verbal  -MO      Sets 5 2  -MO      Reps 5 10  -MO      Time 5 RTB  -MO         Exercise 11    Exercise Name 11 lateral wall walking  -MO      Cueing 11 Verbal  -MO      Reps 11 4 laps  -MO      Time 11 RTB  -MO         Exercise 12    Exercise Name 12 seated lat pull down  -MO      Cueing 12 Verbal  -MO      Reps 12 2x12 Blue TB  -MO         Exercise 13    Exercise Name 13 standing \"Y\"  -MO      Cueing 13 Verbal  -MO      Sets 13 1  -MO      Reps 13 10  -MO      Time 13 YTB  -MO                User Key  (r) = Recorded By, (t) = Taken By, (c) = Cosigned By      Initials Name Provider Type    Erica Evangelista PT Physical Therapist                                    PT OP Goals       Row " Name 04/21/25 1000          PT Short Term Goals    STG Date to Achieve 02/13/25  -MO     STG 1 Pt will be independent with initial HEP and postural awareness to improve pain and symptoms.  -MO     STG 1 Progress Met  -MO     STG 2 Pt to be educated in/verbalize understanding of the importance of posture/ergonomics in association with their condition to facilitate self management of their condition.  -MO     STG 2 Progress Met  -MO     STG 3 Pt will improve cervical rotation AROM from 30 degrees to 45 degrees bilaterally to improve ability to drive safely.  -MO     STG 3 Progress Met  -MO     STG 4 Pt will report 50% reduction in R UE N/T symptoms to demonstrate improved management of symptoms.  -MO     STG 4 Progress Met  -MO        Long Term Goals    LTG Date to Achieve 03/15/25  -MO     LTG 1 Pt will be independent with advance HEP and postural awareness for continued management of pain and symptoms.  -MO     LTG 1 Progress Met  -MO     LTG 2 Pt will improve NDI perceived disability from 10/50 to 3/50 to improve overall quality of life.  -MO     LTG 2 Progress Ongoing  -MO     LTG 3 Pt will decrease neck pain from 5/10 to </= 1/10 to improve participation in ADLs.  -MO     LTG 3 Progress Ongoing  -MO     LTG 4 Pt will demonstrate bilateral  strength to 25# in order to increase ability to perform fine motor tasks including gripping and writing while participating in work related duties.  -MO     LTG 4 Progress Ongoing;Partially Met  -MO               User Key  (r) = Recorded By, (t) = Taken By, (c) = Cosigned By      Initials Name Provider Type    Erica Evangelista, PT Physical Therapist                                   Time Calculation:   Start Time: 0930  Stop Time: 1026  Time Calculation (min): 56 min  Timed Charges  61988 - PT Therapeutic Exercise Minutes: 23  76870 - PT Therapeutic Activity Minutes: 18  Untimed Charges  PT Traction Rx Minutes: 15  Total Minutes  Timed Charges Total Minutes: 41  Untimed  Charges Total Minutes: 15   Total Minutes: 56  Therapy Charges for Today       Code Description Service Date Service Provider Modifiers Qty    49220631445  PT THER PROC EA 15 MIN 4/21/2025 Erica Wynn, PT GP 2    [id number]  PT THERAPEUTIC ACT EA 15 MIN 4/21/2025 Erica Wynn, PT GP 1    [id number]  PT TRACTION CERVICAL 4/21/2025 Erica Wynn, PT GP 1                         Erica Wynn, PT  4/21/2025

## 2025-04-29 ENCOUNTER — TELEPHONE (OUTPATIENT)
Age: 85
End: 2025-04-29
Payer: COMMERCIAL

## 2025-04-29 ENCOUNTER — PATIENT OUTREACH (OUTPATIENT)
Dept: CASE MANAGEMENT | Facility: OTHER | Age: 85
End: 2025-04-29
Payer: COMMERCIAL

## 2025-04-29 DIAGNOSIS — R73.03 PREDIABETES: ICD-10-CM

## 2025-04-29 DIAGNOSIS — M19.09 OSTEOARTHRITIS OF OTHER SITE, UNSPECIFIED OSTEOARTHRITIS TYPE: Primary | ICD-10-CM

## 2025-04-29 NOTE — TELEPHONE ENCOUNTER
Caller: Ashley Motta    Relationship to patient: Self    Best call back number: [phone] (home)     Patient is needing: PATIENT RECEIVED A LETTER FROM MEDICARE STATING THAT HER PROCEDURE ON 2/25/25 WAS NOT COVERED - SHE HAD BEEN TOLD IT WOULD BE COVERED DUE TO HER NEUROPATHY AND BEING PRE-DIABETIC     PATIENT WANTS TO MAKE SURE THIS WAS BILLED CORRECTLY

## 2025-04-29 NOTE — OUTREACH NOTE
AMBULATORY CASE MANAGEMENT NOTE    Names and Relationships of Patient/Support Persons: Contact: Ashley Motta; Relationship: Self -     Patient Outreach    Patient called [facility]. She states she was referred to the podiatrist by PCP. She states she received a bill from the doctor's office because Medicare did not pay for her treatment.  Advised to call Medicare and find out why they did not pay the bill. Advised that it could just be a clerical error. Advised to call and update the doctor's office once she has talked to Medicare.       Aleisha ARMSTRONG  Ambulatory Case Management    4/29/2025, 14:49 EDT

## 2025-05-15 ENCOUNTER — TELEPHONE (OUTPATIENT)
Dept: CASE MANAGEMENT | Facility: OTHER | Age: 85
End: 2025-05-15
Payer: COMMERCIAL

## 2025-05-16 ENCOUNTER — PATIENT OUTREACH (OUTPATIENT)
Dept: CASE MANAGEMENT | Facility: OTHER | Age: 85
End: 2025-05-16
Payer: COMMERCIAL

## 2025-05-16 DIAGNOSIS — M19.09 OSTEOARTHRITIS OF OTHER SITE, UNSPECIFIED OSTEOARTHRITIS TYPE: Primary | ICD-10-CM

## 2025-05-16 DIAGNOSIS — R73.03 PREDIABETES: ICD-10-CM

## 2025-05-16 NOTE — OUTREACH NOTE
"AMBULATORY CASE MANAGEMENT NOTE    Names and Relationships of Patient/Support Persons: Contact: Ashley Motta; Relationship: Self -     Patient Outreach    Called patient for scheduled outreach. She states she called the orthopedic office regarding the bill she received. She states they were going to resubmit it to Medicare. She states she has another appointment at the end of the month.    Patient reports numbness in her right hand and a \"trigger finger\" right ring finger. She is requesting a referral to a hand specialist, but is unsure if she needs to see PCP first. Attempted to transfer her call, but was unsuccessful. She states she will call the office on Monday.     Adult Patient Profile  Questions/Answers      Flowsheet Row Most Recent Value   Symptoms/Conditions Managed at Home musculoskeletal   Barriers to Managing Health none   Musculoskeletal Symptoms/Conditions other (see comments)   Musculoskeletal Management Strategies other (see comments)   Musculoskeletal Comment Patient reports having a trigger finger ring finger right hand. She is requesting a referral to a hand specialist for evaluation.          Aleisha ARMSTRONG  Ambulatory Case Management    5/16/2025, 12:13 EDT  "

## 2025-05-16 NOTE — PLAN OF CARE
Problem: General Plan of Care  Goal: Make and Keep All Appointments  Outcome: Progressing  Goal: Protect My Health  Outcome: Progressing  Goal: Manage My Emotions  Outcome: Progressing  Goal: Maintain My Quality of Life  Outcome: Progressing  Goal: Optimal Care Coordination  Outcome: Progressing  Intervention: Support and Maintain Acceptable Degree of Health, Comfort and Happiness  Flowsheets (Taken 5/16/2025 1212)  Support and Maintain Acceptable Degree of Health, Comfort and Happiness: wellness behaviors promoted

## 2025-05-19 ENCOUNTER — TELEPHONE (OUTPATIENT)
Dept: INTERNAL MEDICINE | Facility: CLINIC | Age: 85
End: 2025-05-19

## 2025-05-19 DIAGNOSIS — M65.341 TRIGGER RING FINGER OF RIGHT HAND: Primary | ICD-10-CM

## 2025-05-19 NOTE — TELEPHONE ENCOUNTER
I called Pt letting her know Dr Bautista is happy to put a referral in for her finger.  She does not need to come in for a OV.  Told Pt she will receive a call from that office in 5-7 business days.  Pt states that she does not answer the phone if it's a number she doesn't recognize.  I let her know, she will not get this appt if she doesn't answer her phone.  Pt states she start answering the phone on day 5

## 2025-05-28 ENCOUNTER — TELEPHONE (OUTPATIENT)
Dept: ORTHOPEDIC SURGERY | Facility: CLINIC | Age: 85
End: 2025-05-28
Payer: COMMERCIAL

## 2025-05-28 NOTE — TELEPHONE ENCOUNTER
Patient advised she is calling back about her billing issue for her last visit. Patient stated she is also wanting to know if she should cancel her upcoming appointment on Friday as well.

## 2025-05-29 NOTE — TELEPHONE ENCOUNTER
Left detailed message for patient that a billing and coding query has been submitted for office not to be resubmitted. Will contact patient back when I have a response.

## 2025-06-04 NOTE — TELEPHONE ENCOUNTER
Can you all please call her and let her know that the billing issue has been corrected and resubmitted to insurance so she can disregard the bill. Billins and coding are resubmitting, Please confirm she will keep her appt coming up with Jennifer.

## 2025-06-04 NOTE — TELEPHONE ENCOUNTER
LVM FOR PATIENT LETTING HER KNOW TO DISREGARD BILL AND BILLING AND CODING ARE RESUBMITTING CORRECTED INFORMATION. PATIENTS DISCRETION TO KEEP OR NOT NEXT APPT.    OK TO RELAY

## 2025-06-06 ENCOUNTER — OFFICE VISIT (OUTPATIENT)
Dept: INTERNAL MEDICINE | Facility: CLINIC | Age: 85
End: 2025-06-06
Payer: MEDICARE

## 2025-06-06 VITALS
BODY MASS INDEX: 30.51 KG/M2 | DIASTOLIC BLOOD PRESSURE: 72 MMHG | HEART RATE: 78 BPM | WEIGHT: 165.8 LBS | TEMPERATURE: 96.9 F | RESPIRATION RATE: 14 BRPM | SYSTOLIC BLOOD PRESSURE: 140 MMHG | OXYGEN SATURATION: 96 % | HEIGHT: 62 IN

## 2025-06-06 DIAGNOSIS — R05.1 ACUTE COUGH: Primary | ICD-10-CM

## 2025-06-06 LAB
EXPIRATION DATE: NORMAL
EXPIRATION DATE: NORMAL
FLUAV AG UPPER RESP QL IA.RAPID: NOT DETECTED
FLUBV AG UPPER RESP QL IA.RAPID: NOT DETECTED
INTERNAL CONTROL: NORMAL
INTERNAL CONTROL: NORMAL
Lab: NORMAL
Lab: NORMAL
S PYO AG THROAT QL: NEGATIVE
SARS-COV-2 AG UPPER RESP QL IA.RAPID: NOT DETECTED

## 2025-06-06 PROCEDURE — 1159F MED LIST DOCD IN RCRD: CPT | Performed by: STUDENT IN AN ORGANIZED HEALTH CARE EDUCATION/TRAINING PROGRAM

## 2025-06-06 PROCEDURE — 87428 SARSCOV & INF VIR A&B AG IA: CPT | Performed by: STUDENT IN AN ORGANIZED HEALTH CARE EDUCATION/TRAINING PROGRAM

## 2025-06-06 PROCEDURE — 1126F AMNT PAIN NOTED NONE PRSNT: CPT | Performed by: STUDENT IN AN ORGANIZED HEALTH CARE EDUCATION/TRAINING PROGRAM

## 2025-06-06 PROCEDURE — 99213 OFFICE O/P EST LOW 20 MIN: CPT | Performed by: STUDENT IN AN ORGANIZED HEALTH CARE EDUCATION/TRAINING PROGRAM

## 2025-06-06 PROCEDURE — 87880 STREP A ASSAY W/OPTIC: CPT | Performed by: STUDENT IN AN ORGANIZED HEALTH CARE EDUCATION/TRAINING PROGRAM

## 2025-06-06 PROCEDURE — 1160F RVW MEDS BY RX/DR IN RCRD: CPT | Performed by: STUDENT IN AN ORGANIZED HEALTH CARE EDUCATION/TRAINING PROGRAM

## 2025-06-06 RX ORDER — BENZONATATE 100 MG/1
100 CAPSULE ORAL 3 TIMES DAILY PRN
Qty: 21 CAPSULE | Refills: 0 | Status: SHIPPED | OUTPATIENT
Start: 2025-06-06 | End: 2025-06-13

## 2025-06-06 NOTE — PROGRESS NOTES
"Chief Complaint  Dizziness (Started Sunday, unsure of fever, denies bodyaches), Sore Throat (Hurts to swallow, denies ear pain or pressure), Cough (Deep, nonproductive cough), and Headache    Basilio Motta presents to [facility]  History of Present Illness  This is an 85-year-old female here for several days of nonproductive cough sore throat and headache.  No associated fever ear pain or pressure.  No changes in breathing.  Has been taking Tylenol over-the-counter with some benefit.    Objective   Vital Signs:  /72   Pulse 78   Temp 96.9 °F (36.1 °C) (Infrared)   Resp 14   Ht 157.5 cm (62\")   Wt 75.2 kg (165 lb 12.8 oz)   SpO2 96%   BMI 30.33 kg/m²   Estimated body mass index is 30.33 kg/m² as calculated from the following:    Height as of this encounter: 157.5 cm (62\").    Weight as of this encounter: 75.2 kg (165 lb 12.8 oz).            Physical Exam  Vitals reviewed.   Constitutional:       Appearance: Normal appearance. She is not toxic-appearing.   HENT:      Nose: Congestion present.   Cardiovascular:      Rate and Rhythm: Normal rate and regular rhythm.      Heart sounds: No murmur heard.  Pulmonary:      Effort: Pulmonary effort is normal. No respiratory distress.      Breath sounds: No wheezing.   Skin:     General: Skin is warm and dry.   Neurological:      Mental Status: She is alert.   Psychiatric:         Mood and Affect: Mood normal.         Judgment: Judgment normal.        Result Review :                Assessment and Plan   Diagnoses and all orders for this visit:    1. Acute cough (Primary)  -     POCT rapid strep A  -     POCT SARS-CoV-2 Antigen VALE + Flu    Other orders  -     phenol (CHLORASEPTIC) 1.4 % liquid liquid; Apply 1 spray to the mouth or throat Every 2 (Two) Hours As Needed (aore throat) for up to 5 days.  Dispense: 20 mL; Refill: 0  -     benzonatate (Tessalon Perles) 100 MG capsule; Take 1 capsule by mouth 3 (Three) Times " a Day As Needed for Cough for up to 7 days.  Dispense: 21 capsule; Refill: 0      Discussed COVID flu and strep testing in office were negative.  Advised this is likely a viral upper respiratory infection.  She reports Tessalon Perles were helpful when she had the flu back in March.  Will treat symptomatically and encouraged rest and hydration.  She will advise if she develops any new or worsening symptoms such as fever or changes in breathing or change in nature of the cough which has been dry.         Follow Up   Return in about 3 months (around 9/6/2025), or if symptoms worsen or fail to improve.  Patient was given instructions and counseling regarding her condition or for health maintenance advice. Please see specific information pulled into the AVS if appropriate.

## 2025-06-09 ENCOUNTER — OFFICE (OUTPATIENT)
Dept: URBAN - METROPOLITAN AREA CLINIC 76 | Facility: CLINIC | Age: 85
End: 2025-06-09
Payer: MEDICARE

## 2025-06-09 VITALS
HEIGHT: 63 IN | WEIGHT: 165 LBS | DIASTOLIC BLOOD PRESSURE: 70 MMHG | HEART RATE: 79 BPM | SYSTOLIC BLOOD PRESSURE: 130 MMHG | OXYGEN SATURATION: 97 %

## 2025-06-09 DIAGNOSIS — K59.00 CONSTIPATION, UNSPECIFIED: ICD-10-CM

## 2025-06-09 DIAGNOSIS — K62.0 ANAL POLYP: ICD-10-CM

## 2025-06-09 PROCEDURE — 99213 OFFICE O/P EST LOW 20 MIN: CPT

## 2025-06-16 ENCOUNTER — PATIENT OUTREACH (OUTPATIENT)
Dept: CASE MANAGEMENT | Facility: OTHER | Age: 85
End: 2025-06-16
Payer: COMMERCIAL

## 2025-06-16 DIAGNOSIS — R73.03 PREDIABETES: ICD-10-CM

## 2025-06-16 DIAGNOSIS — M19.09 OSTEOARTHRITIS OF OTHER SITE, UNSPECIFIED OSTEOARTHRITIS TYPE: Primary | ICD-10-CM

## 2025-06-16 NOTE — PLAN OF CARE
Problem: General Plan of Care  Goal: Make and Keep All Appointments  Outcome: Met  Intervention: My Appointments To Do List  Description:   Why is this important?  Part of staying healthy is seeing the doctor for follow-up care.  If you forget your appointments, there are some things you can do to stay on track.    Goal: Protect My Health  Outcome: Met  Intervention: My Health Protection To Do List  Description:   Why is this important?  Screening tests can find diseases early when they are easier to treat.  Your doctor or nurse will talk with you about which tests are important for you.  Getting shots for common diseases like the flu and shingles will help prevent them.    Goal: Manage My Emotions  Outcome: Met  Intervention: My Emotion Management To Do List  Description:   Why is this important?  When you are stressed, down or upset, your body reacts too.  For example, your blood pressure may get higher; you may have a headache or stomachache.  When your emotions get the best of you, your body's ability to fight off cold and flu gets weak.  These steps will help you manage your emotions.    Goal: Maintain My Quality of Life  Outcome: Met  Intervention: My Quality of Life To Do List  Description:   Why is this important?  Having a long-term illness can be scary.  It can also be stressful for you and your caregiver.  These steps may help.    Goal: Optimal Care Coordination  Outcome: Met  Intervention: Support and Maintain Acceptable Degree of Health, Comfort and Happiness

## 2025-06-16 NOTE — OUTREACH NOTE
AMBULATORY CASE MANAGEMENT NOTE    Names and Relationships of Patient/Support Persons: Contact: Ashley Motta; Relationship: Self -     Patient Outreach    Called patient for scheduled outreach. She states she is doing well. She states she has an appointment with the doctor at the orthopedic office to evaluate her trigger finger. She does complain of mild pain in that finger. She reports receiving the home traction machine for her neck and plans on calling the physical therapist so they can show her how to operate it.     Discussed graduation and is agreeable.  Patient has met care plan goals, all care gaps have been addressed, and patient has a strong sense of health self-management. Will change program status from engaged to monitoring and perform a chart review next month.        Aleisha ARMSTRONG  Ambulatory Case Management    6/16/2025, 10:44 EDT

## 2025-07-08 ENCOUNTER — HOSPITAL ENCOUNTER (OUTPATIENT)
Dept: PHYSICAL THERAPY | Facility: HOSPITAL | Age: 85
Setting detail: THERAPIES SERIES
Discharge: HOME | End: 2025-07-08
Payer: MEDICARE

## 2025-07-08 DIAGNOSIS — M54.12 CERVICAL RADICULOPATHY: ICD-10-CM

## 2025-07-08 DIAGNOSIS — R20.2 NUMBNESS AND TINGLING OF RIGHT ARM: ICD-10-CM

## 2025-07-08 DIAGNOSIS — R20.0 NUMBNESS AND TINGLING OF RIGHT ARM: ICD-10-CM

## 2025-07-08 DIAGNOSIS — R29.898 DECREASED ROM OF NECK: ICD-10-CM

## 2025-07-08 DIAGNOSIS — M54.2 NECK PAIN: Primary | ICD-10-CM

## 2025-07-08 PROCEDURE — 97530 THERAPEUTIC ACTIVITIES: CPT

## 2025-07-08 NOTE — THERAPY RE-EVALUATION
Outpatient Physical Therapy Ortho Re-Evaluation  [facility]     Patient Name: Ashley Motta  : 1940  MRN: [id number]  Today's Date: 2025      Visit Date: 2025    Patient Active Problem List   Diagnosis    Osteopenia    Hyperlipidemia    Prediabetes    Osteoarthritis    BALDEMAR on CPAP    Pelvic relaxation due to vaginal prolapse    Chronic pain of both knees    Arthritis of right knee    Arthritis of left knee    Arthritis of both knees    Restless leg syndrome    Dizziness    Headache, migraine    Heartburn    Internal hemorrhoids with complication    Impacted cerumen of left ear    Vitamin E deficiency    Environmental and seasonal allergies    Chronic cough    Aftercare following joint replacement surgery    Allergic rhinitis, unspecified    Anxiety disorder, unspecified    Atopic dermatitis, unspecified    Constipation, unspecified    Localized osteoporosis without current pathological fracture    Gastro-esophageal reflux disease without esophagitis    Generalized muscle weakness    Iron deficiency anemia due to chronic blood loss    Personal history of other malignant neoplasm of skin    Presence of right artificial knee joint    Migraine, unspecified, not intractable, without status migrainosus    S/P total knee arthroplasty, left    Benign neoplasm of unspecified choroid    Disorder of bone density and structure, unspecified    Localized osteoporosis ([place])    Long term (current) use of anticoagulants    Thyroid nodule        Past Medical History:   Diagnosis Date    Anxiety     SITUATIONAL R/T DEATH OF HER SPOUSE    Balance problem     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 SARAH LIRIANO    Endothelial corneal dystrophy 2020    GERD (gastroesophageal reflux disease)     Goiter, nontoxic, multinodular 2017    THYROID POLYP SEES     Hyperlipidemia     Impaired fasting glucose     Left knee pain     Migraine     BALDEMAR on CPAP     FOLLOWED BY  DR. STEWART SAYIED    Osteoarthritis     Osteopenia     Rectal nodule 03/2020    REFERRED TO DR. PAMELA NAPOLES    Rectocele 07/2017    RLS (restless legs syndrome)     Seborrheic keratosis     Skin cancer 04/2015    LOWER LEG, FOLLOWED BY DR. ATUL SANCHEZ    Vertigo         Past Surgical History:   Procedure Laterality Date    CATARACT EXTRACTION, BILATERAL Bilateral 2015    Dr. Yost    COLONOSCOPY N/A 02/27/2015    1 TUBULAR ADENOMA POLYP IN DISTAL [facility]ENDING[facility]    COLONOSCOPY N/A 03/04/2020    10 MM SESSILE SERRATED ADENOMA POLYP IN ASCENDING, 3 MM HYPERPLASTIC POLYP IN RECTUM, 7 MM ANAL NODULE, DR. DARRIAN LIRIANO AT [facility]    COLONOSCOPY N/A 09/24/2024    Procedure: COLONOSCOPY into cecum and TI with saline lift injection and hot snare polypectomy with x3 clips placed;  Surgeon: Darrian Liriano MD;  [facility];  Service: Gastroenterology;  Laterality: N/A;  pre: hx of colon polyps   post:: polyp, diverticulosis    HYSTERECTOMY N/A 01/19/2004    KAYLEE WITH BSO, DR. RAFIA MORTENSEN AT [facility]    KNEE ARTHROSCOPY Left 2013    KNEE ARTHROSCOPY Right 2010    MELISSA CULDOPLASTY N/A 01/19/2004    DR. RAFIA MORTENSEN AT [facility]    SACROCOLPOPEXY N/A     TOTAL KNEE ARTHROPLASTY Right 04/11/2023    Procedure: RIGHT TOTAL KNEE ARTHROPLASTY WITH SHERI NAVIGATION;  Surgeon: Atul Liriano MD;  Location:  [facility];  Service: Orthopedics;  Laterality: Right;    TOTAL KNEE ARTHROPLASTY Left 11/14/2023    Procedure: LEFT TOTAL KNEE ARTHROPLASTY WITH SHERI NAVIGATION;  Surgeon: Atul Liriano MD;  Location:  [facility];  Service: Orthopedics;  Laterality: Left;       Visit Dx:     ICD-10-CM ICD-9-CM   1. Neck pain  M54.2 723.1   2. Cervical radiculopathy  M54.12 723.4   3. Numbness and tingling of right arm  R20.0 782.0    R20.2    4. Decreased ROM of neck  R29.898 723.8                             Therapy Education  Education Details: Education on device and set up. Parameters  as follows: For the first 1-2 weeks, increase tolerance to 10-15lbs of pressure for 10 minutes daily. Following 2 weeks: slow progression to 15-25lbs of pressure (25lbs max) for 15-20 minutes 3-4x's per week. Gradual progression to pt tolerance  Given: HEP, Symptoms/condition management  Program: New  How Provided: Verbal, Demonstration, Written  Provided to: Patient  Level of Understanding: Verbalized, Demonstrated, Teach back education performed      PT OP Goals       Row Name 07/08/25 1600          PT Short Term Goals    STG Date to Achieve 08/07/25  -MO     STG 1 Pt will be independent with home cervical traction unit for long term management of condition.  -MO     STG 1 Progress New  -MO        Time Calculation    PT Goal Re-Cert Due Date 10/06/25  -MO               User Key  (r) = Recorded By, (t) = Taken By, (c) = Cosigned By      Initials Name Provider Type    Erica Evangelista, PT Physical Therapist                     PT Assessment/Plan       Row Name 07/08/25 1600          PT Assessment    Functional Limitations Limitation in home management;Limitations in community activities;Limitations in functional capacity and performance;Performance in leisure activities;Performance in self-care ADL  -MO     Impairments Impaired flexibility;Impaired muscle endurance;Impaired muscle length;Impaired muscle power;Impaired postural alignment;Joint mobility;Motor function;Muscle strength;Pain;Peripheral nerve integrity;Poor body mechanics;Posture;Range of motion  -MO     Assessment Comments Ms. Ashley Motta returns to PT following discharge in April 2025 with instructions for home cervical traction unit secondary to ongoing cervical pain and RUE radicular symptoms. She returns today with martell unit needing education on set up and parameters. Since discharge, she remains symptoms have been good, no increase at this time but does continue to have N/T into the hand. Spent session educating on device usage, independent set  up, and parameters as described in eduction tab. She demonstrates appropriately several times and verbalizes understanding. At this time, she has no additional need for ongoing skilled PT services, did encourage her to f/u as needed if symptoms worsen or change. She remains appropriate for IND management.  -MO     Please refer to paper survey for additional self-reported information No  -MO     Rehab Potential Excellent  -MO     Patient/caregiver participated in establishment of treatment plan and goals Yes  -MO     Patient would benefit from skilled therapy intervention Yes  -MO        PT Plan    PT Frequency One time visit  -MO     Predicted Duration of Therapy Intervention (PT) one time visit  -MO     Planned CPT's? PT RE-EVAL: 35971;PT THER PROC EA 15 MIN: 34424;PT THER ACT EA 15 MIN: 14964;PT MANUAL THERAPY EA 15 MIN: 47408;PT NEUROMUSC RE-EDUCATION EA 15 MIN: 40987;PT SELF CARE/HOME MGMT/TRAIN EA 15: 29734  -MO     PT Plan Comments return as needed for symptom management  -MO               User Key  (r) = Recorded By, (t) = Taken By, (c) = Cosigned By      Initials Name Provider Type    Erica Evangelista, PT Physical Therapist                       OP Exercises       Row Name 07/08/25 1600             Subjective    Subjective Comments Pt returns for assist with home cervical traction unit  -MO         Total Minutes    02832 - PT Therapeutic Activity Minutes 30  -MO         Exercise 1    Exercise Name 1 Home traction unit demo and education  -MO                User Key  (r) = Recorded By, (t) = Taken By, (c) = Cosigned By      Initials Name Provider Type    Erica Evangelista PT Physical Therapist                                            Time Calculation:     Start Time: 1130  Stop Time: 1200  Time Calculation (min): 30 min  Timed Charges  90922 - PT Therapeutic Activity Minutes: 30  Total Minutes  Timed Charges Total Minutes: 30   Total Minutes: 30     Therapy Charges for Today       Code Description Service  Date Service Provider Modifiers Qty    [id number]  PT THERAPEUTIC ACT EA 15 MIN 7/8/2025 Erica Wynn, PT GP 2                      Erica Wynn, PT  7/8/2025

## 2025-07-16 ENCOUNTER — TELEPHONE (OUTPATIENT)
Dept: CASE MANAGEMENT | Facility: OTHER | Age: 85
End: 2025-07-16
Payer: COMMERCIAL

## 2025-07-16 NOTE — TELEPHONE ENCOUNTER
30D chart review performed. No hospitalizations or ED visits noted this past month. Chronic conditions remain stable. Targets and goals of CCM program completed. No changes or new needs identified. Continue CCM monitoring to graduate.

## 2025-08-12 ENCOUNTER — TELEPHONE (OUTPATIENT)
Age: 85
End: 2025-08-12
Payer: COMMERCIAL

## (undated) DEVICE — SNAR POLYP CAPTIVATOR CRSNT 27MM 240CM

## (undated) DEVICE — TRAP FLD MINIVAC MEGADYNE 100ML

## (undated) DEVICE — SUT VIC 2/0 CT2 27IN J269H

## (undated) DEVICE — THE TORRENT IRRIGATION SCOPE CONNECTOR IS USED WITH THE TORRENT IRRIGATION TUBING TO PROVIDE IRRIGATION FLUIDS SUCH AS STERILE WATER DURING GASTROINTESTINAL ENDOSCOPIC PROCEDURES WHEN USED IN CONJUNCTION WITH AN IRRIGATION PUMP (OR ELECTROSURGICAL UNIT).: Brand: TORRENT

## (undated) DEVICE — KT ORCA ORCAPOD DISP STRL

## (undated) DEVICE — SENSR O2 OXIMAX FNGR A/ 18IN NONSTR

## (undated) DEVICE — GLV SURG SENSICARE W/ALOE PF LF 9 STRL

## (undated) DEVICE — OPTIFOAM GENTLE SA, POSTOP, 4X12: Brand: MEDLINE

## (undated) DEVICE — ADAPT CLN BIOGUARD AIR/H2O DISP

## (undated) DEVICE — SYR LUERLOK 30CC

## (undated) DEVICE — TUBING, SUCTION, 1/4" X 10', STRAIGHT: Brand: MEDLINE

## (undated) DEVICE — STERILE PATIENT PROTECTIVE PAD FOR IMP® KNEE POSITIONERS & COHESIVE WRAP (10 / CASE): Brand: DE MAYO KNEE POSITIONER®

## (undated) DEVICE — PREP SOL POVIDONE/IODINE BT 4OZ

## (undated) DEVICE — LN SMPL CO2 SHTRM SD STREAM W/M LUER

## (undated) DEVICE — THE SINGLE USE ETRAP – POLYP TRAP IS USED FOR SUCTION RETRIEVAL OF ENDOSCOPICALLY REMOVED POLYPS.: Brand: ETRAP

## (undated) DEVICE — 2108 SERIES SAGITTAL BLADE, NO OFFSET  (12.4 X 1.19 X 82.1MM)

## (undated) DEVICE — SOL IRR NACL 0.9PCT ARTHROMATIC 3000ML

## (undated) DEVICE — SUT VICRYL 1 CT1 27IN  JJ40G

## (undated) DEVICE — CANN O2 ETCO2 FITS ALL CONN CO2 SMPL A/ 7IN DISP LF

## (undated) DEVICE — DECANTER BAG 9": Brand: MEDLINE INDUSTRIES, INC.

## (undated) DEVICE — APPL CHLORAPREP HI/LITE 26ML ORNG

## (undated) DEVICE — PK KN TOTL 40

## (undated) DEVICE — MAT FLR ABSORBENT LG 4FT 10 2.5FT

## (undated) DEVICE — GLV SURG PREMIERPRO ORTHO LTX PF SZ8.5 BRN

## (undated) DEVICE — THE CARR-LOCKE INJECTION NEEDLE IS A SINGLE USE, DISPOSABLE, FLEXIBLE SHEATH INJECTION NEEDLE USED FOR THE INJECTION OF VARIOUS TYPES OF MEDIA THROUGH FLEXIBLE ENDOSCOPES.

## (undated) DEVICE — PREMIUM WET SKIN PREP TRAY: Brand: MEDLINE INDUSTRIES, INC.

## (undated) DEVICE — UNDERCAST PADDING: Brand: DEROYAL

## (undated) DEVICE — NEEDLE, QUINCKE, 18GX3.5": Brand: MEDLINE

## (undated) DEVICE — TBG PENCL TELESCP MEGADYNE SMOKE EVAC 10FT

## (undated) DEVICE — COVER,MAYO STAND,STERILE: Brand: MEDLINE

## (undated) DEVICE — INSTRUMENT BATTERY

## (undated) DEVICE — PATIENT RETURN ELECTRODE, SINGLE-USE, CONTACT QUALITY MONITORING, ADULT, WITH 9FT CORD, FOR PATIENTS WEIGING OVER 33LBS. (15KG): Brand: MEGADYNE

## (undated) DEVICE — BNDG ELAS ELITE V/CLOSE 6IN 5YD LF STRL

## (undated) DEVICE — SYS CLS SKIN PREMIERPRO EXOFINFUSION 22CM

## (undated) DEVICE — GLV SURG SENSICARE POLYISPRN W/ALOE PF LF 9 GRN STRL

## (undated) DEVICE — SOL NACL 0.9PCT 100ML SGL

## (undated) DEVICE — SINGLE-USE BIOPSY FORCEPS: Brand: RADIAL JAW 4

## (undated) DEVICE — DUAL CUT SAGITTAL BLADE